# Patient Record
Sex: MALE | Race: WHITE | Employment: OTHER | URBAN - METROPOLITAN AREA
[De-identification: names, ages, dates, MRNs, and addresses within clinical notes are randomized per-mention and may not be internally consistent; named-entity substitution may affect disease eponyms.]

---

## 2020-01-01 ENCOUNTER — HOSPITAL ENCOUNTER (OUTPATIENT)
Dept: LAB | Age: 75
Discharge: HOME | End: 2020-11-23
Payer: MEDICARE

## 2020-01-01 ENCOUNTER — OFFICE VISIT (OUTPATIENT)
Dept: CARDIOLOGY CLINIC | Age: 75
End: 2020-01-01
Payer: MEDICARE

## 2020-01-01 ENCOUNTER — HOSPITAL ENCOUNTER (OUTPATIENT)
Dept: GENERAL RADIOLOGY | Age: 75
Discharge: HOME | End: 2020-12-01
Attending: INTERNAL MEDICINE
Payer: MEDICARE

## 2020-01-01 ENCOUNTER — HOSPITAL ENCOUNTER (OUTPATIENT)
Dept: NON INVASIVE DIAGNOSTICS | Age: 75
Discharge: HOME | End: 2020-11-24
Attending: INTERNAL MEDICINE
Payer: MEDICARE

## 2020-01-01 ENCOUNTER — TELEPHONE (OUTPATIENT)
Dept: PULMONOLOGY | Age: 75
End: 2020-01-01

## 2020-01-01 ENCOUNTER — OFFICE VISIT (OUTPATIENT)
Dept: FAMILY MEDICINE CLINIC | Age: 75
End: 2020-01-01
Payer: MEDICARE

## 2020-01-01 ENCOUNTER — DOCUMENTATION ONLY (OUTPATIENT)
Dept: PULMONOLOGY | Age: 75
End: 2020-01-01

## 2020-01-01 VITALS
DIASTOLIC BLOOD PRESSURE: 60 MMHG | SYSTOLIC BLOOD PRESSURE: 110 MMHG | BODY MASS INDEX: 18.04 KG/M2 | HEIGHT: 68 IN | WEIGHT: 119 LBS

## 2020-01-01 VITALS
DIASTOLIC BLOOD PRESSURE: 67 MMHG | RESPIRATION RATE: 18 BRPM | HEART RATE: 98 BPM | TEMPERATURE: 98.7 F | SYSTOLIC BLOOD PRESSURE: 121 MMHG | BODY MASS INDEX: 18.19 KG/M2 | WEIGHT: 120 LBS | HEIGHT: 68 IN | OXYGEN SATURATION: 98 %

## 2020-01-01 VITALS
HEART RATE: 93 BPM | DIASTOLIC BLOOD PRESSURE: 62 MMHG | HEIGHT: 68 IN | SYSTOLIC BLOOD PRESSURE: 98 MMHG | OXYGEN SATURATION: 97 % | BODY MASS INDEX: 18.04 KG/M2 | WEIGHT: 119 LBS

## 2020-01-01 DIAGNOSIS — J47.0 BRONCHIECTASIS WITH ACUTE LOWER RESPIRATORY INFECTION (HCC): ICD-10-CM

## 2020-01-01 DIAGNOSIS — R06.09 DOE (DYSPNEA ON EXERTION): ICD-10-CM

## 2020-01-01 DIAGNOSIS — R91.8 PULMONARY INFILTRATE: ICD-10-CM

## 2020-01-01 DIAGNOSIS — R06.09 DYSPNEA ON EXERTION: ICD-10-CM

## 2020-01-01 DIAGNOSIS — R09.02 HYPOXIA: ICD-10-CM

## 2020-01-01 DIAGNOSIS — R91.8 LUNG MASS: ICD-10-CM

## 2020-01-01 DIAGNOSIS — R13.10 DYSPHAGIA, UNSPECIFIED TYPE: Primary | ICD-10-CM

## 2020-01-01 DIAGNOSIS — R91.8 PULMONARY INFILTRATES: ICD-10-CM

## 2020-01-01 DIAGNOSIS — J30.9 ALLERGIC RHINITIS, UNSPECIFIED SEASONALITY, UNSPECIFIED TRIGGER: ICD-10-CM

## 2020-01-01 DIAGNOSIS — R13.12 OROPHARYNGEAL DYSPHAGIA: Primary | ICD-10-CM

## 2020-01-01 DIAGNOSIS — R06.02 SOB (SHORTNESS OF BREATH): Primary | ICD-10-CM

## 2020-01-01 DIAGNOSIS — R13.10 DYSPHAGIA, UNSPECIFIED TYPE: ICD-10-CM

## 2020-01-01 DIAGNOSIS — J84.9 ILD (INTERSTITIAL LUNG DISEASE) (HCC): ICD-10-CM

## 2020-01-01 DIAGNOSIS — R06.09 DYSPNEA ON EXERTION: Primary | ICD-10-CM

## 2020-01-01 DIAGNOSIS — R53.81 PHYSICAL DECONDITIONING: ICD-10-CM

## 2020-01-01 LAB
A FUMIGATUS1 AB SER QL ID: NEGATIVE
A PULLULANS AB SER QL: NEGATIVE
ACE SERPL-CCNC: 15 U/L (ref 14–82)
C-ANCA TITR SER IF: NORMAL TITER
CENTROMERE B AB SER-ACNC: <0.2 AI (ref 0–0.9)
CHROMATIN AB SERPL-ACNC: <0.2 AI (ref 0–0.9)
CK SERPL-CCNC: 132 U/L (ref 39–308)
CRP SERPL-MCNC: <0.3 MG/DL (ref 0–0.3)
DSDNA AB SER-ACNC: <1 IU/ML (ref 0–9)
ECHO AO ROOT DIAM: 3.3 CM
ECHO LV INTERNAL DIMENSION DIASTOLIC: 4.97 CM (ref 4.2–5.9)
ECHO LV INTERNAL DIMENSION SYSTOLIC: 4.05 CM
ECHO LV IVSD: 0.99 CM (ref 0.6–1)
ECHO LV MASS 2D: 177.8 G (ref 88–224)
ECHO LV MASS INDEX 2D: 108.4 G/M2 (ref 49–115)
ECHO LV POSTERIOR WALL DIASTOLIC: 0.99 CM (ref 0.6–1)
ECHO LVOT DIAM: 2.29 CM
ECHO LVOT PEAK GRADIENT: 2.33 MMHG
ECHO LVOT PEAK VELOCITY: 76.25 CM/S
ECHO LVOT SV: 53.5 ML
ECHO LVOT VTI: 13.02 CM
ECHO MV A VELOCITY: 66.11 CM/S
ECHO MV E DECELERATION TIME (DT): 224.71 MS
ECHO MV E VELOCITY: 36.18 CM/S
ECHO MV E/A RATIO: 0.55
ECHO TV REGURGITANT MAX VELOCITY: 314.05 CM/S
ECHO TV REGURGITANT PEAK GRADIENT: 39.45 MMHG
ENA JO1 AB SER-ACNC: <0.2 AI (ref 0–0.9)
ENA RNP AB SER-ACNC: 0.2 AI (ref 0–0.9)
ENA SCL70 AB SER-ACNC: <0.2 AI (ref 0–0.9)
ENA SM AB SER-ACNC: <0.2 AI (ref 0–0.9)
ENA SM+RNP AB SER-ACNC: <0.2 AI (ref 0–0.9)
ENA SS-A AB SER-ACNC: <0.2 AI (ref 0–0.9)
ENA SS-B AB SER-ACNC: <0.2 AI (ref 0–0.9)
ERYTHROCYTE [SEDIMENTATION RATE] IN BLOOD: 31 MM/HR (ref 0–20)
GALACTOMANNAN AG SPEC IA-ACNC: 0.03 INDEX (ref 0–0.49)
LACEYELLA SACCHARI AB SER QL: NEGATIVE
LVOT MG: 1.34 MMHG
M TB IFN-G BLD-IMP: NEGATIVE
MYELOPEROXIDASE AB SER IA-ACNC: <9 U/ML (ref 0–9)
P-ANCA ATYPICAL TITR SER IF: NORMAL TITER
P-ANCA TITR SER IF: NORMAL TITER
PIGEON SERUM AB QL ID: NEGATIVE
PROTEINASE3 AB SER IA-ACNC: <3.5 U/ML (ref 0–3.5)
QUANTIFERON CRITERIA, QFI1T: NORMAL
QUANTIFERON INCUBATION, QF1T: NORMAL
QUANTIFERON MITOGEN VALUE: 6.29 IU/ML
QUANTIFERON NIL VALUE: 0.02 IU/ML
QUANTIFERON TB1 AG: 0.03 IU/ML
QUANTIFERON TB2 AG: 0.02 IU/ML
RHEUMATOID FACT SERPL-ACNC: 11.7 IU/ML (ref 0–13.9)
RIBOSOMAL P AB SER-ACNC: <0.2 AI (ref 0–0.9)
S RECTIVIRGULA AB SER QL ID: NEGATIVE
SEE BELOW:, 164879: NORMAL
T VULGARIS AB SER QL ID: NEGATIVE

## 2020-01-01 PROCEDURE — 82550 ASSAY OF CK (CPK): CPT

## 2020-01-01 PROCEDURE — 86606 ASPERGILLUS ANTIBODY: CPT

## 2020-01-01 PROCEDURE — G8536 NO DOC ELDER MAL SCRN: HCPCS | Performed by: NURSE PRACTITIONER

## 2020-01-01 PROCEDURE — 86431 RHEUMATOID FACTOR QUANT: CPT

## 2020-01-01 PROCEDURE — G8419 CALC BMI OUT NRM PARAM NOF/U: HCPCS | Performed by: NURSE PRACTITIONER

## 2020-01-01 PROCEDURE — 85652 RBC SED RATE AUTOMATED: CPT

## 2020-01-01 PROCEDURE — G0463 HOSPITAL OUTPT CLINIC VISIT: HCPCS | Performed by: NURSE PRACTITIONER

## 2020-01-01 PROCEDURE — 99203 OFFICE O/P NEW LOW 30 MIN: CPT | Performed by: INTERNAL MEDICINE

## 2020-01-01 PROCEDURE — 86225 DNA ANTIBODY NATIVE: CPT

## 2020-01-01 PROCEDURE — 93000 ELECTROCARDIOGRAM COMPLETE: CPT | Performed by: INTERNAL MEDICINE

## 2020-01-01 PROCEDURE — 74011000255 HC RX REV CODE- 255: Performed by: INTERNAL MEDICINE

## 2020-01-01 PROCEDURE — 74230 X-RAY XM SWLNG FUNCJ C+: CPT

## 2020-01-01 PROCEDURE — 86480 TB TEST CELL IMMUN MEASURE: CPT

## 2020-01-01 PROCEDURE — 87305 ASPERGILLUS AG IA: CPT

## 2020-01-01 PROCEDURE — 96374 THER/PROPH/DIAG INJ IV PUSH: CPT

## 2020-01-01 PROCEDURE — G8427 DOCREV CUR MEDS BY ELIG CLIN: HCPCS | Performed by: INTERNAL MEDICINE

## 2020-01-01 PROCEDURE — G8432 DEP SCR NOT DOC, RNG: HCPCS | Performed by: NURSE PRACTITIONER

## 2020-01-01 PROCEDURE — 92611 MOTION FLUOROSCOPY/SWALLOW: CPT

## 2020-01-01 PROCEDURE — G8427 DOCREV CUR MEDS BY ELIG CLIN: HCPCS | Performed by: NURSE PRACTITIONER

## 2020-01-01 PROCEDURE — 1101F PT FALLS ASSESS-DOCD LE1/YR: CPT | Performed by: INTERNAL MEDICINE

## 2020-01-01 PROCEDURE — 86140 C-REACTIVE PROTEIN: CPT

## 2020-01-01 PROCEDURE — 82164 ANGIOTENSIN I ENZYME TEST: CPT

## 2020-01-01 PROCEDURE — G8432 DEP SCR NOT DOC, RNG: HCPCS | Performed by: INTERNAL MEDICINE

## 2020-01-01 PROCEDURE — 1101F PT FALLS ASSESS-DOCD LE1/YR: CPT | Performed by: NURSE PRACTITIONER

## 2020-01-01 PROCEDURE — 99213 OFFICE O/P EST LOW 20 MIN: CPT | Performed by: NURSE PRACTITIONER

## 2020-01-01 PROCEDURE — 74011000250 HC RX REV CODE- 250: Performed by: INTERNAL MEDICINE

## 2020-01-01 PROCEDURE — G8536 NO DOC ELDER MAL SCRN: HCPCS | Performed by: INTERNAL MEDICINE

## 2020-01-01 PROCEDURE — 36415 COLL VENOUS BLD VENIPUNCTURE: CPT

## 2020-01-01 PROCEDURE — G8419 CALC BMI OUT NRM PARAM NOF/U: HCPCS | Performed by: INTERNAL MEDICINE

## 2020-01-01 PROCEDURE — 83520 IMMUNOASSAY QUANT NOS NONAB: CPT

## 2020-01-01 RX ORDER — SODIUM CHLORIDE 9 MG/ML
10 INJECTION INTRAMUSCULAR; INTRAVENOUS; SUBCUTANEOUS
Status: COMPLETED | OUTPATIENT
Start: 2020-01-01 | End: 2020-01-01

## 2020-01-01 RX ADMIN — BARIUM SULFATE 90 G: 960 POWDER, FOR SUSPENSION ORAL at 13:23

## 2020-01-01 RX ADMIN — SODIUM CHLORIDE 10 ML: 9 INJECTION INTRAMUSCULAR; INTRAVENOUS; SUBCUTANEOUS at 13:13

## 2020-01-01 RX ADMIN — BARIUM SULFATE 30 ML: 400 SUSPENSION ORAL at 13:23

## 2020-01-01 RX ADMIN — BARIUM SULFATE 30 ML: 400 PASTE ORAL at 13:23

## 2020-01-01 RX ADMIN — BARIUM SULFATE 30 ML: 400 SUSPENSION ORAL at 13:22

## 2020-03-16 ENCOUNTER — TELEPHONE (OUTPATIENT)
Dept: FAMILY MEDICINE CLINIC | Age: 75
End: 2020-03-16

## 2020-09-03 ENCOUNTER — OFFICE VISIT (OUTPATIENT)
Dept: FAMILY MEDICINE CLINIC | Age: 75
End: 2020-09-03

## 2020-09-03 VITALS
WEIGHT: 120 LBS | HEART RATE: 108 BPM | HEIGHT: 68 IN | TEMPERATURE: 98.6 F | RESPIRATION RATE: 22 BRPM | DIASTOLIC BLOOD PRESSURE: 66 MMHG | SYSTOLIC BLOOD PRESSURE: 125 MMHG | BODY MASS INDEX: 18.19 KG/M2 | OXYGEN SATURATION: 93 %

## 2020-09-03 DIAGNOSIS — R05.3 CHRONIC COUGH: ICD-10-CM

## 2020-09-03 DIAGNOSIS — I21.3 ST ELEVATION MYOCARDIAL INFARCTION (STEMI), UNSPECIFIED ARTERY (HCC): ICD-10-CM

## 2020-09-03 DIAGNOSIS — J30.9 ALLERGIC RHINITIS, UNSPECIFIED SEASONALITY, UNSPECIFIED TRIGGER: ICD-10-CM

## 2020-09-03 DIAGNOSIS — R35.0 URINARY FREQUENCY: Primary | ICD-10-CM

## 2020-09-03 DIAGNOSIS — I25.10 CORONARY ARTERY DISEASE INVOLVING NATIVE CORONARY ARTERY OF NATIVE HEART WITHOUT ANGINA PECTORIS: ICD-10-CM

## 2020-09-03 NOTE — PATIENT INSTRUCTIONS
Rhinitis: Care Instructions Your Care Instructions Rhinitis is swelling and irritation in the nose. Allergies and infections are often the cause. Your nose may run or feel stuffy. Other symptoms are itchy and sore eyes, ears, throat, and mouth. If allergies are the cause, your doctor may do tests to find out what you are allergic to. You may be able to stop symptoms if you avoid the things that cause them. Your doctor may suggest or prescribe medicine to ease your symptoms. Follow-up care is a key part of your treatment and safety. Be sure to make and go to all appointments, and call your doctor if you are having problems. It's also a good idea to know your test results and keep a list of the medicines you take. How can you care for yourself at home? · If your rhinitis is caused by allergies, try to find out what sets off (triggers) your symptoms. Take steps to avoid these triggers. ? Avoid yard work. It can stir up both pollen and mold. ? Do not smoke or allow others to smoke around you. If you need help quitting, talk to your doctor about stop-smoking programs and medicines. These can increase your chances of quitting for good. ? Do not use aerosol sprays, cleaning products, or perfumes. ? If pollen is one of your triggers, close your house and car windows during blooming season. ? Clean your house often to control dust. 
? Keep pets outside. · If your doctor recommends over-the-counter medicines to relieve symptoms, take your medicines exactly as prescribed. Call your doctor if you think you are having a problem with your medicine. · Use saline (saltwater) nasal washes to help keep your nasal passages open and wash out mucus and bacteria. You can buy saline nose drops at a grocery store or drugstore. Or you can make your own at home by adding 1 teaspoon of salt and 1 teaspoon of baking soda to 2 cups of distilled water.  If you make your own, fill a bulb syringe with the solution, insert the tip into your nostril, and squeeze gently. Marina Jennifer your nose. When should you call for help? Call your doctor now or seek immediate medical care if: 
· You are having trouble breathing. Watch closely for changes in your health, and be sure to contact your doctor if: · Mucus from your nose gets thicker (like pus) or has new blood in it. · You have new or worse symptoms. · You do not get better as expected. Where can you learn more? Go to http://www.gray.com/ Enter M030 in the search box to learn more about \"Rhinitis: Care Instructions. \" Current as of: July 29, 2019               Content Version: 12.5 © 2546-0686 [employer]. Care instructions adapted under license by [employer] (which disclaims liability or warranty for this information). If you have questions about a medical condition or this instruction, always ask your healthcare professional. [place] [address] any warranty or liability for your use of this information. Frequent Urination: Care Instructions Your Care Instructions An urge to urinate frequently but usually passing only small amounts of urine is a common symptom of urinary problems, such as urinary tract infections. The bladder may become inflamed. This can cause the urge to urinate. You may try to urinate more often than usual to try to soothe that urge. Frequent urination also may be caused by sexually transmitted infections (STIs) or kidney stones. Or it may happen when something irritates the tube that carries urine from the bladder to the outside of the body (urethra). It may also be a sign of diabetes. The cause may be hard to find. You may need tests. Follow-up care is a key part of your treatment and safety. Be sure to make and go to all appointments, and call your doctor if you are having problems. It's also a good idea to know your test results and keep a list of the medicines you take. How can you care for yourself at home? · Drink extra water for the next day or two. This will help make the urine less concentrated. (If you have kidney, heart, or liver disease and have to limit fluids, talk with your doctor before you increase the amount of fluids you drink.) · Avoid drinks that are carbonated or have caffeine. They can irritate the bladder. For women: · Urinate right after you have sex. · After you go to the bathroom, wipe from front to back. · Avoid douches, bubble baths, and feminine hygiene sprays. And avoid other feminine hygiene products that have deodorants. When should you call for help? Call your doctor now or seek immediate medical care if: 
  · You have new symptoms, such as fever, nausea, or vomiting.  
  · You have new or worse symptoms of a urinary problem. For example: ? You have blood or pus in your urine. ? You have chills or body aches. ? It hurts to urinate. ? You have groin or belly pain. ? You have pain in your back just below your rib cage (the flank area). Watch closely for changes in your health, and be sure to contact your doctor if you feel thirstier than usual. 
Where can you learn more? Go to http://www.gray.com/ Enter 486 4371 in the search box to learn more about \"Frequent Urination: Care Instructions. \" Current as of: June 29, 2020               Content Version: 12.6 © 2292-4436 [employer]. Care instructions adapted under license by [employer] (which disclaims liability or warranty for this information). If you have questions about a medical condition or this instruction, always ask your healthcare professional. [place] [address] any warranty or liability for your use of this information.

## 2020-09-03 NOTE — PROGRESS NOTES
General Office Visit Note Patient:  Radha Ruvalcaba Subjective:  
 
Lorna Moran is a 67 y.o. y.o. male who complains of  
Chief Complaint Patient presents with  Weight Loss Pt presents with a host of past medical problems. Pt is deaf using a [id number] krishna on his phone and accompanied by his sister Muna Dos Santos. Pt's currently c/o chronic rhinitis with dry cough, and PND. Pt was previously seen by an allergist and told he has a grass allergy. Pt denies SOB, fever/chills, sinus pressure, headache, or CP. Pt and sister are also concerned with pt's difficulty eating and weight loss. Pt has lost about 10-20 lbs over the last 2-3 months and has decaying teeth. Other concerns include left knee arthritis, with loss of ROM, and mobility (pt ambulates with a rolling walker). Pt denies pain, was seen by PT for gait and ambulation in the past.   
 
Pt also c/o urinary frequency, nocturia, dribbling, and trouble starting stream. He has never seen a urologist but would like a referral. 
 
Cardiovascular disease previously managed by Dr. Natalee Jeter at [facility], Pulmonary managed by Dr. Teri Aguilera with [facility], ENT with Dr. Carole Perez, and Urology with Dr. Nathan Griggs. Past Medical History:  
Diagnosis Date  CAD (coronary artery disease) MI 1995; stent 2003 (done preop cochlear sgy)  Cancer ([facility] [address] SQUAMOUS CELL  
 Deafness  Myocardial infarction [facility]) [id number]  Scarlet fever age 9 Past Surgical History:  
Procedure Laterality Date  BRONCH-FIBER/DIAGNOSTIC  6/16/2016  HX COLONOSCOPY  2013  
 neg  HX CORONARY STENT PLACEMENT  2003 [facility] [address] CATHETERIZATION  [address]  HX HERNIA REPAIR    
 inguinal ? laterality  HX OTHER SURGICAL    
 2 cochlear implants (R); 1 left - all failed Social History Socioeconomic History  Marital status: SINGLE Spouse name: Not on file  Number of children: Not on file  Years of education: Not on file  Highest education level: Not on file Tobacco Use  Smoking status: Never Smoker  Smokeless tobacco: Never Used Substance and Sexual Activity  Alcohol use: No  
 Drug use: No  
 
Current Outpatient Medications Medication Sig Dispense Refill  ascorbic acid, vitamin C, (VITAMIN C) 500 mg tablet Take  by mouth.  multivitamin (ONE A DAY) tablet Take 1 Tab by mouth daily.  glucosamine-chondroitin (ARTHX) 500-400 mg cap Take 1 Cap by mouth daily.  ibuprofen (MOTRIN) 200 mg tablet Take 600 mg by mouth as needed for Pain.  fluticasone propionate (FLONASE NA) by Nasal route.  aspirin delayed-release 81 mg tablet Take  by mouth daily. No Known Allergies The patient has a family history of REVIEW OF SYSTEMS Review of Systems Constitutional: Positive for weight loss. Negative for chills and fever. HENT: Positive for hearing loss. Negative for congestion, sinus pain and sore throat. Respiratory: Positive for cough. Negative for sputum production, shortness of breath and wheezing. Cardiovascular: Negative for chest pain and leg swelling. Musculoskeletal: Positive for falls and joint pain (left knee). Objective:  
 
Visit Vitals /66 (BP 1 Location: Left arm, BP Patient Position: Sitting) Pulse (!) 108 Temp 98.6 °F (37 °C) (Temporal) Resp 22 Ht 5' 8\" (1.727 m) Wt 120 lb (54.4 kg) SpO2 93% BMI 18.25 kg/m² Current Outpatient Medications Medication Instructions  ascorbic acid, vitamin C, (VITAMIN C) 500 mg tablet Oral  
 aspirin delayed-release 81 mg tablet Oral, DAILY  fluticasone propionate (FLONASE NA) Nasal  
 glucosamine-chondroitin (ARTHX) 500-400 mg cap 1 Cap, Oral, DAILY  ibuprofen (MOTRIN) 600 mg, Oral, AS NEEDED  
 multivitamin (ONE A DAY) tablet 1 Tab, Oral, DAILY PHYSICAL EXAM 
Physical Exam 
Vitals signs and nursing note reviewed. Constitutional:   
   Appearance: Normal appearance. HENT:  
   Head: Normocephalic and atraumatic. Right Ear: Tympanic membrane normal.  
   Left Ear: Tympanic membrane normal.  
   Nose: Rhinorrhea present. Mouth/Throat:  
   Mouth: Mucous membranes are moist.  
   Dentition: Abnormal dentition. Gingival swelling and dental caries present. Eyes:  
   Extraocular Movements: Extraocular movements intact. Pupils: Pupils are equal, round, and reactive to light. Neck: Musculoskeletal: Normal range of motion and neck supple. Cardiovascular:  
   Rate and Rhythm: Tachycardia present. Rhythm regularly irregular. Pulses: Normal pulses. Heart sounds: Normal heart sounds, S1 normal and S2 normal.  
Pulmonary:  
   Effort: Pulmonary effort is normal.  
   Breath sounds: Normal breath sounds. Abdominal:  
   General: Abdomen is flat. Bowel sounds are normal.  
   Palpations: Abdomen is soft. Musculoskeletal:  
   Left knee: He exhibits decreased range of motion. Skin: 
   General: Skin is warm and dry. Capillary Refill: Capillary refill takes less than 2 seconds. Neurological:  
   General: No focal deficit present. Mental Status: He is alert and oriented to person, place, and time. Mental status is at baseline. Gait: Gait abnormal.  
Psychiatric:     
   Mood and Affect: Mood normal.     
   Behavior: Behavior normal.  
 
 
 
Assessment/Plan:  
 
Diagnoses and all orders for this visit: 
 
1. Urinary frequency -     PSA, TOTAL &  FREE; Future 
-     REFERRAL TO UROLOGY 2. Coronary artery disease involving native coronary artery of native heart without angina pectoris,s/p stent [id number] 
-     METABOLIC PANEL, BASIC; Future -     CBC WITH AUTOMATED DIFF; Future -     LIPID PANEL; Future 
-     REFERRAL TO CARDIOLOGY 3. ST elevation myocardial infarction (STEMI), unspecified artery ([facility] [address] -     METABOLIC PANEL, BASIC; Future -     CBC WITH AUTOMATED DIFF; Future -     LIPID PANEL; Future 
-     REFERRAL TO CARDIOLOGY 4. Allergic rhinitis, unspecified seasonality, unspecified trigger 
-     REFERRAL TO ENT-OTOLARYNGOLOGY 5. Chronic cough 
-     REFERRAL TO PULMONARY DISEASE Follow-up and Dispositions · Return in about 1 month (around 10/3/2020) for [employer], Labs. Disclaimer: 
 
I have discussed the diagnosis with the patient and the intended plan as seen above. The patient understands our medical plan. The risks, benefits and significant side effects of all medications have been reviewed. Anticipated time course and progression of condition reviewed. All questions have been addressed. He received an after visit summary, with information reviewed, and questions answered. Where appropriate, he is instructed to call the clinic if he has not been notified either by phone or through [address] with the results of his tests or with an appointment plan for any referrals within 1 week(s). The patient  is to call if his condition worsens or fails to improve or if significant side effects are experienced.   
 
 
Mary Rader NP

## 2020-09-24 ENCOUNTER — HOSPITAL ENCOUNTER (OUTPATIENT)
Dept: LAB | Age: 75
Discharge: HOME | End: 2020-09-24
Payer: MEDICARE

## 2020-09-24 ENCOUNTER — APPOINTMENT (OUTPATIENT)
Dept: FAMILY MEDICINE CLINIC | Age: 75
End: 2020-09-24

## 2020-09-24 DIAGNOSIS — I21.3 ST ELEVATION MYOCARDIAL INFARCTION (STEMI), UNSPECIFIED ARTERY (HCC): ICD-10-CM

## 2020-09-24 DIAGNOSIS — I25.10 CORONARY ARTERY DISEASE INVOLVING NATIVE CORONARY ARTERY OF NATIVE HEART WITHOUT ANGINA PECTORIS: ICD-10-CM

## 2020-09-24 DIAGNOSIS — R35.0 URINARY FREQUENCY: ICD-10-CM

## 2020-09-24 LAB
ANION GAP SERPL CALC-SCNC: 4 MMOL/L (ref 3–18)
BASOPHILS # BLD: 0 K/UL (ref 0–0.1)
BASOPHILS NFR BLD: 0 % (ref 0–2)
BUN SERPL-MCNC: 31 MG/DL (ref 7–18)
BUN/CREAT SERPL: 62 (ref 12–20)
CALCIUM SERPL-MCNC: 9 MG/DL (ref 8.5–10.1)
CHLORIDE SERPL-SCNC: 104 MMOL/L (ref 100–111)
CHOLEST SERPL-MCNC: 138 MG/DL
CO2 SERPL-SCNC: 31 MMOL/L (ref 21–32)
CREAT SERPL-MCNC: 0.5 MG/DL (ref 0.6–1.3)
DIFFERENTIAL METHOD BLD: ABNORMAL
EOSINOPHIL # BLD: 0.1 K/UL (ref 0–0.4)
EOSINOPHIL NFR BLD: 1 % (ref 0–5)
ERYTHROCYTE [DISTWIDTH] IN BLOOD BY AUTOMATED COUNT: 13.8 % (ref 11.6–14.5)
GLUCOSE SERPL-MCNC: 104 MG/DL (ref 74–99)
HCT VFR BLD AUTO: 44.1 % (ref 36–48)
HDLC SERPL-MCNC: 66 MG/DL (ref 40–60)
HDLC SERPL: 2.1 {RATIO} (ref 0–5)
HGB BLD-MCNC: 14.4 G/DL (ref 13–16)
LDLC SERPL CALC-MCNC: 61.4 MG/DL (ref 0–100)
LIPID PROFILE,FLP: ABNORMAL
LYMPHOCYTES # BLD: 0.6 K/UL (ref 0.9–3.6)
LYMPHOCYTES NFR BLD: 8 % (ref 21–52)
MCH RBC QN AUTO: 29.9 PG (ref 24–34)
MCHC RBC AUTO-ENTMCNC: 32.7 G/DL (ref 31–37)
MCV RBC AUTO: 91.5 FL (ref 74–97)
MONOCYTES # BLD: 0.9 K/UL (ref 0.05–1.2)
MONOCYTES NFR BLD: 13 % (ref 3–10)
NEUTS SEG # BLD: 5.7 K/UL (ref 1.8–8)
NEUTS SEG NFR BLD: 78 % (ref 40–73)
PLATELET # BLD AUTO: 210 K/UL (ref 135–420)
PMV BLD AUTO: 10.6 FL (ref 9.2–11.8)
POTASSIUM SERPL-SCNC: 4.4 MMOL/L (ref 3.5–5.5)
RBC # BLD AUTO: 4.82 M/UL (ref 4.7–5.5)
SODIUM SERPL-SCNC: 139 MMOL/L (ref 136–145)
TRIGL SERPL-MCNC: 53 MG/DL (ref ?–150)
VLDLC SERPL CALC-MCNC: 10.6 MG/DL
WBC # BLD AUTO: 7.3 K/UL (ref 4.6–13.2)

## 2020-09-24 PROCEDURE — 84154 ASSAY OF PSA FREE: CPT

## 2020-09-24 PROCEDURE — 80048 BASIC METABOLIC PNL TOTAL CA: CPT

## 2020-09-24 PROCEDURE — 80061 LIPID PANEL: CPT

## 2020-09-24 PROCEDURE — 36415 COLL VENOUS BLD VENIPUNCTURE: CPT

## 2020-09-24 PROCEDURE — 85025 COMPLETE CBC W/AUTO DIFF WBC: CPT

## 2020-09-25 LAB
PSA FREE MFR SERPL: 33.3 %
PSA FREE SERPL-MCNC: 0.3 NG/ML
PSA SERPL-MCNC: 0.9 NG/ML (ref 0–4)

## 2020-10-08 ENCOUNTER — HOSPITAL ENCOUNTER (EMERGENCY)
Age: 75
Discharge: HOME | End: 2020-10-09
Attending: EMERGENCY MEDICINE
Payer: MEDICARE

## 2020-10-08 ENCOUNTER — OFFICE VISIT (OUTPATIENT)
Dept: FAMILY MEDICINE CLINIC | Age: 75
End: 2020-10-08
Payer: MEDICARE

## 2020-10-08 ENCOUNTER — APPOINTMENT (OUTPATIENT)
Dept: CT IMAGING | Age: 75
End: 2020-10-08
Attending: STUDENT IN AN ORGANIZED HEALTH CARE EDUCATION/TRAINING PROGRAM
Payer: MEDICARE

## 2020-10-08 ENCOUNTER — APPOINTMENT (OUTPATIENT)
Dept: GENERAL RADIOLOGY | Age: 75
End: 2020-10-08
Attending: EMERGENCY MEDICINE
Payer: MEDICARE

## 2020-10-08 VITALS
TEMPERATURE: 98.4 F | SYSTOLIC BLOOD PRESSURE: 134 MMHG | BODY MASS INDEX: 18.19 KG/M2 | WEIGHT: 120 LBS | HEART RATE: 115 BPM | OXYGEN SATURATION: 94 % | RESPIRATION RATE: 20 BRPM | HEIGHT: 68 IN | DIASTOLIC BLOOD PRESSURE: 65 MMHG

## 2020-10-08 VITALS
DIASTOLIC BLOOD PRESSURE: 57 MMHG | HEART RATE: 95 BPM | RESPIRATION RATE: 30 BRPM | SYSTOLIC BLOOD PRESSURE: 121 MMHG | TEMPERATURE: 98.1 F | OXYGEN SATURATION: 96 %

## 2020-10-08 DIAGNOSIS — Z23 ENCOUNTER FOR IMMUNIZATION: ICD-10-CM

## 2020-10-08 DIAGNOSIS — Z00.00 MEDICARE ANNUAL WELLNESS VISIT, SUBSEQUENT: ICD-10-CM

## 2020-10-08 DIAGNOSIS — R05.3 CHRONIC COUGH: ICD-10-CM

## 2020-10-08 DIAGNOSIS — Z23 NEEDS FLU SHOT: ICD-10-CM

## 2020-10-08 DIAGNOSIS — Z00.00 ROUTINE MEDICAL EXAM: Primary | ICD-10-CM

## 2020-10-08 DIAGNOSIS — R13.10 DYSPHAGIA, UNSPECIFIED TYPE: Primary | ICD-10-CM

## 2020-10-08 DIAGNOSIS — R63.4 WEIGHT LOSS, NON-INTENTIONAL: ICD-10-CM

## 2020-10-08 DIAGNOSIS — Z23 NEED FOR PROPHYLACTIC VACCINATION AGAINST STREPTOCOCCUS PNEUMONIAE (PNEUMOCOCCUS) AND INFLUENZA: ICD-10-CM

## 2020-10-08 DIAGNOSIS — R91.8 MASS OF UPPER LOBE OF RIGHT LUNG: ICD-10-CM

## 2020-10-08 DIAGNOSIS — J18.9 PNEUMONIA OF RIGHT UPPER LOBE DUE TO INFECTIOUS ORGANISM: ICD-10-CM

## 2020-10-08 DIAGNOSIS — R06.09 DYSPNEA ON EXERTION: ICD-10-CM

## 2020-10-08 DIAGNOSIS — R09.82 POSTNASAL DRIP: ICD-10-CM

## 2020-10-08 LAB
ALBUMIN SERPL-MCNC: 3.3 G/DL (ref 3.4–5)
ALBUMIN/GLOB SERPL: 0.6 {RATIO} (ref 0.8–1.7)
ALP SERPL-CCNC: 43 U/L (ref 45–117)
ALT SERPL-CCNC: 24 U/L (ref 16–61)
ANION GAP SERPL CALC-SCNC: 0 MMOL/L (ref 3–18)
AST SERPL-CCNC: 19 U/L (ref 10–38)
BASOPHILS # BLD: 0 K/UL (ref 0–0.1)
BASOPHILS NFR BLD: 0 % (ref 0–2)
BILIRUB SERPL-MCNC: 0.5 MG/DL (ref 0.2–1)
BUN SERPL-MCNC: 36 MG/DL (ref 7–18)
BUN/CREAT SERPL: 56 (ref 12–20)
CALCIUM SERPL-MCNC: 9.3 MG/DL (ref 8.5–10.1)
CHLORIDE SERPL-SCNC: 102 MMOL/L (ref 100–111)
CK MB CFR SERPL CALC: 4 % (ref 0–4)
CK MB SERPL-MCNC: 4.5 NG/ML (ref 5–25)
CK SERPL-CCNC: 113 U/L (ref 39–308)
CO2 SERPL-SCNC: 36 MMOL/L (ref 21–32)
CREAT SERPL-MCNC: 0.64 MG/DL (ref 0.6–1.3)
DIFFERENTIAL METHOD BLD: ABNORMAL
EOSINOPHIL # BLD: 0.1 K/UL (ref 0–0.4)
EOSINOPHIL NFR BLD: 1 % (ref 0–5)
ERYTHROCYTE [DISTWIDTH] IN BLOOD BY AUTOMATED COUNT: 13.8 % (ref 11.6–14.5)
GLOBULIN SER CALC-MCNC: 5.2 G/DL (ref 2–4)
GLUCOSE SERPL-MCNC: 98 MG/DL (ref 74–99)
HCT VFR BLD AUTO: 42.7 % (ref 36–48)
HGB BLD-MCNC: 13.6 G/DL (ref 13–16)
LYMPHOCYTES # BLD: 0.8 K/UL (ref 0.9–3.6)
LYMPHOCYTES NFR BLD: 9 % (ref 21–52)
MAGNESIUM SERPL-MCNC: 2.6 MG/DL (ref 1.6–2.6)
MCH RBC QN AUTO: 29.2 PG (ref 24–34)
MCHC RBC AUTO-ENTMCNC: 31.9 G/DL (ref 31–37)
MCV RBC AUTO: 91.6 FL (ref 74–97)
MONOCYTES # BLD: 0.3 K/UL (ref 0.05–1.2)
MONOCYTES NFR BLD: 4 % (ref 3–10)
NEUTS SEG # BLD: 7.4 K/UL (ref 1.8–8)
NEUTS SEG NFR BLD: 86 % (ref 40–73)
PLATELET # BLD AUTO: 231 K/UL (ref 135–420)
PMV BLD AUTO: 9.6 FL (ref 9.2–11.8)
POTASSIUM SERPL-SCNC: 4.8 MMOL/L (ref 3.5–5.5)
PROT SERPL-MCNC: 8.5 G/DL (ref 6.4–8.2)
RBC # BLD AUTO: 4.66 M/UL (ref 4.7–5.5)
SODIUM SERPL-SCNC: 138 MMOL/L (ref 136–145)
TROPONIN I SERPL-MCNC: <0.02 NG/ML (ref 0–0.04)
WBC # BLD AUTO: 8.6 K/UL (ref 4.6–13.2)

## 2020-10-08 PROCEDURE — 1101F PT FALLS ASSESS-DOCD LE1/YR: CPT | Performed by: NURSE PRACTITIONER

## 2020-10-08 PROCEDURE — 93005 ELECTROCARDIOGRAM TRACING: CPT

## 2020-10-08 PROCEDURE — 99285 EMERGENCY DEPT VISIT HI MDM: CPT

## 2020-10-08 PROCEDURE — 71260 CT THORAX DX C+: CPT

## 2020-10-08 PROCEDURE — 90732 PPSV23 VACC 2 YRS+ SUBQ/IM: CPT

## 2020-10-08 PROCEDURE — G8536 NO DOC ELDER MAL SCRN: HCPCS | Performed by: NURSE PRACTITIONER

## 2020-10-08 PROCEDURE — 82550 ASSAY OF CK (CPK): CPT

## 2020-10-08 PROCEDURE — 96374 THER/PROPH/DIAG INJ IV PUSH: CPT

## 2020-10-08 PROCEDURE — 71045 X-RAY EXAM CHEST 1 VIEW: CPT

## 2020-10-08 PROCEDURE — 80053 COMPREHEN METABOLIC PANEL: CPT

## 2020-10-08 PROCEDURE — G8419 CALC BMI OUT NRM PARAM NOF/U: HCPCS | Performed by: NURSE PRACTITIONER

## 2020-10-08 PROCEDURE — G8432 DEP SCR NOT DOC, RNG: HCPCS | Performed by: NURSE PRACTITIONER

## 2020-10-08 PROCEDURE — 74011250636 HC RX REV CODE- 250/636: Performed by: STUDENT IN AN ORGANIZED HEALTH CARE EDUCATION/TRAINING PROGRAM

## 2020-10-08 PROCEDURE — G8427 DOCREV CUR MEDS BY ELIG CLIN: HCPCS | Performed by: NURSE PRACTITIONER

## 2020-10-08 PROCEDURE — G0463 HOSPITAL OUTPT CLINIC VISIT: HCPCS | Performed by: NURSE PRACTITIONER

## 2020-10-08 PROCEDURE — 74011250636 HC RX REV CODE- 250/636: Performed by: EMERGENCY MEDICINE

## 2020-10-08 PROCEDURE — 74011000250 HC RX REV CODE- 250: Performed by: STUDENT IN AN ORGANIZED HEALTH CARE EDUCATION/TRAINING PROGRAM

## 2020-10-08 PROCEDURE — 90694 VACC AIIV4 NO PRSRV 0.5ML IM: CPT

## 2020-10-08 PROCEDURE — G0439 PPPS, SUBSEQ VISIT: HCPCS | Performed by: NURSE PRACTITIONER

## 2020-10-08 PROCEDURE — 74011000636 HC RX REV CODE- 636: Performed by: EMERGENCY MEDICINE

## 2020-10-08 PROCEDURE — 85025 COMPLETE CBC W/AUTO DIFF WBC: CPT

## 2020-10-08 PROCEDURE — 83735 ASSAY OF MAGNESIUM: CPT

## 2020-10-08 RX ORDER — SULFAMETHOXAZOLE AND TRIMETHOPRIM 800; 160 MG/1; MG/1
1 TABLET ORAL 2 TIMES DAILY
Qty: 14 TAB | Refills: 0 | Status: SHIPPED | OUTPATIENT
Start: 2020-10-08 | End: 2020-10-12

## 2020-10-08 RX ADMIN — WATER 1 G: 1 INJECTION INTRAMUSCULAR; INTRAVENOUS; SUBCUTANEOUS at 22:45

## 2020-10-08 RX ADMIN — IOPAMIDOL 80 ML: 755 INJECTION, SOLUTION INTRAVENOUS at 20:05

## 2020-10-08 RX ADMIN — SODIUM CHLORIDE 1000 ML: 900 INJECTION, SOLUTION INTRAVENOUS at 17:37

## 2020-10-08 NOTE — PROGRESS NOTES
This is the Subsequent Medicare Annual Wellness Exam, performed 12 months or more after the Initial AWV or the last Subsequent AWV I have reviewed the patient's medical history in detail and updated the computerized patient record. Discussed the purpose of ACP with patient and asked pt to be prepared to discuss planning on subsequent yearly wellness visit. This includes the name of his/her authorized decision maker, care preferences during hospitalization, and resuscitation specifications. Gina Gustafson NP 
10/8/2020 History Patient Active Problem List  
Diagnosis Code  Coronary artery disease involving native coronary artery of native heart without angina pectoris,s/p stent 2003 I25.10  Scarlet fever A38.9  Myocardial infarction (HCC) I21.9  Urinary frequency R35.0 Past Medical History:  
Diagnosis Date  CAD (coronary artery disease) MI 1995; stent 2003 (done preop cochlear sgy)  Cancer ([facility] [address] SQUAMOUS CELL  
 Deafness  Myocardial infarction [facility]) [id number]  Scarlet fever age 9 Past Surgical History:  
Procedure Laterality Date  BRONCH-FIBER/DIAGNOSTIC  6/16/2016  HX COLONOSCOPY  2013  
 neg  HX CORONARY STENT PLACEMENT  2003 [facility] [address] [address] CATHETERIZATION  [address]  HX HERNIA REPAIR    
 inguinal ? laterality  HX OTHER SURGICAL    
 2 cochlear implants (R); 1 left - all failed Current Outpatient Medications Medication Sig Dispense Refill  fluticasone propionate (FLONASE NA) by Nasal route.  ascorbic acid, vitamin C, (VITAMIN C) 500 mg tablet Take  by mouth.  multivitamin (ONE A DAY) tablet Take 1 Tab by mouth daily.  aspirin delayed-release 81 mg tablet Take  by mouth daily.  glucosamine-chondroitin (ARTHX) 500-400 mg cap Take 1 Cap by mouth daily.  ibuprofen (MOTRIN) 200 mg tablet Take 600 mg by mouth as needed for Pain. No Known Allergies Family History Problem Relation Age of Onset  No Known Problems Mother  Heart Disease Father  Heart Attack Father   
     46  Coronary Artery Disease Father  Hypertension Father  High Cholesterol Father Social History Tobacco Use  Smoking status: Never Smoker  Smokeless tobacco: Never Used Substance Use Topics  Alcohol use: No  
 
 
Depression Risk Factor Screening:  
 
3 most recent PHQ Screens 10/8/2020 Little interest or pleasure in doing things Not at all Feeling down, depressed, irritable, or hopeless Not at all Total Score PHQ 2 0 Alcohol Risk Screen Do you average more than 1 drink per night or more than 7 drinks a week: No 
 
In the past three months have you have had more than 4 drinks containing alcohol on one occasion: No 
 
 
 
Functional Ability and Level of Safety:  
Hearing: The patient wears hearing aids. Pt is deaf and uses a phone krishna to communicate Activities of Daily Living: The home contains: handrails and grab bars Patient does total self care Ambulation: with mild difficulty and uses a rolling walker Fall Risk: 
Fall Risk Assessment, last 12 mths 10/8/2020 Able to walk? Yes Fall in past 12 months? No  
 
Abuse Screen: 
Patient is not abused Cognitive Screening Has your family/caregiver stated any concerns about your memory: no 
  
Cognitive Screening: A+Ox3 Patient Care Team  
Patient Care Team: 
Tierra Dillard MD as PCP - General (Internal Medicine) Luis Saldana MD (Orthopedic Surgery) Vangie Pyle MD (Cardiology) Katlin Dubon MD (Pulmonary Disease) Assessment/Plan Education and counseling provided: 
Are appropriate based on today's review and evaluation Diagnoses and all orders for this visit: 1. Need for prophylactic vaccination against Streptococcus pneumoniae (pneumococcus) and influenza -     ADMIN PNEUMOCOCCAL VACCINE 
 
2. Encounter for immunization -     ADMIN PNEUMOCOCCAL VACCINE 3. Medicare annual wellness visit, subsequent 4. Routine medical exam 
 
5. Dyspnea on exertion Health Maintenance Due Topic Date Due  
 DTaP/Tdap/Td series (1 - Tdap) 06/25/1965  Shingrix Vaccine Age 50> (1 of 2) 06/25/1994  Pneumococcal 65+ years (1 of 1 - PPSV23) 06/25/2009  GLAUCOMA SCREENING Q2Y  08/31/2018  Medicare Yearly Exam  07/06/2020  Flu Vaccine (1) 09/01/2020

## 2020-10-08 NOTE — PROGRESS NOTES
Mike Carbone presents today for No chief complaint on file. Is someone accompanying this pt? Yes, sister Is the patient using any DME equipment during OV? walker Depression Screening: 
3 most recent PHQ Screens 10/8/2020 Little interest or pleasure in doing things Not at all Feeling down, depressed, irritable, or hopeless Not at all Total Score PHQ 2 0 Learning Assessment: 
Learning Assessment 6/6/2016 PRIMARY LEARNER  PRIMARY LANGUAGE ENGLISH  
LEARNER PREFERENCE PRIMARY DEMONSTRATION  
  READING  
ANSWERED BY Christie Melgar RELATIONSHIP OTHER Abuse Screening: No flowsheet data found. Fall Risk Fall Risk Assessment, last 12 mths 9/3/2020 Able to walk? Yes Fall in past 12 months? No  
 
 
ADL ADL Assessment 9/3/2020 Feeding yourself No Help Needed Getting from bed to chair No Help Needed Getting dressed No Help Needed Bathing or showering No Help Needed Walk across the room (includes cane/walker) No Help Needed Using the telphone Help Needed Taking your medications No Help Needed Preparing meals No Help Needed Managing money (expenses/bills) Help Needed Moderately strenuous housework (laundry) Help Needed Shopping for personal items (toiletries/medicines) Help Needed Shopping for groceries Help Needed Driving Help Needed Climbing a flight of stairs Help Needed Getting to places beyond walking distances Help Needed Health Maintenance reviewed and discussed and ordered per Provider. Health Maintenance Due Topic Date Due  
 DTaP/Tdap/Td series (1 - Tdap) 06/25/1965  Shingrix Vaccine Age 50> (1 of 2) 06/25/1994  Pneumococcal 65+ years (1 of 1 - PPSV23) 06/25/2009  GLAUCOMA SCREENING Q2Y  08/31/2018  Medicare Yearly Exam  07/06/2020  Flu Vaccine (1) 09/01/2020 [place] Coordination of Care: 1.  Have you been to the ER, urgent care clinic since your last visit? noHospitalized since your last visit? no 
 
 2. Have you seen or consulted any other health care providers outside of the [address] [place] [address] since your last visit? Include any pap smears or colon screening. tona 
Viry Feliz is a 67 y.o. male who presents for routine immunizations. He denies any symptoms , reactions or allergies that would exclude them from being immunized today. Risks and adverse reactions were discussed and the VIS was given to them. All questions were addressed. He was observed for 15 min post injection. There were no reactions observed. Verbal order for Flu and PPSV 23 IM received per Len Villagran NP for pt Viry Feliz. Order written down and read back to provider for verification prior to completion.

## 2020-10-08 NOTE — PROGRESS NOTES
Subjective:  
 
Lynda Ahn is a 67 y.o. male presenting for annual exam and complete physical. 
 
Patient Active Problem List  
Diagnosis Code  Coronary artery disease involving native coronary artery of native heart without angina pectoris,s/p stent 2003 I25.10  Scarlet fever A38.9  Myocardial infarction (HCC) I21.9  Urinary frequency R35.0 Current Outpatient Medications Medication Sig Dispense Refill  fluticasone propionate (FLONASE NA) by Nasal route.  ascorbic acid, vitamin C, (VITAMIN C) 500 mg tablet Take  by mouth.  multivitamin (ONE A DAY) tablet Take 1 Tab by mouth daily.  aspirin delayed-release 81 mg tablet Take  by mouth daily.  glucosamine-chondroitin (ARTHX) 500-400 mg cap Take 1 Cap by mouth daily.  ibuprofen (MOTRIN) 200 mg tablet Take 600 mg by mouth as needed for Pain. No Known Allergies Lab Results Component Value Date/Time WBC 7.3 09/24/2020 10:04 AM  
 HGB 14.4 09/24/2020 10:04 AM  
 HCT 44.1 09/24/2020 10:04 AM  
 PLATELET 567 83/82/8179 10:04 AM  
 MCV 91.5 09/24/2020 10:04 AM  
 
Lab Results Component Value Date/Time Cholesterol, total 138 09/24/2020 10:04 AM  
 HDL Cholesterol 66 (H) 09/24/2020 10:04 AM  
 LDL, calculated 61.4 09/24/2020 10:04 AM  
 Triglyceride 53 09/24/2020 10:04 AM  
 CHOL/HDL Ratio 2.1 09/24/2020 10:04 AM  
 
Lab Results Component Value Date/Time Sodium 139 09/24/2020 10:04 AM  
 Potassium 4.4 09/24/2020 10:04 AM  
 Chloride 104 09/24/2020 10:04 AM  
 CO2 31 09/24/2020 10:04 AM  
 Anion gap 4 09/24/2020 10:04 AM  
 Glucose 104 (H) 09/24/2020 10:04 AM  
 BUN 31 (H) 09/24/2020 10:04 AM  
 Creatinine 0.50 (L) 09/24/2020 10:04 AM  
 BUN/Creatinine ratio 62 (H) 09/24/2020 10:04 AM  
 GFR est AA >60 09/24/2020 10:04 AM  
 GFR est non-AA >60 09/24/2020 10:04 AM  
 Calcium 9.0 09/24/2020 10:04 AM  
   
 
Review of Systems Pertinent items are noted in HPI. Objective:  
 
Visit Vitals /65 Pulse (!) 125 Temp 98.4 °F (36.9 °C) (Temporal) Resp 20 Ht 5' 8\" (1.727 m) Wt 120 lb (54.4 kg) SpO2 91% BMI 18.25 kg/m² Physical exam:  
General appearance - alert, well appearing, and in moderate distress and oriented to person, place, and time Mouth - mucous membranes moist, pharynx normal without lesions and dental hygiene poor Chest - Congestions, in bilateral lung fields to auscultation, rales or rhonchi noted on inspiration and expiration. Pt has chronic cough and has a referral to pulmonary. Heart - tachycardiac Musculoskeletal - Joint tenderness and severe arthritis in bilateral knees Assessment/Plan: Pt will be sent to [facility] emergency department for further evaluation. ICD-10-CM ICD-9-CM 1. Routine medical exam  Z00.00 V70.0 2. Medicare annual wellness visit, subsequent  Z00.00 V70.0 3. Need for prophylactic vaccination against Streptococcus pneumoniae (pneumococcus) and influenza  Z23 V06.6 ADMIN PNEUMOCOCCAL VACCINE  
4. Encounter for immunization  Z23 V03.89 ADMIN PNEUMOCOCCAL VACCINE  
5. Dyspnea on exertion  R06.00 786.09   
6. Needs flu shot  Z23 V04.81 FLU (FLUAD QUAD INFLUENZA VACCINE,QUAD,ADJUVANTED) Maureen Olivera

## 2020-10-08 NOTE — ED NOTES
Attending Physician Note for Supervision of  
Resident Physician Care of Patient I have personally seen and examined this patient. I have fully participated in the care of this patient. I have reviewed all pertinent clinical information, including history, physical exam and plan. Physical exam was performed by the [address] under my direct supervision. I, as the supervising physician, was at the bedside to perform a direct face-to-face PMH, ROS, HPI, and physical examination of the patient in addition to the resident physician's evaluation. Diagnostic Study Results Abnormal lab results from this emergency department encounter: 
Labs Reviewed CBC WITH AUTOMATED DIFF  
METABOLIC PANEL, COMPREHENSIVE MAGNESIUM  
CARDIAC PANEL,(CK, CKMB & TROPONIN) Lab values for this patient within approximately the last 12 hours: 
No results found for this or any previous visit (from the past 12 hour(s)). Radiologist and cardiologist interpretations if available at time of this note: No results found. Medication(s) ordered for patient during this emergency visit encounter: 
Medications - No data to display Medical Decision Making I am the first provider for this patient. I reviewed the vital signs, available nursing notes, past medical history, past surgical history, family history and social history. Vital Signs:  Reviewed the patient's vital signs. Physician comments:  
Per sister at bedside, the patient moved to the area in 2013 has had a chronic cough since then that usually with exertion. He states he had pulmonary work-up with Dr. Blanca Santana in the past regarding it but did not find any thing. However that was several years ago. He is brought to the ER today for increased cough as well as low blood pressure that was noted with tachycardia in his primary care physician's office today.   Primary care physician's office also noted a wheeze that concerned him as well and thus wanted him evaluated in the emergency department. On initial MD exam the patient is longer wheezing. He is clearly noticed distress and no respiratory distress in particular. Of note family notes that the patient has very poor dentition it is been affecting his ability to eat. I believe that his hypotension and tachycardia today was secondary to his decreased p.o. intake. Signout Note Pt care transferred to Dr. Yayo Flores  ,ED provider. History of patient complaint(s), available diagnostic reports and current treatment plan has been discussed thoroughly. Bedside rounding on patient occured : no . Intended disposition of patient:  Discharge. Pending diagnostics reports and/or labs (please list): CTA chest and probable discharge home. Diane Davalos M.D. DAVID Board Certified Emergency Physician

## 2020-10-08 NOTE — ED TRIAGE NOTES
Spouse also reports pt is also reports difficulty swallowing that started in March and also states that the pt is in need of teeth and has loss weight

## 2020-10-08 NOTE — PATIENT INSTRUCTIONS
Vaccine Information Statement Pneumococcal Polysaccharide Vaccine (PPSV23): What You Need to Know Many Vaccine Information Statements are available in Faroese and other languages. See www.immunize.org/vis Hojas de información sobre vacunas están disponibles en español y en muchos otros idiomas. Visite www.immunize.org/vis 1. Why get vaccinated? Pneumococcal polysaccharide vaccine (PPSV23) can prevent pneumococcal disease. Pneumococcal disease refers to any illness caused by pneumococcal bacteria. These bacteria can cause many types of illnesses, including pneumonia, which is an infection of the lungs. Pneumococcal bacteria are one of the most common causes of pneumonia. Besides pneumonia, pneumococcal bacteria can also cause: 
 Ear infections  Sinus infections  Meningitis (infection of the tissue covering the brain and spinal cord)  Bacteremia (bloodstream infection) Anyone can get pneumococcal disease, but children under 3years of age, people with certain medical conditions, adults 72 years or older, and cigarette smokers are at the highest risk. Most pneumococcal infections are mild. However, some can result in long-term problems, such as brain damage or hearing loss. Meningitis, bacteremia, and pneumonia caused by pneumococcal disease can be fatal.  
 
2. PPSV23  
 
PPSV23 protects against 23 types of bacteria that cause pneumococcal disease. PPSV23 is recommended for:  All adults 72 years or older,  Anyone 2 years or older with certain medical conditions that can lead to an increased risk for pneumococcal disease. Most people need only one dose of PPSV23. A second dose of PPSV23, and another type of pneumococcal vaccine called PCV13, are recommended for certain high-risk groups. Your health care provider can give you more information.  
 
People 65 years or older should get a dose of PPSV23 even if they have already gotten one or more doses of the vaccine before they turned 65. 
 
3. Talk with your health care provider Tell your vaccine provider if the person getting the vaccine: 
 Has had an allergic reaction after a previous dose of PPSV23, or has any severe, life-threatening allergies. In some cases, your health care provider may decide to postpone PPSV23 vaccination to a future visit. People with minor illnesses, such as a cold, may be vaccinated. People who are moderately or severely ill should usually wait until they recover before getting PPSV23. Your health care provider can give you more information. 4. Risks of a vaccine reaction  Redness or pain where the shot is given, feeling tired, fever, or muscle aches can happen after PPSV23. People sometimes faint after medical procedures, including vaccination. Tell your provider if you feel dizzy or have vision changes or ringing in the ears. As with any medicine, there is a very remote chance of a vaccine causing a severe allergic reaction, other serious injury, or death. 5. What if there is a serious problem? An allergic reaction could occur after the vaccinated person leaves the clinic. If you see signs of a severe allergic reaction (hives, swelling of the face and throat, difficulty breathing, a fast heartbeat, dizziness, or weakness), call 9-1-1 and get the person to the nearest hospital. 
 
For other signs that concern you, call your health care provider. Adverse reactions should be reported to the Vaccine Adverse Event Reporting System (VAERS). Your health care provider will usually file this report, or you can do it yourself. Visit the VAERS website at www.vaers. hhs.gov or call 3-501.956.5620. VAERS is only for reporting reactions, and VAERS staff do not give medical advice. 6. How can I learn more?  Ask your health care provider.  Call your local or state health department.  Contact the Centers for Disease Control and Prevention (CDC): 
- Call 6-975.451.6038 (1-800-CDC-INFO) or 
- Visit CDCs website at www.cdc.gov/vaccines Vaccine Information Statement PPSV23  
10/30/2019 Department of [facility] and [place] Centers for Disease Control and Prevention Office Use Only

## 2020-10-08 NOTE — ED PROVIDER NOTES
Mr. Vince Barragan is a 53-year-old male with past medical history significant for coronary artery disease and deafness who presents with chronic cough and 10 pound weight loss over the last few months. She was sent by his PCP with concern for coarse breath sounds, tachycardia, tachypnea, hypoxia. History is obtained by a combination of speaking with the patient's sister and via a voice to text krishna. Patient has a chronic cough that the patient sister thinks is secondary to postnasal drip. Cough described as dry, nonproductive, and worse with movement. Additionally, the patient has lost about 10 pounds since January. The patient and his sister attribute this to a combination of decreased appetite/p.o. intake and difficulty swallowing secondary to poor dentition. Patient denies any fevers, chills, sore throat, chest pain, abdominal pain, nausea/vomiting, bowel/bladder problems. Past Medical History:  
Diagnosis Date  CAD (coronary artery disease) MI 1995; stent 2003 (done preop cochlear sgy)  Cancer ([facility] [address] SQUAMOUS CELL  
 Deafness  Myocardial infarction [facility]) [id number]  Scarlet fever age 9 Past Surgical History:  
Procedure Laterality Date  BRONCH-FIBER/DIAGNOSTIC  6/16/2016  HX COLONOSCOPY  2013  
 neg  HX CORONARY STENT PLACEMENT  2003 [facility] [address] CATHETERIZATION  [address]  HX HERNIA REPAIR    
 inguinal ? laterality  HX OTHER SURGICAL    
 2 cochlear implants (R); 1 left - all failed Family History:  
Problem Relation Age of Onset  No Known Problems Mother  Heart Disease Father  Heart Attack Father   
     46  Coronary Artery Disease Father  Hypertension Father  High Cholesterol Father Social History Socioeconomic History  Marital status: SINGLE Spouse name: Not on file  Number of children: Not on file  Years of education: Not on file  Highest education level: Not on file Occupational History  Not on file Social Needs  Financial resource strain: Not on file  Food insecurity Worry: Not on file Inability: Not on file  Transportation needs Medical: Not on file Non-medical: Not on file Tobacco Use  Smoking status: Never Smoker  Smokeless tobacco: Never Used Substance and Sexual Activity  Alcohol use: No  
 Drug use: No  
 Sexual activity: Not on file Lifestyle  Physical activity Days per week: Not on file Minutes per session: Not on file  Stress: Not on file Relationships  Social connections Talks on phone: Not on file Gets together: Not on file Attends Jainism service: Not on file Active member of club or organization: Not on file Attends meetings of clubs or organizations: Not on file Relationship status: Not on file  Intimate partner violence Fear of current or ex partner: Not on file Emotionally abused: Not on file Physically abused: Not on file Forced sexual activity: Not on file Other Topics Concern  Not on file Social History Narrative  Not on file ALLERGIES: Patient has no known allergies. Review of Systems Constitutional: Positive for appetite change (Decreased appetite over the last several months) and unexpected weight change (10 pounds loss since January. ). Negative for chills and fever. HENT: Positive for hearing loss (Patient deaf), postnasal drip and trouble swallowing. Negative for sore throat. Respiratory: Positive for cough (Nonproductive). Negative for chest tightness, shortness of breath and wheezing. Cardiovascular: Negative for chest pain, palpitations and leg swelling. Gastrointestinal: Negative for abdominal pain, constipation, diarrhea, nausea and vomiting. Genitourinary: Negative for difficulty urinating, dysuria and hematuria. Neurological: Negative for dizziness, light-headedness and headaches. Vitals:  
 10/08/20 1715 10/08/20 1730 10/08/20 1745 10/08/20 1800 BP: (!) 100/44 (!) 113/58 (!) 105/49 (!) 116/56 Pulse: 87 93 81 85 Resp: 23 25 21 17 Temp:      
SpO2: 96% 95% 97% 98% Physical Exam 
Vitals signs and nursing note reviewed. Constitutional:   
   General: He is not in acute distress. Appearance: He is well-developed and underweight. He is not ill-appearing, toxic-appearing or diaphoretic. HENT:  
   Head: Normocephalic and atraumatic. Mouth/Throat:  
   Lips: Pink. Mouth: Mucous membranes are moist. No injury or lacerations. Dentition: Abnormal dentition (Poor dentition). Tongue: No lesions. Tongue does not deviate from midline. Pharynx: Oropharynx is clear. Uvula midline. No pharyngeal swelling or oropharyngeal exudate. Comments: Postnasal drip appreciated Eyes:  
   General: Lids are normal.  
Neck: Musculoskeletal: Normal range of motion. Trachea: Trachea and phonation normal.  
Cardiovascular:  
   Rate and Rhythm: Normal rate and regular rhythm. Pulses: Normal pulses. Heart sounds: Normal heart sounds. No murmur. Pulmonary:  
   Effort: Pulmonary effort is normal. No tachypnea or respiratory distress. Breath sounds: Examination of the right-upper field reveals rhonchi. Examination of the left-upper field reveals rhonchi. Examination of the right-middle field reveals rhonchi. Examination of the left-middle field reveals rhonchi. Examination of the right-lower field reveals rhonchi. Examination of the left-lower field reveals rhonchi. Rhonchi present. Abdominal:  
   General: Abdomen is flat. Bowel sounds are normal. There is no distension. Palpations: Abdomen is soft. Tenderness: There is no abdominal tenderness. Musculoskeletal:  
   Right lower leg: No edema. Left lower leg: No edema. Neurological: General: No focal deficit present. Mental Status: He is alert and oriented to person, place, and time. MDM Number of Diagnoses or Management Options Diagnosis management comments: Saw the patient after the attending physician evaluated and initiated orders (CBC, CMP, mag, cardiac panel, CXR). Patient is hemodynamically stable with blood pressure of 121/57, heart rate 95. Patient is charted respiratory rate elevated at 30, however this was during physical examination and maneuvering and felt likely to be erroneous. Patient 96 to 98% on room air while in the department and afebrile. Initial EKG showed sinus tachycardia with PAC. Rate 103 bpm..  Normal axis. UT interval 0.126, QTc 461. Q waves seen in V1, V2. No ST segment changes. T wave inversions in V1, V2. Repeat EKG looks similar, with now normalized to 90 bpm. 
 
6:59 PM: CBC shows normal white count, no anemia. CBC with normal electrolytes (section is elevated CO2 at 36). BUN elevated at 36, creatinine normal at 0.64. Chest chest x-ray shows right upper lung field opacity concerning for consolidation. This, combined with patient's diffusely rhonchorous lung exam, as well as his difficulty swallowing/weight loss, increases concern for malignancy. CTA chest ordered for further evaluation 7:46 PM: Patient and sister updated with results back thus far, as well as reasoning for CT scan. They understand and have no questions at this time. Magnesium within normal limits, troponin negative. 8:19 PM: Turned over to Dr. Kitty Zepeda, oncoming intern. CT read still pending, anticipate discharge home once official read is complete. Procedures

## 2020-10-09 LAB
ATRIAL RATE: 103 BPM
CALCULATED P AXIS, ECG09: 61 DEGREES
CALCULATED R AXIS, ECG10: -3 DEGREES
CALCULATED T AXIS, ECG11: 80 DEGREES
DIAGNOSIS, 93000: NORMAL
P-R INTERVAL, ECG05: 126 MS
Q-T INTERVAL, ECG07: 352 MS
QRS DURATION, ECG06: 84 MS
QTC CALCULATION (BEZET), ECG08: 461 MS
VENTRICULAR RATE, ECG03: 103 BPM

## 2020-10-09 NOTE — DISCHARGE INSTRUCTIONS
Ensure that you follow up with your primary care provider as soon as possible. Also call the pulmonologist, Dr. Manuela Lundy via contact info listed below for follow-up as soon as possible. Results of the CT scan of your chest are below:    Study Result     INDICATION: Consolidation seen on chest x-ray: Concern for possible malignancy     TECHNIQUE: Thin section noncontrast axial CT images of the chest with coronal  and sagittal reformatted images.     The following ct dose reduction techniques were utilized: automated exposure  control and/or adjustment of the mA and/or kV according to patient size, and the  use of iterative reconstruction technique      COMPARISON: CT chest May 24, 2016.     FINDINGS:     Lungs:   When compared with 2016 exam, chronic predominantly peripheral areas of  consolidation. Additional areas of increased interstitial lung markings. Tree-in-bud pattern bilateral lower lobes. No pleural effusion or pneumothorax.     New left lower lobe peripheral subpleural nodule measures 1.6 x 1.1 cm, axial  image 37 of series 2.     Within the right upper lobe, there is masslike consolidation of the posterior  right upper lobe measuring approximately 4.7 cm in craniocaudal dimension, 5 cm  in AP dimension and 7.7 cm in transverse dimension. Areas of central cavitation.     Tracheomegaly.     Heart and great vessels: Top normal heart size. Coronary and aortic  atherosclerosis.     Mediastinum and neck base: No significant hilar adenopathy. No axillary  adenopathy. No bulky mediastinal adenopathy.     Body wall/axilla: Within normal limits.     Limited upper abdomen: Within normal limits.      Osseous structures: No destructive osseous lesions. Mild, chronic compression  fracture deformity of the T11 vertebral body.      IMPRESSION  IMPRESSION:   Masslike consolidation of the peripheral posterior right upper lobe measures 4.7  x 5 x 7.7 cm with areas of central cavitation.  This could be  infectious/inflammatory in etiology versus neoplastic process.     When compared with 2016 exam, chronic peripheral opacities of the lungs and  bilateral lower lobe tree-in-bud opacities; suspect chronic infectious or  inflammatory process such as organizing pneumonia, chronic eosinophilic  pneumonia, obliterative bronchiolitis, etc...      Interval follow-up chest CT is recommended.     Consider nonemergent pulmonology consultation. Patient Education        Cough: Care Instructions  Your Care Instructions     A cough is your body's response to something that bothers your throat or airways. Many things can cause a cough. You might cough because of a cold or the flu, bronchitis, or asthma. Smoking, postnasal drip, allergies, and stomach acid that backs up into your throat also can cause coughs. A cough is a symptom, not a disease. Most coughs stop when the cause, such as a cold, goes away. You can take a few steps at home to cough less and feel better. Follow-up care is a key part of your treatment and safety. Be sure to make and go to all appointments, and call your doctor if you are having problems. It's also a good idea to know your test results and keep a list of the medicines you take. How can you care for yourself at home? · Drink lots of water and other fluids. This helps thin the mucus and soothes a dry or sore throat. Honey or lemon juice in hot water or tea may ease a dry cough. · Take cough medicine as directed by your doctor. · Prop up your head on pillows to help you breathe and ease a dry cough. · Try cough drops to soothe a dry or sore throat. Cough drops don't stop a cough. Medicine-flavored cough drops are no better than candy-flavored drops or hard candy. · Do not smoke. Avoid secondhand smoke. If you need help quitting, talk to your doctor about stop-smoking programs and medicines. These can increase your chances of quitting for good. When should you call for help?    Call 911 anytime you think you may need emergency care. For example, call if:    · You have severe trouble breathing. Call your doctor now or seek immediate medical care if:    · You cough up blood.     · You have new or worse trouble breathing.     · You have a new or higher fever.     · You have a new rash. Watch closely for changes in your health, and be sure to contact your doctor if:    · You cough more deeply or more often, especially if you notice more mucus or a change in the color of your mucus.     · You have new symptoms, such as a sore throat, an earache, or sinus pain.     · You do not get better as expected. Where can you learn more? Go to http://www.gray.com/  Enter D279 in the search box to learn more about \"Cough: Care Instructions. \"  Current as of: February 24, 2020               Content Version: 12.6  © 9826-5095 [employer]. Care instructions adapted under license by [employer] (which disclaims liability or warranty for this information). If you have questions about a medical condition or this instruction, always ask your healthcare professional. [place] [address] any warranty or liability for your use of this information. Patient Education        Learning About Lung Nodules  What is a lung nodule? A lung nodule is a growth in the lung. A single nodule surrounded by lung tissue is called a solitary pulmonary nodule. A lung nodule might not cause any symptoms. Your doctor may have found one or more nodules on your lung when you were having a chest X-ray or CT scan. Or it may have been found during a lung cancer screening. A lung nodule may be caused by an old infection or cancer. It might also be a noncancerous growth. Lung nodules can cause a screening to give an abnormal result. Most nodules do not cause any harm.  But without further tests, your doctor can't tell whether an abnormal finding is cancer, a harmless nodule, or something else. What can you expect when you have a lung nodule? Your doctor will look at several risk factors to see how likely it is that the nodule is cancer. He or she will look at:  · Whether you smoke or have ever smoked. · Your age and your family's medical history. · Whether you have ever had lung cancer. · The size, density, and other characteristics of the nodule. · Whether the nodule has changed in size. Your doctor may look at past chest X-rays or CT scans, if available, and compare them. Or you may have a series of CT scans to see if the nodule grows over time. What happens next depends on the risk of the nodule being cancer. · If you have no risk factors and the nodule is small, your doctor may advise doing nothing. · If the risk is small, your doctor may schedule follow-up appointments and CT scans later to see if the nodule is growing. · If there's a higher risk of cancer, your doctor may:  ? Do a PET scan, which may help tell if the nodule is cancerous or not. ? Take a sample of tissue from the nodule for testing. This is called a biopsy. ? Remove the nodule with surgery. Follow-up care is a key part of your treatment and safety. Be sure to make and go to all appointments, and call your doctor if you are having problems. It's also a good idea to know your test results and keep a list of the medicines you take. Where can you learn more? Go to http://www.gray.com/  Enter P430 in the search box to learn more about \"Learning About Lung Nodules. \"  Current as of: April 29, 2020               Content Version: 12.6  © 9209-7074 [employer]. Care instructions adapted under license by [employer] (which disclaims liability or warranty for this information).  If you have questions about a medical condition or this instruction, always ask your healthcare professional. Jane Prabhakar disclaims any warranty or liability for your use of this information.

## 2020-10-09 NOTE — PROGRESS NOTES
conducted an initial consultation and Spiritual Assessment for Samm Weiss, who is a 67 y.o.,male. Patient's Primary Language is: [place]. According to the patient's EMR Taoist Affiliation is: Islam. The reason the Patient came to the hospital is:  
Patient Active Problem List  
 Diagnosis Date Noted  Urinary frequency 06/30/2016  Scarlet fever  Myocardial infarction ([facility] [address]  Coronary artery disease involving native coronary artery of native heart without angina pectoris,s/p stent 2003 05/19/2016 The  provided the following Interventions: 
Initiated a relationship of care and support. Explored issues of con, belief, spirituality and Yarsani/ritual needs while hospitalized. Listened empathically. Provided chaplaincy education. Offered prayer and assurance of continued prayers on patient's behalf. Chart reviewed. The following outcomes where achieved: 
Patient expressed gratitude for 's visit. Assessment: 
There are no spiritual or Yarsani issues which require intervention at this time. Plan: 
Chaplains will continue to follow and will provide pastoral care on an as needed/requested basis. [address] Spiritual Care  
[phone]

## 2020-10-09 NOTE — ED NOTES
I have reviewed discharge instructions with the caregiver. The caregiver verbalized understanding. Caregiver assisted pt to car.

## 2020-10-09 NOTE — ED NOTES
Turned over to me by Dr. Kurt Ding for follow-up and disposition. Patient is a 58-year-old male who presents with shortness of breath and tachycardia. Awaiting CTA. If negative patient will be discharged home. CTA showed cavitary lesion and infiltrates. Discuss with his wife. Patient will follow-up with pulmonologist tomorrow. I have reassessed the patient. Patient is feeling well. Patient will be prescribed Bactrim. Patient was discharged in stable condition. Patient is to return to emergency department if any new or worsening condition.

## 2020-10-09 NOTE — ED NOTES
Mr. Rangel Poster turned over to me at shift change. Briefly 67year old male here no stable presenting after concerns for rhonchi, tachycardia, tachypnea, hypoxia here pending results of CT scan after CXR showed right upper lung field opacity concerning for consolidation. CT results show Masslike consolidation of the peripheral posterior right upper lobe measures 4.7 
x 5 x 7.7 cm with areas of central cavitation. This could be infectious/inflammatory in etiology versus neoplastic process. Interval follow-up chest CT is recommended. Consider nonemergent pulmonology consultation. Will plan to discharge patient home with recommendation to follow-up with pulmonology. As patient has infiltrates on CT will treat for pneumonia with 1 gram of ceftriaxone here prior to discharge. Will also prescribe bactrim. Have discussed all findings with the patient and his wife at bedside. They express understanding of the CT findings and the need to call pulmonologist as soon as possible for follow-up. I personally saw and examined the patient. I have reviewed and agree with the resident's findings, including all diagnostic interpretations, and plans as written. I was present during the key portions of separately billed procedures.    
[address], DO

## 2020-10-12 ENCOUNTER — OFFICE VISIT (OUTPATIENT)
Dept: PULMONOLOGY | Age: 75
End: 2020-10-12
Payer: MEDICARE

## 2020-10-12 VITALS
HEART RATE: 112 BPM | TEMPERATURE: 98.8 F | OXYGEN SATURATION: 93 % | DIASTOLIC BLOOD PRESSURE: 60 MMHG | RESPIRATION RATE: 18 BRPM | BODY MASS INDEX: 18.1 KG/M2 | WEIGHT: 119.4 LBS | SYSTOLIC BLOOD PRESSURE: 110 MMHG | HEIGHT: 68 IN

## 2020-10-12 DIAGNOSIS — J47.0 BRONCHIECTASIS WITH ACUTE LOWER RESPIRATORY INFECTION (HCC): ICD-10-CM

## 2020-10-12 DIAGNOSIS — R06.09 DYSPNEA ON EXERTION: ICD-10-CM

## 2020-10-12 DIAGNOSIS — R13.10 DYSPHAGIA, UNSPECIFIED TYPE: ICD-10-CM

## 2020-10-12 DIAGNOSIS — R91.8 PULMONARY INFILTRATES: ICD-10-CM

## 2020-10-12 DIAGNOSIS — R53.81 PHYSICAL DECONDITIONING: ICD-10-CM

## 2020-10-12 DIAGNOSIS — J84.9 ILD (INTERSTITIAL LUNG DISEASE) (HCC): ICD-10-CM

## 2020-10-12 DIAGNOSIS — J30.9 ALLERGIC RHINITIS, UNSPECIFIED SEASONALITY, UNSPECIFIED TRIGGER: ICD-10-CM

## 2020-10-12 DIAGNOSIS — R91.8 LUNG MASS: Primary | ICD-10-CM

## 2020-10-12 DIAGNOSIS — R09.02 HYPOXIA: ICD-10-CM

## 2020-10-12 PROCEDURE — 1101F PT FALLS ASSESS-DOCD LE1/YR: CPT | Performed by: INTERNAL MEDICINE

## 2020-10-12 PROCEDURE — 99205 OFFICE O/P NEW HI 60 MIN: CPT | Performed by: INTERNAL MEDICINE

## 2020-10-12 PROCEDURE — G8419 CALC BMI OUT NRM PARAM NOF/U: HCPCS | Performed by: INTERNAL MEDICINE

## 2020-10-12 PROCEDURE — G8432 DEP SCR NOT DOC, RNG: HCPCS | Performed by: INTERNAL MEDICINE

## 2020-10-12 PROCEDURE — G8536 NO DOC ELDER MAL SCRN: HCPCS | Performed by: INTERNAL MEDICINE

## 2020-10-12 PROCEDURE — G8427 DOCREV CUR MEDS BY ELIG CLIN: HCPCS | Performed by: INTERNAL MEDICINE

## 2020-10-12 RX ORDER — LEVOFLOXACIN 750 MG/1
750 TABLET ORAL DAILY
Qty: 14 TAB | Refills: 0 | Status: SHIPPED | OUTPATIENT
Start: 2020-10-12 | End: 2020-10-26

## 2020-10-12 NOTE — PROGRESS NOTES
The pt. Was diagnosed with pneumonia on a  recent ED visit. The pt. Is failure to thrive and cannot hear at all. His sister, Lani Hess, is his spokesperson. She uses a phone krishna \"Live Transcribe\" to communicater with him He hasn't had CoVID testing. He received a Flu and Pneumo vaccines while in the ED.

## 2020-10-12 NOTE — PROGRESS NOTES
[address] [facility], [place] 
[phone] [facility] Pulmonary, Critical Care, and Sleep Medicine Pulmonary Office Initial Consultation Name: Vinicius Jiménez 67 y.o. male MRN: [id number] : 1945 Service Date: 10/12/20 Referring Provider: Angel Silva,  [facility] [address] 
[facility], [address] Chief Complaint:  
Chief Complaint Patient presents with  Abnormal CT Scan Ref. by Dr. Philip Burks/ED  PCP Erika Carter NP CT 10/8 History of Present Illness:  N.B. Pt deaf, history mainly obtained from his sister who also helps care for him. Communication with patient via dictation software on phone Vinicius Jiménez is a 67 y.o. male, who presents to Pulmonary clinic referred for abnormal CT scan. Patient's sister reports that patient recently went to the ER a few days ago for worsening cough and weight loss. Patient had a CT scan done while there which showed a possible right upper lobe consolidation versus mass. Patient was given ceftriaxone and discharged on p.o. Bactrim. They report that patient's symptoms have not improved. With regards to symptoms, they report that the patient has a chronic cough, present for multiple months. Patient also has 10 pound weight loss. Cough is mainly nonproductive. Cough occurs throughout the day, no temporal association, no precipitating factors. They report no exacerbations with food intake. Sister reports that the patient does not eat much. He spends most of his time breathing. They also report no alleviating factors, no improvement with cough syrup, antibiotic, etc.  No other modifying factors. Patient is frail, dyspnea with mild exertion, however patient spends most of his day sedentary and stays at home alone, able to get around without issue. Able to perform ADLs. Patient is a lifelong non-smoker Patient has no occupational exposures to coal/silica/asbestos Denies hemoptysis, fevers, chills, night sweats, angina, LE swelling Past Medical Hx: I have personally reviewed medical hx Past Medical History:  
Diagnosis Date  CAD (coronary artery disease) MI 1995; stent 2003 (done preop cochlear sgy)  Cancer ([facility] [address] SQUAMOUS CELL  
 Deafness  Myocardial infarction [facility]) [id number]  Scarlet fever age 9 Past Surgical Hx: I have personally reviewed surgical hx Past Surgical History:  
Procedure Laterality Date  BRONCH-FIBER/DIAGNOSTIC  6/16/2016  HX COLONOSCOPY  2013  
 neg  HX CORONARY STENT PLACEMENT  2003 [address] CATHETERIZATION  [address]  HX HERNIA REPAIR    
 inguinal ? laterality  HX OTHER SURGICAL    
 2 cochlear implants (R); 1 left - all failed Family Hx: I have personally reviewed the family hx. Family History Problem Relation Age of Onset  No Known Problems Mother  Heart Disease Father  Heart Attack Father   
     46  Coronary Artery Disease Father  Hypertension Father  High Cholesterol Father Social Hx: I have personally reviewed the social hx. Social History Socioeconomic History  Marital status: SINGLE Spouse name: Not on file  Number of children: Not on file  Years of education: Not on file  Highest education level: Not on file Occupational History  Not on file Social Needs  Financial resource strain: Not on file  Food insecurity Worry: Not on file Inability: Not on file  Transportation needs Medical: Not on file Non-medical: Not on file Tobacco Use  Smoking status: Never Smoker  Smokeless tobacco: Never Used Substance and Sexual Activity  Alcohol use: No  
 Drug use: No  
 Sexual activity: Not on file Lifestyle  Physical activity Days per week: Not on file Minutes per session: Not on file  Stress: Not on file Relationships  Social connections Talks on phone: Not on file Gets together: Not on file Attends Caodaism service: Not on file Active member of club or organization: Not on file Attends meetings of clubs or organizations: Not on file Relationship status: Not on file  Intimate partner violence Fear of current or ex partner: Not on file Emotionally abused: Not on file Physically abused: Not on file Forced sexual activity: Not on file Other Topics Concern  Not on file Social History Narrative  Not on file Allergies: I have reviewed the allergy hx Allergies Allergen Reactions  Pollens Extract Runny Nose and Cough Medications:  I have reviewed the patient's medications Prior to Admission medications Medication Sig Start Date End Date Taking? Authorizing Provider OTHER 1 Cap daily. tumeric   Yes Provider, Historical  
trimethoprim-sulfamethoxazole (Bactrim DS) 160-800 mg per tablet Take 1 Tab by mouth two (2) times a day for 7 days. 10/8/20 10/15/20 Yes Diamond Bermudez MD  
fluticasone propionate (FLONASE NA) by Nasal route. Yes Provider, Historical  
ascorbic acid, vitamin C, (VITAMIN C) 500 mg tablet Take  by mouth. Yes Provider, Historical  
multivitamin (ONE A DAY) tablet Take 1 Tab by mouth daily. Yes Provider, Historical  
aspirin delayed-release 81 mg tablet Take  by mouth daily. Yes Provider, Historical  
ibuprofen (MOTRIN) 200 mg tablet Take 600 mg by mouth as needed for Pain. Yes Provider, Historical  
glucosamine-chondroitin (ARTHX) 500-400 mg cap Take 1 Cap by mouth daily. Provider, Historical  
 
 
Immunizations:  I have reviewed the patient's immunizations Immunization History Administered Date(s) Administered  Influenza High Dose Vaccine PF 10/06/2016  Influenza, Quadrivalent, Adjuvanted (>65 Yrs FLUAD QUAD [id number]) 10/08/2020  Pneumococcal Polysaccharide (PPSV-23) 10/08/2020 Review of Systems: A complete review of systems was performed as stated in the HPI, all others are negative. Objective: 
 
Physical Exam: 
/60 (BP 1 Location: Left arm, BP Patient Position: Sitting)   Pulse (!) 112   Temp 98.8 °F (37.1 °C)   Resp 18   Ht 5' 8\" (1.727 m)   Wt 54.2 kg (119 lb 6.4 oz)   SpO2 93%   BMI 18.15 kg/m² Vitals were personally reviewed Gen: no acute distress, pleasant and cooperative, sitting up in chair, frail, unable to hear, ambulates slowly with assistance, unable to climb up to exam table HEENT: normocephalic/atraumatic, PERRLA, EOMI, no scleral icterus, nasal turbinates have no erythema, nasal septum midline, no ocular or nasal drainage, no nasal polyps, no oral lesions, poor dentition, Mallampati IV Neck: supple, trachea midline, no JVD, no cervical and supraclavicular adenopathy Chest: no lesions, with even rise and fall, no pectus excavatum or flail chest 
CVS: regular rate rhythm, S1/S2, no murmurs/rubs/gallops Lungs: good air entry B/L, some scattered rhonchi, faint, no wheezes or rales throughout all lung fields Back: Mild kyphosis, no scoliosis Abdomen: soft, nontender, bowel sounds present, no hepatosplenomegaly Ext: no pitting edema B/L, no peripheral cyanosis or clubbing Neuro: grossly normal, AAOx3, globally weak, extremities have normal strength and coordination grossly, no focal deficits Skin: no rashes, erythema, lesions Psych: normal memory, thought content, and processing Labs: I have reviewed the patient's available labs Lab Results Component Value Date/Time WBC 8.6 10/08/2020 05:00 PM  
 HGB 13.6 10/08/2020 05:00 PM  
 HCT 42.7 10/08/2020 05:00 PM  
 PLATELET 887 00/22/4711 05:00 PM  
 MCV 91.6 10/08/2020 05:00 PM  
 
Lab Results Component Value Date/Time  Sodium 138 10/08/2020 05:00 PM  
 Potassium 4.8 10/08/2020 05:00 PM  
 Chloride 102 10/08/2020 05:00 PM  
 CO2 36 (H) 10/08/2020 05:00 PM  
 Anion gap 0 (L) 10/08/2020 05:00 PM  
 Glucose 98 10/08/2020 05:00 PM  
 BUN 36 (H) 10/08/2020 05:00 PM  
 Creatinine 0.64 10/08/2020 05:00 PM  
 BUN/Creatinine ratio 56 (H) 10/08/2020 05:00 PM  
 GFR est AA >60 10/08/2020 05:00 PM  
 GFR est non-AA >60 10/08/2020 05:00 PM  
 Calcium 9.3 10/08/2020 05:00 PM  
 Bilirubin, total 0.5 10/08/2020 05:00 PM  
 Alk. phosphatase 43 (L) 10/08/2020 05:00 PM  
 Protein, total 8.5 (H) 10/08/2020 05:00 PM  
 Albumin 3.3 (L) 10/08/2020 05:00 PM  
 Globulin 5.2 (H) 10/08/2020 05:00 PM  
 A-G Ratio 0.6 (L) 10/08/2020 05:00 PM  
 ALT (SGPT) 24 10/08/2020 05:00 PM  
 AST (SGOT) 19 10/08/2020 05:00 PM  
 
 
Outside records reviewed in clinic as follows: 
-ER note by Dr. Costa Nuno, reports that 69-year-old male with a history of CAD and deafness presented with chronic cough and 10 pound weight loss after the last few months, saw his PCP for coarse breath sounds, tachycardia, tachypnea, hypoxia, cough is dry, nonproductive, worse with movement, 10 pound weight loss. CT scan was ordered which showed nodules. Reviewed note from Dr. Lillie Delgado who noted that he would prescribe ceftriaxone and Bactrim, and refer the patient to pulmonary medicine for possible mass. 
-Last progress note by PCP from 9/3/2020, NP Garrison Burkitt, reports the patient was seen by an allergist and had a grass allergy, denies shortness of breath, fevers, chills. Patient sister reports difficulty eating and weight loss, 10 to 20 pound weight loss after the last 2 to 3 months. Also has left knee arthritis, and cardiovascular disease. Notes that the patient had seen pulmonary medicine Dr. Odette Turcios 
-Last pulmonary progress note by Dr. Raul Cruz from 10/6/2016, reports the patient presented for cough and weight loss, good appetite, gaining weight, chest pain relieved by water. Parenchymal pleural densities on CT imaging, did not show up on chest x-ray, advised to have follow-up chest x-ray in 6 months. Imaging:  I have personally reviewed patient's imaging as follows--CT chest with IV contrast from 10/8/2020 shows areas of patchy atelectasis and subpleural fibrotic changes with traction bronchiectasis, and tree-in-bud opacities in bilateral lower lobes, along with right upper lobe consolidation versus masslike appearance along with cavitation versus bulla. No other masses, consolidations, effusions seen. Official report per radiology: CT Results (most recent): 
Results from Hospital Encounter encounter on 10/08/20 CT CHEST W CONT Narrative INDICATION: Consolidation seen on chest x-ray: Concern for possible malignancy TECHNIQUE: Thin section noncontrast axial CT images of the chest with coronal 
and sagittal reformatted images. The following ct dose reduction techniques were utilized: automated exposure 
control and/or adjustment of the mA and/or kV according to patient size, and the 
use of iterative reconstruction technique COMPARISON: CT chest May 24, 2016. FINDINGS: 
 
Lungs:  
When compared with 2016 exam, chronic predominantly peripheral areas of 
consolidation. Additional areas of increased interstitial lung markings. Tree-in-bud pattern bilateral lower lobes. No pleural effusion or pneumothorax. New left lower lobe peripheral subpleural nodule measures 1.6 x 1.1 cm, axial 
image 37 of series 2. Within the right upper lobe, there is masslike consolidation of the posterior 
right upper lobe measuring approximately 4.7 cm in craniocaudal dimension, 5 cm 
in AP dimension and 7.7 cm in transverse dimension. Areas of central cavitation. Tracheomegaly. Heart and great vessels: Top normal heart size. Coronary and aortic 
atherosclerosis. Mediastinum and neck base: No significant hilar adenopathy. No axillary 
adenopathy. No bulky mediastinal adenopathy. Body wall/axilla: Within normal limits. Limited upper abdomen: Within normal limits. Osseous structures: No destructive osseous lesions. Mild, chronic compression 
fracture deformity of the T11 vertebral body. Impression IMPRESSION:  
Masslike consolidation of the peripheral posterior right upper lobe measures 4.7 
x 5 x 7.7 cm with areas of central cavitation. This could be 
infectious/inflammatory in etiology versus neoplastic process. When compared with 2016 exam, chronic peripheral opacities of the lungs and 
bilateral lower lobe tree-in-bud opacities; suspect chronic infectious or 
inflammatory process such as organizing pneumonia, chronic eosinophilic 
pneumonia, obliterative bronchiolitis, etc... Interval follow-up chest CT is recommended. Consider nonemergent pulmonology consultation. PFTs:  I have reviewed the patient's PFTs from 6/8/2016, spirometry shows severe obstructive defect, likely has mixed obstruction and restriction. No BD response. TTE:  I have reviewed the patient's TTE results No results found for this or any previous visit. Assessment and Plan: 
67 y.o. male with: 
 
Impression: 1. Abnormal CT scan of chest: Performed due to chronic cough. Right upper lobe shows masslike consolidation, concerning for malignancy versus infection. This is in the setting of underlying interstitial lung disease which has progressed. No apical basilar gradient, likely NSIP, underlying cause unclear. No smoking history to indicate RBILD/DIP, no pulmonary toxic medications. Consolidation may be possible infection. 2.  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 Bronchiectasis with lower respiratory tract infection 4. Deconditioning 5. Allergic rhinitis 6. Chronic cough: Etiology due to above, suspect from underlying ILD 7. Suspect dysphagia 8. Dyspnea on exertion/shortness of breath Plan: -Spent time in this clinic visit going over CT findings with patient and his sister present with him in today's visit. Advised patient that based on imaging, malignancy cannot be ruled out, however given appearance, this is not high on the differential.  Advised patient to have repeat CT scan in 3 months to look for interval change before attempting more invasive sampling. 
-Given appearance of consolidation with air bronchogram, will treat patient for pneumonia. Stop Bactrim prescribed in ER. Start Levaquin 750 mg p.o. once daily x14 days. Counseled patient regarding possible complications including tendon rupture, etc. 
-Ordered sputum to be induced, however due to COVID-19 pandemic, this procedure is not being performed currently at [facility]. Unfortunately given the patient's frailty, I would not pursue bronchoscopy unless absolutely necessary due to high risk of procedure for complications. In this setting, we will have to attempt noninvasive testing to rule out atypical infection, ordered galactomannan and Fungitell, along with gold QuantiFERON to rule out fungal infection or TB. May attempt induced sputum when possible 
-Full PFTs at next visit -6-minute walk test performed today in clinic, patient ambulated 408 m. No significant desaturations on room air, lowest SPO2 was 93% (starting SPO2 was 94%) -ILD work-up to include, CRP, ESR, ACE, CK, connective tissue work-up including WHITNEY cascade, ANCA, and HP panel 
-Given debility, will refer patient to pulmonary rehab 
-given dysphagia, modified barium swallow ordered to rule out overt aspiration 
-Transthoracic echo with bubble study 
-Consider graded exercise, physical therapy 
-Immunizations reviewed, influenza pneumococcal vaccinations up-to-date 
-Counseled patient regarding lifestyle precautions in COVID-19 pandemic including wearing mask in public and confined spaces, social/physical distancing, frequent hand hygiene, etc. 
 
 Follow-up and Dispositions · Return in about 3 months (around 1/12/2021). Orders Placed This Encounter  AMB POC PFT COMPLETE W/BRONCHODILATOR  AMB POC PFT COMPLETE W/O BRONCHODILATOR  
 GAS DILUT/WASHOUT LUNG VOL W/WO DISTRIB VENT&VOL  DIFFUSING CAPACITY  PRO PULMONARY STRESS TESTING  
 CULTURE, RESPIRATORY/SPUTUM/BRONCH W GRAM STAIN  
 CULTURE, FUNGUS  
 AFB CULTURE + SMEAR W/RFLX ID FROM CULTURE  
 QUANTIFERON-TB GOLD PLUS  
 CT CHEST WO CONT  XR SWALLOW FUNC VIDEO  ANCA PANEL  
 RHEUMATOID FACTOR, QL  
 SCLERODERMA (SCL-70) AB, IGG  
 SED RATE (ESR)  ANGIOTENSIN CONVERTING ENZYME  C REACTIVE PROTEIN, QT  
 CK  SJOGREN'S ABS, SSA AND SSB  WHITNEY COMPREHENSIVE PLUS PANEL  
 HYPERSENS PNEUMONITIS I  
 ASPERGILLUS GALACT AG  
 FUNGAL ABS, QT  
 REFERRAL TO PULMONARY REHAB  
 RT--INDUCE SPUTUM  
 OTHER  
 levoFLOXacin (LEVAQUIN) 750 mg tablet 60 minutes were spent in this patient encounter with greater than 50% of that time spent in face-to-face counseling regarding the above given time taken to dictate patient. Time was spent going over imaging and lesion found, and possible work-up. Time also spent in coordination of care contacting respiratory lab for induced sputum. Ariane Faye MD/MPH Pulmonary, Critical Care Medicine [facility]

## 2020-10-12 NOTE — LETTER
10/19/20 Patient: Yuval Pandya YOB: 1945 Date of Visit: 10/12/2020 Loida Moran NP 
[address] [address] VIA In Basket Johanne Hernandez, [address] [facility] [address] [facility] [address] VIA In Basket Dear LEORA Norton MD, Thank you for referring Mr. Suzanne Michaels to [address] [place] [address] for evaluation. My notes for this consultation are attached. If you have questions, please do not hesitate to call me. I look forward to following your patient along with you. Sincerely, Marifer Cruz MD

## 2020-10-13 ENCOUNTER — TELEPHONE (OUTPATIENT)
Dept: PULMONOLOGY | Age: 75
End: 2020-10-13

## 2020-10-13 DIAGNOSIS — R06.09 DYSPNEA ON EXERTION: ICD-10-CM

## 2020-10-13 DIAGNOSIS — R91.8 LUNG MASS: ICD-10-CM

## 2020-10-13 DIAGNOSIS — R09.02 HYPOXIA: ICD-10-CM

## 2020-10-13 DIAGNOSIS — J30.9 ALLERGIC RHINITIS, UNSPECIFIED SEASONALITY, UNSPECIFIED TRIGGER: ICD-10-CM

## 2020-10-13 DIAGNOSIS — R91.8 PULMONARY INFILTRATES: ICD-10-CM

## 2020-10-13 DIAGNOSIS — J47.0 BRONCHIECTASIS WITH ACUTE LOWER RESPIRATORY INFECTION (HCC): ICD-10-CM

## 2020-10-13 DIAGNOSIS — J84.9 ILD (INTERSTITIAL LUNG DISEASE) (HCC): ICD-10-CM

## 2020-10-13 PROCEDURE — 94618 PULMONARY STRESS TESTING: CPT | Performed by: INTERNAL MEDICINE

## 2020-10-13 NOTE — TELEPHONE ENCOUNTER
David returns our call of inquiry re: Sputum induction for labs on the pt. Maureen Jarod [facility]. Cannot yet do the Sputum Induction for labs secondary to CoVID. Dr. Reyna Cohn and the pt's sister are notified of same.

## 2020-10-13 NOTE — TELEPHONE ENCOUNTER
The pt's sister is left a msg. To call me back. Sputum Induction by [facility] is not avail. At this time. Dr. Ankit Noriega placed an order for labwork in place of the sputums.

## 2020-10-21 ENCOUNTER — TELEPHONE (OUTPATIENT)
Dept: PULMONOLOGY | Age: 75
End: 2020-10-21

## 2020-10-21 NOTE — TELEPHONE ENCOUNTER
FYI- sister calling to advise they never started the levaquin from 10/12 visit. They completed the Bactrim. Patient nevous to take the levaquin due to already having p. Neuopathy and worried about his tendens.

## 2020-10-21 NOTE — TELEPHONE ENCOUNTER
Pt's sister, Jay Barkley [phone]). Needs to talk to nurse regrading side effect of antibiotic Leviflax. Please call her back.

## 2020-10-26 RX ORDER — AZITHROMYCIN 500 MG/1
500 TABLET, FILM COATED ORAL DAILY
Qty: 10 TAB | Refills: 0 | Status: SHIPPED | OUTPATIENT
Start: 2020-10-26 | End: 2020-01-01 | Stop reason: ALTCHOICE

## 2020-10-26 RX ORDER — CEFPODOXIME PROXETIL 200 MG/1
200 TABLET, FILM COATED ORAL 2 TIMES DAILY
Qty: 20 TAB | Refills: 0 | Status: SHIPPED | OUTPATIENT
Start: 2020-10-26 | End: 2020-01-01 | Stop reason: ALTCHOICE

## 2020-10-26 NOTE — TELEPHONE ENCOUNTER
Pt declines levaquin. Will do empiric course of Cefpodoxime and Azithro for 10 days. Orders placed.     Freddie Fontaine MD/MPH     Pulmonary, Critical Care Medicine  [facility]

## 2020-10-27 ENCOUNTER — TELEPHONE (OUTPATIENT)
Dept: PULMONOLOGY | Age: 75
End: 2020-10-27

## 2020-10-27 NOTE — TELEPHONE ENCOUNTER
In review of the pt's chart, it is noted that the requested labwork has not been done. The pt's sister, his main caretaker, is left a message re: same.

## 2020-10-28 ENCOUNTER — TELEPHONE (OUTPATIENT)
Dept: PULMONOLOGY | Age: 75
End: 2020-10-28

## 2020-10-28 NOTE — TELEPHONE ENCOUNTER
Pt sister states she needs to speak w/ nurse Oriana Ronquillo regarding why pt testing hasn't been complete. Please advise [phone].

## 2020-10-30 NOTE — TELEPHONE ENCOUNTER
The pt's sister and  both ended up with CoVID. The pt. Is in quarantine without symptoms and tested negative. The pt. Himself refused to take two antibiotics at once. As it was, one of them had to be ordered and didn't arrive until yesterday. She contacted [facility] Scheduling but, they told her they don't schedule Speech Eval. Appt.s   I gave her the number for Speech Pathology. I infomed Flom that when, she gets the Speech Eval. Appt. then call us for an appt. with Dr. William Quinteros.

## 2020-11-12 NOTE — TELEPHONE ENCOUNTER
Pulmonary Rehab called patient and left a message about the program. Additional attempts at contact will be made.     Thank you,  Danyelle Jiménez

## 2020-12-01 NOTE — PROGRESS NOTES
SPEECH LANGUAGE PATHOLOGY   MODIFIED BARIUM SWALLOW STUDY    Patient: Chloe Davis (89 y.o. male)  Date: 12/1/2020  Primary Diagnosis: Lung mass [R91.8]  Pulmonary infiltrates [R91.8]  Dyspnea on exertion [R06.00]  Bronchiectasis with acute lower respiratory infection (HCC) [J47.0]  ILD (interstitial lung disease) ([address] [J84.9]  Hypoxia [R09.02]  Allergic rhinitis, unspecified seasonality, unspecified trigger [J30.9]  Dysphagia, unspecified type [R13.10]    Precautions: Fall      Prior Level of Swallowing Function/Home Situation: Mostly \"soft food\" per sisters report   Assessment:  Based on the objective data described below, the patient presents with mild-mod oral and moderately severe pharyngeal dysphagia. Pt seen with pt's sister present utilizing voice-transcription software on phone as pt deaf. Sister reporting consuming mostly \"soft food\" due to lack of dentition and inability to get dentures, denying coughing/choking with intake. Pt demonstrating silent penetration during and after the swallow with trials of thin, nectar thick and honey thick liquids. Pt able to tolerate pudding and regular solids with no aspiration/penetration visualized (pill not tested as pt's sister reporting pt requires pills to be coated). Deficits include decreased bolus formation/control, decreased base of tongue strength with premature spillage, decreased laryngeal elevation/adduction/sensation and slowed epiglottic inversion. Pt also with decreased pharyngeal motility and decreased opening of upper esophageal sphincter resulting in mild-mod pharyngeal residuals that were eventually penetrated. Safest, least restrictive diet would be soft solids with pudding thick liquids; however, pt's sister reporting \"he wouldn't want to do that\". Educated extensively related to test results, aspiration risk/precautions, diet recs, oral care and positioning with sister able to verbalize understanding.   Recommend follow up with outpatient SLP to further address oropharyngeal dysphagia and for further training and education related to aspiration risk/precautions. Recommendations:   Safest, least restrictive diet would be soft solids diet with pudding thick liquids  Aspiration precautions  HOB >45 during po intake, remain >30 for 30-45 minutes after po   Small bites/sips; alternate liquid/solid with slow feeding rate   Oral care TID  Meds in pudding  Outpatient SLP to further address oropharyngeal dysphagia      SUBJECTIVE:   Patient stated Bradly Pitt is this?     OBJECTIVE:     Past Medical History:   Diagnosis Date    CAD (coronary artery disease)     MI 1995; stent 2003 (done preop cochlear sgy)    Cancer ([facility] [address]     SQUAMOUS CELL    Deafness     Myocardial infarction [facility]) [phone]    Scarlet fever age 9     Past Surgical History:   Procedure Laterality Date    BRONCH-FIBER/DIAGNOSTIC  6/16/2016         HX COLONOSCOPY  2013    neg    [place]    HX HEART CATHETERIZATION  2003    HX HEART CATHETERIZATION  1999    HX HERNIA REPAIR      inguinal ? laterality    HX OTHER SURGICAL      2 cochlear implants (R); 1 left - all failed     Current Diet:  Mostly soft food    Radiology:  Film Views: Lateral;Fluoro  Patient Position: 90 in chair    Trial 1: Trial 2:   Consistency Presented: Thin liquid; Nectar thick liquid;Honey thick liquid Consistency Presented: Pudding; Solid   How Presented: Self-fed/presented;Cup/sip;Straw;Successive swallows;Spoon How Presented: Self-fed/presented;Cup/sip; Successive swallows   Bolus Acceptance: No impairment Bolus Acceptance: No impairment   Bolus Formation/Control: Impaired: Premature spillage Bolus Formation/Control: Impaired: Mastication;Delayed;Poor;Posterior   Propulsion: Delayed (# of seconds) Propulsion: Delayed (# of seconds)     Oral Residue: Lingual   Penetration: During swallow; After swallow; To laryngeal vestibule;From initial swallow;From residual Penetration: None   Aspiration/Timing: No evidence of aspiration Aspiration/Timing: No evidence of aspiration   Pharyngeal Clearance: Pyriform residue ; Less than 10%; Vallecular residue Pharyngeal Clearance: Vallecular residue;Pyriform residue ; Less than 10%   Attempted Modifications: Cup/sip;Effortful swallow;Spoon;Small sips and bites Attempted Modifications: Small sips and bites; Alternate liquids/solids   Effective Modifications: None Effective Modifications: Alternate liquids/solids   Cues for Modifications: Minimal Cues for Modifications: Minimal     Decreased Tongue Base Retraction?: Yes  Laryngeal Elevation: Inadequate epiglottic inversion; Incomplete laryngeal closure  Aspiration/Penetration Score: 3 (Penetration/Visible residue-Contrast remains above the folds/cords, but is not cleared)  Pharyngeal Symmetry: Not assessed  Pharyngeal-Esophageal Segment: Decreased relaxation of upper esophageal segment  Pharyngeal Dysfunction: Decreased strength;Decreased tongue base retraction;Crico-pharyngeal dysfunction;Decreased elevation/closure;Decreased pharyngeal wall constriction  Oral Phase Severity: Mild-moderate  Pharyngeal Phase Severity: Moderately severe    8-point Penetration-Aspiration Scale: Score 3    PAIN:  Pt reports 0/10 pain or discomfort prior to MBS. Pt reports 0/10 pain or discomfort post MBS. COMMUNICATION/EDUCATION:   [x]  Education provided post diagnostic testing including oropharyngeal anatomy/physiology, MBS results, diet recommendations and        compensatory strategies/positioning. [x]  Video feedback utilized. []  Handout regarding diet recommendations and thickener instructions provided. [x]  Patient/family have participated as able in goal setting and plan of care. []  Patient/family agree to work toward stated goals and plan of care. []  Patient understands intent and goals of therapy, but is neutral about his/her participation.   []  Patient is unable to participate in goal setting and plan of care.     Thank you for this referral,  Ky Raza M.S., [id number] [facility]  Speech-Language Pathologist

## 2020-12-02 NOTE — PROGRESS NOTES
MBS noted, showing dysphagia -- will refer to SLP for therapy.       Claudia Conteh MD/MPH     Pulmonary, Critical Care Medicine  [facility]

## 2020-12-03 NOTE — PATIENT INSTRUCTIONS
Learning About the Diet for Swallowing Problems  What are swallowing problems? Difficulty swallowing is also called dysphagia (say \"wxd-UXX-eop-uh\"). It is most often a sign of a problem with your throat or esophagus. This is the tube that moves food and liquids from the back of your mouth to your stomach. Trouble swallowing can occur when the muscles and nerves that move food through the throat and esophagus are not working right. To help you swallow food, your doctor or speech therapist may advise a special dysphagia diet for you. Why is a special diet important? A dysphagia diet can help you handle some problems that can occur when it's hard to swallow food and liquids easily. These problems can include:  · Malnutrition. This means you aren't getting enough healthy foods to keep your body working well. · Dehydration. This means you aren't getting enough liquids to keep your body healthy. · Aspiration. This means that food, liquid, or saliva goes down your windpipe (trachea) into your lungs, instead of down your esophagus to your stomach. This can lead to aspiration pneumonia, which is an inflammation of the lungs. What is the dysphagia diet? In the dysphagia diet, you change the foods you eat and the liquids you drink to make it easier to swallow them. You may:  · Change the texture of the foods you eat. Your doctor or speech therapist may advise you to eat one of these types of foods:  ? Easy-to-chew foods. These are foods that are soft or tender. ? Soft and bite-sized foods. These are soft foods that have been cut into small pieces. ? Minced and moist foods. These are very soft, small, and moist lumps of food that need very little chewing. ? Pureed foods. These are foods that have been blended smooth. The puree must be thick enough to hold its shape on a spoon. These foods don't need to be chewed. ? Liquidized foods.  These are foods that have been blended smooth but are not as thick as pureed foods. You can drink them from a cup. · Thicken the liquids you drink. Your doctor or speech therapist will tell you what kind of thickener to use and how thick to make the liquids. ? Slightly thick liquids. These are thicker than water but can flow through a straw. ? Mildly thick liquids. These can be sipped from a cup but take some effort to drink with a straw. ? Moderately thick liquids. These liquids are thick enough to drink from a cup or from a spoon. But they are hard to drink through a wide straw. ? Extremely thick liquids. These are thick enough to hold their shape on a spoon. They are too thick to drink from a cup or suck through a straw. Your speech therapist will help you learn exercises to train your muscles to work together so you can swallow. You may also need to learn how to position your body or how to put food in your mouth to be able to swallow better. Follow-up care is a key part of your treatment and safety. Be sure to make and go to all appointments, and call your doctor if you are having problems. It's also a good idea to know your test results and keep a list of the medicines you take. Where can you learn more? Go to http://www.gray.com/  Enter G281 in the search box to learn more about \"Learning About the Diet for Swallowing Problems. \"  Current as of: June 26, 2019               Content Version: 12.6  © 5351-5740 [employer]. Care instructions adapted under license by [employer] (which disclaims liability or warranty for this information). If you have questions about a medical condition or this instruction, always ask your healthcare professional. [place] [address] any warranty or liability for your use of this information.

## 2020-12-03 NOTE — PROGRESS NOTES
General Office Visit Note      Patient:  Randi Gonzalez    Subjective:     Pa De León is a 67 y.o. y.o. male who complains of   Chief Complaint   Patient presents with    Follow-up     ER      Pt is following-up on pulmonary and hospital visits. He has had a modified barrum swallow which showed that he was aspirating on thin liquids. The recommendation is for soft thickened liquids. Pt would also benefit from speech therapy. His sister states that he is still SOB on occasion and the pulmonologist did a walking o2 test and his o2 did drop but he recovered quickly according to his sister. Pt's sister is also concerned about his gait. When he fell, one of his legs seems shorter than the other. Pt states he is not in pain but in the future he would like to see an orthopedic provider for options. Past Medical History:   Diagnosis Date    CAD (coronary artery disease)     MI 1995; stent 2003 (done preop cochlear sgy)    Cancer ([facility])     SQUAMOUS CELL    Deafness     Myocardial infarction [facility]) [phone]    Scarlet fever age 9     Past Surgical History:   Procedure Laterality Date    BRONCH-FIBER/DIAGNOSTIC  6/16/2016         HX COLONOSCOPY  2013    neg    [place]    HX HEART CATHETERIZATION  2003    HX HEART CATHETERIZATION  1999    HX HERNIA REPAIR      inguinal ? laterality    HX OTHER SURGICAL      2 cochlear implants (R); 1 left - all failed     Social History     Socioeconomic History    Marital status: SINGLE     Spouse name: Not on file    Number of children: Not on file    Years of education: Not on file    Highest education level: Not on file   Tobacco Use    Smoking status: Never Smoker    Smokeless tobacco: Never Used   Substance and Sexual Activity    Alcohol use: No    Drug use: No     Current Outpatient Medications   Medication Sig Dispense Refill    OTHER 1 Cap daily. tumeric      fluticasone propionate (FLONASE NA) by Nasal route.       ascorbic acid, vitamin C, (VITAMIN C) 500 mg tablet Take  by mouth.  multivitamin (ONE A DAY) tablet Take 1 Tab by mouth daily.  aspirin delayed-release 81 mg tablet Take  by mouth daily.  ibuprofen (MOTRIN) 200 mg tablet Take 600 mg by mouth as needed for Pain. Allergies   Allergen Reactions    Pollens Extract Runny Nose and Cough     The patient has a family history of    REVIEW OF SYSTEMS  ROS    Objective:     Visit Vitals  /67 (BP 1 Location: Right arm, BP Patient Position: Sitting)   Pulse 98   Temp 98.7 °F (37.1 °C)   Resp 18   Ht 5' 8\" (1.727 m)   Wt 120 lb (54.4 kg)   SpO2 98%   BMI 18.25 kg/m²       Current Outpatient Medications   Medication Instructions    ascorbic acid, vitamin C, (VITAMIN C) 500 mg tablet Oral    aspirin delayed-release 81 mg tablet Oral, DAILY    fluticasone propionate (FLONASE NA) Nasal    ibuprofen (MOTRIN) 600 mg, Oral, AS NEEDED    multivitamin (ONE A DAY) tablet 1 Tab, Oral, DAILY    OTHER 1 Cap, DAILY, tumeric         PHYSICAL EXAM  Physical Exam  Vitals signs and nursing note reviewed. Constitutional:       Appearance: Normal appearance. Neck:      Musculoskeletal: Normal range of motion and neck supple. Cardiovascular:      Rate and Rhythm: Normal rate and regular rhythm. Pulses: Normal pulses. Heart sounds: Normal heart sounds. Pulmonary:      Breath sounds: Decreased air movement present. Examination of the right-upper field reveals decreased breath sounds. Decreased breath sounds present. Musculoskeletal: Normal range of motion. Skin:     General: Skin is warm and dry. Neurological:      Mental Status: He is alert and oriented to person, place, and time. Psychiatric:         Mood and Affect: Mood normal.         Behavior: Behavior normal.         Assessment/Plan:     Diagnoses and all orders for this visit:    1.  Dysphagia, unspecified type        Follow-up and Dispositions    · Return in about 3 months (around 3/3/2021) for Lung mass follow-up. Disclaimer:    I have discussed the diagnosis with the patient and the intended plan as seen above. The patient understands our medical plan. The risks, benefits and significant side effects of all medications have been reviewed. Anticipated time course and progression of condition reviewed. All questions have been addressed. He received an after visit summary, with information reviewed, and questions answered. Where appropriate, he is instructed to call the clinic if he has not been notified either by phone or through [address] with the results of his tests or with an appointment plan for any referrals within 1 week(s). The patient  is to call if his condition worsens or fails to improve or if significant side effects are experienced.        Svetlana Weinstein NP

## 2020-12-04 NOTE — TELEPHONE ENCOUNTER
Pulmonary Rehab received a call from patient's sister stating that they are interested in the program. She will call back when she figures out a good schedule for him.     Thank you,  Lexy Gutierrez

## 2020-12-09 NOTE — PROGRESS NOTES
Order placed for COVID-19 test, per Verbal Order from Dr. Giulia Pinedo on 12/9/2020. Last office visit: 10/12/2020  Follow up Visit: 2/1/2021    Provider is aware of last office visit and follow up. No further action requested from provider.

## 2020-12-09 NOTE — PROGRESS NOTES
Order placed for COVID-19 test, per Verbal Order from Dr. Kristina Regan on 12/9/2020. Last office visit: 10/12/2020  Follow up Visit: 2/1/2021    Provider is aware of last office visit and follow up. No further action requested from provider.

## 2020-12-22 NOTE — PROGRESS NOTES
Kvng Woodard presents today for Chief Complaint Patient presents with  New Patient  
  [facility] preferred language for health care discussion is english/other. Is someone accompanying this pt? no 
 
Is the patient using any DME equipment during [address]? no 
 
Depression Screening: 
3 most recent PHQ Screens 12/3/2020 Little interest or pleasure in doing things Not at all Feeling down, depressed, irritable, or hopeless Not at all Total Score PHQ 2 0 Learning Assessment: 
Learning Assessment 6/6/2016 PRIMARY LEARNER  PRIMARY LANGUAGE ENGLISH  
LEARNER PREFERENCE PRIMARY DEMONSTRATION  
  READING  
ANSWERED BY Celina Ramsay RELATIONSHIP OTHER Abuse Screening: 
Abuse Screening Questionnaire 10/8/2020 Do you ever feel afraid of your partner? Vitor Lime Are you in a relationship with someone who physically or mentally threatens you? Vitor Lime Is it safe for you to go home? Illene Dural Fall Risk Fall Risk Assessment, last 12 mths 12/3/2020 Able to walk? Yes Fall in past 12 months? No  
 
 
Pt currently taking Anticoagulant therapy? ASA 81mg every day Coordination of Care: 1. Have you been to the ER, urgent care clinic since your last visit? Hospitalized since your last visit? no 
 
2. Have you seen or consulted any other health care providers outside of the [address] [place] [address] since your last visit? Include any pap smears or colon screening.  no

## 2020-12-22 NOTE — PROGRESS NOTES
HISTORY OF PRESENT ILLNESS Yuval Pandya is a 67 y.o. male. ASSESSMENT and PLAN Mr. Carol Tovar has prior history of CAD. He had myocardial infarction back in 1995, when he was living in [place]. He had stent performed in 2003. He moved to [place] in 2015. He lost his hearing as a child with gradual loss into his early adulthood. He is able to speak but cannot hear anything. He does not read lips. He has right upper lobe lung mass which is currently being evaluated. This was present for years ago. However, he was lost to follow-up. He is scheduled to have continued work-up. He lives by himself. Because of poor dentition, he does not eat. He is currently in the process of getting dentures. His echocardiogram in November 2020 revealed EF 45-50%. There was global hypokinesis. Moderate tricuspid regurgitation was noted with PA pressure of 42 mmHg. Saline contrast study was performed and did not show any shunts. From cardiac standpoint, he denies any chest pains, or increased dyspnea on exertion. He is not able to ambulate without a walker. However, this is without significant or recent changes. His blood pressure is controlled. His heart rate is stable. There is no evidence of decompensated CHF noted. His weight today is 119 pounds. His target LDL is less than 70. He should be on statins. With his prior history of CAD, he should be on beta-blockers. For the time being, I did not make any changes to his medication regimen. He should continue on his baby aspirin daily. He was accompanied by his sister who takes care of him on weekends. He should be able to tolerate teeth extraction. He needs to put on some weight. He has not had any anginal symptoms. He should continue on aspirin daily. I will plan on seeing him back in about 6 months. At that point, further discussions will take place including initiation of statins and beta-blockers. Encounter Diagnoses Name Primary?  Dyspnea on exertion Yes  Hypoxia   
 
current treatment plan is effective, no change in therapy 
lab results and schedule of future lab studies reviewed with patient 
reviewed diet, exercise and weight control 
cardiovascular risk and specific lipid/LDL goals reviewed 
use of aspirin to prevent MI and TIA's discussed HPI Today, Mr. Juan Carlos Prieto has no complaints of chest pains, increased shortness of breath or decreased exercise capacity. He comes in as a new patient after his echocardiogram revealed EF of 40-45%. Review of Systems Constitutional: Positive for weight loss. Respiratory: Negative for shortness of breath. Cardiovascular: Negative for chest pain, palpitations, orthopnea, claudication, leg swelling and PND. All other systems reviewed and are negative. Physical Exam 
Vitals signs and nursing note reviewed. Constitutional:   
   Appearance: Normal appearance. HENT:  
   Head: Normocephalic. Eyes:  
   Conjunctiva/sclera: Conjunctivae normal.  
Neck: Musculoskeletal: No neck rigidity. Cardiovascular:  
   Rate and Rhythm: Normal rate and regular rhythm. Pulmonary:  
   Breath sounds: Normal breath sounds. Abdominal:  
   Palpations: Abdomen is soft. Musculoskeletal:     
   General: No swelling. Skin: 
   General: Skin is warm and dry. Neurological:  
   General: No focal deficit present. Mental Status: He is alert and oriented to person, place, and time. Psychiatric:     
   Mood and Affect: Mood normal.     
   Behavior: Behavior normal.  
 
 
 
PCP: Alaina Mejia NP Past Medical History:  
Diagnosis Date  CAD (coronary artery disease) MI 1995; stent 2003 (done preop cochlear sgy)  Cancer ([facility] [address] SQUAMOUS CELL  
 Deafness  Myocardial infarction [facility]) [id number]  Scarlet fever age 9 Past Surgical History:  
Procedure Laterality Date  BRONCH-FIBER/DIAGNOSTIC  6/16/2016  HX COLONOSCOPY  2013 neg  
 HX CORONARY STENT PLACEMENT  2003 [facility] [address] CATHETERIZATION  [address]  HX HERNIA REPAIR    
 inguinal ? laterality  HX OTHER SURGICAL    
 2 cochlear implants (R); 1 left - all failed Current Outpatient Medications Medication Sig Dispense Refill  OTHER 1 Cap daily. tumeric  fluticasone propionate (FLONASE NA) by Nasal route.  ascorbic acid, vitamin C, (VITAMIN C) 500 mg tablet Take  by mouth.  multivitamin (ONE A DAY) tablet Take 1 Tab by mouth daily.  aspirin delayed-release 81 mg tablet Take  by mouth daily. The patient has a family history of 
 
Social History Tobacco Use  Smoking status: Never Smoker  Smokeless tobacco: Never Used Substance Use Topics  Alcohol use: No  
 Drug use: No  
 
 
Lab Results Component Value Date/Time Cholesterol, total 138 09/24/2020 10:04 AM  
 HDL Cholesterol 66 (H) 09/24/2020 10:04 AM  
 LDL, calculated 61.4 09/24/2020 10:04 AM  
 Triglyceride 53 09/24/2020 10:04 AM  
 CHOL/HDL Ratio 2.1 09/24/2020 10:04 AM  
  
 
BP Readings from Last 3 Encounters:  
12/22/20 98/62  
12/03/20 121/67  
11/24/20 110/60 Pulse Readings from Last 3 Encounters:  
12/22/20 93  
12/03/20 98  
10/12/20 (!) 112 Wt Readings from Last 3 Encounters:  
12/22/20 54 kg (119 lb) 12/03/20 54.4 kg (120 lb)  
11/24/20 54 kg (119 lb) EKG: normal EKG, normal sinus rhythm.

## 2021-01-01 ENCOUNTER — HOSPITAL ENCOUNTER (INPATIENT)
Age: 76
LOS: 25 days | DRG: 870 | End: 2021-11-12
Attending: EMERGENCY MEDICINE | Admitting: STUDENT IN AN ORGANIZED HEALTH CARE EDUCATION/TRAINING PROGRAM
Payer: MEDICARE

## 2021-01-01 ENCOUNTER — APPOINTMENT (OUTPATIENT)
Dept: CT IMAGING | Age: 76
DRG: 870 | End: 2021-01-01
Attending: INTERNAL MEDICINE
Payer: MEDICARE

## 2021-01-01 ENCOUNTER — ANESTHESIA (OUTPATIENT)
Dept: ENDOSCOPY | Age: 76
DRG: 870 | End: 2021-01-01
Payer: MEDICARE

## 2021-01-01 ENCOUNTER — APPOINTMENT (OUTPATIENT)
Dept: GENERAL RADIOLOGY | Age: 76
DRG: 870 | End: 2021-01-01
Attending: EMERGENCY MEDICINE
Payer: MEDICARE

## 2021-01-01 ENCOUNTER — APPOINTMENT (OUTPATIENT)
Dept: GENERAL RADIOLOGY | Age: 76
DRG: 870 | End: 2021-01-01
Attending: PHYSICIAN ASSISTANT
Payer: MEDICARE

## 2021-01-01 ENCOUNTER — OFFICE VISIT (OUTPATIENT)
Dept: FAMILY MEDICINE CLINIC | Age: 76
End: 2021-01-01
Payer: MEDICARE

## 2021-01-01 ENCOUNTER — DOCUMENTATION ONLY (OUTPATIENT)
Dept: PULMONOLOGY | Age: 76
End: 2021-01-01

## 2021-01-01 ENCOUNTER — TELEPHONE (OUTPATIENT)
Dept: PULMONOLOGY | Age: 76
End: 2021-01-01

## 2021-01-01 ENCOUNTER — ANESTHESIA EVENT (OUTPATIENT)
Dept: ENDOSCOPY | Age: 76
DRG: 870 | End: 2021-01-01
Payer: MEDICARE

## 2021-01-01 ENCOUNTER — OFFICE VISIT (OUTPATIENT)
Dept: PULMONOLOGY | Age: 76
End: 2021-01-01
Payer: MEDICARE

## 2021-01-01 ENCOUNTER — APPOINTMENT (OUTPATIENT)
Dept: VASCULAR SURGERY | Age: 76
DRG: 870 | End: 2021-01-01
Attending: INTERNAL MEDICINE
Payer: MEDICARE

## 2021-01-01 ENCOUNTER — HOSPITAL ENCOUNTER (OUTPATIENT)
Dept: LAB | Age: 76
Discharge: HOME | End: 2021-02-10
Payer: MEDICARE

## 2021-01-01 ENCOUNTER — HOSPITAL ENCOUNTER (OUTPATIENT)
Dept: LAB | Age: 76
Discharge: HOME | End: 2021-04-28
Payer: MEDICARE

## 2021-01-01 ENCOUNTER — HOSPITAL ENCOUNTER (OUTPATIENT)
Dept: LAB | Age: 76
Discharge: HOME | End: 2021-05-12
Payer: MEDICARE

## 2021-01-01 ENCOUNTER — ANESTHESIA EVENT (OUTPATIENT)
Dept: ICU | Age: 76
DRG: 870 | End: 2021-01-01
Payer: MEDICARE

## 2021-01-01 ENCOUNTER — APPOINTMENT (OUTPATIENT)
Dept: CT IMAGING | Age: 76
DRG: 870 | End: 2021-01-01
Attending: EMERGENCY MEDICINE
Payer: MEDICARE

## 2021-01-01 ENCOUNTER — VIRTUAL VISIT (OUTPATIENT)
Dept: FAMILY MEDICINE CLINIC | Age: 76
End: 2021-01-01
Payer: MEDICARE

## 2021-01-01 ENCOUNTER — OFFICE VISIT (OUTPATIENT)
Dept: CARDIOLOGY CLINIC | Age: 76
End: 2021-01-01
Payer: MEDICARE

## 2021-01-01 ENCOUNTER — HOSPITAL ENCOUNTER (OUTPATIENT)
Dept: CT IMAGING | Age: 76
Discharge: HOME | End: 2021-08-10
Attending: INTERNAL MEDICINE
Payer: MEDICARE

## 2021-01-01 ENCOUNTER — ANESTHESIA (OUTPATIENT)
Dept: ICU | Age: 76
DRG: 870 | End: 2021-01-01
Payer: MEDICARE

## 2021-01-01 ENCOUNTER — HOSPITAL ENCOUNTER (OUTPATIENT)
Dept: CT IMAGING | Age: 76
Discharge: HOME | End: 2021-01-14
Attending: INTERNAL MEDICINE
Payer: MEDICARE

## 2021-01-01 ENCOUNTER — HOSPITAL ENCOUNTER (OUTPATIENT)
Dept: LAB | Age: 76
Discharge: HOME | End: 2021-05-18
Payer: MEDICARE

## 2021-01-01 ENCOUNTER — APPOINTMENT (OUTPATIENT)
Dept: NON INVASIVE DIAGNOSTICS | Age: 76
DRG: 870 | End: 2021-01-01
Attending: EMERGENCY MEDICINE
Payer: MEDICARE

## 2021-01-01 ENCOUNTER — APPOINTMENT (OUTPATIENT)
Dept: PHYSICAL THERAPY | Age: 76
End: 2021-01-01

## 2021-01-01 VITALS
HEIGHT: 68 IN | BODY MASS INDEX: 18.49 KG/M2 | OXYGEN SATURATION: 94 % | HEART RATE: 110 BPM | WEIGHT: 122 LBS | RESPIRATION RATE: 16 BRPM | SYSTOLIC BLOOD PRESSURE: 121 MMHG | TEMPERATURE: 98 F | DIASTOLIC BLOOD PRESSURE: 55 MMHG

## 2021-01-01 VITALS
SYSTOLIC BLOOD PRESSURE: 116 MMHG | DIASTOLIC BLOOD PRESSURE: 62 MMHG | HEIGHT: 68 IN | WEIGHT: 117 LBS | HEART RATE: 107 BPM | BODY MASS INDEX: 17.73 KG/M2 | OXYGEN SATURATION: 92 %

## 2021-01-01 VITALS
SYSTOLIC BLOOD PRESSURE: 117 MMHG | HEART RATE: 121 BPM | TEMPERATURE: 97.8 F | RESPIRATION RATE: 22 BRPM | DIASTOLIC BLOOD PRESSURE: 76 MMHG | HEIGHT: 68 IN | WEIGHT: 114 LBS | BODY MASS INDEX: 17.28 KG/M2 | OXYGEN SATURATION: 91 %

## 2021-01-01 VITALS
OXYGEN SATURATION: 91 % | SYSTOLIC BLOOD PRESSURE: 112 MMHG | BODY MASS INDEX: 18.01 KG/M2 | HEIGHT: 68 IN | DIASTOLIC BLOOD PRESSURE: 59 MMHG | WEIGHT: 118.8 LBS | HEART RATE: 110 BPM

## 2021-01-01 VITALS
SYSTOLIC BLOOD PRESSURE: 106 MMHG | WEIGHT: 138.45 LBS | BODY MASS INDEX: 21.73 KG/M2 | OXYGEN SATURATION: 89 % | DIASTOLIC BLOOD PRESSURE: 58 MMHG | TEMPERATURE: 99 F | HEIGHT: 67 IN

## 2021-01-01 VITALS — WEIGHT: 121 LBS | HEIGHT: 68 IN | BODY MASS INDEX: 18.34 KG/M2 | TEMPERATURE: 98.3 F

## 2021-01-01 DIAGNOSIS — J96.02 ACUTE RESPIRATORY FAILURE WITH HYPOXIA AND HYPERCAPNIA (HCC): ICD-10-CM

## 2021-01-01 DIAGNOSIS — R91.8 LUNG MASS: ICD-10-CM

## 2021-01-01 DIAGNOSIS — J47.9 BRONCHIECTASIS WITHOUT COMPLICATION (HCC): Primary | ICD-10-CM

## 2021-01-01 DIAGNOSIS — R13.12 OROPHARYNGEAL DYSPHAGIA: ICD-10-CM

## 2021-01-01 DIAGNOSIS — R06.09 DYSPNEA ON EXERTION: ICD-10-CM

## 2021-01-01 DIAGNOSIS — J18.9 PNEUMONIA OF RIGHT LUNG DUE TO INFECTIOUS ORGANISM, UNSPECIFIED PART OF LUNG: Primary | ICD-10-CM

## 2021-01-01 DIAGNOSIS — A31.0 MAI (MYCOBACTERIUM AVIUM-INTRACELLULARE) INFECTION (HCC): ICD-10-CM

## 2021-01-01 DIAGNOSIS — R91.8 PULMONARY INFILTRATE: ICD-10-CM

## 2021-01-01 DIAGNOSIS — R13.12 OROPHARYNGEAL DYSPHAGIA: Primary | ICD-10-CM

## 2021-01-01 DIAGNOSIS — J84.9 ILD (INTERSTITIAL LUNG DISEASE) (HCC): ICD-10-CM

## 2021-01-01 DIAGNOSIS — T17.908S CHRONIC PULMONARY ASPIRATION, SEQUELA: ICD-10-CM

## 2021-01-01 DIAGNOSIS — J47.0 BRONCHIECTASIS WITH ACUTE LOWER RESPIRATORY INFECTION (HCC): ICD-10-CM

## 2021-01-01 DIAGNOSIS — J69.0 ASPIRATION PNEUMONIA OF RIGHT LOWER LOBE DUE TO GASTRIC SECRETIONS (HCC): ICD-10-CM

## 2021-01-01 DIAGNOSIS — R91.8 PULMONARY INFILTRATES: ICD-10-CM

## 2021-01-01 DIAGNOSIS — J47.1 BRONCHIECTASIS WITH ACUTE EXACERBATION (HCC): ICD-10-CM

## 2021-01-01 DIAGNOSIS — Z71.89 GOALS OF CARE, COUNSELING/DISCUSSION: ICD-10-CM

## 2021-01-01 DIAGNOSIS — J47.9 BRONCHIECTASIS WITHOUT COMPLICATION (HCC): ICD-10-CM

## 2021-01-01 DIAGNOSIS — R53.81 DEBILITY: ICD-10-CM

## 2021-01-01 DIAGNOSIS — J30.9 ALLERGIC RHINITIS, UNSPECIFIED SEASONALITY, UNSPECIFIED TRIGGER: ICD-10-CM

## 2021-01-01 DIAGNOSIS — J96.01 ACUTE RESPIRATORY FAILURE WITH HYPOXIA AND HYPERCAPNIA (HCC): ICD-10-CM

## 2021-01-01 DIAGNOSIS — R06.02 SHORTNESS OF BREATH: Primary | ICD-10-CM

## 2021-01-01 DIAGNOSIS — A41.9 SEVERE SEPSIS (HCC): ICD-10-CM

## 2021-01-01 DIAGNOSIS — A41.89 SEPSIS DUE TO OTHER ETIOLOGY (HCC): ICD-10-CM

## 2021-01-01 DIAGNOSIS — J96.01 ACUTE RESPIRATORY FAILURE WITH HYPOXIA (HCC): ICD-10-CM

## 2021-01-01 DIAGNOSIS — R53.81 PHYSICAL DECONDITIONING: ICD-10-CM

## 2021-01-01 DIAGNOSIS — R09.02 HYPOXIA: ICD-10-CM

## 2021-01-01 DIAGNOSIS — R06.09 DOE (DYSPNEA ON EXERTION): ICD-10-CM

## 2021-01-01 DIAGNOSIS — R65.20 SEVERE SEPSIS (HCC): ICD-10-CM

## 2021-01-01 DIAGNOSIS — E43 SEVERE PROTEIN-CALORIE MALNUTRITION (HCC): ICD-10-CM

## 2021-01-01 DIAGNOSIS — R62.7 ADULT FAILURE TO THRIVE: ICD-10-CM

## 2021-01-01 DIAGNOSIS — I50.23 SYSTOLIC CHF, ACUTE ON CHRONIC (HCC): ICD-10-CM

## 2021-01-01 DIAGNOSIS — R06.02 SOB (SHORTNESS OF BREATH): ICD-10-CM

## 2021-01-01 DIAGNOSIS — R13.10 DYSPHAGIA, UNSPECIFIED TYPE: ICD-10-CM

## 2021-01-01 DIAGNOSIS — K94.20 GASTROSTOMY COMPLICATION (HCC): ICD-10-CM

## 2021-01-01 DIAGNOSIS — J80 ARDS (ADULT RESPIRATORY DISTRESS SYNDROME) (HCC): ICD-10-CM

## 2021-01-01 DIAGNOSIS — R40.1 OBTUNDED: ICD-10-CM

## 2021-01-01 DIAGNOSIS — J81.0 ACUTE PULMONARY EDEMA (HCC): ICD-10-CM

## 2021-01-01 DIAGNOSIS — I50.23 ACUTE ON CHRONIC SYSTOLIC CONGESTIVE HEART FAILURE (HCC): ICD-10-CM

## 2021-01-01 DIAGNOSIS — G93.40 ACUTE ENCEPHALOPATHY: ICD-10-CM

## 2021-01-01 DIAGNOSIS — R00.0 TACHYCARDIA: ICD-10-CM

## 2021-01-01 DIAGNOSIS — T17.908D CHRONIC PULMONARY ASPIRATION, SUBSEQUENT ENCOUNTER: ICD-10-CM

## 2021-01-01 DIAGNOSIS — R62.7 FAILURE TO THRIVE IN ADULT: ICD-10-CM

## 2021-01-01 DIAGNOSIS — Z23 ENCOUNTER FOR IMMUNIZATION: ICD-10-CM

## 2021-01-01 DIAGNOSIS — E46 PROTEIN-CALORIE MALNUTRITION, UNSPECIFIED SEVERITY (HCC): Primary | ICD-10-CM

## 2021-01-01 DIAGNOSIS — J98.4 CAVITARY LESION OF LUNG: ICD-10-CM

## 2021-01-01 DIAGNOSIS — E46 PROTEIN-CALORIE MALNUTRITION, UNSPECIFIED SEVERITY (HCC): ICD-10-CM

## 2021-01-01 DIAGNOSIS — J90 PLEURAL EFFUSION ON LEFT: ICD-10-CM

## 2021-01-01 DIAGNOSIS — I25.10 CORONARY ARTERY DISEASE INVOLVING NATIVE CORONARY ARTERY OF NATIVE HEART WITHOUT ANGINA PECTORIS: Primary | ICD-10-CM

## 2021-01-01 LAB
1,3 BETA GLUCAN SER-MCNC: 61 PG/ML
ABO + RH BLD: NORMAL
ABO + RH BLD: NORMAL
ALBUMIN SERPL-MCNC: 1.4 G/DL (ref 3.4–5)
ALBUMIN SERPL-MCNC: 1.5 G/DL (ref 3.4–5)
ALBUMIN SERPL-MCNC: 1.9 G/DL (ref 3.4–5)
ALBUMIN SERPL-MCNC: 2.4 G/DL (ref 3.4–5)
ALBUMIN SERPL-MCNC: 3.4 G/DL (ref 3.4–5)
ALBUMIN/GLOB SERPL: 0.4 {RATIO} (ref 0.8–1.7)
ALBUMIN/GLOB SERPL: 0.5 {RATIO} (ref 0.8–1.7)
ALBUMIN/GLOB SERPL: 0.5 {RATIO} (ref 0.8–1.7)
ALBUMIN/GLOB SERPL: 0.8 {RATIO} (ref 0.8–1.7)
ALP SERPL-CCNC: 30 U/L (ref 45–117)
ALP SERPL-CCNC: 32 U/L (ref 45–117)
ALP SERPL-CCNC: 41 U/L (ref 45–117)
ALP SERPL-CCNC: 55 U/L (ref 45–117)
ALT SERPL-CCNC: 21 U/L (ref 16–61)
ALT SERPL-CCNC: 23 U/L (ref 16–61)
ALT SERPL-CCNC: 28 U/L (ref 16–61)
ALT SERPL-CCNC: 30 U/L (ref 16–61)
ANION GAP SERPL CALC-SCNC: 0 MMOL/L (ref 3–18)
ANION GAP SERPL CALC-SCNC: 1 MMOL/L (ref 3–18)
ANION GAP SERPL CALC-SCNC: 2 MMOL/L (ref 3–18)
ANION GAP SERPL CALC-SCNC: 3 MMOL/L (ref 3–18)
ANION GAP SERPL CALC-SCNC: 4 MMOL/L (ref 3–18)
ANION GAP SERPL CALC-SCNC: 4 MMOL/L (ref 3–18)
ANION GAP SERPL CALC-SCNC: 5 MMOL/L (ref 3–18)
ANION GAP SERPL CALC-SCNC: 5 MMOL/L (ref 3–18)
ANION GAP SERPL CALC-SCNC: ABNORMAL MMOL/L (ref 3–18)
APPEARANCE FLD: ABNORMAL
APPEARANCE UR: CLEAR
APPEARANCE UR: CLEAR
ARTERIAL PATENCY WRIST A: ABNORMAL
ARTERIAL PATENCY WRIST A: NEGATIVE
ARTERIAL PATENCY WRIST A: POSITIVE
AST SERPL-CCNC: 24 U/L (ref 10–38)
AST SERPL-CCNC: 25 U/L (ref 10–38)
AST SERPL-CCNC: 28 U/L (ref 10–38)
AST SERPL-CCNC: 36 U/L (ref 10–38)
ATRIAL RATE: 102 BPM
ATRIAL RATE: 120 BPM
ATRIAL RATE: 143 BPM
B PERT DNA SPEC QL NAA+PROBE: NOT DETECTED
BACTERIA SPEC CULT: ABNORMAL
BACTERIA SPEC CULT: NORMAL
BACTERIA URNS QL MICRO: NEGATIVE /HPF
BACTERIA URNS QL MICRO: NEGATIVE /HPF
BASE EXCESS BLD CALC-SCNC: 1.6 MMOL/L
BASE EXCESS BLD CALC-SCNC: 10.1 MMOL/L
BASE EXCESS BLD CALC-SCNC: 10.8 MMOL/L
BASE EXCESS BLD CALC-SCNC: 10.8 MMOL/L
BASE EXCESS BLD CALC-SCNC: 11.9 MMOL/L
BASE EXCESS BLD CALC-SCNC: 12.4 MMOL/L
BASE EXCESS BLD CALC-SCNC: 19.1 MMOL/L
BASE EXCESS BLD CALC-SCNC: 2.6 MMOL/L
BASE EXCESS BLD CALC-SCNC: 3.8 MMOL/L
BASE EXCESS BLD CALC-SCNC: 4.6 MMOL/L
BASE EXCESS BLD CALC-SCNC: 4.8 MMOL/L
BASE EXCESS BLD CALC-SCNC: 5 MMOL/L
BASE EXCESS BLD CALC-SCNC: 5.2 MMOL/L
BASE EXCESS BLD CALC-SCNC: 6.2 MMOL/L
BASE EXCESS BLD CALC-SCNC: 6.7 MMOL/L
BASE EXCESS BLD CALC-SCNC: 6.9 MMOL/L
BASE EXCESS BLD CALC-SCNC: 8 MMOL/L
BASE EXCESS BLD CALC-SCNC: 8.4 MMOL/L
BASE EXCESS BLD CALC-SCNC: 8.8 MMOL/L
BASE EXCESS BLD CALC-SCNC: 9.7 MMOL/L
BASOPHILS # BLD: 0 K/UL (ref 0–0.1)
BASOPHILS NFR BLD: 0 % (ref 0–2)
BDY SITE: ABNORMAL
BILIRUB DIRECT SERPL-MCNC: 0.2 MG/DL (ref 0–0.2)
BILIRUB SERPL-MCNC: 0.1 MG/DL (ref 0.2–1)
BILIRUB SERPL-MCNC: 0.3 MG/DL (ref 0.2–1)
BILIRUB SERPL-MCNC: 0.3 MG/DL (ref 0.2–1)
BILIRUB SERPL-MCNC: 0.9 MG/DL (ref 0.2–1)
BILIRUB UR QL: NEGATIVE
BILIRUB UR QL: NEGATIVE
BLD PROD TYP BPU: NORMAL
BLOOD GROUP ANTIBODIES SERPL: NORMAL
BLOOD GROUP ANTIBODIES SERPL: NORMAL
BNP SERPL-MCNC: 1775 PG/ML (ref 0–1800)
BNP SERPL-MCNC: 3478 PG/ML (ref 0–1800)
BODY FLD TYPE: NORMAL
BODY TEMPERATURE: 100.7
BODY TEMPERATURE: 98.1
BODY TEMPERATURE: 98.1
BODY TEMPERATURE: 99.9
BORDETELLA PARAPERTUSSIS PCR, BORPAR: NOT DETECTED
BPU ID: NORMAL
BUN SERPL-MCNC: 10 MG/DL (ref 7–18)
BUN SERPL-MCNC: 10 MG/DL (ref 7–18)
BUN SERPL-MCNC: 11 MG/DL (ref 7–18)
BUN SERPL-MCNC: 13 MG/DL (ref 7–18)
BUN SERPL-MCNC: 15 MG/DL (ref 7–18)
BUN SERPL-MCNC: 15 MG/DL (ref 7–18)
BUN SERPL-MCNC: 18 MG/DL (ref 7–18)
BUN SERPL-MCNC: 19 MG/DL (ref 7–18)
BUN SERPL-MCNC: 20 MG/DL (ref 7–18)
BUN SERPL-MCNC: 20 MG/DL (ref 7–18)
BUN SERPL-MCNC: 21 MG/DL (ref 7–18)
BUN SERPL-MCNC: 22 MG/DL (ref 7–18)
BUN SERPL-MCNC: 23 MG/DL (ref 7–18)
BUN SERPL-MCNC: 24 MG/DL (ref 7–18)
BUN SERPL-MCNC: 25 MG/DL (ref 7–18)
BUN SERPL-MCNC: 25 MG/DL (ref 7–18)
BUN SERPL-MCNC: 27 MG/DL (ref 7–18)
BUN SERPL-MCNC: 28 MG/DL (ref 7–18)
BUN SERPL-MCNC: 29 MG/DL (ref 7–18)
BUN SERPL-MCNC: 29 MG/DL (ref 7–18)
BUN SERPL-MCNC: 33 MG/DL (ref 7–18)
BUN SERPL-MCNC: 38 MG/DL (ref 7–18)
BUN/CREAT SERPL: 27 (ref 12–20)
BUN/CREAT SERPL: 31 (ref 12–20)
BUN/CREAT SERPL: 37 (ref 12–20)
BUN/CREAT SERPL: 38 (ref 12–20)
BUN/CREAT SERPL: 38 (ref 12–20)
BUN/CREAT SERPL: 39 (ref 12–20)
BUN/CREAT SERPL: 40 (ref 12–20)
BUN/CREAT SERPL: 41 (ref 12–20)
BUN/CREAT SERPL: 41 (ref 12–20)
BUN/CREAT SERPL: 42 (ref 12–20)
BUN/CREAT SERPL: 44 (ref 12–20)
BUN/CREAT SERPL: 44 (ref 12–20)
BUN/CREAT SERPL: 45 (ref 12–20)
BUN/CREAT SERPL: 45 (ref 12–20)
BUN/CREAT SERPL: 46 (ref 12–20)
BUN/CREAT SERPL: 48 (ref 12–20)
BUN/CREAT SERPL: 50 (ref 12–20)
BUN/CREAT SERPL: 51 (ref 12–20)
BUN/CREAT SERPL: 51 (ref 12–20)
BUN/CREAT SERPL: 55 (ref 12–20)
BUN/CREAT SERPL: 56 (ref 12–20)
BUN/CREAT SERPL: 57 (ref 12–20)
BUN/CREAT SERPL: 61 (ref 12–20)
BUN/CREAT SERPL: 61 (ref 12–20)
BUN/CREAT SERPL: 66 (ref 12–20)
BUN/CREAT SERPL: 71 (ref 12–20)
C PNEUM DNA SPEC QL NAA+PROBE: NOT DETECTED
CA-I SERPL-SCNC: 1.19 MMOL/L (ref 1.15–1.33)
CALCIUM SERPL-MCNC: 7.4 MG/DL (ref 8.5–10.1)
CALCIUM SERPL-MCNC: 7.7 MG/DL (ref 8.5–10.1)
CALCIUM SERPL-MCNC: 7.8 MG/DL (ref 8.5–10.1)
CALCIUM SERPL-MCNC: 7.9 MG/DL (ref 8.5–10.1)
CALCIUM SERPL-MCNC: 8 MG/DL (ref 8.5–10.1)
CALCIUM SERPL-MCNC: 8.1 MG/DL (ref 8.5–10.1)
CALCIUM SERPL-MCNC: 8.2 MG/DL (ref 8.5–10.1)
CALCIUM SERPL-MCNC: 8.3 MG/DL (ref 8.5–10.1)
CALCIUM SERPL-MCNC: 8.3 MG/DL (ref 8.5–10.1)
CALCIUM SERPL-MCNC: 8.4 MG/DL (ref 8.5–10.1)
CALCIUM SERPL-MCNC: 8.4 MG/DL (ref 8.5–10.1)
CALCIUM SERPL-MCNC: 8.5 MG/DL (ref 8.5–10.1)
CALCIUM SERPL-MCNC: 8.6 MG/DL (ref 8.5–10.1)
CALCIUM SERPL-MCNC: 8.8 MG/DL (ref 8.5–10.1)
CALCIUM SERPL-MCNC: 8.9 MG/DL (ref 8.5–10.1)
CALCIUM SERPL-MCNC: 8.9 MG/DL (ref 8.5–10.1)
CALCIUM SERPL-MCNC: 9.1 MG/DL (ref 8.5–10.1)
CALCIUM SERPL-MCNC: 9.1 MG/DL (ref 8.5–10.1)
CALCULATED P AXIS, ECG09: 52 DEGREES
CALCULATED P AXIS, ECG09: 53 DEGREES
CALCULATED P AXIS, ECG09: 57 DEGREES
CALCULATED R AXIS, ECG10: -24 DEGREES
CALCULATED R AXIS, ECG10: -42 DEGREES
CALCULATED R AXIS, ECG10: -44 DEGREES
CALCULATED T AXIS, ECG11: 85 DEGREES
CALCULATED T AXIS, ECG11: 86 DEGREES
CALCULATED T AXIS, ECG11: 87 DEGREES
CALLED TO:,BCALL1: NORMAL
CHLORIDE SERPL-SCNC: 100 MMOL/L (ref 100–111)
CHLORIDE SERPL-SCNC: 101 MMOL/L (ref 100–111)
CHLORIDE SERPL-SCNC: 102 MMOL/L (ref 100–111)
CHLORIDE SERPL-SCNC: 104 MMOL/L (ref 100–111)
CHLORIDE SERPL-SCNC: 105 MMOL/L (ref 100–111)
CHLORIDE SERPL-SCNC: 107 MMOL/L (ref 100–111)
CHLORIDE SERPL-SCNC: 95 MMOL/L (ref 100–111)
CHLORIDE SERPL-SCNC: 96 MMOL/L (ref 100–111)
CHLORIDE SERPL-SCNC: 97 MMOL/L (ref 100–111)
CHLORIDE SERPL-SCNC: 99 MMOL/L (ref 100–111)
CK MB CFR SERPL CALC: 11.2 % (ref 0–4)
CK MB CFR SERPL CALC: 11.4 % (ref 0–4)
CK MB CFR SERPL CALC: 4 % (ref 0–4)
CK MB CFR SERPL CALC: 6 % (ref 0–4)
CK MB SERPL-MCNC: 1.5 NG/ML (ref 5–25)
CK MB SERPL-MCNC: 2.1 NG/ML (ref 5–25)
CK MB SERPL-MCNC: 2.8 NG/ML (ref 5–25)
CK MB SERPL-MCNC: 4.9 NG/ML (ref 5–25)
CK SERPL-CCNC: 25 U/L (ref 39–308)
CK SERPL-CCNC: 25 U/L (ref 39–308)
CK SERPL-CCNC: 43 U/L (ref 39–308)
CK SERPL-CCNC: 53 U/L (ref 39–308)
CO2 SERPL-SCNC: 28 MMOL/L (ref 21–32)
CO2 SERPL-SCNC: 29 MMOL/L (ref 21–32)
CO2 SERPL-SCNC: 31 MMOL/L (ref 21–32)
CO2 SERPL-SCNC: 32 MMOL/L (ref 21–32)
CO2 SERPL-SCNC: 33 MMOL/L (ref 21–32)
CO2 SERPL-SCNC: 34 MMOL/L (ref 21–32)
CO2 SERPL-SCNC: 35 MMOL/L (ref 21–32)
CO2 SERPL-SCNC: 36 MMOL/L (ref 21–32)
CO2 SERPL-SCNC: 37 MMOL/L (ref 21–32)
CO2 SERPL-SCNC: 38 MMOL/L (ref 21–32)
CO2 SERPL-SCNC: 38 MMOL/L (ref 21–32)
CO2 SERPL-SCNC: 39 MMOL/L (ref 21–32)
CO2 SERPL-SCNC: 40 MMOL/L (ref 21–32)
CO2 SERPL-SCNC: 41 MMOL/L (ref 21–32)
CO2 SERPL-SCNC: 41 MMOL/L (ref 21–32)
CO2 SERPL-SCNC: 43 MMOL/L (ref 21–32)
CO2 SERPL-SCNC: 44 MMOL/L (ref 21–32)
COLOR FLD: YELLOW
COLOR UR: YELLOW
COLOR UR: YELLOW
CREAT SERPL-MCNC: 0.29 MG/DL (ref 0.6–1.3)
CREAT SERPL-MCNC: 0.32 MG/DL (ref 0.6–1.3)
CREAT SERPL-MCNC: 0.32 MG/DL (ref 0.6–1.3)
CREAT SERPL-MCNC: 0.33 MG/DL (ref 0.6–1.3)
CREAT SERPL-MCNC: 0.33 MG/DL (ref 0.6–1.3)
CREAT SERPL-MCNC: 0.34 MG/DL (ref 0.6–1.3)
CREAT SERPL-MCNC: 0.37 MG/DL (ref 0.6–1.3)
CREAT SERPL-MCNC: 0.37 MG/DL (ref 0.6–1.3)
CREAT SERPL-MCNC: 0.41 MG/DL (ref 0.6–1.3)
CREAT SERPL-MCNC: 0.41 MG/DL (ref 0.6–1.3)
CREAT SERPL-MCNC: 0.42 MG/DL (ref 0.6–1.3)
CREAT SERPL-MCNC: 0.43 MG/DL (ref 0.6–1.3)
CREAT SERPL-MCNC: 0.44 MG/DL (ref 0.6–1.3)
CREAT SERPL-MCNC: 0.44 MG/DL (ref 0.6–1.3)
CREAT SERPL-MCNC: 0.45 MG/DL (ref 0.6–1.3)
CREAT SERPL-MCNC: 0.46 MG/DL (ref 0.6–1.3)
CREAT SERPL-MCNC: 0.46 MG/DL (ref 0.6–1.3)
CREAT SERPL-MCNC: 0.48 MG/DL (ref 0.6–1.3)
CREAT SERPL-MCNC: 0.49 MG/DL (ref 0.6–1.3)
CREAT SERPL-MCNC: 0.52 MG/DL (ref 0.6–1.3)
CREAT SERPL-MCNC: 0.52 MG/DL (ref 0.6–1.3)
CREAT SERPL-MCNC: 0.54 MG/DL (ref 0.6–1.3)
CREAT SERPL-MCNC: 0.54 MG/DL (ref 0.6–1.3)
CREAT SERPL-MCNC: 0.56 MG/DL (ref 0.6–1.3)
CREAT SERPL-MCNC: 0.57 MG/DL (ref 0.6–1.3)
CREAT SERPL-MCNC: 0.58 MG/DL (ref 0.6–1.3)
CREAT SERPL-MCNC: 0.62 MG/DL (ref 0.6–1.3)
CREAT SERPL-MCNC: 0.63 MG/DL (ref 0.6–1.3)
CROSSMATCH RESULT,%XM: NORMAL
DATE LAST DOSE: NORMAL
DIAGNOSIS, 93000: NORMAL
DIFFERENTIAL METHOD BLD: ABNORMAL
ECHO AO ROOT DIAM: 2.96 CM
ECHO LV E' LATERAL VELOCITY: 12.46 CM/S
ECHO LV E' SEPTAL VELOCITY: 13.71 CM/S
ECHO LV INTERNAL DIMENSION DIASTOLIC: 5.49 CM (ref 4.2–5.9)
ECHO LV INTERNAL DIMENSION SYSTOLIC: 4.76 CM
ECHO LV IVSD: 0.86 CM (ref 0.6–1)
ECHO LV MASS 2D: 183.9 G (ref 88–224)
ECHO LV MASS INDEX 2D: 112.8 G/M2 (ref 49–115)
ECHO LV POSTERIOR WALL DIASTOLIC: 0.93 CM (ref 0.6–1)
ECHO LVOT CARDIAC OUTPUT: 5.7 LITER/MINUTE
ECHO LVOT DIAM: 2.15 CM
ECHO LVOT PEAK GRADIENT: 2.81 MMHG
ECHO LVOT PEAK VELOCITY: 83.85 CM/S
ECHO LVOT SV: 47.3 ML
ECHO LVOT VTI: 13.01 CM
ECHO TV REGURGITANT MAX VELOCITY: 418.44 CM/S
ECHO TV REGURGITANT PEAK GRADIENT: 70.04 MMHG
EOSINOPHIL # BLD: 0 K/UL (ref 0–0.4)
EOSINOPHIL # BLD: 0.1 K/UL (ref 0–0.4)
EOSINOPHIL # BLD: 0.2 K/UL (ref 0–0.4)
EOSINOPHIL # BLD: 0.3 K/UL (ref 0–0.4)
EOSINOPHIL # BLD: 0.4 K/UL (ref 0–0.4)
EOSINOPHIL NFR BLD: 0 % (ref 0–5)
EOSINOPHIL NFR BLD: 1 % (ref 0–5)
EOSINOPHIL NFR BLD: 2 % (ref 0–5)
EOSINOPHIL NFR BLD: 3 % (ref 0–5)
EOSINOPHIL NFR BLD: 5 % (ref 0–5)
EOSINOPHIL NFR FLD MANUAL: 0 %
EPITH CASTS URNS QL MICRO: NEGATIVE /LPF (ref 0–5)
EPITH CASTS URNS QL MICRO: NEGATIVE /LPF (ref 0–5)
ERYTHROCYTE [DISTWIDTH] IN BLOOD BY AUTOMATED COUNT: 13.3 % (ref 11.6–14.5)
ERYTHROCYTE [DISTWIDTH] IN BLOOD BY AUTOMATED COUNT: 13.4 % (ref 11.6–14.5)
ERYTHROCYTE [DISTWIDTH] IN BLOOD BY AUTOMATED COUNT: 13.5 % (ref 11.6–14.5)
ERYTHROCYTE [DISTWIDTH] IN BLOOD BY AUTOMATED COUNT: 13.7 % (ref 11.6–14.5)
ERYTHROCYTE [DISTWIDTH] IN BLOOD BY AUTOMATED COUNT: 13.8 % (ref 11.6–14.5)
ERYTHROCYTE [DISTWIDTH] IN BLOOD BY AUTOMATED COUNT: 13.8 % (ref 11.6–14.5)
ERYTHROCYTE [DISTWIDTH] IN BLOOD BY AUTOMATED COUNT: 14 % (ref 11.6–14.5)
ERYTHROCYTE [DISTWIDTH] IN BLOOD BY AUTOMATED COUNT: 14 % (ref 11.6–14.5)
ERYTHROCYTE [DISTWIDTH] IN BLOOD BY AUTOMATED COUNT: 14.1 % (ref 11.6–14.5)
ERYTHROCYTE [DISTWIDTH] IN BLOOD BY AUTOMATED COUNT: 14.2 % (ref 11.6–14.5)
ERYTHROCYTE [DISTWIDTH] IN BLOOD BY AUTOMATED COUNT: 14.3 % (ref 11.6–14.5)
ERYTHROCYTE [DISTWIDTH] IN BLOOD BY AUTOMATED COUNT: 14.6 % (ref 11.6–14.5)
ERYTHROCYTE [DISTWIDTH] IN BLOOD BY AUTOMATED COUNT: 14.9 % (ref 11.6–14.5)
ERYTHROCYTE [DISTWIDTH] IN BLOOD BY AUTOMATED COUNT: 15.4 % (ref 11.6–14.5)
ERYTHROCYTE [DISTWIDTH] IN BLOOD BY AUTOMATED COUNT: 15.8 % (ref 11.6–14.5)
EST. AVERAGE GLUCOSE BLD GHB EST-MCNC: 120 MG/DL
FLUAV H1 2009 PAND RNA SPEC QL NAA+PROBE: NOT DETECTED
FLUAV H1 RNA SPEC QL NAA+PROBE: NOT DETECTED
FLUAV H3 RNA SPEC QL NAA+PROBE: NOT DETECTED
FLUAV SUBTYP SPEC NAA+PROBE: NOT DETECTED
FLUBV RNA SPEC QL NAA+PROBE: NOT DETECTED
GAS FLOW.O2 O2 DELIVERY SYS: ABNORMAL L/MIN
GAS FLOW.O2 SETTING OXYMISER: 14 BPM
GAS FLOW.O2 SETTING OXYMISER: 16 BPM
GAS FLOW.O2 SETTING OXYMISER: 18 BPM
GAS FLOW.O2 SETTING OXYMISER: 24 BPM
GLOBULIN SER CALC-MCNC: 3.5 G/DL (ref 2–4)
GLOBULIN SER CALC-MCNC: 3.7 G/DL (ref 2–4)
GLOBULIN SER CALC-MCNC: 4.2 G/DL (ref 2–4)
GLOBULIN SER CALC-MCNC: 4.8 G/DL (ref 2–4)
GLUCOSE BLD STRIP.AUTO-MCNC: 100 MG/DL (ref 70–110)
GLUCOSE BLD STRIP.AUTO-MCNC: 103 MG/DL (ref 70–110)
GLUCOSE BLD STRIP.AUTO-MCNC: 103 MG/DL (ref 70–110)
GLUCOSE BLD STRIP.AUTO-MCNC: 104 MG/DL (ref 70–110)
GLUCOSE BLD STRIP.AUTO-MCNC: 107 MG/DL (ref 70–110)
GLUCOSE BLD STRIP.AUTO-MCNC: 109 MG/DL (ref 70–110)
GLUCOSE BLD STRIP.AUTO-MCNC: 110 MG/DL (ref 70–110)
GLUCOSE BLD STRIP.AUTO-MCNC: 113 MG/DL (ref 70–110)
GLUCOSE BLD STRIP.AUTO-MCNC: 113 MG/DL (ref 70–110)
GLUCOSE BLD STRIP.AUTO-MCNC: 114 MG/DL (ref 70–110)
GLUCOSE BLD STRIP.AUTO-MCNC: 115 MG/DL (ref 70–110)
GLUCOSE BLD STRIP.AUTO-MCNC: 116 MG/DL (ref 70–110)
GLUCOSE BLD STRIP.AUTO-MCNC: 118 MG/DL (ref 70–110)
GLUCOSE BLD STRIP.AUTO-MCNC: 120 MG/DL (ref 70–110)
GLUCOSE BLD STRIP.AUTO-MCNC: 122 MG/DL (ref 70–110)
GLUCOSE BLD STRIP.AUTO-MCNC: 126 MG/DL (ref 70–110)
GLUCOSE BLD STRIP.AUTO-MCNC: 128 MG/DL (ref 70–110)
GLUCOSE BLD STRIP.AUTO-MCNC: 129 MG/DL (ref 70–110)
GLUCOSE BLD STRIP.AUTO-MCNC: 129 MG/DL (ref 70–110)
GLUCOSE BLD STRIP.AUTO-MCNC: 133 MG/DL (ref 70–110)
GLUCOSE BLD STRIP.AUTO-MCNC: 133 MG/DL (ref 70–110)
GLUCOSE BLD STRIP.AUTO-MCNC: 134 MG/DL (ref 70–110)
GLUCOSE BLD STRIP.AUTO-MCNC: 135 MG/DL (ref 70–110)
GLUCOSE BLD STRIP.AUTO-MCNC: 138 MG/DL (ref 70–110)
GLUCOSE BLD STRIP.AUTO-MCNC: 140 MG/DL (ref 70–110)
GLUCOSE BLD STRIP.AUTO-MCNC: 142 MG/DL (ref 70–110)
GLUCOSE BLD STRIP.AUTO-MCNC: 146 MG/DL (ref 70–110)
GLUCOSE BLD STRIP.AUTO-MCNC: 148 MG/DL (ref 70–110)
GLUCOSE BLD STRIP.AUTO-MCNC: 150 MG/DL (ref 70–110)
GLUCOSE BLD STRIP.AUTO-MCNC: 151 MG/DL (ref 70–110)
GLUCOSE BLD STRIP.AUTO-MCNC: 152 MG/DL (ref 70–110)
GLUCOSE BLD STRIP.AUTO-MCNC: 152 MG/DL (ref 70–110)
GLUCOSE BLD STRIP.AUTO-MCNC: 155 MG/DL (ref 70–110)
GLUCOSE BLD STRIP.AUTO-MCNC: 157 MG/DL (ref 70–110)
GLUCOSE BLD STRIP.AUTO-MCNC: 158 MG/DL (ref 70–110)
GLUCOSE BLD STRIP.AUTO-MCNC: 158 MG/DL (ref 70–110)
GLUCOSE BLD STRIP.AUTO-MCNC: 159 MG/DL (ref 70–110)
GLUCOSE BLD STRIP.AUTO-MCNC: 161 MG/DL (ref 70–110)
GLUCOSE BLD STRIP.AUTO-MCNC: 162 MG/DL (ref 70–110)
GLUCOSE BLD STRIP.AUTO-MCNC: 162 MG/DL (ref 70–110)
GLUCOSE BLD STRIP.AUTO-MCNC: 163 MG/DL (ref 70–110)
GLUCOSE BLD STRIP.AUTO-MCNC: 164 MG/DL (ref 70–110)
GLUCOSE BLD STRIP.AUTO-MCNC: 164 MG/DL (ref 70–110)
GLUCOSE BLD STRIP.AUTO-MCNC: 165 MG/DL (ref 70–110)
GLUCOSE BLD STRIP.AUTO-MCNC: 166 MG/DL (ref 70–110)
GLUCOSE BLD STRIP.AUTO-MCNC: 166 MG/DL (ref 70–110)
GLUCOSE BLD STRIP.AUTO-MCNC: 171 MG/DL (ref 70–110)
GLUCOSE BLD STRIP.AUTO-MCNC: 172 MG/DL (ref 70–110)
GLUCOSE BLD STRIP.AUTO-MCNC: 173 MG/DL (ref 70–110)
GLUCOSE BLD STRIP.AUTO-MCNC: 175 MG/DL (ref 70–110)
GLUCOSE BLD STRIP.AUTO-MCNC: 178 MG/DL (ref 70–110)
GLUCOSE BLD STRIP.AUTO-MCNC: 180 MG/DL (ref 70–110)
GLUCOSE BLD STRIP.AUTO-MCNC: 181 MG/DL (ref 70–110)
GLUCOSE BLD STRIP.AUTO-MCNC: 183 MG/DL (ref 70–110)
GLUCOSE BLD STRIP.AUTO-MCNC: 183 MG/DL (ref 70–110)
GLUCOSE BLD STRIP.AUTO-MCNC: 186 MG/DL (ref 70–110)
GLUCOSE BLD STRIP.AUTO-MCNC: 187 MG/DL (ref 70–110)
GLUCOSE BLD STRIP.AUTO-MCNC: 188 MG/DL (ref 70–110)
GLUCOSE BLD STRIP.AUTO-MCNC: 189 MG/DL (ref 70–110)
GLUCOSE BLD STRIP.AUTO-MCNC: 195 MG/DL (ref 70–110)
GLUCOSE BLD STRIP.AUTO-MCNC: 204 MG/DL (ref 70–110)
GLUCOSE BLD STRIP.AUTO-MCNC: 229 MG/DL (ref 70–110)
GLUCOSE BLD STRIP.AUTO-MCNC: 96 MG/DL (ref 70–110)
GLUCOSE BLD STRIP.AUTO-MCNC: 96 MG/DL (ref 70–110)
GLUCOSE BLD STRIP.AUTO-MCNC: 98 MG/DL (ref 70–110)
GLUCOSE FLD-MCNC: 141 MG/DL
GLUCOSE SERPL-MCNC: 101 MG/DL (ref 74–99)
GLUCOSE SERPL-MCNC: 101 MG/DL (ref 74–99)
GLUCOSE SERPL-MCNC: 107 MG/DL (ref 74–99)
GLUCOSE SERPL-MCNC: 116 MG/DL (ref 74–99)
GLUCOSE SERPL-MCNC: 120 MG/DL (ref 74–99)
GLUCOSE SERPL-MCNC: 122 MG/DL (ref 74–99)
GLUCOSE SERPL-MCNC: 124 MG/DL (ref 74–99)
GLUCOSE SERPL-MCNC: 124 MG/DL (ref 74–99)
GLUCOSE SERPL-MCNC: 129 MG/DL (ref 74–99)
GLUCOSE SERPL-MCNC: 138 MG/DL (ref 74–99)
GLUCOSE SERPL-MCNC: 139 MG/DL (ref 74–99)
GLUCOSE SERPL-MCNC: 141 MG/DL (ref 74–99)
GLUCOSE SERPL-MCNC: 145 MG/DL (ref 74–99)
GLUCOSE SERPL-MCNC: 147 MG/DL (ref 74–99)
GLUCOSE SERPL-MCNC: 150 MG/DL (ref 74–99)
GLUCOSE SERPL-MCNC: 153 MG/DL (ref 74–99)
GLUCOSE SERPL-MCNC: 154 MG/DL (ref 74–99)
GLUCOSE SERPL-MCNC: 155 MG/DL (ref 74–99)
GLUCOSE SERPL-MCNC: 160 MG/DL (ref 74–99)
GLUCOSE SERPL-MCNC: 166 MG/DL (ref 74–99)
GLUCOSE SERPL-MCNC: 167 MG/DL (ref 74–99)
GLUCOSE SERPL-MCNC: 170 MG/DL (ref 74–99)
GLUCOSE SERPL-MCNC: 186 MG/DL (ref 74–99)
GLUCOSE SERPL-MCNC: 247 MG/DL (ref 74–99)
GLUCOSE SERPL-MCNC: 84 MG/DL (ref 74–99)
GLUCOSE SERPL-MCNC: 86 MG/DL (ref 74–99)
GLUCOSE SERPL-MCNC: 87 MG/DL (ref 74–99)
GLUCOSE SERPL-MCNC: 88 MG/DL (ref 74–99)
GLUCOSE UR STRIP.AUTO-MCNC: NEGATIVE MG/DL
GLUCOSE UR STRIP.AUTO-MCNC: NEGATIVE MG/DL
GRAM STN SPEC: ABNORMAL
GRAM STN SPEC: NORMAL
HADV DNA SPEC QL NAA+PROBE: NOT DETECTED
HBA1C MFR BLD: 5.8 % (ref 4.2–5.6)
HCO3 BLD-SCNC: 26.1 MMOL/L (ref 22–26)
HCO3 BLD-SCNC: 27.2 MMOL/L (ref 22–26)
HCO3 BLD-SCNC: 28.6 MMOL/L (ref 22–26)
HCO3 BLD-SCNC: 28.8 MMOL/L (ref 22–26)
HCO3 BLD-SCNC: 29.6 MMOL/L (ref 22–26)
HCO3 BLD-SCNC: 31.1 MMOL/L (ref 22–26)
HCO3 BLD-SCNC: 31.4 MMOL/L (ref 22–26)
HCO3 BLD-SCNC: 32.9 MMOL/L (ref 22–26)
HCO3 BLD-SCNC: 33.9 MMOL/L (ref 22–26)
HCO3 BLD-SCNC: 34.6 MMOL/L (ref 22–26)
HCO3 BLD-SCNC: 34.7 MMOL/L (ref 22–26)
HCO3 BLD-SCNC: 34.8 MMOL/L (ref 22–26)
HCO3 BLD-SCNC: 35.2 MMOL/L (ref 22–26)
HCO3 BLD-SCNC: 35.3 MMOL/L (ref 22–26)
HCO3 BLD-SCNC: 36 MMOL/L (ref 22–26)
HCO3 BLD-SCNC: 36.2 MMOL/L (ref 22–26)
HCO3 BLD-SCNC: 36.9 MMOL/L (ref 22–26)
HCO3 BLD-SCNC: 37.8 MMOL/L (ref 22–26)
HCO3 BLD-SCNC: 38.4 MMOL/L (ref 22–26)
HCO3 BLD-SCNC: 49.3 MMOL/L (ref 22–26)
HCOV 229E RNA SPEC QL NAA+PROBE: NOT DETECTED
HCOV HKU1 RNA SPEC QL NAA+PROBE: NOT DETECTED
HCOV NL63 RNA SPEC QL NAA+PROBE: NOT DETECTED
HCOV OC43 RNA SPEC QL NAA+PROBE: NOT DETECTED
HCT VFR BLD AUTO: 23.8 % (ref 36–48)
HCT VFR BLD AUTO: 24.6 % (ref 36–48)
HCT VFR BLD AUTO: 24.8 % (ref 36–48)
HCT VFR BLD AUTO: 26.2 % (ref 36–48)
HCT VFR BLD AUTO: 26.9 % (ref 36–48)
HCT VFR BLD AUTO: 28.2 % (ref 36–48)
HCT VFR BLD AUTO: 29 % (ref 36–48)
HCT VFR BLD AUTO: 30.3 % (ref 36–48)
HCT VFR BLD AUTO: 30.4 % (ref 36–48)
HCT VFR BLD AUTO: 30.5 % (ref 36–48)
HCT VFR BLD AUTO: 30.9 % (ref 36–48)
HCT VFR BLD AUTO: 31 % (ref 36–48)
HCT VFR BLD AUTO: 31.2 % (ref 36–48)
HCT VFR BLD AUTO: 31.4 % (ref 36–48)
HCT VFR BLD AUTO: 32.6 % (ref 36–48)
HCT VFR BLD AUTO: 33.7 % (ref 36–48)
HCT VFR BLD AUTO: 34.1 % (ref 36–48)
HCT VFR BLD AUTO: 34.7 % (ref 36–48)
HCT VFR BLD AUTO: 36 % (ref 36–48)
HCT VFR BLD AUTO: 36.1 % (ref 36–48)
HCT VFR BLD AUTO: 37 % (ref 36–48)
HCT VFR BLD AUTO: 38 % (ref 36–48)
HCT VFR BLD AUTO: 39.4 % (ref 36–48)
HCT VFR BLD AUTO: 39.7 % (ref 36–48)
HCT VFR BLD AUTO: 41 % (ref 36–48)
HCT VFR BLD AUTO: 41.2 % (ref 36–48)
HCT VFR BLD AUTO: 44.5 % (ref 36–48)
HGB BLD-MCNC: 10 G/DL (ref 13–16)
HGB BLD-MCNC: 10.4 G/DL (ref 13–16)
HGB BLD-MCNC: 10.6 G/DL (ref 13–16)
HGB BLD-MCNC: 10.6 G/DL (ref 13–16)
HGB BLD-MCNC: 10.8 G/DL (ref 13–16)
HGB BLD-MCNC: 11.4 G/DL (ref 13–16)
HGB BLD-MCNC: 11.5 G/DL (ref 13–16)
HGB BLD-MCNC: 11.9 G/DL (ref 13–16)
HGB BLD-MCNC: 12.8 G/DL (ref 13–16)
HGB BLD-MCNC: 12.9 G/DL (ref 13–16)
HGB BLD-MCNC: 6.9 G/DL (ref 13–16)
HGB BLD-MCNC: 7.2 G/DL (ref 13–16)
HGB BLD-MCNC: 7.2 G/DL (ref 13–16)
HGB BLD-MCNC: 7.7 G/DL (ref 13–16)
HGB BLD-MCNC: 8.1 G/DL (ref 13–16)
HGB BLD-MCNC: 8.6 G/DL (ref 13–16)
HGB BLD-MCNC: 8.6 G/DL (ref 13–16)
HGB BLD-MCNC: 8.7 G/DL (ref 13–16)
HGB BLD-MCNC: 8.9 G/DL (ref 13–16)
HGB BLD-MCNC: 9 G/DL (ref 13–16)
HGB BLD-MCNC: 9.1 G/DL (ref 13–16)
HGB BLD-MCNC: 9.1 G/DL (ref 13–16)
HGB BLD-MCNC: 9.4 G/DL (ref 13–16)
HGB UR QL STRIP: ABNORMAL
HGB UR QL STRIP: NEGATIVE
HISTORY CHECKED?,CKHIST: NORMAL
HMPV RNA SPEC QL NAA+PROBE: NOT DETECTED
HPIV1 RNA SPEC QL NAA+PROBE: NOT DETECTED
HPIV2 RNA SPEC QL NAA+PROBE: NOT DETECTED
HPIV3 RNA SPEC QL NAA+PROBE: NOT DETECTED
HPIV4 RNA SPEC QL NAA+PROBE: NOT DETECTED
IMM GRANULOCYTES # BLD AUTO: 0 K/UL
IMM GRANULOCYTES NFR BLD AUTO: 0 %
INR PPP: 1.1 (ref 0.8–1.2)
INR PPP: 1.1 (ref 0.8–1.2)
INSPIRATION.DURATION SETTING TIME VENT: 0.9 SEC
INSPIRATION.DURATION SETTING TIME VENT: 1 SEC
INSPIRATION.DURATION SETTING TIME VENT: 1 SEC
KETONES UR QL STRIP.AUTO: NEGATIVE MG/DL
KETONES UR QL STRIP.AUTO: NEGATIVE MG/DL
L PNEUMO AG UR QL IA: NEGATIVE
LACTATE BLD-SCNC: 1.13 MMOL/L (ref 0.4–2)
LACTATE SERPL-SCNC: 1.1 MMOL/L (ref 0.4–2)
LACTATE SERPL-SCNC: 1.5 MMOL/L (ref 0.4–2)
LACTATE SERPL-SCNC: 1.9 MMOL/L (ref 0.4–2)
LDH FLD L TO P-CCNC: 202 U/L
LDH SERPL L TO P-CCNC: 179 U/L (ref 81–234)
LEUKOCYTE ESTERASE UR QL STRIP.AUTO: NEGATIVE
LEUKOCYTE ESTERASE UR QL STRIP.AUTO: NEGATIVE
LVOT MG: 1.24 MMHG
LYMPHOCYTES # BLD: 0 K/UL (ref 0.9–3.6)
LYMPHOCYTES # BLD: 0.2 K/UL (ref 0.9–3.6)
LYMPHOCYTES # BLD: 0.3 K/UL (ref 0.9–3.6)
LYMPHOCYTES # BLD: 0.4 K/UL (ref 0.9–3.6)
LYMPHOCYTES # BLD: 0.4 K/UL (ref 0.9–3.6)
LYMPHOCYTES # BLD: 0.5 K/UL (ref 0.9–3.6)
LYMPHOCYTES # BLD: 0.6 K/UL (ref 0.9–3.6)
LYMPHOCYTES NFR BLD: 0 % (ref 21–52)
LYMPHOCYTES NFR BLD: 1 % (ref 21–52)
LYMPHOCYTES NFR BLD: 2 % (ref 21–52)
LYMPHOCYTES NFR BLD: 3 % (ref 21–52)
LYMPHOCYTES NFR BLD: 4 % (ref 21–52)
LYMPHOCYTES NFR BLD: 5 % (ref 21–52)
LYMPHOCYTES NFR BLD: 5 % (ref 21–52)
LYMPHOCYTES NFR FLD: 40 %
M PNEUMO DNA SPEC QL NAA+PROBE: NOT DETECTED
MAGNESIUM SERPL-MCNC: 1.9 MG/DL (ref 1.6–2.6)
MAGNESIUM SERPL-MCNC: 2 MG/DL (ref 1.6–2.6)
MAGNESIUM SERPL-MCNC: 2.1 MG/DL (ref 1.6–2.6)
MAGNESIUM SERPL-MCNC: 2.2 MG/DL (ref 1.6–2.6)
MAGNESIUM SERPL-MCNC: 2.3 MG/DL (ref 1.6–2.6)
MAGNESIUM SERPL-MCNC: 2.4 MG/DL (ref 1.6–2.6)
MAGNESIUM SERPL-MCNC: 2.5 MG/DL (ref 1.6–2.6)
MCH RBC QN AUTO: 25.7 PG (ref 24–34)
MCH RBC QN AUTO: 25.7 PG (ref 24–34)
MCH RBC QN AUTO: 25.8 PG (ref 24–34)
MCH RBC QN AUTO: 25.8 PG (ref 24–34)
MCH RBC QN AUTO: 26 PG (ref 24–34)
MCH RBC QN AUTO: 26.2 PG (ref 24–34)
MCH RBC QN AUTO: 26.2 PG (ref 24–34)
MCH RBC QN AUTO: 26.3 PG (ref 24–34)
MCH RBC QN AUTO: 26.4 PG (ref 24–34)
MCH RBC QN AUTO: 26.4 PG (ref 24–34)
MCH RBC QN AUTO: 26.5 PG (ref 24–34)
MCH RBC QN AUTO: 26.5 PG (ref 24–34)
MCH RBC QN AUTO: 26.6 PG (ref 24–34)
MCH RBC QN AUTO: 26.7 PG (ref 24–34)
MCH RBC QN AUTO: 26.7 PG (ref 24–34)
MCH RBC QN AUTO: 26.8 PG (ref 24–34)
MCH RBC QN AUTO: 26.9 PG (ref 24–34)
MCH RBC QN AUTO: 26.9 PG (ref 24–34)
MCH RBC QN AUTO: 27 PG (ref 24–34)
MCH RBC QN AUTO: 27 PG (ref 24–34)
MCH RBC QN AUTO: 29.1 PG (ref 24–34)
MCHC RBC AUTO-ENTMCNC: 27.7 G/DL (ref 31–37)
MCHC RBC AUTO-ENTMCNC: 27.8 G/DL (ref 31–37)
MCHC RBC AUTO-ENTMCNC: 27.9 G/DL (ref 31–37)
MCHC RBC AUTO-ENTMCNC: 28.6 G/DL (ref 31–37)
MCHC RBC AUTO-ENTMCNC: 28.7 G/DL (ref 31–37)
MCHC RBC AUTO-ENTMCNC: 28.8 G/DL (ref 31–37)
MCHC RBC AUTO-ENTMCNC: 28.9 G/DL (ref 31–37)
MCHC RBC AUTO-ENTMCNC: 29 G/DL (ref 31–37)
MCHC RBC AUTO-ENTMCNC: 29 G/DL (ref 31–37)
MCHC RBC AUTO-ENTMCNC: 29.2 G/DL (ref 31–37)
MCHC RBC AUTO-ENTMCNC: 29.2 G/DL (ref 31–37)
MCHC RBC AUTO-ENTMCNC: 29.3 G/DL (ref 31–37)
MCHC RBC AUTO-ENTMCNC: 29.3 G/DL (ref 31–37)
MCHC RBC AUTO-ENTMCNC: 29.4 G/DL (ref 31–37)
MCHC RBC AUTO-ENTMCNC: 29.7 G/DL (ref 31–37)
MCHC RBC AUTO-ENTMCNC: 30 G/DL (ref 31–37)
MCHC RBC AUTO-ENTMCNC: 30 G/DL (ref 31–37)
MCHC RBC AUTO-ENTMCNC: 30.1 G/DL (ref 31–37)
MCHC RBC AUTO-ENTMCNC: 30.1 G/DL (ref 31–37)
MCHC RBC AUTO-ENTMCNC: 30.5 G/DL (ref 31–37)
MCHC RBC AUTO-ENTMCNC: 30.7 G/DL (ref 31–37)
MCHC RBC AUTO-ENTMCNC: 30.8 G/DL (ref 31–37)
MCHC RBC AUTO-ENTMCNC: 31.5 G/DL (ref 31–37)
MCV RBC AUTO: 85.7 FL (ref 78–100)
MCV RBC AUTO: 86.2 FL (ref 78–100)
MCV RBC AUTO: 86.9 FL (ref 78–100)
MCV RBC AUTO: 87.1 FL (ref 78–100)
MCV RBC AUTO: 87.6 FL (ref 78–100)
MCV RBC AUTO: 87.6 FL (ref 78–100)
MCV RBC AUTO: 87.8 FL (ref 78–100)
MCV RBC AUTO: 88.5 FL (ref 78–100)
MCV RBC AUTO: 88.9 FL (ref 78–100)
MCV RBC AUTO: 89.1 FL (ref 78–100)
MCV RBC AUTO: 89.2 FL (ref 78–100)
MCV RBC AUTO: 89.5 FL (ref 78–100)
MCV RBC AUTO: 89.8 FL (ref 78–100)
MCV RBC AUTO: 90.3 FL (ref 78–100)
MCV RBC AUTO: 90.5 FL (ref 78–100)
MCV RBC AUTO: 90.7 FL (ref 78–100)
MCV RBC AUTO: 91.3 FL (ref 78–100)
MCV RBC AUTO: 91.4 FL (ref 78–100)
MCV RBC AUTO: 91.8 FL (ref 78–100)
MCV RBC AUTO: 91.9 FL (ref 78–100)
MCV RBC AUTO: 92.1 FL (ref 78–100)
MCV RBC AUTO: 92.3 FL (ref 74–97)
MCV RBC AUTO: 92.3 FL (ref 78–100)
MCV RBC AUTO: 92.5 FL (ref 78–100)
MCV RBC AUTO: 92.5 FL (ref 78–100)
MCV RBC AUTO: 95 FL (ref 78–100)
MCV RBC AUTO: 97.2 FL (ref 78–100)
MONOCYTES # BLD: 0.4 K/UL (ref 0.05–1.2)
MONOCYTES # BLD: 0.4 K/UL (ref 0.05–1.2)
MONOCYTES # BLD: 0.5 K/UL (ref 0.05–1.2)
MONOCYTES # BLD: 0.6 K/UL (ref 0.05–1.2)
MONOCYTES # BLD: 0.7 K/UL (ref 0.05–1.2)
MONOCYTES # BLD: 0.8 K/UL (ref 0.05–1.2)
MONOCYTES # BLD: 0.9 K/UL (ref 0.05–1.2)
MONOCYTES # BLD: 0.9 K/UL (ref 0.05–1.2)
MONOCYTES # BLD: 1 K/UL (ref 0.05–1.2)
MONOCYTES NFR BLD: 10 % (ref 3–10)
MONOCYTES NFR BLD: 10 % (ref 3–10)
MONOCYTES NFR BLD: 11 % (ref 3–10)
MONOCYTES NFR BLD: 2 % (ref 3–10)
MONOCYTES NFR BLD: 4 % (ref 3–10)
MONOCYTES NFR BLD: 4 % (ref 3–10)
MONOCYTES NFR BLD: 5 % (ref 3–10)
MONOCYTES NFR BLD: 5 % (ref 3–10)
MONOCYTES NFR BLD: 6 % (ref 3–10)
MONOCYTES NFR BLD: 7 % (ref 3–10)
MONOCYTES NFR BLD: 8 % (ref 3–10)
MONOCYTES NFR BLD: 9 % (ref 3–10)
MONOCYTES NFR FLD: 11 %
NEUTROPHILS NFR FLD: 49 %
NEUTS BAND # FLD: 0 %
NEUTS SEG # BLD: 10.1 K/UL (ref 1.8–8)
NEUTS SEG # BLD: 10.3 K/UL (ref 1.8–8)
NEUTS SEG # BLD: 10.4 K/UL (ref 1.8–8)
NEUTS SEG # BLD: 10.7 K/UL (ref 1.8–8)
NEUTS SEG # BLD: 10.9 K/UL (ref 1.8–8)
NEUTS SEG # BLD: 11.1 K/UL (ref 1.8–8)
NEUTS SEG # BLD: 14.2 K/UL (ref 1.8–8)
NEUTS SEG # BLD: 14.3 K/UL (ref 1.8–8)
NEUTS SEG # BLD: 17.5 K/UL (ref 1.8–8)
NEUTS SEG # BLD: 4.9 K/UL (ref 1.8–8)
NEUTS SEG # BLD: 5.8 K/UL (ref 1.8–8)
NEUTS SEG # BLD: 6 K/UL (ref 1.8–8)
NEUTS SEG # BLD: 6.1 K/UL (ref 1.8–8)
NEUTS SEG # BLD: 6.4 K/UL (ref 1.8–8)
NEUTS SEG # BLD: 6.5 K/UL (ref 1.8–8)
NEUTS SEG # BLD: 6.6 K/UL (ref 1.8–8)
NEUTS SEG # BLD: 6.7 K/UL (ref 1.8–8)
NEUTS SEG # BLD: 7 K/UL (ref 1.8–8)
NEUTS SEG # BLD: 7 K/UL (ref 1.8–8)
NEUTS SEG # BLD: 7.1 K/UL (ref 1.8–8)
NEUTS SEG # BLD: 7.1 K/UL (ref 1.8–8)
NEUTS SEG # BLD: 7.5 K/UL (ref 1.8–8)
NEUTS SEG # BLD: 7.6 K/UL (ref 1.8–8)
NEUTS SEG # BLD: 7.8 K/UL (ref 1.8–8)
NEUTS SEG # BLD: 7.8 K/UL (ref 1.8–8)
NEUTS SEG # BLD: 8.3 K/UL (ref 1.8–8)
NEUTS SEG # BLD: 8.6 K/UL (ref 1.8–8)
NEUTS SEG NFR BLD: 82 % (ref 40–73)
NEUTS SEG NFR BLD: 83 % (ref 40–73)
NEUTS SEG NFR BLD: 85 % (ref 40–73)
NEUTS SEG NFR BLD: 85 % (ref 40–73)
NEUTS SEG NFR BLD: 86 % (ref 40–73)
NEUTS SEG NFR BLD: 87 % (ref 40–73)
NEUTS SEG NFR BLD: 88 % (ref 40–73)
NEUTS SEG NFR BLD: 88 % (ref 40–73)
NEUTS SEG NFR BLD: 89 % (ref 40–73)
NEUTS SEG NFR BLD: 90 % (ref 40–73)
NEUTS SEG NFR BLD: 91 % (ref 40–73)
NEUTS SEG NFR BLD: 92 % (ref 40–73)
NEUTS SEG NFR BLD: 93 % (ref 40–73)
NEUTS SEG NFR BLD: 94 % (ref 40–73)
NEUTS SEG NFR BLD: 95 % (ref 40–73)
NITRITE UR QL STRIP.AUTO: NEGATIVE
NITRITE UR QL STRIP.AUTO: NEGATIVE
NRBC # BLD: 0 K/UL (ref 0–0.01)
NRBC BLD-RTO: 0 PER 100 WBC
NUC CELL # FLD: 967 /CU MM
O2/TOTAL GAS SETTING VFR VENT: 30 %
O2/TOTAL GAS SETTING VFR VENT: 35 %
O2/TOTAL GAS SETTING VFR VENT: 4 %
O2/TOTAL GAS SETTING VFR VENT: 40 %
O2/TOTAL GAS SETTING VFR VENT: 40 %
O2/TOTAL GAS SETTING VFR VENT: 50 %
O2/TOTAL GAS SETTING VFR VENT: 55 %
O2/TOTAL GAS SETTING VFR VENT: 55 %
O2/TOTAL GAS SETTING VFR VENT: 6 %
O2/TOTAL GAS SETTING VFR VENT: 60 %
O2/TOTAL GAS SETTING VFR VENT: 85 %
O2/TOTAL GAS SETTING VFR VENT: 90 %
O2/TOTAL GAS SETTING VFR VENT: 90 %
P-R INTERVAL, ECG05: 124 MS
P-R INTERVAL, ECG05: 132 MS
P-R INTERVAL, ECG05: 134 MS
PAW @ MEAN EXP FLOW ON VENT: 10 CMH2O
PAW @ MEAN EXP FLOW ON VENT: 11 CMH2O
PAW @ MEAN EXP FLOW ON VENT: 12 CMH2O
PAW @ MEAN EXP FLOW ON VENT: 13 CMH2O
PAW @ MEAN EXP FLOW ON VENT: 13 CMH2O
PAW @ MEAN EXP FLOW ON VENT: 15 CMH2O
PAW @ MEAN EXP FLOW ON VENT: 21 CMH2O
PCO2 BLD: 33.2 MMHG (ref 35–45)
PCO2 BLD: 39.6 MMHG (ref 35–45)
PCO2 BLD: 41.4 MMHG (ref 35–45)
PCO2 BLD: 41.7 MMHG (ref 35–45)
PCO2 BLD: 42.7 MMHG (ref 35–45)
PCO2 BLD: 42.9 MMHG (ref 35–45)
PCO2 BLD: 44.1 MMHG (ref 35–45)
PCO2 BLD: 49.1 MMHG (ref 35–45)
PCO2 BLD: 52 MMHG (ref 35–45)
PCO2 BLD: 52.8 MMHG (ref 35–45)
PCO2 BLD: 53.5 MMHG (ref 35–45)
PCO2 BLD: 54.2 MMHG (ref 35–45)
PCO2 BLD: 56 MMHG (ref 35–45)
PCO2 BLD: 56.6 MMHG (ref 35–45)
PCO2 BLD: 59.5 MMHG (ref 35–45)
PCO2 BLD: 59.7 MMHG (ref 35–45)
PCO2 BLD: 78.3 MMHG (ref 35–45)
PCO2 BLD: 80 MMHG (ref 35–45)
PCO2 BLD: 83 MMHG (ref 35–45)
PCO2 BLD: 84.5 MMHG (ref 35–45)
PEEP RESPIRATORY: 10 CMH2O
PEEP RESPIRATORY: 5 CMH2O
PEEP RESPIRATORY: 6 CMH2O
PH BLD: 7.23 [PH] (ref 7.35–7.45)
PH BLD: 7.23 [PH] (ref 7.35–7.45)
PH BLD: 7.26 [PH] (ref 7.35–7.45)
PH BLD: 7.26 [PH] (ref 7.35–7.45)
PH BLD: 7.37 [PH] (ref 7.35–7.45)
PH BLD: 7.39 [PH] (ref 7.35–7.45)
PH BLD: 7.4 [PH] (ref 7.35–7.45)
PH BLD: 7.42 [PH] (ref 7.35–7.45)
PH BLD: 7.43 [PH] (ref 7.35–7.45)
PH BLD: 7.44 [PH] (ref 7.35–7.45)
PH BLD: 7.44 [PH] (ref 7.35–7.45)
PH BLD: 7.46 [PH] (ref 7.35–7.45)
PH BLD: 7.47 [PH] (ref 7.35–7.45)
PH BLD: 7.52 [PH] (ref 7.35–7.45)
PH BLD: 7.55 [PH] (ref 7.35–7.45)
PH FLD: 7.7 [PH]
PH UR STRIP: 5.5 [PH] (ref 5–8)
PH UR STRIP: >8.5 [PH] (ref 5–8)
PHOSPHATE SERPL-MCNC: 1.5 MG/DL (ref 2.5–4.9)
PHOSPHATE SERPL-MCNC: 1.8 MG/DL (ref 2.5–4.9)
PHOSPHATE SERPL-MCNC: 1.9 MG/DL (ref 2.5–4.9)
PHOSPHATE SERPL-MCNC: 2 MG/DL (ref 2.5–4.9)
PHOSPHATE SERPL-MCNC: 2 MG/DL (ref 2.5–4.9)
PHOSPHATE SERPL-MCNC: 2.1 MG/DL (ref 2.5–4.9)
PHOSPHATE SERPL-MCNC: 2.1 MG/DL (ref 2.5–4.9)
PHOSPHATE SERPL-MCNC: 2.2 MG/DL (ref 2.5–4.9)
PHOSPHATE SERPL-MCNC: 2.3 MG/DL (ref 2.5–4.9)
PHOSPHATE SERPL-MCNC: 2.4 MG/DL (ref 2.5–4.9)
PHOSPHATE SERPL-MCNC: 2.6 MG/DL (ref 2.5–4.9)
PHOSPHATE SERPL-MCNC: 2.7 MG/DL (ref 2.5–4.9)
PHOSPHATE SERPL-MCNC: 2.8 MG/DL (ref 2.5–4.9)
PHOSPHATE SERPL-MCNC: 2.9 MG/DL (ref 2.5–4.9)
PHOSPHATE SERPL-MCNC: 3 MG/DL (ref 2.5–4.9)
PHOSPHATE SERPL-MCNC: 3.1 MG/DL (ref 2.5–4.9)
PHOSPHATE SERPL-MCNC: 3.1 MG/DL (ref 2.5–4.9)
PHOSPHATE SERPL-MCNC: 3.6 MG/DL (ref 2.5–4.9)
PHOSPHATE SERPL-MCNC: 3.7 MG/DL (ref 2.5–4.9)
PHOSPHATE SERPL-MCNC: 3.9 MG/DL (ref 2.5–4.9)
PHOSPHATE SERPL-MCNC: 4.5 MG/DL (ref 2.5–4.9)
PIP ISTAT,IPIP: 23
PIP ISTAT,IPIP: 24
PIP ISTAT,IPIP: 25
PIP ISTAT,IPIP: 26
PIP ISTAT,IPIP: 30
PIP ISTAT,IPIP: 32
PIP ISTAT,IPIP: 32
PIP ISTAT,IPIP: 33
PIP ISTAT,IPIP: 37
PIP ISTAT,IPIP: 39
PLATELET # BLD AUTO: 172 K/UL (ref 135–420)
PLATELET # BLD AUTO: 183 K/UL (ref 135–420)
PLATELET # BLD AUTO: 183 K/UL (ref 135–420)
PLATELET # BLD AUTO: 187 K/UL (ref 135–420)
PLATELET # BLD AUTO: 192 K/UL (ref 135–420)
PLATELET # BLD AUTO: 193 K/UL (ref 135–420)
PLATELET # BLD AUTO: 200 K/UL (ref 135–420)
PLATELET # BLD AUTO: 202 K/UL (ref 135–420)
PLATELET # BLD AUTO: 213 K/UL (ref 135–420)
PLATELET # BLD AUTO: 214 K/UL (ref 135–420)
PLATELET # BLD AUTO: 217 K/UL (ref 135–420)
PLATELET # BLD AUTO: 218 K/UL (ref 135–420)
PLATELET # BLD AUTO: 219 K/UL (ref 135–420)
PLATELET # BLD AUTO: 220 K/UL (ref 135–420)
PLATELET # BLD AUTO: 221 K/UL (ref 135–420)
PLATELET # BLD AUTO: 232 K/UL (ref 135–420)
PLATELET # BLD AUTO: 245 K/UL (ref 135–420)
PLATELET # BLD AUTO: 249 K/UL (ref 135–420)
PLATELET # BLD AUTO: 267 K/UL (ref 135–420)
PLATELET # BLD AUTO: 283 K/UL (ref 135–420)
PLATELET # BLD AUTO: 301 K/UL (ref 135–420)
PLATELET # BLD AUTO: 301 K/UL (ref 135–420)
PLATELET # BLD AUTO: 309 K/UL (ref 135–420)
PLATELET # BLD AUTO: 337 K/UL (ref 135–420)
PLATELET # BLD AUTO: 366 K/UL (ref 135–420)
PLATELET # BLD AUTO: 403 K/UL (ref 135–420)
PLATELET # BLD AUTO: 404 K/UL (ref 135–420)
PLATELET COMMENTS,PCOM: ABNORMAL
PMV BLD AUTO: 10 FL (ref 9.2–11.8)
PMV BLD AUTO: 10.1 FL (ref 9.2–11.8)
PMV BLD AUTO: 10.2 FL (ref 9.2–11.8)
PMV BLD AUTO: 11.3 FL (ref 9.2–11.8)
PMV BLD AUTO: 8.9 FL (ref 9.2–11.8)
PMV BLD AUTO: 9.1 FL (ref 9.2–11.8)
PMV BLD AUTO: 9.2 FL (ref 9.2–11.8)
PMV BLD AUTO: 9.2 FL (ref 9.2–11.8)
PMV BLD AUTO: 9.3 FL (ref 9.2–11.8)
PMV BLD AUTO: 9.4 FL (ref 9.2–11.8)
PMV BLD AUTO: 9.5 FL (ref 9.2–11.8)
PMV BLD AUTO: 9.6 FL (ref 9.2–11.8)
PMV BLD AUTO: 9.8 FL (ref 9.2–11.8)
PMV BLD AUTO: 9.9 FL (ref 9.2–11.8)
PO2 BLD: 107 MMHG (ref 80–100)
PO2 BLD: 124 MMHG (ref 80–100)
PO2 BLD: 230 MMHG (ref 80–100)
PO2 BLD: 49 MMHG (ref 80–100)
PO2 BLD: 54 MMHG (ref 80–100)
PO2 BLD: 55 MMHG (ref 80–100)
PO2 BLD: 59 MMHG (ref 80–100)
PO2 BLD: 59 MMHG (ref 80–100)
PO2 BLD: 60 MMHG (ref 80–100)
PO2 BLD: 61 MMHG (ref 80–100)
PO2 BLD: 62 MMHG (ref 80–100)
PO2 BLD: 66 MMHG (ref 80–100)
PO2 BLD: 67 MMHG (ref 80–100)
PO2 BLD: 67 MMHG (ref 80–100)
PO2 BLD: 68 MMHG (ref 80–100)
PO2 BLD: 69 MMHG (ref 80–100)
PO2 BLD: 76 MMHG (ref 80–100)
PO2 BLD: 76 MMHG (ref 80–100)
PO2 BLD: 78 MMHG (ref 80–100)
PO2 BLD: 81 MMHG (ref 80–100)
PO2 BLD: 83 MMHG (ref 80–100)
POTASSIUM SERPL-SCNC: 3.5 MMOL/L (ref 3.5–5.5)
POTASSIUM SERPL-SCNC: 3.6 MMOL/L (ref 3.5–5.5)
POTASSIUM SERPL-SCNC: 3.8 MMOL/L (ref 3.5–5.5)
POTASSIUM SERPL-SCNC: 4 MMOL/L (ref 3.5–5.5)
POTASSIUM SERPL-SCNC: 4.1 MMOL/L (ref 3.5–5.5)
POTASSIUM SERPL-SCNC: 4.3 MMOL/L (ref 3.5–5.5)
POTASSIUM SERPL-SCNC: 4.4 MMOL/L (ref 3.5–5.5)
POTASSIUM SERPL-SCNC: 4.5 MMOL/L (ref 3.5–5.5)
POTASSIUM SERPL-SCNC: 4.5 MMOL/L (ref 3.5–5.5)
POTASSIUM SERPL-SCNC: 4.6 MMOL/L (ref 3.5–5.5)
POTASSIUM SERPL-SCNC: 4.7 MMOL/L (ref 3.5–5.5)
POTASSIUM SERPL-SCNC: 4.8 MMOL/L (ref 3.5–5.5)
POTASSIUM SERPL-SCNC: 5.1 MMOL/L (ref 3.5–5.5)
PROCALCITONIN SERPL-MCNC: 0.14 NG/ML
PROCALCITONIN SERPL-MCNC: 0.14 NG/ML
PROCALCITONIN SERPL-MCNC: 0.15 NG/ML
PROCALCITONIN SERPL-MCNC: 0.16 NG/ML
PROCALCITONIN SERPL-MCNC: 0.16 NG/ML
PROCALCITONIN SERPL-MCNC: 0.17 NG/ML
PROCALCITONIN SERPL-MCNC: 0.28 NG/ML
PROCALCITONIN SERPL-MCNC: 0.3 NG/ML
PROCALCITONIN SERPL-MCNC: 0.44 NG/ML
PROCALCITONIN SERPL-MCNC: 0.52 NG/ML
PROCALCITONIN SERPL-MCNC: 0.67 NG/ML
PROCALCITONIN SERPL-MCNC: <0.05 NG/ML
PROT FLD-MCNC: 3.1 G/DL
PROT SERPL-MCNC: 5.2 G/DL (ref 6.4–8.2)
PROT SERPL-MCNC: 5.4 G/DL (ref 6.4–8.2)
PROT SERPL-MCNC: 7.2 G/DL (ref 6.4–8.2)
PROT SERPL-MCNC: 7.6 G/DL (ref 6.4–8.2)
PROT UR STRIP-MCNC: 30 MG/DL
PROT UR STRIP-MCNC: 30 MG/DL
PROTHROMBIN TIME: 13.7 SEC (ref 11.5–15.2)
PROTHROMBIN TIME: 14.2 SEC (ref 11.5–15.2)
Q-T INTERVAL, ECG07: 248 MS
Q-T INTERVAL, ECG07: 306 MS
Q-T INTERVAL, ECG07: 352 MS
QRS DURATION, ECG06: 68 MS
QRS DURATION, ECG06: 76 MS
QRS DURATION, ECG06: 88 MS
QTC CALCULATION (BEZET), ECG08: 382 MS
QTC CALCULATION (BEZET), ECG08: 432 MS
QTC CALCULATION (BEZET), ECG08: 458 MS
RBC # BLD AUTO: 2.65 M/UL (ref 4.35–5.65)
RBC # BLD AUTO: 2.75 M/UL (ref 4.35–5.65)
RBC # BLD AUTO: 2.79 M/UL (ref 4.35–5.65)
RBC # BLD AUTO: 2.96 M/UL (ref 4.35–5.65)
RBC # BLD AUTO: 3.07 M/UL (ref 4.35–5.65)
RBC # BLD AUTO: 3.27 M/UL (ref 4.35–5.65)
RBC # BLD AUTO: 3.33 M/UL (ref 4.35–5.65)
RBC # BLD AUTO: 3.34 M/UL (ref 4.35–5.65)
RBC # BLD AUTO: 3.35 M/UL (ref 4.35–5.65)
RBC # BLD AUTO: 3.42 M/UL (ref 4.35–5.65)
RBC # BLD AUTO: 3.46 M/UL (ref 4.35–5.65)
RBC # BLD AUTO: 3.53 M/UL (ref 4.35–5.65)
RBC # BLD AUTO: 3.56 M/UL (ref 4.35–5.65)
RBC # BLD AUTO: 3.59 M/UL (ref 4.35–5.65)
RBC # BLD AUTO: 3.66 M/UL (ref 4.35–5.65)
RBC # BLD AUTO: 3.73 M/UL (ref 4.35–5.65)
RBC # BLD AUTO: 3.77 M/UL (ref 4.35–5.65)
RBC # BLD AUTO: 3.8 M/UL (ref 4.35–5.65)
RBC # BLD AUTO: 3.91 M/UL (ref 4.35–5.65)
RBC # BLD AUTO: 3.93 M/UL (ref 4.35–5.65)
RBC # BLD AUTO: 4 M/UL (ref 4.35–5.65)
RBC # BLD AUTO: 4 M/UL (ref 4.35–5.65)
RBC # BLD AUTO: 4.24 M/UL (ref 4.35–5.65)
RBC # BLD AUTO: 4.27 M/UL (ref 4.35–5.65)
RBC # BLD AUTO: 4.44 M/UL (ref 4.35–5.65)
RBC # BLD AUTO: 4.45 M/UL (ref 4.35–5.65)
RBC # BLD AUTO: 4.87 M/UL (ref 4.35–5.65)
RBC # FLD: 1733 /CU MM
RBC #/AREA URNS HPF: NEGATIVE /HPF (ref 0–5)
RBC #/AREA URNS HPF: NORMAL /HPF (ref 0–5)
RBC MORPH BLD: ABNORMAL
REPORTED DOSE,DOSE: NORMAL UNITS
REPORTED DOSE/TIME,TMG: 0
REPORTED DOSE/TIME,TMG: 1800
REPORTED DOSE/TIME,TMG: 2300
RSV RNA SPEC QL NAA+PROBE: NOT DETECTED
RV+EV RNA SPEC QL NAA+PROBE: NOT DETECTED
S PNEUM AG UR QL: NEGATIVE
SAO2 % BLD: 80.5 % (ref 92–97)
SAO2 % BLD: 83.5 % (ref 92–97)
SAO2 % BLD: 87.2 % (ref 92–97)
SAO2 % BLD: 89.6 % (ref 92–97)
SAO2 % BLD: 90 % (ref 92–97)
SAO2 % BLD: 90.7 % (ref 92–97)
SAO2 % BLD: 90.8 % (ref 92–97)
SAO2 % BLD: 90.8 % (ref 92–97)
SAO2 % BLD: 91.1 % (ref 92–97)
SAO2 % BLD: 91.5 % (ref 92–97)
SAO2 % BLD: 91.9 % (ref 92–97)
SAO2 % BLD: 92 % (ref 92–97)
SAO2 % BLD: 93.3 % (ref 92–97)
SAO2 % BLD: 93.4 % (ref 92–97)
SAO2 % BLD: 93.6 % (ref 92–97)
SAO2 % BLD: 95.5 % (ref 92–97)
SAO2 % BLD: 95.7 % (ref 92–97)
SAO2 % BLD: 96.5 % (ref 92–97)
SAO2 % BLD: 98.6 % (ref 92–97)
SAO2 % BLD: 99.3 % (ref 92–97)
SARS-COV-2 PCR, COVPCR: NOT DETECTED
SARS-COV-2, COV2NT: NOT DETECTED
SARS-COV-2, COV2NT: NOT DETECTED
SERVICE CMNT-IMP: ABNORMAL
SERVICE CMNT-IMP: NORMAL
SODIUM SERPL-SCNC: 131 MMOL/L (ref 136–145)
SODIUM SERPL-SCNC: 132 MMOL/L (ref 136–145)
SODIUM SERPL-SCNC: 133 MMOL/L (ref 136–145)
SODIUM SERPL-SCNC: 134 MMOL/L (ref 136–145)
SODIUM SERPL-SCNC: 135 MMOL/L (ref 136–145)
SODIUM SERPL-SCNC: 137 MMOL/L (ref 136–145)
SODIUM SERPL-SCNC: 138 MMOL/L (ref 136–145)
SODIUM SERPL-SCNC: 139 MMOL/L (ref 136–145)
SODIUM SERPL-SCNC: 139 MMOL/L (ref 136–145)
SODIUM SERPL-SCNC: 140 MMOL/L (ref 136–145)
SODIUM SERPL-SCNC: 141 MMOL/L (ref 136–145)
SODIUM SERPL-SCNC: 141 MMOL/L (ref 136–145)
SODIUM SERPL-SCNC: 142 MMOL/L (ref 136–145)
SODIUM SERPL-SCNC: 143 MMOL/L (ref 136–145)
SODIUM SERPL-SCNC: 145 MMOL/L (ref 136–145)
SP GR UR REFRACTOMETRY: 1.01 (ref 1–1.03)
SP GR UR REFRACTOMETRY: >1.03 (ref 1–1.03)
SPECIMEN EXP DATE BLD: NORMAL
SPECIMEN EXP DATE BLD: NORMAL
SPECIMEN SOURCE FLD: ABNORMAL
SPECIMEN SOURCE FLD: NORMAL
SPECIMEN TYPE: ABNORMAL
STATUS OF UNIT,%ST: NORMAL
TOTAL RESP. RATE, ITRR: 14
TOTAL RESP. RATE, ITRR: 18
TROPONIN I SERPL-MCNC: 0.04 NG/ML (ref 0–0.04)
TROPONIN I SERPL-MCNC: 0.53 NG/ML (ref 0–0.04)
TROPONIN I SERPL-MCNC: 0.55 NG/ML (ref 0–0.04)
TROPONIN I SERPL-MCNC: 0.62 NG/ML (ref 0–0.04)
TROPONIN I SERPL-MCNC: <0.02 NG/ML (ref 0–0.04)
TSH SERPL DL<=0.05 MIU/L-ACNC: 1.4 UIU/ML (ref 0.36–3.74)
UNIT DIVISION, %UDIV: 0
UROBILINOGEN UR QL STRIP.AUTO: 0.2 EU/DL (ref 0.2–1)
UROBILINOGEN UR QL STRIP.AUTO: 1 EU/DL (ref 0.2–1)
VANCOMYCIN SERPL-MCNC: 19.3 UG/ML (ref 5–40)
VANCOMYCIN TROUGH SERPL-MCNC: 11.8 UG/ML (ref 10–20)
VANCOMYCIN TROUGH SERPL-MCNC: 14.2 UG/ML (ref 10–20)
VANCOMYCIN TROUGH SERPL-MCNC: 15.2 UG/ML (ref 10–20)
VENTILATION MODE VENT: ABNORMAL
VENTRICULAR RATE, ECG03: 102 BPM
VENTRICULAR RATE, ECG03: 120 BPM
VENTRICULAR RATE, ECG03: 143 BPM
VT SETTING VENT: 304 ML
VT SETTING VENT: 350 ML
VT SETTING VENT: 400 ML
WBC # BLD AUTO: 11 K/UL (ref 4.6–13.2)
WBC # BLD AUTO: 11.1 K/UL (ref 4.6–13.2)
WBC # BLD AUTO: 11.4 K/UL (ref 4.6–13.2)
WBC # BLD AUTO: 11.5 K/UL (ref 4.6–13.2)
WBC # BLD AUTO: 11.9 K/UL (ref 4.6–13.2)
WBC # BLD AUTO: 12.2 K/UL (ref 4.6–13.2)
WBC # BLD AUTO: 15.3 K/UL (ref 4.6–13.2)
WBC # BLD AUTO: 15.6 K/UL (ref 4.6–13.2)
WBC # BLD AUTO: 18.5 K/UL (ref 4.6–13.2)
WBC # BLD AUTO: 5.9 K/UL (ref 4.6–13.2)
WBC # BLD AUTO: 6.6 K/UL (ref 4.6–13.2)
WBC # BLD AUTO: 6.9 K/UL (ref 4.6–13.2)
WBC # BLD AUTO: 7.3 K/UL (ref 4.6–13.2)
WBC # BLD AUTO: 7.3 K/UL (ref 4.6–13.2)
WBC # BLD AUTO: 7.5 K/UL (ref 4.6–13.2)
WBC # BLD AUTO: 7.7 K/UL (ref 4.6–13.2)
WBC # BLD AUTO: 7.8 K/UL (ref 4.6–13.2)
WBC # BLD AUTO: 7.8 K/UL (ref 4.6–13.2)
WBC # BLD AUTO: 7.9 K/UL (ref 4.6–13.2)
WBC # BLD AUTO: 8.3 K/UL (ref 4.6–13.2)
WBC # BLD AUTO: 8.4 K/UL (ref 4.6–13.2)
WBC # BLD AUTO: 8.6 K/UL (ref 4.6–13.2)
WBC # BLD AUTO: 8.6 K/UL (ref 4.6–13.2)
WBC # BLD AUTO: 9 K/UL (ref 4.6–13.2)
WBC # BLD AUTO: 9 K/UL (ref 4.6–13.2)
WBC # BLD AUTO: 9.4 K/UL (ref 4.6–13.2)
WBC # BLD AUTO: 9.7 K/UL (ref 4.6–13.2)
WBC URNS QL MICRO: NEGATIVE /HPF (ref 0–4)
WBC URNS QL MICRO: NEGATIVE /HPF (ref 0–4)

## 2021-01-01 PROCEDURE — 71045 X-RAY EXAM CHEST 1 VIEW: CPT

## 2021-01-01 PROCEDURE — 65610000006 HC RM INTENSIVE CARE

## 2021-01-01 PROCEDURE — 74011250637 HC RX REV CODE- 250/637: Performed by: PHYSICIAN ASSISTANT

## 2021-01-01 PROCEDURE — 94640 AIRWAY INHALATION TREATMENT: CPT

## 2021-01-01 PROCEDURE — 0W9B3ZZ DRAINAGE OF LEFT PLEURAL CAVITY, PERCUTANEOUS APPROACH: ICD-10-PCS | Performed by: INTERNAL MEDICINE

## 2021-01-01 PROCEDURE — 36600 WITHDRAWAL OF ARTERIAL BLOOD: CPT

## 2021-01-01 PROCEDURE — 74011000258 HC RX REV CODE- 258: Performed by: EMERGENCY MEDICINE

## 2021-01-01 PROCEDURE — 82962 GLUCOSE BLOOD TEST: CPT

## 2021-01-01 PROCEDURE — 74011250637 HC RX REV CODE- 250/637: Performed by: REGISTERED NURSE

## 2021-01-01 PROCEDURE — 74176 CT ABD & PELVIS W/O CONTRAST: CPT

## 2021-01-01 PROCEDURE — 2709999900 HC NON-CHARGEABLE SUPPLY: Performed by: STUDENT IN AN ORGANIZED HEALTH CARE EDUCATION/TRAINING PROGRAM

## 2021-01-01 PROCEDURE — 99232 SBSQ HOSP IP/OBS MODERATE 35: CPT | Performed by: INTERNAL MEDICINE

## 2021-01-01 PROCEDURE — 99291 CRITICAL CARE FIRST HOUR: CPT | Performed by: INTERNAL MEDICINE

## 2021-01-01 PROCEDURE — 87040 BLOOD CULTURE FOR BACTERIA: CPT

## 2021-01-01 PROCEDURE — 74011250637 HC RX REV CODE- 250/637: Performed by: STUDENT IN AN ORGANIZED HEALTH CARE EDUCATION/TRAINING PROGRAM

## 2021-01-01 PROCEDURE — 74011250636 HC RX REV CODE- 250/636: Performed by: INTERNAL MEDICINE

## 2021-01-01 PROCEDURE — 84100 ASSAY OF PHOSPHORUS: CPT

## 2021-01-01 PROCEDURE — U0003 INFECTIOUS AGENT DETECTION BY NUCLEIC ACID (DNA OR RNA); SEVERE ACUTE RESPIRATORY SYNDROME CORONAVIRUS 2 (SARS-COV-2) (CORONAVIRUS DISEASE [COVID-19]), AMPLIFIED PROBE TECHNIQUE, MAKING USE OF HIGH THROUGHPUT TECHNOLOGIES AS DESCRIBED BY CMS-2020-01-R: HCPCS

## 2021-01-01 PROCEDURE — 74011250636 HC RX REV CODE- 250/636: Performed by: PHYSICIAN ASSISTANT

## 2021-01-01 PROCEDURE — 77030037878 HC DRSG MEPILEX >48IN BORD MOLN -B

## 2021-01-01 PROCEDURE — 82803 BLOOD GASES ANY COMBINATION: CPT

## 2021-01-01 PROCEDURE — 74011000258 HC RX REV CODE- 258: Performed by: INTERNAL MEDICINE

## 2021-01-01 PROCEDURE — 80048 BASIC METABOLIC PNL TOTAL CA: CPT

## 2021-01-01 PROCEDURE — 87070 CULTURE OTHR SPECIMN AEROBIC: CPT

## 2021-01-01 PROCEDURE — 85025 COMPLETE CBC W/AUTO DIFF WBC: CPT

## 2021-01-01 PROCEDURE — 74011250636 HC RX REV CODE- 250/636: Performed by: NURSE PRACTITIONER

## 2021-01-01 PROCEDURE — 74011000250 HC RX REV CODE- 250: Performed by: STUDENT IN AN ORGANIZED HEALTH CARE EDUCATION/TRAINING PROGRAM

## 2021-01-01 PROCEDURE — 87149 DNA/RNA DIRECT PROBE: CPT

## 2021-01-01 PROCEDURE — 99233 SBSQ HOSP IP/OBS HIGH 50: CPT | Performed by: NURSE PRACTITIONER

## 2021-01-01 PROCEDURE — 36591 DRAW BLOOD OFF VENOUS DEVICE: CPT

## 2021-01-01 PROCEDURE — 76040000007: Performed by: STUDENT IN AN ORGANIZED HEALTH CARE EDUCATION/TRAINING PROGRAM

## 2021-01-01 PROCEDURE — 77030041076 HC DRSG AG OPTICELL MDII -A

## 2021-01-01 PROCEDURE — 74011250636 HC RX REV CODE- 250/636: Performed by: NURSE ANESTHETIST, CERTIFIED REGISTERED

## 2021-01-01 PROCEDURE — 74011250636 HC RX REV CODE- 250/636: Performed by: STUDENT IN AN ORGANIZED HEALTH CARE EDUCATION/TRAINING PROGRAM

## 2021-01-01 PROCEDURE — G8432 DEP SCR NOT DOC, RNG: HCPCS | Performed by: NURSE PRACTITIONER

## 2021-01-01 PROCEDURE — 92526 ORAL FUNCTION THERAPY: CPT

## 2021-01-01 PROCEDURE — 83735 ASSAY OF MAGNESIUM: CPT

## 2021-01-01 PROCEDURE — 74011000250 HC RX REV CODE- 250: Performed by: PHYSICIAN ASSISTANT

## 2021-01-01 PROCEDURE — 2709999900 HC NON-CHARGEABLE SUPPLY

## 2021-01-01 PROCEDURE — 82040 ASSAY OF SERUM ALBUMIN: CPT

## 2021-01-01 PROCEDURE — 74011000258 HC RX REV CODE- 258: Performed by: STUDENT IN AN ORGANIZED HEALTH CARE EDUCATION/TRAINING PROGRAM

## 2021-01-01 PROCEDURE — 84145 PROCALCITONIN (PCT): CPT

## 2021-01-01 PROCEDURE — 99214 OFFICE O/P EST MOD 30 MIN: CPT | Performed by: NURSE PRACTITIONER

## 2021-01-01 PROCEDURE — 99285 EMERGENCY DEPT VISIT HI MDM: CPT

## 2021-01-01 PROCEDURE — 99291 CRITICAL CARE FIRST HOUR: CPT | Performed by: PHYSICIAN ASSISTANT

## 2021-01-01 PROCEDURE — 77030008771 HC TU NG SALEM SUMP -A

## 2021-01-01 PROCEDURE — 74011250636 HC RX REV CODE- 250/636: Performed by: REGISTERED NURSE

## 2021-01-01 PROCEDURE — 71260 CT THORAX DX C+: CPT

## 2021-01-01 PROCEDURE — 77030040393 HC DRSG OPTIFOAM GENT MDII -B

## 2021-01-01 PROCEDURE — 89220 SPUTUM SPECIMEN COLLECTION: CPT

## 2021-01-01 PROCEDURE — 99223 1ST HOSP IP/OBS HIGH 75: CPT | Performed by: STUDENT IN AN ORGANIZED HEALTH CARE EDUCATION/TRAINING PROGRAM

## 2021-01-01 PROCEDURE — 74011250637 HC RX REV CODE- 250/637: Performed by: EMERGENCY MEDICINE

## 2021-01-01 PROCEDURE — 86901 BLOOD TYPING SEROLOGIC RH(D): CPT

## 2021-01-01 PROCEDURE — 74011000250 HC RX REV CODE- 250: Performed by: INTERNAL MEDICINE

## 2021-01-01 PROCEDURE — 74011250637 HC RX REV CODE- 250/637: Performed by: INTERNAL MEDICINE

## 2021-01-01 PROCEDURE — 87116 MYCOBACTERIA CULTURE: CPT

## 2021-01-01 PROCEDURE — 80053 COMPREHEN METABOLIC PANEL: CPT

## 2021-01-01 PROCEDURE — 88305 TISSUE EXAM BY PATHOLOGIST: CPT

## 2021-01-01 PROCEDURE — 03HY32Z INSERTION OF MONITORING DEVICE INTO UPPER ARTERY, PERCUTANEOUS APPROACH: ICD-10-PCS | Performed by: INTERNAL MEDICINE

## 2021-01-01 PROCEDURE — 93306 TTE W/DOPPLER COMPLETE: CPT | Performed by: INTERNAL MEDICINE

## 2021-01-01 PROCEDURE — G8536 NO DOC ELDER MAL SCRN: HCPCS | Performed by: NURSE PRACTITIONER

## 2021-01-01 PROCEDURE — 77010033678 HC OXYGEN DAILY

## 2021-01-01 PROCEDURE — 87205 SMEAR GRAM STAIN: CPT

## 2021-01-01 PROCEDURE — 99292 CRITICAL CARE ADDL 30 MIN: CPT | Performed by: INTERNAL MEDICINE

## 2021-01-01 PROCEDURE — 94002 VENT MGMT INPAT INIT DAY: CPT

## 2021-01-01 PROCEDURE — 99232 SBSQ HOSP IP/OBS MODERATE 35: CPT | Performed by: EMERGENCY MEDICINE

## 2021-01-01 PROCEDURE — 74011250636 HC RX REV CODE- 250/636: Performed by: EMERGENCY MEDICINE

## 2021-01-01 PROCEDURE — 87449 NOS EACH ORGANISM AG IA: CPT

## 2021-01-01 PROCEDURE — 74011000250 HC RX REV CODE- 250: Performed by: NURSE PRACTITIONER

## 2021-01-01 PROCEDURE — 65270000029 HC RM PRIVATE

## 2021-01-01 PROCEDURE — 74011000636 HC RX REV CODE- 636: Performed by: INTERNAL MEDICINE

## 2021-01-01 PROCEDURE — 83880 ASSAY OF NATRIURETIC PEPTIDE: CPT

## 2021-01-01 PROCEDURE — G8510 SCR DEP NEG, NO PLAN REQD: HCPCS | Performed by: INTERNAL MEDICINE

## 2021-01-01 PROCEDURE — 31624 DX BRONCHOSCOPE/LAVAGE: CPT | Performed by: INTERNAL MEDICINE

## 2021-01-01 PROCEDURE — 36415 COLL VENOUS BLD VENIPUNCTURE: CPT

## 2021-01-01 PROCEDURE — 94762 N-INVAS EAR/PLS OXIMTRY CONT: CPT

## 2021-01-01 PROCEDURE — 74011636637 HC RX REV CODE- 636/637: Performed by: PHYSICIAN ASSISTANT

## 2021-01-01 PROCEDURE — APPNB45 APP NON BILLABLE 31-45 MINUTES: Performed by: REGISTERED NURSE

## 2021-01-01 PROCEDURE — 74230 X-RAY XM SWLNG FUNCJ C+: CPT

## 2021-01-01 PROCEDURE — 90715 TDAP VACCINE 7 YRS/> IM: CPT | Performed by: NURSE PRACTITIONER

## 2021-01-01 PROCEDURE — 99223 1ST HOSP IP/OBS HIGH 75: CPT | Performed by: INTERNAL MEDICINE

## 2021-01-01 PROCEDURE — 82553 CREATINE MB FRACTION: CPT

## 2021-01-01 PROCEDURE — 83986 ASSAY PH BODY FLUID NOS: CPT

## 2021-01-01 PROCEDURE — 77030018798 HC PMP KT ENTRL FED COVD -A

## 2021-01-01 PROCEDURE — 84484 ASSAY OF TROPONIN QUANT: CPT

## 2021-01-01 PROCEDURE — 5A1955Z RESPIRATORY VENTILATION, GREATER THAN 96 CONSECUTIVE HOURS: ICD-10-PCS | Performed by: INTERNAL MEDICINE

## 2021-01-01 PROCEDURE — 80202 ASSAY OF VANCOMYCIN: CPT

## 2021-01-01 PROCEDURE — 99232 SBSQ HOSP IP/OBS MODERATE 35: CPT

## 2021-01-01 PROCEDURE — 0202U NFCT DS 22 TRGT SARS-COV-2: CPT

## 2021-01-01 PROCEDURE — 74011000250 HC RX REV CODE- 250: Performed by: NURSE ANESTHETIST, CERTIFIED REGISTERED

## 2021-01-01 PROCEDURE — 77030040392 HC DRSG OPTIFOAM MDII -A

## 2021-01-01 PROCEDURE — 94003 VENT MGMT INPAT SUBQ DAY: CPT

## 2021-01-01 PROCEDURE — 74011000250 HC RX REV CODE- 250: Performed by: EMERGENCY MEDICINE

## 2021-01-01 PROCEDURE — 31500 INSERT EMERGENCY AIRWAY: CPT

## 2021-01-01 PROCEDURE — 36430 TRANSFUSION BLD/BLD COMPNT: CPT

## 2021-01-01 PROCEDURE — 97535 SELF CARE MNGMENT TRAINING: CPT

## 2021-01-01 PROCEDURE — 97530 THERAPEUTIC ACTIVITIES: CPT

## 2021-01-01 PROCEDURE — P9045 ALBUMIN (HUMAN), 5%, 250 ML: HCPCS | Performed by: NURSE PRACTITIONER

## 2021-01-01 PROCEDURE — 81001 URINALYSIS AUTO W/SCOPE: CPT

## 2021-01-01 PROCEDURE — 85610 PROTHROMBIN TIME: CPT

## 2021-01-01 PROCEDURE — 0B9J8ZX DRAINAGE OF LEFT LOWER LUNG LOBE, VIA NATURAL OR ARTIFICIAL OPENING ENDOSCOPIC, DIAGNOSTIC: ICD-10-PCS | Performed by: INTERNAL MEDICINE

## 2021-01-01 PROCEDURE — 0T9B80Z DRAINAGE OF BLADDER WITH DRAINAGE DEVICE, VIA NATURAL OR ARTIFICIAL OPENING ENDOSCOPIC: ICD-10-PCS | Performed by: UROLOGY

## 2021-01-01 PROCEDURE — 93000 ELECTROCARDIOGRAM COMPLETE: CPT | Performed by: INTERNAL MEDICINE

## 2021-01-01 PROCEDURE — 74011250637 HC RX REV CODE- 250/637: Performed by: NURSE PRACTITIONER

## 2021-01-01 PROCEDURE — 77030040831 HC BAG URINE DRNG MDII -A

## 2021-01-01 PROCEDURE — 99215 OFFICE O/P EST HI 40 MIN: CPT | Performed by: INTERNAL MEDICINE

## 2021-01-01 PROCEDURE — 84443 ASSAY THYROID STIM HORMONE: CPT

## 2021-01-01 PROCEDURE — G0463 HOSPITAL OUTPT CLINIC VISIT: HCPCS | Performed by: NURSE PRACTITIONER

## 2021-01-01 PROCEDURE — G8419 CALC BMI OUT NRM PARAM NOF/U: HCPCS | Performed by: INTERNAL MEDICINE

## 2021-01-01 PROCEDURE — 74011000250 HC RX REV CODE- 250

## 2021-01-01 PROCEDURE — 1101F PT FALLS ASSESS-DOCD LE1/YR: CPT | Performed by: NURSE PRACTITIONER

## 2021-01-01 PROCEDURE — 94761 N-INVAS EAR/PLS OXIMETRY MLT: CPT

## 2021-01-01 PROCEDURE — 02HV33Z INSERTION OF INFUSION DEVICE INTO SUPERIOR VENA CAVA, PERCUTANEOUS APPROACH: ICD-10-PCS | Performed by: INTERNAL MEDICINE

## 2021-01-01 PROCEDURE — 0BC48ZZ EXTIRPATION OF MATTER FROM RIGHT UPPER LOBE BRONCHUS, VIA NATURAL OR ARTIFICIAL OPENING ENDOSCOPIC: ICD-10-PCS | Performed by: INTERNAL MEDICINE

## 2021-01-01 PROCEDURE — APPNB15 APP NON BILLABLE TIME 0-15 MINS: Performed by: PHYSICIAN ASSISTANT

## 2021-01-01 PROCEDURE — 88331 PATH CONSLTJ SURG 1 BLK 1SPC: CPT

## 2021-01-01 PROCEDURE — 87015 SPECIMEN INFECT AGNT CONCNTJ: CPT

## 2021-01-01 PROCEDURE — 83605 ASSAY OF LACTIC ACID: CPT

## 2021-01-01 PROCEDURE — 0B9F8ZX DRAINAGE OF RIGHT LOWER LUNG LOBE, VIA NATURAL OR ARTIFICIAL OPENING ENDOSCOPIC, DIAGNOSTIC: ICD-10-PCS | Performed by: INTERNAL MEDICINE

## 2021-01-01 PROCEDURE — 65660000004 HC RM CVT STEPDOWN

## 2021-01-01 PROCEDURE — 1101F PT FALLS ASSESS-DOCD LE1/YR: CPT | Performed by: INTERNAL MEDICINE

## 2021-01-01 PROCEDURE — G8427 DOCREV CUR MEDS BY ELIG CLIN: HCPCS | Performed by: INTERNAL MEDICINE

## 2021-01-01 PROCEDURE — 99231 SBSQ HOSP IP/OBS SF/LOW 25: CPT | Performed by: NURSE PRACTITIONER

## 2021-01-01 PROCEDURE — 74011000258 HC RX REV CODE- 258: Performed by: REGISTERED NURSE

## 2021-01-01 PROCEDURE — APPNB30 APP NON BILLABLE TIME 0-30 MINS: Performed by: NURSE PRACTITIONER

## 2021-01-01 PROCEDURE — 87086 URINE CULTURE/COLONY COUNT: CPT

## 2021-01-01 PROCEDURE — 74018 RADEX ABDOMEN 1 VIEW: CPT

## 2021-01-01 PROCEDURE — 74011000258 HC RX REV CODE- 258

## 2021-01-01 PROCEDURE — 87077 CULTURE AEROBIC IDENTIFY: CPT

## 2021-01-01 PROCEDURE — 74011250636 HC RX REV CODE- 250/636

## 2021-01-01 PROCEDURE — 97161 PT EVAL LOW COMPLEX 20 MIN: CPT

## 2021-01-01 PROCEDURE — 77030020454 HC TU ET CUF HI/LO COVD -B

## 2021-01-01 PROCEDURE — 0BC68ZZ EXTIRPATION OF MATTER FROM RIGHT LOWER LOBE BRONCHUS, VIA NATURAL OR ARTIFICIAL OPENING ENDOSCOPIC: ICD-10-PCS | Performed by: INTERNAL MEDICINE

## 2021-01-01 PROCEDURE — G8536 NO DOC ELDER MAL SCRN: HCPCS | Performed by: INTERNAL MEDICINE

## 2021-01-01 PROCEDURE — 83615 LACTATE (LD) (LDH) ENZYME: CPT

## 2021-01-01 PROCEDURE — 97110 THERAPEUTIC EXERCISES: CPT

## 2021-01-01 PROCEDURE — 94010 BREATHING CAPACITY TEST: CPT | Performed by: INTERNAL MEDICINE

## 2021-01-01 PROCEDURE — 97116 GAIT TRAINING THERAPY: CPT

## 2021-01-01 PROCEDURE — 71275 CT ANGIOGRAPHY CHEST: CPT

## 2021-01-01 PROCEDURE — 89051 BODY FLUID CELL COUNT: CPT

## 2021-01-01 PROCEDURE — 87186 SC STD MICRODIL/AGAR DIL: CPT

## 2021-01-01 PROCEDURE — 0BC88ZZ EXTIRPATION OF MATTER FROM LEFT UPPER LOBE BRONCHUS, VIA NATURAL OR ARTIFICIAL OPENING ENDOSCOPIC: ICD-10-PCS | Performed by: INTERNAL MEDICINE

## 2021-01-01 PROCEDURE — P9016 RBC LEUKOCYTES REDUCED: HCPCS

## 2021-01-01 PROCEDURE — 99233 SBSQ HOSP IP/OBS HIGH 50: CPT | Performed by: INTERNAL MEDICINE

## 2021-01-01 PROCEDURE — 0BCB8ZZ EXTIRPATION OF MATTER FROM LEFT LOWER LOBE BRONCHUS, VIA NATURAL OR ARTIFICIAL OPENING ENDOSCOPIC: ICD-10-PCS | Performed by: INTERNAL MEDICINE

## 2021-01-01 PROCEDURE — G8419 CALC BMI OUT NRM PARAM NOF/U: HCPCS | Performed by: NURSE PRACTITIONER

## 2021-01-01 PROCEDURE — 74011000636 HC RX REV CODE- 636: Performed by: EMERGENCY MEDICINE

## 2021-01-01 PROCEDURE — 77030011254 HC DRSG HYDRGEL S&N -A

## 2021-01-01 PROCEDURE — 88112 CYTOPATH CELL ENHANCE TECH: CPT

## 2021-01-01 PROCEDURE — 32554 ASPIRATE PLEURA W/O IMAGING: CPT | Performed by: INTERNAL MEDICINE

## 2021-01-01 PROCEDURE — 71250 CT THORAX DX C-: CPT

## 2021-01-01 PROCEDURE — 93005 ELECTROCARDIOGRAM TRACING: CPT

## 2021-01-01 PROCEDURE — G8420 CALC BMI NORM PARAMETERS: HCPCS | Performed by: INTERNAL MEDICINE

## 2021-01-01 PROCEDURE — 92611 MOTION FLUOROSCOPY/SWALLOW: CPT

## 2021-01-01 PROCEDURE — 99214 OFFICE O/P EST MOD 30 MIN: CPT | Performed by: INTERNAL MEDICINE

## 2021-01-01 PROCEDURE — 77030040830 HC CATH URETH FOL MDII -A

## 2021-01-01 PROCEDURE — 76937 US GUIDE VASCULAR ACCESS: CPT | Performed by: INTERNAL MEDICINE

## 2021-01-01 PROCEDURE — 87153 DNA/RNA SEQUENCING: CPT

## 2021-01-01 PROCEDURE — 31645 BRNCHSC W/THER ASPIR 1ST: CPT | Performed by: INTERNAL MEDICINE

## 2021-01-01 PROCEDURE — 77030005122 HC CATH GASTMY PEG BSC -B: Performed by: STUDENT IN AN ORGANIZED HEALTH CARE EDUCATION/TRAINING PROGRAM

## 2021-01-01 PROCEDURE — 36592 COLLECT BLOOD FROM PICC: CPT

## 2021-01-01 PROCEDURE — G8427 DOCREV CUR MEDS BY ELIG CLIN: HCPCS | Performed by: NURSE PRACTITIONER

## 2021-01-01 PROCEDURE — 80076 HEPATIC FUNCTION PANEL: CPT

## 2021-01-01 PROCEDURE — 93306 TTE W/DOPPLER COMPLETE: CPT

## 2021-01-01 PROCEDURE — 0DH68UZ INSERTION OF FEEDING DEVICE INTO STOMACH, VIA NATURAL OR ARTIFICIAL OPENING ENDOSCOPIC: ICD-10-PCS | Performed by: STUDENT IN AN ORGANIZED HEALTH CARE EDUCATION/TRAINING PROGRAM

## 2021-01-01 PROCEDURE — 36556 INSERT NON-TUNNEL CV CATH: CPT | Performed by: INTERNAL MEDICINE

## 2021-01-01 PROCEDURE — 99222 1ST HOSP IP/OBS MODERATE 55: CPT | Performed by: NURSE PRACTITIONER

## 2021-01-01 PROCEDURE — 87106 FUNGI IDENTIFICATION YEAST: CPT

## 2021-01-01 PROCEDURE — 94667 MNPJ CHEST WALL 1ST: CPT

## 2021-01-01 PROCEDURE — P9047 ALBUMIN (HUMAN), 25%, 50ML: HCPCS | Performed by: REGISTERED NURSE

## 2021-01-01 PROCEDURE — 97165 OT EVAL LOW COMPLEX 30 MIN: CPT

## 2021-01-01 PROCEDURE — 36620 INSERTION CATHETER ARTERY: CPT | Performed by: INTERNAL MEDICINE

## 2021-01-01 PROCEDURE — G8420 CALC BMI NORM PARAMETERS: HCPCS | Performed by: NURSE PRACTITIONER

## 2021-01-01 PROCEDURE — 76060000032 HC ANESTHESIA 0.5 TO 1 HR: Performed by: STUDENT IN AN ORGANIZED HEALTH CARE EDUCATION/TRAINING PROGRAM

## 2021-01-01 PROCEDURE — 77010033711 HC HIGH FLOW OXYGEN

## 2021-01-01 PROCEDURE — 99239 HOSP IP/OBS DSCHRG MGMT >30: CPT | Performed by: INTERNAL MEDICINE

## 2021-01-01 PROCEDURE — 94727 GAS DIL/WSHOT DETER LNG VOL: CPT | Performed by: INTERNAL MEDICINE

## 2021-01-01 PROCEDURE — 76450000000

## 2021-01-01 PROCEDURE — 0BH17EZ INSERTION OF ENDOTRACHEAL AIRWAY INTO TRACHEA, VIA NATURAL OR ARTIFICIAL OPENING: ICD-10-PCS | Performed by: NURSE ANESTHETIST, CERTIFIED REGISTERED

## 2021-01-01 PROCEDURE — 99232 SBSQ HOSP IP/OBS MODERATE 35: CPT | Performed by: NURSE PRACTITIONER

## 2021-01-01 PROCEDURE — 97164 PT RE-EVAL EST PLAN CARE: CPT

## 2021-01-01 PROCEDURE — 83036 HEMOGLOBIN GLYCOSYLATED A1C: CPT

## 2021-01-01 PROCEDURE — 86923 COMPATIBILITY TEST ELECTRIC: CPT

## 2021-01-01 PROCEDURE — 3E0G76Z INTRODUCTION OF NUTRITIONAL SUBSTANCE INTO UPPER GI, VIA NATURAL OR ARTIFICIAL OPENING: ICD-10-PCS | Performed by: INTERNAL MEDICINE

## 2021-01-01 PROCEDURE — 93970 EXTREMITY STUDY: CPT

## 2021-01-01 PROCEDURE — 82330 ASSAY OF CALCIUM: CPT

## 2021-01-01 PROCEDURE — 82945 GLUCOSE OTHER FLUID: CPT

## 2021-01-01 PROCEDURE — 74011250636 HC RX REV CODE- 250/636: Performed by: GENERAL PRACTICE

## 2021-01-01 PROCEDURE — 84157 ASSAY OF PROTEIN OTHER: CPT

## 2021-01-01 PROCEDURE — 99222 1ST HOSP IP/OBS MODERATE 55: CPT | Performed by: INTERNAL MEDICINE

## 2021-01-01 PROCEDURE — 96365 THER/PROPH/DIAG IV INF INIT: CPT

## 2021-01-01 RX ORDER — GUAIFENESIN 600 MG/1
600 TABLET, EXTENDED RELEASE ORAL EVERY 12 HOURS
Status: DISCONTINUED | OUTPATIENT
Start: 2021-01-01 | End: 2021-01-01

## 2021-01-01 RX ORDER — SODIUM CHLORIDE FOR INHALATION 3 %
4 VIAL, NEBULIZER (ML) INHALATION 4 TIMES DAILY
Status: DISCONTINUED | OUTPATIENT
Start: 2021-01-01 | End: 2021-01-01

## 2021-01-01 RX ORDER — BACITRACIN ZINC AND POLYMYXIN B SULFATE 500; 1000 [USP'U]/G; [USP'U]/G
OINTMENT TOPICAL 2 TIMES DAILY
Status: COMPLETED | OUTPATIENT
Start: 2021-01-01 | End: 2021-01-01

## 2021-01-01 RX ORDER — OMEGA-3-ACID ETHYL ESTERS 1 G/1
1 CAPSULE, LIQUID FILLED ORAL
Status: DISCONTINUED | OUTPATIENT
Start: 2021-01-01 | End: 2021-01-01

## 2021-01-01 RX ORDER — SODIUM CHLORIDE FOR INHALATION 3 %
4 VIAL, NEBULIZER (ML) INHALATION
Status: DISCONTINUED | OUTPATIENT
Start: 2021-01-01 | End: 2021-01-01

## 2021-01-01 RX ORDER — FUROSEMIDE 10 MG/ML
20 INJECTION INTRAMUSCULAR; INTRAVENOUS ONCE
Status: COMPLETED | OUTPATIENT
Start: 2021-01-01 | End: 2021-01-01

## 2021-01-01 RX ORDER — HYDROMORPHONE HYDROCHLORIDE 1 MG/ML
1 INJECTION, SOLUTION INTRAMUSCULAR; INTRAVENOUS; SUBCUTANEOUS
Status: DISCONTINUED | OUTPATIENT
Start: 2021-01-01 | End: 2021-01-01

## 2021-01-01 RX ORDER — CHLORHEXIDINE GLUCONATE 1.2 MG/ML
10 RINSE ORAL EVERY 12 HOURS
Status: DISCONTINUED | OUTPATIENT
Start: 2021-01-01 | End: 2021-01-01 | Stop reason: HOSPADM

## 2021-01-01 RX ORDER — LIDOCAINE HYDROCHLORIDE 10 MG/ML
INJECTION, SOLUTION EPIDURAL; INFILTRATION; INTRACAUDAL; PERINEURAL
Status: DISCONTINUED
Start: 2021-01-01 | End: 2021-01-01 | Stop reason: WASHOUT

## 2021-01-01 RX ORDER — FLUCONAZOLE 2 MG/ML
400 INJECTION, SOLUTION INTRAVENOUS EVERY 24 HOURS
Status: DISCONTINUED | OUTPATIENT
Start: 2021-01-01 | End: 2021-01-01

## 2021-01-01 RX ORDER — MAGNESIUM SULFATE 100 %
16 CRYSTALS MISCELLANEOUS AS NEEDED
Status: DISCONTINUED | OUTPATIENT
Start: 2021-01-01 | End: 2021-01-01

## 2021-01-01 RX ORDER — SODIUM CHLORIDE FOR INHALATION 3 %
4 VIAL, NEBULIZER (ML) INHALATION 2 TIMES DAILY
Status: DISCONTINUED | OUTPATIENT
Start: 2021-01-01 | End: 2021-01-01

## 2021-01-01 RX ORDER — DEXTROSE MONOHYDRATE AND SODIUM CHLORIDE 5; .9 G/100ML; G/100ML
40 INJECTION, SOLUTION INTRAVENOUS CONTINUOUS
Status: DISCONTINUED | OUTPATIENT
Start: 2021-01-01 | End: 2021-01-01

## 2021-01-01 RX ORDER — MORPHINE SULFATE 2 MG/ML
1 INJECTION, SOLUTION INTRAMUSCULAR; INTRAVENOUS ONCE
Status: COMPLETED | OUTPATIENT
Start: 2021-01-01 | End: 2021-01-01

## 2021-01-01 RX ORDER — MELATONIN
1000 DAILY
Status: DISCONTINUED | OUTPATIENT
Start: 2021-01-01 | End: 2021-01-01

## 2021-01-01 RX ORDER — HYDROMORPHONE HYDROCHLORIDE 1 MG/ML
1 INJECTION, SOLUTION INTRAMUSCULAR; INTRAVENOUS; SUBCUTANEOUS ONCE
Status: ACTIVE | OUTPATIENT
Start: 2021-01-01 | End: 2021-01-01

## 2021-01-01 RX ORDER — LIDOCAINE HYDROCHLORIDE 10 MG/ML
INJECTION, SOLUTION EPIDURAL; INFILTRATION; INTRACAUDAL; PERINEURAL AS NEEDED
Status: DISCONTINUED | OUTPATIENT
Start: 2021-01-01 | End: 2021-01-01 | Stop reason: HOSPADM

## 2021-01-01 RX ORDER — GUAIFENESIN 100 MG/5ML
400 SOLUTION ORAL
Status: DISCONTINUED | OUTPATIENT
Start: 2021-01-01 | End: 2021-01-01

## 2021-01-01 RX ORDER — GUAIFENESIN 600 MG/1
600 TABLET, EXTENDED RELEASE ORAL 2 TIMES DAILY
Qty: 180 TABLET | Refills: 3 | Status: SHIPPED | OUTPATIENT
Start: 2021-01-01 | End: 2021-11-29

## 2021-01-01 RX ORDER — ONDANSETRON 2 MG/ML
4 INJECTION INTRAMUSCULAR; INTRAVENOUS ONCE
Status: CANCELLED | OUTPATIENT
Start: 2021-01-01 | End: 2021-01-01

## 2021-01-01 RX ORDER — PROPOFOL 10 MG/ML
0-50 VIAL (ML) INTRAVENOUS
Status: DISCONTINUED | OUTPATIENT
Start: 2021-01-01 | End: 2021-01-01

## 2021-01-01 RX ORDER — PROPOFOL 10 MG/ML
INJECTION, EMULSION INTRAVENOUS
Status: COMPLETED
Start: 2021-01-01 | End: 2021-01-01

## 2021-01-01 RX ORDER — POLYETHYLENE GLYCOL 3350 17 G/17G
17 POWDER, FOR SOLUTION ORAL 2 TIMES DAILY
Status: DISCONTINUED | OUTPATIENT
Start: 2021-01-01 | End: 2021-01-01

## 2021-01-01 RX ORDER — FENTANYL CITRATE 50 UG/ML
25 INJECTION, SOLUTION INTRAMUSCULAR; INTRAVENOUS AS NEEDED
Status: CANCELLED | OUTPATIENT
Start: 2021-01-01

## 2021-01-01 RX ORDER — NOREPINEPHRINE BITARTRATE/D5W 8 MG/250ML
PLASTIC BAG, INJECTION (ML) INTRAVENOUS
Status: DISPENSED
Start: 2021-01-01 | End: 2021-01-01

## 2021-01-01 RX ORDER — IPRATROPIUM BROMIDE AND ALBUTEROL SULFATE 2.5; .5 MG/3ML; MG/3ML
3 SOLUTION RESPIRATORY (INHALATION)
Status: DISCONTINUED | OUTPATIENT
Start: 2021-01-01 | End: 2021-01-01

## 2021-01-01 RX ORDER — ASCORBIC ACID 250 MG
500 TABLET ORAL DAILY
Status: DISCONTINUED | OUTPATIENT
Start: 2021-01-01 | End: 2021-01-01

## 2021-01-01 RX ORDER — POLYETHYLENE GLYCOL 3350 17 G/17G
17 POWDER, FOR SOLUTION ORAL DAILY PRN
Status: DISCONTINUED | OUTPATIENT
Start: 2021-01-01 | End: 2021-01-01

## 2021-01-01 RX ORDER — POLYETHYLENE GLYCOL 3350 17 G/17G
17 POWDER, FOR SOLUTION ORAL DAILY
Status: DISCONTINUED | OUTPATIENT
Start: 2021-01-01 | End: 2021-01-01

## 2021-01-01 RX ORDER — ACETAMINOPHEN 650 MG/1
650 SUPPOSITORY RECTAL
Status: DISCONTINUED | OUTPATIENT
Start: 2021-01-01 | End: 2021-01-01 | Stop reason: HOSPADM

## 2021-01-01 RX ORDER — NOREPINEPHRINE BITARTRATE/D5W 8 MG/250ML
.5-5 PLASTIC BAG, INJECTION (ML) INTRAVENOUS
Status: DISPENSED | OUTPATIENT
Start: 2021-01-01 | End: 2021-01-01

## 2021-01-01 RX ORDER — IPRATROPIUM BROMIDE AND ALBUTEROL SULFATE 2.5; .5 MG/3ML; MG/3ML
3 SOLUTION RESPIRATORY (INHALATION)
Status: CANCELLED | OUTPATIENT
Start: 2021-01-01

## 2021-01-01 RX ORDER — MIDAZOLAM HYDROCHLORIDE 1 MG/ML
2 INJECTION, SOLUTION INTRAMUSCULAR; INTRAVENOUS
Status: DISCONTINUED | OUTPATIENT
Start: 2021-01-01 | End: 2021-01-01

## 2021-01-01 RX ORDER — MAGNESIUM SULFATE HEPTAHYDRATE 40 MG/ML
2 INJECTION, SOLUTION INTRAVENOUS ONCE
Status: COMPLETED | OUTPATIENT
Start: 2021-01-01 | End: 2021-01-01

## 2021-01-01 RX ORDER — THERA TABS 400 MCG
1 TAB ORAL DAILY
Status: DISCONTINUED | OUTPATIENT
Start: 2021-01-01 | End: 2021-01-01

## 2021-01-01 RX ORDER — VANCOMYCIN HYDROCHLORIDE
1250 ONCE
Status: DISPENSED | OUTPATIENT
Start: 2021-01-01 | End: 2021-01-01

## 2021-01-01 RX ORDER — VANCOMYCIN HYDROCHLORIDE
1250 ONCE
Status: COMPLETED | OUTPATIENT
Start: 2021-01-01 | End: 2021-01-01

## 2021-01-01 RX ORDER — ALBUTEROL SULFATE 0.83 MG/ML
SOLUTION RESPIRATORY (INHALATION)
Qty: 360 NEBULE | Refills: 3 | Status: SHIPPED | OUTPATIENT
Start: 2021-01-01

## 2021-01-01 RX ORDER — FENTANYL CITRATE 50 UG/ML
100 INJECTION, SOLUTION INTRAMUSCULAR; INTRAVENOUS
Status: DISCONTINUED | OUTPATIENT
Start: 2021-01-01 | End: 2021-01-01

## 2021-01-01 RX ORDER — SODIUM CHLORIDE 9 MG/ML
75 INJECTION, SOLUTION INTRAVENOUS CONTINUOUS
Status: DISCONTINUED | OUTPATIENT
Start: 2021-01-01 | End: 2021-01-01

## 2021-01-01 RX ORDER — ACETAMINOPHEN 650 MG/1
650 SUPPOSITORY RECTAL
Status: DISCONTINUED | OUTPATIENT
Start: 2021-01-01 | End: 2021-01-01

## 2021-01-01 RX ORDER — GUAIFENESIN 100 MG/5ML
81 LIQUID (ML) ORAL DAILY
Status: DISCONTINUED | OUTPATIENT
Start: 2021-01-01 | End: 2021-01-01

## 2021-01-01 RX ORDER — SODIUM CHLORIDE FOR INHALATION 3 %
4 VIAL, NEBULIZER (ML) INHALATION 4 TIMES DAILY
Qty: 480 ML | Refills: 5 | Status: SHIPPED | OUTPATIENT
Start: 2021-01-01

## 2021-01-01 RX ORDER — SODIUM CHLORIDE 9 MG/ML
250 INJECTION, SOLUTION INTRAVENOUS AS NEEDED
Status: DISCONTINUED | OUTPATIENT
Start: 2021-01-01 | End: 2021-01-01 | Stop reason: HOSPADM

## 2021-01-01 RX ORDER — SODIUM CHLORIDE, SODIUM LACTATE, POTASSIUM CHLORIDE, CALCIUM CHLORIDE 600; 310; 30; 20 MG/100ML; MG/100ML; MG/100ML; MG/100ML
125 INJECTION, SOLUTION INTRAVENOUS CONTINUOUS
Status: DISCONTINUED | OUTPATIENT
Start: 2021-01-01 | End: 2021-01-01 | Stop reason: HOSPADM

## 2021-01-01 RX ORDER — SCOLOPAMINE TRANSDERMAL SYSTEM 1 MG/1
1 PATCH, EXTENDED RELEASE TRANSDERMAL
Status: DISCONTINUED | OUTPATIENT
Start: 2021-01-01 | End: 2021-01-01 | Stop reason: HOSPADM

## 2021-01-01 RX ORDER — MELATONIN
1000 DAILY
COMMUNITY

## 2021-01-01 RX ORDER — LANOLIN ALCOHOL/MO/W.PET/CERES
200 CREAM (GRAM) TOPICAL DAILY
Status: DISCONTINUED | OUTPATIENT
Start: 2021-01-01 | End: 2021-01-01

## 2021-01-01 RX ORDER — POTASSIUM CHLORIDE 7.45 MG/ML
INJECTION INTRAVENOUS
Status: DISCONTINUED
Start: 2021-01-01 | End: 2021-01-01 | Stop reason: WASHOUT

## 2021-01-01 RX ORDER — NALOXONE HYDROCHLORIDE 0.4 MG/ML
0.4 INJECTION, SOLUTION INTRAMUSCULAR; INTRAVENOUS; SUBCUTANEOUS AS NEEDED
Status: DISCONTINUED | OUTPATIENT
Start: 2021-01-01 | End: 2021-01-01

## 2021-01-01 RX ORDER — SODIUM,POTASSIUM PHOSPHATES 280-250MG
1 POWDER IN PACKET (EA) ORAL 2 TIMES DAILY
Status: COMPLETED | OUTPATIENT
Start: 2021-01-01 | End: 2021-01-01

## 2021-01-01 RX ORDER — AMOXICILLIN 250 MG
1 CAPSULE ORAL DAILY
Status: DISCONTINUED | OUTPATIENT
Start: 2021-01-01 | End: 2021-01-01

## 2021-01-01 RX ORDER — LEVOFLOXACIN 5 MG/ML
750 INJECTION, SOLUTION INTRAVENOUS EVERY 24 HOURS
Status: DISCONTINUED | OUTPATIENT
Start: 2021-01-01 | End: 2021-01-01

## 2021-01-01 RX ORDER — SODIUM CHLORIDE, SODIUM LACTATE, POTASSIUM CHLORIDE, CALCIUM CHLORIDE 600; 310; 30; 20 MG/100ML; MG/100ML; MG/100ML; MG/100ML
25 INJECTION, SOLUTION INTRAVENOUS CONTINUOUS
Status: CANCELLED | OUTPATIENT
Start: 2021-01-01

## 2021-01-01 RX ORDER — ACETAMINOPHEN 325 MG/1
650 TABLET ORAL
Status: DISCONTINUED | OUTPATIENT
Start: 2021-01-01 | End: 2021-01-01

## 2021-01-01 RX ORDER — PHENOBARBITAL SODIUM 65 MG/ML
200 INJECTION INTRAMUSCULAR ONCE
Status: COMPLETED | OUTPATIENT
Start: 2021-01-01 | End: 2021-01-01

## 2021-01-01 RX ORDER — LORAZEPAM 2 MG/ML
INJECTION INTRAMUSCULAR
Status: COMPLETED
Start: 2021-01-01 | End: 2021-01-01

## 2021-01-01 RX ORDER — UREA 10 %
2 LOTION (ML) TOPICAL 2 TIMES DAILY
Status: DISCONTINUED | OUTPATIENT
Start: 2021-01-01 | End: 2021-01-01

## 2021-01-01 RX ORDER — SODIUM CHLORIDE 0.9 % (FLUSH) 0.9 %
5-40 SYRINGE (ML) INJECTION AS NEEDED
Status: DISCONTINUED | OUTPATIENT
Start: 2021-01-01 | End: 2021-01-01 | Stop reason: HOSPADM

## 2021-01-01 RX ORDER — ALBUTEROL SULFATE 0.83 MG/ML
2.5 SOLUTION RESPIRATORY (INHALATION)
Status: DISCONTINUED | OUTPATIENT
Start: 2021-01-01 | End: 2021-01-01 | Stop reason: SDUPTHER

## 2021-01-01 RX ORDER — DEXMEDETOMIDINE HYDROCHLORIDE 4 UG/ML
.1-1.5 INJECTION, SOLUTION INTRAVENOUS
Status: DISCONTINUED | OUTPATIENT
Start: 2021-01-01 | End: 2021-01-01

## 2021-01-01 RX ORDER — NOREPINEPHRINE BITARTRATE/D5W 8 MG/250ML
.5-5 PLASTIC BAG, INJECTION (ML) INTRAVENOUS
Status: DISCONTINUED | OUTPATIENT
Start: 2021-01-01 | End: 2021-01-01

## 2021-01-01 RX ORDER — HYDROMORPHONE HYDROCHLORIDE 2 MG/ML
INJECTION, SOLUTION INTRAMUSCULAR; INTRAVENOUS; SUBCUTANEOUS
Status: COMPLETED
Start: 2021-01-01 | End: 2021-01-01

## 2021-01-01 RX ORDER — IMIQUIMOD 12.5 MG/.25G
CREAM TOPICAL
COMMUNITY
Start: 2021-01-01 | End: 2021-01-01

## 2021-01-01 RX ORDER — VANCOMYCIN HYDROCHLORIDE
1250 EVERY 12 HOURS
Status: DISCONTINUED | OUTPATIENT
Start: 2021-01-01 | End: 2021-01-01

## 2021-01-01 RX ORDER — LIDOCAINE HYDROCHLORIDE 20 MG/ML
INJECTION, SOLUTION EPIDURAL; INFILTRATION; INTRACAUDAL; PERINEURAL AS NEEDED
Status: DISCONTINUED | OUTPATIENT
Start: 2021-01-01 | End: 2021-01-01 | Stop reason: HOSPADM

## 2021-01-01 RX ORDER — HYDROMORPHONE HYDROCHLORIDE 2 MG/ML
2 INJECTION, SOLUTION INTRAMUSCULAR; INTRAVENOUS; SUBCUTANEOUS ONCE
Status: COMPLETED | OUTPATIENT
Start: 2021-01-01 | End: 2021-01-01

## 2021-01-01 RX ORDER — ALBUMIN HUMAN 50 G/1000ML
25 SOLUTION INTRAVENOUS ONCE
Status: COMPLETED | OUTPATIENT
Start: 2021-01-01 | End: 2021-01-01

## 2021-01-01 RX ORDER — LORAZEPAM 2 MG/ML
2 INJECTION INTRAMUSCULAR ONCE
Status: COMPLETED | OUTPATIENT
Start: 2021-01-01 | End: 2021-01-01

## 2021-01-01 RX ORDER — SODIUM CHLORIDE 0.9 % (FLUSH) 0.9 %
5-40 SYRINGE (ML) INJECTION EVERY 8 HOURS
Status: DISCONTINUED | OUTPATIENT
Start: 2021-01-01 | End: 2021-01-01

## 2021-01-01 RX ORDER — LORAZEPAM 2 MG/ML
2 INJECTION INTRAMUSCULAR
Status: DISCONTINUED | OUTPATIENT
Start: 2021-01-01 | End: 2021-01-01

## 2021-01-01 RX ORDER — POTASSIUM CHLORIDE 14.9 MG/ML
10 INJECTION INTRAVENOUS ONCE
Status: COMPLETED | OUTPATIENT
Start: 2021-01-01 | End: 2021-01-01

## 2021-01-01 RX ORDER — MORPHINE SULFATE 2 MG/ML
1 INJECTION, SOLUTION INTRAMUSCULAR; INTRAVENOUS
Status: DISCONTINUED | OUTPATIENT
Start: 2021-01-01 | End: 2021-01-01

## 2021-01-01 RX ORDER — IPRATROPIUM BROMIDE AND ALBUTEROL SULFATE 2.5; .5 MG/3ML; MG/3ML
3 SOLUTION RESPIRATORY (INHALATION) 2 TIMES DAILY
Status: DISCONTINUED | OUTPATIENT
Start: 2021-01-01 | End: 2021-01-01

## 2021-01-01 RX ORDER — ALBUMIN HUMAN 250 G/1000ML
25 SOLUTION INTRAVENOUS ONCE
Status: COMPLETED | OUTPATIENT
Start: 2021-01-01 | End: 2021-01-01

## 2021-01-01 RX ORDER — MORPHINE SULFATE 5 MG/ML
INJECTION, SOLUTION INTRAVENOUS CONTINUOUS
Status: DISCONTINUED | OUTPATIENT
Start: 2021-01-01 | End: 2021-01-01 | Stop reason: HOSPADM

## 2021-01-01 RX ORDER — IPRATROPIUM BROMIDE AND ALBUTEROL SULFATE 2.5; .5 MG/3ML; MG/3ML
3 SOLUTION RESPIRATORY (INHALATION)
Status: DISCONTINUED | OUTPATIENT
Start: 2021-01-01 | End: 2021-01-01 | Stop reason: HOSPADM

## 2021-01-01 RX ORDER — MORPHINE SULFATE 4 MG/ML
4 INJECTION INTRAVENOUS
Status: DISCONTINUED | OUTPATIENT
Start: 2021-01-01 | End: 2021-01-01

## 2021-01-01 RX ORDER — SODIUM CHLORIDE FOR INHALATION 3 %
4 VIAL, NEBULIZER (ML) INHALATION EVERY 8 HOURS
Status: DISCONTINUED | OUTPATIENT
Start: 2021-01-01 | End: 2021-01-01

## 2021-01-01 RX ORDER — ACETYLCYSTEINE 100 MG/ML
4 SOLUTION ORAL; RESPIRATORY (INHALATION) EVERY 4 HOURS
Qty: 720 ML | Refills: 3 | OUTPATIENT
Start: 2021-01-01 | End: 2021-01-01 | Stop reason: SDUPTHER

## 2021-01-01 RX ORDER — SODIUM,POTASSIUM PHOSPHATES 280-250MG
1 POWDER IN PACKET (EA) ORAL 4 TIMES DAILY
Status: DISCONTINUED | OUTPATIENT
Start: 2021-01-01 | End: 2021-01-01

## 2021-01-01 RX ORDER — LORAZEPAM 2 MG/ML
1 INJECTION INTRAMUSCULAR
Status: DISCONTINUED | OUTPATIENT
Start: 2021-01-01 | End: 2021-01-01 | Stop reason: HOSPADM

## 2021-01-01 RX ORDER — INSULIN LISPRO 100 [IU]/ML
INJECTION, SOLUTION INTRAVENOUS; SUBCUTANEOUS EVERY 6 HOURS
Status: DISCONTINUED | OUTPATIENT
Start: 2021-01-01 | End: 2021-01-01

## 2021-01-01 RX ORDER — LEVOFLOXACIN 5 MG/ML
750 INJECTION, SOLUTION INTRAVENOUS EVERY 24 HOURS
Status: DISCONTINUED | OUTPATIENT
Start: 2021-01-01 | End: 2021-01-01 | Stop reason: ALTCHOICE

## 2021-01-01 RX ORDER — ENOXAPARIN SODIUM 100 MG/ML
40 INJECTION SUBCUTANEOUS
Status: DISCONTINUED | OUTPATIENT
Start: 2021-01-01 | End: 2021-01-01

## 2021-01-01 RX ORDER — GLYCOPYRROLATE 0.2 MG/ML
0.2 INJECTION INTRAMUSCULAR; INTRAVENOUS
Status: DISCONTINUED | OUTPATIENT
Start: 2021-01-01 | End: 2021-01-01 | Stop reason: HOSPADM

## 2021-01-01 RX ORDER — DEXTROSE MONOHYDRATE AND SODIUM CHLORIDE 5; .9 G/100ML; G/100ML
75 INJECTION, SOLUTION INTRAVENOUS CONTINUOUS
Status: DISCONTINUED | OUTPATIENT
Start: 2021-01-01 | End: 2021-01-01

## 2021-01-01 RX ORDER — PROPOFOL 10 MG/ML
INJECTION, EMULSION INTRAVENOUS AS NEEDED
Status: DISCONTINUED | OUTPATIENT
Start: 2021-01-01 | End: 2021-01-01 | Stop reason: HOSPADM

## 2021-01-01 RX ORDER — MORPHINE SULFATE 10 MG/ML
10 INJECTION, SOLUTION INTRAMUSCULAR; INTRAVENOUS ONCE
Status: COMPLETED | OUTPATIENT
Start: 2021-01-01 | End: 2021-01-01

## 2021-01-01 RX ORDER — PROPOFOL 10 MG/ML
INJECTION, EMULSION INTRAVENOUS AS NEEDED
Status: DISCONTINUED | OUTPATIENT
Start: 2021-01-01 | End: 2021-01-01

## 2021-01-01 RX ORDER — ACETYLCYSTEINE 100 MG/ML
4 SOLUTION ORAL; RESPIRATORY (INHALATION) EVERY 4 HOURS
Qty: 720 ML | Refills: 3 | Status: SHIPPED | OUTPATIENT
Start: 2021-01-01 | End: 2022-01-29

## 2021-01-01 RX ORDER — FLUOROURACIL 50 MG/G
CREAM TOPICAL
COMMUNITY
Start: 2021-01-01

## 2021-01-01 RX ORDER — POTASSIUM CHLORIDE 14.9 MG/ML
10 INJECTION INTRAVENOUS
Status: DISPENSED | OUTPATIENT
Start: 2021-01-01 | End: 2021-01-01

## 2021-01-01 RX ORDER — BUMETANIDE 0.25 MG/ML
0.5 INJECTION INTRAMUSCULAR; INTRAVENOUS ONCE
Status: COMPLETED | OUTPATIENT
Start: 2021-01-01 | End: 2021-01-01

## 2021-01-01 RX ORDER — MORPHINE SULFATE 2 MG/ML
2 INJECTION, SOLUTION INTRAMUSCULAR; INTRAVENOUS
Status: DISCONTINUED | OUTPATIENT
Start: 2021-01-01 | End: 2021-01-01 | Stop reason: HOSPADM

## 2021-01-01 RX ORDER — SODIUM CHLORIDE 0.9 % (FLUSH) 0.9 %
5-10 SYRINGE (ML) INJECTION AS NEEDED
Status: DISCONTINUED | OUTPATIENT
Start: 2021-01-01 | End: 2021-01-01 | Stop reason: HOSPADM

## 2021-01-01 RX ADMIN — Medication 200 MG: at 09:11

## 2021-01-01 RX ADMIN — Medication 200 MG: at 09:37

## 2021-01-01 RX ADMIN — VANCOMYCIN HYDROCHLORIDE 1000 MG: 1 INJECTION, POWDER, LYOPHILIZED, FOR SOLUTION INTRAVENOUS at 06:27

## 2021-01-01 RX ADMIN — SODIUM PHOSPHATE, MONOBASIC, MONOHYDRATE AND SODIUM PHOSPHATE, DIBASIC, ANHYDROUS: 276; 142 INJECTION, SOLUTION INTRAVENOUS at 07:04

## 2021-01-01 RX ADMIN — Medication 10 ML: at 14:30

## 2021-01-01 RX ADMIN — PIPERACILLIN AND TAZOBACTAM 4.5 G: 4; .5 INJECTION, POWDER, FOR SOLUTION INTRAVENOUS at 18:22

## 2021-01-01 RX ADMIN — LIDOCAINE HYDROCHLORIDE 60 MG: 20 INJECTION, SOLUTION EPIDURAL; INFILTRATION; INTRACAUDAL; PERINEURAL at 16:35

## 2021-01-01 RX ADMIN — DEXTROSE MONOHYDRATE 9 MCG/MIN: 50 INJECTION, SOLUTION INTRAVENOUS at 18:50

## 2021-01-01 RX ADMIN — IPRATROPIUM BROMIDE AND ALBUTEROL SULFATE 3 ML: .5; 2.5 SOLUTION RESPIRATORY (INHALATION) at 08:48

## 2021-01-01 RX ADMIN — INSULIN LISPRO 2 UNITS: 100 INJECTION, SOLUTION INTRAVENOUS; SUBCUTANEOUS at 00:00

## 2021-01-01 RX ADMIN — FENTANYL CITRATE 175 MCG/HR: 0.05 INJECTION, SOLUTION INTRAMUSCULAR; INTRAVENOUS at 20:00

## 2021-01-01 RX ADMIN — IPRATROPIUM BROMIDE AND ALBUTEROL SULFATE 3 ML: .5; 2.5 SOLUTION RESPIRATORY (INHALATION) at 07:33

## 2021-01-01 RX ADMIN — FENTANYL CITRATE 100 MCG/HR: 0.05 INJECTION, SOLUTION INTRAMUSCULAR; INTRAVENOUS at 09:16

## 2021-01-01 RX ADMIN — POTASSIUM & SODIUM PHOSPHATES POWDER PACK 280-160-250 MG 1 PACKET: 280-160-250 PACK at 09:11

## 2021-01-01 RX ADMIN — CHLORHEXIDINE GLUCONATE 0.12% ORAL RINSE 10 ML: 1.2 LIQUID ORAL at 09:07

## 2021-01-01 RX ADMIN — CHLORHEXIDINE GLUCONATE 0.12% ORAL RINSE 10 ML: 1.2 LIQUID ORAL at 09:22

## 2021-01-01 RX ADMIN — PHENOBARBITAL SODIUM 200 MG: 65 INJECTION INTRAMUSCULAR at 10:41

## 2021-01-01 RX ADMIN — OMEGA-3-ACID ETHYL ESTERS 1000 MG: 1 CAPSULE, LIQUID FILLED ORAL at 09:10

## 2021-01-01 RX ADMIN — CHLORHEXIDINE GLUCONATE 0.12% ORAL RINSE 10 ML: 1.2 LIQUID ORAL at 10:14

## 2021-01-01 RX ADMIN — NOREPINEPHRINE BITARTRATE 8 MCG/MIN: 1 SOLUTION INTRAVENOUS at 14:48

## 2021-01-01 RX ADMIN — WATER 1 G: 1 INJECTION INTRAMUSCULAR; INTRAVENOUS; SUBCUTANEOUS at 08:37

## 2021-01-01 RX ADMIN — PIPERACILLIN AND TAZOBACTAM 4.5 G: 4; .5 INJECTION, POWDER, FOR SOLUTION INTRAVENOUS at 23:19

## 2021-01-01 RX ADMIN — ACETAMINOPHEN 650 MG: 325 TABLET ORAL at 08:10

## 2021-01-01 RX ADMIN — Medication 10 ML: at 14:00

## 2021-01-01 RX ADMIN — MORPHINE SULFATE 1 MG: 2 INJECTION, SOLUTION INTRAMUSCULAR; INTRAVENOUS at 06:25

## 2021-01-01 RX ADMIN — IPRATROPIUM BROMIDE AND ALBUTEROL SULFATE 3 ML: .5; 2.5 SOLUTION RESPIRATORY (INHALATION) at 20:39

## 2021-01-01 RX ADMIN — LORAZEPAM 2 MG: 2 INJECTION INTRAMUSCULAR; INTRAVENOUS at 13:46

## 2021-01-01 RX ADMIN — IPRATROPIUM BROMIDE AND ALBUTEROL SULFATE 3 ML: .5; 2.5 SOLUTION RESPIRATORY (INHALATION) at 15:20

## 2021-01-01 RX ADMIN — POTASSIUM & SODIUM PHOSPHATES POWDER PACK 280-160-250 MG 1 PACKET: 280-160-250 PACK at 21:22

## 2021-01-01 RX ADMIN — MIDAZOLAM 2 MG: 1 INJECTION INTRAMUSCULAR; INTRAVENOUS at 15:42

## 2021-01-01 RX ADMIN — MIDAZOLAM 2 MG: 1 INJECTION INTRAMUSCULAR; INTRAVENOUS at 18:38

## 2021-01-01 RX ADMIN — Medication 10 ML: at 23:18

## 2021-01-01 RX ADMIN — Medication 2 TABLET: at 08:50

## 2021-01-01 RX ADMIN — Medication 2 TABLET: at 08:19

## 2021-01-01 RX ADMIN — CHLORHEXIDINE GLUCONATE 0.12% ORAL RINSE 10 ML: 1.2 LIQUID ORAL at 20:02

## 2021-01-01 RX ADMIN — PIPERACILLIN AND TAZOBACTAM 3.38 G: 3; .375 INJECTION, POWDER, LYOPHILIZED, FOR SOLUTION INTRAVENOUS at 04:38

## 2021-01-01 RX ADMIN — MIDAZOLAM 2 MG: 1 INJECTION INTRAMUSCULAR; INTRAVENOUS at 00:29

## 2021-01-01 RX ADMIN — IPRATROPIUM BROMIDE AND ALBUTEROL SULFATE 3 ML: .5; 2.5 SOLUTION RESPIRATORY (INHALATION) at 11:52

## 2021-01-01 RX ADMIN — ASPIRIN 81 MG CHEWABLE TABLET 81 MG: 81 TABLET CHEWABLE at 10:09

## 2021-01-01 RX ADMIN — Medication 10 ML: at 18:28

## 2021-01-01 RX ADMIN — BACITRACIN ZINC AND POLYMYXIN B SULFATE: 500; 10000 OINTMENT TOPICAL at 17:46

## 2021-01-01 RX ADMIN — THERA TABS 1 TABLET: TAB at 09:13

## 2021-01-01 RX ADMIN — DEXTROSE MONOHYDRATE 8 MCG/MIN: 50 INJECTION, SOLUTION INTRAVENOUS at 02:13

## 2021-01-01 RX ADMIN — SODIUM CHLORIDE SOLN NEBU 3% 4 ML: 3 NEBU SOLN at 17:04

## 2021-01-01 RX ADMIN — POTASSIUM CHLORIDE 10 MEQ: 14.9 INJECTION, SOLUTION INTRAVENOUS at 08:21

## 2021-01-01 RX ADMIN — PIPERACILLIN AND TAZOBACTAM 3.38 G: 3; .375 INJECTION, POWDER, LYOPHILIZED, FOR SOLUTION INTRAVENOUS at 18:53

## 2021-01-01 RX ADMIN — CHLORHEXIDINE GLUCONATE 0.12% ORAL RINSE 10 ML: 1.2 LIQUID ORAL at 08:10

## 2021-01-01 RX ADMIN — SODIUM CHLORIDE SOLN NEBU 3% 4 ML: 3 NEBU SOLN at 11:29

## 2021-01-01 RX ADMIN — OMEGA-3-ACID ETHYL ESTERS 1000 MG: 1 CAPSULE, LIQUID FILLED ORAL at 09:11

## 2021-01-01 RX ADMIN — PIPERACILLIN AND TAZOBACTAM 3.38 G: 3; .375 INJECTION, POWDER, LYOPHILIZED, FOR SOLUTION INTRAVENOUS at 12:57

## 2021-01-01 RX ADMIN — INSULIN LISPRO 2 UNITS: 100 INJECTION, SOLUTION INTRAVENOUS; SUBCUTANEOUS at 18:55

## 2021-01-01 RX ADMIN — Medication 10 ML: at 16:08

## 2021-01-01 RX ADMIN — Medication 10 ML: at 13:20

## 2021-01-01 RX ADMIN — IPRATROPIUM BROMIDE AND ALBUTEROL SULFATE 3 ML: .5; 2.5 SOLUTION RESPIRATORY (INHALATION) at 12:57

## 2021-01-01 RX ADMIN — Medication 2 TABLET: at 08:23

## 2021-01-01 RX ADMIN — Medication 10 ML: at 16:52

## 2021-01-01 RX ADMIN — Medication 2 TABLET: at 08:10

## 2021-01-01 RX ADMIN — BARIUM SULFATE 30 G: 960 POWDER, FOR SUSPENSION ORAL at 13:30

## 2021-01-01 RX ADMIN — WATER 1 G: 1 INJECTION INTRAMUSCULAR; INTRAVENOUS; SUBCUTANEOUS at 09:22

## 2021-01-01 RX ADMIN — PIPERACILLIN AND TAZOBACTAM 4.5 G: 4; .5 INJECTION, POWDER, FOR SOLUTION INTRAVENOUS at 17:00

## 2021-01-01 RX ADMIN — MORPHINE SULFATE 10 MG: 10 INJECTION, SOLUTION INTRAMUSCULAR; INTRAVENOUS at 12:04

## 2021-01-01 RX ADMIN — PIPERACILLIN AND TAZOBACTAM 4.5 G: 4; .5 INJECTION, POWDER, FOR SOLUTION INTRAVENOUS at 05:02

## 2021-01-01 RX ADMIN — INSULIN LISPRO 2 UNITS: 100 INJECTION, SOLUTION INTRAVENOUS; SUBCUTANEOUS at 06:05

## 2021-01-01 RX ADMIN — MIDAZOLAM 2 MG: 1 INJECTION INTRAMUSCULAR; INTRAVENOUS at 22:19

## 2021-01-01 RX ADMIN — DOCUSATE SODIUM 50 MG AND SENNOSIDES 8.6 MG 1 TABLET: 8.6; 5 TABLET, FILM COATED ORAL at 09:23

## 2021-01-01 RX ADMIN — IPRATROPIUM BROMIDE AND ALBUTEROL SULFATE 3 ML: .5; 2.5 SOLUTION RESPIRATORY (INHALATION) at 08:02

## 2021-01-01 RX ADMIN — OMEGA-3-ACID ETHYL ESTERS 1000 MG: 1 CAPSULE, LIQUID FILLED ORAL at 09:23

## 2021-01-01 RX ADMIN — FAMOTIDINE 20 MG: 10 INJECTION INTRAVENOUS at 20:41

## 2021-01-01 RX ADMIN — PIPERACILLIN AND TAZOBACTAM 4.5 G: 4; .5 INJECTION, POWDER, FOR SOLUTION INTRAVENOUS at 11:07

## 2021-01-01 RX ADMIN — IPRATROPIUM BROMIDE AND ALBUTEROL SULFATE 3 ML: .5; 2.5 SOLUTION RESPIRATORY (INHALATION) at 03:36

## 2021-01-01 RX ADMIN — FENTANYL CITRATE 75 MCG/HR: 0.05 INJECTION, SOLUTION INTRAMUSCULAR; INTRAVENOUS at 13:30

## 2021-01-01 RX ADMIN — POLYETHYLENE GLYCOL 3350 17 G: 17 POWDER, FOR SOLUTION ORAL at 09:11

## 2021-01-01 RX ADMIN — ENOXAPARIN SODIUM 40 MG: 100 INJECTION SUBCUTANEOUS at 21:22

## 2021-01-01 RX ADMIN — MORPHINE SULFATE 4 MG: 4 INJECTION, SOLUTION INTRAMUSCULAR; INTRAVENOUS at 18:51

## 2021-01-01 RX ADMIN — THIAMINE HYDROCHLORIDE 200 MG: 100 INJECTION, SOLUTION INTRAMUSCULAR; INTRAVENOUS at 09:07

## 2021-01-01 RX ADMIN — SODIUM CHLORIDE SOLN NEBU 3% 4 ML: 3 NEBU SOLN at 12:19

## 2021-01-01 RX ADMIN — PIPERACILLIN AND TAZOBACTAM 3.38 G: 3; .375 INJECTION, POWDER, LYOPHILIZED, FOR SOLUTION INTRAVENOUS at 11:54

## 2021-01-01 RX ADMIN — IPRATROPIUM BROMIDE AND ALBUTEROL SULFATE 3 ML: .5; 2.5 SOLUTION RESPIRATORY (INHALATION) at 00:52

## 2021-01-01 RX ADMIN — Medication 500 MG: at 09:46

## 2021-01-01 RX ADMIN — FAMOTIDINE 20 MG: 10 INJECTION INTRAVENOUS at 20:07

## 2021-01-01 RX ADMIN — Medication 10 ML: at 14:45

## 2021-01-01 RX ADMIN — IPRATROPIUM BROMIDE AND ALBUTEROL SULFATE 3 ML: .5; 2.5 SOLUTION RESPIRATORY (INHALATION) at 19:42

## 2021-01-01 RX ADMIN — SODIUM CHLORIDE SOLN NEBU 3% 4 ML: 3 NEBU SOLN at 14:00

## 2021-01-01 RX ADMIN — SODIUM CHLORIDE SOLN NEBU 3% 4 ML: 3 NEBU SOLN at 09:32

## 2021-01-01 RX ADMIN — BACITRACIN ZINC AND POLYMYXIN B SULFATE: 500; 10000 OINTMENT TOPICAL at 08:19

## 2021-01-01 RX ADMIN — INSULIN LISPRO 2 UNITS: 100 INJECTION, SOLUTION INTRAVENOUS; SUBCUTANEOUS at 06:21

## 2021-01-01 RX ADMIN — LEVOFLOXACIN 750 MG: 5 INJECTION, SOLUTION INTRAVENOUS at 22:38

## 2021-01-01 RX ADMIN — Medication 1000 UNITS: at 09:30

## 2021-01-01 RX ADMIN — OMEGA-3-ACID ETHYL ESTERS 1000 MG: 1 CAPSULE, LIQUID FILLED ORAL at 09:36

## 2021-01-01 RX ADMIN — WATER 1 G: 1 INJECTION INTRAMUSCULAR; INTRAVENOUS; SUBCUTANEOUS at 16:39

## 2021-01-01 RX ADMIN — Medication 10 ML: at 22:00

## 2021-01-01 RX ADMIN — INSULIN LISPRO 2 UNITS: 100 INJECTION, SOLUTION INTRAVENOUS; SUBCUTANEOUS at 12:27

## 2021-01-01 RX ADMIN — INSULIN LISPRO 4 UNITS: 100 INJECTION, SOLUTION INTRAVENOUS; SUBCUTANEOUS at 18:02

## 2021-01-01 RX ADMIN — DEXTROSE MONOHYDRATE 10 MCG/MIN: 50 INJECTION, SOLUTION INTRAVENOUS at 11:47

## 2021-01-01 RX ADMIN — IPRATROPIUM BROMIDE AND ALBUTEROL SULFATE 3 ML: .5; 2.5 SOLUTION RESPIRATORY (INHALATION) at 23:08

## 2021-01-01 RX ADMIN — Medication 500 MG: at 10:43

## 2021-01-01 RX ADMIN — ACETAMINOPHEN 650 MG: 325 TABLET ORAL at 03:06

## 2021-01-01 RX ADMIN — ASPIRIN 81 MG CHEWABLE TABLET 81 MG: 81 TABLET CHEWABLE at 09:28

## 2021-01-01 RX ADMIN — CHLORHEXIDINE GLUCONATE 0.12% ORAL RINSE 10 ML: 1.2 LIQUID ORAL at 08:49

## 2021-01-01 RX ADMIN — IOPAMIDOL 100 ML: 755 INJECTION, SOLUTION INTRAVENOUS at 21:06

## 2021-01-01 RX ADMIN — FENTANYL CITRATE 75 MCG/HR: 0.05 INJECTION, SOLUTION INTRAMUSCULAR; INTRAVENOUS at 02:52

## 2021-01-01 RX ADMIN — SODIUM CHLORIDE 75 ML/HR: 900 INJECTION, SOLUTION INTRAVENOUS at 11:44

## 2021-01-01 RX ADMIN — SODIUM CHLORIDE 75 ML/HR: 900 INJECTION, SOLUTION INTRAVENOUS at 23:34

## 2021-01-01 RX ADMIN — Medication 10 ML: at 20:46

## 2021-01-01 RX ADMIN — PIPERACILLIN AND TAZOBACTAM 4.5 G: 4; .5 INJECTION, POWDER, FOR SOLUTION INTRAVENOUS at 11:18

## 2021-01-01 RX ADMIN — Medication 10 ML: at 22:41

## 2021-01-01 RX ADMIN — LEVOFLOXACIN 750 MG: 5 INJECTION, SOLUTION INTRAVENOUS at 21:20

## 2021-01-01 RX ADMIN — INSULIN LISPRO 2 UNITS: 100 INJECTION, SOLUTION INTRAVENOUS; SUBCUTANEOUS at 23:42

## 2021-01-01 RX ADMIN — ASPIRIN 81 MG CHEWABLE TABLET 81 MG: 81 TABLET CHEWABLE at 10:13

## 2021-01-01 RX ADMIN — SODIUM CHLORIDE SOLN NEBU 3% 4 ML: 3 NEBU SOLN at 19:33

## 2021-01-01 RX ADMIN — CHLORHEXIDINE GLUCONATE 0.12% ORAL RINSE 10 ML: 1.2 LIQUID ORAL at 09:27

## 2021-01-01 RX ADMIN — Medication 2 TABLET: at 17:55

## 2021-01-01 RX ADMIN — POTASSIUM & SODIUM PHOSPHATES POWDER PACK 280-160-250 MG 1 PACKET: 280-160-250 PACK at 21:07

## 2021-01-01 RX ADMIN — INSULIN LISPRO 2 UNITS: 100 INJECTION, SOLUTION INTRAVENOUS; SUBCUTANEOUS at 18:26

## 2021-01-01 RX ADMIN — PIPERACILLIN AND TAZOBACTAM 3.38 G: 3; .375 INJECTION, POWDER, LYOPHILIZED, FOR SOLUTION INTRAVENOUS at 17:42

## 2021-01-01 RX ADMIN — POTASSIUM PHOSPHATE, MONOBASIC POTASSIUM PHOSPHATE, DIBASIC: 224; 236 INJECTION, SOLUTION, CONCENTRATE INTRAVENOUS at 09:28

## 2021-01-01 RX ADMIN — GUAIFENESIN 400 MG: 200 SOLUTION ORAL at 23:52

## 2021-01-01 RX ADMIN — MIDAZOLAM 2 MG: 1 INJECTION INTRAMUSCULAR; INTRAVENOUS at 20:58

## 2021-01-01 RX ADMIN — SODIUM CHLORIDE SOLN NEBU 3% 4 ML: 3 NEBU SOLN at 07:36

## 2021-01-01 RX ADMIN — POLYETHYLENE GLYCOL 3350 17 G: 17 POWDER, FOR SOLUTION ORAL at 08:19

## 2021-01-01 RX ADMIN — OMEGA-3-ACID ETHYL ESTERS 1000 MG: 1 CAPSULE, LIQUID FILLED ORAL at 09:34

## 2021-01-01 RX ADMIN — ACETAMINOPHEN 650 MG: 325 TABLET ORAL at 12:46

## 2021-01-01 RX ADMIN — IPRATROPIUM BROMIDE AND ALBUTEROL SULFATE 3 ML: .5; 2.5 SOLUTION RESPIRATORY (INHALATION) at 20:00

## 2021-01-01 RX ADMIN — THERA TABS 1 TABLET: TAB at 09:33

## 2021-01-01 RX ADMIN — IPRATROPIUM BROMIDE AND ALBUTEROL SULFATE 3 ML: .5; 2.5 SOLUTION RESPIRATORY (INHALATION) at 12:50

## 2021-01-01 RX ADMIN — Medication 200 MG: at 09:52

## 2021-01-01 RX ADMIN — SODIUM CHLORIDE SOLN NEBU 3% 4 ML: 3 NEBU SOLN at 08:19

## 2021-01-01 RX ADMIN — SODIUM CHLORIDE SOLN NEBU 3% 4 ML: 3 NEBU SOLN at 07:47

## 2021-01-01 RX ADMIN — ENOXAPARIN SODIUM 40 MG: 100 INJECTION SUBCUTANEOUS at 22:38

## 2021-01-01 RX ADMIN — CHLORHEXIDINE GLUCONATE 0.12% ORAL RINSE 10 ML: 1.2 LIQUID ORAL at 21:06

## 2021-01-01 RX ADMIN — ASPIRIN 81 MG CHEWABLE TABLET 81 MG: 81 TABLET CHEWABLE at 08:10

## 2021-01-01 RX ADMIN — HYDROMORPHONE HYDROCHLORIDE 1 MG: 1 INJECTION, SOLUTION INTRAMUSCULAR; INTRAVENOUS; SUBCUTANEOUS at 20:30

## 2021-01-01 RX ADMIN — PIPERACILLIN AND TAZOBACTAM 4.5 G: 4; .5 INJECTION, POWDER, FOR SOLUTION INTRAVENOUS at 05:09

## 2021-01-01 RX ADMIN — LORAZEPAM 2 MG: 2 INJECTION INTRAMUSCULAR; INTRAVENOUS at 02:08

## 2021-01-01 RX ADMIN — PROPOFOL 30 MG: 10 INJECTION, EMULSION INTRAVENOUS at 16:39

## 2021-01-01 RX ADMIN — SODIUM CHLORIDE SOLN NEBU 3% 4 ML: 3 NEBU SOLN at 12:56

## 2021-01-01 RX ADMIN — Medication 200 MG: at 09:12

## 2021-01-01 RX ADMIN — MORPHINE SULFATE 4 MG: 4 INJECTION, SOLUTION INTRAMUSCULAR; INTRAVENOUS at 18:33

## 2021-01-01 RX ADMIN — LORAZEPAM 2 MG: 2 INJECTION INTRAMUSCULAR; INTRAVENOUS at 00:59

## 2021-01-01 RX ADMIN — DOCUSATE SODIUM 50 MG AND SENNOSIDES 8.6 MG 1 TABLET: 8.6; 5 TABLET, FILM COATED ORAL at 08:38

## 2021-01-01 RX ADMIN — POTASSIUM & SODIUM PHOSPHATES POWDER PACK 280-160-250 MG 1 PACKET: 280-160-250 PACK at 18:55

## 2021-01-01 RX ADMIN — FLUCONAZOLE 400 MG: 2 INJECTION, SOLUTION INTRAVENOUS at 17:00

## 2021-01-01 RX ADMIN — FAMOTIDINE 20 MG: 10 INJECTION INTRAVENOUS at 20:01

## 2021-01-01 RX ADMIN — VANCOMYCIN HYDROCHLORIDE 1250 MG: 10 INJECTION, POWDER, LYOPHILIZED, FOR SOLUTION INTRAVENOUS at 12:00

## 2021-01-01 RX ADMIN — LEVOFLOXACIN 750 MG: 5 INJECTION, SOLUTION INTRAVENOUS at 19:06

## 2021-01-01 RX ADMIN — ACETAMINOPHEN 650 MG: 650 SUPPOSITORY RECTAL at 15:32

## 2021-01-01 RX ADMIN — DEXTROSE MONOHYDRATE 8 MCG/MIN: 50 INJECTION, SOLUTION INTRAVENOUS at 08:05

## 2021-01-01 RX ADMIN — LEVOFLOXACIN 750 MG: 5 INJECTION, SOLUTION INTRAVENOUS at 20:48

## 2021-01-01 RX ADMIN — LORAZEPAM 2 MG: 2 INJECTION INTRAMUSCULAR; INTRAVENOUS at 18:51

## 2021-01-01 RX ADMIN — INSULIN LISPRO 2 UNITS: 100 INJECTION, SOLUTION INTRAVENOUS; SUBCUTANEOUS at 11:53

## 2021-01-01 RX ADMIN — IPRATROPIUM BROMIDE AND ALBUTEROL SULFATE 3 ML: .5; 2.5 SOLUTION RESPIRATORY (INHALATION) at 23:45

## 2021-01-01 RX ADMIN — LEVOFLOXACIN 750 MG: 5 INJECTION, SOLUTION INTRAVENOUS at 19:59

## 2021-01-01 RX ADMIN — ACETAMINOPHEN 650 MG: 325 TABLET ORAL at 21:31

## 2021-01-01 RX ADMIN — OMEGA-3-ACID ETHYL ESTERS 1000 MG: 1 CAPSULE, LIQUID FILLED ORAL at 10:10

## 2021-01-01 RX ADMIN — POLYETHYLENE GLYCOL 3350 17 G: 17 POWDER, FOR SOLUTION ORAL at 18:30

## 2021-01-01 RX ADMIN — Medication 500 MG: at 10:09

## 2021-01-01 RX ADMIN — SODIUM CHLORIDE SOLN NEBU 3% 4 ML: 3 NEBU SOLN at 12:50

## 2021-01-01 RX ADMIN — VANCOMYCIN HYDROCHLORIDE 1000 MG: 1 INJECTION, POWDER, LYOPHILIZED, FOR SOLUTION INTRAVENOUS at 05:20

## 2021-01-01 RX ADMIN — FLUCONAZOLE 400 MG: 2 INJECTION, SOLUTION INTRAVENOUS at 18:49

## 2021-01-01 RX ADMIN — IPRATROPIUM BROMIDE AND ALBUTEROL SULFATE 3 ML: .5; 3 SOLUTION RESPIRATORY (INHALATION) at 18:19

## 2021-01-01 RX ADMIN — OMEGA-3-ACID ETHYL ESTERS 1000 MG: 1 CAPSULE, LIQUID FILLED ORAL at 09:33

## 2021-01-01 RX ADMIN — MAGNESIUM SULFATE 2 G: 2 INJECTION INTRAVENOUS at 07:56

## 2021-01-01 RX ADMIN — DEXTROSE MONOHYDRATE AND SODIUM CHLORIDE 40 ML/HR: 5; .9 INJECTION, SOLUTION INTRAVENOUS at 20:16

## 2021-01-01 RX ADMIN — Medication 2 TABLET: at 18:55

## 2021-01-01 RX ADMIN — POTASSIUM & SODIUM PHOSPHATES POWDER PACK 280-160-250 MG 1 PACKET: 280-160-250 PACK at 10:44

## 2021-01-01 RX ADMIN — Medication 10 ML: at 05:24

## 2021-01-01 RX ADMIN — DEXTROSE MONOHYDRATE 8 MCG/MIN: 50 INJECTION, SOLUTION INTRAVENOUS at 03:34

## 2021-01-01 RX ADMIN — SODIUM CHLORIDE 200 MG: 9 INJECTION, SOLUTION INTRAVENOUS at 22:15

## 2021-01-01 RX ADMIN — Medication 500 MG: at 09:21

## 2021-01-01 RX ADMIN — SODIUM CHLORIDE SOLN NEBU 3% 4 ML: 3 NEBU SOLN at 15:52

## 2021-01-01 RX ADMIN — Medication 1000 UNITS: at 09:23

## 2021-01-01 RX ADMIN — SODIUM CHLORIDE SOLN NEBU 3% 4 ML: 3 NEBU SOLN at 13:07

## 2021-01-01 RX ADMIN — GUAIFENESIN 600 MG: 600 TABLET, EXTENDED RELEASE ORAL at 09:21

## 2021-01-01 RX ADMIN — Medication 2 TABLET: at 09:05

## 2021-01-01 RX ADMIN — SODIUM CHLORIDE, SODIUM LACTATE, POTASSIUM CHLORIDE, AND CALCIUM CHLORIDE 125 ML/HR: 600; 310; 30; 20 INJECTION, SOLUTION INTRAVENOUS at 14:30

## 2021-01-01 RX ADMIN — BACITRACIN ZINC AND POLYMYXIN B SULFATE: 500; 10000 OINTMENT TOPICAL at 17:43

## 2021-01-01 RX ADMIN — CHLORHEXIDINE GLUCONATE 0.12% ORAL RINSE 10 ML: 1.2 LIQUID ORAL at 20:41

## 2021-01-01 RX ADMIN — FAMOTIDINE 20 MG: 10 INJECTION INTRAVENOUS at 21:05

## 2021-01-01 RX ADMIN — IPRATROPIUM BROMIDE AND ALBUTEROL SULFATE 3 ML: .5; 2.5 SOLUTION RESPIRATORY (INHALATION) at 01:06

## 2021-01-01 RX ADMIN — Medication 2 TABLET: at 18:21

## 2021-01-01 RX ADMIN — Medication 10 ML: at 21:45

## 2021-01-01 RX ADMIN — FENTANYL CITRATE 50 MCG/HR: 0.05 INJECTION, SOLUTION INTRAMUSCULAR; INTRAVENOUS at 01:08

## 2021-01-01 RX ADMIN — WATER 1 G: 1 INJECTION INTRAMUSCULAR; INTRAVENOUS; SUBCUTANEOUS at 16:52

## 2021-01-01 RX ADMIN — LORAZEPAM 2 MG: 2 INJECTION INTRAMUSCULAR; INTRAVENOUS at 08:13

## 2021-01-01 RX ADMIN — SODIUM CHLORIDE SOLN NEBU 3% 4 ML: 3 NEBU SOLN at 13:46

## 2021-01-01 RX ADMIN — POTASSIUM BICARBONATE 20 MEQ: 782 TABLET, EFFERVESCENT ORAL at 06:41

## 2021-01-01 RX ADMIN — Medication 1000 UNITS: at 10:21

## 2021-01-01 RX ADMIN — HYDROMORPHONE HYDROCHLORIDE 2 MG: 2 INJECTION, SOLUTION INTRAMUSCULAR; INTRAVENOUS; SUBCUTANEOUS at 20:30

## 2021-01-01 RX ADMIN — Medication 1000 UNITS: at 08:22

## 2021-01-01 RX ADMIN — IPRATROPIUM BROMIDE AND ALBUTEROL SULFATE 3 ML: .5; 2.5 SOLUTION RESPIRATORY (INHALATION) at 08:10

## 2021-01-01 RX ADMIN — SODIUM CHLORIDE SOLN NEBU 3% 4 ML: 3 NEBU SOLN at 21:03

## 2021-01-01 RX ADMIN — Medication 10 ML: at 23:12

## 2021-01-01 RX ADMIN — Medication 5 ML: at 05:20

## 2021-01-01 RX ADMIN — HYDROMORPHONE HYDROCHLORIDE 1 MG: 1 INJECTION, SOLUTION INTRAMUSCULAR; INTRAVENOUS; SUBCUTANEOUS at 00:53

## 2021-01-01 RX ADMIN — Medication 2 TABLET: at 18:13

## 2021-01-01 RX ADMIN — FAMOTIDINE 20 MG: 10 INJECTION INTRAVENOUS at 09:23

## 2021-01-01 RX ADMIN — DOCUSATE SODIUM 50 MG AND SENNOSIDES 8.6 MG 1 TABLET: 8.6; 5 TABLET, FILM COATED ORAL at 10:13

## 2021-01-01 RX ADMIN — Medication 20 ML: at 06:00

## 2021-01-01 RX ADMIN — POTASSIUM & SODIUM PHOSPHATES POWDER PACK 280-160-250 MG 1 PACKET: 280-160-250 PACK at 21:06

## 2021-01-01 RX ADMIN — THIAMINE HYDROCHLORIDE 200 MG: 100 INJECTION, SOLUTION INTRAMUSCULAR; INTRAVENOUS at 10:36

## 2021-01-01 RX ADMIN — Medication 10 ML: at 18:52

## 2021-01-01 RX ADMIN — DEXTROSE MONOHYDRATE 12 MCG/MIN: 50 INJECTION, SOLUTION INTRAVENOUS at 18:12

## 2021-01-01 RX ADMIN — FAMOTIDINE 20 MG: 10 INJECTION INTRAVENOUS at 00:10

## 2021-01-01 RX ADMIN — PIPERACILLIN AND TAZOBACTAM 3.38 G: 3; .375 INJECTION, POWDER, LYOPHILIZED, FOR SOLUTION INTRAVENOUS at 06:13

## 2021-01-01 RX ADMIN — SODIUM CHLORIDE SOLN NEBU 3% 4 ML: 3 NEBU SOLN at 19:25

## 2021-01-01 RX ADMIN — ACETAMINOPHEN 650 MG: 325 TABLET ORAL at 03:31

## 2021-01-01 RX ADMIN — POTASSIUM & SODIUM PHOSPHATES POWDER PACK 280-160-250 MG 1 PACKET: 280-160-250 PACK at 09:05

## 2021-01-01 RX ADMIN — PIPERACILLIN AND TAZOBACTAM 4.5 G: 4; .5 INJECTION, POWDER, FOR SOLUTION INTRAVENOUS at 00:47

## 2021-01-01 RX ADMIN — Medication 10 ML: at 05:43

## 2021-01-01 RX ADMIN — VANCOMYCIN HYDROCHLORIDE 1000 MG: 1 INJECTION, POWDER, LYOPHILIZED, FOR SOLUTION INTRAVENOUS at 23:07

## 2021-01-01 RX ADMIN — FENTANYL CITRATE 200 MCG/HR: 0.05 INJECTION, SOLUTION INTRAMUSCULAR; INTRAVENOUS at 01:59

## 2021-01-01 RX ADMIN — PIPERACILLIN AND TAZOBACTAM 3.38 G: 3; .375 INJECTION, POWDER, LYOPHILIZED, FOR SOLUTION INTRAVENOUS at 05:26

## 2021-01-01 RX ADMIN — BACITRACIN ZINC AND POLYMYXIN B SULFATE: 500; 10000 OINTMENT TOPICAL at 09:11

## 2021-01-01 RX ADMIN — PIPERACILLIN AND TAZOBACTAM 3.38 G: 3; .375 INJECTION, POWDER, LYOPHILIZED, FOR SOLUTION INTRAVENOUS at 11:29

## 2021-01-01 RX ADMIN — ENOXAPARIN SODIUM 40 MG: 100 INJECTION SUBCUTANEOUS at 23:11

## 2021-01-01 RX ADMIN — Medication 2 MCG/MIN: at 20:00

## 2021-01-01 RX ADMIN — IPRATROPIUM BROMIDE AND ALBUTEROL SULFATE 3 ML: .5; 2.5 SOLUTION RESPIRATORY (INHALATION) at 23:09

## 2021-01-01 RX ADMIN — FAMOTIDINE 20 MG: 10 INJECTION INTRAVENOUS at 21:08

## 2021-01-01 RX ADMIN — ASPIRIN 81 MG CHEWABLE TABLET 81 MG: 81 TABLET CHEWABLE at 08:23

## 2021-01-01 RX ADMIN — CHLORHEXIDINE GLUCONATE 0.12% ORAL RINSE 10 ML: 1.2 LIQUID ORAL at 22:00

## 2021-01-01 RX ADMIN — DEXTROSE MONOHYDRATE 8 MCG/MIN: 50 INJECTION, SOLUTION INTRAVENOUS at 13:47

## 2021-01-01 RX ADMIN — WATER 1 G: 1 INJECTION INTRAMUSCULAR; INTRAVENOUS; SUBCUTANEOUS at 09:32

## 2021-01-01 RX ADMIN — Medication 10 ML: at 22:28

## 2021-01-01 RX ADMIN — Medication 10 ML: at 21:47

## 2021-01-01 RX ADMIN — IPRATROPIUM BROMIDE AND ALBUTEROL SULFATE 3 ML: .5; 2.5 SOLUTION RESPIRATORY (INHALATION) at 12:08

## 2021-01-01 RX ADMIN — CHLORHEXIDINE GLUCONATE 0.12% ORAL RINSE 10 ML: 1.2 LIQUID ORAL at 21:08

## 2021-01-01 RX ADMIN — IPRATROPIUM BROMIDE AND ALBUTEROL SULFATE 3 ML: .5; 2.5 SOLUTION RESPIRATORY (INHALATION) at 10:44

## 2021-01-01 RX ADMIN — INSULIN LISPRO 4 UNITS: 100 INJECTION, SOLUTION INTRAVENOUS; SUBCUTANEOUS at 23:04

## 2021-01-01 RX ADMIN — IPRATROPIUM BROMIDE AND ALBUTEROL SULFATE 3 ML: .5; 2.5 SOLUTION RESPIRATORY (INHALATION) at 04:25

## 2021-01-01 RX ADMIN — SODIUM CHLORIDE SOLN NEBU 3% 4 ML: 3 NEBU SOLN at 06:13

## 2021-01-01 RX ADMIN — THIAMINE HYDROCHLORIDE 200 MG: 100 INJECTION, SOLUTION INTRAMUSCULAR; INTRAVENOUS at 13:29

## 2021-01-01 RX ADMIN — FENTANYL CITRATE 100 MCG/HR: 0.05 INJECTION, SOLUTION INTRAMUSCULAR; INTRAVENOUS at 12:13

## 2021-01-01 RX ADMIN — IPRATROPIUM BROMIDE AND ALBUTEROL SULFATE 3 ML: .5; 2.5 SOLUTION RESPIRATORY (INHALATION) at 03:00

## 2021-01-01 RX ADMIN — POTASSIUM PHOSPHATE, MONOBASIC AND POTASSIUM PHOSPHATE, DIBASIC: 224; 236 INJECTION, SOLUTION, CONCENTRATE INTRAVENOUS at 22:06

## 2021-01-01 RX ADMIN — IPRATROPIUM BROMIDE AND ALBUTEROL SULFATE 3 ML: .5; 2.5 SOLUTION RESPIRATORY (INHALATION) at 17:23

## 2021-01-01 RX ADMIN — Medication 10 ML: at 21:05

## 2021-01-01 RX ADMIN — IPRATROPIUM BROMIDE AND ALBUTEROL SULFATE 3 ML: .5; 2.5 SOLUTION RESPIRATORY (INHALATION) at 00:21

## 2021-01-01 RX ADMIN — PROPOFOL 20 MG: 10 INJECTION, EMULSION INTRAVENOUS at 16:46

## 2021-01-01 RX ADMIN — FENTANYL CITRATE 200 MCG/HR: 0.05 INJECTION, SOLUTION INTRAMUSCULAR; INTRAVENOUS at 11:59

## 2021-01-01 RX ADMIN — Medication 2 TABLET: at 18:31

## 2021-01-01 RX ADMIN — SODIUM CHLORIDE SOLN NEBU 3% 4 ML: 3 NEBU SOLN at 07:44

## 2021-01-01 RX ADMIN — HYDROMORPHONE HYDROCHLORIDE 1 MG: 1 INJECTION, SOLUTION INTRAMUSCULAR; INTRAVENOUS; SUBCUTANEOUS at 08:10

## 2021-01-01 RX ADMIN — ALBUTEROL SULFATE 2.5 MG: 2.5 SOLUTION RESPIRATORY (INHALATION) at 05:24

## 2021-01-01 RX ADMIN — PIPERACILLIN AND TAZOBACTAM 3.38 G: 3; .375 INJECTION, POWDER, LYOPHILIZED, FOR SOLUTION INTRAVENOUS at 06:08

## 2021-01-01 RX ADMIN — BARIUM SULFATE 30 ML: 400 PASTE ORAL at 13:30

## 2021-01-01 RX ADMIN — INSULIN LISPRO 2 UNITS: 100 INJECTION, SOLUTION INTRAVENOUS; SUBCUTANEOUS at 17:46

## 2021-01-01 RX ADMIN — FENTANYL CITRATE 100 MCG/HR: 0.05 INJECTION, SOLUTION INTRAMUSCULAR; INTRAVENOUS at 22:22

## 2021-01-01 RX ADMIN — DEXTROSE MONOHYDRATE 10 MCG/MIN: 50 INJECTION, SOLUTION INTRAVENOUS at 09:30

## 2021-01-01 RX ADMIN — Medication 2 TABLET: at 18:56

## 2021-01-01 RX ADMIN — POTASSIUM & SODIUM PHOSPHATES POWDER PACK 280-160-250 MG 1 PACKET: 280-160-250 PACK at 21:15

## 2021-01-01 RX ADMIN — IPRATROPIUM BROMIDE AND ALBUTEROL SULFATE 3 ML: .5; 2.5 SOLUTION RESPIRATORY (INHALATION) at 15:29

## 2021-01-01 RX ADMIN — ACETAMINOPHEN 650 MG: 325 TABLET ORAL at 23:22

## 2021-01-01 RX ADMIN — Medication 12 ML: at 06:00

## 2021-01-01 RX ADMIN — WATER 1 G: 1 INJECTION INTRAMUSCULAR; INTRAVENOUS; SUBCUTANEOUS at 16:11

## 2021-01-01 RX ADMIN — SODIUM CHLORIDE SOLN NEBU 3% 4 ML: 3 NEBU SOLN at 20:00

## 2021-01-01 RX ADMIN — OMEGA-3-ACID ETHYL ESTERS 1000 MG: 1 CAPSULE, LIQUID FILLED ORAL at 08:47

## 2021-01-01 RX ADMIN — CHLORHEXIDINE GLUCONATE 0.12% ORAL RINSE 10 ML: 1.2 LIQUID ORAL at 09:18

## 2021-01-01 RX ADMIN — FLUCONAZOLE 400 MG: 2 INJECTION, SOLUTION INTRAVENOUS at 17:18

## 2021-01-01 RX ADMIN — Medication 10 ML: at 05:56

## 2021-01-01 RX ADMIN — FAMOTIDINE 20 MG: 10 INJECTION INTRAVENOUS at 21:20

## 2021-01-01 RX ADMIN — Medication 500 MG: at 09:30

## 2021-01-01 RX ADMIN — GUAIFENESIN 600 MG: 600 TABLET, EXTENDED RELEASE ORAL at 00:34

## 2021-01-01 RX ADMIN — POTASSIUM & SODIUM PHOSPHATES POWDER PACK 280-160-250 MG 1 PACKET: 280-160-250 PACK at 17:20

## 2021-01-01 RX ADMIN — IPRATROPIUM BROMIDE AND ALBUTEROL SULFATE 3 ML: .5; 3 SOLUTION RESPIRATORY (INHALATION) at 19:42

## 2021-01-01 RX ADMIN — VANCOMYCIN HYDROCHLORIDE 1000 MG: 1 INJECTION, POWDER, LYOPHILIZED, FOR SOLUTION INTRAVENOUS at 20:07

## 2021-01-01 RX ADMIN — BACITRACIN ZINC AND POLYMYXIN B SULFATE: 500; 10000 OINTMENT TOPICAL at 18:56

## 2021-01-01 RX ADMIN — CHLORHEXIDINE GLUCONATE 0.12% ORAL RINSE 10 ML: 1.2 LIQUID ORAL at 09:45

## 2021-01-01 RX ADMIN — IPRATROPIUM BROMIDE AND ALBUTEROL SULFATE 3 ML: .5; 2.5 SOLUTION RESPIRATORY (INHALATION) at 07:47

## 2021-01-01 RX ADMIN — SODIUM CHLORIDE SOLN NEBU 3% 4 ML: 3 NEBU SOLN at 16:03

## 2021-01-01 RX ADMIN — THERA TABS 1 TABLET: TAB at 10:11

## 2021-01-01 RX ADMIN — ACETAZOLAMIDE SODIUM 500 MG: 500 INJECTION, POWDER, LYOPHILIZED, FOR SOLUTION INTRAVENOUS at 14:07

## 2021-01-01 RX ADMIN — ACETAMINOPHEN 650 MG: 325 TABLET ORAL at 01:45

## 2021-01-01 RX ADMIN — Medication 2 TABLET: at 18:30

## 2021-01-01 RX ADMIN — FAMOTIDINE 20 MG: 10 INJECTION INTRAVENOUS at 09:30

## 2021-01-01 RX ADMIN — INSULIN LISPRO 2 UNITS: 100 INJECTION, SOLUTION INTRAVENOUS; SUBCUTANEOUS at 23:55

## 2021-01-01 RX ADMIN — THIAMINE HYDROCHLORIDE 200 MG: 100 INJECTION, SOLUTION INTRAMUSCULAR; INTRAVENOUS at 10:31

## 2021-01-01 RX ADMIN — ACETAMINOPHEN 650 MG: 325 TABLET ORAL at 05:12

## 2021-01-01 RX ADMIN — PIPERACILLIN AND TAZOBACTAM 4.5 G: 4; .5 INJECTION, POWDER, FOR SOLUTION INTRAVENOUS at 12:26

## 2021-01-01 RX ADMIN — DEXTROSE MONOHYDRATE 12 MCG/MIN: 50 INJECTION, SOLUTION INTRAVENOUS at 06:51

## 2021-01-01 RX ADMIN — CHLORHEXIDINE GLUCONATE 0.12% ORAL RINSE 10 ML: 1.2 LIQUID ORAL at 09:04

## 2021-01-01 RX ADMIN — MIDAZOLAM 2 MG: 1 INJECTION INTRAMUSCULAR; INTRAVENOUS at 01:59

## 2021-01-01 RX ADMIN — Medication 2 TABLET: at 17:20

## 2021-01-01 RX ADMIN — LORAZEPAM 2 MG: 2 INJECTION INTRAMUSCULAR; INTRAVENOUS at 04:42

## 2021-01-01 RX ADMIN — PIPERACILLIN AND TAZOBACTAM 4.5 G: 4; .5 INJECTION, POWDER, FOR SOLUTION INTRAVENOUS at 10:40

## 2021-01-01 RX ADMIN — Medication 10 ML: at 23:43

## 2021-01-01 RX ADMIN — FENTANYL CITRATE 175 MCG/HR: 0.05 INJECTION, SOLUTION INTRAMUSCULAR; INTRAVENOUS at 03:26

## 2021-01-01 RX ADMIN — Medication 1 PACKET: at 18:02

## 2021-01-01 RX ADMIN — PROPOFOL 20 MG: 10 INJECTION, EMULSION INTRAVENOUS at 16:36

## 2021-01-01 RX ADMIN — Medication 10 ML: at 05:03

## 2021-01-01 RX ADMIN — Medication 2 TABLET: at 09:07

## 2021-01-01 RX ADMIN — LEVOFLOXACIN 750 MG: 5 INJECTION, SOLUTION INTRAVENOUS at 20:00

## 2021-01-01 RX ADMIN — Medication 10 ML: at 21:20

## 2021-01-01 RX ADMIN — INSULIN LISPRO 2 UNITS: 100 INJECTION, SOLUTION INTRAVENOUS; SUBCUTANEOUS at 06:00

## 2021-01-01 RX ADMIN — MIDAZOLAM 2 MG: 1 INJECTION INTRAMUSCULAR; INTRAVENOUS at 08:46

## 2021-01-01 RX ADMIN — VANCOMYCIN HYDROCHLORIDE 1250 MG: 10 INJECTION, POWDER, LYOPHILIZED, FOR SOLUTION INTRAVENOUS at 22:49

## 2021-01-01 RX ADMIN — ACETAMINOPHEN 650 MG: 325 TABLET ORAL at 21:45

## 2021-01-01 RX ADMIN — Medication 1000 UNITS: at 09:19

## 2021-01-01 RX ADMIN — Medication 1000 UNITS: at 09:32

## 2021-01-01 RX ADMIN — IPRATROPIUM BROMIDE AND ALBUTEROL SULFATE 3 ML: .5; 2.5 SOLUTION RESPIRATORY (INHALATION) at 03:33

## 2021-01-01 RX ADMIN — SODIUM CHLORIDE SOLN NEBU 3% 4 ML: 3 NEBU SOLN at 12:12

## 2021-01-01 RX ADMIN — IPRATROPIUM BROMIDE AND ALBUTEROL SULFATE 3 ML: .5; 2.5 SOLUTION RESPIRATORY (INHALATION) at 03:46

## 2021-01-01 RX ADMIN — IPRATROPIUM BROMIDE AND ALBUTEROL SULFATE 3 ML: .5; 2.5 SOLUTION RESPIRATORY (INHALATION) at 15:55

## 2021-01-01 RX ADMIN — ENOXAPARIN SODIUM 40 MG: 100 INJECTION SUBCUTANEOUS at 01:36

## 2021-01-01 RX ADMIN — POTASSIUM & SODIUM PHOSPHATES POWDER PACK 280-160-250 MG 1 PACKET: 280-160-250 PACK at 15:41

## 2021-01-01 RX ADMIN — IPRATROPIUM BROMIDE AND ALBUTEROL SULFATE 3 ML: .5; 3 SOLUTION RESPIRATORY (INHALATION) at 23:54

## 2021-01-01 RX ADMIN — FAMOTIDINE 20 MG: 10 INJECTION INTRAVENOUS at 21:04

## 2021-01-01 RX ADMIN — Medication 1000 UNITS: at 08:19

## 2021-01-01 RX ADMIN — Medication 10 ML: at 06:33

## 2021-01-01 RX ADMIN — IPRATROPIUM BROMIDE AND ALBUTEROL SULFATE 3 ML: .5; 2.5 SOLUTION RESPIRATORY (INHALATION) at 04:44

## 2021-01-01 RX ADMIN — ALBUTEROL SULFATE 2.5 MG: 2.5 SOLUTION RESPIRATORY (INHALATION) at 03:15

## 2021-01-01 RX ADMIN — Medication 10 ML: at 05:28

## 2021-01-01 RX ADMIN — IOPAMIDOL 90 ML: 612 INJECTION, SOLUTION INTRAVENOUS at 10:41

## 2021-01-01 RX ADMIN — ACETAMINOPHEN 650 MG: 325 TABLET ORAL at 15:39

## 2021-01-01 RX ADMIN — IPRATROPIUM BROMIDE AND ALBUTEROL SULFATE 3 ML: .5; 2.5 SOLUTION RESPIRATORY (INHALATION) at 08:32

## 2021-01-01 RX ADMIN — FLUCONAZOLE 400 MG: 2 INJECTION, SOLUTION INTRAVENOUS at 21:06

## 2021-01-01 RX ADMIN — VANCOMYCIN HYDROCHLORIDE 1250 MG: 10 INJECTION, POWDER, LYOPHILIZED, FOR SOLUTION INTRAVENOUS at 13:51

## 2021-01-01 RX ADMIN — POLYETHYLENE GLYCOL 3350 17 G: 17 POWDER, FOR SOLUTION ORAL at 09:52

## 2021-01-01 RX ADMIN — IPRATROPIUM BROMIDE AND ALBUTEROL SULFATE 3 ML: .5; 2.5 SOLUTION RESPIRATORY (INHALATION) at 23:42

## 2021-01-01 RX ADMIN — VANCOMYCIN HYDROCHLORIDE 1000 MG: 1 INJECTION, POWDER, LYOPHILIZED, FOR SOLUTION INTRAVENOUS at 14:47

## 2021-01-01 RX ADMIN — IPRATROPIUM BROMIDE AND ALBUTEROL SULFATE 3 ML: .5; 2.5 SOLUTION RESPIRATORY (INHALATION) at 00:23

## 2021-01-01 RX ADMIN — LORAZEPAM 2 MG: 2 INJECTION INTRAMUSCULAR; INTRAVENOUS at 23:55

## 2021-01-01 RX ADMIN — Medication 2 TABLET: at 09:32

## 2021-01-01 RX ADMIN — CHLORHEXIDINE GLUCONATE 0.12% ORAL RINSE 10 ML: 1.2 LIQUID ORAL at 08:18

## 2021-01-01 RX ADMIN — IPRATROPIUM BROMIDE AND ALBUTEROL SULFATE 3 ML: .5; 2.5 SOLUTION RESPIRATORY (INHALATION) at 23:13

## 2021-01-01 RX ADMIN — OMEGA-3-ACID ETHYL ESTERS 1000 MG: 1 CAPSULE, LIQUID FILLED ORAL at 10:44

## 2021-01-01 RX ADMIN — SODIUM CHLORIDE SOLN NEBU 3% 4 ML: 3 NEBU SOLN at 13:00

## 2021-01-01 RX ADMIN — Medication 200 MG: at 09:23

## 2021-01-01 RX ADMIN — POTASSIUM & SODIUM PHOSPHATES POWDER PACK 280-160-250 MG 1 PACKET: 280-160-250 PACK at 18:30

## 2021-01-01 RX ADMIN — Medication 1000 UNITS: at 09:00

## 2021-01-01 RX ADMIN — PIPERACILLIN AND TAZOBACTAM 4.5 G: 4; .5 INJECTION, POWDER, FOR SOLUTION INTRAVENOUS at 17:55

## 2021-01-01 RX ADMIN — IPRATROPIUM BROMIDE AND ALBUTEROL SULFATE 3 ML: .5; 3 SOLUTION RESPIRATORY (INHALATION) at 09:38

## 2021-01-01 RX ADMIN — ENOXAPARIN SODIUM 40 MG: 100 INJECTION SUBCUTANEOUS at 21:10

## 2021-01-01 RX ADMIN — Medication 10 ML: at 23:56

## 2021-01-01 RX ADMIN — DEXTROSE MONOHYDRATE 9 MCG/MIN: 50 INJECTION, SOLUTION INTRAVENOUS at 07:30

## 2021-01-01 RX ADMIN — INSULIN LISPRO 2 UNITS: 100 INJECTION, SOLUTION INTRAVENOUS; SUBCUTANEOUS at 12:30

## 2021-01-01 RX ADMIN — SODIUM CHLORIDE SOLN NEBU 3% 4 ML: 3 NEBU SOLN at 12:08

## 2021-01-01 RX ADMIN — MORPHINE SULFATE 1 MG: 2 INJECTION, SOLUTION INTRAMUSCULAR; INTRAVENOUS at 20:16

## 2021-01-01 RX ADMIN — PIPERACILLIN AND TAZOBACTAM 3.38 G: 3; .375 INJECTION, POWDER, LYOPHILIZED, FOR SOLUTION INTRAVENOUS at 18:18

## 2021-01-01 RX ADMIN — Medication 2 TABLET: at 18:00

## 2021-01-01 RX ADMIN — THIAMINE HYDROCHLORIDE 200 MG: 100 INJECTION, SOLUTION INTRAMUSCULAR; INTRAVENOUS at 09:46

## 2021-01-01 RX ADMIN — IPRATROPIUM BROMIDE AND ALBUTEROL SULFATE 3 ML: .5; 3 SOLUTION RESPIRATORY (INHALATION) at 20:51

## 2021-01-01 RX ADMIN — LORAZEPAM 2 MG: 2 INJECTION INTRAMUSCULAR; INTRAVENOUS at 19:36

## 2021-01-01 RX ADMIN — WATER 1 G: 1 INJECTION INTRAMUSCULAR; INTRAVENOUS; SUBCUTANEOUS at 23:11

## 2021-01-01 RX ADMIN — DEXTROSE MONOHYDRATE 8 MCG/MIN: 50 INJECTION, SOLUTION INTRAVENOUS at 12:29

## 2021-01-01 RX ADMIN — IPRATROPIUM BROMIDE AND ALBUTEROL SULFATE 3 ML: .5; 2.5 SOLUTION RESPIRATORY (INHALATION) at 04:16

## 2021-01-01 RX ADMIN — PIPERACILLIN AND TAZOBACTAM 4.5 G: 4; .5 INJECTION, POWDER, FOR SOLUTION INTRAVENOUS at 17:17

## 2021-01-01 RX ADMIN — Medication 200 MG: at 09:07

## 2021-01-01 RX ADMIN — FENTANYL CITRATE 200 MCG/HR: 0.05 INJECTION, SOLUTION INTRAMUSCULAR; INTRAVENOUS at 09:35

## 2021-01-01 RX ADMIN — SODIUM CHLORIDE 100 MG: 9 INJECTION, SOLUTION INTRAVENOUS at 19:59

## 2021-01-01 RX ADMIN — BACITRACIN ZINC AND POLYMYXIN B SULFATE: 500; 10000 OINTMENT TOPICAL at 18:52

## 2021-01-01 RX ADMIN — SODIUM CHLORIDE SOLN NEBU 3% 4 ML: 3 NEBU SOLN at 15:30

## 2021-01-01 RX ADMIN — IPRATROPIUM BROMIDE AND ALBUTEROL SULFATE 3 ML: .5; 2.5 SOLUTION RESPIRATORY (INHALATION) at 19:22

## 2021-01-01 RX ADMIN — SODIUM CHLORIDE 500 ML: 900 INJECTION, SOLUTION INTRAVENOUS at 11:43

## 2021-01-01 RX ADMIN — ACETAMINOPHEN 650 MG: 325 TABLET ORAL at 22:32

## 2021-01-01 RX ADMIN — POTASSIUM & SODIUM PHOSPHATES POWDER PACK 280-160-250 MG 1 PACKET: 280-160-250 PACK at 17:55

## 2021-01-01 RX ADMIN — POLYETHYLENE GLYCOL 3350 17 G: 17 POWDER, FOR SOLUTION ORAL at 09:20

## 2021-01-01 RX ADMIN — VANCOMYCIN HYDROCHLORIDE 1250 MG: 10 INJECTION, POWDER, LYOPHILIZED, FOR SOLUTION INTRAVENOUS at 20:00

## 2021-01-01 RX ADMIN — FAMOTIDINE 20 MG: 10 INJECTION INTRAVENOUS at 20:13

## 2021-01-01 RX ADMIN — IPRATROPIUM BROMIDE AND ALBUTEROL SULFATE 3 ML: .5; 3 SOLUTION RESPIRATORY (INHALATION) at 10:21

## 2021-01-01 RX ADMIN — POLYETHYLENE GLYCOL 3350 17 G: 17 POWDER, FOR SOLUTION ORAL at 08:10

## 2021-01-01 RX ADMIN — Medication 10 ML: at 06:25

## 2021-01-01 RX ADMIN — IPRATROPIUM BROMIDE AND ALBUTEROL SULFATE 3 ML: .5; 2.5 SOLUTION RESPIRATORY (INHALATION) at 00:40

## 2021-01-01 RX ADMIN — DOCUSATE SODIUM 50 MG AND SENNOSIDES 8.6 MG 1 TABLET: 8.6; 5 TABLET, FILM COATED ORAL at 09:33

## 2021-01-01 RX ADMIN — HYDROMORPHONE HYDROCHLORIDE 1 MG: 2 INJECTION, SOLUTION INTRAMUSCULAR; INTRAVENOUS; SUBCUTANEOUS at 13:47

## 2021-01-01 RX ADMIN — BACITRACIN ZINC AND POLYMYXIN B SULFATE: 500; 10000 OINTMENT TOPICAL at 08:38

## 2021-01-01 RX ADMIN — BACITRACIN ZINC AND POLYMYXIN B SULFATE: 500; 10000 OINTMENT TOPICAL at 09:13

## 2021-01-01 RX ADMIN — IPRATROPIUM BROMIDE AND ALBUTEROL SULFATE 3 ML: .5; 2.5 SOLUTION RESPIRATORY (INHALATION) at 23:35

## 2021-01-01 RX ADMIN — VANCOMYCIN HYDROCHLORIDE 1250 MG: 10 INJECTION, POWDER, LYOPHILIZED, FOR SOLUTION INTRAVENOUS at 14:07

## 2021-01-01 RX ADMIN — FENTANYL CITRATE 200 MCG/HR: 0.05 INJECTION, SOLUTION INTRAMUSCULAR; INTRAVENOUS at 22:10

## 2021-01-01 RX ADMIN — PIPERACILLIN AND TAZOBACTAM 3.38 G: 3; .375 INJECTION, POWDER, LYOPHILIZED, FOR SOLUTION INTRAVENOUS at 22:04

## 2021-01-01 RX ADMIN — Medication 2 TABLET: at 08:37

## 2021-01-01 RX ADMIN — SODIUM CHLORIDE SOLN NEBU 3% 4 ML: 3 NEBU SOLN at 13:51

## 2021-01-01 RX ADMIN — SODIUM CHLORIDE, SODIUM LACTATE, POTASSIUM CHLORIDE, AND CALCIUM CHLORIDE 500 ML: 600; 310; 30; 20 INJECTION, SOLUTION INTRAVENOUS at 23:58

## 2021-01-01 RX ADMIN — IPRATROPIUM BROMIDE AND ALBUTEROL SULFATE 3 ML: .5; 2.5 SOLUTION RESPIRATORY (INHALATION) at 11:30

## 2021-01-01 RX ADMIN — WATER 1 G: 1 INJECTION INTRAMUSCULAR; INTRAVENOUS; SUBCUTANEOUS at 02:09

## 2021-01-01 RX ADMIN — IPRATROPIUM BROMIDE AND ALBUTEROL SULFATE 3 ML: .5; 3 SOLUTION RESPIRATORY (INHALATION) at 12:47

## 2021-01-01 RX ADMIN — ACETAMINOPHEN 650 MG: 325 TABLET ORAL at 20:56

## 2021-01-01 RX ADMIN — SODIUM CHLORIDE SOLN NEBU 3% 4 ML: 3 NEBU SOLN at 20:39

## 2021-01-01 RX ADMIN — Medication 2 TABLET: at 09:19

## 2021-01-01 RX ADMIN — IPRATROPIUM BROMIDE AND ALBUTEROL SULFATE 3 ML: .5; 2.5 SOLUTION RESPIRATORY (INHALATION) at 19:23

## 2021-01-01 RX ADMIN — IPRATROPIUM BROMIDE AND ALBUTEROL SULFATE 3 ML: .5; 2.5 SOLUTION RESPIRATORY (INHALATION) at 20:45

## 2021-01-01 RX ADMIN — GUAIFENESIN 600 MG: 600 TABLET, EXTENDED RELEASE ORAL at 10:43

## 2021-01-01 RX ADMIN — DEXTROSE MONOHYDRATE 5 MCG/MIN: 50 INJECTION, SOLUTION INTRAVENOUS at 05:15

## 2021-01-01 RX ADMIN — Medication 10 ML: at 21:15

## 2021-01-01 RX ADMIN — OMEGA-3-ACID ETHYL ESTERS 1000 MG: 1 CAPSULE, LIQUID FILLED ORAL at 09:12

## 2021-01-01 RX ADMIN — DOCUSATE SODIUM 50 MG AND SENNOSIDES 8.6 MG 1 TABLET: 8.6; 5 TABLET, FILM COATED ORAL at 08:10

## 2021-01-01 RX ADMIN — THIAMINE HYDROCHLORIDE 200 MG: 100 INJECTION, SOLUTION INTRAMUSCULAR; INTRAVENOUS at 16:00

## 2021-01-01 RX ADMIN — PIPERACILLIN AND TAZOBACTAM 3.38 G: 3; .375 INJECTION, POWDER, LYOPHILIZED, FOR SOLUTION INTRAVENOUS at 12:16

## 2021-01-01 RX ADMIN — ENOXAPARIN SODIUM 40 MG: 100 INJECTION SUBCUTANEOUS at 21:28

## 2021-01-01 RX ADMIN — FAMOTIDINE 20 MG: 10 INJECTION INTRAVENOUS at 20:18

## 2021-01-01 RX ADMIN — IPRATROPIUM BROMIDE AND ALBUTEROL SULFATE 3 ML: .5; 2.5 SOLUTION RESPIRATORY (INHALATION) at 12:56

## 2021-01-01 RX ADMIN — IPRATROPIUM BROMIDE AND ALBUTEROL SULFATE 3 ML: .5; 2.5 SOLUTION RESPIRATORY (INHALATION) at 19:15

## 2021-01-01 RX ADMIN — Medication 10 ML: at 00:03

## 2021-01-01 RX ADMIN — THIAMINE HYDROCHLORIDE 200 MG: 100 INJECTION, SOLUTION INTRAMUSCULAR; INTRAVENOUS at 09:15

## 2021-01-01 RX ADMIN — CHLORHEXIDINE GLUCONATE 0.12% ORAL RINSE 10 ML: 1.2 LIQUID ORAL at 08:22

## 2021-01-01 RX ADMIN — PROPOFOL 50 MG: 10 INJECTION, EMULSION INTRAVENOUS at 16:35

## 2021-01-01 RX ADMIN — IPRATROPIUM BROMIDE AND ALBUTEROL SULFATE 3 ML: .5; 2.5 SOLUTION RESPIRATORY (INHALATION) at 04:47

## 2021-01-01 RX ADMIN — MIDAZOLAM 2 MG: 1 INJECTION INTRAMUSCULAR; INTRAVENOUS at 23:55

## 2021-01-01 RX ADMIN — Medication 10 ML: at 23:36

## 2021-01-01 RX ADMIN — Medication 10 ML: at 13:53

## 2021-01-01 RX ADMIN — LORAZEPAM 2 MG: 2 INJECTION INTRAMUSCULAR; INTRAVENOUS at 18:33

## 2021-01-01 RX ADMIN — IPRATROPIUM BROMIDE AND ALBUTEROL SULFATE 3 ML: .5; 2.5 SOLUTION RESPIRATORY (INHALATION) at 16:35

## 2021-01-01 RX ADMIN — THERA TABS 1 TABLET: TAB at 09:30

## 2021-01-01 RX ADMIN — ASPIRIN 81 MG CHEWABLE TABLET 81 MG: 81 TABLET CHEWABLE at 09:35

## 2021-01-01 RX ADMIN — Medication 10 ML: at 21:14

## 2021-01-01 RX ADMIN — IPRATROPIUM BROMIDE AND ALBUTEROL SULFATE 3 ML: .5; 2.5 SOLUTION RESPIRATORY (INHALATION) at 16:03

## 2021-01-01 RX ADMIN — ACETAMINOPHEN 650 MG: 325 TABLET ORAL at 19:37

## 2021-01-01 RX ADMIN — Medication 10 ML: at 05:46

## 2021-01-01 RX ADMIN — ENOXAPARIN SODIUM 40 MG: 100 INJECTION SUBCUTANEOUS at 21:15

## 2021-01-01 RX ADMIN — FAMOTIDINE 20 MG: 10 INJECTION INTRAVENOUS at 15:56

## 2021-01-01 RX ADMIN — BACITRACIN ZINC AND POLYMYXIN B SULFATE: 500; 10000 OINTMENT TOPICAL at 18:13

## 2021-01-01 RX ADMIN — PROPOFOL 10 MCG/KG/MIN: 10 INJECTION, EMULSION INTRAVENOUS at 12:05

## 2021-01-01 RX ADMIN — PIPERACILLIN AND TAZOBACTAM 4.5 G: 4; .5 INJECTION, POWDER, FOR SOLUTION INTRAVENOUS at 10:14

## 2021-01-01 RX ADMIN — INSULIN LISPRO 2 UNITS: 100 INJECTION, SOLUTION INTRAVENOUS; SUBCUTANEOUS at 23:46

## 2021-01-01 RX ADMIN — MIDAZOLAM 2 MG: 1 INJECTION INTRAMUSCULAR; INTRAVENOUS at 15:17

## 2021-01-01 RX ADMIN — THERA TABS 1 TABLET: TAB at 09:12

## 2021-01-01 RX ADMIN — THERA TABS 1 TABLET: TAB at 09:05

## 2021-01-01 RX ADMIN — PIPERACILLIN AND TAZOBACTAM 3.38 G: 3; .375 INJECTION, POWDER, LYOPHILIZED, FOR SOLUTION INTRAVENOUS at 04:55

## 2021-01-01 RX ADMIN — PIPERACILLIN AND TAZOBACTAM 3.38 G: 3; .375 INJECTION, POWDER, LYOPHILIZED, FOR SOLUTION INTRAVENOUS at 11:24

## 2021-01-01 RX ADMIN — GUAIFENESIN 600 MG: 600 TABLET, EXTENDED RELEASE ORAL at 23:09

## 2021-01-01 RX ADMIN — SODIUM CHLORIDE SOLN NEBU 3% 4 ML: 3 NEBU SOLN at 19:14

## 2021-01-01 RX ADMIN — IPRATROPIUM BROMIDE AND ALBUTEROL SULFATE 3 ML: .5; 2.5 SOLUTION RESPIRATORY (INHALATION) at 09:56

## 2021-01-01 RX ADMIN — FENTANYL CITRATE 100 MCG: 50 INJECTION INTRAMUSCULAR; INTRAVENOUS at 02:07

## 2021-01-01 RX ADMIN — ACETAMINOPHEN 650 MG: 325 TABLET ORAL at 19:49

## 2021-01-01 RX ADMIN — IPRATROPIUM BROMIDE AND ALBUTEROL SULFATE 3 ML: .5; 2.5 SOLUTION RESPIRATORY (INHALATION) at 11:53

## 2021-01-01 RX ADMIN — LORAZEPAM 1 MG: 2 INJECTION INTRAMUSCULAR; INTRAVENOUS at 17:02

## 2021-01-01 RX ADMIN — IPRATROPIUM BROMIDE AND ALBUTEROL SULFATE 3 ML: .5; 3 SOLUTION RESPIRATORY (INHALATION) at 10:19

## 2021-01-01 RX ADMIN — OMEGA-3-ACID ETHYL ESTERS 1000 MG: 1 CAPSULE, LIQUID FILLED ORAL at 08:18

## 2021-01-01 RX ADMIN — ASPIRIN 81 MG CHEWABLE TABLET 81 MG: 81 TABLET CHEWABLE at 08:50

## 2021-01-01 RX ADMIN — IPRATROPIUM BROMIDE AND ALBUTEROL SULFATE 3 ML: .5; 3 SOLUTION RESPIRATORY (INHALATION) at 05:24

## 2021-01-01 RX ADMIN — Medication 1000 UNITS: at 08:50

## 2021-01-01 RX ADMIN — IPRATROPIUM BROMIDE AND ALBUTEROL SULFATE 3 ML: .5; 2.5 SOLUTION RESPIRATORY (INHALATION) at 08:19

## 2021-01-01 RX ADMIN — SODIUM CHLORIDE SOLN NEBU 3% 4 ML: 3 NEBU SOLN at 20:43

## 2021-01-01 RX ADMIN — LEVOFLOXACIN 750 MG: 5 INJECTION, SOLUTION INTRAVENOUS at 20:02

## 2021-01-01 RX ADMIN — Medication 10 ML: at 13:52

## 2021-01-01 RX ADMIN — ASPIRIN 81 MG CHEWABLE TABLET 81 MG: 81 TABLET CHEWABLE at 09:11

## 2021-01-01 RX ADMIN — PIPERACILLIN AND TAZOBACTAM 4.5 G: 4; .5 INJECTION, POWDER, FOR SOLUTION INTRAVENOUS at 22:57

## 2021-01-01 RX ADMIN — PIPERACILLIN AND TAZOBACTAM 3.38 G: 3; .375 INJECTION, POWDER, LYOPHILIZED, FOR SOLUTION INTRAVENOUS at 01:40

## 2021-01-01 RX ADMIN — SODIUM PHOSPHATE, MONOBASIC, MONOHYDRATE 9 MMOL: 276; 142 INJECTION, SOLUTION INTRAVENOUS at 11:45

## 2021-01-01 RX ADMIN — LEVOFLOXACIN 750 MG: 5 INJECTION, SOLUTION INTRAVENOUS at 21:44

## 2021-01-01 RX ADMIN — LORAZEPAM 2 MG: 2 INJECTION INTRAMUSCULAR; INTRAVENOUS at 22:32

## 2021-01-01 RX ADMIN — ACETAMINOPHEN 650 MG: 325 TABLET ORAL at 21:28

## 2021-01-01 RX ADMIN — FENTANYL CITRATE 75 MCG/HR: 0.05 INJECTION, SOLUTION INTRAMUSCULAR; INTRAVENOUS at 03:25

## 2021-01-01 RX ADMIN — DOCUSATE SODIUM 50 MG AND SENNOSIDES 8.6 MG 1 TABLET: 8.6; 5 TABLET, FILM COATED ORAL at 09:52

## 2021-01-01 RX ADMIN — THERA TABS 1 TABLET: TAB at 10:43

## 2021-01-01 RX ADMIN — ALBUMIN (HUMAN) 25 G: 0.25 INJECTION, SOLUTION INTRAVENOUS at 17:41

## 2021-01-01 RX ADMIN — VANCOMYCIN HYDROCHLORIDE 1000 MG: 1 INJECTION, POWDER, LYOPHILIZED, FOR SOLUTION INTRAVENOUS at 23:08

## 2021-01-01 RX ADMIN — THERA TABS 1 TABLET: TAB at 09:19

## 2021-01-01 RX ADMIN — FENTANYL CITRATE 75 MCG/HR: 0.05 INJECTION, SOLUTION INTRAMUSCULAR; INTRAVENOUS at 17:26

## 2021-01-01 RX ADMIN — DEXTROSE MONOHYDRATE 12 MCG/MIN: 50 INJECTION, SOLUTION INTRAVENOUS at 09:25

## 2021-01-01 RX ADMIN — GUAIFENESIN 600 MG: 600 TABLET, EXTENDED RELEASE ORAL at 21:45

## 2021-01-01 RX ADMIN — IPRATROPIUM BROMIDE AND ALBUTEROL SULFATE 3 ML: .5; 2.5 SOLUTION RESPIRATORY (INHALATION) at 13:20

## 2021-01-01 RX ADMIN — MORPHINE SULFATE 2 MG: 2 INJECTION, SOLUTION INTRAMUSCULAR; INTRAVENOUS at 16:10

## 2021-01-01 RX ADMIN — DEXTROSE MONOHYDRATE 7 MCG/MIN: 50 INJECTION, SOLUTION INTRAVENOUS at 12:00

## 2021-01-01 RX ADMIN — THERA TABS 1 TABLET: TAB at 09:23

## 2021-01-01 RX ADMIN — LORAZEPAM 2 MG: 2 INJECTION INTRAMUSCULAR; INTRAVENOUS at 23:37

## 2021-01-01 RX ADMIN — WATER 1 G: 1 INJECTION INTRAMUSCULAR; INTRAVENOUS; SUBCUTANEOUS at 23:12

## 2021-01-01 RX ADMIN — ENOXAPARIN SODIUM 40 MG: 100 INJECTION SUBCUTANEOUS at 22:40

## 2021-01-01 RX ADMIN — BACITRACIN ZINC AND POLYMYXIN B SULFATE: 500; 10000 OINTMENT TOPICAL at 09:28

## 2021-01-01 RX ADMIN — Medication 10 ML: at 00:48

## 2021-01-01 RX ADMIN — FENTANYL CITRATE 200 MCG/HR: 0.05 INJECTION, SOLUTION INTRAMUSCULAR; INTRAVENOUS at 08:17

## 2021-01-01 RX ADMIN — PIPERACILLIN AND TAZOBACTAM 3.38 G: 3; .375 INJECTION, POWDER, LYOPHILIZED, FOR SOLUTION INTRAVENOUS at 23:13

## 2021-01-01 RX ADMIN — Medication 1000 UNITS: at 10:35

## 2021-01-01 RX ADMIN — SODIUM CHLORIDE SOLN NEBU 3% 4 ML: 3 NEBU SOLN at 17:23

## 2021-01-01 RX ADMIN — MIDAZOLAM 2 MG: 1 INJECTION INTRAMUSCULAR; INTRAVENOUS at 01:52

## 2021-01-01 RX ADMIN — OMEGA-3-ACID ETHYL ESTERS 1000 MG: 1 CAPSULE, LIQUID FILLED ORAL at 08:23

## 2021-01-01 RX ADMIN — LORAZEPAM 1 MG: 2 INJECTION INTRAMUSCULAR; INTRAVENOUS at 16:10

## 2021-01-01 RX ADMIN — POLYETHYLENE GLYCOL 3350 17 G: 17 POWDER, FOR SOLUTION ORAL at 17:17

## 2021-01-01 RX ADMIN — PIPERACILLIN AND TAZOBACTAM 3.38 G: 3; .375 INJECTION, POWDER, LYOPHILIZED, FOR SOLUTION INTRAVENOUS at 00:35

## 2021-01-01 RX ADMIN — THERA TABS 1 TABLET: TAB at 09:28

## 2021-01-01 RX ADMIN — IPRATROPIUM BROMIDE AND ALBUTEROL SULFATE 3 ML: .5; 2.5 SOLUTION RESPIRATORY (INHALATION) at 11:34

## 2021-01-01 RX ADMIN — PIPERACILLIN AND TAZOBACTAM 4.5 G: 4; .5 INJECTION, POWDER, FOR SOLUTION INTRAVENOUS at 04:18

## 2021-01-01 RX ADMIN — PIPERACILLIN AND TAZOBACTAM 3.38 G: 3; .375 INJECTION, POWDER, LYOPHILIZED, FOR SOLUTION INTRAVENOUS at 04:58

## 2021-01-01 RX ADMIN — FAMOTIDINE 20 MG: 10 INJECTION INTRAVENOUS at 08:50

## 2021-01-01 RX ADMIN — POLYETHYLENE GLYCOL 3350 17 G: 17 POWDER, FOR SOLUTION ORAL at 18:55

## 2021-01-01 RX ADMIN — ACETAMINOPHEN ORAL SOLUTION 500 MG: 650 SOLUTION ORAL at 04:35

## 2021-01-01 RX ADMIN — INSULIN LISPRO 2 UNITS: 100 INJECTION, SOLUTION INTRAVENOUS; SUBCUTANEOUS at 00:09

## 2021-01-01 RX ADMIN — FENTANYL CITRATE 75 MCG/HR: 0.05 INJECTION, SOLUTION INTRAMUSCULAR; INTRAVENOUS at 09:35

## 2021-01-01 RX ADMIN — IPRATROPIUM BROMIDE AND ALBUTEROL SULFATE 3 ML: .5; 2.5 SOLUTION RESPIRATORY (INHALATION) at 04:11

## 2021-01-01 RX ADMIN — Medication 200 MG: at 08:37

## 2021-01-01 RX ADMIN — Medication 200 MG: at 08:10

## 2021-01-01 RX ADMIN — IPRATROPIUM BROMIDE AND ALBUTEROL SULFATE 3 ML: .5; 2.5 SOLUTION RESPIRATORY (INHALATION) at 08:59

## 2021-01-01 RX ADMIN — PIPERACILLIN AND TAZOBACTAM 4.5 G: 4; .5 INJECTION, POWDER, FOR SOLUTION INTRAVENOUS at 10:42

## 2021-01-01 RX ADMIN — ACETAZOLAMIDE SODIUM 500 MG: 500 INJECTION, POWDER, LYOPHILIZED, FOR SOLUTION INTRAVENOUS at 11:01

## 2021-01-01 RX ADMIN — PIPERACILLIN AND TAZOBACTAM 3.38 G: 3; .375 INJECTION, POWDER, LYOPHILIZED, FOR SOLUTION INTRAVENOUS at 23:56

## 2021-01-01 RX ADMIN — Medication 10 ML: at 06:00

## 2021-01-01 RX ADMIN — CHLORHEXIDINE GLUCONATE 0.12% ORAL RINSE 10 ML: 1.2 LIQUID ORAL at 09:09

## 2021-01-01 RX ADMIN — FAMOTIDINE 20 MG: 10 INJECTION INTRAVENOUS at 09:27

## 2021-01-01 RX ADMIN — LORAZEPAM 2 MG: 2 INJECTION INTRAMUSCULAR; INTRAVENOUS at 12:03

## 2021-01-01 RX ADMIN — IPRATROPIUM BROMIDE AND ALBUTEROL SULFATE 3 ML: .5; 2.5 SOLUTION RESPIRATORY (INHALATION) at 15:11

## 2021-01-01 RX ADMIN — POTASSIUM CHLORIDE 10 MEQ: 14.9 INJECTION, SOLUTION INTRAVENOUS at 11:03

## 2021-01-01 RX ADMIN — IPRATROPIUM BROMIDE AND ALBUTEROL SULFATE 3 ML: .5; 2.5 SOLUTION RESPIRATORY (INHALATION) at 13:46

## 2021-01-01 RX ADMIN — IPRATROPIUM BROMIDE AND ALBUTEROL SULFATE 3 ML: .5; 2.5 SOLUTION RESPIRATORY (INHALATION) at 04:06

## 2021-01-01 RX ADMIN — Medication 4 MCG/MIN: at 13:00

## 2021-01-01 RX ADMIN — SODIUM CHLORIDE SOLN NEBU 3% 4 ML: 3 NEBU SOLN at 08:59

## 2021-01-01 RX ADMIN — ACETAMINOPHEN 650 MG: 325 TABLET ORAL at 19:39

## 2021-01-01 RX ADMIN — DEXTROSE MONOHYDRATE AND SODIUM CHLORIDE 75 ML/HR: 5; .9 INJECTION, SOLUTION INTRAVENOUS at 20:48

## 2021-01-01 RX ADMIN — SODIUM CHLORIDE SOLN NEBU 3% 4 ML: 3 NEBU SOLN at 05:24

## 2021-01-01 RX ADMIN — SODIUM CHLORIDE SOLN NEBU 3% 4 ML: 3 NEBU SOLN at 19:34

## 2021-01-01 RX ADMIN — IPRATROPIUM BROMIDE AND ALBUTEROL SULFATE 3 ML: .5; 2.5 SOLUTION RESPIRATORY (INHALATION) at 07:19

## 2021-01-01 RX ADMIN — FAMOTIDINE 20 MG: 10 INJECTION INTRAVENOUS at 20:00

## 2021-01-01 RX ADMIN — PIPERACILLIN AND TAZOBACTAM 4.5 G: 4; .5 INJECTION, POWDER, FOR SOLUTION INTRAVENOUS at 04:55

## 2021-01-01 RX ADMIN — FENTANYL CITRATE 200 MCG/HR: 0.05 INJECTION, SOLUTION INTRAMUSCULAR; INTRAVENOUS at 05:22

## 2021-01-01 RX ADMIN — PIPERACILLIN AND TAZOBACTAM 3.38 G: 3; .375 INJECTION, POWDER, LYOPHILIZED, FOR SOLUTION INTRAVENOUS at 05:55

## 2021-01-01 RX ADMIN — IPRATROPIUM BROMIDE AND ALBUTEROL SULFATE 3 ML: .5; 2.5 SOLUTION RESPIRATORY (INHALATION) at 07:44

## 2021-01-01 RX ADMIN — LORAZEPAM 2 MG: 2 INJECTION INTRAMUSCULAR; INTRAVENOUS at 00:52

## 2021-01-01 RX ADMIN — THERA TABS 1 TABLET: TAB at 08:18

## 2021-01-01 RX ADMIN — Medication 1000 UNITS: at 09:21

## 2021-01-01 RX ADMIN — SODIUM CHLORIDE SOLN NEBU 3% 4 ML: 3 NEBU SOLN at 19:42

## 2021-01-01 RX ADMIN — CHLORHEXIDINE GLUCONATE 0.12% ORAL RINSE 10 ML: 1.2 LIQUID ORAL at 20:13

## 2021-01-01 RX ADMIN — GUAIFENESIN 600 MG: 600 TABLET, EXTENDED RELEASE ORAL at 20:50

## 2021-01-01 RX ADMIN — ACETAMINOPHEN 650 MG: 325 TABLET ORAL at 12:55

## 2021-01-01 RX ADMIN — IPRATROPIUM BROMIDE AND ALBUTEROL SULFATE 3 ML: .5; 2.5 SOLUTION RESPIRATORY (INHALATION) at 09:11

## 2021-01-01 RX ADMIN — PIPERACILLIN AND TAZOBACTAM 4.5 G: 4; .5 INJECTION, POWDER, FOR SOLUTION INTRAVENOUS at 05:28

## 2021-01-01 RX ADMIN — PIPERACILLIN AND TAZOBACTAM 3.38 G: 3; .375 INJECTION, POWDER, LYOPHILIZED, FOR SOLUTION INTRAVENOUS at 23:42

## 2021-01-01 RX ADMIN — VANCOMYCIN HYDROCHLORIDE 1000 MG: 1 INJECTION, POWDER, LYOPHILIZED, FOR SOLUTION INTRAVENOUS at 05:28

## 2021-01-01 RX ADMIN — INSULIN LISPRO 2 UNITS: 100 INJECTION, SOLUTION INTRAVENOUS; SUBCUTANEOUS at 12:59

## 2021-01-01 RX ADMIN — Medication 10 ML: at 06:14

## 2021-01-01 RX ADMIN — PIPERACILLIN AND TAZOBACTAM 3.38 G: 3; .375 INJECTION, POWDER, LYOPHILIZED, FOR SOLUTION INTRAVENOUS at 12:45

## 2021-01-01 RX ADMIN — IPRATROPIUM BROMIDE AND ALBUTEROL SULFATE 3 ML: .5; 2.5 SOLUTION RESPIRATORY (INHALATION) at 20:32

## 2021-01-01 RX ADMIN — INSULIN LISPRO 2 UNITS: 100 INJECTION, SOLUTION INTRAVENOUS; SUBCUTANEOUS at 12:46

## 2021-01-01 RX ADMIN — Medication 2 TABLET: at 09:27

## 2021-01-01 RX ADMIN — IPRATROPIUM BROMIDE AND ALBUTEROL SULFATE 3 ML: .5; 2.5 SOLUTION RESPIRATORY (INHALATION) at 00:00

## 2021-01-01 RX ADMIN — FENTANYL CITRATE 200 MCG/HR: 0.05 INJECTION, SOLUTION INTRAMUSCULAR; INTRAVENOUS at 16:41

## 2021-01-01 RX ADMIN — PIPERACILLIN AND TAZOBACTAM 3.38 G: 3; .375 INJECTION, POWDER, LYOPHILIZED, FOR SOLUTION INTRAVENOUS at 23:32

## 2021-01-01 RX ADMIN — SODIUM CHLORIDE SOLN NEBU 3% 4 ML: 3 NEBU SOLN at 09:05

## 2021-01-01 RX ADMIN — GUAIFENESIN 600 MG: 600 TABLET, EXTENDED RELEASE ORAL at 22:40

## 2021-01-01 RX ADMIN — ACETAMINOPHEN 650 MG: 325 TABLET ORAL at 21:16

## 2021-01-01 RX ADMIN — VANCOMYCIN HYDROCHLORIDE 1000 MG: 1 INJECTION, POWDER, LYOPHILIZED, FOR SOLUTION INTRAVENOUS at 23:58

## 2021-01-01 RX ADMIN — NOREPINEPHRINE BITARTRATE 4 MCG/MIN: 1 SOLUTION INTRAVENOUS at 13:45

## 2021-01-01 RX ADMIN — FENTANYL CITRATE 150 MCG/HR: 0.05 INJECTION, SOLUTION INTRAMUSCULAR; INTRAVENOUS at 05:37

## 2021-01-01 RX ADMIN — MIDAZOLAM 2 MG: 1 INJECTION INTRAMUSCULAR; INTRAVENOUS at 15:23

## 2021-01-01 RX ADMIN — ACETAMINOPHEN 650 MG: 325 TABLET ORAL at 04:47

## 2021-01-01 RX ADMIN — LEVOFLOXACIN 750 MG: 5 INJECTION, SOLUTION INTRAVENOUS at 17:00

## 2021-01-01 RX ADMIN — Medication 10 ML: at 15:41

## 2021-01-01 RX ADMIN — FAMOTIDINE 20 MG: 10 INJECTION INTRAVENOUS at 09:11

## 2021-01-01 RX ADMIN — FENTANYL CITRATE 50 MCG/HR: 0.05 INJECTION, SOLUTION INTRAMUSCULAR; INTRAVENOUS at 09:00

## 2021-01-01 RX ADMIN — MORPHINE SULFATE: 10 INJECTION, SOLUTION INTRAMUSCULAR; INTRAVENOUS at 16:42

## 2021-01-01 RX ADMIN — SODIUM CHLORIDE SOLN NEBU 3% 4 ML: 3 NEBU SOLN at 08:02

## 2021-01-01 RX ADMIN — IPRATROPIUM BROMIDE AND ALBUTEROL SULFATE 3 ML: .5; 2.5 SOLUTION RESPIRATORY (INHALATION) at 03:03

## 2021-01-01 RX ADMIN — ENOXAPARIN SODIUM 40 MG: 100 INJECTION SUBCUTANEOUS at 22:16

## 2021-01-01 RX ADMIN — DOCUSATE SODIUM 50 MG AND SENNOSIDES 8.6 MG 1 TABLET: 8.6; 5 TABLET, FILM COATED ORAL at 08:18

## 2021-01-01 RX ADMIN — SODIUM CHLORIDE SOLN NEBU 3% 4 ML: 3 NEBU SOLN at 15:36

## 2021-01-01 RX ADMIN — IPRATROPIUM BROMIDE AND ALBUTEROL SULFATE 3 ML: .5; 3 SOLUTION RESPIRATORY (INHALATION) at 22:39

## 2021-01-01 RX ADMIN — IPRATROPIUM BROMIDE AND ALBUTEROL SULFATE 3 ML: .5; 2.5 SOLUTION RESPIRATORY (INHALATION) at 12:19

## 2021-01-01 RX ADMIN — WATER 1 G: 1 INJECTION INTRAMUSCULAR; INTRAVENOUS; SUBCUTANEOUS at 15:33

## 2021-01-01 RX ADMIN — Medication 2 TABLET: at 09:46

## 2021-01-01 RX ADMIN — IPRATROPIUM BROMIDE AND ALBUTEROL SULFATE 3 ML: .5; 2.5 SOLUTION RESPIRATORY (INHALATION) at 17:05

## 2021-01-01 RX ADMIN — FENTANYL CITRATE 200 MCG/HR: 0.05 INJECTION, SOLUTION INTRAMUSCULAR; INTRAVENOUS at 18:36

## 2021-01-01 RX ADMIN — SODIUM CHLORIDE SOLN NEBU 3% 4 ML: 3 NEBU SOLN at 20:20

## 2021-01-01 RX ADMIN — BACITRACIN ZINC AND POLYMYXIN B SULFATE: 500; 10000 OINTMENT TOPICAL at 18:47

## 2021-01-01 RX ADMIN — BACITRACIN ZINC AND POLYMYXIN B SULFATE: 500; 10000 OINTMENT TOPICAL at 09:32

## 2021-01-01 RX ADMIN — BACITRACIN ZINC AND POLYMYXIN B SULFATE: 500; 10000 OINTMENT TOPICAL at 17:59

## 2021-01-01 RX ADMIN — BACITRACIN ZINC AND POLYMYXIN B SULFATE: 500; 10000 OINTMENT TOPICAL at 09:07

## 2021-01-01 RX ADMIN — PIPERACILLIN AND TAZOBACTAM 4.5 G: 4; .5 INJECTION, POWDER, FOR SOLUTION INTRAVENOUS at 06:18

## 2021-01-01 RX ADMIN — FENTANYL CITRATE 100 MCG: 50 INJECTION INTRAMUSCULAR; INTRAVENOUS at 20:56

## 2021-01-01 RX ADMIN — CHLORHEXIDINE GLUCONATE 0.12% ORAL RINSE 10 ML: 1.2 LIQUID ORAL at 09:12

## 2021-01-01 RX ADMIN — CHLORHEXIDINE GLUCONATE 0.12% ORAL RINSE 10 ML: 1.2 LIQUID ORAL at 09:52

## 2021-01-01 RX ADMIN — ASPIRIN 81 MG CHEWABLE TABLET 81 MG: 81 TABLET CHEWABLE at 08:37

## 2021-01-01 RX ADMIN — LORAZEPAM 2 MG: 2 INJECTION INTRAMUSCULAR at 13:46

## 2021-01-01 RX ADMIN — MIDAZOLAM 2 MG: 1 INJECTION INTRAMUSCULAR; INTRAVENOUS at 23:37

## 2021-01-01 RX ADMIN — PIPERACILLIN AND TAZOBACTAM 3.38 G: 3; .375 INJECTION, POWDER, LYOPHILIZED, FOR SOLUTION INTRAVENOUS at 23:11

## 2021-01-01 RX ADMIN — FENTANYL CITRATE 200 MCG/HR: 0.05 INJECTION, SOLUTION INTRAMUSCULAR; INTRAVENOUS at 14:25

## 2021-01-01 RX ADMIN — INSULIN LISPRO 2 UNITS: 100 INJECTION, SOLUTION INTRAVENOUS; SUBCUTANEOUS at 05:45

## 2021-01-01 RX ADMIN — FAMOTIDINE 20 MG: 10 INJECTION INTRAVENOUS at 08:38

## 2021-01-01 RX ADMIN — FAMOTIDINE 20 MG: 10 INJECTION INTRAVENOUS at 09:18

## 2021-01-01 RX ADMIN — SODIUM CHLORIDE SOLN NEBU 3% 4 ML: 3 NEBU SOLN at 13:20

## 2021-01-01 RX ADMIN — ACETAMINOPHEN 650 MG: 325 TABLET ORAL at 19:05

## 2021-01-01 RX ADMIN — Medication 500 MG: at 10:21

## 2021-01-01 RX ADMIN — WATER 1 G: 1 INJECTION INTRAMUSCULAR; INTRAVENOUS; SUBCUTANEOUS at 00:09

## 2021-01-01 RX ADMIN — Medication 10 ML: at 05:16

## 2021-01-01 RX ADMIN — ENOXAPARIN SODIUM 40 MG: 100 INJECTION SUBCUTANEOUS at 00:10

## 2021-01-01 RX ADMIN — GUAIFENESIN 600 MG: 600 TABLET, EXTENDED RELEASE ORAL at 23:57

## 2021-01-01 RX ADMIN — GUAIFENESIN 600 MG: 600 TABLET, EXTENDED RELEASE ORAL at 09:30

## 2021-01-01 RX ADMIN — INSULIN LISPRO 2 UNITS: 100 INJECTION, SOLUTION INTRAVENOUS; SUBCUTANEOUS at 17:52

## 2021-01-01 RX ADMIN — ACETAMINOPHEN 650 MG: 325 TABLET ORAL at 00:30

## 2021-01-01 RX ADMIN — CHLORHEXIDINE GLUCONATE 0.12% ORAL RINSE 10 ML: 1.2 LIQUID ORAL at 09:32

## 2021-01-01 RX ADMIN — Medication 10 MCG/KG/MIN: at 12:05

## 2021-01-01 RX ADMIN — ASPIRIN 81 MG CHEWABLE TABLET 81 MG: 81 TABLET CHEWABLE at 09:45

## 2021-01-01 RX ADMIN — BACITRACIN ZINC AND POLYMYXIN B SULFATE: 500; 10000 OINTMENT TOPICAL at 09:23

## 2021-01-01 RX ADMIN — SODIUM CHLORIDE SOLN NEBU 3% 4 ML: 3 NEBU SOLN at 16:35

## 2021-01-01 RX ADMIN — Medication 2 TABLET: at 18:02

## 2021-01-01 RX ADMIN — Medication 2 TABLET: at 17:42

## 2021-01-01 RX ADMIN — WATER 1 G: 1 INJECTION INTRAMUSCULAR; INTRAVENOUS; SUBCUTANEOUS at 16:40

## 2021-01-01 RX ADMIN — POLYETHYLENE GLYCOL 3350 17 G: 17 POWDER, FOR SOLUTION ORAL at 09:32

## 2021-01-01 RX ADMIN — PIPERACILLIN AND TAZOBACTAM 3.38 G: 3; .375 INJECTION, POWDER, LYOPHILIZED, FOR SOLUTION INTRAVENOUS at 13:30

## 2021-01-01 RX ADMIN — WATER 1 G: 1 INJECTION INTRAMUSCULAR; INTRAVENOUS; SUBCUTANEOUS at 16:08

## 2021-01-01 RX ADMIN — BARIUM SULFATE 45 ML: 400 SUSPENSION ORAL at 13:30

## 2021-01-01 RX ADMIN — Medication 10 ML: at 21:27

## 2021-01-01 RX ADMIN — Medication 2 TABLET: at 17:46

## 2021-01-01 RX ADMIN — IPRATROPIUM BROMIDE AND ALBUTEROL SULFATE 3 ML: .5; 2.5 SOLUTION RESPIRATORY (INHALATION) at 07:36

## 2021-01-01 RX ADMIN — SODIUM CHLORIDE SOLN NEBU 3% 4 ML: 3 NEBU SOLN at 19:22

## 2021-01-01 RX ADMIN — Medication 2 TABLET: at 09:52

## 2021-01-01 RX ADMIN — FLUCONAZOLE 400 MG: 400 INJECTION, SOLUTION INTRAVENOUS at 15:24

## 2021-01-01 RX ADMIN — IPRATROPIUM BROMIDE AND ALBUTEROL SULFATE 3 ML: .5; 2.5 SOLUTION RESPIRATORY (INHALATION) at 15:52

## 2021-01-01 RX ADMIN — FENTANYL CITRATE 150 MCG/HR: 0.05 INJECTION, SOLUTION INTRAMUSCULAR; INTRAVENOUS at 09:40

## 2021-01-01 RX ADMIN — THERA TABS 1 TABLET: TAB at 10:35

## 2021-01-01 RX ADMIN — VANCOMYCIN HYDROCHLORIDE 750 MG: 750 INJECTION, POWDER, LYOPHILIZED, FOR SOLUTION INTRAVENOUS at 09:21

## 2021-01-01 RX ADMIN — Medication 10 ML: at 14:07

## 2021-01-01 RX ADMIN — INSULIN LISPRO 2 UNITS: 100 INJECTION, SOLUTION INTRAVENOUS; SUBCUTANEOUS at 18:56

## 2021-01-01 RX ADMIN — THERA TABS 1 TABLET: TAB at 08:50

## 2021-01-01 RX ADMIN — THIAMINE HYDROCHLORIDE 200 MG: 100 INJECTION, SOLUTION INTRAMUSCULAR; INTRAVENOUS at 11:38

## 2021-01-01 RX ADMIN — LORAZEPAM 2 MG: 2 INJECTION INTRAMUSCULAR; INTRAVENOUS at 02:06

## 2021-01-01 RX ADMIN — IPRATROPIUM BROMIDE AND ALBUTEROL SULFATE 3 ML: .5; 2.5 SOLUTION RESPIRATORY (INHALATION) at 00:22

## 2021-01-01 RX ADMIN — PIPERACILLIN AND TAZOBACTAM 4.5 G: 4; .5 INJECTION, POWDER, FOR SOLUTION INTRAVENOUS at 04:35

## 2021-01-01 RX ADMIN — LORAZEPAM 2 MG: 2 INJECTION INTRAMUSCULAR; INTRAVENOUS at 09:07

## 2021-01-01 RX ADMIN — Medication 10 ML: at 05:29

## 2021-01-01 RX ADMIN — IPRATROPIUM BROMIDE AND ALBUTEROL SULFATE 3 ML: .5; 3 SOLUTION RESPIRATORY (INHALATION) at 00:36

## 2021-01-01 RX ADMIN — FAMOTIDINE 20 MG: 10 INJECTION INTRAVENOUS at 11:24

## 2021-01-01 RX ADMIN — ASPIRIN 81 MG CHEWABLE TABLET 81 MG: 81 TABLET CHEWABLE at 09:23

## 2021-01-01 RX ADMIN — PIPERACILLIN AND TAZOBACTAM 3.38 G: 3; .375 INJECTION, POWDER, LYOPHILIZED, FOR SOLUTION INTRAVENOUS at 19:20

## 2021-01-01 RX ADMIN — DEXTROSE MONOHYDRATE 6 MCG/MIN: 50 INJECTION, SOLUTION INTRAVENOUS at 07:56

## 2021-01-01 RX ADMIN — ASPIRIN 81 MG CHEWABLE TABLET 81 MG: 81 TABLET CHEWABLE at 08:18

## 2021-01-01 RX ADMIN — SODIUM CHLORIDE 200 MG: 9 INJECTION, SOLUTION INTRAVENOUS at 21:56

## 2021-01-01 RX ADMIN — ENOXAPARIN SODIUM 40 MG: 100 INJECTION SUBCUTANEOUS at 23:07

## 2021-01-01 RX ADMIN — IPRATROPIUM BROMIDE AND ALBUTEROL SULFATE 3 ML: .5; 2.5 SOLUTION RESPIRATORY (INHALATION) at 13:07

## 2021-01-01 RX ADMIN — IPRATROPIUM BROMIDE AND ALBUTEROL SULFATE 3 ML: .5; 2.5 SOLUTION RESPIRATORY (INHALATION) at 03:31

## 2021-01-01 RX ADMIN — Medication 10 ML: at 05:50

## 2021-01-01 RX ADMIN — ACETAMINOPHEN 650 MG: 325 TABLET ORAL at 19:55

## 2021-01-01 RX ADMIN — PIPERACILLIN AND TAZOBACTAM 4.5 G: 4; .5 INJECTION, POWDER, FOR SOLUTION INTRAVENOUS at 17:24

## 2021-01-01 RX ADMIN — IPRATROPIUM BROMIDE AND ALBUTEROL SULFATE 3 ML: .5; 2.5 SOLUTION RESPIRATORY (INHALATION) at 14:58

## 2021-01-01 RX ADMIN — BACITRACIN ZINC AND POLYMYXIN B SULFATE: 500; 10000 OINTMENT TOPICAL at 18:32

## 2021-01-01 RX ADMIN — MIDAZOLAM 2 MG: 1 INJECTION INTRAMUSCULAR; INTRAVENOUS at 05:09

## 2021-01-01 RX ADMIN — FENTANYL CITRATE 175 MCG/HR: 0.05 INJECTION, SOLUTION INTRAMUSCULAR; INTRAVENOUS at 11:18

## 2021-01-01 RX ADMIN — POLYETHYLENE GLYCOL 3350 17 G: 17 POWDER, FOR SOLUTION ORAL at 08:37

## 2021-01-01 RX ADMIN — IPRATROPIUM BROMIDE AND ALBUTEROL SULFATE 3 ML: .5; 2.5 SOLUTION RESPIRATORY (INHALATION) at 19:24

## 2021-01-01 RX ADMIN — FAMOTIDINE 20 MG: 10 INJECTION INTRAVENOUS at 09:07

## 2021-01-01 RX ADMIN — SODIUM CHLORIDE SOLN NEBU 3% 4 ML: 3 NEBU SOLN at 15:20

## 2021-01-01 RX ADMIN — ACETAMINOPHEN 650 MG: 325 TABLET ORAL at 05:28

## 2021-01-01 RX ADMIN — ASPIRIN 81 MG CHEWABLE TABLET 81 MG: 81 TABLET CHEWABLE at 09:52

## 2021-01-01 RX ADMIN — WATER 1 G: 1 INJECTION INTRAMUSCULAR; INTRAVENOUS; SUBCUTANEOUS at 15:24

## 2021-01-01 RX ADMIN — SODIUM CHLORIDE SOLN NEBU 3% 4 ML: 3 NEBU SOLN at 12:21

## 2021-01-01 RX ADMIN — DEXTROSE MONOHYDRATE AND SODIUM CHLORIDE 75 ML/HR: 5; .9 INJECTION, SOLUTION INTRAVENOUS at 23:19

## 2021-01-01 RX ADMIN — PIPERACILLIN AND TAZOBACTAM 3.38 G: 3; .375 INJECTION, POWDER, LYOPHILIZED, FOR SOLUTION INTRAVENOUS at 09:07

## 2021-01-01 RX ADMIN — VANCOMYCIN HYDROCHLORIDE 1250 MG: 10 INJECTION, POWDER, LYOPHILIZED, FOR SOLUTION INTRAVENOUS at 13:52

## 2021-01-01 RX ADMIN — POTASSIUM CHLORIDE 10 MEQ: 14.9 INJECTION, SOLUTION INTRAVENOUS at 12:15

## 2021-01-01 RX ADMIN — PIPERACILLIN AND TAZOBACTAM 3.38 G: 3; .375 INJECTION, POWDER, LYOPHILIZED, FOR SOLUTION INTRAVENOUS at 19:43

## 2021-01-01 RX ADMIN — ASPIRIN 81 MG CHEWABLE TABLET 81 MG: 81 TABLET CHEWABLE at 09:33

## 2021-01-01 RX ADMIN — PIPERACILLIN AND TAZOBACTAM 3.38 G: 3; .375 INJECTION, POWDER, LYOPHILIZED, FOR SOLUTION INTRAVENOUS at 11:52

## 2021-01-01 RX ADMIN — THERA TABS 1 TABLET: TAB at 09:52

## 2021-01-01 RX ADMIN — FENTANYL CITRATE 200 MCG/HR: 0.05 INJECTION, SOLUTION INTRAMUSCULAR; INTRAVENOUS at 01:09

## 2021-01-01 RX ADMIN — PIPERACILLIN AND TAZOBACTAM 3.38 G: 3; .375 INJECTION, POWDER, LYOPHILIZED, FOR SOLUTION INTRAVENOUS at 04:50

## 2021-01-01 RX ADMIN — IPRATROPIUM BROMIDE AND ALBUTEROL SULFATE 3 ML: .5; 2.5 SOLUTION RESPIRATORY (INHALATION) at 20:43

## 2021-01-01 RX ADMIN — INSULIN LISPRO 2 UNITS: 100 INJECTION, SOLUTION INTRAVENOUS; SUBCUTANEOUS at 13:19

## 2021-01-01 RX ADMIN — Medication 2 TABLET: at 17:18

## 2021-01-01 RX ADMIN — IPRATROPIUM BROMIDE AND ALBUTEROL SULFATE 3 ML: .5; 2.5 SOLUTION RESPIRATORY (INHALATION) at 20:16

## 2021-01-01 RX ADMIN — POLYETHYLENE GLYCOL 3350 17 G: 17 POWDER, FOR SOLUTION ORAL at 09:22

## 2021-01-01 RX ADMIN — DEXTROSE MONOHYDRATE 12 MCG/MIN: 50 INJECTION, SOLUTION INTRAVENOUS at 06:49

## 2021-01-01 RX ADMIN — FUROSEMIDE 20 MG: 20 INJECTION, SOLUTION INTRAMUSCULAR; INTRAVENOUS at 13:52

## 2021-01-01 RX ADMIN — DEXTROSE MONOHYDRATE 10 MCG/MIN: 50 INJECTION, SOLUTION INTRAVENOUS at 01:06

## 2021-01-01 RX ADMIN — IPRATROPIUM BROMIDE AND ALBUTEROL SULFATE 3 ML: .5; 2.5 SOLUTION RESPIRATORY (INHALATION) at 19:18

## 2021-01-01 RX ADMIN — Medication 1 PACKET: at 08:28

## 2021-01-01 RX ADMIN — POTASSIUM & SODIUM PHOSPHATES POWDER PACK 280-160-250 MG 1 PACKET: 280-160-250 PACK at 12:57

## 2021-01-01 RX ADMIN — Medication 2 TABLET: at 18:44

## 2021-01-01 RX ADMIN — NOREPINEPHRINE BITARTRATE 8 MCG/MIN: 1 SOLUTION INTRAVENOUS at 06:48

## 2021-01-01 RX ADMIN — IPRATROPIUM BROMIDE AND ALBUTEROL SULFATE 3 ML: .5; 2.5 SOLUTION RESPIRATORY (INHALATION) at 12:12

## 2021-01-01 RX ADMIN — SODIUM CHLORIDE SOLN NEBU 3% 4 ML: 3 NEBU SOLN at 19:18

## 2021-01-01 RX ADMIN — Medication 10 ML: at 06:35

## 2021-01-01 RX ADMIN — ASPIRIN 81 MG CHEWABLE TABLET 81 MG: 81 TABLET CHEWABLE at 10:35

## 2021-01-01 RX ADMIN — THERA TABS 1 TABLET: TAB at 09:11

## 2021-01-01 RX ADMIN — DOCUSATE SODIUM 50 MG AND SENNOSIDES 8.6 MG 1 TABLET: 8.6; 5 TABLET, FILM COATED ORAL at 09:19

## 2021-01-01 RX ADMIN — PIPERACILLIN AND TAZOBACTAM 3.38 G: 3; .375 INJECTION, POWDER, LYOPHILIZED, FOR SOLUTION INTRAVENOUS at 20:41

## 2021-01-01 RX ADMIN — Medication 1000 UNITS: at 09:36

## 2021-01-01 RX ADMIN — THERA TABS 1 TABLET: TAB at 08:37

## 2021-01-01 RX ADMIN — ACETAMINOPHEN 650 MG: 325 TABLET ORAL at 08:59

## 2021-01-01 RX ADMIN — Medication 200 MG: at 09:05

## 2021-01-01 RX ADMIN — ASPIRIN 81 MG CHEWABLE TABLET 81 MG: 81 TABLET CHEWABLE at 10:43

## 2021-01-01 RX ADMIN — LORAZEPAM 2 MG: 2 INJECTION INTRAMUSCULAR; INTRAVENOUS at 20:30

## 2021-01-01 RX ADMIN — Medication 10 ML: at 22:49

## 2021-01-01 RX ADMIN — ASPIRIN 81 MG CHEWABLE TABLET 81 MG: 81 TABLET CHEWABLE at 09:07

## 2021-01-01 RX ADMIN — IPRATROPIUM BROMIDE AND ALBUTEROL SULFATE 3 ML: .5; 2.5 SOLUTION RESPIRATORY (INHALATION) at 12:00

## 2021-01-01 RX ADMIN — POLYETHYLENE GLYCOL 3350 17 G: 17 POWDER, FOR SOLUTION ORAL at 10:14

## 2021-01-01 RX ADMIN — Medication 10 ML: at 18:31

## 2021-01-01 RX ADMIN — IPRATROPIUM BROMIDE AND ALBUTEROL SULFATE 3 ML: .5; 2.5 SOLUTION RESPIRATORY (INHALATION) at 09:05

## 2021-01-01 RX ADMIN — VANCOMYCIN HYDROCHLORIDE 750 MG: 750 INJECTION, POWDER, LYOPHILIZED, FOR SOLUTION INTRAVENOUS at 09:29

## 2021-01-01 RX ADMIN — VANCOMYCIN HYDROCHLORIDE 1250 MG: 10 INJECTION, POWDER, LYOPHILIZED, FOR SOLUTION INTRAVENOUS at 00:00

## 2021-01-01 RX ADMIN — IPRATROPIUM BROMIDE AND ALBUTEROL SULFATE 3 ML: .5; 3 SOLUTION RESPIRATORY (INHALATION) at 21:45

## 2021-01-01 RX ADMIN — PIPERACILLIN AND TAZOBACTAM 4.5 G: 4; .5 INJECTION, POWDER, FOR SOLUTION INTRAVENOUS at 23:22

## 2021-01-01 RX ADMIN — SODIUM CHLORIDE SOLN NEBU 3% 4 ML: 3 NEBU SOLN at 12:10

## 2021-01-01 RX ADMIN — IPRATROPIUM BROMIDE AND ALBUTEROL SULFATE 3 ML: .5; 2.5 SOLUTION RESPIRATORY (INHALATION) at 17:04

## 2021-01-01 RX ADMIN — Medication 2 TABLET: at 10:13

## 2021-01-01 RX ADMIN — POTASSIUM & SODIUM PHOSPHATES POWDER PACK 280-160-250 MG 1 PACKET: 280-160-250 PACK at 18:19

## 2021-01-01 RX ADMIN — SODIUM CHLORIDE SOLN NEBU 3% 4 ML: 3 NEBU SOLN at 11:52

## 2021-01-01 RX ADMIN — DEXTROSE MONOHYDRATE 10 MCG/MIN: 50 INJECTION, SOLUTION INTRAVENOUS at 23:30

## 2021-01-01 RX ADMIN — CHLORHEXIDINE GLUCONATE 0.12% ORAL RINSE 10 ML: 1.2 LIQUID ORAL at 21:24

## 2021-01-01 RX ADMIN — DEXTROSE MONOHYDRATE 2 MCG/MIN: 50 INJECTION, SOLUTION INTRAVENOUS at 13:35

## 2021-01-01 RX ADMIN — IPRATROPIUM BROMIDE AND ALBUTEROL SULFATE 3 ML: .5; 2.5 SOLUTION RESPIRATORY (INHALATION) at 00:35

## 2021-01-01 RX ADMIN — DOCUSATE SODIUM 50 MG AND SENNOSIDES 8.6 MG 1 TABLET: 8.6; 5 TABLET, FILM COATED ORAL at 09:12

## 2021-01-01 RX ADMIN — LORAZEPAM 2 MG: 2 INJECTION INTRAMUSCULAR; INTRAVENOUS at 03:26

## 2021-01-01 RX ADMIN — IPRATROPIUM BROMIDE AND ALBUTEROL SULFATE 3 ML: .5; 3 SOLUTION RESPIRATORY (INHALATION) at 12:57

## 2021-01-01 RX ADMIN — IPRATROPIUM BROMIDE AND ALBUTEROL SULFATE 3 ML: .5; 2.5 SOLUTION RESPIRATORY (INHALATION) at 16:49

## 2021-01-01 RX ADMIN — SODIUM CHLORIDE SOLN NEBU 3% 4 ML: 3 NEBU SOLN at 08:00

## 2021-01-01 RX ADMIN — Medication 2 TABLET: at 10:09

## 2021-01-01 RX ADMIN — PIPERACILLIN AND TAZOBACTAM 3.38 G: 3; .375 INJECTION, POWDER, LYOPHILIZED, FOR SOLUTION INTRAVENOUS at 16:51

## 2021-01-01 RX ADMIN — Medication 2 TABLET: at 17:53

## 2021-01-01 RX ADMIN — GUAIFENESIN 600 MG: 600 TABLET, EXTENDED RELEASE ORAL at 09:36

## 2021-01-01 RX ADMIN — IPRATROPIUM BROMIDE AND ALBUTEROL SULFATE 3 ML: .5; 3 SOLUTION RESPIRATORY (INHALATION) at 16:57

## 2021-01-01 RX ADMIN — THERA TABS 1 TABLET: TAB at 09:36

## 2021-01-01 RX ADMIN — PIPERACILLIN AND TAZOBACTAM 4.5 G: 4; .5 INJECTION, POWDER, FOR SOLUTION INTRAVENOUS at 11:20

## 2021-01-01 RX ADMIN — Medication 2 TABLET: at 09:23

## 2021-01-01 RX ADMIN — SODIUM CHLORIDE SOLN NEBU 3% 4 ML: 3 NEBU SOLN at 08:10

## 2021-01-01 RX ADMIN — Medication 200 MG: at 09:27

## 2021-01-01 RX ADMIN — IPRATROPIUM BROMIDE AND ALBUTEROL SULFATE 3 ML: .5; 2.5 SOLUTION RESPIRATORY (INHALATION) at 23:38

## 2021-01-01 RX ADMIN — FENTANYL CITRATE 100 MCG/HR: 0.05 INJECTION, SOLUTION INTRAMUSCULAR; INTRAVENOUS at 18:45

## 2021-01-01 RX ADMIN — WATER 1 G: 1 INJECTION INTRAMUSCULAR; INTRAVENOUS; SUBCUTANEOUS at 08:18

## 2021-01-01 RX ADMIN — WATER 500 MG: 1 INJECTION INTRAMUSCULAR; INTRAVENOUS; SUBCUTANEOUS at 15:34

## 2021-01-01 RX ADMIN — ENOXAPARIN SODIUM 40 MG: 100 INJECTION SUBCUTANEOUS at 21:31

## 2021-01-01 RX ADMIN — IPRATROPIUM BROMIDE AND ALBUTEROL SULFATE 3 ML: .5; 2.5 SOLUTION RESPIRATORY (INHALATION) at 19:34

## 2021-01-01 RX ADMIN — IPRATROPIUM BROMIDE AND ALBUTEROL SULFATE 3 ML: .5; 2.5 SOLUTION RESPIRATORY (INHALATION) at 15:28

## 2021-01-01 RX ADMIN — OMEGA-3-ACID ETHYL ESTERS 1000 MG: 1 CAPSULE, LIQUID FILLED ORAL at 08:50

## 2021-01-01 RX ADMIN — PIPERACILLIN AND TAZOBACTAM 4.5 G: 4; .5 INJECTION, POWDER, FOR SOLUTION INTRAVENOUS at 22:48

## 2021-01-01 RX ADMIN — CHLORHEXIDINE GLUCONATE 0.12% ORAL RINSE 10 ML: 1.2 LIQUID ORAL at 20:00

## 2021-01-01 RX ADMIN — PIPERACILLIN AND TAZOBACTAM 4.5 G: 4; .5 INJECTION, POWDER, FOR SOLUTION INTRAVENOUS at 23:23

## 2021-01-01 RX ADMIN — POLYETHYLENE GLYCOL 3350 17 G: 17 POWDER, FOR SOLUTION ORAL at 18:44

## 2021-01-01 RX ADMIN — FAMOTIDINE 20 MG: 10 INJECTION INTRAVENOUS at 20:08

## 2021-01-01 RX ADMIN — Medication 500 MG: at 10:35

## 2021-01-01 RX ADMIN — SODIUM CHLORIDE SOLN NEBU 3% 4 ML: 3 NEBU SOLN at 20:31

## 2021-01-01 RX ADMIN — POTASSIUM & SODIUM PHOSPHATES POWDER PACK 280-160-250 MG 1 PACKET: 280-160-250 PACK at 08:50

## 2021-01-01 RX ADMIN — PIPERACILLIN AND TAZOBACTAM 4.5 G: 4; .5 INJECTION, POWDER, FOR SOLUTION INTRAVENOUS at 18:21

## 2021-01-01 RX ADMIN — INSULIN LISPRO 2 UNITS: 100 INJECTION, SOLUTION INTRAVENOUS; SUBCUTANEOUS at 18:49

## 2021-01-01 RX ADMIN — SODIUM CHLORIDE, SODIUM LACTATE, POTASSIUM CHLORIDE, AND CALCIUM CHLORIDE 500 ML: 600; 310; 30; 20 INJECTION, SOLUTION INTRAVENOUS at 11:47

## 2021-01-01 RX ADMIN — Medication 10 ML: at 16:57

## 2021-01-01 RX ADMIN — FENTANYL CITRATE 150 MCG/HR: 0.05 INJECTION, SOLUTION INTRAMUSCULAR; INTRAVENOUS at 17:41

## 2021-01-01 RX ADMIN — IPRATROPIUM BROMIDE AND ALBUTEROL SULFATE 3 ML: .5; 2.5 SOLUTION RESPIRATORY (INHALATION) at 03:12

## 2021-01-01 RX ADMIN — Medication 2 MCG/MIN: at 09:03

## 2021-01-01 RX ADMIN — SODIUM PHOSPHATE, MONOBASIC, MONOHYDRATE: 276; 142 INJECTION, SOLUTION INTRAVENOUS at 04:26

## 2021-01-01 RX ADMIN — ACETAMINOPHEN 650 MG: 325 TABLET ORAL at 05:20

## 2021-01-01 RX ADMIN — PIPERACILLIN AND TAZOBACTAM 4.5 G: 4; .5 INJECTION, POWDER, FOR SOLUTION INTRAVENOUS at 22:26

## 2021-01-01 RX ADMIN — Medication 8 MCG/MIN: at 00:36

## 2021-01-01 RX ADMIN — FENTANYL CITRATE 200 MCG/HR: 0.05 INJECTION, SOLUTION INTRAMUSCULAR; INTRAVENOUS at 06:49

## 2021-01-01 RX ADMIN — POLYETHYLENE GLYCOL 3350 17 G: 17 POWDER, FOR SOLUTION ORAL at 18:12

## 2021-01-01 RX ADMIN — SODIUM CHLORIDE SOLN NEBU 3% 4 ML: 3 NEBU SOLN at 08:32

## 2021-01-01 RX ADMIN — FUROSEMIDE 20 MG: 20 INJECTION, SOLUTION INTRAMUSCULAR; INTRAVENOUS at 11:18

## 2021-01-01 RX ADMIN — THIAMINE HYDROCHLORIDE 200 MG: 100 INJECTION, SOLUTION INTRAMUSCULAR; INTRAVENOUS at 17:03

## 2021-01-01 RX ADMIN — Medication 1000 UNITS: at 10:11

## 2021-01-01 RX ADMIN — ALBUMIN (HUMAN) 25 G: 12.5 INJECTION, SOLUTION INTRAVENOUS at 14:16

## 2021-01-01 RX ADMIN — PIPERACILLIN AND TAZOBACTAM 3.38 G: 3; .375 INJECTION, POWDER, LYOPHILIZED, FOR SOLUTION INTRAVENOUS at 05:16

## 2021-01-01 RX ADMIN — OMEGA-3-ACID ETHYL ESTERS 1000 MG: 1 CAPSULE, LIQUID FILLED ORAL at 08:00

## 2021-01-01 RX ADMIN — ENOXAPARIN SODIUM 40 MG: 100 INJECTION SUBCUTANEOUS at 21:08

## 2021-01-01 RX ADMIN — LORAZEPAM 2 MG: 2 INJECTION INTRAMUSCULAR; INTRAVENOUS at 20:50

## 2021-01-01 RX ADMIN — MORPHINE SULFATE 1 MG: 2 INJECTION, SOLUTION INTRAMUSCULAR; INTRAVENOUS at 00:24

## 2021-01-01 RX ADMIN — FENTANYL CITRATE 50 MCG/HR: 0.05 INJECTION, SOLUTION INTRAMUSCULAR; INTRAVENOUS at 16:36

## 2021-01-01 RX ADMIN — CHLORHEXIDINE GLUCONATE 0.12% ORAL RINSE 10 ML: 1.2 LIQUID ORAL at 20:18

## 2021-01-01 RX ADMIN — BACITRACIN ZINC AND POLYMYXIN B SULFATE: 500; 10000 OINTMENT TOPICAL at 18:30

## 2021-01-01 RX ADMIN — POTASSIUM & SODIUM PHOSPHATES POWDER PACK 280-160-250 MG 1 PACKET: 280-160-250 PACK at 17:42

## 2021-01-01 RX ADMIN — CHLORHEXIDINE GLUCONATE 0.12% ORAL RINSE 10 ML: 1.2 LIQUID ORAL at 00:10

## 2021-01-01 RX ADMIN — WATER 1 G: 1 INJECTION INTRAMUSCULAR; INTRAVENOUS; SUBCUTANEOUS at 09:11

## 2021-01-01 RX ADMIN — IPRATROPIUM BROMIDE AND ALBUTEROL SULFATE 3 ML: .5; 2.5 SOLUTION RESPIRATORY (INHALATION) at 03:28

## 2021-01-01 RX ADMIN — FLUCONAZOLE 400 MG: 400 INJECTION, SOLUTION INTRAVENOUS at 16:08

## 2021-01-01 RX ADMIN — FAMOTIDINE 20 MG: 10 INJECTION INTRAVENOUS at 21:06

## 2021-01-01 RX ADMIN — PROPOFOL 50 MG: 10 INJECTION, EMULSION INTRAVENOUS at 00:20

## 2021-01-01 RX ADMIN — BUMETANIDE 0.5 MG: 0.25 INJECTION INTRAMUSCULAR; INTRAVENOUS at 10:12

## 2021-01-01 RX ADMIN — Medication 200 MG: at 10:12

## 2021-01-01 RX ADMIN — POLYETHYLENE GLYCOL 3350 17 G: 17 POWDER, FOR SOLUTION ORAL at 09:09

## 2021-01-01 RX ADMIN — SODIUM CHLORIDE SOLN NEBU 3% 4 ML: 3 NEBU SOLN at 22:39

## 2021-01-01 RX ADMIN — SODIUM CHLORIDE SOLN NEBU 3% 4 ML: 3 NEBU SOLN at 15:55

## 2021-01-01 RX ADMIN — THERA TABS 1 TABLET: TAB at 09:21

## 2021-01-01 RX ADMIN — INSULIN LISPRO 2 UNITS: 100 INJECTION, SOLUTION INTRAVENOUS; SUBCUTANEOUS at 14:07

## 2021-01-01 RX ADMIN — INSULIN LISPRO 2 UNITS: 100 INJECTION, SOLUTION INTRAVENOUS; SUBCUTANEOUS at 18:42

## 2021-01-01 RX ADMIN — GUAIFENESIN 600 MG: 600 TABLET, EXTENDED RELEASE ORAL at 10:21

## 2021-01-01 RX ADMIN — ASPIRIN 81 MG CHEWABLE TABLET 81 MG: 81 TABLET CHEWABLE at 09:21

## 2021-01-01 RX ADMIN — WATER 1 G: 1 INJECTION INTRAMUSCULAR; INTRAVENOUS; SUBCUTANEOUS at 00:40

## 2021-01-01 RX ADMIN — OMEGA-3-ACID ETHYL ESTERS 1000 MG: 1 CAPSULE, LIQUID FILLED ORAL at 09:28

## 2021-01-01 RX ADMIN — LORAZEPAM 2 MG: 2 INJECTION INTRAMUSCULAR; INTRAVENOUS at 03:50

## 2021-01-01 RX ADMIN — INSULIN LISPRO 2 UNITS: 100 INJECTION, SOLUTION INTRAVENOUS; SUBCUTANEOUS at 12:57

## 2021-01-01 RX ADMIN — POLYETHYLENE GLYCOL 3350 17 G: 17 POWDER, FOR SOLUTION ORAL at 18:24

## 2021-01-01 RX ADMIN — ACETAMINOPHEN 650 MG: 325 TABLET ORAL at 15:33

## 2021-01-01 RX ADMIN — Medication 30 ML: at 09:00

## 2021-01-01 RX ADMIN — CHLORHEXIDINE GLUCONATE 0.12% ORAL RINSE 10 ML: 1.2 LIQUID ORAL at 08:37

## 2021-01-01 RX ADMIN — ASPIRIN 81 MG CHEWABLE TABLET 81 MG: 81 TABLET CHEWABLE at 10:21

## 2021-01-01 RX ADMIN — IPRATROPIUM BROMIDE AND ALBUTEROL SULFATE 3 ML: .5; 2.5 SOLUTION RESPIRATORY (INHALATION) at 07:31

## 2021-01-01 RX ADMIN — Medication 7 MCG/MIN: at 01:19

## 2021-01-01 RX ADMIN — Medication 200 MG: at 08:19

## 2021-01-01 RX ADMIN — OMEGA-3-ACID ETHYL ESTERS 1000 MG: 1 CAPSULE, LIQUID FILLED ORAL at 09:19

## 2021-01-01 RX ADMIN — Medication 10 ML: at 21:07

## 2021-01-01 RX ADMIN — PIPERACILLIN AND TAZOBACTAM 3.38 G: 3; .375 INJECTION, POWDER, LYOPHILIZED, FOR SOLUTION INTRAVENOUS at 04:47

## 2021-01-01 RX ADMIN — FENTANYL CITRATE 200 MCG/HR: 0.05 INJECTION, SOLUTION INTRAMUSCULAR; INTRAVENOUS at 06:53

## 2021-01-01 RX ADMIN — THERA TABS 1 TABLET: TAB at 10:13

## 2021-01-01 RX ADMIN — ALBUTEROL SULFATE 2.5 MG: 2.5 SOLUTION RESPIRATORY (INHALATION) at 09:32

## 2021-01-01 RX ADMIN — ACETAMINOPHEN 650 MG: 325 TABLET ORAL at 04:59

## 2021-01-01 RX ADMIN — Medication 1000 UNITS: at 09:05

## 2021-01-01 RX ADMIN — BACITRACIN ZINC AND POLYMYXIN B SULFATE: 500; 10000 OINTMENT TOPICAL at 09:14

## 2021-01-01 RX ADMIN — IPRATROPIUM BROMIDE AND ALBUTEROL SULFATE 3 ML: .5; 2.5 SOLUTION RESPIRATORY (INHALATION) at 10:09

## 2021-01-01 RX ADMIN — FENTANYL CITRATE 200 MCG/HR: 0.05 INJECTION, SOLUTION INTRAMUSCULAR; INTRAVENOUS at 21:51

## 2021-01-01 RX ADMIN — FAMOTIDINE 20 MG: 10 INJECTION INTRAVENOUS at 08:18

## 2021-01-01 RX ADMIN — BACITRACIN ZINC AND POLYMYXIN B SULFATE: 500; 10000 OINTMENT TOPICAL at 09:20

## 2021-01-01 RX ADMIN — Medication 10 ML: at 21:06

## 2021-01-01 RX ADMIN — ACETAMINOPHEN 650 MG: 325 TABLET ORAL at 20:02

## 2021-01-01 RX ADMIN — PIPERACILLIN AND TAZOBACTAM 3.38 G: 3; .375 INJECTION, POWDER, LYOPHILIZED, FOR SOLUTION INTRAVENOUS at 10:30

## 2021-01-01 RX ADMIN — SODIUM CHLORIDE SOLN NEBU 3% 4 ML: 3 NEBU SOLN at 09:57

## 2021-01-01 RX ADMIN — PIPERACILLIN AND TAZOBACTAM 4.5 G: 4; .5 INJECTION, POWDER, FOR SOLUTION INTRAVENOUS at 04:08

## 2021-01-01 RX ADMIN — IPRATROPIUM BROMIDE AND ALBUTEROL SULFATE 3 ML: .5; 2.5 SOLUTION RESPIRATORY (INHALATION) at 20:20

## 2021-01-01 RX ADMIN — VANCOMYCIN HYDROCHLORIDE 750 MG: 750 INJECTION, POWDER, LYOPHILIZED, FOR SOLUTION INTRAVENOUS at 00:35

## 2021-01-01 RX ADMIN — LEVOFLOXACIN 750 MG: 5 INJECTION, SOLUTION INTRAVENOUS at 17:17

## 2021-01-01 RX ADMIN — INSULIN LISPRO 2 UNITS: 100 INJECTION, SOLUTION INTRAVENOUS; SUBCUTANEOUS at 18:21

## 2021-01-01 RX ADMIN — Medication 1000 UNITS: at 09:11

## 2021-01-01 RX ADMIN — ENOXAPARIN SODIUM 40 MG: 100 INJECTION SUBCUTANEOUS at 21:05

## 2021-01-01 RX ADMIN — ENOXAPARIN SODIUM 40 MG: 100 INJECTION SUBCUTANEOUS at 23:09

## 2021-01-01 RX ADMIN — Medication 10 ML: at 21:32

## 2021-01-01 RX ADMIN — CHLORHEXIDINE GLUCONATE 0.12% ORAL RINSE 10 ML: 1.2 LIQUID ORAL at 21:05

## 2021-01-01 RX ADMIN — VANCOMYCIN HYDROCHLORIDE 1250 MG: 10 INJECTION, POWDER, LYOPHILIZED, FOR SOLUTION INTRAVENOUS at 23:19

## 2021-01-01 RX ADMIN — IPRATROPIUM BROMIDE AND ALBUTEROL SULFATE 3 ML: .5; 2.5 SOLUTION RESPIRATORY (INHALATION) at 13:52

## 2021-01-01 RX ADMIN — WATER 1 G: 1 INJECTION INTRAMUSCULAR; INTRAVENOUS; SUBCUTANEOUS at 23:47

## 2021-01-01 RX ADMIN — Medication 10 ML: at 16:54

## 2021-01-01 RX ADMIN — ASPIRIN 81 MG CHEWABLE TABLET 81 MG: 81 TABLET CHEWABLE at 09:12

## 2021-01-01 RX ADMIN — FENTANYL CITRATE 150 MCG/HR: 0.05 INJECTION, SOLUTION INTRAMUSCULAR; INTRAVENOUS at 23:23

## 2021-01-01 RX ADMIN — ACETAMINOPHEN 650 MG: 325 TABLET ORAL at 10:00

## 2021-01-01 RX ADMIN — POLYETHYLENE GLYCOL 3350 17 G: 17 POWDER, FOR SOLUTION ORAL at 18:21

## 2021-01-01 RX ADMIN — IPRATROPIUM BROMIDE AND ALBUTEROL SULFATE 3 ML: .5; 2.5 SOLUTION RESPIRATORY (INHALATION) at 15:36

## 2021-01-01 RX ADMIN — IPRATROPIUM BROMIDE AND ALBUTEROL SULFATE 3 ML: .5; 2.5 SOLUTION RESPIRATORY (INHALATION) at 08:00

## 2021-01-01 RX ADMIN — Medication 10 ML: at 06:07

## 2021-01-01 RX ADMIN — FENTANYL CITRATE 200 MCG/HR: 0.05 INJECTION, SOLUTION INTRAMUSCULAR; INTRAVENOUS at 07:24

## 2021-01-01 RX ADMIN — Medication 10 ML: at 04:39

## 2021-01-01 RX ADMIN — ASPIRIN 81 MG CHEWABLE TABLET 81 MG: 81 TABLET CHEWABLE at 09:32

## 2021-01-01 RX ADMIN — VANCOMYCIN HYDROCHLORIDE 1000 MG: 1 INJECTION, POWDER, LYOPHILIZED, FOR SOLUTION INTRAVENOUS at 05:17

## 2021-01-01 RX ADMIN — Medication 8 MCG/MIN: at 11:52

## 2021-01-01 RX ADMIN — Medication 200 MG: at 09:48

## 2021-01-01 RX ADMIN — FENTANYL CITRATE 200 MCG/HR: 0.05 INJECTION, SOLUTION INTRAMUSCULAR; INTRAVENOUS at 17:55

## 2021-01-01 RX ADMIN — GUAIFENESIN 600 MG: 600 TABLET, EXTENDED RELEASE ORAL at 10:34

## 2021-01-01 RX ADMIN — SODIUM CHLORIDE SOLN NEBU 3% 4 ML: 3 NEBU SOLN at 07:33

## 2021-01-01 RX ADMIN — ALBUTEROL SULFATE 2.5 MG: 2.5 SOLUTION RESPIRATORY (INHALATION) at 22:39

## 2021-01-01 RX ADMIN — SODIUM CHLORIDE SOLN NEBU 3% 4 ML: 3 NEBU SOLN at 15:11

## 2021-01-01 RX ADMIN — FAMOTIDINE 20 MG: 10 INJECTION INTRAVENOUS at 20:59

## 2021-01-01 RX ADMIN — ENOXAPARIN SODIUM 40 MG: 100 INJECTION SUBCUTANEOUS at 22:00

## 2021-01-01 RX ADMIN — IPRATROPIUM BROMIDE AND ALBUTEROL SULFATE 3 ML: .5; 2.5 SOLUTION RESPIRATORY (INHALATION) at 01:12

## 2021-01-01 RX ADMIN — Medication 10 ML: at 14:40

## 2021-01-01 RX ADMIN — FAMOTIDINE 20 MG: 10 INJECTION INTRAVENOUS at 09:45

## 2021-01-01 RX ADMIN — MIDAZOLAM 2 MG: 1 INJECTION INTRAMUSCULAR; INTRAVENOUS at 16:11

## 2021-01-01 RX ADMIN — ENOXAPARIN SODIUM 40 MG: 100 INJECTION SUBCUTANEOUS at 22:25

## 2021-01-01 RX ADMIN — SODIUM CHLORIDE SOLN NEBU 3% 4 ML: 3 NEBU SOLN at 12:57

## 2021-01-01 RX ADMIN — ENOXAPARIN SODIUM 40 MG: 100 INJECTION SUBCUTANEOUS at 22:24

## 2021-01-01 RX ADMIN — IPRATROPIUM BROMIDE AND ALBUTEROL SULFATE 3 ML: .5; 2.5 SOLUTION RESPIRATORY (INHALATION) at 20:42

## 2021-01-01 RX ADMIN — WATER 1 G: 1 INJECTION INTRAMUSCULAR; INTRAVENOUS; SUBCUTANEOUS at 09:18

## 2021-01-01 RX ADMIN — POTASSIUM & SODIUM PHOSPHATES POWDER PACK 280-160-250 MG 1 PACKET: 280-160-250 PACK at 12:13

## 2021-01-01 RX ADMIN — FENTANYL CITRATE 200 MCG/HR: 0.05 INJECTION, SOLUTION INTRAMUSCULAR; INTRAVENOUS at 02:52

## 2021-01-01 RX ADMIN — ENOXAPARIN SODIUM 40 MG: 100 INJECTION SUBCUTANEOUS at 21:11

## 2021-01-01 RX ADMIN — PIPERACILLIN AND TAZOBACTAM 4.5 G: 4; .5 INJECTION, POWDER, FOR SOLUTION INTRAVENOUS at 22:27

## 2021-01-01 RX ADMIN — FAMOTIDINE 20 MG: 10 INJECTION INTRAVENOUS at 10:14

## 2021-01-01 RX ADMIN — THERA TABS 1 TABLET: TAB at 10:21

## 2021-01-01 RX ADMIN — CHLORHEXIDINE GLUCONATE 0.12% ORAL RINSE 10 ML: 1.2 LIQUID ORAL at 20:07

## 2021-01-01 RX ADMIN — SODIUM CHLORIDE SOLN NEBU 3% 4 ML: 3 NEBU SOLN at 08:49

## 2021-01-01 RX ADMIN — MORPHINE SULFATE 1 MG: 2 INJECTION, SOLUTION INTRAMUSCULAR; INTRAVENOUS at 15:45

## 2021-01-01 RX ADMIN — WATER 500 MG: 1 INJECTION INTRAMUSCULAR; INTRAVENOUS; SUBCUTANEOUS at 18:23

## 2021-01-01 RX ADMIN — OMEGA-3-ACID ETHYL ESTERS 1000 MG: 1 CAPSULE, LIQUID FILLED ORAL at 08:37

## 2021-01-01 RX ADMIN — ACETAMINOPHEN 650 MG: 325 TABLET ORAL at 03:13

## 2021-01-01 RX ADMIN — IPRATROPIUM BROMIDE AND ALBUTEROL SULFATE 3 ML: .5; 2.5 SOLUTION RESPIRATORY (INHALATION) at 00:28

## 2021-01-01 RX ADMIN — FAMOTIDINE 20 MG: 10 INJECTION INTRAVENOUS at 09:52

## 2021-01-01 RX ADMIN — SODIUM PHOSPHATE, MONOBASIC, MONOHYDRATE AND SODIUM PHOSPHATE, DIBASIC, ANHYDROUS: 276; 142 INJECTION, SOLUTION INTRAVENOUS at 09:37

## 2021-01-01 RX ADMIN — IPRATROPIUM BROMIDE AND ALBUTEROL SULFATE 3 ML: .5; 2.5 SOLUTION RESPIRATORY (INHALATION) at 21:03

## 2021-01-01 RX ADMIN — Medication 200 MG: at 08:50

## 2021-01-01 RX ADMIN — FAMOTIDINE 20 MG: 10 INJECTION INTRAVENOUS at 08:10

## 2021-01-01 RX ADMIN — PIPERACILLIN AND TAZOBACTAM 4.5 G: 4; .5 INJECTION, POWDER, FOR SOLUTION INTRAVENOUS at 12:18

## 2021-01-01 RX ADMIN — IPRATROPIUM BROMIDE AND ALBUTEROL SULFATE 3 ML: .5; 2.5 SOLUTION RESPIRATORY (INHALATION) at 03:50

## 2021-01-01 RX ADMIN — Medication 2 TABLET: at 09:11

## 2021-01-01 RX ADMIN — FAMOTIDINE 20 MG: 10 INJECTION INTRAVENOUS at 22:00

## 2021-01-01 RX ADMIN — PIPERACILLIN AND TAZOBACTAM 3.38 G: 3; .375 INJECTION, POWDER, LYOPHILIZED, FOR SOLUTION INTRAVENOUS at 12:37

## 2021-01-01 RX ADMIN — PIPERACILLIN AND TAZOBACTAM 3.38 G: 3; .375 INJECTION, POWDER, LYOPHILIZED, FOR SOLUTION INTRAVENOUS at 16:37

## 2021-01-01 RX ADMIN — ACETAMINOPHEN 650 MG: 325 TABLET ORAL at 20:11

## 2021-01-01 RX ADMIN — FAMOTIDINE 20 MG: 10 INJECTION INTRAVENOUS at 09:05

## 2021-01-01 RX ADMIN — POTASSIUM CHLORIDE 10 MEQ: 14.9 INJECTION, SOLUTION INTRAVENOUS at 09:23

## 2021-01-01 RX ADMIN — FLUCONAZOLE 400 MG: 2 INJECTION, SOLUTION INTRAVENOUS at 20:00

## 2021-01-01 RX ADMIN — Medication 1000 UNITS: at 09:27

## 2021-01-01 RX ADMIN — MIDAZOLAM 2 MG: 1 INJECTION INTRAMUSCULAR; INTRAVENOUS at 15:41

## 2021-01-01 RX ADMIN — ENOXAPARIN SODIUM 40 MG: 100 INJECTION SUBCUTANEOUS at 21:46

## 2021-01-01 RX ADMIN — DOCUSATE SODIUM 50 MG AND SENNOSIDES 8.6 MG 1 TABLET: 8.6; 5 TABLET, FILM COATED ORAL at 09:11

## 2021-01-01 RX ADMIN — THERA TABS 1 TABLET: TAB at 09:15

## 2021-01-01 RX ADMIN — ASPIRIN 81 MG CHEWABLE TABLET 81 MG: 81 TABLET CHEWABLE at 09:19

## 2021-01-01 RX ADMIN — ENOXAPARIN SODIUM 40 MG: 100 INJECTION SUBCUTANEOUS at 21:07

## 2021-01-01 RX ADMIN — Medication 1000 UNITS: at 08:37

## 2021-01-01 RX ADMIN — SODIUM CHLORIDE SOLN NEBU 3% 4 ML: 3 NEBU SOLN at 20:16

## 2021-01-01 RX ADMIN — Medication 10 ML: at 22:29

## 2021-01-01 RX ADMIN — POTASSIUM & SODIUM PHOSPHATES POWDER PACK 280-160-250 MG 1 PACKET: 280-160-250 PACK at 10:34

## 2021-01-01 RX ADMIN — Medication 2 TABLET: at 09:12

## 2021-01-01 RX ADMIN — IPRATROPIUM BROMIDE AND ALBUTEROL SULFATE 3 ML: .5; 3 SOLUTION RESPIRATORY (INHALATION) at 04:57

## 2021-01-01 RX ADMIN — Medication 4 MCG/MIN: at 16:39

## 2021-01-01 RX ADMIN — FENTANYL CITRATE 200 MCG/HR: 0.05 INJECTION, SOLUTION INTRAMUSCULAR; INTRAVENOUS at 10:42

## 2021-01-01 RX ADMIN — PIPERACILLIN AND TAZOBACTAM 4.5 G: 4; .5 INJECTION, POWDER, FOR SOLUTION INTRAVENOUS at 18:30

## 2021-01-01 RX ADMIN — SODIUM CHLORIDE SOLN NEBU 3% 4 ML: 3 NEBU SOLN at 07:19

## 2021-01-01 RX ADMIN — MIDAZOLAM 2 MG: 1 INJECTION INTRAMUSCULAR; INTRAVENOUS at 20:10

## 2021-01-01 RX ADMIN — POTASSIUM & SODIUM PHOSPHATES POWDER PACK 280-160-250 MG 1 PACKET: 280-160-250 PACK at 12:18

## 2021-01-01 RX ADMIN — Medication 6 MCG/MIN: at 00:16

## 2021-01-01 RX ADMIN — SODIUM CHLORIDE SOLN NEBU 3% 4 ML: 3 NEBU SOLN at 20:45

## 2021-01-01 RX ADMIN — SODIUM CHLORIDE 1000 ML: 900 INJECTION, SOLUTION INTRAVENOUS at 20:46

## 2021-01-01 RX ADMIN — Medication 200 MG: at 09:19

## 2021-01-01 RX ADMIN — ENOXAPARIN SODIUM 40 MG: 100 INJECTION SUBCUTANEOUS at 21:06

## 2021-01-01 RX ADMIN — BACITRACIN ZINC AND POLYMYXIN B SULFATE: 500; 10000 OINTMENT TOPICAL at 19:09

## 2021-01-01 RX ADMIN — Medication 2 TABLET: at 18:24

## 2021-01-01 RX ADMIN — HYDROMORPHONE HYDROCHLORIDE 2 MG: 2 INJECTION, SOLUTION INTRAMUSCULAR; INTRAVENOUS; SUBCUTANEOUS at 20:17

## 2021-01-01 RX ADMIN — IPRATROPIUM BROMIDE AND ALBUTEROL SULFATE 3 ML: .5; 2.5 SOLUTION RESPIRATORY (INHALATION) at 12:21

## 2021-01-01 RX ADMIN — PIPERACILLIN AND TAZOBACTAM 4.5 G: 4; .5 INJECTION, POWDER, FOR SOLUTION INTRAVENOUS at 17:20

## 2021-01-01 RX ADMIN — FENTANYL CITRATE 200 MCG/HR: 0.05 INJECTION, SOLUTION INTRAMUSCULAR; INTRAVENOUS at 00:13

## 2021-01-01 RX ADMIN — IPRATROPIUM BROMIDE AND ALBUTEROL SULFATE 3 ML: .5; 3 SOLUTION RESPIRATORY (INHALATION) at 13:30

## 2021-01-01 RX ADMIN — SODIUM CHLORIDE SOLN NEBU 3% 4 ML: 3 NEBU SOLN at 09:04

## 2021-01-01 RX ADMIN — ASPIRIN 81 MG CHEWABLE TABLET 81 MG: 81 TABLET CHEWABLE at 09:05

## 2021-01-01 RX ADMIN — POTASSIUM BICARBONATE 40 MEQ: 782 TABLET, EFFERVESCENT ORAL at 21:09

## 2021-01-01 RX ADMIN — SODIUM CHLORIDE 75 ML/HR: 900 INJECTION, SOLUTION INTRAVENOUS at 17:25

## 2021-01-01 RX ADMIN — INSULIN LISPRO 2 UNITS: 100 INJECTION, SOLUTION INTRAVENOUS; SUBCUTANEOUS at 23:47

## 2021-01-01 RX ADMIN — Medication 10 ML: at 21:22

## 2021-01-01 RX ADMIN — SODIUM CHLORIDE SOLN NEBU 3% 4 ML: 3 NEBU SOLN at 21:45

## 2021-01-01 RX ADMIN — Medication 1000 UNITS: at 10:43

## 2021-01-01 RX ADMIN — IPRATROPIUM BROMIDE AND ALBUTEROL SULFATE 3 ML: .5; 2.5 SOLUTION RESPIRATORY (INHALATION) at 19:33

## 2021-01-01 RX ADMIN — Medication 10 ML: at 21:09

## 2021-02-18 NOTE — PROGRESS NOTES
Epi Palomino presents today for Chief Complaint Patient presents with  Lung Mass  
  follow up from 10/12/2020  Breathing Problem BROOKE, hypoxia  Other  
  pulmonary infiltrates, interstitial lung disease, allergic rhinitis  Bronchiectasis  Results PFT 2/15/2021, CT 1/14/2021, Echo 11/24/2020, barium swallow study 12/1/2020, labs 11/23/2020, COVID (-) 2/10/2021 Is someone accompanying this pt? Yes. Family member Is the patient using any DME equipment during OV? Yes. Wheelchair & walker 
 -DME Company N/A Depression Screening: 
3 most recent PHQ Screens 2/18/2021 Little interest or pleasure in doing things Not at all Feeling down, depressed, irritable, or hopeless Not at all Total Score PHQ 2 0 Learning Assessment: 
Learning Assessment 2/18/2021 PRIMARY LEARNER Patient PRIMARY LANGUAGE ENGLISH  
LEARNER PREFERENCE PRIMARY DEMONSTRATION  
  READING  
ANSWERED BY Indy Wade RELATIONSHIP OTHER Abuse Screening: 
Abuse Screening Questionnaire 2/18/2021 Do you ever feel afraid of your partner? Alden Case Are you in a relationship with someone who physically or mentally threatens you? Alden Case Is it safe for you to go home? Savanna Gómez Fall Risk Fall Risk Assessment, last 12 mths 2/18/2021 Able to walk? Yes Fall in past 12 months? 0 Do you feel unsteady? 1 Are you worried about falling 1 Is the gait abnormal? 1 Number of falls in past 12 months 0 Coordination of Care: 1. Have you been to the ER, urgent care clinic since your last visit? Hospitalized since your last visit? No 
 
2. Have you seen or consulted any other health care providers outside of the [address] [place] [address] since your last visit? Include any pap smears or colon screening.  No

## 2021-02-18 NOTE — PROGRESS NOTES
[address] [place] [address] [facility]  Pulmonary, Critical Care, and Sleep Medicine    Pulmonary F/U  Name: Radha Al 67 y.o. male  MRN: [id number]  : 1945  Service Date: 21  Chief Complaint:   Chief Complaint   Patient presents with    Lung Mass     follow up from 10/12/2020    Breathing Problem     BROOKE, hypoxia    Other     pulmonary infiltrates, interstitial lung disease, allergic rhinitis    Bronchiectasis    Results     PFT 2/15/2021, CT 2021, Echo 2020, barium swallow study 2020, labs 2020, COVID (-) 2/10/2021       History of Present Illness:  N.B. Pt deaf, history mainly obtained from his sister who also helps care for him. Communication with patient via dictation software on phone  Radha Al is a 67 y.o. male, who presents to Pulmonary clinic for followup of lung mass/lesion and bronchiectasis. Pt was last seen in our clinic on 10/12/20. In the interval, pt's sister reports that pt is very sedentary. Sister reports that she was unable to take him to therapy because her  got COVID and got a stroke. She reports pt is ambulatory, sits reads and does computer work. They have a exercise bike which the patient can do, but he does not do. She reports that the patient did start a modified diet, but they did not hear from speech therapy and cannot followup currently due to her 's health from recent stroke. She reports pt continues to not eat and remains frail. They report that the pt's cough did improve some with course of ABX from last visit. Still has some persistent cough but nonproductive.     Past Medical History:   Diagnosis Date    CAD (coronary artery disease)     MI ; stent  (done preop cochlear sgy)    Cancer ([facility] [address]     SQUAMOUS CELL    Coronary artery disease     Deafness     Myocardial infarction [facility]) [id number]    Scarlet fever age 9     Past Surgical History:   Procedure Laterality Date    BRONCH-FIBER/DIAGNOSTIC  6/16/2016         HX COLONOSCOPY  2013    neg    [place]ON[place]    HX HEART CATHETERIZATION  2003    HX HEART CATHETERIZATION  1999    HX HERNIA REPAIR      inguinal ? laterality    HX OTHER SURGICAL      2 cochlear implants (R); 1 left - all failed     Family History   Problem Relation Age of Onset    No Known Problems Mother     Heart Disease Father     Heart Attack Father         1989    Coronary Artery Disease Father     Hypertension Father     High Cholesterol Father      Social History     Socioeconomic History    Marital status: SINGLE     Spouse name: Not on file    Number of children: Not on file    Years of education: Not on file    Highest education level: Not on file   Occupational History    Not on file   Social Needs    Financial resource strain: Not on file    Food insecurity     Worry: Not on file     Inability: Not on file    Transportation needs     Medical: Not on file     Non-medical: Not on file   Tobacco Use    Smoking status: Never Smoker    Smokeless tobacco: Never Used   Substance and Sexual Activity    Alcohol use: No    Drug use: No    Sexual activity: Not on file   Lifestyle    Physical activity     Days per week: Not on file     Minutes per session: Not on file    Stress: Not on file   Relationships    Social connections     Talks on phone: Not on file     Gets together: Not on file     Attends [employer] service: Not on file     Active member of club or organization: Not on file     Attends meetings of clubs or organizations: Not on file     Relationship status: Not on file    Intimate partner violence     Fear of current or ex partner: Not on file     Emotionally abused: Not on file     Physically abused: Not on file     Forced sexual activity: Not on file   Other Topics Concern    Not on file   Social History Narrative    Not on file     Allergies Allergen Reactions    Pollens Extract Runny Nose and Cough     Prior to Admission medications    Medication Sig Start Date End Date Taking? Authorizing Provider   FISH OIL-DHA-EPA PO Take 1 Tab by mouth daily. Yes Provider, Historical   amino acids powd Take 1 Dose by mouth daily. Yes Provider, Historical   OTHER Take 1 Cap by mouth daily. tumeric    Yes Provider, Historical   fluticasone propionate (FLONASE NA) 1 Spray by Both Nostrils route daily. Yes Provider, Historical   ascorbic acid, vitamin C, (VITAMIN C) 500 mg tablet Take 500 mg by mouth daily. Yes Provider, Historical   multivitamin (ONE A DAY) tablet Take 1 Tab by mouth daily. Yes Provider, Historical   aspirin delayed-release 81 mg tablet Take 81 mg by mouth daily. Yes Provider, Historical     Immunization History   Administered Date(s) Administered    Influenza High Dose Vaccine PF 10/06/2016    Influenza, Quadrivalent, Adjuvanted (>65 Yrs FLUAD QUAD [id number]) 10/08/2020    Pneumococcal Polysaccharide (PPSV-23) 10/08/2020       Review of Systems:  A complete review of systems was performed as stated in the HPI, all others are negative.       Objective:    Physical Exam:  BP (!) 121/55 (BP 1 Location: Right arm, BP Patient Position: Sitting, BP Cuff Size: Adult)   Pulse (!) 110   Temp 98 °F (36.7 °C) (Oral)   Resp 16   Ht 5' 8\" (1.727 m)   Wt 55.3 kg (122 lb)   SpO2 94%   BMI 18.55 kg/m²   Vitals were personally reviewed  Gen: no acute distress, pleasant and cooperative, sitting up in chair, frail, unable to hear, ambulates slowly with assistance and walker, unable to climb up to exam table  HEENT: normocephalic/atraumatic, no ocular drainage, EOMI, no scleral icterus, nasal bridge midline, unable to assess nasal and oral cavities due to patient wearing mask in the setting of COVID-19 pandemic  Neck: supple, trachea midline, no JVD, no cervical and supraclavicular adenopathy  CVS: regular rate rhythm, S1/S2, no murmurs/rubs/gallops  Lungs: good air entry B/L, some scattered rhonchi, faint, no wheezes or rales throughout all lung fields  Back: Mild kyphosis, no scoliosis    Labs: I have reviewed the patient's available labs  Lab Results   Component Value Date/Time    WBC 8.6 10/08/2020 05:00 PM    HGB 13.6 10/08/2020 05:00 PM    HCT 42.7 10/08/2020 05:00 PM    PLATELET 930 19/62/7349 05:00 PM    MCV 91.6 10/08/2020 05:00 PM     Lab Results   Component Value Date/Time    Sodium 138 10/08/2020 05:00 PM    Potassium 4.8 10/08/2020 05:00 PM    Chloride 102 10/08/2020 05:00 PM    CO2 36 (H) 10/08/2020 05:00 PM    Anion gap 0 (L) 10/08/2020 05:00 PM    Glucose 98 10/08/2020 05:00 PM    BUN 36 (H) 10/08/2020 05:00 PM    Creatinine 0.64 10/08/2020 05:00 PM    BUN/Creatinine ratio 56 (H) 10/08/2020 05:00 PM    GFR est AA >60 10/08/2020 05:00 PM    GFR est non-AA >60 10/08/2020 05:00 PM    Calcium 9.3 10/08/2020 05:00 PM    Bilirubin, total 0.5 10/08/2020 05:00 PM    Alk. phosphatase 43 (L) 10/08/2020 05:00 PM    Protein, total 8.5 (H) 10/08/2020 05:00 PM    Albumin 3.3 (L) 10/08/2020 05:00 PM    Globulin 5.2 (H) 10/08/2020 05:00 PM    A-G Ratio 0.6 (L) 10/08/2020 05:00 PM    ALT (SGPT) 24 10/08/2020 05:00 PM    AST (SGOT) 19 10/08/2020 05:00 PM   -Connective tissue work-up negative    Imaging:  I have personally reviewed patient's imaging as follows--CT chest without contrast from 1/14/2021 shows some improvement in right upper lobe consolidative masslike lesion, still persists but smaller. Patient has persistent areas of patchy atelectasis and subpleural fibrotic changes with traction bronchiectasis, and tree-in-bud opacities in bilateral lower lobes, along with right upper lobe consolidation versus masslike appearance along with cavitation versus bulla. No other masses, consolidations, effusions seen.   Official report per radiology:  CT Results (most recent):  Results from Hospital Encounter encounter on 01/14/21   CT CHEST WO CONT    Narrative CT CHEST WITHOUT ENHANCEMENT    INDICATION: Follow-up right upper lobe mass, dyspnea on exertion, pulmonary  infiltrates, bronchiectasis, interstitial lung disease. TECHNIQUE: Axial images obtained from the thoracic inlet to the level of the  diaphragm without intravenous contrast. Coronal and sagittal reformatted images  were obtained. All CT scans at this facility are performed using dose optimization technique as  appropriate to a performed exam, to include automated exposure control,  adjustment of the mA and/or kV according to patient size (including appropriate  matching first site-specific examinations), or use of iterative reconstruction  technique. COMPARISON: 10/8/2020. CHEST FINDINGS:   The evaluation of the mediastinum, hilum and vascular structures is limited in  the absence of intravenous contrast.    Thyroid: Unremarkable in its visualized aspects. Pericardium/ Heart: No significant effusion. Aorta/ Vessels: Mild atherosclerosis. Lymph Nodes: Calcified mediastinal nodes. .    Lungs: Right greater than left bronchial wall thickening. Biapical pleural  parenchymal scarring is improved posteriorly on the left. Improved subpleural  posterior consolidation right upper lobe now measures 5.7 x 1.6 cm, previously  8.3 x 3 cm. There is associated bronchiectasis and new cavitation which measures  up to 2.8 cm. Similar 1.2 x 0.8 cm consolidation in the anterior right upper  lobe (20). Similar bronchiolectasis and consolidation right middle lobe. Mild  centrilobular densities and bronchiolectasis right lower lobe is overall  improved. Similar linear density posterior lingula. Similar subsegmental  atelectasis or scarring lingula. Pleura: No effusion. Upper Abdomen: Gastric distention. Mild atherosclerosis. Bones/soft tissues: Osteopenia. Anterior compression deformity T11.           Impression IMPRESSION:    Improved multifocal pneumonia and bronchiolitis with likely atypical infection  such as MAC/OSCAR. PFTs:  I have reviewed the patient's PFTs from 2/15/2021, spirometry shows a severe obstructive defect, however unreliable since patient was unable to follow directions. TTE:  I have reviewed the patient's TTE results  11/24/20   ECHO ADULT COMPLETE 11/24/2020 11/24/2020    Narrative · LV: Estimated LVEF is 45 - 50%. Visually measured ejection fraction. Normal cavity size and wall thickness. Globally reduced systolic function. Abnormal left ventricular septal motion consistent with paradoxic motion. Inconclusive left ventricular diastolic function. · Saline contrast was given to evaluate for intracardiac shunt. No shunt   seen. Bubble study was negative with valsalva and without valsalva. · TV: Moderate tricuspid valve regurgitation is present. · PA: Mild pulmonary hypertension. Pulmonary arterial systolic pressure is   42 mmHg. Signed by: Haja Barrientos MD            Assessment and Plan:  67 y.o. male with:    Impression:  1. Pulm infiltrates with RUL masslike consolidation:  Repeat CT after course of ABx shows some improvement. This represents an infected bullae. This is in the setting of underlying interstitial lung disease which has progressed. No apical basilar gradient, likely NSIP, underlying cause unclear. No smoking history to indicate RBILD/DIP, no pulmonary toxic medications. Consolidation may be possible infection. 2.  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 Bronchiectasis, non-CF  4. Deconditioning  5. Allergic rhinitis  6. Chronic cough: Etiology due to above  7. Chronic aspiration with dysphagia  8. CHF, systolic dysfunction:  Seen on last TTE with LVEF of 45-50%  9. Dyspnea on exertion/shortness of breath:  Multifactorial -- due to above    Plan:  -Spent time in this clinic visit going over CT findings with patient and his sister present with him in today's visit.   Advised patient that based on imaging, this is likely not a malignancy and due to his bronchiectasis with infected bullae. Given pt's frailty, I would NOT recommend bronchoscopy -- explained to both pt's sister and the patient. We will obtain induced sputum when possible --currently not being performed due to restrictions from [facility] due to COVID-19 pandemic. Advised that if patient becomes symptomatic, we will collect serial sputum at that time. -Repeat CT scan in 6 months for serial surveillance of right upper lobe masslike conslidation  -Counseled regarding aspiration lifestyle precautions. Advised them to follow-up with SLP pharyngeal strengthening therapy when possible  -Management of CHF per Cardiology  -Consider graded exercise --- offered pulmonary rehab, but they are currently unable to provide transportation at this time  -Immunizations reviewed, influenza pneumococcal vaccinations up-to-date  -Counseled patient regarding lifestyle precautions in COVID-19 pandemic including wearing mask in public and confined spaces, social/physical distancing, frequent hand hygiene, etc.  Cortes Cannon pt to receive COVID-19 vaccination when possible     Follow-up and Dispositions    · Return in about 6 months (around 8/18/2021).        Orders Placed This Encounter    CT CHEST WO CONT    REFERRAL TO SPEECH THERAPY       Vy Wagoner MD/MPH     Pulmonary, Critical Care Medicine  [facility]

## 2021-02-18 NOTE — LETTER
2/21/2021 Patient: Kelvin Dowling YOB: 1945 Date of Visit: 2/18/2021 Adenike Baumann NP 
[address] [facility] [address] [facility] [address] [address] [employer] Dear Adenike Baumann NP, Thank you for referring Mr. Rina Vasquez to [address] [place] [address] for evaluation. My notes for this consultation are attached. If you have questions, please do not hesitate to call me. I look forward to following your patient along with you. Sincerely, Gema Rocha MD

## 2021-02-23 NOTE — TELEPHONE ENCOUNTER
Pt's sister Gene GATICA([phone]). She wants to know if Dr Carmita Alfaro will refer pt to a speech therapist closer to where he lives. Please check and call her back.
Sister Romel Sawyer state she can not go to [facility], [facility] for speech therapy and would like a closer place. [facility] has a speech therapy program that comes to the home but the doctor does need to do a home health order for the speech therapy. Fax with notes and any studies of why the speech therapy is needed.    Nurse to discuss with Dr. Nellie Christine
verbal cues/nonverbal cues (demo/gestures)/1 person assist

## 2021-03-09 NOTE — TELEPHONE ENCOUNTER
Pulmonary Rehab called patient and spoke to his sister. She said that they are more interested in [address] and potentially PT/OT right now until patient gets stronger. She will call if they change their mind down the road and want to participate.     Thank you,  Vita Mayfield

## 2021-03-15 NOTE — PROGRESS NOTES
Andie Hsu is a 67 y.o. male, evaluated via audio-only technology on 3/15/2021 for Follow Up Chronic Condition Pt is deaf and the interview is being conducted by his sister Indy Wade. She says that he is scheduled for his second COVID vaccine on March 24th at the [facility]. Sister says that she is still working on getting pt Medicaid and some dental insurance because his eating is still poor due to poor dentition. She is trying various supplement and protein to put in his diet and has started him on soft foods. Pt will follow-up in the office 6/9/21 and have all of his labs and care gaps completed. Assessment & Plan:  
Diagnoses and all orders for this visit: 1. Protein-calorie malnutrition, unspecified severity ([facility] [address] Follow-up and Dispositions · Return in about 3 months (around 6/15/2021) for Malnutrition (Keep 6/9/21 appointment), (In Office). 12 
Subjective:  
 
 
Prior to Admission medications Medication Sig Start Date End Date Taking? Authorizing Provider FISH OIL-DHA-EPA PO Take 1 Tab by mouth daily. Yes Provider, Historical  
amino acids powd Take 1 Dose by mouth daily. Yes Provider, Historical  
OTHER Take 1 Cap by mouth daily. tumeric    Yes Provider, Historical  
fluticasone propionate (FLONASE NA) 1 Spray by Both Nostrils route daily. Yes Provider, Historical  
ascorbic acid, vitamin C, (VITAMIN C) 500 mg tablet Take 500 mg by mouth daily. Yes Provider, Historical  
multivitamin (ONE A DAY) tablet Take 1 Tab by mouth daily. Yes Provider, Historical  
aspirin delayed-release 81 mg tablet Take 81 mg by mouth daily. Yes Provider, Historical  
 
Patient Active Problem List  
Diagnosis Code  Coronary artery disease involving native coronary artery of native heart without angina pectoris,s/p stent 2003 I25.10  Scarlet fever A38.9  Myocardial infarction (HCC) I21.9  Urinary frequency R35.0 Current Outpatient Medications Medication Sig Dispense Refill  FISH OIL-DHA-EPA PO Take 1 Tab by mouth daily.  amino acids powd Take 1 Dose by mouth daily.  OTHER Take 1 Cap by mouth daily. tumeric  fluticasone propionate (FLONASE NA) 1 Spray by Both Nostrils route daily.  ascorbic acid, vitamin C, (VITAMIN C) 500 mg tablet Take 500 mg by mouth daily.  multivitamin (ONE A DAY) tablet Take 1 Tab by mouth daily.  aspirin delayed-release 81 mg tablet Take 81 mg by mouth daily. Allergies Allergen Reactions  Pollens Extract Runny Nose and Cough ROS No flowsheet data found. Aura Jocy, who was evaluated through a patient-initiated, synchronous (real-time) audio only encounter, and/or her healthcare decision maker, is aware that it is a billable service, with coverage as determined by his insurance carrier. He provided verbal consent to proceed: Yes. He has not had a related appointment within my department in the past 7 days or scheduled within the next 24 hours. Total Time: minutes: 21-30 minutes Amarilis Vieira NP

## 2021-06-09 NOTE — PATIENT INSTRUCTIONS
Vaccine Information Statement Pneumococcal Polysaccharide Vaccine (PPSV23): What You Need to Know Many Vaccine Information Statements are available in Estonian and other languages. See www.immunize.org/vis Hojas de información sobre vacunas están disponibles en español y en muchos otros idiomas. Visite www.immunize.org/vis 1. Why get vaccinated? Pneumococcal polysaccharide vaccine (PPSV23) can prevent pneumococcal disease. Pneumococcal disease refers to any illness caused by pneumococcal bacteria. These bacteria can cause many types of illnesses, including pneumonia, which is an infection of the lungs. Pneumococcal bacteria are one of the most common causes of pneumonia. Besides pneumonia, pneumococcal bacteria can also cause: 
 Ear infections  Sinus infections  Meningitis (infection of the tissue covering the brain and spinal cord)  Bacteremia (bloodstream infection) Anyone can get pneumococcal disease, but children under 3years of age, people with certain medical conditions, adults 72 years or older, and cigarette smokers are at the highest risk. Most pneumococcal infections are mild. However, some can result in long-term problems, such as brain damage or hearing loss. Meningitis, bacteremia, and pneumonia caused by pneumococcal disease can be fatal.  
 
2. PPSV23  
 
PPSV23 protects against 23 types of bacteria that cause pneumococcal disease. PPSV23 is recommended for:  All adults 72 years or older,  Anyone 2 years or older with certain medical conditions that can lead to an increased risk for pneumococcal disease. Most people need only one dose of PPSV23. A second dose of PPSV23, and another type of pneumococcal vaccine called PCV13, are recommended for certain high-risk groups. Your health care provider can give you more information.  
 
People 65 years or older should get a dose of PPSV23 even if they have already gotten one or more doses of the vaccine before they turned 72. 
 
3. Talk with your health care provider Tell your vaccine provider if the person getting the vaccine: 
 Has had an allergic reaction after a previous dose of PPSV23, or has any severe, life-threatening allergies. In some cases, your health care provider may decide to postpone PPSV23 vaccination to a future visit. People with minor illnesses, such as a cold, may be vaccinated. People who are moderately or severely ill should usually wait until they recover before getting PPSV23. Your health care provider can give you more information. 4. Risks of a vaccine reaction  Redness or pain where the shot is given, feeling tired, fever, or muscle aches can happen after PPSV23. People sometimes faint after medical procedures, including vaccination. Tell your provider if you feel dizzy or have vision changes or ringing in the ears. As with any medicine, there is a very remote chance of a vaccine causing a severe allergic reaction, other serious injury, or death. 5. What if there is a serious problem? An allergic reaction could occur after the vaccinated person leaves the clinic. If you see signs of a severe allergic reaction (hives, swelling of the face and throat, difficulty breathing, a fast heartbeat, dizziness, or weakness), call 9-1-1 and get the person to the nearest hospital. 
 
For other signs that concern you, call your health care provider. Adverse reactions should be reported to the Vaccine Adverse Event Reporting System (VAERS). Your health care provider will usually file this report, or you can do it yourself. Visit the VAERS website at www.vaers. hhs.gov or call 7-674.406.3163. VAERS is only for reporting reactions, and VAERS staff do not give medical advice. 6. How can I learn more?  Ask your health care provider.  Call your local or state health department.  
 Contact the Centers for Disease Control and Prevention (CDC): 
- Call 5-513.982.3449 (7-332-FZN-INFO) or 
- Visit CDCs website at www.cdc.gov/vaccines Vaccine Information Statement PPSV23  
10/30/2019 Department of [facility] and [employer] Centers for Disease Control and Prevention Office Use Only Vaccine Information Statement Recombinant Zoster (Shingles) Vaccine: What You Need to Know Many Vaccine Information Statements are available in [place]ian and other languages. See www.immunize.org/vis Hojas de información sobre vacunas están disponibles en español y en muchos otros idiomas. Visite www.immunize.org/vis 1. Why get vaccinated? Recombinant zoster (shingles) vaccine can prevent shingles. Shingles (also called herpes zoster, or just zoster) is a painful skin rash, usually with blisters. In addition to the rash, shingles can cause fever, headache, chills, or upset stomach. More rarely, shingles can lead to pneumonia, hearing problems, blindness, brain inflammation (encephalitis), or death. The most common complication of shingles is long-term nerve pain called postherpetic neuralgia (PHN). PHN occurs in the areas where the shingles rash was, even after the rash clears up. It can last for months or years after the rash goes away. The pain from PHN can be severe and debilitating. About 10 to 18% of people who get shingles will experience PHN. The risk of PHN increases with age. An older adult with shingles is more likely to develop PHN and have longer lasting and more severe pain than a younger person with shingles. Shingles is caused by the varicella zoster virus, the same virus that causes chickenpox. After you have chickenpox, the virus stays in your body and can cause shingles later in life. Shingles cannot be passed from one person to another, but the virus that causes shingles can spread and cause chickenpox in someone who had never had chickenpox or received chickenpox vaccine. 2. Recombinant shingles vaccine Recombinant shingles vaccine provides strong protection against shingles. By preventing shingles, recombinant shingles vaccine also protects against PHN. Recombinant shingles vaccine is the preferred vaccine for the prevention of shingles. However, a different vaccine, live shingles vaccine, may be used in some circumstances. The recombinant shingles vaccine is recommended for adults 50 years and older without serious immune problems. It is given as a two-dose series. This vaccine is also recommended for people who have already gotten another type of shingles vaccine, the live shingles vaccine. There is no live virus in this vaccine. Shingles vaccine may be given at the same time as other vaccines. 3. Talk with your health care provider Tell your vaccine provider if the person getting the vaccine: 
 Has had an allergic reaction after a previous dose of recombinant shingles vaccine, or has any severe, life-threatening allergies.  Is pregnant or breastfeeding.  Is currently experiencing an episode of shingles. In some cases, your health care provider may decide to postpone shingles vaccination to a future visit. People with minor illnesses, such as a cold, may be vaccinated. People who are moderately or severely ill should usually wait until they recover before getting recombinant shingles vaccine. Your health care provider can give you more information. 4. Risks of a vaccine reaction  A sore arm with mild or moderate pain is very common after recombinant shingles vaccine, affecting about 80% of vaccinated people. Redness and swelling can also happen at the site of the injection.  Tiredness, muscle pain, headache, shivering, fever, stomach pain, and nausea happen after vaccination in more than half of people who receive recombinant shingles vaccine.  
 
In clinical trials, about 1 out of 6 people who got recombinant zoster vaccine experienced side effects that prevented them from doing regular activities. Symptoms usually went away on their own in 2 to 3 days. You should still get the second dose of recombinant zoster vaccine even if you had one of these reactions after the first dose. People sometimes faint after medical procedures, including vaccination. Tell your provider if you feel dizzy or have vision changes or ringing in the ears. As with any medicine, there is a very remote chance of a vaccine causing a severe allergic reaction, other serious injury, or death. 5. What if there is a serious problem? An allergic reaction could occur after the vaccinated person leaves the clinic. If you see signs of a severe allergic reaction (hives, swelling of the face and throat, difficulty breathing, a fast heartbeat, dizziness, or weakness), call 9-1-1 and get the person to the nearest hospital. 
 
For other signs that concern you, call your health care provider. Adverse reactions should be reported to the Vaccine Adverse Event Reporting System (VAERS). Your health care provider will usually file this report, or you can do it yourself. Visit the VAERS website at www.vaers. [facility].gov or call 7-765.599.4677. VAERS is only for reporting reactions, and VAERS staff do not give medical advice. 6. How can I learn more?  Ask your health care provider.  Call your local or state health department.  Contact the Centers for Disease Control and Prevention (CDC): 
- Call 9-428.125.1813 (1-800-CDC-INFO) or 
- Visit CDCs website at www.cdc.gov/vaccines Vaccine Information Statement Recombinant Zoster Vaccine 10/30/2019 Department of Health and [employer] Centers for Disease Control and Prevention Office Use Only

## 2021-06-09 NOTE — PROGRESS NOTES
General Office Visit Note Patient:  Lyndsay Cortez Subjective:  
 
Brandy Polk is a 67 y.o. y.o. male who complains of  
Chief Complaint Patient presents with  Follow-up Patient presents for a 3-month follow-up. Pt is deaf using a [id number]-je-ecnq krishna on his phone and accompanied by his sister Alton Morley. Sister states that patient is starting to eat better. He is drinking 3-4 milk shakes and 2 meals daily. She says that patient had his Zuri Luster checked in March, got both Moderna vaccines, had a squamous cell carcinoma with removal and skin grafting in May on the right side of his scalp. He also has a basal cell carcinoma on the left side of his neck, saw derm in May, and is currently being treated with a cream.. Pt's sister is working to get speech therapy for his eating but it has been difficult with also managing her family issues at home. Past Medical History:  
Diagnosis Date  CAD (coronary artery disease) MI 1995; stent 2003 (done preop cochlear sgy)  Cancer ([facility] [address] SQUAMOUS CELL  
 Coronary artery disease  Deafness  Myocardial infarction [facility]) [id number]  Scarlet fever age 9 Past Surgical History:  
Procedure Laterality Date  BRONCH-FIBER/DIAGNOSTIC  6/16/2016  HX COLONOSCOPY  2013  
 neg  HX CORONARY STENT PLACEMENT  2003 [address] CATHETERIZATION  [address]  HX HERNIA REPAIR    
 inguinal ? laterality  HX OTHER SURGICAL    
 2 cochlear implants (R); 1 left - all failed Social History Socioeconomic History  Marital status: SINGLE Spouse name: Not on file  Number of children: Not on file  Years of education: Not on file  Highest education level: Not on file Tobacco Use  Smoking status: Never Smoker  Smokeless tobacco: Never Used Vaping Use  Vaping Use: Never used Substance and Sexual Activity  Alcohol use: No  
 Drug use: No  
 
Social Determinants of Health Financial Resource Strain:  Difficulty of Paying Living Expenses:   
Food Insecurity:  Worried About [address] [facility] in the Last Year:   
[address] in the Last Year:   
Transportation Needs:   
 Lack of Transportation (Medical):  Lack of Transportation (Non-Medical): Physical Activity:   
 Days of Exercise per Week:  Minutes of Exercise per Session:   
Stress:  Feeling of Stress :   
Social Connections:  Frequency of Communication with Friends and Family:  Frequency of Social Gatherings with Friends and Family:  Attends [employer] Services:  Active Member of Clubs or Organizations:  Attends Club or Organization Meetings:  Marital Status:   
 
Current Outpatient Medications Medication Sig Dispense Refill  imiquimod (ALDARA) 5 % cream APPLY TO LEFT COLLARBONE MONDAY THROUGH FRIDAY AT NIGHT FOR 6 WEEKS  cholecalciferol (VITAMIN D3) (1000 Units /25 mcg) tablet Take 1,000 Units by mouth daily.  varicella-zoster recombinant, PF, (SHINGRIX) 50 mcg/0.5 mL susr injection 0.5 mL by IntraMUSCular route once for 1 dose. 1 Each 1  
 FISH OIL-DHA-EPA PO Take 1 Tab by mouth daily.  amino acids powd Take 1 Dose by mouth daily.  OTHER Take 1 Cap by mouth daily. tumeric  fluticasone propionate (FLONASE NA) 1 Spray by Both Nostrils route daily.  ascorbic acid, vitamin C, (VITAMIN C) 500 mg tablet Take 500 mg by mouth daily.  multivitamin (ONE A DAY) tablet Take 1 Tab by mouth daily.  aspirin delayed-release 81 mg tablet Take 81 mg by mouth daily. Allergies Allergen Reactions  Pollens Extract Runny Nose and Cough The patient has a family history of REVIEW OF SYSTEMS 
ROS Objective:  
 
Visit Vitals BP (!) 112/59 (BP 1 Location: Right arm, BP Patient Position: Sitting, BP Cuff Size: Adult) Pulse (!) 110 Ht 5' 8\" (1.727 m) Wt 118 lb 12.8 oz (53.9 kg) SpO2 91% BMI 18.06 kg/m² Current Outpatient Medications Medication Instructions  amino acids powd 1 Dose, Oral, DAILY  ascorbic acid (vitamin C) (VITAMIN C) 500 mg, Oral, DAILY  aspirin delayed-release 81 mg, Oral, DAILY  cholecalciferol (VITAMIN D3) 1,000 Units, Oral, DAILY  FISH OIL-DHA-EPA PO 1 Tablet, Oral, DAILY  fluticasone propionate (FLONASE NA) 1 Spray, Both Nostrils, DAILY  imiquimod (ALDARA) 5 % cream APPLY TO LEFT COLLARBONE MONDAY THROUGH FRIDAY AT NIGHT FOR 6 WEEKS  
 multivitamin (ONE A DAY) tablet 1 Tablet, Oral, DAILY  OTHER 1 Capsule, Oral, DAILY, tumeric  varicella-zoster recombinant, PF, (SHINGRIX) 50 mcg/0.5 mL susr injection 0.5 mL, IntraMUSCular, ONCE PHYSICAL EXAM 
Physical Exam 
Vitals and nursing note reviewed. Constitutional:   
   Appearance: Normal appearance. Cardiovascular:  
   Rate and Rhythm: Normal rate and regular rhythm. Pulses: Normal pulses. Heart sounds: Normal heart sounds. Pulmonary:  
   Effort: Pulmonary effort is normal.  
   Breath sounds: Normal breath sounds. Musculoskeletal:     
   General: Normal range of motion. Cervical back: Normal range of motion and neck supple. Skin: 
   General: Skin is warm and dry. Neurological:  
   Mental Status: He is alert and oriented to person, place, and time. Psychiatric:     
   Mood and Affect: Mood normal.     
   Behavior: Behavior normal.  
 
 
Assessment/Plan:  
 
Diagnoses and all orders for this visit: 1. Protein-calorie malnutrition, unspecified severity ([facility] [address] 2. Dysphagia, unspecified type 3. Encounter for immunization 
-     varicella-zoster recombinant, PF, (SHINGRIX) 50 mcg/0.5 mL susr injection; 0.5 mL by IntraMUSCular route once for 1 dose. -     TETANUS, DIPHTHERIA TOXOIDS AND ACELLULAR PERTUSSIS VACCINE (TDAP), IN INDIVIDS. >=7, IM Follow-up and Dispositions · Return in about 6 months (around 12/9/2021) for Failure to Thrive, (In Office). Disclaimer: I have discussed the diagnosis with the patient and the intended plan as seen above. The patient understands our medical plan. The risks, benefits and significant side effects of all medications have been reviewed. Anticipated time course and progression of condition reviewed. All questions have been addressed. He received an after visit summary, with information reviewed, and questions answered. Where appropriate, he is instructed to call the clinic if he has not been notified either by phone or through [address] with the results of his tests or with an appointment plan for any referrals within 1 week(s). The patient  is to call if his condition worsens or fails to improve or if significant side effects are experienced.

## 2021-06-29 NOTE — PROGRESS NOTES
Kristina Rowan presents today for   Chief Complaint   Patient presents with    Follow-up     6 month follow up        Kristina Rowan preferred language for health care discussion is english/other. Is someone accompanying this pt? Yes, siser    Is the patient using any DME equipment during [address]? walker    Depression Screening:  3 most recent PHQ Screens 6/29/2021   Little interest or pleasure in doing things Not at all   Feeling down, depressed, irritable, or hopeless Not at all   Total Score PHQ 2 0       Learning Assessment:  Learning Assessment 2/18/2021   PRIMARY LEARNER Patient   PRIMARY LANGUAGE ENGLISH   LEARNER PREFERENCE PRIMARY DEMONSTRATION     READING   ANSWERED BY Indy Wade   RELATIONSHIP OTHER       Abuse Screening:  Abuse Screening Questionnaire 6/29/2021   Do you ever feel afraid of your partner? N   Are you in a relationship with someone who physically or mentally threatens you? N   Is it safe for you to go home? -       Fall Risk  Fall Risk Assessment, last 12 mths 6/29/2021   Able to walk? Yes   Fall in past 12 months? 0   Do you feel unsteady? 0   Are you worried about falling 0   Is the gait abnormal? -   Number of falls in past 12 months -       Pt currently taking Anticoagulant therapy? no    Coordination of Care:  1. Have you been to the ER, urgent care clinic since your last visit? Hospitalized since your last visit? no    2. Have you seen or consulted any other health care providers outside of the [address] [place] [address] since your last visit? Include any pap smears or colon screening.  no

## 2021-06-29 NOTE — PROGRESS NOTES
HISTORY OF PRESENT ILLNESS  Kristina Rowan is a 67 y.o. male. ASSESSMENT and PLAN    Mr. Neymar Turcios has prior history of CAD. He had myocardial infarction back in 1995, when he was living in [place]. He had stent performed in 2003. He moved to [place] in 2015. He lost his hearing as a child with gradual loss into his early adulthood. He is able to speak but cannot hear anything. He does not read lips. He has right upper lobe lung mass which is currently being evaluated; suspicions for malignancy is low due to the shrinkage. This was present 2016. However, he was lost to follow-up. He will have continued work-up. He lives by himself. Because of poor dentition, he does not eat. He is currently in the process of getting dentures. His echocardiogram in November 2020 revealed EF 45-50%. There was global hypokinesis. Moderate tricuspid regurgitation was noted with PA pressure of 42 mmHg. Saline contrast study was performed and did not show any shunts. · CAD:    Symptomatically stable. He has not had any chest pains. · BP:    Well controlled. · Rhythm:    Sinus tachycardia. This is likely from being malnourished and hypovolemic. · CHF:    There is no evidence of decompensated CHF noted. · Weight:     His current weight is 117 pounds. This is his biggest issue at this time. He needs to gain at least 10 to 20 pounds. Again, he is accompanied by his sister. This was discussed and encouraged at length. · Cholesterol:   Target LDL <70. · Anti-platelet:   Remains on ASA. He was not able to get his teeth pulled due to insurance/financial reasons. He states that his right upper lobe lung mass has now shrunk. His pulmonologist, Dr. Shirin Malone is managing. He was accompanied by his sister who takes care of him on weekends. We discussed possible use of statins and beta-blockers.   However, with his current malnourished state, I have encouraged him to try to gain weight and increase oral intake rather than initiating other medications. I will see him back in 12 months. Thank you. Encounter Diagnoses   Name Primary?  Coronary artery disease involving native coronary artery of native heart without angina pectoris,s/p stent 2003 Yes     current treatment plan is effective, no change in therapy  lab results and schedule of future lab studies reviewed with patient  reviewed diet, exercise and weight control      HPI   Today, Mr. Agustin Vuong has no complaints of chest pains. He has baseline dyspnea on exertion. He denies any changes. Unfortunately, he has not been able to gain any weight; he has lost few pounds. He denies any orthopnea or PND. He denies any palpitations or dizziness. Review of Systems   Constitutional: Positive for malaise/fatigue and weight loss. Respiratory: Positive for shortness of breath. Cardiovascular: Negative for chest pain, palpitations, orthopnea, claudication, leg swelling and PND. All other systems reviewed and are negative. Physical Exam  Constitutional:       Appearance: He is cachectic. He is ill-appearing. HENT:      Head: Normocephalic. Eyes:      Conjunctiva/sclera: Conjunctivae normal.   Cardiovascular:      Rate and Rhythm: Regular rhythm. Tachycardia present. Pulmonary:      Breath sounds: Normal breath sounds. Abdominal:      Palpations: Abdomen is soft. Musculoskeletal:         General: No swelling. Cervical back: No rigidity. Skin:     General: Skin is warm and dry. Neurological:      General: No focal deficit present. Mental Status: He is alert and oriented to person, place, and time.    Psychiatric:         Mood and Affect: Mood normal.         Behavior: Behavior normal.         PCP: Freddie Pérez NP    Past Medical History:   Diagnosis Date    CAD (coronary artery disease)     MI 1995; stent 2003 (done preop cochlear sgy)    Cancer ([facility] [address]     SQUAMOUS CELL    Coronary artery disease     Deafness     Myocardial infarction [facility]) [id number]    Scarlet fever age 9       Past Surgical History:   Procedure Laterality Date    BRONCH-FIBER/DIAGNOSTIC  6/16/2016         HX COLONOSCOPY  2013    neg    [place]    HX HEART CATHETERIZATION  2003    HX HEART CATHETERIZATION  1999    HX HERNIA REPAIR      inguinal ? laterality    HX OTHER SURGICAL      2 cochlear implants (R); 1 left - all failed       Current Outpatient Medications   Medication Sig Dispense Refill    imiquimod (ALDARA) 5 % cream APPLY TO LEFT COLLARBONE MONDAY THROUGH FRIDAY AT NIGHT FOR 6 WEEKS      cholecalciferol (VITAMIN D3) (1000 Units /25 mcg) tablet Take 1,000 Units by mouth daily.  FISH OIL-DHA-EPA PO Take 1 Tab by mouth daily.  amino acids powd Take 1 Dose by mouth daily.  OTHER Take 1 Cap by mouth daily. tumeric       fluticasone propionate (FLONASE NA) 1 Spray by Both Nostrils route daily.  ascorbic acid, vitamin C, (VITAMIN C) 500 mg tablet Take 500 mg by mouth daily.  multivitamin (ONE A DAY) tablet Take 1 Tab by mouth daily.  aspirin delayed-release 81 mg tablet Take 81 mg by mouth daily.          The patient has a family history of    Social History     Tobacco Use    Smoking status: Never Smoker    Smokeless tobacco: Never Used   Vaping Use    Vaping Use: Never used   Substance Use Topics    Alcohol use: No    Drug use: No       Lab Results   Component Value Date/Time    Cholesterol, total 138 09/24/2020 10:04 AM    HDL Cholesterol 66 (H) 09/24/2020 10:04 AM    LDL, calculated 61.4 09/24/2020 10:04 AM    Triglyceride 53 09/24/2020 10:04 AM    CHOL/HDL Ratio 2.1 09/24/2020 10:04 AM        BP Readings from Last 3 Encounters:   06/29/21 116/62   06/09/21 (!) 112/59   02/18/21 (!) 121/55        Pulse Readings from Last 3 Encounters:   06/29/21 (!) 107   06/09/21 (!) 110   02/18/21 (!) 110       Wt Readings from Last 3 Encounters:   06/29/21 53.1 kg (117 lb)   06/09/21 53.9 kg (118 lb 12.8 oz)   02/18/21 55.3 kg (122 lb)         EKG: unchanged from previous tracings, sinus tachycardia, Q waves in leads V1 and V2.

## 2021-08-31 NOTE — PROGRESS NOTES
[address] [facility]  Pulmonary, Critical Care, and Sleep Medicine    Pulmonary F/U  Name: Rodri Medina 67 y.o. male  MRN: [id number]  : 1945  Service Date: 21  Chief Complaint:   Chief Complaint   Patient presents with    Bronchiectasis     follow up from 2021    Other     pulmonary infiltrates, chronic pulmonary aspirations, ILD    Results     COVID (-) 2021       History of Present Illness:  N.B. Pt deaf, history mainly obtained from his sister who also helps care for him. Communication with patient via dictation software on phone  Rodri Medina is a 67 y.o. male, who presents to Pulmonary clinic for followup of lung lesion and bronchiectasis. Pt was last seen in our clinic on 2021. Pt has had progressively worsening dyspnea. Sister reports that he had some worsening dyspnea and cough last Thursday. She considered taking him to ER but rest improved his dyspnea some. She reports that she was unable to take the patient to speech-language pathology for pharyngeal strengthening therapy. She reports that her  suffered a stroke and was hospitalized and then required therapy. She reports that she is considering placing the patient in assisted living, however she fears the patient will continue to deteriorate no matter what. She reports that the patient continues to deteriorate, losing weight, has ongoing cough, does not take care of himself.   No exacerbations in the interval    Past Medical History:   Diagnosis Date    CAD (coronary artery disease)     MI ; stent  (done preop cochlear sgy)    Cancer ([facility] [address]     SQUAMOUS CELL    Coronary artery disease     Deafness     Myocardial infarction [facility]) [id number]    Scarlet fever age 9     Past Surgical History:   Procedure Laterality Date    BRONCH-FIBER/DIAGNOSTIC  2016         HX COLONOSCOPY      neg    [place]ON[place]    HX HEART CATHETERIZATION  2003    HX HEART CATHETERIZATION  1999    HX HERNIA REPAIR      inguinal ? laterality    HX OTHER SURGICAL      2 cochlear implants (R); 1 left - all failed     Family History   Problem Relation Age of Onset    No Known Problems Mother     Heart Disease Father     Heart Attack Father         1989    Coronary Artery Disease Father     Hypertension Father     High Cholesterol Father      Social History     Socioeconomic History    Marital status: SINGLE     Spouse name: Not on file    Number of children: Not on file    Years of education: Not on file    Highest education level: Not on file   Occupational History    Not on file   Tobacco Use    Smoking status: Never Smoker    Smokeless tobacco: Never Used   Vaping Use    Vaping Use: Never used   Substance and Sexual Activity    Alcohol use: No    Drug use: No    Sexual activity: Not on file   Other Topics Concern    Not on file   Social History Narrative    Not on file     Social Determinants of Health     Financial Resource Strain:     Difficulty of Paying Living Expenses:    Food Insecurity:     Worried About Running Out of Food in the Last Year:     Ran Out of Food in the Last Year:    Transportation Needs:     Lack of Transportation (Medical):  Lack of Transportation (Non-Medical):    Physical Activity:     Days of Exercise per Week:     Minutes of Exercise per Session:    Stress:     Feeling of Stress :    Social Connections:     Frequency of Communication with Friends and Family:     Frequency of Social Gatherings with Friends and Family:     Attends [employer] Services:     Active Member of Clubs or Organizations:     Attends Club or Organization Meetings:     Marital Status:    Intimate Partner Violence:     Fear of Current or Ex-Partner:     Emotionally Abused:     Physically Abused:     Sexually Abused:       Allergies   Allergen Reactions    Pollens Extract Runny Nose and Cough     Prior to Admission medications    Medication Sig Start Date End Date Taking? Authorizing Provider   imiquimod (ALDARA) 5 % cream APPLY TO LEFT COLLARBONE MONDAY THROUGH FRIDAY AT NIGHT FOR 6 WEEKS 4/19/21   Provider, Historical   cholecalciferol (VITAMIN D3) (1000 Units /25 mcg) tablet Take 1,000 Units by mouth daily. Provider, Historical   FISH OIL-DHA-EPA PO Take 1 Tab by mouth daily. Provider, Historical   amino acids powd Take 1 Dose by mouth daily. Provider, Historical   OTHER Take 1 Cap by mouth daily. tumeric     Provider, Historical   fluticasone propionate (FLONASE NA) 1 Spray by Both Nostrils route daily. Provider, Historical   ascorbic acid, vitamin C, (VITAMIN C) 500 mg tablet Take 500 mg by mouth daily. Provider, Historical   multivitamin (ONE A DAY) tablet Take 1 Tab by mouth daily. Provider, Historical   aspirin delayed-release 81 mg tablet Take 81 mg by mouth daily. Provider, Historical     Immunization History   Administered Date(s) Administered    Influenza High Dose Vaccine PF 10/06/2016    Influenza, Quadrivalent, Adjuvanted (>65 Yrs FLUAD QUAD [id number]) 10/08/2020    Pneumococcal Polysaccharide (PPSV-23) 10/08/2020    Tdap 06/09/2021    Zoster Vaccine, Live 07/21/2021       Review of Systems:  A complete review of systems was performed as stated in the HPI, all others are negative.       Objective:    Physical Exam:  /76 (BP 1 Location: Left upper arm, BP Patient Position: Sitting, BP Cuff Size: Adult)   Pulse (!) 121   Temp 97.8 °F (36.6 °C) (Temporal)   Resp 22   Ht 5' 8\" (1.727 m)   Wt 51.7 kg (114 lb)   SpO2 91%   BMI 17.33 kg/m²   Vitals were personally reviewed  Gen: no acute distress, pleasant and cooperative, sitting up in chair, very frail, unable to hear, ambulates slowly with assistance and walker, unable to climb up to exam table, patient is deaf  HEENT: normocephalic/atraumatic, no ocular drainage, EOMI, no scleral icterus, nasal bridge midline, unable to assess nasal and oral cavities due to patient wearing mask in the setting of COVID-19 pandemic  Neck: supple, trachea midline, no JVD  CVS: regular rate rhythm, S1/S2, no murmurs/rubs/gallops  Lungs: Poor air entry B/L, some scattered rhonchi, faint, no wheezes or rales throughout all lung fields  Back: Mild kyphosis, no scoliosis    Labs: I have reviewed the patient's available labs  Lab Results   Component Value Date/Time    WBC 8.6 04/28/2021 01:42 PM    HGB 12.9 (L) 04/28/2021 01:42 PM    HCT 41.0 04/28/2021 01:42 PM    PLATELET 957 60/41/2137 01:42 PM    MCV 92.3 04/28/2021 01:42 PM     Lab Results   Component Value Date/Time    Sodium 137 04/28/2021 01:42 PM    Potassium 4.5 04/28/2021 01:42 PM    Chloride 101 04/28/2021 01:42 PM    CO2 32 04/28/2021 01:42 PM    Anion gap 4 04/28/2021 01:42 PM    Glucose 101 (H) 04/28/2021 01:42 PM    BUN 27 (H) 04/28/2021 01:42 PM    Creatinine 0.44 (L) 04/28/2021 01:42 PM    BUN/Creatinine ratio 61 (H) 04/28/2021 01:42 PM    GFR est AA >60 04/28/2021 01:42 PM    GFR est non-AA >60 04/28/2021 01:42 PM    Calcium 8.8 04/28/2021 01:42 PM    Bilirubin, total 0.3 04/28/2021 01:42 PM    Alk. phosphatase 41 (L) 04/28/2021 01:42 PM    Protein, total 7.6 04/28/2021 01:42 PM    Albumin 3.4 04/28/2021 01:42 PM    Globulin 4.2 (H) 04/28/2021 01:42 PM    A-G Ratio 0.8 04/28/2021 01:42 PM    ALT (SGPT) 23 04/28/2021 01:42 PM    AST (SGOT) 24 04/28/2021 01:42 PM   -Connective tissue work-up negative    Imaging:  I have personally reviewed patient's imaging as follows--CT chest without contrast from 8/10/2021 shows some improvement in right upper lobe consolidative masslike lesion --now is cystic, with internal contents emptied, consistent with below.   Patient has persistent areas of patchy atelectasis and subpleural fibrotic changes with traction bronchiectasis, and tree-in-bud opacities in bilateral lower lobes, along with right upper lobe consolidation versus masslike appearance along with cavitation versus bulla. No other masses, consolidations, effusions seen. Official report per radiology:  CT Results (most recent):  Results from Hospital Encounter encounter on 08/10/21    CT CHEST WO CONT    Narrative  CT CHEST WITHOUT ENHANCEMENT    INDICATION: Interstitial lung disease, dysphagia, bronchiectasis, pulmonary  infiltrates, right upper lobe mass undergoing follow-up. TECHNIQUE: Axial images obtained from the thoracic inlet to the level of the  diaphragm without intravenous contrast. Coronal and sagittal reformatted images  were obtained. All CT scans at this facility are performed using dose optimization technique as  appropriate to a performed exam, to include automated exposure control,  adjustment of the mA and/or kV according to patient size (including appropriate  matching first site-specific examinations), or use of iterative reconstruction  technique. COMPARISON: 1/14/2021. CHEST FINDINGS:  The evaluation of the mediastinum, hilum and vascular structures is limited in  the absence of intravenous contrast.      Thyroid: Unremarkable in its visualized aspects. Pericardium/ Heart: Cardiomegaly. No pericardial effusion. Severe coronary  arteriosclerosis throughout. Aorta/ Vessels: No aneurysm. Lymph Nodes: Unremarkable. .    Lungs: Tracheomegaly. Regions of bronchiectasis is, as before. Increased size of  the thick-walled collapsed cyst in the subpleural right upper lobe which  measures 6.1 x 2.6 cm (series 3, image 19), previously 4.6 x 1.9 cm. There is  similar adjacent subpleural consolidation right upper, right middle and right  lower lobes. Similar centrilobular nodules in the periphery of the right lower  lobe. There are subpleural consolidation in the left upper lobe and medial  lingula. Similar centrilobular nodules left lower lobe. Pleura: Trace left pleural effusion.     Upper Abdomen: Mild adrenal thickening. Bones/soft tissues: Osteopenia. Degenerative disc disease. Impression  No significant interval change in lung findings except a thick-walled collapsed  cystic structure in the right upper lobe is increased. This is presumed  infectious/inflammatory. No nodular or significant thickening to suggest  malignancy. Severe coronary arteriosclerosis. Trace left pleural effusion. PFTs:  I have reviewed the patient's PFTs from 2/15/2021, spirometry shows a severe obstructive defect, however unreliable since patient was unable to follow directions. TTE:  I have reviewed the patient's TTE results  11/24/20   ECHO ADULT COMPLETE 11/24/2020 11/24/2020    Narrative · LV: Estimated LVEF is 45 - 50%. Visually measured ejection fraction. Normal cavity size and wall thickness. Globally reduced systolic function. Abnormal left ventricular septal motion consistent with paradoxic motion. Inconclusive left ventricular diastolic function. · Saline contrast was given to evaluate for intracardiac shunt. No shunt   seen. Bubble study was negative with valsalva and without valsalva. · TV: Moderate tricuspid valve regurgitation is present. · PA: Mild pulmonary hypertension. Pulmonary arterial systolic pressure is   42 mmHg. Signed by: Mitch Steele MD            Assessment and Plan:  67 y.o. male with:    Impression:  1. Pulm infiltrates with RUL masslike consolidation:  Repeat CT shows RUL lesion was likely an infected bulla. This is in the setting of underlying interstitial lung disease which has progressed. No apical basilar gradient, likely NSIP, underlying cause unclear. No smoking history to indicate RBILD/DIP, no pulmonary toxic medications. ILD likely due to chronic aspiration. Infiltrates likely caused by non-CF bronchiectasis  2. 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 Bronchiectasis, non-CF  4. Deconditioning  5. Allergic rhinitis  6.   Chronic cough: Etiology due to above  7. Chronic aspiration with dysphagia  8. CHF, systolic dysfunction:  Seen on last TTE with LVEF of 45-50%  9. Dyspnea on exertion/shortness of breath:  Multifactorial -- due to above    Plan:  -Spent time in this clinic visit going over CT findings with patient and his sister present with him in today's visit. Advised patient that based on imaging, this is likely not a malignancy and due to his bronchiectasis with infected bullae. Given pt's frailty, I would NOT recommend bronchoscopy -- explained to both pt's sister and the patient. I have advised aggressive airway clearance if tolerated. We will obtain serial sputum once patient starts airway clearance  -Start airway clearance as follows:  3% hypertonic saline nebs 4 times per day  Simultaneous albuterol nebs 4 times per day  Flutter device 4 times per day  Mucinex 600 mg p.o. twice daily  -Advised sister that if patient fails, we will consider mechanical vest therapy at that time  -Orders written for nebulizer with supplies and flutter device since patient does not have either of these and will need them for airway clearance  -Sputum for Gram stain and culture and AFB x3  -Given patient's frailty, I have advised sister consider putting patient in assisted living. I advised her that pt's lung condition will likely lead to progressive worsening and decompensation  -6-minute walk test at next visit if possible  -We will hold off on repeat CT until patient can start aggressive airway clearance as lung nodules will likely persist  -Counseled regarding aspiration lifestyle precautions.   Advised them to follow-up with SLP pharyngeal strengthening therapy when possible  -Management of CHF per Cardiology  -Advised graded exercise --- offered pulmonary rehab, but they are currently unable to provide transportation at this time  -Immunizations reviewed, Covid and pneumococcal vaccinations up-to-date  -Counseled patient regarding lifestyle precautions in COVID-19 pandemic including wearing mask in public and confined spaces, social/physical distancing, frequent hand hygiene, etc        Follow-up and Dispositions    · Return in about 3 months (around 11/30/2021).        Orders Placed This Encounter    PRO PULMONARY STRESS TESTING    AMB SUPPLY ORDER    AMB SUPPLY ORDER    CULTURE, RESPIRATORY/SPUTUM/BRONCH W GRAM STAIN    AFB CULTURE + SMEAR W/RFLX ID FROM CULTURE    AFB CULTURE + SMEAR W/RFLX ID FROM CULTURE    AFB CULTURE + SMEAR W/RFLX ID FROM CULTURE    fluorouraciL (EFUDEX) 5 % chemo cream    triamcinolone acetonide (NASACORT NA)    sodium chloride 3 % nebulizer solution    albuterol (PROVENTIL VENTOLIN) 2.5 mg /3 mL (0.083 %) nebu    guaiFENesin ER (MUCINEX) 600 mg ER tablet       Masood Irwin MD/MPH     Pulmonary, Critical Care Medicine  [address] [place] [address] [facility]

## 2021-08-31 NOTE — LETTER
9/1/2021    Patient: Casandra Cisneros   YOB: 1945   Date of Visit: 8/31/2021     Siddhartha Silverio, [address] IntersReplaced by [phone]  [address]  [id number]  [address]    Dear Siddhartha Silverio NP,      Thank you for referring Mr. Marge Kelly to [address] [place] [address] for evaluation. My notes for this consultation are attached. If you have questions, please do not hesitate to call me. I look forward to following your patient along with you.       Sincerely,    Amber Vizcarra MD

## 2021-08-31 NOTE — PROGRESS NOTES
Nayeli Keene presents today for   Chief Complaint   Patient presents with    Bronchiectasis     follow up from 2/18/2021    Other     pulmonary infiltrates, chronic pulmonary aspirations, ILD    Results     COVID (-) 5/12/2021       Is someone accompanying this pt? Yes. Sister    Is the patient using any DME equipment during [address]? Yes. Walker/rollator   -DME Company N/A    Depression Screening:  3 most recent PHQ Screens 8/31/2021   Little interest or pleasure in doing things Not at all   Feeling down, depressed, irritable, or hopeless Not at all   Total Score PHQ 2 0       Learning Assessment:  Learning Assessment 2/18/2021   PRIMARY LEARNER Patient   PRIMARY LANGUAGE ENGLISH   LEARNER PREFERENCE PRIMARY DEMONSTRATION     READING   ANSWERED BY Indy Wade   RELATIONSHIP OTHER       Abuse Screening:  Abuse Screening Questionnaire 6/29/2021   Do you ever feel afraid of your partner? N   Are you in a relationship with someone who physically or mentally threatens you? N   Is it safe for you to go home? -       Fall Risk  Fall Risk Assessment, last 12 mths 8/31/2021   Able to walk? Yes   Fall in past 12 months? 0   Do you feel unsteady? 1   Are you worried about falling 1   Is the gait abnormal? 1   Number of falls in past 12 months 0         Coordination of Care:  1. Have you been to the ER, urgent care clinic since your last visit? Hospitalized since your last visit? No    2. Have you seen or consulted any other health care providers outside of the [address] [place] [address] since your last visit? Include any pap smears or colon screening. Yes. NP Wei Dash, PCP    COVID vaccine Tonia Dupree) received from [facility] on 2/24/2021 (1st dose) and 3/24/2021 (2nd dose) per vaccine card. Card scanned into patient's chart. Immunization record updated.

## 2021-09-03 NOTE — TELEPHONE ENCOUNTER
Order placed for Referral to Speech thereapy, per Verbal Order from Dr. Yolanda Lorenzo on 9/3/2021. Last office visit: 9/01/21  Follow up Visit: Due November 2021    Provider is aware of last office visit and follow up. No further action requested from provider.
Patient Caretaker notified referral has been placed.
Pt's caretaker Indy called because the ref for speech therapy has . She would like to have a new order placed so that she can set up an appointment with in [facility] on [facility].  Please advise
yes

## 2021-09-22 NOTE — TELEPHONE ENCOUNTER
Patient caregiver inquiring if hypertonic solution is absolutely necessary. It is not covered by patient's insurance and he will have to pay over $50 out of pocket.

## 2021-10-01 NOTE — TELEPHONE ENCOUNTER
Patient requesting refill of mucomyst on 10/1/2021. [facility] requesting new script with diagnosis codes for billing. Last office visit: 8/31/2021  Follow up Visit: Due November 2021    Provider is aware of last office visit and follow up. No further action requested from provider.

## 2021-10-18 PROBLEM — J18.9 PNEUMONIA: Status: ACTIVE | Noted: 2021-01-01

## 2021-10-18 NOTE — ED PROVIDER NOTES
[facility]  Emergency Department Treatment Report        Patient: Darius Hardy Age: 67 y.o. Sex: male    YOB: 1945 Admit Date: 10/18/2021 PCP: Mata Hearn NP   MRN: [id number]  CSN: [id number]  Attending: Dr. Donny Valencia   Room: [place] [address] Time Dictated: 7:43 PM KRISHNA: George Camacho MD     Chief Complaint   Chief Complaint   Patient presents with    Shortness of Breath       History of Present Illness   67 y.o. male with a PMH of CAD, deafness, pulmonary mass of unknown etiology presents to the ER for evaluation of progressive shortness of breath. Per his sister, the patient has had the symptoms for the past 10 days and they became acutely worse last night. She also reports that he has had very poor by mouth intake and has experienced an 8 pound weight loss in the past 2 months with resultant generalized weakness. Reports that he did have his COVID-19 vaccine. The patient is deaf. History was obtained from the sister and from a phone krishna. Review of Systems   Review of Systems   Constitutional: Negative for chills and fever. HENT: Negative for congestion and sore throat. Eyes: Negative for blurred vision. Respiratory: Negative for cough. Cardiovascular: Negative for chest pain. Gastrointestinal: Negative for abdominal pain, diarrhea, nausea and vomiting. Genitourinary: Negative for dysuria and hematuria. Musculoskeletal: Negative for myalgias. Skin: Negative for rash. Neurological: Negative for headaches.        Past Medical/Surgical History     Past Medical History:   Diagnosis Date    CAD (coronary artery disease)     MI 1995; stent 2003 (done preop cochlear sgy)    Cancer ([facility] [address]     SQUAMOUS CELL    Coronary artery disease     Deafness     Myocardial infarction [facility]) [id number],[phone]    Scarlet fever age 9     Past Surgical History:   Procedure Laterality Date    BRONCH-FIBER/DIAGNOSTIC  6/16/2016         HX COLONOSCOPY  2013    neg    [place]    HX HEART CATHETERIZATION  2003    HX HEART CATHETERIZATION  1999    HX HERNIA REPAIR      inguinal ? laterality    HX OTHER SURGICAL      2 cochlear implants (R); 1 left - all failed       Social History     Social History     Socioeconomic History    Marital status: SINGLE     Spouse name: Not on file    Number of children: Not on file    Years of education: Not on file    Highest education level: Not on file   Occupational History    Not on file   Tobacco Use    Smoking status: Never Smoker    Smokeless tobacco: Never Used   Vaping Use    Vaping Use: Never used   Substance and Sexual Activity    Alcohol use: No    Drug use: No    Sexual activity: Not on file   Other Topics Concern    Not on file   Social History Narrative    Not on file     Social Determinants of Health     Financial Resource Strain:     Difficulty of Paying Living Expenses:    Food Insecurity:     Worried About Running Out of Food in the Last Year:     [address] in the Last Year:    Transportation Needs:     Lack of Transportation (Medical):      Lack of Transportation (Non-Medical):    Physical Activity:     Days of Exercise per Week:     Minutes of Exercise per Session:    Stress:     Feeling of Stress :    Social Connections:     Frequency of Communication with Friends and Family:     Frequency of Social Gatherings with Friends and Family:     Attends Buddhism Services:     Active Member of Clubs or Organizations:     Attends Club or Organization Meetings:     Marital Status:    Intimate Partner Violence:     Fear of Current or Ex-Partner:     Emotionally Abused:     Physically Abused:     Sexually Abused:        Family History     Family History   Problem Relation Age of Onset    No Known Problems Mother     Heart Disease Father     Heart Attack Father         1989    Coronary Artery Disease Father     Hypertension Father     High Cholesterol Father Current Medications   (Not in a hospital admission)      Allergies     Allergies   Allergen Reactions    Pollens Extract Runny Nose and Cough       Physical Exam     ED Triage Vitals [10/18/21 1828]   Enc Vitals Group      /62      Pulse (Heart Rate) (!) 123      Resp Rate (!) 56      Temp 100.2 °F (37.9 °C)      Temp src       O2 Sat (%) 90 %      Weight       Height       Head Circumference       Peak Flow       Pain Score       Pain Loc       Pain Edu? Excl. in [address]? Physical Exam  Constitutional:       General: He is not in acute distress. Comments: Chronically ill-appearing, very thin and unkempt   HENT:      Head: Normocephalic. Mouth/Throat:      Mouth: Mucous membranes are moist.   Eyes:      Extraocular Movements: Extraocular movements intact. Cardiovascular:      Rate and Rhythm: Tachycardia present. Pulmonary:      Effort: Tachypnea present. No accessory muscle usage. Breath sounds: No wheezing, rhonchi or rales. Chest:      Chest wall: No mass or deformity. Abdominal:      Palpations: Abdomen is soft. Tenderness: There is no abdominal tenderness. There is no guarding. Musculoskeletal:      Cervical back: Neck supple. Right lower leg: No tenderness. No edema. Left lower leg: No tenderness. No edema. Skin:     General: Skin is dry. Capillary Refill: Capillary refill takes less than 2 seconds. Neurological:      Mental Status: He is alert. Impression and Management Plan   76YOM with a history of CAD, pulmonary mass/cyst, hearing loss presents to the ER for evaluation of progressive dyspnea as well as failure to thrive at home. Vital signs remarkable for tachycardia, tachypnea, and hypoxia to the mid 80s, borderline fever of 100.2. Oxygen saturation improved on 4L NC. Physical exam reveals a chronically ill-appearing thin, and unkempt individual with tachypnea.   Benign cardiopulmonary and abdominal exam.  No lower extremity edema.  EKG reveals sinus tachycardia without ischemic changes. Given his vital signs and history, sepsis bundle initiated. Blood cultures obtained. Initiated on broad spectrum antibiotics to cover likely pulmonary source. Will also pursue cardiac and PE workup with cardiac markers and CTA chest. I established with the patient and his sister that he is full code. Diagnostic Studies   Lab:   Recent Results (from the past 12 hour(s))   EKG, 12 LEAD, INITIAL    Collection Time: 10/18/21  6:37 PM   Result Value Ref Range    Ventricular Rate 119 BPM    Atrial Rate 119 BPM    P-R Interval 118 ms    QRS Duration 92 ms    Q-T Interval 314 ms    QTC Calculation (Bezet) 441 ms    Calculated P Axis 69 degrees    Calculated R Axis -39 degrees    Calculated T Axis 83 degrees    Diagnosis       Sinus tachycardia  Right atrial enlargement  Left axis deviation  Abnormal ECG  When compared with ECG of 28-APR-2021 13:39,  No significant change was found     METABOLIC PANEL, COMPREHENSIVE    Collection Time: 10/18/21  7:28 PM   Result Value Ref Range    Sodium 139 136 - 145 mmol/L    Potassium 4.6 3.5 - 5.5 mmol/L    Chloride 101 100 - 111 mmol/L    CO2 38 (H) 21 - 32 mmol/L    Anion gap 0 (L) 3.0 - 18 mmol/L    Glucose 124 (H) 74 - 99 mg/dL    BUN 38 (H) 7.0 - 18 MG/DL    Creatinine 0.58 (L) 0.6 - 1.3 MG/DL    BUN/Creatinine ratio 66 (H) 12 - 20      GFR est AA >60 >60 ml/min/1.73m2    GFR est non-AA >60 >60 ml/min/1.73m2    Calcium 8.9 8.5 - 10.1 MG/DL    Bilirubin, total 0.1 (L) 0.2 - 1.0 MG/DL    ALT (SGPT) 28 16 - 61 U/L    AST (SGOT) 25 10 - 38 U/L    Alk.  phosphatase 32 (L) 45 - 117 U/L    Protein, total 7.2 6.4 - 8.2 g/dL    Albumin 2.4 (L) 3.4 - 5.0 g/dL    Globulin 4.8 (H) 2.0 - 4.0 g/dL    A-G Ratio 0.5 (L) 0.8 - 1.7     CBC WITH AUTOMATED DIFF    Collection Time: 10/18/21  7:28 PM   Result Value Ref Range    WBC 9.0 4.6 - 13.2 K/uL    RBC 4.45 4.35 - 5.65 M/uL    HGB 11.9 (L) 13.0 - 16.0 g/dL    HCT 39.7 36.0 - 48.0 % MCV 89.2 78.0 - 100.0 FL    MCH 26.7 24.0 - 34.0 PG    MCHC 30.0 (L) 31.0 - 37.0 g/dL    RDW 13.7 11.6 - 14.5 %    PLATELET 981 810 - 081 K/uL    MPV 9.1 (L) 9.2 - 11.8 FL    NEUTROPHILS 85 (H) 40 - 73 %    LYMPHOCYTES 3 (L) 21 - 52 %    MONOCYTES 11 (H) 3 - 10 %    EOSINOPHILS 1 0 - 5 %    BASOPHILS 0 0 - 2 %    ABS. NEUTROPHILS 7.6 1.8 - 8.0 K/UL    ABS. LYMPHOCYTES 0.3 (L) 0.9 - 3.6 K/UL    ABS. MONOCYTES 1.0 0.05 - 1.2 K/UL    ABS. EOSINOPHILS 0.1 0.0 - 0.4 K/UL    ABS. BASOPHILS 0.0 0.0 - 0.1 K/UL    DF AUTOMATED     TROPONIN I    Collection Time: 10/18/21  7:28 PM   Result Value Ref Range    Troponin-I, QT <0.02 0.0 - 0.045 NG/ML   POC LACTIC ACID    Collection Time: 10/18/21  7:44 PM   Result Value Ref Range    Lactic Acid (POC) 1.13 0.40 - 2.00 mmol/L   URINALYSIS W/ RFLX MICROSCOPIC    Collection Time: 10/18/21  9:15 PM   Result Value Ref Range    Color YELLOW      Appearance CLEAR      Specific gravity >1.030 (H) 1.005 - 1.030    pH (UA) 5.5 5.0 - 8.0      Protein 30 (A) NEG mg/dL    Glucose Negative NEG mg/dL    Ketone Negative NEG mg/dL    Bilirubin Negative NEG      Blood Negative NEG      Urobilinogen 1.0 0.2 - 1.0 EU/dL    Nitrites Negative NEG      Leukocyte Esterase Negative NEG       Labs Reviewed   URINALYSIS W/ RFLX MICROSCOPIC - Abnormal; Notable for the following components:       Result Value    Specific gravity >1.030 (*)     Protein 30 (*)     All other components within normal limits   METABOLIC PANEL, COMPREHENSIVE - Abnormal; Notable for the following components:    CO2 38 (*)     Anion gap 0 (*)     Glucose 124 (*)     BUN 38 (*)     Creatinine 0.58 (*)     BUN/Creatinine ratio 66 (*)     Bilirubin, total 0.1 (*)     Alk.  phosphatase 32 (*)     Albumin 2.4 (*)     Globulin 4.8 (*)     A-G Ratio 0.5 (*)     All other components within normal limits   CBC WITH AUTOMATED DIFF - Abnormal; Notable for the following components:    HGB 11.9 (*)     MCHC 30.0 (*)     MPV 9.1 (*) NEUTROPHILS 85 (*)     LYMPHOCYTES 3 (*)     MONOCYTES 11 (*)     ABS. LYMPHOCYTES 0.3 (*)     All other components within normal limits   CULTURE, BLOOD   CULTURE, BLOOD   RESPIRATORY VIRUS PANEL W/COVID-19, PCR   TROPONIN I   URINE MICROSCOPIC ONLY   POC LACTIC ACID       Imaging:    No results found. EKG as interpreted by me: Rate 120. Sinus tachycardia. Left axis deviation. Normal intervals. Large q waves in anterior leads. No acute ischemic changes. Imaging as interpreted by me:     CXR: Increased lung markings of the RUL and new RLL infiltrate concerning for pneumonia. CTA chest RUL and RLL infiltrate. Likely underlying interstitial lung disease. ED Course/Medical Decision Making   Bedside telemetry was due to dyspnea. On my interpretation it shows -  Rate 100 Rhythm - Normal sinus    Labs reveal normal white count. Mild normocytic anemia. Elevated Bun with normal Cr concerning for dehydration. Normal electrolytes, hepatic function. Low albumin consistent with poor PO intake. CXR concerning for RLL pneumonia and worsening of fluid around RUL cystic structure. CTA with concern for bilateral pneumonia in addition to underlying interstitial lung disease. Treated with 1L IVF, Vanc, Zosyn, and Levaquin. Negative troponin and nonischemic EKG. Low clinical concern for acute cardiogenic etiology of his symptoms. Given his CT findings, I clinically suspect his symptoms and hypoxia are secondary to progressive infectious pulmonary process. Given his septic features and hypoxia, he would benefit from inpatient admission for further management of his symptoms. 9:40PM: Case discussed with Dr. Sofia Mao, hospitalist, who agrees to admission. Final Diagnosis       ICD-10-CM ICD-9-CM   1. Pneumonia of right lung due to infectious organism, unspecified part of lung  J18.9 483.8   2. Hypoxia  R09.02 799.02   3.  Sepsis due to other etiology [facility])  A41.89 038.8       Disposition   Admit    Robert July, MD  October 18, 2021  9:43 PM      My signature above authenticates this document and my orders, the final    diagnosis (es), discharge prescription (s), and instructions in the Epic    record. If you have any questions please contact [phone]. Nursing notes have been reviewed by the physician/ advanced practice    Clinician.

## 2021-10-19 NOTE — ED NOTES
Pt received from previous nurse. Pt alert and awake, following commands. Pt on 4L nasal canula. Pt tolerating at this time with oxygen sat 98%. No complaints of pain at this time. Pt slightly hypotensive 90/50s which family states is normal for this patient. Pt deaf and can communicate by typing on phone. Pt cachectic. Continue to monitor. Call bell within reach.

## 2021-10-19 NOTE — CONSULTS
[id number] [facility] 54: [phone]  [facility]: [address] [place] [address] Way: [phone]    Patient Name: Dean Lopez  YOB: 1945    Date of Initial Consult: 10/19/2021   Reason for Consult: goals of care discussions   Requesting Provider: Dr Mao  Primary Care Physician: Dionne Jim NP      SUMMARY:   Dean Lopez is a 67y.o. year old with a past history of CAD. S/p stent in 2003, scarlet fever at age 9, interstitial lung disease, chronic bronchiectasis, and chronic aspiration with dysphagia   , who was admitted on 10/18/2021 from home  with a diagnosis of hypoxemic respiratory failure possibly due to aspiration pneumonia . Current medical issues leading to Palliative Medicine involvement include: 67year old male with several life limiting chronic illnesses. Palliative medicine is consulted for support and goals of care discussions. PALLIATIVE DIAGNOSES:   1. Goals of care   2. Acute hypoxic respiratory failure   3. Pneumonia likely due to aspiration   4. Debility        PLAN:   1. Goals of care Patient seen along with Ms Kay Pickering RN. He is alert, deaf and communicates verbally however needs questions text or written. We were able to write our questions today. He denied pain or shortness of breath. Noted AMD on file naming his sister Indy Wade as MPOA and her  Carmelita Dunham as secondary. He wished no changes today. Able to discuss goals of care through writing the question and explaining the benefits and burdens. He was able to verbally tell us he wished to be resuscitated in the event of cardiac arrest and intubation if needed. This was consistent with his AMD wishes. Goals of care full code with full interventions.  Communicated with patient by writing to have further thought and consideration of resuscitation as these efforts will not cure, treat or reverse his underlying chronic medical conditions and likely cause more functional debility. 2. Acute hypoxic respiratory failure likely due to pneumonia but has interstitial lung disease. Maintaining on nasal cannula currently   3. Pneumonia due to aspiration. Appreciate speech consult at time of our visit MBS pending. 4. Debility has care givers during weekdays, sister and brother in law provide care on weekends. Uses walker for ambulation. Very thin and frail appearing albumin 2.4   5. Initial consult note routed to primary continuity provider  6. Communicated plan of care with: Palliative IDT, patient     Patient/Health Care Proxy Stated Goals: Prolong life      TREATMENT PREFERENCES:   Code Status: Full Code    Advance Care Planning:  [] The [facility] Interdisciplinary Team has updated the ACP Navigator with [address] and Patient Capacity    Primary Decision Maker ([address]): AMD on file naming his sister Luis Daniel Ding as MPOA   Brother in law Gela Malcolm as secondary     Medical Interventions: Full interventions           Other:  As far as possible, the palliative care team has discussed with patient / health care proxy about goals of care / treatment preferences for patient. HISTORY:     History obtained from: chart and patient     CHIEF COMPLAINT: respiratory failure     HPI/SUBJECTIVE:    The patient is:   [x] Verbal and participatory receives communication via writing and via text.  He is verbal  [] Non-participatory due to:   Please see summary     Clinical Pain Assessment (nonverbal scale for nonverbal patients): Clinical Pain Assessment  Severity: 0          Duration: for how long has pt been experiencing pain (e.g., 2 days, 1 month, years)  Frequency: how often pain is an issue (e.g., several times per day, once every few days, constant)     FUNCTIONAL ASSESSMENT:     Palliative Performance Scale (PPS):  PPS: 50    ECOG  ECOG Status : Limited self-care     PSYCHOSOCIAL/SPIRITUAL SCREENING:      Any spiritual / Nondenominational concerns:  [] Yes /  [x] No    Caregiver Burnout:  [] Yes /  [] No /  [x] No Caregiver Present      Anticipatory grief assessment:   [x] Normal  / [] Maladaptive        REVIEW OF SYSTEMS:     Positive and pertinent negative findings in ROS are noted above in HPI. The following systems were [x] reviewed / [] unable to be reviewed as noted in HPI  Other findings are noted below. Systems: constitutional, ears/nose/mouth/throat, respiratory, gastrointestinal, genitourinary, musculoskeletal, integumentary, neurologic, psychiatric, endocrine. Positive findings noted below. Modified ESAS Completed by: provider   Fatigue: 6       Pain: 0   Anxiety: 0       Dyspnea: 0                    PHYSICAL EXAM:     Wt Readings from Last 3 Encounters:   10/18/21 51.3 kg (113 lb)   08/31/21 51.7 kg (114 lb)   06/29/21 53.1 kg (117 lb)     Blood pressure (!) 87/46, pulse 80, temperature 98.3 °F (36.8 °C), resp. rate 15, height 5' 7\" (1.702 m), weight 51.3 kg (113 lb), SpO2 99 %.   Pain:                       Last bowel movement: none recorded     Constitutional: chronically ill, very thin and frail appearing gentleman who is lying on gurney in NAD   Eyes: glasses donned   ENMT: moist MM   Cardiovascular: RRR   Respiratory: nasal cannula oxygen in place, respirations not labored at present   Skin: warm, dry  Neurologic: alert oriented x 3   Psychiatric: affect flat        HISTORY:     Active Problems:    Pneumonia (10/18/2021)      Past Medical History:   Diagnosis Date    CAD (coronary artery disease)     MI 1995; stent 2003 (done preop cochlear sgy)    Cancer ([facility] [address]     SQUAMOUS CELL    Coronary artery disease     Deafness     Myocardial infarction [facility]) [phone]    Scarlet fever age 9      Past Surgical History:   Procedure Laterality Date    BRONCH-FIBER/DIAGNOSTIC  6/16/2016         HX COLONOSCOPY  2013    neg    [place]    HX HEART CATHETERIZATION  2003    HX HEART CATHETERIZATION  1999    HX HERNIA REPAIR inguinal ? laterality    HX OTHER SURGICAL      2 cochlear implants (R); 1 left - all failed      Family History   Problem Relation Age of Onset    No Known Problems Mother     Heart Disease Father     Heart Attack Father         1989    Coronary Artery Disease Father     Hypertension Father     High Cholesterol Father      History reviewed, no pertinent family history.   Social History     Tobacco Use    Smoking status: Never Smoker    Smokeless tobacco: Never Used   Substance Use Topics    Alcohol use: No     Allergies   Allergen Reactions    Pollens Extract Runny Nose and Cough      Current Facility-Administered Medications   Medication Dose Route Frequency    enoxaparin (LOVENOX) injection 40 mg  40 mg SubCUTAneous QHS    vancomycin (VANCOCIN) 750 mg in 0.9% sodium chloride 250 mL (VIAL-MATE)  750 mg IntraVENous Q8H    sodium chloride 3% hypertonic nebulizer soln  4 mL Nebulization Q6H RT    [START ON 10/20/2021] Vancomycin Lab Information: Trough Level Due at 0730 on 10/20  1 Each Other ONCE    0.9% sodium chloride infusion  75 mL/hr IntraVENous CONTINUOUS    albuterol-ipratropium (DUO-NEB) 2.5 MG-0.5 MG/3 ML  3 mL Nebulization Q4H RT    sodium chloride 3% hypertonic nebulizer soln  4 mL Nebulization Q8H    guaiFENesin ER (MUCINEX) tablet 600 mg  600 mg Oral Q12H    sodium chloride (NS) flush 5-10 mL  5-10 mL IntraVENous PRN    piperacillin-tazobactam (ZOSYN) 3.375 g in 0.9% sodium chloride (MBP/ADV) 100 mL MBP  3.375 g IntraVENous Q6H    levoFLOXacin (LEVAQUIN) 750 mg in D5W IVPB  750 mg IntraVENous Q24H    sodium chloride (NS) flush 5-40 mL  5-40 mL IntraVENous Q8H    sodium chloride (NS) flush 5-40 mL  5-40 mL IntraVENous PRN    acetaminophen (TYLENOL) tablet 650 mg  650 mg Oral Q6H PRN    Or    acetaminophen (TYLENOL) suppository 650 mg  650 mg Rectal Q6H PRN    polyethylene glycol (MIRALAX) packet 17 g  17 g Oral DAILY PRN    albuterol (PROVENTIL VENTOLIN) nebulizer solution 2.5 mg  2.5 mg Nebulization Q6H RT    guaiFENesin ER (MUCINEX) tablet 600 mg  600 mg Oral Q12H    cholecalciferol (VITAMIN D3) (1000 Units /25 mcg) tablet 1,000 Units  1,000 Units Oral DAILY    omega-3 acid ethyl esters (LOVAZA) capsule 1,000 mg  1 g Oral DAILY WITH BREAKFAST    ascorbic acid (vitamin C) (VITAMIN C) tablet 500 mg  500 mg Oral DAILY    therapeutic multivitamin (THERAGRAN) tablet 1 Tablet  1 Tablet Oral DAILY    aspirin chewable tablet 81 mg  81 mg Oral DAILY     Current Outpatient Medications   Medication Sig    acetylcysteine (MUCOMYST) 100 mg/mL (10 %) nebulizer solution Take 4 mL by inhalation every four (4) hours for 120 days.  [START ON 11/5/2021] fluorouraciL (EFUDEX) 5 % chemo cream APPLY CREAM TO FOREHEAD 2 TIMES DAILY FOR 2 WEEKS ONCE HEALED USE 2 TIMES DAILY TO THE EARS FOR 2 WEEKS ONCE HEALED USE 2 TIMES DAILY TO THE CHEEKS AND TEMPLES FOR 2 WEEKS    sodium chloride 3 % nebulizer solution 4 mL by Nebulization route four (4) times daily. Mix with albuterol. File under Medicare Part B. Dx: R06.02; J47.9; J84.9; R53.81    albuterol (PROVENTIL VENTOLIN) 2.5 mg /3 mL (0.083 %) nebu Mix with hypertonic saline nebulizer -- use 3- times per day. File under Medicare Part B. Dx: R06.02; J47.9; J84.9; R53.81    guaiFENesin ER (MUCINEX) 600 mg ER tablet Take 1 Tablet by mouth two (2) times a day for 90 days.  cholecalciferol (VITAMIN D3) (1000 Units /25 mcg) tablet Take 1,000 Units by mouth daily.  FISH OIL-DHA-EPA PO Take 1 Tab by mouth daily.  amino acids powd Take 1 Dose by mouth daily.  OTHER Take 1 Cap by mouth daily. tumeric     fluticasone propionate (FLONASE NA) 1 Spray by Both Nostrils route daily.  ascorbic acid, vitamin C, (VITAMIN C) 500 mg tablet Take 500 mg by mouth daily.  multivitamin (ONE A DAY) tablet Take 1 Tab by mouth daily.  aspirin delayed-release 81 mg tablet Take 81 mg by mouth daily.         LAB AND IMAGING FINDINGS:     Lab Results Component Value Date/Time    WBC 7.3 10/19/2021 03:30 AM    HGB 10.0 (L) 10/19/2021 03:30 AM    PLATELET 157 84/55/1404 03:30 AM     Lab Results   Component Value Date/Time    Sodium 138 10/19/2021 03:30 AM    Potassium 4.4 10/19/2021 03:30 AM    Chloride 105 10/19/2021 03:30 AM    CO2 34 (H) 10/19/2021 03:30 AM    BUN 29 (H) 10/19/2021 03:30 AM    Creatinine 0.41 (L) 10/19/2021 03:30 AM    Calcium 8.0 (L) 10/19/2021 03:30 AM    Magnesium 2.6 10/08/2020 05:00 PM      Lab Results   Component Value Date/Time    Alk. phosphatase 32 (L) 10/18/2021 07:28 PM    Protein, total 7.2 10/18/2021 07:28 PM    Albumin 2.4 (L) 10/18/2021 07:28 PM    Globulin 4.8 (H) 10/18/2021 07:28 PM     Lab Results   Component Value Date/Time    INR (POC) 1.2 (H) 06/16/2016 11:26 AM      No results found for: IRON, FE, TIBC, IBCT, PSAT, FERR   No results found for: PH, PCO2, PO2  No components found for: [place]   Lab Results   Component Value Date/Time     11/23/2020 02:09 PM    CK - MB 4.5 (H) 10/08/2020 05:00 PM              Total time: 50 minutes   Counseling / coordination time, spent as noted above:   > 50% counseling / coordination: yes with patient     Prolonged service was provided for  []30 min   []75 min in face to face time in the presence of the patient, spent as noted above.   Time Start:   Time End:

## 2021-10-19 NOTE — ED NOTES
Pt continues to be hypotensive. Pt mentating well. Pt assisted to standing to use urinal at bedside. No complaints. Continue to monitor.

## 2021-10-19 NOTE — PROGRESS NOTES
visited with the family of Andie Hsu, who is a 67 y. o.,male. The  provided the following Interventions:  Initiated a relationship of care and support. Offered prayer and assurance of continued prayers on patient's behalf. Plan:  Chaplains will continue to follow and will provide pastoral care on an as needed/requested basis.  recommends bedside caregivers page  on duty if patient shows signs of acute spiritual or emotional distress.     [address]   [phone]

## 2021-10-19 NOTE — PROGRESS NOTES
[facility] Hospitalist Group  Progress Note    Patient: Sylvester Perea Age: 67 y.o. : 1945 MR#: [id number] SSN: [id number]  Date/Time: 10/19/2021    Subjective:     Patient is laying in bed in no apparent distress, awake, follows simple commands. Patient is very hard of hearing    Assessment/Plan:     Possible aspiration pneumonia, suspected chronic aspiration  Cavitary lung lesion  Interstitial lung disease  Hypoxia  Chronic systolic congestive heart failurecompensated  Gram-positive cocci bacteremia  Dysphagia    PLAN  Continue broad-spectrum antibiotics, follow cultures  , Bronchial hygiene  Pulmonary and ID consulted  Continue oxygen  Gentle IV fluids given the low normal blood pressures  Speech therapy evaluation noted. Palliative care input noted. Patient is full code  Discussed with patient, discussed with ID  I called patient sister at phone #[phone] and updated her regarding patient's care    Case discussed with:  [x]Patient  []Family  []Nursing  []Case Management  DVT Prophylaxis:  [x]Lovenox  []Hep SQ  []SCDs  []Coumadin   []On Heparin gtt    Objective:   VS:   Visit Vitals  BP (!) 87/46   Pulse 80   Temp 98.3 °F (36.8 °C)   Resp 15   Ht 5' 7\" (1.702 m)   Wt 51.3 kg (113 lb)   SpO2 99%   BMI 17.70 kg/m²      Tmax/24hrs: Temp (24hrs), Av.3 °F (37.4 °C), Min:98.3 °F (36.8 °C), Max:100.2 °F (37.9 °C)    Blood pressure when I was in patient's room on the monitor was 96/50.   This is around 3:30 PM    Input/Output:     Intake/Output Summary (Last 24 hours) at 10/19/2021 1636  Last data filed at 10/19/2021 1029  Gross per 24 hour   Intake 400 ml   Output    Net 400 ml       General:  Awake, follows simple commands  Cardiovascular:  S1S2+, RRR  Pulmonary: Coarse breath sounds bilaterally  GI:  Soft, BS+, NT, ND  Extremities:  No edema  Has bitemporal wasting    Labs:    Recent Results (from the past 24 hour(s))   EKG, 12 LEAD, INITIAL    Collection Time: 10/18/21 6:37 PM   Result Value Ref Range    Ventricular Rate 120 BPM    Atrial Rate 120 BPM    P-R Interval 134 ms    QRS Duration 76 ms    Q-T Interval 306 ms    QTC Calculation (Bezet) 432 ms    Calculated P Axis 57 degrees    Calculated R Axis -44 degrees    Calculated T Axis 86 degrees    Diagnosis       Sinus tachycardia  Right atrial enlargement  Left axis deviation  Pulmonary disease pattern  Septal infarct (cited on or before 08-OCT-2020)  Abnormal ECG  When compared with ECG of 28-APR-2021 13:39,  No significant change was found  Confirmed by Chitra Kemp MD, --- ([id number]) on 10/19/2021 12:05:37 PM     CULTURE, BLOOD    Collection Time: 10/18/21  7:28 PM    Specimen: Blood   Result Value Ref Range    Special Requests: NO SPECIAL REQUESTS      GRAM STAIN AEROBIC BOTTLE GRAM POSITIVE COCCI IN GROUPS      GRAM STAIN        SMEAR CALLED TO AND CORRECTLY REPEATED BY: Buffy Choe RN ED AT [phone] TO [phone]. Culture result: CULTURE IN PROGRESS,FURTHER UPDATES TO FOLLOW      Culture result:        Sent to [facility] for ID/Susceptibility if indicated. METABOLIC PANEL, COMPREHENSIVE    Collection Time: 10/18/21  7:28 PM   Result Value Ref Range    Sodium 139 136 - 145 mmol/L    Potassium 4.6 3.5 - 5.5 mmol/L    Chloride 101 100 - 111 mmol/L    CO2 38 (H) 21 - 32 mmol/L    Anion gap 0 (L) 3.0 - 18 mmol/L    Glucose 124 (H) 74 - 99 mg/dL    BUN 38 (H) 7.0 - 18 MG/DL    Creatinine 0.58 (L) 0.6 - 1.3 MG/DL    BUN/Creatinine ratio 66 (H) 12 - 20      GFR est AA >60 >60 ml/min/1.73m2    GFR est non-AA >60 >60 ml/min/1.73m2    Calcium 8.9 8.5 - 10.1 MG/DL    Bilirubin, total 0.1 (L) 0.2 - 1.0 MG/DL    ALT (SGPT) 28 16 - 61 U/L    AST (SGOT) 25 10 - 38 U/L    Alk.  phosphatase 32 (L) 45 - 117 U/L    Protein, total 7.2 6.4 - 8.2 g/dL    Albumin 2.4 (L) 3.4 - 5.0 g/dL    Globulin 4.8 (H) 2.0 - 4.0 g/dL    A-G Ratio 0.5 (L) 0.8 - 1.7     CBC WITH AUTOMATED DIFF    Collection Time: 10/18/21  7:28 PM   Result Value Ref Range    WBC 9.0 4.6 - 13.2 K/uL    RBC 4.45 4.35 - 5.65 M/uL    HGB 11.9 (L) 13.0 - 16.0 g/dL    HCT 39.7 36.0 - 48.0 %    MCV 89.2 78.0 - 100.0 FL    MCH 26.7 24.0 - 34.0 PG    MCHC 30.0 (L) 31.0 - 37.0 g/dL    RDW 13.7 11.6 - 14.5 %    PLATELET 912 153 - 258 K/uL    MPV 9.1 (L) 9.2 - 11.8 FL    NEUTROPHILS 85 (H) 40 - 73 %    LYMPHOCYTES 3 (L) 21 - 52 %    MONOCYTES 11 (H) 3 - 10 %    EOSINOPHILS 1 0 - 5 %    BASOPHILS 0 0 - 2 %    ABS. NEUTROPHILS 7.6 1.8 - 8.0 K/UL    ABS. LYMPHOCYTES 0.3 (L) 0.9 - 3.6 K/UL    ABS. MONOCYTES 1.0 0.05 - 1.2 K/UL    ABS. EOSINOPHILS 0.1 0.0 - 0.4 K/UL    ABS.  BASOPHILS 0.0 0.0 - 0.1 K/UL    DF AUTOMATED     TROPONIN I    Collection Time: 10/18/21  7:28 PM   Result Value Ref Range    Troponin-I, QT <0.02 0.0 - 0.045 NG/ML   PROCALCITONIN    Collection Time: 10/18/21  7:28 PM   Result Value Ref Range    Procalcitonin <0.05 ng/mL   POC LACTIC ACID    Collection Time: 10/18/21  7:44 PM   Result Value Ref Range    Lactic Acid (POC) 1.13 0.40 - 2.00 mmol/L   CULTURE, BLOOD    Collection Time: 10/18/21  9:15 PM    Specimen: Blood   Result Value Ref Range    Special Requests: NO SPECIAL REQUESTS      Culture result: NO GROWTH AFTER 9 HOURS     URINALYSIS W/ RFLX MICROSCOPIC    Collection Time: 10/18/21  9:15 PM   Result Value Ref Range    Color YELLOW      Appearance CLEAR      Specific gravity >1.030 (H) 1.005 - 1.030    pH (UA) 5.5 5.0 - 8.0      Protein 30 (A) NEG mg/dL    Glucose Negative NEG mg/dL    Ketone Negative NEG mg/dL    Bilirubin Negative NEG      Blood Negative NEG      Urobilinogen 1.0 0.2 - 1.0 EU/dL    Nitrites Negative NEG      Leukocyte Esterase Negative NEG     URINE MICROSCOPIC ONLY    Collection Time: 10/18/21  9:15 PM   Result Value Ref Range    WBC Negative 0 - 4 /hpf    RBC Negative 0 - 5 /hpf    Epithelial cells Negative 0 - 5 /lpf    Bacteria Negative NEG /hpf   RESPIRATORY VIRUS PANEL W/COVID-19, PCR    Collection Time: 10/18/21  9:20 PM    Specimen: Nasopharyngeal   Result Value Ref Range    Adenovirus Not detected NOTD      Coronavirus 229E Not detected NOTD      Coronavirus HKU1 Not detected NOTD      Coronavirus CVNL63 Not detected NOTD      Coronavirus OC43 Not detected NOTD      SARS-CoV-2, PCR Not detected NOTD      Metapneumovirus Not detected NOTD      Rhinovirus and Enterovirus Not detected NOTD      Influenza A Not detected NOTD      Influenza A, subtype H1 Not detected NOTD      Influenza A, subtype H3 Not detected NOTD      INFLUENZA A H1N1 PCR Not detected NOTD      Influenza B Not detected NOTD      Parainfluenza 1 Not detected NOTD      Parainfluenza 2 Not detected NOTD      Parainfluenza 3 Not detected NOTD      Parainfluenza virus 4 Not detected NOTD      RSV by PCR Not detected NOTD      B. parapertussis, PCR Not detected NOTD      Bordetella pertussis - PCR Not detected NOTD      Chlamydophila pneumoniae DNA, QL, PCR Not detected NOTD      Mycoplasma pneumoniae DNA, QL, PCR Not detected NOTD     METABOLIC PANEL, BASIC    Collection Time: 10/19/21  3:30 AM   Result Value Ref Range    Sodium 138 136 - 145 mmol/L    Potassium 4.4 3.5 - 5.5 mmol/L    Chloride 105 100 - 111 mmol/L    CO2 34 (H) 21 - 32 mmol/L    Anion gap NEG 1 3.0 - 18 mmol/L    Glucose 101 (H) 74 - 99 mg/dL    BUN 29 (H) 7.0 - 18 MG/DL    Creatinine 0.41 (L) 0.6 - 1.3 MG/DL    BUN/Creatinine ratio 71 (H) 12 - 20      GFR est AA >60 >60 ml/min/1.73m2    GFR est non-AA >60 >60 ml/min/1.73m2    Calcium 8.0 (L) 8.5 - 10.1 MG/DL   CBC WITH AUTOMATED DIFF    Collection Time: 10/19/21  3:30 AM   Result Value Ref Range    WBC 7.3 4.6 - 13.2 K/uL    RBC 3.77 (L) 4.35 - 5.65 M/uL    HGB 10.0 (L) 13.0 - 16.0 g/dL    HCT 34.1 (L) 36.0 - 48.0 %    MCV 90.5 78.0 - 100.0 FL    MCH 26.5 24.0 - 34.0 PG    MCHC 29.3 (L) 31.0 - 37.0 g/dL    RDW 13.5 11.6 - 14.5 %    PLATELET 504 046 - 492 K/uL    MPV 9.1 (L) 9.2 - 11.8 FL    NEUTROPHILS 83 (H) 40 - 73 %    LYMPHOCYTES 5 (L) 21 - 52 %    MONOCYTES 10 3 - 10 %    EOSINOPHILS 1 0 - 5 %    BASOPHILS 0 0 - 2 %    ABS. NEUTROPHILS 6.1 1.8 - 8.0 K/UL    ABS. LYMPHOCYTES 0.4 (L) 0.9 - 3.6 K/UL    ABS. MONOCYTES 0.7 0.05 - 1.2 K/UL    ABS. EOSINOPHILS 0.1 0.0 - 0.4 K/UL    ABS.  BASOPHILS 0.0 0.0 - 0.1 K/UL    DF AUTOMATED     STREP PNEUMO AG, URINE    Collection Time: 10/19/21  3:30 AM    Specimen: Urine, random   Result Value Ref Range    Strep pneumo Ag, urine Negative NEG     LEGIONELLA PNEUMOPHILA AG, URINE    Collection Time: 10/19/21  3:30 AM    Specimen: Urine, random   Result Value Ref Range    Legionella Ag, urine Negative NEG       Additional Data Reviewed:      Signed By: Marimar Raymundo MD     October 19, 2021

## 2021-10-19 NOTE — REMOTE MONITORING
Spoke with primary RN Ander Iyer, regarding Sepsis bundle. Thank You.     Jing Wyatt RN, [facility]  [phone]

## 2021-10-19 NOTE — CONSULTS
Infectious Disease Consultation Note        Reason: Right upper lobe cavitary pneumonia    Current abx Prior abx   Piperacillin/tazobactam, levofloxacin, vancomycin since 10/18/2021      Lines:       Assessment :    67 y.o. 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 intake, and worsening cough . Now with gram positive bacteremia, chronic cavitary lesion RUL with RUL/RLL infiltrates    Clinical presentation c/w acute on chronic hypoxic respiratory failure likely secondary to recurrent aspiration pneumonia, chronic cavitary lesion right upper lobe  Rule out superimposed infection/colonization with atypical mycobacteria    Pulmonary follow-up appreciated    Single positive blood culture for gram-positive cocci in 10/18 represent contamination versus secondary bloodstream infection-will need to follow-up identification of gram-positive cocci to determine this    Recommendations:    1. Continue Zosyn, levofloxacin, vancomycin for now  2. Obtain induced sputum culture, sputum for AFB  3. Follow-up identification of gram-positive cocci in blood culture  4. Obtain nasal MRSA swab  5. Follow-up pulmonary recommendations  6. Will de-escalate antibiotics based on the blood test results, clinical course      Thank you for consultation request. Above plan was discussed in details with patient, and dr Nanine Dandy. Please call me if any further questions or concerns. Will continue to participate in the care of this patient. HPI:    67 y.o. 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 intake, and worsening cough . Patient is very hard of hearing.   He answers some questions when written on paper. I obtained most history from review of records, talking to the hospitalist.    Per patient's sister's report, patient lives by himself and has a caretaker that comes to his living space Monday through Friday. Sister and brother-in-law help patient out on the weekend. He has continued to lose weight over the past few months. More specifically, patient is not eating very much food and seems disinterested in it. When he does eat, sister notes that he aspirates. Review of records reveals that [facility]. He was last seen by dr. Tara Epstein in 2/2021. At that time he was noted to have RUL lesion felt to be infected bullae. He was noted to have some iimprovement s/p abx. Plans were made to obtain induced sputum, repeat CT chest in 6 months. ER Course:   Levofloxacin, Zosyn, vancomycin  NS 1000 mL bolus   mL bolus     CTA chest showed no evidence of pulmonary emboli, but it did show multifocal pneumonia that has developed over the right lung and a pre-existing cavitary right upper lobe lesion that is similar in appearance to previous CT scans     Significant labs: Lactic acid 1.13, white blood cell count 9, neutrophil: Lymphocyte ratio 85:3, creatinine 0.58, albumin 2.4, troponin undetectable, glucose 124, respiratory viral panel  Negative    Pulmonary consulted. I have been consulted for further recommendations.   Detailed review of system not feasible since difficulty chronic at with the patient         Past Medical History:   Diagnosis Date    CAD (coronary artery disease)     MI 1995; stent 2003 (done preop cochlear sgy)    Cancer ([facility] [address]     SQUAMOUS CELL    Coronary artery disease     Deafness     Myocardial infarction [facility]) [id number]    Scarlet fever age 9       Past Surgical History:   Procedure Laterality Date    BRONCH-FIBER/DIAGNOSTIC  6/16/2016         HX COLONOSCOPY  2013    neg    [place]    HX HEART CATHETERIZATION  2003    HX HEART CATHETERIZATION  1999    HX HERNIA REPAIR      inguinal ? laterality    HX OTHER SURGICAL      2 cochlear implants (R); 1 left - all failed       Patient's Medications   Start Taking    No medications on file   Continue Taking    ACETYLCYSTEINE (MUCOMYST) 100 MG/ML (10 %) NEBULIZER SOLUTION    Take 4 mL by inhalation every four (4) hours for 120 days. ALBUTEROL (PROVENTIL VENTOLIN) 2.5 MG /3 ML (0.083 %) NEBU    Mix with hypertonic saline nebulizer -- use 3- times per day. File under Medicare Part B. Dx: R06.02; J47.9; J84.9; R53.81    AMINO ACIDS POWD    Take 1 Dose by mouth daily. ASCORBIC ACID, VITAMIN C, (VITAMIN C) 500 MG TABLET    Take 500 mg by mouth daily. ASPIRIN DELAYED-RELEASE 81 MG TABLET    Take 81 mg by mouth daily. CHOLECALCIFEROL (VITAMIN D3) (1000 UNITS /25 MCG) TABLET    Take 1,000 Units by mouth daily. FISH OIL-DHA-EPA PO    Take 1 Tab by mouth daily. FLUOROURACIL (EFUDEX) 5 % CHEMO CREAM    APPLY CREAM TO FOREHEAD 2 TIMES DAILY FOR 2 WEEKS ONCE HEALED USE 2 TIMES DAILY TO THE EARS FOR 2 WEEKS ONCE HEALED USE 2 TIMES DAILY TO THE CHEEKS AND TEMPLES FOR 2 WEEKS    FLUTICASONE PROPIONATE (FLONASE NA)    1 [place] by Both Nostrils route daily. GUAIFENESIN ER (MUCINEX) 600 MG ER TABLET    Take 1 Tablet by mouth two (2) times a day for 90 days. MULTIVITAMIN (ONE A DAY) TABLET    Take 1 Tab by mouth daily. OTHER    Take 1 Cap by mouth daily. tumeric     SODIUM CHLORIDE 3 % NEBULIZER SOLUTION    4 mL by Nebulization route four (4) times daily. Mix with albuterol. File under Medicare Part B.   Dx: R06.02; J47.9; J84.9; R53.81   These Medications have changed    No medications on file   Stop Taking    No medications on file       Current Facility-Administered Medications   Medication Dose Route Frequency    enoxaparin (LOVENOX) injection 40 mg  40 mg SubCUTAneous QHS    vancomycin (VANCOCIN) 750 mg in 0.9% sodium chloride 250 mL (VIAL-MATE)  750 mg IntraVENous Q8H    sodium chloride 3% hypertonic nebulizer soln  4 mL Nebulization Q6H RT    [START ON 10/20/2021] Vancomycin Lab Information: Trough Level Due at 0730 on 10/20  1 Each Other ONCE    0.9% sodium chloride infusion  75 mL/hr IntraVENous CONTINUOUS    albuterol-ipratropium (DUO-NEB) 2.5 MG-0.5 MG/3 ML  3 mL Nebulization Q4H RT    sodium chloride 3% hypertonic nebulizer soln  4 mL Nebulization Q8H    guaiFENesin ER (MUCINEX) tablet 600 mg  600 mg Oral Q12H    sodium chloride (NS) flush 5-10 mL  5-10 mL IntraVENous PRN    piperacillin-tazobactam (ZOSYN) 3.375 g in 0.9% sodium chloride (MBP/ADV) 100 mL MBP  3.375 g IntraVENous Q6H    levoFLOXacin (LEVAQUIN) 750 mg in D5W IVPB  750 mg IntraVENous Q24H    sodium chloride (NS) flush 5-40 mL  5-40 mL IntraVENous Q8H    sodium chloride (NS) flush 5-40 mL  5-40 mL IntraVENous PRN    acetaminophen (TYLENOL) tablet 650 mg  650 mg Oral Q6H PRN    Or    acetaminophen (TYLENOL) suppository 650 mg  650 mg Rectal Q6H PRN    polyethylene glycol (MIRALAX) packet 17 g  17 g Oral DAILY PRN    albuterol (PROVENTIL VENTOLIN) nebulizer solution 2.5 mg  2.5 mg Nebulization Q6H RT    guaiFENesin ER (MUCINEX) tablet 600 mg  600 mg Oral Q12H    cholecalciferol (VITAMIN D3) (1000 Units /25 mcg) tablet 1,000 Units  1,000 Units Oral DAILY    omega-3 acid ethyl esters (LOVAZA) capsule 1,000 mg  1 g Oral DAILY WITH BREAKFAST    ascorbic acid (vitamin C) (VITAMIN C) tablet 500 mg  500 mg Oral DAILY    therapeutic multivitamin (THERAGRAN) tablet 1 Tablet  1 Tablet Oral DAILY    aspirin chewable tablet 81 mg  81 mg Oral DAILY     Current Outpatient Medications   Medication Sig    acetylcysteine (MUCOMYST) 100 mg/mL (10 %) nebulizer solution Take 4 mL by inhalation every four (4) hours for 120 days.     [START ON 11/5/2021] fluorouraciL (EFUDEX) 5 % chemo cream APPLY CREAM TO FOREHEAD 2 TIMES DAILY FOR 2 WEEKS ONCE HEALED USE 2 TIMES DAILY TO THE EARS FOR 2 WEEKS ONCE HEALED USE 2 TIMES DAILY TO THE CHEEKS AND TEMPLES FOR 2 WEEKS    sodium chloride 3 % nebulizer solution 4 mL by Nebulization route four (4) times daily. Mix with albuterol. File under Medicare Part B. Dx: R06.02; J47.9; J84.9; R53.81    albuterol (PROVENTIL VENTOLIN) 2.5 mg /3 mL (0.083 %) nebu Mix with hypertonic saline nebulizer -- use 3- times per day. File under Medicare Part B. Dx: R06.02; J47.9; J84.9; R53.81    guaiFENesin ER (MUCINEX) 600 mg ER tablet Take 1 Tablet by mouth two (2) times a day for 90 days.  cholecalciferol (VITAMIN D3) (1000 Units /25 mcg) tablet Take 1,000 Units by mouth daily.  FISH OIL-DHA-EPA PO Take 1 Tab by mouth daily.  amino acids powd Take 1 Dose by mouth daily.  OTHER Take 1 Cap by mouth daily. tumeric     fluticasone propionate (FLONASE NA) 1 Spray by Both Nostrils route daily.  ascorbic acid, vitamin C, (VITAMIN C) 500 mg tablet Take 500 mg by mouth daily.  multivitamin (ONE A DAY) tablet Take 1 Tab by mouth daily.  aspirin delayed-release 81 mg tablet Take 81 mg by mouth daily.        Allergies: Pollens extract    Family History   Problem Relation Age of Onset    No Known Problems Mother     Heart Disease Father     Heart Attack Father         1989    Coronary Artery Disease Father     Hypertension Father     High Cholesterol Father      Social History     Socioeconomic History    Marital status: SINGLE     Spouse name: Not on file    Number of children: Not on file    Years of education: Not on file    Highest education level: Not on file   Occupational History    Not on file   Tobacco Use    Smoking status: Never Smoker    Smokeless tobacco: Never Used   Vaping Use    Vaping Use: Never used   Substance and Sexual Activity    Alcohol use: No    Drug use: No    Sexual activity: Not on file   Other Topics Concern    Not on file   Social History Narrative    Not on file Social Determinants of Health     Financial Resource Strain:     Difficulty of Paying Living Expenses:    Food Insecurity:     Worried About Running Out of Food in the Last Year:     [address] in the Last Year:    Transportation Needs:     Lack of Transportation (Medical):  Lack of Transportation (Non-Medical):    Physical Activity:     Days of Exercise per Week:     Minutes of Exercise per Session:    Stress:     Feeling of Stress :    Social Connections:     Frequency of Communication with Friends and Family:     Frequency of Social Gatherings with Friends and Family:     Attends [employer] Services:     Active Member of Clubs or Organizations:     Attends Club or Organization Meetings:     Marital Status:    Intimate Partner Violence:     Fear of Current or Ex-Partner:     Emotionally Abused:     Physically Abused:     Sexually Abused:      Social History     Tobacco Use   Smoking Status Never Smoker   Smokeless Tobacco Never Used        Temp (24hrs), Av.3 °F (37.4 °C), Min:98.3 °F (36.8 °C), Max:100.2 °F (37.9 °C)    Visit Vitals  BP (!) 87/46   Pulse 80   Temp 98.3 °F (36.8 °C)   Resp 15   Ht 5' 7\" (1.702 m)   Wt 51.3 kg (113 lb)   SpO2 99%   BMI 17.70 kg/m²       ROS: 12 point ROS obtained in details. Pertinent positives as mentioned in HPI,   otherwise negative    Physical Exam:    General:Thin  male laying on the bed AAOx3 in no acute distress. HEENT:  Normocephalic, atraumatic, EOMI, no scleral icterus or pallor; no conjunctival hemmohage;  nasal and oral mucous are moist and without evidence of lesions. Neck supple, no bruits. Lymph Nodes:   not examined   Lungs:   shallow breathing, tachypneic, rhonchi right upper lung   Heart:  RRR, s1 and s2; no  rubs or gallops, no edema, + pedal pulses   Abdomen:  soft, non-distended, active bowel sounds, no hepatomegaly, no splenomegaly. Non-tender   Genitourinary:  deferred   Extremities:   no clubbing, cyanosis; no joint effusions or swelling; Full ROM of all large joints to the upper and lower extremities; muscle mass appropriate for age   Neurologic:  No gross focal motor or sensory abnormalities                        Skin:  No rash or ulcers noted   Back:  no spinal or paraspinal muscle tenderness or rigidity, no CVA tenderness     Psychiatric:  No suicidal or homicidal ideations, appropriate mood and affect         Labs: Results:   Chemistry Recent Labs     10/19/21  0330 10/18/21  1928   * 124*    139   K 4.4 4.6    101   CO2 34* 38*   BUN 29* 38*   CREA 0.41* 0.58*   CA 8.0* 8.9   AGAP NEG 1 0*   BUCR 71* 66*   AP  --  32*   TP  --  7.2   ALB  --  2.4*   GLOB  --  4.8*   AGRAT  --  0.5*      CBC w/Diff Recent Labs     10/19/21  0330 10/18/21  1928   WBC 7.3 9.0   RBC 3.77* 4.45   HGB 10.0* 11.9*   HCT 34.1* 39.7    245   GRANS 83* 85*   LYMPH 5* 3*   EOS 1 1      Microbiology Recent Labs     10/18/21  2115 10/18/21  1928   CULT NO GROWTH AFTER 9 HOURS NO GROWTH AFTER 11 HOURS          RADIOLOGY:    All available imaging studies/reports in [facility] care for this admission were reviewed      Disclaimer: Sections of this note are dictated utilizing voice recognition software, which may have resulted in some phonetic based errors in grammar and contents. Even though attempts were made to correct all the mistakes, some may have been missed, and remained in the body of the document. If questions arise, please contact our department.     Dr. Georgette Spivey, Infectious Disease Specialist  [phone]  October 19, 2021  3:25 PM

## 2021-10-19 NOTE — CONSULTS
[facility]. Pulmonary, Critical Care, and Sleep Medicine    Initial Patient Consult    Name: Dora Maravilla MRN: [id number]   : 1945 Hospital: [address] [place] [address]   Date: 10/19/2021        IMPRESSION:   · Acute on Chronic Hypoxic Respiratory Failure  · - 2/2 chronic aspiration, bronchiectasis, ILD, with new consolidations suspicious for mucus plugging vs pneumonia. · RUL cavitary lesion- chronic, stable, likely 2/2 MAC  · Interstitial Lung disease- fibrotic NSIP  · Bronchiectasis- non-CF  · Chronic cough- 2/2 bronchiectasis  · CHF- EF 45-50%     Patient Active Problem List   Diagnosis Code    Coronary artery disease involving native coronary artery of native heart without angina pectoris,s/p stent  I25.10    Scarlet fever A38.9    Myocardial infarction ([facility] [address] I21.9    Urinary frequency R35.0    Pneumonia J18.9      RECOMMENDATIONS:   · Send for sputum AFBs and sputum Cx  · Okay for broad spectrum ABX- defer to ID  · No need for steroids  · Will start hypertonic saline nebs with duonebs. · Start mucinex  · Start acapella valve  · May need metanebs. · Assess home Oxygen needs at discharge  · Will Follow     Subjective: This patient has been seen and evaluated at the request of Dr. Laure Brewster for respiratory failure. Patient is a 67 y.o. male with PMHx of chronic respiratory failure, ILD, bronchiectasis who presents with failure to thrive. He has apparently not been eating well and had more shortness of breath. He was placed on some O2 and CT chest showed some worsening consolidations in the presence of the chronic lung disease. He follows with us in clinic and was instructed to use hypertonic saline, acapella and mucinex. It is unclear whether he has been doing this regimen. He also had AFB ordered which had not been submitted. He is unable to give me a history and he is deaf and has limited ability to speak. No family member is present.        Past Medical History: Diagnosis Date    CAD (coronary artery disease)     MI 1995; stent 2003 (done preop cochlear sgy)    Cancer ([facility] [address]     SQUAMOUS CELL    Coronary artery disease     Deafness     Myocardial infarction [facility]) [phone]    Scarlet fever age 9      Past Surgical History:   Procedure Laterality Date    BRONCH-FIBER/DIAGNOSTIC  6/16/2016         HX COLONOSCOPY  2013    neg    [place]ON[place]    HX HEART CATHETERIZATION  2003    HX HEART CATHETERIZATION  1999    HX HERNIA REPAIR      inguinal ? laterality    HX OTHER SURGICAL      2 cochlear implants (R); 1 left - all failed      Prior to Admission medications    Medication Sig Start Date End Date Taking? Authorizing Provider   acetylcysteine (MUCOMYST) 100 mg/mL (10 %) nebulizer solution Take 4 mL by inhalation every four (4) hours for 120 days. 10/1/21 1/29/22  Selene Silva MD   fluorouraciL (EFUDEX) 5 % chemo cream APPLY CREAM TO FOREHEAD 2 TIMES DAILY FOR 2 WEEKS ONCE HEALED USE 2 TIMES DAILY TO THE EARS FOR 2 WEEKS ONCE HEALED USE 2 TIMES DAILY TO THE CHEEKS AND TEMPLES FOR 2 WEEKS 11/5/21   Provider, Historical   sodium chloride 3 % nebulizer solution 4 mL by Nebulization route four (4) times daily. Mix with albuterol. File under Medicare Part B. Dx: R06.02; I80.9; J84.9; R53.81 8/31/21   Selene Silva MD   albuterol (PROVENTIL VENTOLIN) 2.5 mg /3 mL (0.083 %) nebu Mix with hypertonic saline nebulizer -- use 3- times per day. File under Medicare Part B. Dx: R06.02; T36.7; J84.9; R53.81 8/31/21   Selene Silva MD   guaiFENesin ER (MUCINEX) 600 mg ER tablet Take 1 Tablet by mouth two (2) times a day for 90 days. 8/31/21 11/29/21  Selene Silva MD   cholecalciferol (VITAMIN D3) (1000 Units /25 mcg) tablet Take 1,000 Units by mouth daily. Provider, Historical   FISH OIL-DHA-EPA PO Take 1 Tab by mouth daily. Provider, Historical   amino acids powd Take 1 Dose by mouth daily.     Provider, Historical   OTHER Take 1 Cap by mouth daily. tumeric     Provider, Historical   fluticasone propionate (FLONASE NA) 1 Spray by Both Nostrils route daily. Provider, Historical   ascorbic acid, vitamin C, (VITAMIN C) 500 mg tablet Take 500 mg by mouth daily. Provider, Historical   multivitamin (ONE A DAY) tablet Take 1 Tab by mouth daily. Provider, Historical   aspirin delayed-release 81 mg tablet Take 81 mg by mouth daily.     Provider, Historical     Allergies   Allergen Reactions    Pollens Extract Runny Nose and Cough      Social History     Tobacco Use    Smoking status: Never Smoker    Smokeless tobacco: Never Used   Substance Use Topics    Alcohol use: No      Family History   Problem Relation Age of Onset    No Known Problems Mother     Heart Disease Father     Heart Attack Father         46    Coronary Artery Disease Father     Hypertension Father     High Cholesterol Father         Current Facility-Administered Medications   Medication Dose Route Frequency    enoxaparin (LOVENOX) injection 40 mg  40 mg SubCUTAneous QHS    vancomycin (VANCOCIN) 750 mg in 0.9% sodium chloride 250 mL (VIAL-MATE)  750 mg IntraVENous Q8H    sodium chloride 3% hypertonic nebulizer soln  4 mL Nebulization Q6H RT    [START ON 10/20/2021] Vancomycin Lab Information: Trough Level Due at 0730 on 10/20  1 Each Other ONCE    0.9% sodium chloride infusion  75 mL/hr IntraVENous CONTINUOUS    piperacillin-tazobactam (ZOSYN) 3.375 g in 0.9% sodium chloride (MBP/ADV) 100 mL MBP  3.375 g IntraVENous Q6H    levoFLOXacin (LEVAQUIN) 750 mg in D5W IVPB  750 mg IntraVENous Q24H    sodium chloride (NS) flush 5-40 mL  5-40 mL IntraVENous Q8H    albuterol (PROVENTIL VENTOLIN) nebulizer solution 2.5 mg  2.5 mg Nebulization Q6H RT    guaiFENesin ER (MUCINEX) tablet 600 mg  600 mg Oral Q12H    cholecalciferol (VITAMIN D3) (1000 Units /25 mcg) tablet 1,000 Units  1,000 Units Oral DAILY    omega-3 acid ethyl esters (LOVAZA) capsule 1,000 mg  1 g Oral DAILY WITH BREAKFAST    ascorbic acid (vitamin C) (VITAMIN C) tablet 500 mg  500 mg Oral DAILY    therapeutic multivitamin (THERAGRAN) tablet 1 Tablet  1 Tablet Oral DAILY    aspirin chewable tablet 81 mg  81 mg Oral DAILY       Review of Systems:  Pertinent items are noted in HPI. ROS    Objective:   Vital Signs:    Visit Vitals  BP (!) 87/46   Pulse 80   Temp 98.3 °F (36.8 °C)   Resp 15   Ht 5' 7\" (1.702 m)   Wt 51.3 kg (113 lb)   SpO2 99%   BMI 17.70 kg/m²       O2 Device: Nasal cannula   O2 Flow Rate (L/min): 4 l/min   Temp (24hrs), Av.3 °F (37.4 °C), Min:98.3 °F (36.8 °C), Max:100.2 °F (37.9 °C)       Intake/Output:   Last shift:      10/19 0701 - 10/19 1900  In: 250 [I.V.:250]  Out: -   Last 3 shifts: 10/17 1901 - 10/19 0700  In: 150 [I.V.:150]  Out: -     Intake/Output Summary (Last 24 hours) at 10/19/2021 1426  Last data filed at 10/19/2021 1029  Gross per 24 hour   Intake 400 ml   Output    Net 400 ml      Physical Exam:   General:  Alert, awake, appears older than stated age, chronic malnourished. Head:  Normocephalic, without obvious abnormality, atraumatic. Lungs:   Bilateral auscultation bilateral rhonchi, no rales. No wheezing. Chest wall:  No tenderness or deformity. NO CREPITUS   Heart:  Regular rate and rhythm, S1, S2 normal, no murmur, click, rub or gallop. Abdomen:   Soft, non-tender. Bowel sounds normal. No masses,  No organomegaly. No paradox   Extremities: normal, atraumatic, no cyanosis or edema. Pulses: 1-2+ and symmetric all extremities.    Neurologic: Grossly nonfocal, deaf          Data review:   Labs:  Recent Results (from the past 24 hour(s))   EKG, 12 LEAD, INITIAL    Collection Time: 10/18/21  6:37 PM   Result Value Ref Range    Ventricular Rate 120 BPM    Atrial Rate 120 BPM    P-R Interval 134 ms    QRS Duration 76 ms    Q-T Interval 306 ms    QTC Calculation (Bezet) 432 ms    Calculated P Axis 57 degrees    Calculated R Axis -44 degrees    Calculated T Axis 86 degrees    Diagnosis       Sinus tachycardia  Right atrial enlargement  Left axis deviation  Pulmonary disease pattern  Septal infarct (cited on or before 08-OCT-2020)  Abnormal ECG  When compared with ECG of 28-APR-2021 13:39,  No significant change was found  Confirmed by Julissa Christensen MD, --- ([id number]) on 10/19/2021 12:05:37 PM     CULTURE, BLOOD    Collection Time: 10/18/21  7:28 PM    Specimen: Blood   Result Value Ref Range    Special Requests: NO SPECIAL REQUESTS      Culture result: NO GROWTH AFTER 11 HOURS     METABOLIC PANEL, COMPREHENSIVE    Collection Time: 10/18/21  7:28 PM   Result Value Ref Range    Sodium 139 136 - 145 mmol/L    Potassium 4.6 3.5 - 5.5 mmol/L    Chloride 101 100 - 111 mmol/L    CO2 38 (H) 21 - 32 mmol/L    Anion gap 0 (L) 3.0 - 18 mmol/L    Glucose 124 (H) 74 - 99 mg/dL    BUN 38 (H) 7.0 - 18 MG/DL    Creatinine 0.58 (L) 0.6 - 1.3 MG/DL    BUN/Creatinine ratio 66 (H) 12 - 20      GFR est AA >60 >60 ml/min/1.73m2    GFR est non-AA >60 >60 ml/min/1.73m2    Calcium 8.9 8.5 - 10.1 MG/DL    Bilirubin, total 0.1 (L) 0.2 - 1.0 MG/DL    ALT (SGPT) 28 16 - 61 U/L    AST (SGOT) 25 10 - 38 U/L    Alk. phosphatase 32 (L) 45 - 117 U/L    Protein, total 7.2 6.4 - 8.2 g/dL    Albumin 2.4 (L) 3.4 - 5.0 g/dL    Globulin 4.8 (H) 2.0 - 4.0 g/dL    A-G Ratio 0.5 (L) 0.8 - 1.7     CBC WITH AUTOMATED DIFF    Collection Time: 10/18/21  7:28 PM   Result Value Ref Range    WBC 9.0 4.6 - 13.2 K/uL    RBC 4.45 4.35 - 5.65 M/uL    HGB 11.9 (L) 13.0 - 16.0 g/dL    HCT 39.7 36.0 - 48.0 %    MCV 89.2 78.0 - 100.0 FL    MCH 26.7 24.0 - 34.0 PG    MCHC 30.0 (L) 31.0 - 37.0 g/dL    RDW 13.7 11.6 - 14.5 %    PLATELET 350 310 - 346 K/uL    MPV 9.1 (L) 9.2 - 11.8 FL    NEUTROPHILS 85 (H) 40 - 73 %    LYMPHOCYTES 3 (L) 21 - 52 %    MONOCYTES 11 (H) 3 - 10 %    EOSINOPHILS 1 0 - 5 %    BASOPHILS 0 0 - 2 %    ABS. NEUTROPHILS 7.6 1.8 - 8.0 K/UL    ABS. LYMPHOCYTES 0.3 (L) 0.9 - 3.6 K/UL    ABS.  MONOCYTES 1.0 0.05 - 1.2 K/UL ABS. EOSINOPHILS 0.1 0.0 - 0.4 K/UL    ABS.  BASOPHILS 0.0 0.0 - 0.1 K/UL    DF AUTOMATED     TROPONIN I    Collection Time: 10/18/21  7:28 PM   Result Value Ref Range    Troponin-I, QT <0.02 0.0 - 0.045 NG/ML   PROCALCITONIN    Collection Time: 10/18/21  7:28 PM   Result Value Ref Range    Procalcitonin <0.05 ng/mL   POC LACTIC ACID    Collection Time: 10/18/21  7:44 PM   Result Value Ref Range    Lactic Acid (POC) 1.13 0.40 - 2.00 mmol/L   CULTURE, BLOOD    Collection Time: 10/18/21  9:15 PM    Specimen: Blood   Result Value Ref Range    Special Requests: NO SPECIAL REQUESTS      Culture result: NO GROWTH AFTER 9 HOURS     URINALYSIS W/ RFLX MICROSCOPIC    Collection Time: 10/18/21  9:15 PM   Result Value Ref Range    Color YELLOW      Appearance CLEAR      Specific gravity >1.030 (H) 1.005 - 1.030    pH (UA) 5.5 5.0 - 8.0      Protein 30 (A) NEG mg/dL    Glucose Negative NEG mg/dL    Ketone Negative NEG mg/dL    Bilirubin Negative NEG      Blood Negative NEG      Urobilinogen 1.0 0.2 - 1.0 EU/dL    Nitrites Negative NEG      Leukocyte Esterase Negative NEG     URINE MICROSCOPIC ONLY    Collection Time: 10/18/21  9:15 PM   Result Value Ref Range    WBC Negative 0 - 4 /hpf    RBC Negative 0 - 5 /hpf    Epithelial cells Negative 0 - 5 /lpf    Bacteria Negative NEG /hpf   RESPIRATORY VIRUS PANEL W/COVID-19, PCR    Collection Time: 10/18/21  9:20 PM    Specimen: Nasopharyngeal   Result Value Ref Range    Adenovirus Not detected NOTD      Coronavirus 229E Not detected NOTD      Coronavirus HKU1 Not detected NOTD      Coronavirus CVNL63 Not detected NOTD      Coronavirus OC43 Not detected NOTD      SARS-CoV-2, PCR Not detected NOTD      Metapneumovirus Not detected NOTD      Rhinovirus and Enterovirus Not detected NOTD      Influenza A Not detected NOTD      Influenza A, subtype H1 Not detected NOTD      Influenza A, subtype H3 Not detected NOTD      INFLUENZA A H1N1 PCR Not detected NOTD      Influenza B Not detected NOTD      Parainfluenza 1 Not detected NOTD      Parainfluenza 2 Not detected NOTD      Parainfluenza 3 Not detected NOTD      Parainfluenza virus 4 Not detected NOTD      RSV by PCR Not detected NOTD      B. parapertussis, PCR Not detected NOTD      Bordetella pertussis - PCR Not detected NOTD      Chlamydophila pneumoniae DNA, QL, PCR Not detected NOTD      Mycoplasma pneumoniae DNA, QL, PCR Not detected NOTD     METABOLIC PANEL, BASIC    Collection Time: 10/19/21  3:30 AM   Result Value Ref Range    Sodium 138 136 - 145 mmol/L    Potassium 4.4 3.5 - 5.5 mmol/L    Chloride 105 100 - 111 mmol/L    CO2 34 (H) 21 - 32 mmol/L    Anion gap NEG 1 3.0 - 18 mmol/L    Glucose 101 (H) 74 - 99 mg/dL    BUN 29 (H) 7.0 - 18 MG/DL    Creatinine 0.41 (L) 0.6 - 1.3 MG/DL    BUN/Creatinine ratio 71 (H) 12 - 20      GFR est AA >60 >60 ml/min/1.73m2    GFR est non-AA >60 >60 ml/min/1.73m2    Calcium 8.0 (L) 8.5 - 10.1 MG/DL   CBC WITH AUTOMATED DIFF    Collection Time: 10/19/21  3:30 AM   Result Value Ref Range    WBC 7.3 4.6 - 13.2 K/uL    RBC 3.77 (L) 4.35 - 5.65 M/uL    HGB 10.0 (L) 13.0 - 16.0 g/dL    HCT 34.1 (L) 36.0 - 48.0 %    MCV 90.5 78.0 - 100.0 FL    MCH 26.5 24.0 - 34.0 PG    MCHC 29.3 (L) 31.0 - 37.0 g/dL    RDW 13.5 11.6 - 14.5 %    PLATELET 758 173 - 125 K/uL    MPV 9.1 (L) 9.2 - 11.8 FL    NEUTROPHILS 83 (H) 40 - 73 %    LYMPHOCYTES 5 (L) 21 - 52 %    MONOCYTES 10 3 - 10 %    EOSINOPHILS 1 0 - 5 %    BASOPHILS 0 0 - 2 %    ABS. NEUTROPHILS 6.1 1.8 - 8.0 K/UL    ABS. LYMPHOCYTES 0.4 (L) 0.9 - 3.6 K/UL    ABS. MONOCYTES 0.7 0.05 - 1.2 K/UL    ABS. EOSINOPHILS 0.1 0.0 - 0.4 K/UL    ABS.  BASOPHILS 0.0 0.0 - 0.1 K/UL    DF AUTOMATED     STREP PNEUMO AG, URINE    Collection Time: 10/19/21  3:30 AM    Specimen: Urine, random   Result Value Ref Range    Strep pneumo Ag, urine Negative NEG     LEGIONELLA PNEUMOPHILA AG, URINE    Collection Time: 10/19/21  3:30 AM    Specimen: Urine, random   Result Value Ref Range Legionella Ag, urine Negative NEG       Imaging:  I have personally reviewed the patients radiographs and have reviewed the reports:  CXR Results  (Last 48 hours)               10/18/21 1908  XR CHEST PORT Final result    Impression:  1. Multifocal pneumonia with new focal consolidations of the right upper and   right lower lobe, possible aspiration. 2.  Chronic interstitial disease at bilateral lung apices with known cavitary   lesions in the right upper and lower lobes. Please refer to the CT chest report from 10/18/2021 for follow-up   recommendations. Davin Adkins MD    R1 Radiology Resident       All findings discussed with attending radiologist. I have personally reviewed   all images and agree with the interpretation above. Narrative:  EXAM: XR CHEST PORT       INDICATION: 67 years Male. Meets SIRS criteria. ADDITIONAL HISTORY: Patient complaining of shortness of breath for past week   with acute worsening. History of interstitial lung disease and chronic   bronchiectasis. Admitted for hypoxic respiratory failure. TECHNIQUE: Frontal view of the chest.       COMPARISON: CT chest 10/18/2021. FINDINGS:       The cardiac silhouette is mildly enlarged, similar to prior. Mild bilateral   hilar fullness, similar to prior. Tracheal is prominent with deviation to the right at the level of the aortic   knob, which appears chronic, likely due to adjacent fibrosis. Increased haziness and pleural thickening at bilateral apices, similar to prior. Area of increased lucency at right upper lobe at level of overlying EKG lead,   likely correlates with known cavitary lesion. Suspected additional cavitary   lesion within the lateral right lower lobe. Right upper lobe and right middle   lobe bronchiectasis again noted        New consolidation in the right upper lobe and medial right lower lobe.  The   background background of interstitial lung disease decreases sensitivity for   identification of acute lung disease. No definite evidence of pleural effusion or pneumothorax. No acute osseous abnormality appreciated. Generalized osteopenia. Multilevel   degenerative changes of the spine. Overlying wires visualized. CT Results  (Last 48 hours)               10/18/21 2107  CTA CHEST W OR W WO CONT Final result    Impression:  No evidence for pulmonary emboli. Multifocal pneumonia has developed in the right lung. Pre-existing cavitary right upper lobe lesion is similar in appearance. 3 month follow-up chest CT is recommended for reassessment. Additional incidentals as above. Narrative:  CTA CHEST PULMONARY EMBOLISM PROTOCOL             INDICATION: Progressive shortness of breath. Question pulmonary embolism. TECHNIQUE: Thin collimation axial images obtained through the level of the   pulmonary arteries with additional imaging through the chest following the   uneventful administration of nonionic intravenous contrast.  Images   reconstructed into three dimensional coronal and sagittal projections for   complete evaluation of the tortuous and overlapping pulmonary vascular   structures and to reduce patient radiation dose. All CT scans at this facility are performed using dose optimization technique as   appropriate to a performed exam, to include automated exposure control,   adjustment of the mA and/or kV according to patient size (including appropriate   matching first site-specific examinations), or use of iterative reconstruction   technique. COMPARISON: August 10, 2021. FINDINGS:       There is some mixing artifact in the left pulmonary artery. There is no definite   acute pulmonary embolus. .        Thyroid: Unremarkable in its visualized aspects. Pericardium/ Heart: Heart is enlarged. Coronary artery disease. Aorta/ Vessels: No aneurysm or dissection. Lymph Nodes: Unremarkable. [place] Lungs: Emphysema. There is dense consolidation in the lateral right upper lobe   and posteriorly which has increased. Dense consolidation anteriorly in the right   upper lobe is also increased. There is dense consolidation in the periphery of   the right lower lobe which has increased. The known cavitary right upper lobe   lesion is similar in the cavitary component. There are some nodular opacities in   the right lower lobe concerning for infection. The left lung is relatively   stable in appearance. Upper Abdomen: Colonic diverticulosis       Bones/soft tissues: No acute finding                   High complexity decision making was performed during the evaluation of this patient at high risk for decompensation with multiple organ involvement     Above mentioned total time spent on reviewing the case/medical record/data/notes/EMR/patient examination/documentation/coordinating care with nurse/consultants, exclusive of procedures with complex decision making performed and > 50% time spent in face to face evaluation.      Frantz Smith MD

## 2021-10-19 NOTE — PROGRESS NOTES
Problem: Dysphagia (Adult)  Goal: *Acute Goals and Plan of Care (Insert Text)  Description: Patient will:  1. Tolerate PO trials with 0 s/s overt distress in 4/5 trials  2. Utilize compensatory swallow strategies/maneuvers (decrease bite/sip, size/rate, alt. liq/sol) with min cues in 4/5 trials  3. Perform oral-motor/laryngeal exercises to increase oropharyngeal swallow function with min cues  4. Complete an objective swallow study (i.e., MBSS) to assess swallow integrity, r/o aspiration, and determine of safest LRD, min A as indicated/ordered by MD-met 10/19/21     Recommend:   NPO   Strict aspiration precautions (HOB >30 degrees at all times, Oral care TID)  ? alternative means of nutrition or comfort feeds     Outcome: Progressing Towards Goal    SPEECH PATHOLOGY MODIFIED BARIUM SWALLOW STUDY & TREATMENT    Patient: Darius Hardy (80 y.o. male)  Date: 10/19/2021  Primary Diagnosis: Pneumonia [J18.9]  Precautions: Aspiration        ASSESSMENT :  Based on the objective data described below, the patient presents with mild oral and severe pharyngeal dysphagia c/b silent aspiration during and after the swallow across all consistencies presented. Pt seen seated upright in flouro chair utilizing written word to communicate as pt is deaf. Pt demonstrating decreased bolus formation/control, premature spillage with pooling for ~10 seconds in valleculae prior to swallow initiation, minimal movement of larynx/epiglottis with vestibule gap and decreased laryngeal sensation. Pt also with impairments in overall pharyngeal strength/motility resulting in moderately severe pharyngeal residuals that were eventually aspirated. Compensatory strategies were ineffective at improving airway protection across trials. Pt at high risk of aspiration with subsequent aspiration pneumonia, dehydration and malnutrition with oral intake.   Recommend continue NPO with consideration of comfort feeds vs alternative nutrition/hydration source. TREATMENT :  Treatment provided post diagnostic testing including oropharyngeal anatomy/physiology, MBS results, diet recommendations and compensatory strategies/positioning via written word. Pt stated \"I didn't know that\" following training and education. Introduced idea of alternative nutrition/hydration source vs comfort feeds with pt stating \"I don't know\". Will follow for further dysphagia management as indicated. Patient will benefit from skilled intervention to address the above impairments. Patient's rehabilitation potential is considered to be Guarded  Factors which may influence rehabilitation potential include:   []              None noted  []              Mental ability/status  [x]              Medical condition  []              Home/family situation and support systems  [x]              Safety awareness  []              Pain tolerance/management  []              Other:      PLAN :  Recommendations and Planned Interventions:  As above   Frequency/Duration: Patient will be followed by speech-language pathology 1-2 times per day/3-7 days per week to address goals. Discharge Recommendations: To Be Determined     SUBJECTIVE:   Patient stated I didn't t know that.     OBJECTIVE:     Past Medical History:   Diagnosis Date    CAD (coronary artery disease)     MI 1995; stent 2003 (done preop cochlear sgy)    Cancer ([facility] [address]     SQUAMOUS CELL    Coronary artery disease     Deafness     Myocardial infarction [facility]) [phone]    Scarlet fever age 9     Past Surgical History:   Procedure Laterality Date    BRONCH-FIBER/DIAGNOSTIC  6/16/2016         HX COLONOSCOPY  2013    neg    [place]    HX HEART CATHETERIZATION  2003    HX HEART CATHETERIZATION  1999    HX HERNIA REPAIR      inguinal ? laterality    HX OTHER SURGICAL      2 cochlear implants (R); 1 left - all failed     Prior Level of Function/Home Situation: home  Diet prior to admission: unknown   Current Diet:  NPO   Radiologist:    Film Views: Lateral;Fluoro  Patient Position: 90 in chair    Trial 1:   Consistency Presented: Thin liquid; Nectar thick liquid;Pudding   How Presented: Self-fed/presented;Cup/sip;Spoon   Bolus Acceptance: No impairment   Bolus Formation/Control: Impaired: Premature spillage;Delayed   Propulsion: Delayed (# of seconds)   Oral Residue: Lingual   Initiation of Swallow: Triggered at vallecula   Timing: Pooling 6-10 sec;Vallecular   Aspiration/Timing: Silent ;Repeated;During; After;From initial swallow;From residual   Pharyngeal Clearance: Vallecular residue;Pyriform residue ;10-50%   Attempted Modifications: Double swallow;Spoon;Small sips and bites   Effective Modifications: None   Cues for Modifications: Moderate     Decreased Tongue Base Retraction?: Yes  Laryngeal Elevation: Inadequate epiglottic inversion; Incomplete laryngeal closure;Minimal movement of larynx/epiglottis  Aspiration/Penetration Score: 8 (Aspiration-Contrast passes cords/glottis with no effort to eject, ie/silent aspiration)  Pharyngeal Symmetry: Not assessed  Pharyngeal-Esophageal Segment: Decreased relaxation of upper esophageal segment; Suspected esophageal dysphagia  Pharyngeal Dysfunction: Decreased tongue base retraction;Crico-pharyngeal dysfunction;Decreased strength;Decreased pharyngeal wall constriction;Decreased elevation/closure  Oral Phase Severity: Mild  Pharyngeal Phase Severity: Moderately severe    8-point Penetration-Aspiration Scale: Score 8    PAIN:  Pt reports 0/10 pain or discomfort prior to MBS. Pt reports 0/10 pain or discomfort post MBS. COMMUNICATION/EDUCATION:   [x]  Patient educated regarding MBS results and diet recommendations. []  Patient/family have participated as able in goal setting and plan of care. [x]  Patient/family agree to work toward stated goals and plan of care.   []  Patient understands intent and goals of therapy, but is neutral about his/her participation. []  Patient is unable to participate in goal setting and plan of care.     Thank you for this referral,  Tatum Umanzor M.S., [id number] [facility]  Speech-Language Pathologist

## 2021-10-19 NOTE — H&P
History and Physical    Patient: Mariah Scott               Sex: male          DOA: 10/18/2021       YOB: 1945      Age:  67 y.o.        LOS:  LOS: 1 day        HPI:     Mariah Scott is a deaf 67 y.o. male with history of coronary artery disease s/p stent 2003, scarlet fever at age 9, interstitial lung disease (suspect fibrotic NSIP), chronic bronchiectasis, and chronic aspiration with dysphagia who presents with cachexia, weakness, poor p.o. intake, and worsening cough who is now admitted for hypoxemic respiratory failure due to likely aspiration pneumonia. History of patient condition was given by his wife and brother-in-law who are at the bedside. Patient is completely deaf. He is able to communicate with head nods when questions are written out to him in text. Per patient's sister's report, patient lives by himself and has a caretaker that comes to his living space Monday through Friday. Sister and brother-in-law help patient out on the weekend. He has continued to lose weight over the past few months. More specifically, patient is not eating very much food and seems disinterested in it. When he does eat, sister notes that he aspirates. Patient reportedly had outpatient speech therapy set up, but he did not go to it. No fevers or chills have been noted, only severe weakness and wasting. Patient himself only endorsing weakness and cough. Sister denies that patient has dementia. 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     ER Course:   Levofloxacin, Zosyn, vancomycin  NS 1000 mL bolus   mL bolus    CTA chest showed no evidence of pulmonary emboli, but it did show multifocal pneumonia that has developed over the right lung and a pre-existing cavitary right upper lobe lesion that is similar in appearance to previous CT scans    Significant labs: Lactic acid 1.13, white blood cell count 9, neutrophil: Lymphocyte ratio 85:3, creatinine 0.58, albumin 2.4, troponin undetectable, glucose 124, respiratory viral panel completely negative    Past Medical History:   Diagnosis Date    CAD (coronary artery disease)     MI 1995; stent 2003 (done preop cochlear sgy)    Cancer ([address]     SQUAMOUS CELL    Coronary artery disease     Deafness     Myocardial infarction [facility]) [phone]    Scarlet fever age 9     Past Surgical History:   Procedure Laterality Date    BRONCH-FIBER/DIAGNOSTIC  6/16/2016         HX COLONOSCOPY  2013    neg    H[place]    [facility] [place]    HX HEART CATHETERIZATION  2003    HX HEART CATHETERIZATION  1999    HX HERNIA REPAIR      inguinal ? laterality    HX OTHER SURGICAL      2 cochlear implants (R); 1 left - all failed      Family History   Problem Relation Age of Onset    No Known Problems Mother     Heart Disease Father     Heart Attack Father         1989    Coronary Artery Disease Father     Hypertension Father     High Cholesterol Father      Social History     Tobacco Use    Smoking status: Never Smoker    Smokeless tobacco: Never Used   Substance Use Topics    Alcohol use: No      Prior to Admission medications    Medication Sig Start Date End Date Taking? Authorizing Provider   acetylcysteine (MUCOMYST) 100 mg/mL (10 %) nebulizer solution Take 4 mL by inhalation every four (4) hours for 120 days. 10/1/21 1/29/22  Dinora Lunsford MD   fluorouraciL (EFUDEX) 5 % chemo cream APPLY CREAM TO FOREHEAD 2 TIMES DAILY FOR 2 WEEKS ONCE HEALED USE 2 TIMES DAILY TO THE EARS FOR 2 WEEKS ONCE HEALED USE 2 TIMES DAILY TO THE CHEEKS AND TEMPLES FOR 2 WEEKS 11/5/21   Provider, Historical   sodium chloride 3 % nebulizer solution 4 mL by Nebulization route four (4) times daily. Mix with albuterol. File under Medicare Part B. Dx: R06.02; Z23.1; J84.9; R53.81 8/31/21   Dinora Lunsford MD   albuterol (PROVENTIL VENTOLIN) 2.5 mg /3 mL (0.083 %) nebu Mix with hypertonic saline nebulizer -- use 3- times per day. File under Medicare Part B. Dx: R06.02; O91.4; J84.9; R53.81 8/31/21   Zaki Nevarez MD   guaiFENesin ER (MUCINEX) 600 mg ER tablet Take 1 Tablet by mouth two (2) times a day for 90 days. 8/31/21 11/29/21  Zaki Nevarez MD   cholecalciferol (VITAMIN D3) (1000 Units /25 mcg) tablet Take 1,000 Units by mouth daily. Provider, Historical   FISH OIL-DHA-EPA PO Take 1 Tab by mouth daily. Provider, Historical   amino acids powd Take 1 Dose by mouth daily. Provider, Historical   OTHER Take 1 Cap by mouth daily. tumeric     Provider, Historical   fluticasone propionate (FLONASE NA) 1 Spray by Both Nostrils route daily. Provider, Historical   ascorbic acid, vitamin C, (VITAMIN C) 500 mg tablet Take 500 mg by mouth daily. Provider, Historical   multivitamin (ONE A DAY) tablet Take 1 Tab by mouth daily. Provider, Historical   aspirin delayed-release 81 mg tablet Take 81 mg by mouth daily. Provider, Historical        Allergies   Allergen Reactions    Pollens Extract Runny Nose and Cough       Review of Systems: Patient able to head nod \"yes\" or \"no\" to questions    Negative Unless BOLDED    Constitutional: Fever, chills,diaphoresis. HENT: Negative for congestion, rhinorrhea, sore throat and trouble swallowing. Eyes: Negative for visual disturbance. Respiratory: Cough,shortness of breath, wheezing. Cardiovascular: Chest pain, palpitations. Gastrointestinal: Abdominal pain, blood in stool, constipation, diarrhea, nausea and vomiting. Endocrine: Polyuria. Genitourinary: Difficulty urinating and dysuria. Musculoskeletal: Arthralgias, myalgias, and neck stiffness. Skin: Pallor, rash. Neurological: Dizziness, weakness, numbness and headaches. Hematological: Bruise/bleed easily   Psychiatric/Behavioral: Confusion, dysphoric mood, hallucinations  All other systems reviewed and are negative.     Physical Exam:      Vitals:    10/18/21 1828 10/1945 10/18/21 2035   BP: 105/62 Pulse: (!) 123  99   Resp: (!) 56  27   Temp: 100.2 °F (37.9 °C)     SpO2: 90%  98%   Weight:  51.3 kg (113 lb)    Height:  5' 7\" (1.702 m)       Temp (24hrs), Av.2 °F (37.9 °C), Min:100.2 °F (37.9 °C), Max:100.2 °F (37.9 °C)      General:   awake alert, gives appropriate head nod answers and follows commands. Cachectic   Skin:   no rashes or skin lesions noted on limited exam   HEENT:  Normocephalic, atraumatic, PERRL, EOMI, no scleral icterus or pallor; no conjunctival hemmohage;  nasal and oral mucous are dry. Poor dentition. Neck supple. Lymph Nodes:   no cervical or axillary adenopathy   Lungs:   Crackles auscultated over right anterior lateral lung fields, good air movement, equal chest rise and fall bilaterally   Heart:   Tachycardic, regular rhythm, s1 and s2; no murmurs rubs or gallops, no edema, radial pulses intact bilaterally   Abdomen: soft, non-distended, active bowel sounds, nontender to palpation   Genitourinary:  deferred   Extremities:   Ulnar deviation of right upper extremity digits with tophi visible on DIP of right index finger; muscle mass inadequate for age, able to move all 4 extremities weakly   Neurologic:  4/5 muscle strength to upper and lower extremities. Communication appropriate considering patient is deaf. Cranial nerves grossly intact   Psychiatric:   appropriate and interactive.        Labs Reviewed:    Recent Results (from the past 24 hour(s))   EKG, 12 LEAD, INITIAL    Collection Time: 10/18/21  6:37 PM   Result Value Ref Range    Ventricular Rate 119 BPM    Atrial Rate 119 BPM    P-R Interval 118 ms    QRS Duration 92 ms    Q-T Interval 314 ms    QTC Calculation (Bezet) 441 ms    Calculated P Axis 69 degrees    Calculated R Axis -39 degrees    Calculated T Axis 83 degrees    Diagnosis       Sinus tachycardia  Right atrial enlargement  Left axis deviation  Abnormal ECG  When compared with ECG of 2021 13:39,  No significant change was found     METABOLIC PANEL, COMPREHENSIVE    Collection Time: 10/18/21  7:28 PM   Result Value Ref Range    Sodium 139 136 - 145 mmol/L    Potassium 4.6 3.5 - 5.5 mmol/L    Chloride 101 100 - 111 mmol/L    CO2 38 (H) 21 - 32 mmol/L    Anion gap 0 (L) 3.0 - 18 mmol/L    Glucose 124 (H) 74 - 99 mg/dL    BUN 38 (H) 7.0 - 18 MG/DL    Creatinine 0.58 (L) 0.6 - 1.3 MG/DL    BUN/Creatinine ratio 66 (H) 12 - 20      GFR est AA >60 >60 ml/min/1.73m2    GFR est non-AA >60 >60 ml/min/1.73m2    Calcium 8.9 8.5 - 10.1 MG/DL    Bilirubin, total 0.1 (L) 0.2 - 1.0 MG/DL    ALT (SGPT) 28 16 - 61 U/L    AST (SGOT) 25 10 - 38 U/L    Alk. phosphatase 32 (L) 45 - 117 U/L    Protein, total 7.2 6.4 - 8.2 g/dL    Albumin 2.4 (L) 3.4 - 5.0 g/dL    Globulin 4.8 (H) 2.0 - 4.0 g/dL    A-G Ratio 0.5 (L) 0.8 - 1.7     CBC WITH AUTOMATED DIFF    Collection Time: 10/18/21  7:28 PM   Result Value Ref Range    WBC 9.0 4.6 - 13.2 K/uL    RBC 4.45 4.35 - 5.65 M/uL    HGB 11.9 (L) 13.0 - 16.0 g/dL    HCT 39.7 36.0 - 48.0 %    MCV 89.2 78.0 - 100.0 FL    MCH 26.7 24.0 - 34.0 PG    MCHC 30.0 (L) 31.0 - 37.0 g/dL    RDW 13.7 11.6 - 14.5 %    PLATELET 628 476 - 181 K/uL    MPV 9.1 (L) 9.2 - 11.8 FL    NEUTROPHILS 85 (H) 40 - 73 %    LYMPHOCYTES 3 (L) 21 - 52 %    MONOCYTES 11 (H) 3 - 10 %    EOSINOPHILS 1 0 - 5 %    BASOPHILS 0 0 - 2 %    ABS. NEUTROPHILS 7.6 1.8 - 8.0 K/UL    ABS. LYMPHOCYTES 0.3 (L) 0.9 - 3.6 K/UL    ABS. MONOCYTES 1.0 0.05 - 1.2 K/UL    ABS. EOSINOPHILS 0.1 0.0 - 0.4 K/UL    ABS.  BASOPHILS 0.0 0.0 - 0.1 K/UL    DF AUTOMATED     TROPONIN I    Collection Time: 10/18/21  7:28 PM   Result Value Ref Range    Troponin-I, QT <0.02 0.0 - 0.045 NG/ML   POC LACTIC ACID    Collection Time: 10/18/21  7:44 PM   Result Value Ref Range    Lactic Acid (POC) 1.13 0.40 - 2.00 mmol/L   URINALYSIS W/ RFLX MICROSCOPIC    Collection Time: 10/18/21  9:15 PM   Result Value Ref Range    Color YELLOW      Appearance CLEAR      Specific gravity >1.030 (H) 1.005 - 1.030    pH (UA) 5.5 5.0 - 8.0      Protein 30 (A) NEG mg/dL    Glucose Negative NEG mg/dL    Ketone Negative NEG mg/dL    Bilirubin Negative NEG      Blood Negative NEG      Urobilinogen 1.0 0.2 - 1.0 EU/dL    Nitrites Negative NEG      Leukocyte Esterase Negative NEG     URINE MICROSCOPIC ONLY    Collection Time: 10/18/21  9:15 PM   Result Value Ref Range    WBC Negative 0 - 4 /hpf    RBC Negative 0 - 5 /hpf    Epithelial cells Negative 0 - 5 /lpf    Bacteria Negative NEG /hpf   RESPIRATORY VIRUS PANEL W/COVID-19, PCR    Collection Time: 10/18/21  9:20 PM    Specimen: Nasopharyngeal   Result Value Ref Range    Adenovirus Not detected NOTD      Coronavirus 229E Not detected NOTD      Coronavirus HKU1 Not detected NOTD      Coronavirus CVNL63 Not detected NOTD      Coronavirus OC43 Not detected NOTD      SARS-CoV-2, PCR Not detected NOTD      Metapneumovirus Not detected NOTD      Rhinovirus and Enterovirus Not detected NOTD      Influenza A Not detected NOTD      Influenza A, subtype H1 Not detected NOTD      Influenza A, subtype H3 Not detected NOTD      INFLUENZA A H1N1 PCR Not detected NOTD      Influenza B Not detected NOTD      Parainfluenza 1 Not detected NOTD      Parainfluenza 2 Not detected NOTD      Parainfluenza 3 Not detected NOTD      Parainfluenza virus 4 Not detected NOTD      RSV by PCR Not detected NOTD      B. parapertussis, PCR Not detected NOTD      Bordetella pertussis - PCR Not detected NOTD      Chlamydophila pneumoniae DNA, QL, PCR Not detected NOTD      Mycoplasma pneumoniae DNA, QL, PCR Not detected NOTD          Imaging:  CT Results  (Last 48 hours)               10/18/21 2107  CTA CHEST W OR W WO CONT Final result    Impression:  No evidence for pulmonary emboli. Multifocal pneumonia has developed in the right lung. Pre-existing cavitary right upper lobe lesion is similar in appearance. 3 month follow-up chest CT is recommended for reassessment. Additional incidentals as above. Narrative:  CTA CHEST PULMONARY EMBOLISM PROTOCOL             INDICATION: Progressive shortness of breath. Question pulmonary embolism. TECHNIQUE: Thin collimation axial images obtained through the level of the   pulmonary arteries with additional imaging through the chest following the   uneventful administration of nonionic intravenous contrast.  Images   reconstructed into three dimensional coronal and sagittal projections for   complete evaluation of the tortuous and overlapping pulmonary vascular   structures and to reduce patient radiation dose. All CT scans at this facility are performed using dose optimization technique as   appropriate to a performed exam, to include automated exposure control,   adjustment of the mA and/or kV according to patient size (including appropriate   matching first site-specific examinations), or use of iterative reconstruction   technique. COMPARISON: August 10, 2021. FINDINGS:       There is some mixing artifact in the left pulmonary artery. There is no definite   acute pulmonary embolus. .        Thyroid: Unremarkable in its visualized aspects. Pericardium/ Heart: Heart is enlarged. Coronary artery disease. Aorta/ Vessels: No aneurysm or dissection. Lymph Nodes: Unremarkable. .       Lungs: Emphysema. There is dense consolidation in the lateral right upper lobe   and posteriorly which has increased. Dense consolidation anteriorly in the right   upper lobe is also increased. There is dense consolidation in the periphery of   the right lower lobe which has increased. The known cavitary right upper lobe   lesion is similar in the cavitary component. There are some nodular opacities in   the right lower lobe concerning for infection. The left lung is relatively   stable in appearance.        Upper Abdomen: Colonic diverticulosis       Bones/soft tissues: No acute finding                  CXR Results  (Last 48 hours)    None         Swallow functional video December 2020  MODIFIED BARIUM SWALLOW.     CLINICAL INDICATION/HISTORY: Dysphagia. Feeding difficulties. Lung mass. Pulmonary infiltrates. Dyspnea on exertion. Bronchiectasis with lower acute  respiratory infection.       COMPARISON: None.     TECHNIQUE: A live videoradiographic swallowing function study was performed in  conjunction with the speech therapist.  The video is available in the department  for review by speech pathology and ancillary staff.      Fluoroscopic images were not made available in PACS for radiologist review and  this report is strictly a procedural documentation by the physician assistant  with input from speech pathology. It is not considered inclusive or exclusive  of anatomic abnormalities and is not diagnostic beyond the specific  considerations regarding swallowing function as it relates to airway protection  while eating and drinking.     Fluoroscopy time: 1 minute 54 seconds     Fluoroscopic images: 0     Electronic capture cine loops: 12     FINDINGS:     Patient swallowed multiple consistencies of barium mixtures including thin  liquids, nectar, honey, pudding, solids and barium tablet challenge.     There was laryngeal penetration of thin liquids, nectar, and honey  consistencies.     Other tested consistencies including pudding and solids were swallowed without  kwame penetration or aspiration.     Premature spillage and pharyngeal residuals were seen with all tested  consistencies.     IMPRESSION  IMPRESSION:     1.  Laryngeal penetration of thin liquids, nectar, and honey consistencies. 2.  No kwame penetration or aspiration with other tested consistencies.     Assessment/Plan     Bacterial pneumonia, likely aspiration driven  Coronary artery disease with history of stent placement  Cachexia, worsening  Dysphagia with aspiration    PLAN:    Continue broad-spectrum IV antibiotics  Legionella and strep antigen pending  Procalcitonin pending  Blood cultures pending  Likely can taper off vancomycin tomorrow as this is not behaving like a MRSA pneumonia  Continuing home bronchodilator medication and nebulized saline  Continuing home aspirin and vitamins  Pulmonary consult  Infectious disease consult  Barium swallow study  Dietary consult  Physical therapy  Palliative care consult  N.p.o. until speech therapy evaluation; okay to have ice chips, water and medications for now  Supplemental oxygen as necessary    Activity: Bedrest  Diet: N.p.o. Antibiotics: Vancomycin, Zosyn, Levaquin  DVT prophylaxis: Lovenox 40 mg subcu daily  CODE status: Full    Disposition: Remain inpatient for supplemental oxygen and IV antibiotics    Signed By: Umm Rabago MD   [facility]    October 19, 2021      Dragon voice recognition software was used for parts of this note. Unintended errors may have occurred.

## 2021-10-19 NOTE — PROGRESS NOTES
SLP Note:    MBS completed; full report to follow. Recommend NPO with alternative nutrition/hydration source vs comfort feeds.       Mariella Cho M.S., [address] [facility]  Speech-Language Pathologist

## 2021-10-19 NOTE — ED NOTES
Pt hypotensive. 80's/40's.  May aware and will administer IV bolus as ordered. Pt remains alert and awake with no complaints. Communicative. Continue to monitor. Mastoid Interpolation Flap Text: A decision was made to reconstruct the defect utilizing an interpolation axial flap and a staged reconstruction.  A telfa template was made of the defect.  This telfa template was then used to outline the mastoid interpolation flap.  The donor area for the pedicle flap was then injected with anesthesia.  The flap was excised through the skin and subcutaneous tissue down to the layer of the underlying musculature.  The pedicle flap was carefully excised within this deep plane to maintain its blood supply.  The edges of the donor site were undermined.   The donor site was closed in a primary fashion.  The pedicle was then rotated into position and sutured.  Once the tube was sutured into place, adequate blood supply was confirmed with blanching and refill.  The pedicle was then wrapped with xeroform gauze and dressed appropriately with a telfa and gauze bandage to ensure continued blood supply and protect the attached pedicle.

## 2021-10-19 NOTE — PROGRESS NOTES
Palliative Medicine    Palliative Medicine team Po Campbell NP and Kory Braxton RN met at the pt's bedside. Pt is totally deaf so we communicated using pen and paper. Pt was alert and oriented x 4, very flat affect. Has a very wet cough. Discussed benefits and burdens of CPR and intubation via paper. Pt stated that he would want us to attempt CPR if he  and that he would find intubation acceptable. He stated that he wishes to be FULL code with FULL aggressive medical management at this time. Encouraged pt to give further thought to his code status given his age and underlying comorbidities. Pt remains FULL code with FULL aggressive medical management. Potential dispo plan is unknown. Thank you for the Palliative Medicine consult and allowing us to participate in the care of Mr. Sun Longoria. Will continue to monitor and provide support.     Kory Braxton RN, BSN  Palliative Medicine Inpatient RN  [facility]  Palliative [place] Line: [phone])

## 2021-10-20 PROBLEM — E43 SEVERE PROTEIN-CALORIE MALNUTRITION (HCC): Status: ACTIVE | Noted: 2021-01-01

## 2021-10-20 NOTE — PROGRESS NOTES
[facility]  Pulmonary, Critical Care, and Sleep Medicine    Pulmonary Medicine Progress Note    Name: Ellen Hunter MRN: [id number]  : 1945 Hospital: [address] [place] [address] Dr  Date: 10/20/2021       Subjective:  Pt remains stable. Breathing is improved. Still unable to cough. Wife at bedside. Patient Active Problem List   Diagnosis Code    Coronary artery disease involving native coronary artery of native heart without angina pectoris,s/p stent  I25.10    Scarlet fever A38.9    Myocardial infarction ([facility] [address] I21.9    Urinary frequency R35.0    Pneumonia J18.9    Goals of care, counseling/discussion Z71.89    Acute respiratory failure with hypoxia ([facility] [address] J96.01    Debility R53.81    Severe protein-calorie malnutrition ([facility] [address] E43       Assessment:  · Acute on Chronic Hypoxic Respiratory Failure  · - 2/2 chronic aspiration, bronchiectasis, ILD, with new consolidations suspicious for mucus plugging vs pneumonia. · RUL cavitary lesion- chronic, stable, likely 2/2 MAC  · Interstitial Lung disease- fibrotic NSIP  · Bronchiectasis- non-CF  · Chronic cough- 2/2 bronchiectasis  · CHF- EF 45-50%    Impression/Plan:  · No sputum, will ask RT for induced sputum. · Okay for broad spectrum ABX- defer to ID  · No need for steroids  · Continue hypertonic saline nebs with duonebs. May need metanebs. · Mucinex and acapella valve  · Assess home Oxygen needs at discharge  · Will Follow      FiO2 to keep SpO2 >=92%, HOB >=30 degree, aspiration precautions, aggressive pulmonary toileting, incentive spirometry. Other issues management by primary team and respective consultants. Events and notes from last 24 hours reviewed. Discussed with patient and family, answered all questions to their satisfaction. Care plan discussed with nursing.      Labs and images personally seen and available reports reviewed  All current medicines are reviewed       Medications- Current:  Current Facility-Administered Medications   Medication Dose Route Frequency    vancomycin (VANCOCIN) 1,000 mg in 0.9% sodium chloride 250 mL (VIAL-MATE)  1,000 mg IntraVENous Q12H    thiamine (B-1) 200 mg in 0.9% sodium chloride 50 mL IVPB  200 mg IntraVENous DAILY    enoxaparin (LOVENOX) injection 40 mg  40 mg SubCUTAneous QHS    0.9% sodium chloride infusion  75 mL/hr IntraVENous CONTINUOUS    albuterol-ipratropium (DUO-NEB) 2.5 MG-0.5 MG/3 ML  3 mL Nebulization Q4H RT    sodium chloride 3% hypertonic nebulizer soln  4 mL Nebulization Q8H    guaiFENesin ER (MUCINEX) tablet 600 mg  600 mg Oral Q12H    sodium chloride (NS) flush 5-10 mL  5-10 mL IntraVENous PRN    piperacillin-tazobactam (ZOSYN) 3.375 g in 0.9% sodium chloride (MBP/ADV) 100 mL MBP  3.375 g IntraVENous Q6H    levoFLOXacin (LEVAQUIN) 750 mg in D5W IVPB  750 mg IntraVENous Q24H    sodium chloride (NS) flush 5-40 mL  5-40 mL IntraVENous Q8H    sodium chloride (NS) flush 5-40 mL  5-40 mL IntraVENous PRN    acetaminophen (TYLENOL) tablet 650 mg  650 mg Oral Q6H PRN    Or    acetaminophen (TYLENOL) suppository 650 mg  650 mg Rectal Q6H PRN    polyethylene glycol (MIRALAX) packet 17 g  17 g Oral DAILY PRN    cholecalciferol (VITAMIN D3) (1000 Units /25 mcg) tablet 1,000 Units  1,000 Units Oral DAILY    omega-3 acid ethyl esters (LOVAZA) capsule 1,000 mg  1 g Oral DAILY WITH BREAKFAST    ascorbic acid (vitamin C) (VITAMIN C) tablet 500 mg  500 mg Oral DAILY    therapeutic multivitamin (THERAGRAN) tablet 1 Tablet  1 Tablet Oral DAILY    aspirin chewable tablet 81 mg  81 mg Oral DAILY       Objective:  Vital Signs:    Visit Vitals  BP (!) 112/50   Pulse 85   Temp 98.7 °F (37.1 °C)   Resp 22   Ht 5' 7\" (1.702 m)   Wt 52.6 kg (116 lb)   SpO2 91%   BMI 18.17 kg/m²      O2 Device: Nasal cannula  O2 Flow Rate (L/min): 4 l/min  Temp (24hrs), Av °F (36.7 °C), Min:97.6 °F (36.4 °C), Max:98.7 °F (37.1 °C)      Intake/Output:   Last shift:      No intake/output data recorded. Last 3 shifts: 10/18 1901 - 10/20 0700  In: 400 [I.V.:400]  Out: 75 [Urine:75]    Intake/Output Summary (Last 24 hours) at 10/20/2021 1419  Last data filed at 10/19/2021 2340  Gross per 24 hour   Intake 0 ml   Output 75 ml   Net -75 ml       Physical Exam:   General:  Alert, awake, appears older than stated age, chronic malnourished. Head:  Normocephalic, without obvious abnormality, atraumatic. Lungs:   Bilateral auscultation bilateral rhonchi, no rales. No wheezing. Chest wall:  No tenderness or deformity. NO CREPITUS   Heart:  Regular rate and rhythm, S1, S2 normal, no murmur, click, rub or gallop. Abdomen:   Soft, non-tender. Bowel sounds normal. No masses,  No organomegaly. No paradox   Extremities: normal, atraumatic, no cyanosis or edema. Pulses: 1-2+ and symmetric all extremities.    Neurologic: Grossly nonfocal, deaf         Data:      Recent Results (from the past 24 hour(s))   METABOLIC PANEL, BASIC    Collection Time: 10/20/21  4:42 AM   Result Value Ref Range    Sodium 137 136 - 145 mmol/L    Potassium 4.0 3.5 - 5.5 mmol/L    Chloride 105 100 - 111 mmol/L    CO2 31 21 - 32 mmol/L    Anion gap 1 (L) 3.0 - 18 mmol/L    Glucose 84 74 - 99 mg/dL    BUN 25 (H) 7.0 - 18 MG/DL    Creatinine 0.49 (L) 0.6 - 1.3 MG/DL    BUN/Creatinine ratio 51 (H) 12 - 20      GFR est AA >60 >60 ml/min/1.73m2    GFR est non-AA >60 >60 ml/min/1.73m2    Calcium 8.3 (L) 8.5 - 10.1 MG/DL   CBC WITH AUTOMATED DIFF    Collection Time: 10/20/21  4:42 AM   Result Value Ref Range    WBC 6.9 4.6 - 13.2 K/uL    RBC 3.93 (L) 4.35 - 5.65 M/uL    HGB 10.6 (L) 13.0 - 16.0 g/dL    HCT 36.1 36.0 - 48.0 %    MCV 91.9 78.0 - 100.0 FL    MCH 27.0 24.0 - 34.0 PG    MCHC 29.4 (L) 31.0 - 37.0 g/dL    RDW 13.4 11.6 - 14.5 %    PLATELET 842 006 - 098 K/uL    MPV 9.4 9.2 - 11.8 FL    NEUTROPHILS 87 (H) 40 - 73 %    LYMPHOCYTES 3 (L) 21 - 52 %    MONOCYTES 9 3 - 10 %    EOSINOPHILS 0 0 - 5 %    BASOPHILS 0 0 - 2 % ABS. NEUTROPHILS 6.0 1.8 - 8.0 K/UL    ABS. LYMPHOCYTES 0.2 (L) 0.9 - 3.6 K/UL    ABS. MONOCYTES 0.6 0.05 - 1.2 K/UL    ABS. EOSINOPHILS 0.0 0.0 - 0.4 K/UL    ABS. BASOPHILS 0.0 0.0 - 0.1 K/UL    DF AUTOMATED     MAGNESIUM    Collection Time: 10/20/21  4:42 AM   Result Value Ref Range    Magnesium 2.1 1.6 - 2.6 mg/dL   Lio Silvio    Collection Time: 10/20/21  7:56 AM   Result Value Ref Range    Vancomycin,trough 11.8 10.0 - 20.0 ug/mL    Reported dose date [id number]      Reported dose time: 0000      Reported dose: 750 MG UNITS         Chemistry   Recent Labs     10/20/21  0442 10/19/21  0330 10/18/21  1928   GLU 84 101* 124*    138 139   K 4.0 4.4 4.6    105 101   CO2 31 34* 38*   BUN 25* 29* 38*   CREA 0.49* 0.41* 0.58*   CA 8.3* 8.0* 8.9   MG 2.1  --   --    AGAP 1* NEG 1 0*   BUCR 51* 71* 66*   AP  --   --  32*   TP  --   --  7.2   ALB  --   --  2.4*   GLOB  --   --  4.8*   AGRAT  --   --  0.5*       CBC w/Diff   Recent Labs     10/20/21  0442 10/19/21  0330 10/18/21  1928   WBC 6.9 7.3 9.0   RBC 3.93* 3.77* 4.45   HGB 10.6* 10.0* 11.9*   HCT 36.1 34.1* 39.7    200 245   GRANS 87* 83* 85*   LYMPH 3* 5* 3*   EOS 0 1 1       ABG No results for input(s): PHI, PHI, POC2, PCO2I, PO2, PO2I, HCO3, HCO3I, FIO2, FIO2I in the last 72 hours. Micro    Recent Labs     10/18/21  2115 10/18/21  1928   CULT NO GROWTH 2 DAYS STAPHYLOCOCCUS SPECIES, COAGULASE NEGATIVE GROWING IN THE AEROBIC BOTTLE NO SITE*     Recent Labs     10/18/21  2115 10/18/21  1928   CULT NO GROWTH 2 DAYS STAPHYLOCOCCUS SPECIES, COAGULASE NEGATIVE GROWING IN THE AEROBIC BOTTLE NO SITE*       CT (Most Recent) Results from Hospital Encounter encounter on 10/18/21    CTA CHEST W OR W WO CONT    Narrative  CTA CHEST PULMONARY EMBOLISM PROTOCOL      INDICATION: Progressive shortness of breath. Question pulmonary embolism.     TECHNIQUE: Thin collimation axial images obtained through the level of the  pulmonary arteries with additional imaging through the chest following the  uneventful administration of nonionic intravenous contrast.  Images  reconstructed into three dimensional coronal and sagittal projections for  complete evaluation of the tortuous and overlapping pulmonary vascular  structures and to reduce patient radiation dose. All CT scans at this facility are performed using dose optimization technique as  appropriate to a performed exam, to include automated exposure control,  adjustment of the mA and/or kV according to patient size (including appropriate  matching first site-specific examinations), or use of iterative reconstruction  technique. COMPARISON: August 10, 2021. FINDINGS:    There is some mixing artifact in the left pulmonary artery. There is no definite  acute pulmonary embolus. .    Thyroid: Unremarkable in its visualized aspects. Pericardium/ Heart: Heart is enlarged. Coronary artery disease. Aorta/ Vessels: No aneurysm or dissection. Lymph Nodes: Unremarkable. .    Lungs: Emphysema. There is dense consolidation in the lateral right upper lobe  and posteriorly which has increased. Dense consolidation anteriorly in the right  upper lobe is also increased. There is dense consolidation in the periphery of  the right lower lobe which has increased. The known cavitary right upper lobe  lesion is similar in the cavitary component. There are some nodular opacities in  the right lower lobe concerning for infection. The left lung is relatively  stable in appearance. Upper Abdomen: Colonic diverticulosis    Bones/soft tissues: No acute finding    Impression  No evidence for pulmonary emboli. Multifocal pneumonia has developed in the right lung. Pre-existing cavitary right upper lobe lesion is similar in appearance. 3 month follow-up chest CT is recommended for reassessment. Additional incidentals as above. XR (Most Recent).  CXR reviewed by me and compared with previous CXR Results from Hospital Encounter encounter on 10/18/21    XR SWALLOW [facility] VIDEO    Narrative  MODIFIED BARIUM SWALLOW. CLINICAL INDICATION/HISTORY: Dysphagia. Feeding difficulties. Poor PO intake. Worsening cough. Chronic aspiration. Hypoxemic respiratory failure. COMPARISON: None. TECHNIQUE: A live videoradiographic swallowing function study was performed in  conjunction with the speech therapist.  The video is available in the department  for review by speech pathology and ancillary staff. Fluoroscopic images were not made available in PACS for radiologist review and  this report is strictly a procedural documentation by the physician assistant  with input from speech pathology. It is not considered inclusive or exclusive  of anatomic abnormalities and is not diagnostic beyond the specific  considerations regarding swallowing function as it relates to airway protection  while eating and drinking. Fluoroscopy time: 24 seconds    Fluoroscopic images: 0    Electronic capture cine loops: 7    FINDINGS:    Patient swallowed multiple consistencies of barium mixtures including thin  liquids, nectar, pudding. There was silent tracheal aspiration of all tested consistencies. Swallow delay, premature spillage, and pharyngeal residuals were seen with all  tested consistencies. Impression  Silent tracheal aspiration of all tested consistencies. Please see speech pathologist report for additional details and recommendations. See my orders for details     Total care time exclusive of procedures with complex decision making, coordination of care and counseling patient performed and > 50% time spent in face to face evaluation as mentioned above.     Jose D Pride MD  Critical Care Medicine

## 2021-10-20 NOTE — CONSULTS
[email or web address]  [phone]    GASTROENTEROLOGY CONSULT      Impression:   1. Severe dysphagia - failed MBS, alternate means of nutrition recommended  2. Acute on chronic hypoxic respiratory failure  - ? Due to aspiration PNA  3. Cavitary lung lesion  4. Interstitial lung disease  5. Chronic CHF - compensated  6. Gram pos cocci bacteremia      Plan:     1. Plan for PEG placement if OK with pulmonology and BP stabilizes, currently low  2. NPO after midnight  3. Hold anticoagulation after midnight  4. Continue antibiotics per ID/pulm  5. Medical management per primary team      Chief Complaint: Severe dysphagia, aspiration      HPI:  Richard Olson is a 67 y.o. male who I am being asked to see in consultation for an opinion regarding the above. He presented to  ED for evaluation of worsening SOB, poor PO intake, weight loss and generalized weakness. His family states he had also been having trouble swallowing and felt he had been aspirating when he did try to eat. In the ED he was found to have bilateral PNA, likely aspiration driven and hypoxia. He has since failed several swallowing evals with SLP, recommendation is for alternate means of nutrition such as PEG or comfort feeds. He has been seen by palliative care and remains full code with full interventions and wants to proceed with PEG placement.     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) [phone]    Scarlet fever age 9       [address]:   Past Surgical History:   Procedure Laterality Date    BRONCH-FIBER/DIAGNOSTIC  6/16/2016         HX COLONOSCOPY  2013    neg    [place]ON[place]    HX HEART CATHETERIZATION  2003    HX HEART CATHETERIZATION  1999    HX HERNIA REPAIR      inguinal ? laterality    HX OTHER SURGICAL      2 cochlear implants (R); 1 left - all failed       Social HX:   Social History     Socioeconomic History    Marital status: SINGLE     Spouse name: Not on file    Number of children: Not on file    Years of education: Not on file    Highest education level: Not on file   Occupational History    Not on file   Tobacco Use    Smoking status: Never Smoker    Smokeless tobacco: Never Used   Vaping Use    Vaping Use: Never used   Substance and Sexual Activity    Alcohol use: No    Drug use: No    Sexual activity: Not on file   Other Topics Concern    Not on file   Social History Narrative    Not on file     Social Determinants of Health     Financial Resource Strain:     Difficulty of Paying Living Expenses:    Food Insecurity:     Worried About Running Out of Food in the Last Year:     [address] in the Last Year:    Transportation Needs:     Lack of Transportation (Medical):      Lack of Transportation (Non-Medical):    Physical Activity:     Days of Exercise per Week:     Minutes of Exercise per Session:    Stress:     Feeling of Stress :    Social Connections:     Frequency of Communication with Friends and Family:     Frequency of Social Gatherings with Friends and Family:     Attends Tenriism Services:     Active Member of Clubs or Organizations:     Attends Club or Organization Meetings:     Marital Status:    Intimate Partner Violence:     Fear of Current or Ex-Partner:     Emotionally Abused:     Physically Abused:     Sexually Abused:        FHX:   Family History   Problem Relation Age of Onset    No Known Problems Mother     Heart Disease Father     Heart Attack Father         1989    Coronary Artery Disease Father     Hypertension Father     High Cholesterol Father        Allergy:   Allergies   Allergen Reactions    Pollens Extract Runny Nose and Cough       Patient Active Problem List   Diagnosis Code    Coronary artery disease involving native coronary artery of native heart without angina pectoris,s/p stent 2003 I25.10    Scarlet fever A38.9    Myocardial infarction ([facility]) I21.9    Urinary frequency R35.0    Pneumonia J18.9    Goals of care, counseling/discussion Z71.89    Acute respiratory failure with hypoxia ([facility]) J96.01    Debility R53.81    Severe protein-calorie malnutrition ([facility]) E43       Home Medications:     Medications Prior to Admission   Medication Sig    acetylcysteine (MUCOMYST) 100 mg/mL (10 %) nebulizer solution Take 4 mL by inhalation every four (4) hours for 120 days.  [START ON 11/5/2021] fluorouraciL (EFUDEX) 5 % chemo cream APPLY CREAM TO FOREHEAD 2 TIMES DAILY FOR 2 WEEKS ONCE HEALED USE 2 TIMES DAILY TO THE EARS FOR 2 WEEKS ONCE HEALED USE 2 TIMES DAILY TO THE CHEEKS AND TEMPLES FOR 2 WEEKS    sodium chloride 3 % nebulizer solution 4 mL by Nebulization route four (4) times daily. Mix with albuterol. File under Medicare Part B. Dx: R06.02; J47.9; J84.9; R53.81    albuterol (PROVENTIL VENTOLIN) 2.5 mg /3 mL (0.083 %) nebu Mix with hypertonic saline nebulizer -- use 3- times per day. File under Medicare Part B. Dx: R06.02; J47.9; J84.9; R53.81    guaiFENesin ER (MUCINEX) 600 mg ER tablet Take 1 Tablet by mouth two (2) times a day for 90 days.  cholecalciferol (VITAMIN D3) (1000 Units /25 mcg) tablet Take 1,000 Units by mouth daily.  FISH OIL-DHA-EPA PO Take 1 Tab by mouth daily.  amino acids powd Take 1 Dose by mouth daily.  OTHER Take 1 Cap by mouth daily. tumeric     fluticasone propionate (FLONASE NA) 1 Spray by Both Nostrils route daily.  ascorbic acid, vitamin C, (VITAMIN C) 500 mg tablet Take 500 mg by mouth daily.  multivitamin (ONE A DAY) tablet Take 1 Tab by mouth daily.  aspirin delayed-release 81 mg tablet Take 81 mg by mouth daily. Review of Systems:     Constitutional: weight loss, fatigue. Skin: No rashes, pruritis, jaundice, ulcerations, erythema.    HENT: No headaches, nosebleeds, sinus pressure, rhinorrhea, sore throat. Eyes: No visual changes, blurred vision, eye pain, photophobia, jaundice. Cardiovascular: No chest pain, heart palpitations. Respiratory: cough, SOB   Gastrointestinal: Neg unless noted otherwise in H&P   Genitourinary: No dysuria, bleeding, discharge, pyuria. Musculoskeletal: No weakness, arthralgias, wasting. Endo: No sweats. Heme: No bruising, easy bleeding. Allergies: As noted. Neurological: Cranial nerves intact. Alert and oriented. Gait not assessed. Psychiatric:  No anxiety, depression, hallucinations. Visit Vitals  BP (!) 112/50   Pulse 85   Temp 98.7 °F (37.1 °C)   Resp 22   Ht 5' 7\" (1.702 m)   Wt 52.6 kg (116 lb)   SpO2 91%   BMI 18.17 kg/m²       Physical Assessment:     constitutional: appearance: thin, chronically ill appearing, in no acute distress. skin: inspection: no rashes, ulcers, icterus or other lesions; no clubbing or telangiectasias. palpation: no induration or subcutaneos nodules. eyes: inspection: normal conjunctivae and lids; no jaundice pupils: normal  ENMT: mouth: normal oral mucosa,lips and gums; good dentition. oropharynx: normal tongue, hard and soft palate; posterior pharynx without erithema, exudate or lesions. neck: thyroid: normal size, consistency and position; no masses or tenderness. respiratory: effort: normal chest excursion; no intercostal retraction or accessory muscle use. cardiovascular: abdominal aorta: normal size and position; no bruits. palpation: PMI of normal size and position; normal rhythm; no thrill or murmurs. abdominal: abdomen: normal consistency; no tenderness or masses. hernias: no hernias appreciated. liver: normal size and consistency. spleen: not palpable. rectal: hemoccult/guaiac: not performed. musculoskeletal: grossly normal, gait not assessed, resting in bed   neurologic: cranial nerves: II-XII grossly normal. Pupils intact. psychiatric: judgement/insight: within normal limits. memory: within normal limits for recent and remote events. mood and affect: no evidence of depression, anxiety or agitation. orientation: oriented to time, space and person. Basic Metabolic Profile   Recent Labs     10/20/21  0442      K 4.0      CO2 31   BUN 25*   GLU 84   CA 8.3*   MG 2.1         CBC w/Diff    Recent Labs     10/20/21  0442   WBC 6.9   RBC 3.93*   HGB 10.6*   HCT 36.1   MCV 91.9   MCH 27.0   MCHC 29.4*   RDW 13.4       Recent Labs     10/20/21  0442   GRANS 87*   LYMPH 3*   EOS 0        Hepatic Function   Recent Labs     10/18/21  1928   ALB 2.4*   TP 7.2   TBILI 0.1*   AP 32*        Coags   No results for input(s): PTP, INR, APTT, INREXT in the last 72 hours. LEXUS Morel. [facility], [address] [place] [address]  Cell: [phone]  [email or web address]kaye

## 2021-10-20 NOTE — CONSULTS
Comprehensive Nutrition Assessment    Type and Reason for Visit: Initial, Consult    Nutrition Recommendations/Plan:   - Recommend feeding tube placement to provide enteral nutrition support, only if consistent with goals of care. Nutrition available for management of tube feeding as needed. - Consider addition of dextrose to IVF while pt NPO without enteral access. - Pt at risk for refeeding syndrome, add IV thiamine, monitor mg & phos. Nutrition Assessment:  Admitted with pneumonia, suspected chronic aspiration. S/p MBS yesterday, SLP recommending NPO. Pt currently on IVF and family reports plan to speak with MD today regarding g-tube placement. NFPE completed. Malnutrition Assessment:  Malnutrition Status:  Severe malnutrition    Context:  Chronic illness     Findings of the 6 clinical characteristics of malnutrition:   Energy Intake:  Mild decrease in energy intake (specify) (inadequate intake x 2 weeks, generalized poor intake)  Weight Loss:  Mild weight loss (specify amount and time period) (-8#, 6.5% x 2-3 months per family report)     Body Fat Loss:  7 - Severe body fat loss, Fat overlying ribs, Orbital, Buccal region, Triceps   Muscle Mass Loss:  7 - Severe muscle mass loss, Clavicles (pectoralis &deltoids), Temples (temporalis), Hand (interosseous)  Fluid Accumulation:  Unable to assess,     Strength:  Not performed     Nutrition History and Allergies: PMHx- CAD, deafness (communicates via phone krishna), pulmonary mass of unknown etiology. Presented to ED for evaluation of progressive SOB. Per H&P pts sister reports pt with poor appetite, inadequate po intake x 10 days & an 8# wt loss x 2 months resulting in generalized weakness. NKFA. Estimated Daily Nutrient Needs:  Energy (kcal): 6717-1009; Weight Used for Energy Requirements: Ideal (67 kg)  Protein (g): 64-80;  Weight Used for Protein Requirements: Current (1.2-1.5)  Fluid (ml/day): 0132-5167; Method Used for Fluid Requirements: 1 ml/kcal    Nutrition Related Findings:  BM PTA. Meds: NS at 75 mL/hr, vitamin C, D3 & MVI. Wounds:    None       Current Nutrition Therapies:  DIET NPO Ice Chips, Sips of Water with Meds    Anthropometric Measures:  · Height:  5' 7\" (170.2 cm)  · Current Body Wt:  52.6 kg (115 lb 15.4 oz)   · Admission Body Wt:  115 lb 15.4 oz    · Usual Body Wt:  54.9 kg (121 lb) (2/15/21)     · Ideal Body Wt:  148 lbs:  78.4 %   · BMI Category:  Underweight (BMI less than 22) age over 72       Nutrition Diagnosis:   · Inadequate oral intake related to swallowing difficulty as evidenced by NPO or clear liquid status due to medical condition, swallowing study results    · Severe malnutrition, In context of chronic illness related to inadequate protein-energy intake, swallowing difficulty as evidenced by poor intake prior to admission, weight loss, severe muscle loss, severe loss of subcutaneous fat    Nutrition Interventions:   Food and/or Nutrient Delivery: Continue NPO, IV fluid delivery, Vitamin supplement, Mineral supplement  Nutrition Education and Counseling: No recommendations at this time, Education not indicated  Coordination of Nutrition Care: Continue to monitor while inpatient, Swallow evaluation    Goals:  Nutritional needs will be met through adequate oral intake or nutrition support within the next 7 days. Nutrition Monitoring and Evaluation:   Behavioral-Environmental Outcomes: None identified  Food/Nutrient Intake Outcomes: Diet advancement/tolerance, IVF intake  Physical Signs/Symptoms Outcomes: Biochemical data, Chewing or swallowing, Nutrition focused physical findings, Weight    Discharge Planning:     Too soon to determine     Electronically signed by Daisy Diaz RD on 10/20/2021 at 1:21 PM    Contact: [phone]

## 2021-10-20 NOTE — PROGRESS NOTES
Infectious Disease progress Note        Reason: Right upper lobe cavitary pneumonia    Current abx Prior abx   Piperacillin/tazobactam, levofloxacin, vancomycin since 10/18/2021      Lines:       Assessment :    67 y.o. 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 intake, and worsening cough . Now with gram positive bacteremia, chronic cavitary lesion RUL with RUL/RLL infiltrates    Clinical presentation c/w acute on chronic hypoxic respiratory failure likely secondary to recurrent aspiration pneumonia, chronic cavitary lesion right upper lobe  Rule out superimposed infection/colonization with atypical mycobacteria    Pulmonary follow-up appreciated    Single positive blood culture for coagulase negative staph. On  10/18 is likely contamination  Recommendations:    1. Continue Zosyn, levofloxacin, d/c vancomycin  2. Obtain induced sputum culture, sputum for AFB  3. Follow-up identification of gram-positive cocci in blood culture  4. Follow-up pulmonary recommendations  5. Will de-escalate antibiotics based on the blood test results, clinical course       Above plan was discussed in details with patient, and dr Reed Lares. Please call me if any further questions or concerns. Will continue to participate in the care of this patient. HPI:    Feels slightly better. Denies any chest pain. No new complaints. Current Discharge Medication List      CONTINUE these medications which have NOT CHANGED    Details   acetylcysteine (MUCOMYST) 100 mg/mL (10 %) nebulizer solution Take 4 mL by inhalation every four (4) hours for 120 days.   Qty: 720 mL, Refills: 3      fluorouraciL (EFUDEX) 5 % chemo cream APPLY CREAM TO FOREHEAD 2 TIMES DAILY FOR 2 WEEKS ONCE HEALED USE 2 TIMES DAILY TO THE EARS FOR 2 WEEKS ONCE HEALED USE 2 TIMES DAILY TO THE CHEEKS AND TEMPLES FOR 2 WEEKS      sodium chloride 3 % nebulizer solution 4 mL by Nebulization route four (4) times daily. Mix with albuterol. File under Medicare Part B. Dx: R06.02; J47.9; J84.9; R53.81  Qty: 480 mL, Refills: 5    Associated Diagnoses: Dyspnea on exertion; Bronchiectasis without complication ([facility] [address] ILD (interstitial lung disease) ([facility] Chronic pulmonary aspiration, sequela; Physical deconditioning      albuterol (PROVENTIL VENTOLIN) 2.5 mg /3 mL (0.083 %) nebu Mix with hypertonic saline nebulizer -- use 3- times per day. File under Medicare Part B. Dx: R06.02; J47.9; J84.9; R53.81  Qty: 360 Nebule, Refills: 3    Associated Diagnoses: Dyspnea on exertion; [facility] ILD (interstitial lung disease) ([facility] Chronic pulmonary aspiration, sequela; Physical deconditioning      guaiFENesin ER (MUCINEX) 600 mg ER tablet Take 1 Tablet by mouth two (2) times a day for 90 days. Qty: 180 Tablet, Refills: 3    Associated Diagnoses: Bronchiectasis without complication (HCC)      cholecalciferol (VITAMIN D3) (1000 Units /25 mcg) tablet Take 1,000 Units by mouth daily. FISH OIL-DHA-EPA PO Take 1 Tab by mouth daily. amino acids powd Take 1 Dose by mouth daily. OTHER Take 1 Cap by mouth daily. tumeric       fluticasone propionate (FLONASE NA) 1 Spray by Both Nostrils route daily. ascorbic acid, vitamin C, (VITAMIN C) 500 mg tablet Take 500 mg by mouth daily. multivitamin (ONE A DAY) tablet Take 1 Tab by mouth daily. aspirin delayed-release 81 mg tablet Take 81 mg by mouth daily.     Associated Diagnoses: Coronary artery disease involving native coronary artery of native heart without angina pectoris             Current Facility-Administered Medications   Medication Dose Route Frequency    enoxaparin (LOVENOX) injection 40 mg  40 mg SubCUTAneous QHS    vancomycin (VANCOCIN) 750 mg in 0.9% sodium chloride 250 mL (VIAL-MATE)  750 mg IntraVENous Q8H    Vancomycin Lab Information: Trough Level Due at 0730 on 10/20  1 Each Other ONCE    0.9% sodium chloride infusion  75 mL/hr IntraVENous CONTINUOUS    albuterol-ipratropium (DUO-NEB) 2.5 MG-0.5 MG/3 ML  3 mL Nebulization Q4H RT    sodium chloride 3% hypertonic nebulizer soln  4 mL Nebulization Q8H    guaiFENesin ER (MUCINEX) tablet 600 mg  600 mg Oral Q12H    sodium chloride (NS) flush 5-10 mL  5-10 mL IntraVENous PRN    piperacillin-tazobactam (ZOSYN) 3.375 g in 0.9% sodium chloride (MBP/ADV) 100 mL MBP  3.375 g IntraVENous Q6H    levoFLOXacin (LEVAQUIN) 750 mg in D5W IVPB  750 mg IntraVENous Q24H    sodium chloride (NS) flush 5-40 mL  5-40 mL IntraVENous Q8H    sodium chloride (NS) flush 5-40 mL  5-40 mL IntraVENous PRN    acetaminophen (TYLENOL) tablet 650 mg  650 mg Oral Q6H PRN    Or    acetaminophen (TYLENOL) suppository 650 mg  650 mg Rectal Q6H PRN    polyethylene glycol (MIRALAX) packet 17 g  17 g Oral DAILY PRN    albuterol (PROVENTIL VENTOLIN) nebulizer solution 2.5 mg  2.5 mg Nebulization Q6H RT    cholecalciferol (VITAMIN D3) (1000 Units /25 mcg) tablet 1,000 Units  1,000 Units Oral DAILY    omega-3 acid ethyl esters (LOVAZA) capsule 1,000 mg  1 g Oral DAILY WITH BREAKFAST    ascorbic acid (vitamin C) (VITAMIN C) tablet 500 mg  500 mg Oral DAILY    therapeutic multivitamin (THERAGRAN) tablet 1 Tablet  1 Tablet Oral DAILY    aspirin chewable tablet 81 mg  81 mg Oral DAILY       Allergies: Pollens extract    Family History   Problem Relation Age of Onset    No Known Problems Mother     Heart Disease Father     Heart Attack Father         1989    Coronary Artery Disease Father     Hypertension Father     High Cholesterol Father      Social History     Socioeconomic History    Marital status: SINGLE     Spouse name: Not on file    Number of children: Not on file    Years of education: Not on file    Highest education level: Not on file   Occupational History    Not on file   Tobacco Use    Smoking status: Never Smoker    Smokeless tobacco: Never Used   Vaping Use    Vaping Use: Never used   Substance and Sexual Activity    Alcohol use: No    Drug use: No    Sexual activity: Not on file   Other Topics Concern    Not on file   Social History Narrative    Not on file     Social Determinants of Health     Financial Resource Strain:     Difficulty of Paying Living Expenses:    Food Insecurity:     Worried About Running Out of Food in the Last Year:     [address] in the Last Year:    Transportation Needs:     Lack of Transportation (Medical):  Lack of Transportation (Non-Medical):    Physical Activity:     Days of Exercise per Week:     Minutes of Exercise per Session:    Stress:     Feeling of Stress :    Social Connections:     Frequency of Communication with Friends and Family:     Frequency of Social Gatherings with Friends and Family:     Attends [employer] Services:     Active Member of Clubs or Organizations:     Attends Club or Organization Meetings:     Marital Status:    Intimate Partner Violence:     Fear of Current or Ex-Partner:     Emotionally Abused:     Physically Abused:     Sexually Abused:      Social History     Tobacco Use   Smoking Status Never Smoker   Smokeless Tobacco Never Used        Temp (24hrs), Av.9 °F (36.6 °C), Min:97.6 °F (36.4 °C), Max:98.2 °F (36.8 °C)    Visit Vitals  BP (!) 99/49   Pulse 96   Temp 97.6 °F (36.4 °C)   Resp 27   Ht 5' 7\" (1.702 m)   Wt 52.6 kg (116 lb)   SpO2 95%   BMI 18.17 kg/m²       ROS: 12 point ROS obtained in details. Pertinent positives as mentioned in HPI,   otherwise negative    Physical Exam:    General:Thin  male laying on the bed AAOx3 in no acute distress. HEENT:  Normocephalic, atraumatic, EOMI, no scleral icterus or pallor; no conjunctival hemmohage;  nasal and oral mucous are moist and without evidence of lesions. Neck supple, no bruits.    Lymph Nodes:   not examined   Lungs: shallow breathing, tachypneic, rhonchi right upper lung   Heart:  RRR, s1 and s2; no  rubs or gallops, no edema, + pedal pulses   Abdomen:  soft, non-distended, active bowel sounds, no hepatomegaly, no splenomegaly. Non-tender   Genitourinary:  deferred   Extremities:   no clubbing, cyanosis; no joint effusions or swelling; Full ROM of all large joints to the upper and lower extremities; muscle mass appropriate for age   Neurologic:  No gross focal motor or sensory abnormalities                        Skin:  No rash or ulcers noted   Back:  no spinal or paraspinal muscle tenderness or rigidity, no CVA tenderness     Psychiatric:  No suicidal or homicidal ideations, appropriate mood and affect         Labs: Results:   Chemistry Recent Labs     10/20/21  0442 10/19/21  0330 10/18/21  1928   GLU 84 101* 124*    138 139   K 4.0 4.4 4.6    105 101   CO2 31 34* 38*   BUN 25* 29* 38*   CREA 0.49* 0.41* 0.58*   CA 8.3* 8.0* 8.9   AGAP 1* NEG 1 0*   BUCR 51* 71* 66*   AP  --   --  32*   TP  --   --  7.2   ALB  --   --  2.4*   GLOB  --   --  4.8*   AGRAT  --   --  0.5*      CBC w/Diff Recent Labs     10/20/21  0442 10/19/21  0330 10/18/21  1928   WBC 6.9 7.3 9.0   RBC 3.93* 3.77* 4.45   HGB 10.6* 10.0* 11.9*   HCT 36.1 34.1* 39.7    200 245   GRANS 87* 83* 85*   LYMPH 3* 5* 3*   EOS 0 1 1      Microbiology Recent Labs     10/18/21  2115 10/18/21  1928   CULT NO GROWTH 2 DAYS CULTURE IN PROGRESS,FURTHER UPDATES TO FOLLOW  Sent to [facility] for ID/Susceptibility if indicated. RADIOLOGY:    All available imaging studies/reports in [facility] for this admission were reviewed  High complexity decision making was performed during the evaluation of this patient at high risk for decompensation          Disclaimer: Sections of this note are dictated utilizing voice recognition software, which may have resulted in some phonetic based errors in grammar and contents.  Even though attempts were made to correct all the mistakes, some may have been missed, and remained in the body of the document. If questions arise, please contact our department.     Dr. Arjun Frye, Infectious Disease Specialist  [phone]  October 20, 2021  3:25 PM

## 2021-10-20 NOTE — PROGRESS NOTES
Problem: Self Care Deficits Care Plan (Adult)  Goal: *Acute Goals and Plan of Care (Insert Text)  Description: Occupational Therapy Goals  Initiated 10/20/2021 within 7 day(s). 1.  Patient will perform grooming with supervision/set-up standing at the sink for 2-4 min with Good balance. 2.  Patient will perform bathing with supervision/set-up. 3.  Patient will perform lower body dressing with supervision/set-up for seated and standing aspects, using EC techniques. 4.  Patient will perform toilet transfers with supervision/set-up. 5.  Patient will perform all aspects of toileting with supervision/set-up. 6.  Patient will participate in upper extremity therapeutic exercise/activities with supervision/set-up for 5 minutes to increase endurance for ADLs. 7.  Patient will utilize energy conservation techniques during functional activities with verbal cues. Prior Level of Function: Pt reports he lives alone and has caregivers daily assisting with meals and light housework. Outcome: Progressing Towards Goal  OCCUPATIONAL THERAPY EVALUATION    Patient: Manuel Alexander (29 y.o. male)  Date: 10/20/2021  Primary Diagnosis: Pneumonia [J18.9]        Precautions:   Fall, Aspiration    ASSESSMENT :  Based on the objective data described below, the patient presents with decreased endurance and functional activity tolerance, generalized weakness, decreased standing balance, decreased functional mobility, SOB with activities, limiting his participation and independence with ADLs. Pt with hearing impairment, benefits from communication via speech to text application on his phone. Pt is seen with PT to increase safety of the pt and staff during functional mobility and ADLs. Pt is very SOB with all activities with shallow breathing and increased respiratory rate, benefiting from mult cues for PLB and pacing throughout the session. Pt performs LB dressing with bending fwd method and mult RBs to manage SOB.  O2 sats remained 88-93% throughout the session. Patient will benefit from skilled intervention to address the above impairments. Patient's rehabilitation potential is considered to be Fair  Factors which may influence rehabilitation potential include:   []             None noted  []             Mental ability/status  [x]             Medical condition  [x]             Home/family situation and support systems  []             Safety awareness  []             Pain tolerance/management  []             Other:      PLAN :  Recommendations and Planned Interventions:   [x]               Self Care Training                  [x]      Therapeutic Activities  [x]               Functional Mobility Training   []      Cognitive Retraining  [x]               Therapeutic Exercises           [x]      Endurance Activities  [x]               Balance Training                    [x]      Neuromuscular Re-Education  []               Visual/Perceptual Training     [x]      Home Safety Training  [x]               Patient Education                   [x]      Family Training/Education  []               Other (comment):    Frequency/Duration: Patient will be followed by occupational therapy 1-2 times per day/3-5 days per week to address goals. Discharge Recommendations: Home Health with increased assistance vs SNF  Further Equipment Recommendations for Discharge: bedside commode and shower chair     SUBJECTIVE:   Patient stated I dress myself.     OBJECTIVE DATA SUMMARY:     Past Medical History:   Diagnosis Date    CAD (coronary artery disease)     MI 1995; stent 2003 (done preop cochlear sgy)    Cancer ([facility] [address]     SQUAMOUS CELL    Coronary artery disease     Deafness     Myocardial infarction [facility]) [id number]    Scarlet fever age 9     Past Surgical History:   Procedure Laterality Date    BRONCH-FIBER/DIAGNOSTIC  6/16/2016         HX COLONOSCOPY  2013    neg    HX CORONARY STENT PLACEMENT  2003    [facility] HEART CATHETERIZATION  2003    HX HEART CATHETERIZATION  1999    HX HERNIA REPAIR      inguinal ? laterality    HX OTHER SURGICAL      2 cochlear implants (R); 1 left - all failed     Barriers to Learning/Limitations: yes;  sensory deficits-vision/hearing/speech  Compensate with: visual, verbal, tactile, kinesthetic cues/model    Home Situation:   Home Situation  Home Environment: Private residence  # Steps to Enter: 2  Rails to Enter: No  Wheelchair Ramp: No  One/Two Story Residence: One story  Living Alone: Yes  Support Systems: Other Family Member(s)  Current DME Used/Available at Home: otto Wang  [x]  Right hand dominant   []  Left hand dominant    Cognitive/Behavioral Status:  Neurologic State: Alert  Orientation Level: Oriented X4  Cognition: Follows commands  Safety/Judgement: Awareness of environment    Skin: mult bruises, dry skin  Edema: none noted    Vision/Perceptual:       Acuity: Impaired near vision; Impaired far vision    Corrective Lenses: Glasses    Coordination: BUE  Coordination: Generally decreased, functional  Fine Motor Skills-Upper: Left Impaired;Right Impaired    Gross Motor Skills-Upper: Left Intact; Right Intact    Balance:  Sitting: Intact  Standing: Impaired; With support  Standing - Static: Good  Standing - Dynamic : Fair    Strength: BUE  Strength: Generally decreased, functional   Tone & Sensation: BUE  Tone: Normal  Sensation: Intact   Range of Motion: BUE  AROM: Generally decreased, functional     Functional Mobility and Transfers for ADLs:  Bed Mobility:     Supine to Sit: Stand-by assistance  Sit to Supine: Stand-by assistance  Scooting: Stand-by assistance  Transfers:  Sit to Stand: Contact guard assistance  Stand to Sit: Contact guard assistance   Toilet Transfer : Contact guard assistance    Bathroom Mobility: Contact guard assistance  ADL Assessment:   Feeding: Setup  Oral Facial Hygiene/Grooming: Setup  Bathing: Contact guard assistance  Upper Body Dressing: Setup  Lower Body Dressing: Contact guard assistance  Toileting: Contact guard assistance   ADL Intervention:     Cognitive Retraining  Safety/Judgement: Awareness of environment    Pain:  Pain level pre-treatment: 0/10   Pain level post-treatment: 0/10     Activity Tolerance:   Fair  Please refer to the flowsheet for vital signs taken during this treatment. After treatment:   [] Patient left in no apparent distress sitting up in chair  [x] Patient left in no apparent distress in bed  [x] Call bell left within reach  [x] Nursing notified  [] Caregiver present  [x] Bed alarm activated    COMMUNICATION/EDUCATION:   [x] Role of Occupational Therapy in the acute care setting  [x] Home safety education was provided and the patient/caregiver indicated understanding. [x] Patient/family have participated as able in goal setting and plan of care. [x] Patient/family agree to work toward stated goals and plan of care. [] Patient understands intent and goals of therapy, but is neutral about his/her participation. [] Patient is unable to participate in goal setting and plan of care. Thank you for this referral.  Renu Leong, OTR/L  Time Calculation: 38 mins    Eval Complexity: History: LOW Complexity : Brief history review ; Examination: LOW Complexity : 1-3 performance deficits relating to physical, cognitive , or psychosocial skils that result in activity limitations and / or participation restrictions ;    Decision Making:LOW Complexity : No comorbidities that affect functional and no verbal or physical assistance needed to complete eval tasks

## 2021-10-20 NOTE — PROGRESS NOTES
Problem: Patient Education: Go to Patient Education Activity  Goal: Patient/Family Education  Outcome: Progressing Towards Goal     Problem: Falls - Risk of  Goal: *Absence of Falls  Description: Document Earma Nataly Fall Risk and appropriate interventions in the flowsheet.   Outcome: Progressing Towards Goal  Note: Fall Risk Interventions:  Mobility Interventions: Patient to call before getting OOB              Elimination Interventions: Bed/chair exit alarm              Problem: Patient Education: Go to Patient Education Activity  Goal: Patient/Family Education  Outcome: Progressing Towards Goal     Problem: Discharge Planning  Goal: *Discharge to safe environment  Outcome: Progressing Towards Goal  Goal: *Knowledge of medication management  Outcome: Progressing Towards Goal  Goal: *Knowledge of discharge instructions  Outcome: Progressing Towards Goal     Problem: Patient Education: Go to Patient Education Activity  Goal: Patient/Family Education  Outcome: Progressing Towards Goal     Problem: Patient Education: Go to Patient Education Activity  Goal: Patient/Family Education  Outcome: Progressing Towards Goal     Problem: Patient Education: Go to Patient Education Activity  Goal: Patient/Family Education  Outcome: Progressing Towards Goal     Problem: Nutrition Deficit  Goal: *Optimize nutritional status  Outcome: Progressing Towards Goal

## 2021-10-20 NOTE — PROGRESS NOTES
Problem: Patient Education: Go to Patient Education Activity  Goal: Patient/Family Education  Outcome: Progressing Towards Goal     Problem: Falls - Risk of  Goal: *Absence of Falls  Description: Document Rajan Edwards Fall Risk and appropriate interventions in the flowsheet.   Outcome: Progressing Towards Goal  Note: Fall Risk Interventions:  Mobility Interventions: Patient to call before getting OOB              Elimination Interventions: Bed/chair exit alarm, Call light in reach, Patient to call for help with toileting needs              Problem: Patient Education: Go to Patient Education Activity  Goal: Patient/Family Education  Outcome: Progressing Towards Goal     Problem: Discharge Planning  Goal: *Discharge to safe environment  Outcome: Progressing Towards Goal  Goal: *Knowledge of medication management  Outcome: Progressing Towards Goal  Goal: *Knowledge of discharge instructions  Outcome: Progressing Towards Goal     Problem: Patient Education: Go to Patient Education Activity  Goal: Patient/Family Education  Outcome: Progressing Towards Goal

## 2021-10-20 NOTE — PROGRESS NOTES
Problem: Patient Education: Go to Patient Education Activity  Goal: Patient/Family Education  10/20/2021 0745 by Juan Pena  Outcome: Progressing Towards Goal  10/20/2021 0403 by Froy MARTINEZ  Outcome: Progressing Towards Goal     Problem: Falls - Risk of  Goal: *Absence of Falls  Description: Document Annabelle Lowry Fall Risk and appropriate interventions in the flowsheet.   10/20/2021 0745 by Juan Pena  Outcome: Progressing Towards Goal  Note: Fall Risk Interventions:  Mobility Interventions: Patient to call before getting OOB              Elimination Interventions: Bed/chair exit alarm, Call light in reach, Patient to call for help with toileting needs           10/20/2021 0403 by Fryo MARTINEZ  Outcome: Progressing Towards Goal  Note: Fall Risk Interventions:  Mobility Interventions: Patient to call before getting OOB              Elimination Interventions: Bed/chair exit alarm, Call light in reach, Patient to call for help with toileting needs              Problem: Patient Education: Go to Patient Education Activity  Goal: Patient/Family Education  10/20/2021 0745 by Juan Pena  Outcome: Progressing Towards Goal  10/20/2021 0403 by Juan Pena  Outcome: Progressing Towards Goal     Problem: Discharge Planning  Goal: *Discharge to safe environment  10/20/2021 0745 by Juan Pena  Outcome: Progressing Towards Goal  10/20/2021 0403 by Juan Pena  Outcome: Progressing Towards Goal  Goal: *Knowledge of medication management  10/20/2021 0745 by Juan Pena  Outcome: Progressing Towards Goal  10/20/2021 0403 by Juan Pena  Outcome: Progressing Towards Goal  Goal: *Knowledge of discharge instructions  10/20/2021 0745 by Juan Pena  Outcome: Progressing Towards Goal  10/20/2021 0403 by Juanlouie Pena  Outcome: Progressing Towards Goal     Problem: Patient Education: Go to Patient Education Activity  Goal: Patient/Family Education  10/20/2021 0745 by Froy Barclay S  Outcome: Progressing Towards Goal  10/20/2021 0403 by Bolivar MARTINEZ  Outcome: Progressing Towards Goal

## 2021-10-20 NOTE — PROGRESS NOTES
Left message for sister Ketan Singleton, requesting a call back.   Buffy Scales RN - Outcomes Manager  [phone]

## 2021-10-20 NOTE — PROGRESS NOTES
Problem: Mobility Impaired (Adult and Pediatric)  Goal: *Acute Goals and Plan of Care (Insert Text)  Description: Physical Therapy Goals  Initiated 10/20/2021 and to be accomplished within 7 day(s)  1. Patient will move from supine to sit and sit to supine , scoot up and down, and roll side to side in bed with modified independence. 2.  Patient will transfer from bed to chair and chair to bed with modified independence using the least restrictive device. 3.  Patient will perform sit to stand with modified independence. 4.  Patient will ambulate with modified independence for 100 feet with the least restrictive device. 5.  Patient will ascend/descend 2 stairs with 0 handrail(s) with modified independence. PLOF: Pt reporting he lives alone in 1 story house with 2 JOSE EDUARDO without handrails. Pt uses RW for mobility, caregiver daily for assist with meal prep and house chores. Outcome: Progressing Towards Goal        PHYSICAL THERAPY EVALUATION    Patient: Keith Hoyt (52 y.o. male)  Date: 10/20/2021  Primary Diagnosis: Pneumonia [J18.9]       Precautions:   Fall, Aspiration      ASSESSMENT :  Pt cleared to participate in PT session, pt received semi-reclined in bed and agreeable to therapy session. Completing with OT to maximize safety and mobility. Based on the objective data described below, the patient presents with decreased endurance, decreased strength, decreased balance reactions, gait deviations, and decreased independence in functional mobility. Pt Wainwright needing dictation krishna on phone for conversation with PT. Pt demonstrating bed mobility with SBA. Standing with CGA to RW. Pt on 4L O2, SPO2 in high 80s-low 90s throughout session. Pt needing education on deep breathing techniques. Pt demosntrating shallow breathing and increased use of accessory musculature. Pt positioned for comfort and educated to call for assist before getting up, pt verbalized understanding.  Pt left with all needs met and call bell in reach. RN notified of position and participation. Bed alarm activated. Patient will benefit from skilled intervention to address the above impairments. Patient's rehabilitation potential is considered to be Fair  Factors which may influence rehabilitation potential include:   []         None noted  []         Mental ability/status  [x]         Medical condition  [x]         Home/family situation and support systems  []         Safety awareness  []         Pain tolerance/management  []         Other:      PLAN :  Recommendations and Planned Interventions:   [x]           Bed Mobility Training             []    Neuromuscular Re-Education  [x]           Transfer Training                   []    Orthotic/Prosthetic Training  [x]           Gait Training                          []    Modalities  [x]           Therapeutic Exercises           []    Edema Management/Control  [x]           Therapeutic Activities            [x]    Family Training/Education  [x]           Patient Education  []           Other (comment):    Frequency/Duration: Patient will be followed by physical therapy 1-2 times per day/4-7 days per week to address goals. Discharge Recommendations: Rehab vs HH pending progress   Further Equipment Recommendations for Discharge: rolling walker     SUBJECTIVE:   Patient stated I use a walker.     OBJECTIVE DATA SUMMARY:     Past Medical History:   Diagnosis Date    CAD (coronary artery disease)     MI 1995; stent 2003 (done preop cochlear sgy)    Cancer ([facility] [address]     SQUAMOUS CELL    Coronary artery disease     Deafness     Myocardial infarction [facility]) [phone]    Scarlet fever age 9     Past Surgical History:   Procedure Laterality Date    BRONCH-FIBER/DIAGNOSTIC  6/16/2016         HX COLONOSCOPY  2013    neg    [place]    HX HEART CATHETERIZATION  2003    HX HEART CATHETERIZATION  1999    HX HERNIA REPAIR      inguinal ? laterality    HX OTHER SURGICAL      2 cochlear implants (R); 1 left - all failed     Barriers to Learning/Limitations: yes;  sensory deficits-vision/hearing/speech  Compensate with: Visual Cues and Tactile Cues  Home Situation:  Home Situation  Home Environment: Private residence  # Steps to Enter: 2  Rails to Enter: No  Wheelchair Ramp: No  One/Two Story Residence: One story  Living Alone: Yes  Support Systems: Other Family Member(s)  Critical Behavior:  Neurologic State: Alert  Orientation Level: Oriented X4  Cognition: Appropriate decision making  Safety/Judgement: Awareness of environment; Fall prevention  Psychosocial  Patient Behaviors: Calm; Cooperative  Purposeful Interaction: Yes  Pt Identified Daily Priority: Clinical issues (comment)  Caritas Process: Nurture loving kindness;Establish trust;Enable con/hope  Caring Interventions: Reassure  Reassure: Therapeutic listening; Informing  Strength:    Strength: Generally decreased, functional (4/5)  Tone & Sensation:   Tone: Normal     Sensation: Intact    Range Of Motion:  AROM: Within functional limits  Posture:  Posture (WDL): Within defined limits     Functional Mobility:  Bed Mobility:     Supine to Sit: Stand-by assistance  Sit to Supine: Stand-by assistance  Scooting: Stand-by assistance  Transfers:  Sit to Stand: Contact guard assistance  Stand to Sit: Contact guard assistance      Balance:   Sitting: Intact  Standing: Impaired; With support  Standing - Static: Good  Standing - Dynamic : Fair  Ambulation/Gait Training:  Distance (ft): 20 Feet (ft) (x2)  Assistive Device: Walker, rolling  Ambulation - Level of Assistance: Contact guard assistance     Gait Description (WDL): Exceptions to WDL  Gait Abnormalities: Decreased step clearance        Base of Support: Narrowed; Center of gravity altered     Speed/Elaina: Slow;Shuffled  Step Length: Right shortened;Left shortened     Pain:  Pain level pre-treatment: 0/10   Pain level post-treatment: 0/10    Activity Tolerance:   Fair tolerance, SOB     Please refer to the flowsheet for vital signs taken during this treatment. After treatment:   []         Patient left in no apparent distress sitting up in chair  [x]         Patient left in no apparent distress in bed  [x]         Call bell left within reach  [x]         Nursing notified  []         Caregiver present  [x]         Bed alarm activated  []         SCDs applied    COMMUNICATION/EDUCATION:   [x]         Role of Physical Therapy in the acute care setting. [x]         Fall prevention education was provided and the patient/caregiver indicated understanding. [x]         Patient/family have participated as able in goal setting and plan of care. [x]         Patient/family agree to work toward stated goals and plan of care. []         Patient understands intent and goals of therapy, but is neutral about his/her participation. []         Patient is unable to participate in goal setting/plan of care: ongoing with therapy staff.  []         Other:     Thank you for this referral.  Ernestina Da Silva, PT   Time Calculation: 45 mins      Eval Complexity: History: MEDIUM  Complexity : 1-2 comorbidities / personal factors will impact the outcome/ POC Exam:MEDIUM Complexity : 3 Standardized tests and measures addressing body structure, function, activity limitation and / or participation in recreation  Presentation: LOW Complexity : Stable, uncomplicated  Clinical Decision Making:Low Complexity low Overall Complexity:LOW

## 2021-10-20 NOTE — PROGRESS NOTES
[facility] Hospitalist Group  Progress Note    Patient: Marina Flores Age: 67 y.o. : 1945 MR#: [id number] SSN: [id number]  Date/Time: 10/20/2021    Subjective:     Patient is laying in bed in no apparent distress, awake, follows simple commands. Sister and brother-in-law at bedside    Assessment/Plan:     Possible aspiration pneumonia, suspected chronic aspiration  Cavitary lung lesion  Interstitial lung disease  Hypoxia  Chronic systolic congestive heart failurecompensated  Gram-positive cocci bacteremia  Dysphagiaspeech recommends n.p.o. and alternative means of feeding    PLAN  Continue antibiotics, bronchial hygiene, follow cultures  Pulmonary and ID are following  Continue oxygen  Continue D5 normal saline while patient is n.p.o.  GI is planning PEG tube tomorrow  Will consider PPN if there are any delays in PEG tube placement  Palliative care input noted. Patient is full code  I discussed at length with patient and sister and brother-in-law at bedside. Patient is agreeable for PEG tube placement. Case discussed with:  [x]Patient  []Family  []Nursing  []Case Management  DVT Prophylaxis:  [x]Lovenox  []Hep SQ  []SCDs  []Coumadin   []On Heparin gtt    Objective:   VS:   Visit Vitals  BP (!) 99/49   Pulse 96   Temp 97.6 °F (36.4 °C)   Resp 27   Ht 5' 7\" (1.702 m)   Wt 52.6 kg (116 lb)   SpO2 95%   BMI 18.17 kg/m²      Tmax/24hrs: Temp (24hrs), Av.9 °F (36.6 °C), Min:97.6 °F (36.4 °C), Max:98.2 °F (36.8 °C)    Blood pressure when I was in patient's room on the monitor was 96/50. This is around 3:30 PM    Input/Output:     Intake/Output Summary (Last 24 hours) at 10/20/2021 0956  Last data filed at 10/19/2021 2340  Gross per 24 hour   Intake 250 ml   Output 75 ml   Net 175 ml       General:  Awake, follows commands, responds appropriately.   Hard of hearing  Cardiovascular:  S1S2+, RRR  Pulmonary: Coarse breath sounds bilaterally  GI:  Soft, BS+, NT, ND  Extremities: No edema  Bitemporal wasting    Labs:    Recent Results (from the past 24 hour(s))   METABOLIC PANEL, BASIC    Collection Time: 10/20/21  4:42 AM   Result Value Ref Range    Sodium 137 136 - 145 mmol/L    Potassium 4.0 3.5 - 5.5 mmol/L    Chloride 105 100 - 111 mmol/L    CO2 31 21 - 32 mmol/L    Anion gap 1 (L) 3.0 - 18 mmol/L    Glucose 84 74 - 99 mg/dL    BUN 25 (H) 7.0 - 18 MG/DL    Creatinine 0.49 (L) 0.6 - 1.3 MG/DL    BUN/Creatinine ratio 51 (H) 12 - 20      GFR est AA >60 >60 ml/min/1.73m2    GFR est non-AA >60 >60 ml/min/1.73m2    Calcium 8.3 (L) 8.5 - 10.1 MG/DL   CBC WITH AUTOMATED DIFF    Collection Time: 10/20/21  4:42 AM   Result Value Ref Range    WBC 6.9 4.6 - 13.2 K/uL    RBC 3.93 (L) 4.35 - 5.65 M/uL    HGB 10.6 (L) 13.0 - 16.0 g/dL    HCT 36.1 36.0 - 48.0 %    MCV 91.9 78.0 - 100.0 FL    MCH 27.0 24.0 - 34.0 PG    MCHC 29.4 (L) 31.0 - 37.0 g/dL    RDW 13.4 11.6 - 14.5 %    PLATELET 135 477 - 757 K/uL    MPV 9.4 9.2 - 11.8 FL    NEUTROPHILS 87 (H) 40 - 73 %    LYMPHOCYTES 3 (L) 21 - 52 %    MONOCYTES 9 3 - 10 %    EOSINOPHILS 0 0 - 5 %    BASOPHILS 0 0 - 2 %    ABS. NEUTROPHILS 6.0 1.8 - 8.0 K/UL    ABS. LYMPHOCYTES 0.2 (L) 0.9 - 3.6 K/UL    ABS. MONOCYTES 0.6 0.05 - 1.2 K/UL    ABS. EOSINOPHILS 0.0 0.0 - 0.4 K/UL    ABS.  BASOPHILS 0.0 0.0 - 0.1 K/UL    DF AUTOMATED     MAGNESIUM    Collection Time: 10/20/21  4:42 AM   Result Value Ref Range    Magnesium 2.1 1.6 - 2.6 mg/dL   Mando Borjas    Collection Time: 10/20/21  7:56 AM   Result Value Ref Range    Vancomycin,trough 11.8 10.0 - 20.0 ug/mL    Reported dose date 20211020      Reported dose time: 0000      Reported dose: 750 MG UNITS     Additional Data Reviewed:      Signed By: Ramez Wise MD     October 20, 2021

## 2021-10-21 NOTE — PROGRESS NOTES
[address] [facility] 54: [phone]  [facility]: [address] [place] [address] Way: [phone]    Patient Name: Kelvin Dowling  YOB: 1945    Date of follow up 10/21/2021   Reason for Consult: goals of care discussions   Requesting Provider: Dr Mao  Primary Care Physician: Ag Melara NP      SUMMARY:   Kelvin Dowling is a 67y.o. year old with a past history of CAD. S/p stent in 2003, scarlet fever at age 9, interstitial lung disease, chronic bronchiectasis, and chronic aspiration with dysphagia   , who was admitted on 10/18/2021 from home  with a diagnosis of hypoxemic respiratory failure possibly due to aspiration pneumonia . Current medical issues leading to Palliative Medicine involvement include: 67year old male with several life limiting chronic illnesses. Palliative medicine is consulted for support and goals of care discussions. 10/21/2021 alert, no complaints this am. PEG placement today. PALLIATIVE DIAGNOSES:   1. Goals of care   2. Acute hypoxic respiratory failure   3. Pneumonia likely due to aspiration   4. Debility        PLAN:   1. 10/21/2021 Mr Mathew Reyes seen along with Ms Corina Szymanski. Alert, deaf, although able to communicate with written word. Denies pain, still some shortness of breath, he is not sure the oxygen is helping. Breathing a bit more labored this am. O2 sats 95% on nasal cannula oxygen. Noted plans for PEG placement later today. Goals of care have not changed patient wishes for full code with full interventions. Goals of care are defined. Palliative medicine will sign off at this time Please re consult if we can be of service. ( please see below for previous notes per palliative team)     2. Goals of care Patient seen along with Ms Livia Mendiola RN. He is alert, deaf and communicates verbally however needs questions text or written. We were able to write our questions today.  He denied pain or shortness of breath. Noted AMD on file naming his sister Indy Wade as MPOA and her  Carmelita Dunham as secondary. He wished no changes today. Able to discuss goals of care through writing the question and explaining the benefits and burdens. He was able to verbally tell us he wished to be resuscitated in the event of cardiac arrest and intubation if needed. This was consistent with his AMD wishes. Goals of care full code with full interventions. Communicated with patient by writing to have further thought and consideration of resuscitation as these efforts will not cure, treat or reverse his underlying chronic medical conditions and likely cause more functional debility. 3. Acute hypoxic respiratory failure likely due to pneumonia but has interstitial lung disease. Maintaining on nasal cannula currently   4. Pneumonia due to aspiration. Appreciate speech consult at time of our visit MBS pending. 5. Debility has care givers during weekdays, sister and brother in law provide care on weekends. Uses walker for ambulation. Very thin and frail appearing albumin 2.4   6. Initial consult note routed to primary continuity provider  7. Communicated plan of care with: Palliative IDT, patient     Patient/Health Care Proxy Stated Goals: Prolong life      TREATMENT PREFERENCES:   Code Status: Full Code    Advance Care Planning:  [] [facility] Interdisciplinary Team has updated the ACP Navigator with [address] and Patient Capacity    Primary Decision Maker ([address]): AMD on file naming his sister Tahira Martinez as MPOA   Brother in law Adam Nava as secondary     Medical Interventions: Full interventions     Artificially Administered Nutrition: Feeding tube long-term, if indicated     Other:  As far as possible, the palliative care team has discussed with patient / health care proxy about goals of care / treatment preferences for patient.      HISTORY:     History obtained from: chart and patient     CHIEF COMPLAINT: respiratory failure     HPI/SUBJECTIVE:    The patient is:   [x] Verbal and participatory receives communication via writing and via text. He is verbal  [] Non-participatory due to:   Please see summary     Clinical Pain Assessment (nonverbal scale for nonverbal patients): Clinical Pain Assessment  Severity: 0          Duration: for how long has pt been experiencing pain (e.g., 2 days, 1 month, years)  Frequency: how often pain is an issue (e.g., several times per day, once every few days, constant)     FUNCTIONAL ASSESSMENT:     Palliative Performance Scale (PPS):  PPS: 40    ECOG  ECOG Status : Ambulatory, but unable to carry out work activities     PSYCHOSOCIAL/SPIRITUAL SCREENING:      Any spiritual / Holiness concerns:  [] Yes /  [x] No    Caregiver Burnout:  [] Yes /  [] No /  [x] No Caregiver Present      Anticipatory grief assessment:   [x] Normal  / [] Maladaptive        REVIEW OF SYSTEMS:     Positive and pertinent negative findings in ROS are noted above in HPI. The following systems were [x] reviewed / [] unable to be reviewed as noted in HPI  Other findings are noted below. Systems: constitutional, ears/nose/mouth/throat, respiratory, gastrointestinal, genitourinary, musculoskeletal, integumentary, neurologic, psychiatric, endocrine. Positive findings noted below. Modified ESAS Completed by: provider   Fatigue: 5       Pain: 0   Anxiety: 0 Nausea: 0     Dyspnea: 0                    PHYSICAL EXAM:     Wt Readings from Last 3 Encounters:   10/21/21 54.4 kg (120 lb)   08/31/21 51.7 kg (114 lb)   06/29/21 53.1 kg (117 lb)     Blood pressure (!) 102/54, pulse 82, temperature 97.9 °F (36.6 °C), resp. rate 18, height 5' 7\" (1.702 m), weight 54.4 kg (120 lb), SpO2 95 %.   Pain:  Pain Scale 1: Visual  Pain Intensity 1: 0                 Last bowel movement: none recorded     Constitutional: thin, chronically ill appearing male who is reclining in bed seems in mild distress due to respiratory status   Eyes: glasses donned   ENMT: moist MM   Cardiovascular: RRR  Respiratory: breathing appears to be mildly labored on nasal cannula oxygen, O2 sats 95%   Skin: warm, dry  Neurologic: alert oriented x 3   Psychiatric: full affect        HISTORY:     Active Problems:    Pneumonia (10/18/2021)      Severe protein-calorie malnutrition ([facility] [address] (10/20/2021)      Past Medical History:   Diagnosis Date    CAD (coronary artery disease)     MI 1995; stent 2003 (done preop cochlear sgy)    Cancer ([facility] [address]     SQUAMOUS CELL    Coronary artery disease     Deafness     Myocardial infarction [facility]) [phone]    Scarlet fever age 9      Past Surgical History:   Procedure Laterality Date    BRONCH-FIBER/DIAGNOSTIC  6/16/2016         HX COLONOSCOPY  2013    neg    [place]    HX HEART CATHETERIZATION  2003    HX HEART CATHETERIZATION  1999    HX HERNIA REPAIR      inguinal ? laterality    HX OTHER SURGICAL      2 cochlear implants (R); 1 left - all failed      Family History   Problem Relation Age of Onset    No Known Problems Mother     Heart Disease Father     Heart Attack Father         1989    Coronary Artery Disease Father     Hypertension Father     High Cholesterol Father      History reviewed, no pertinent family history.   Social History     Tobacco Use    Smoking status: Never Smoker    Smokeless tobacco: Never Used   Substance Use Topics    Alcohol use: No     Allergies   Allergen Reactions    Pollens Extract Runny Nose and Cough      Current Facility-Administered Medications   Medication Dose Route Frequency    thiamine (B-1) 200 mg in 0.9% sodium chloride 50 mL IVPB  200 mg IntraVENous DAILY    dextrose 5% and 0.9% NaCl infusion  75 mL/hr IntraVENous CONTINUOUS    [Held by provider] enoxaparin (LOVENOX) injection 40 mg  40 mg SubCUTAneous QHS    albuterol-ipratropium (DUO-NEB) 2.5 MG-0.5 MG/3 ML  3 mL Nebulization Q4H RT    sodium chloride 3% hypertonic nebulizer soln  4 mL Nebulization Q8H    guaiFENesin ER (MUCINEX) tablet 600 mg  600 mg Oral Q12H    sodium chloride (NS) flush 5-10 mL  5-10 mL IntraVENous PRN    piperacillin-tazobactam (ZOSYN) 3.375 g in 0.9% sodium chloride (MBP/ADV) 100 mL MBP  3.375 g IntraVENous Q6H    levoFLOXacin (LEVAQUIN) 750 mg in D5W IVPB  750 mg IntraVENous Q24H    sodium chloride (NS) flush 5-40 mL  5-40 mL IntraVENous Q8H    sodium chloride (NS) flush 5-40 mL  5-40 mL IntraVENous PRN    acetaminophen (TYLENOL) tablet 650 mg  650 mg Oral Q6H PRN    Or    acetaminophen (TYLENOL) suppository 650 mg  650 mg Rectal Q6H PRN    polyethylene glycol (MIRALAX) packet 17 g  17 g Oral DAILY PRN    cholecalciferol (VITAMIN D3) (1000 Units /25 mcg) tablet 1,000 Units  1,000 Units Oral DAILY    omega-3 acid ethyl esters (LOVAZA) capsule 1,000 mg  1 g Oral DAILY WITH BREAKFAST    ascorbic acid (vitamin C) (VITAMIN C) tablet 500 mg  500 mg Oral DAILY    therapeutic multivitamin (THERAGRAN) tablet 1 Tablet  1 Tablet Oral DAILY    aspirin chewable tablet 81 mg  81 mg Oral DAILY        LAB AND IMAGING FINDINGS:     Lab Results   Component Value Date/Time    WBC 6.6 10/21/2021 02:18 AM    HGB 10.0 (L) 10/21/2021 02:18 AM    PLATELET 299 12/96/2952 02:18 AM     Lab Results   Component Value Date/Time    Sodium 141 10/21/2021 02:18 AM    Potassium 4.0 10/21/2021 02:18 AM    Chloride 105 10/21/2021 02:18 AM    CO2 33 (H) 10/21/2021 02:18 AM    BUN 18 10/21/2021 02:18 AM    Creatinine 0.33 (L) 10/21/2021 02:18 AM    Calcium 8.0 (L) 10/21/2021 02:18 AM    Magnesium 2.1 10/21/2021 02:18 AM    Phosphorus 2.4 (L) 10/21/2021 02:18 AM      Lab Results   Component Value Date/Time    Alk.  phosphatase 32 (L) 10/18/2021 07:28 PM    Protein, total 7.2 10/18/2021 07:28 PM    Albumin 2.4 (L) 10/18/2021 07:28 PM    Globulin 4.8 (H) 10/18/2021 07:28 PM     Lab Results   Component Value Date/Time    INR (POC) 1.2 (H) 06/16/2016 11:26 AM      No results found for: IRON, FE, TIBC, IBCT, PSAT, FERR   No results found for: PH, PCO2, PO2  No components found for: [place]   Lab Results   Component Value Date/Time     11/23/2020 02:09 PM    CK - MB 4.5 (H) 10/08/2020 05:00 PM              Total time: 15 minutes   Counseling / coordination time, spent as noted above:   > 50% counseling / coordination: yes with patient     Prolonged service was provided for  []30 min   []75 min in face to face time in the presence of the patient, spent as noted above.   Time Start:   Time End:

## 2021-10-21 NOTE — PROGRESS NOTES
Problem: Patient Education: Go to Patient Education Activity  Goal: Patient/Family Education  Outcome: Progressing Towards Goal     Problem: Falls - Risk of  Goal: *Absence of Falls  Description: Document Rajan Edwards Fall Risk and appropriate interventions in the flowsheet.   Outcome: Progressing Towards Goal  Note: Fall Risk Interventions:  Mobility Interventions: Assess mobility with egress test, Communicate number of staff needed for ambulation/transfer, OT consult for ADLs, Patient to call before getting OOB              Elimination Interventions: Bed/chair exit alarm, Call light in reach, Elevated toilet seat, Patient to call for help with toileting needs, Stay With Me (per policy), Toilet paper/wipes in reach, Toileting schedule/hourly rounds, Urinal in reach              Problem: Patient Education: Go to Patient Education Activity  Goal: Patient/Family Education  Outcome: Progressing Towards Goal     Problem: Discharge Planning  Goal: *Discharge to safe environment  Outcome: Progressing Towards Goal  Goal: *Knowledge of medication management  Outcome: Progressing Towards Goal  Goal: *Knowledge of discharge instructions  Outcome: Progressing Towards Goal     Problem: Patient Education: Go to Patient Education Activity  Goal: Patient/Family Education  Outcome: Progressing Towards Goal     Problem: Patient Education: Go to Patient Education Activity  Goal: Patient/Family Education  Outcome: Progressing Towards Goal     Problem: Patient Education: Go to Patient Education Activity  Goal: Patient/Family Education  Outcome: Progressing Towards Goal     Problem: Nutrition Deficit  Goal: *Optimize nutritional status  Outcome: Progressing Towards Goal

## 2021-10-21 NOTE — PROGRESS NOTES
[facility] Hospitalist Group  Progress Note    Patient: Neymar Matamoros Age: 67 y.o. : 1945 MR#: [id number] SSN: [id number]  Date/Time: 10/21/2021    Subjective:     I personally saw and evaluated this patient on 2021  Patient is laying in bed in no apparent distress, awake, status post PEG tube placement. Patient is hard of hearing    Assessment/Plan:     Possible aspiration pneumonia, suspected chronic aspiration  Cavitary lung lesion  Interstitial lung disease  Hypoxia  Chronic systolic congestive heart failurecompensated  Gram-positive cocci bacteremialikely contaminant  Dysphagia    PLAN  Continue antibiotics, ID is following  Pulmonary is following  Continue oxygen, wean  Status post PEG tube placement by GI today  Start tube feeding when okay with GI  Palliative care input noted. Patient is full code  Discussed with patient    Case discussed with:  [x]Patient  []Family  []Nursing  []Case Management  DVT Prophylaxis:  [x]Lovenox  []Hep SQ  []SCDs  []Coumadin   []On Heparin gtt    Objective:   VS:   Visit Vitals  BP (!) 126/58 (BP 1 Location: Left upper arm, BP Patient Position: At rest)   Pulse 98   Temp 97.6 °F (36.4 °C)   Resp 18   Ht 5' 7\" (1.702 m)   Wt 54.4 kg (120 lb)   SpO2 90%   BMI 18.79 kg/m²      Tmax/24hrs: Temp (24hrs), Av.4 °F (36.9 °C), Min:97.6 °F (36.4 °C), Max:98.9 °F (37.2 °C)    Blood pressure when I was in patient's room on the monitor was 96/50.   This is around 3:30 PM    Input/Output:     Intake/Output Summary (Last 24 hours) at 10/21/2021 1840  Last data filed at 10/21/2021 1653  Gross per 24 hour   Intake 4147.5 ml   Output 350 ml   Net 3797.5 ml       General:  Awake, alert  Cardiovascular:  S1S2+, RRR  Pulmonary: Coarse breath sounds bilaterally  GI:  Soft, BS+, NT, ND  Extremities:  No edema  Bitemporal wasting    Labs:    Recent Results (from the past 24 hour(s))   METABOLIC PANEL, BASIC    Collection Time: 10/21/21  2:18 AM Result Value Ref Range    Sodium 141 136 - 145 mmol/L    Potassium 4.0 3.5 - 5.5 mmol/L    Chloride 105 100 - 111 mmol/L    CO2 33 (H) 21 - 32 mmol/L    Anion gap 3 3.0 - 18 mmol/L    Glucose 87 74 - 99 mg/dL    BUN 18 7.0 - 18 MG/DL    Creatinine 0.33 (L) 0.6 - 1.3 MG/DL    BUN/Creatinine ratio 55 (H) 12 - 20      GFR est AA >60 >60 ml/min/1.73m2    GFR est non-AA >60 >60 ml/min/1.73m2    Calcium 8.0 (L) 8.5 - 10.1 MG/DL   MAGNESIUM    Collection Time: 10/21/21  2:18 AM   Result Value Ref Range    Magnesium 2.1 1.6 - 2.6 mg/dL   PHOSPHORUS    Collection Time: 10/21/21  2:18 AM   Result Value Ref Range    Phosphorus 2.4 (L) 2.5 - 4.9 MG/DL   CBC WITH AUTOMATED DIFF    Collection Time: 10/21/21  2:18 AM   Result Value Ref Range    WBC 6.6 4.6 - 13.2 K/uL    RBC 3.73 (L) 4.35 - 5.65 M/uL    HGB 10.0 (L) 13.0 - 16.0 g/dL    HCT 33.7 (L) 36.0 - 48.0 %    MCV 90.3 78.0 - 100.0 FL    MCH 26.8 24.0 - 34.0 PG    MCHC 29.7 (L) 31.0 - 37.0 g/dL    RDW 13.4 11.6 - 14.5 %    PLATELET 021 278 - 211 K/uL    MPV 9.2 9.2 - 11.8 FL    NEUTROPHILS 87 (H) 40 - 73 %    LYMPHOCYTES 3 (L) 21 - 52 %    MONOCYTES 9 3 - 10 %    EOSINOPHILS 1 0 - 5 %    BASOPHILS 0 0 - 2 %    ABS. NEUTROPHILS 5.8 1.8 - 8.0 K/UL    ABS. LYMPHOCYTES 0.2 (L) 0.9 - 3.6 K/UL    ABS. MONOCYTES 0.6 0.05 - 1.2 K/UL    ABS. EOSINOPHILS 0.1 0.0 - 0.4 K/UL    ABS.  BASOPHILS 0.0 0.0 - 0.1 K/UL    DF AUTOMATED       Additional Data Reviewed:      Signed By: Teresa Keene MD     October 21, 2021

## 2021-10-21 NOTE — PROGRESS NOTES
Problem: Patient Education: Go to Patient Education Activity  Goal: Patient/Family Education  Outcome: Progressing Towards Goal     Problem: Falls - Risk of  Goal: *Absence of Falls  Description: Document Nena Katelynn Fall Risk and appropriate interventions in the flowsheet.   Outcome: Progressing Towards Goal  Note: Fall Risk Interventions:  Mobility Interventions: Communicate number of staff needed for ambulation/transfer              Elimination Interventions: Call light in reach              Problem: Patient Education: Go to Patient Education Activity  Goal: Patient/Family Education  Outcome: Progressing Towards Goal     Problem: Discharge Planning  Goal: *Discharge to safe environment  Outcome: Progressing Towards Goal  Goal: *Knowledge of medication management  Outcome: Progressing Towards Goal  Goal: *Knowledge of discharge instructions  Outcome: Progressing Towards Goal     Problem: Patient Education: Go to Patient Education Activity  Goal: Patient/Family Education  Outcome: Progressing Towards Goal     Problem: Patient Education: Go to Patient Education Activity  Goal: Patient/Family Education  Outcome: Progressing Towards Goal     Problem: Patient Education: Go to Patient Education Activity  Goal: Patient/Family Education  Outcome: Progressing Towards Goal     Problem: Nutrition Deficit  Goal: *Optimize nutritional status  Outcome: Progressing Towards Goal

## 2021-10-21 NOTE — ROUTINE PROCESS
Bedside shift change report given to jonna jara (oncoming nurse) by Lorie Genao (offgoing nurse). Report included the following information SBAR and Kardex.

## 2021-10-21 NOTE — PROGRESS NOTES
Pt received Moderna Two shot vaccine in March and April 2021. Reason for Admission:  Pneumonia [J18.9]                 RUR Score:    13%            Plan for utilizing home health:    No, needs rehab                      Likelihood of Readmission:   LOW                         Transition of Care Plan:              Initial assessment completed with sisterHope. Cognitive status of patient: not assessed; sleeping. Face sheet information confirmed:  yes. The patient designates Yash calderón Pair to participate in his discharge plan and to receive any needed information. This patient lives in a single family home, owned by his sister and DARLENE, alone with a paid caregiver m-f for about 6hrs/day. Patient is not able to navigate steps as needed. Prior to hospitalization, patient was considered to be independent with ADLs/IADLS : no . If not independent,  patient needs assist with : food preparation and cooking    Patient has a current ACP document on file: yes      Healthcare Decision Maker:     Click here to complete [address] including selection of the Healthcare Decision Maker Relationship (ie \"Primary\")    The patient will need transport upon discharge. The patient already has [address] available in the home. Patient is not currently active with home health. Patient has not stayed in a skilled nursing facility or rehab. This patient is on dialysis :no    List of available SNF agencies were provided and reviewed with the patient prior to discharge. Freedom of choice signed verbally: yes, for local SNFs. Currently, the discharge plan is SNF. The patient states that he can obtain his medications from the pharmacy, and take his medications as directed. Patient's current insurance is Medicare and Medicaid. Care Management Interventions  PCP Verified by CM:  Yes  Mode of Transport at Discharge: BLS  Transition of Care Consult (CM Consult): Discharge Planning, SNF  Support Systems: Other Family Member(s)  Confirm Follow Up Transport: Family  The Patient and/or Patient Representative was Provided with a Choice of Provider and Agrees with the Discharge Plan?: Yes  Name of the Patient Representative Who was Provided with a Choice of Provider and Agrees with the Discharge Plan: sisterChrissy would like a local SNF for rehab.   [place] of Choice List was Provided with Basic Dialogue that Supports the Patient's Individualized Plan of Care/Goals, Treatment Preferences and Shares the Quality Data Associated with the Providers?: Yes  Discharge Location  Discharge Placement: Skilled nursing facility        German Ramos RN - Outcomes Manager  [phone]

## 2021-10-21 NOTE — PROGRESS NOTES
Infectious Disease progress Note        Reason: Right upper lobe cavitary pneumonia    Current abx Prior abx   Piperacillin/tazobactam, levofloxacin since 10/18/2021 vancomycin 10/18-10/21     Lines:       Assessment :    67 y.o. 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 intake, and worsening cough . Now with gram positive bacteremia, chronic cavitary lesion RUL with RUL/RLL infiltrates    Clinical presentation c/w acute on chronic hypoxic respiratory failure likely secondary to recurrent aspiration pneumonia, chronic cavitary lesion right upper lobe  Rule out superimposed infection/colonization with atypical mycobacteria    Pulmonary follow-up appreciated    Single positive blood culture for coagulase negative staph. On  10/18 is likely contamination    Clinically better. Still requiring 4 L oxygen. Plans for PEG tube    Recommendations:    1. Continue Zosyn, levofloxacin  2. Follow sputum culture, sputum for AFB  3. Follow-up pulmonary recommendations  4. Will de-escalate antibiotics based on cultures, clinical course       Above plan was discussed in details with patient, family at bedside. . Please call me if any further questions or concerns. Will continue to participate in the care of this patient. HPI:    Feels slightly better. Denies any chest pain. No new complaints. Current Discharge Medication List      CONTINUE these medications which have NOT CHANGED    Details   acetylcysteine (MUCOMYST) 100 mg/mL (10 %) nebulizer solution Take 4 mL by inhalation every four (4) hours for 120 days.   Qty: 720 mL, Refills: 3      fluorouraciL (EFUDEX) 5 % chemo cream APPLY CREAM TO FOREHEAD 2 TIMES DAILY FOR 2 WEEKS ONCE HEALED USE 2 TIMES DAILY TO THE EARS FOR 2 WEEKS ONCE HEALED USE 2 TIMES DAILY TO THE CHEEKS AND TEMPLES FOR 2 WEEKS      sodium chloride 3 % nebulizer solution 4 mL by Nebulization route four (4) times daily. Mix with albuterol. File under Medicare Part B. Dx: R06.02; J47.9; J84.9; R53.81  Qty: 480 mL, Refills: 5    Associated Diagnoses: Dyspnea on exertion; Bronchiectasis without complication ([facility]interstitial [facility] disease) [facility] Chronic pulmonary aspiration, sequela; Physical deconditioning      albuterol (PROVENTIL VENTOLIN) 2.5 mg /3 mL (0.083 %) nebu Mix with hypertonic saline nebulizer -- use 3- times per day. File under Medicare Part B. Dx: R06.02; J47.9; J84.9; R53.81  Qty: 360 Nebule, Refills: 3    Associated Diagnoses: Dyspnea on exertion; Bronchiectasis without complication ([facility] ILD (interstitial lung disease) ([facility] Chronic pulmonary aspiration, sequela; Physical deconditioning      guaiFENesin ER (MUCINEX) 600 mg ER tablet Take 1 Tablet by mouth two (2) times a day for 90 days. Qty: 180 Tablet, Refills: 3    Associated Diagnoses: Bronchiectasis without complication (HCC)      cholecalciferol (VITAMIN D3) (1000 Units /25 mcg) tablet Take 1,000 Units by mouth daily. FISH OIL-DHA-EPA PO Take 1 Tab by mouth daily. amino acids powd Take 1 Dose by mouth daily. OTHER Take 1 Cap by mouth daily. tumeric       fluticasone propionate (FLONASE NA) 1 Spray by Both Nostrils route daily. ascorbic acid, vitamin C, (VITAMIN C) 500 mg tablet Take 500 mg by mouth daily. multivitamin (ONE A DAY) tablet Take 1 Tab by mouth daily. aspirin delayed-release 81 mg tablet Take 81 mg by mouth daily.     Associated Diagnoses: Coronary artery disease involving native coronary artery of native heart without angina pectoris             Current Facility-Administered Medications   Medication Dose Route Frequency    thiamine (B-1) 200 mg in 0.9% sodium chloride 50 mL IVPB  200 mg IntraVENous DAILY    dextrose 5% and 0.9% NaCl infusion  75 mL/hr IntraVENous CONTINUOUS    [Held by provider] enoxaparin (LOVENOX) injection 40 mg  40 mg SubCUTAneous QHS    albuterol-ipratropium (DUO-NEB) 2.5 MG-0.5 MG/3 ML  3 mL Nebulization Q4H RT    sodium chloride 3% hypertonic nebulizer soln  4 mL Nebulization Q8H    guaiFENesin ER (MUCINEX) tablet 600 mg  600 mg Oral Q12H    sodium chloride (NS) flush 5-10 mL  5-10 mL IntraVENous PRN    piperacillin-tazobactam (ZOSYN) 3.375 g in 0.9% sodium chloride (MBP/ADV) 100 mL MBP  3.375 g IntraVENous Q6H    levoFLOXacin (LEVAQUIN) 750 mg in D5W IVPB  750 mg IntraVENous Q24H    sodium chloride (NS) flush 5-40 mL  5-40 mL IntraVENous Q8H    sodium chloride (NS) flush 5-40 mL  5-40 mL IntraVENous PRN    acetaminophen (TYLENOL) tablet 650 mg  650 mg Oral Q6H PRN    Or    acetaminophen (TYLENOL) suppository 650 mg  650 mg Rectal Q6H PRN    polyethylene glycol (MIRALAX) packet 17 g  17 g Oral DAILY PRN    cholecalciferol (VITAMIN D3) (1000 Units /25 mcg) tablet 1,000 Units  1,000 Units Oral DAILY    omega-3 acid ethyl esters (LOVAZA) capsule 1,000 mg  1 g Oral DAILY WITH BREAKFAST    ascorbic acid (vitamin C) (VITAMIN C) tablet 500 mg  500 mg Oral DAILY    therapeutic multivitamin (THERAGRAN) tablet 1 Tablet  1 Tablet Oral DAILY    aspirin chewable tablet 81 mg  81 mg Oral DAILY       Allergies: Pollens extract    Family History   Problem Relation Age of Onset    No Known Problems Mother     Heart Disease Father     Heart Attack Father         1989    Coronary Artery Disease Father     Hypertension Father     High Cholesterol Father      Social History     Socioeconomic History    Marital status: SINGLE     Spouse name: Not on file    Number of children: Not on file    Years of education: Not on file    Highest education level: Not on file   Occupational History    Not on file   Tobacco Use    Smoking status: Never Smoker    Smokeless tobacco: Never Used   Vaping Use    Vaping Use: Never used   Substance and Sexual Activity    Alcohol use:  No  Drug use: No    Sexual activity: Not on file   Other Topics Concern    Not on file   Social History Narrative    Not on file     Social Determinants of Health     Financial Resource Strain:     Difficulty of Paying Living Expenses:    Food Insecurity:     Worried About Running Out of Food in the Last Year:     [address] in the Last Year:    Transportation Needs:     Lack of Transportation (Medical):  Lack of Transportation (Non-Medical):    Physical Activity:     Days of Exercise per Week:     Minutes of Exercise per Session:    Stress:     Feeling of Stress :    Social Connections:     Frequency of Communication with Friends and Family:     Frequency of Social Gatherings with Friends and Family:     Attends Buddhist Services:     Active Member of Clubs or Organizations:     Attends Club or Organization Meetings:     Marital Status:    Intimate Partner Violence:     Fear of Current or Ex-Partner:     Emotionally Abused:     Physically Abused:     Sexually Abused:      Social History     Tobacco Use   Smoking Status Never Smoker   Smokeless Tobacco Never Used        Temp (24hrs), Av.6 °F (37 °C), Min:97.9 °F (36.6 °C), Max:98.9 °F (37.2 °C)    Visit Vitals  BP (!) 102/54 (BP 1 Location: Right upper arm, BP Patient Position: At rest)   Pulse 82   Temp 97.9 °F (36.6 °C)   Resp 18   Ht 5' 7\" (1.702 m)   Wt 54.4 kg (120 lb)   SpO2 95%   BMI 18.79 kg/m²       ROS: 12 point ROS obtained in details. Pertinent positives as mentioned in HPI,   otherwise negative    Physical Exam:    General:Thin  male laying on the bed AAOx3 in no acute distress. HEENT:  Normocephalic, atraumatic, EOMI, no scleral icterus or pallor; no conjunctival hemmohage;  nasal and oral mucous are moist and without evidence of lesions. Neck supple, no bruits.    Lymph Nodes:   not examined   Lungs:   shallow breathing, tachypneic, rhonchi right upper lung   Heart:  RRR, s1 and s2; no  rubs or gallops, no edema, + pedal pulses   Abdomen:  soft, non-distended, active bowel sounds, no hepatomegaly, no splenomegaly. Non-tender   Genitourinary:  deferred   Extremities:   no clubbing, cyanosis; no joint effusions or swelling; Full ROM of all large joints to the upper and lower extremities; muscle mass appropriate for age   Neurologic:  No gross focal motor or sensory abnormalities                        Skin:  No rash or ulcers noted   Back:  no spinal or paraspinal muscle tenderness or rigidity, no CVA tenderness     Psychiatric:  No suicidal or homicidal ideations, appropriate mood and affect         Labs: Results:   Chemistry Recent Labs     10/21/21  0218 10/20/21  0442 10/19/21  0330 10/18/21  1928 10/18/21  1928   GLU 87 84 101*   < > 124*    137 138   < > 139   K 4.0 4.0 4.4   < > 4.6    105 105   < > 101   CO2 33* 31 34*   < > 38*   BUN 18 25* 29*   < > 38*   CREA 0.33* 0.49* 0.41*   < > 0.58*   CA 8.0* 8.3* 8.0*   < > 8.9   AGAP 3 1* NEG 1   < > 0*   BUCR 55* 51* 71*   < > 66*   AP  --   --   --   --  32*   TP  --   --   --   --  7.2   ALB  --   --   --   --  2.4*   GLOB  --   --   --   --  4.8*   AGRAT  --   --   --   --  0.5*    < > = values in this interval not displayed. CBC w/Diff Recent Labs     10/21/21  0218 10/20/21  0442 10/19/21  0330   WBC 6.6 6.9 7.3   RBC 3.73* 3.93* 3.77*   HGB 10.0* 10.6* 10.0*   HCT 33.7* 36.1 34.1*    214 200   GRANS 87* 87* 83*   LYMPH 3* 3* 5*   EOS 1 0 1      Microbiology Recent Labs     10/18/21  2115 10/18/21  1928   CULT NO GROWTH 3 DAYS STAPHYLOCOCCUS SPECIES, COAGULASE NEGATIVE GROWING IN THE AEROBIC BOTTLE NO SITE*          RADIOLOGY:    All available imaging studies/reports in [facility] care for this admission were reviewed        Disclaimer: Sections of this note are dictated utilizing voice recognition software, which may have resulted in some phonetic based errors in grammar and contents.  Even though attempts were made to correct all the mistakes, some may have been missed, and remained in the body of the document. If questions arise, please contact our department.     Dr. Tara Soto, Infectious Disease Specialist  [phone]  October 21, 2021  3:25 PM

## 2021-10-21 NOTE — PROGRESS NOTES
[facility]  Pulmonary, Critical Care, and Sleep Medicine    Pulmonary Medicine Progress Note    Name: Neymar Matamoros MRN: [id number]  : 1945 Hospital: [facility]  Date: 10/21/2021       Subjective:  Sputum sample collected. Pt resting in no distress. Wife states he is doing better     Patient Active Problem List   Diagnosis Code    Coronary artery disease involving native coronary artery of native heart without angina pectoris,s/p stent  I25.10    Scarlet fever A38.9    Myocardial infarction ([facility] I21.9    Urinary frequency R35.0    Pneumonia J18.9    Goals of care, counseling/discussion Z71.89    Acute respiratory failure with hypoxia ([facility] J96.01    Debility R53.81    Severe protein-calorie malnutrition ([facility] E43       Assessment:  · Acute on Chronic Hypoxic Respiratory Failure  · - 2/2 chronic aspiration, bronchiectasis, ILD, with new consolidations suspicious for mucus plugging vs pneumonia. 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 Other issues management by primary team and respective consultants. Events and notes from last 24 hours reviewed. Discussed with patient and family, answered all questions to their satisfaction. Care plan discussed with nursing.      Labs and images personally seen and available reports reviewed  All current medicines are reviewed       Medications- Current:  Current Facility-Administered Medications   Medication Dose Route Frequency    thiamine (B-1) 200 mg in 0.9% sodium chloride 50 mL IVPB  200 mg IntraVENous DAILY    dextrose 5% and 0.9% NaCl infusion  75 mL/hr IntraVENous CONTINUOUS    [Held by provider] enoxaparin (LOVENOX) injection 40 mg  40 mg SubCUTAneous QHS    albuterol-ipratropium (DUO-NEB) 2.5 MG-0.5 MG/3 ML  3 mL Nebulization Q4H RT    sodium chloride 3% hypertonic nebulizer soln  4 mL Nebulization Q8H    guaiFENesin ER (MUCINEX) tablet 600 mg  600 mg Oral Q12H    sodium chloride (NS) flush 5-10 mL  5-10 mL IntraVENous PRN    piperacillin-tazobactam (ZOSYN) 3.375 g in 0.9% sodium chloride (MBP/ADV) 100 mL MBP  3.375 g IntraVENous Q6H    levoFLOXacin (LEVAQUIN) 750 mg in D5W IVPB  750 mg IntraVENous Q24H    sodium chloride (NS) flush 5-40 mL  5-40 mL IntraVENous Q8H    sodium chloride (NS) flush 5-40 mL  5-40 mL IntraVENous PRN    acetaminophen (TYLENOL) tablet 650 mg  650 mg Oral Q6H PRN    Or    acetaminophen (TYLENOL) suppository 650 mg  650 mg Rectal Q6H PRN    polyethylene glycol (MIRALAX) packet 17 g  17 g Oral DAILY PRN    cholecalciferol (VITAMIN D3) (1000 Units /25 mcg) tablet 1,000 Units  1,000 Units Oral DAILY    omega-3 acid ethyl esters (LOVAZA) capsule 1,000 mg  1 g Oral DAILY WITH BREAKFAST    ascorbic acid (vitamin C) (VITAMIN C) tablet 500 mg  500 mg Oral DAILY    therapeutic multivitamin (THERAGRAN) tablet 1 Tablet  1 Tablet Oral DAILY    aspirin chewable tablet 81 mg  81 mg Oral DAILY       Objective:  Vital Signs:    Visit Vitals  /61 (BP 1 Location: Left upper arm, BP Patient Position: At rest)   Pulse 96   Temp 98 °F (36.7 °C)   Resp 16   Ht 5' 7\" (1.702 m)   Wt 54.4 kg (120 lb)   SpO2 95%   BMI 18.79 kg/m²      O2 Device: Nasal cannula  O2 Flow Rate (L/min): 3.5 l/min  Temp (24hrs), Av.5 °F (36.9 °C), Min:97.9 °F (36.6 °C), Max:98.9 °F (37.2 °C)      Intake/Output:   Last shift:      10/21 0701 - 10/21 1900  In: -   Out: 100 [Urine:100]  Last 3 shifts: 10/19 1901 - 10/21 0700  In: 3647.5 [I.V.:3647.5]  Out: 325 [Urine:325]    Intake/Output Summary (Last 24 hours) at 10/21/2021 1304  Last data filed at 10/21/2021 0955  Gross per 24 hour   Intake 3647.5 ml   Output 350 ml   Net 3297.5 ml       Physical Exam:   General:  Alert, awake, appears older than stated age, chronic malnourished. Head:  Normocephalic, without obvious abnormality, atraumatic. Lungs:   Bilateral auscultation bilateral rhonchi, no rales. No wheezing. Chest wall:  No tenderness or deformity. NO CREPITUS   Heart:  Regular rate and rhythm, S1, S2 normal, no murmur, click, rub or gallop. Abdomen:   Soft, non-tender. Bowel sounds normal. No masses,  No organomegaly. No paradox   Extremities: normal, atraumatic, no cyanosis or edema. Pulses: 1-2+ and symmetric all extremities.    Neurologic: Grossly nonfocal, deaf         Data:      Recent Results (from the past 24 hour(s))   METABOLIC PANEL, BASIC    Collection Time: 10/21/21  2:18 AM   Result Value Ref Range    Sodium 141 136 - 145 mmol/L    Potassium 4.0 3.5 - 5.5 mmol/L    Chloride 105 100 - 111 mmol/L    CO2 33 (H) 21 - 32 mmol/L    Anion gap 3 3.0 - 18 mmol/L    Glucose 87 74 - 99 mg/dL    BUN 18 7.0 - 18 MG/DL    Creatinine 0.33 (L) 0.6 - 1.3 MG/DL    BUN/Creatinine ratio 55 (H) 12 - 20      GFR est AA >60 >60 ml/min/1.73m2    GFR est non-AA >60 >60 ml/min/1.73m2    Calcium 8.0 (L) 8.5 - 10.1 MG/DL   MAGNESIUM    Collection Time: 10/21/21  2:18 AM   Result Value Ref Range    Magnesium 2.1 1.6 - 2.6 mg/dL   PHOSPHORUS    Collection Time: 10/21/21  2:18 AM   Result Value Ref Range    Phosphorus 2.4 (L) 2.5 - 4.9 MG/DL   CBC WITH AUTOMATED DIFF    Collection Time: 10/21/21  2:18 AM   Result Value Ref Range    WBC 6.6 4.6 - 13.2 K/uL    RBC 3.73 (L) 4.35 - 5.65 M/uL    HGB 10.0 (L) 13.0 - 16.0 g/dL    HCT 33.7 (L) 36.0 - 48.0 %    MCV 90.3 78.0 - 100.0 FL    MCH 26.8 24.0 - 34.0 PG    MCHC 29.7 (L) 31.0 - 37.0 g/dL    RDW 13.4 11.6 - 14.5 % PLATELET 186 584 - 823 K/uL    MPV 9.2 9.2 - 11.8 FL    NEUTROPHILS 87 (H) 40 - 73 %    LYMPHOCYTES 3 (L) 21 - 52 %    MONOCYTES 9 3 - 10 %    EOSINOPHILS 1 0 - 5 %    BASOPHILS 0 0 - 2 %    ABS. NEUTROPHILS 5.8 1.8 - 8.0 K/UL    ABS. LYMPHOCYTES 0.2 (L) 0.9 - 3.6 K/UL    ABS. MONOCYTES 0.6 0.05 - 1.2 K/UL    ABS. EOSINOPHILS 0.1 0.0 - 0.4 K/UL    ABS. BASOPHILS 0.0 0.0 - 0.1 K/UL    DF AUTOMATED           Chemistry   Recent Labs     10/21/21  0218 10/20/21  0442 10/19/21  0330 10/18/21  1928 10/18/21  1928   GLU 87 84 101*   < > 124*    137 138   < > 139   K 4.0 4.0 4.4   < > 4.6    105 105   < > 101   CO2 33* 31 34*   < > 38*   BUN 18 25* 29*   < > 38*   CREA 0.33* 0.49* 0.41*   < > 0.58*   CA 8.0* 8.3* 8.0*   < > 8.9   MG 2.1 2.1  --   --   --    PHOS 2.4*  --   --   --   --    AGAP 3 1* NEG 1   < > 0*   BUCR 55* 51* 71*   < > 66*   AP  --   --   --   --  32*   TP  --   --   --   --  7.2   ALB  --   --   --   --  2.4*   GLOB  --   --   --   --  4.8*   AGRAT  --   --   --   --  0.5*    < > = values in this interval not displayed. CBC w/Diff   Recent Labs     10/21/21  0218 10/20/21  0442 10/19/21  0330   WBC 6.6 6.9 7.3   RBC 3.73* 3.93* 3.77*   HGB 10.0* 10.6* 10.0*   HCT 33.7* 36.1 34.1*    214 200   GRANS 87* 87* 83*   LYMPH 3* 3* 5*   EOS 1 0 1       ABG No results for input(s): PHI, PHI, POC2, PCO2I, PO2, PO2I, HCO3, HCO3I, FIO2, FIO2I in the last 72 hours.     Micro    Recent Labs     10/20/21  1141 10/18/21  2115 10/18/21  1928   CULT PENDING NO GROWTH 3 DAYS STAPHYLOCOCCUS SPECIES, COAGULASE NEGATIVE GROWING IN THE AEROBIC BOTTLE NO SITE*     Recent Labs     10/20/21  1141 10/18/21  2115 10/18/21  1928   CULT PENDING NO GROWTH 3 DAYS STAPHYLOCOCCUS SPECIES, COAGULASE NEGATIVE GROWING IN THE AEROBIC BOTTLE NO SITE*       CT (Most Recent) Results from Hospital Encounter encounter on 10/18/21    CTA CHEST W OR W WO CONT    Narrative  CTA CHEST PULMONARY EMBOLISM PROTOCOL      INDICATION: Progressive shortness of breath. Question pulmonary embolism. TECHNIQUE: Thin collimation axial images obtained through the level of the  pulmonary arteries with additional imaging through the chest following the  uneventful administration of nonionic intravenous contrast.  Images  reconstructed into three dimensional coronal and sagittal projections for  complete evaluation of the tortuous and overlapping pulmonary vascular  structures and to reduce patient radiation dose. All CT scans at this facility are performed using dose optimization technique as  appropriate to a performed exam, to include automated exposure control,  adjustment of the mA and/or kV according to patient size (including appropriate  matching first site-specific examinations), or use of iterative reconstruction  technique. COMPARISON: August 10, 2021. FINDINGS:    There is some mixing artifact in the left pulmonary artery. There is no definite  acute pulmonary embolus. .    Thyroid: Unremarkable in its visualized aspects. Pericardium/ Heart: Heart is enlarged. Coronary artery disease. Aorta/ Vessels: No aneurysm or dissection. Lymph Nodes: Unremarkable. .    Lungs: Emphysema. There is dense consolidation in the lateral right upper lobe  and posteriorly which has increased. Dense consolidation anteriorly in the right  upper lobe is also increased. There is dense consolidation in the periphery of  the right lower lobe which has increased. The known cavitary right upper lobe  lesion is similar in the cavitary component. There are some nodular opacities in  the right lower lobe concerning for infection. The left lung is relatively  stable in appearance. Upper Abdomen: Colonic diverticulosis    Bones/soft tissues: No acute finding    Impression  No evidence for pulmonary emboli. Multifocal pneumonia has developed in the right lung.     Pre-existing cavitary right upper lobe lesion is similar in appearance. 3 month follow-up chest CT is recommended for reassessment. Additional incidentals as above. XR (Most Recent). CXR reviewed by me and compared with previous CXR Results from [place] encounter on 10/18/21    XR SWALLOW [facility] VIDEO    Narrative  MODIFIED BARIUM SWALLOW. CLINICAL INDICATION/HISTORY: Dysphagia. Feeding difficulties. Poor PO intake. Worsening cough. Chronic aspiration. Hypoxemic respiratory failure. COMPARISON: None. TECHNIQUE: A live videoradiographic swallowing function study was performed in  conjunction with the speech therapist.  The video is available in the department  for review by speech pathology and ancillary staff. Fluoroscopic images were not made available in PACS for radiologist review and  this report is strictly a procedural documentation by the physician assistant  with input from speech pathology. It is not considered inclusive or exclusive  of anatomic abnormalities and is not diagnostic beyond the specific  considerations regarding swallowing function as it relates to airway protection  while eating and drinking. Fluoroscopy time: 24 seconds    Fluoroscopic images: 0    Electronic capture cine loops: 7    FINDINGS:    Patient swallowed multiple consistencies of barium mixtures including thin  liquids, nectar, pudding. There was silent tracheal aspiration of all tested consistencies. Swallow delay, premature spillage, and pharyngeal residuals were seen with all  tested consistencies. Impression  Silent tracheal aspiration of all tested consistencies. Please see speech pathologist report for additional details and recommendations. See my orders for details     Total care time exclusive of procedures with complex decision making, coordination of care and counseling patient performed and > 50% time spent in face to face evaluation as mentioned above.     Lindsay Alracon MD  Critical Care Medicine

## 2021-10-21 NOTE — CONSULTS
Nutrition Assessment     Type and Reason for Visit: Reassess, Consult    Nutrition Recommendations/Plan:  - Once PEG placed and okay for use per GI, start tube feeding of Jevity 1.5 at 20 mL/hr advancing as tolerated by 10 mL q 6 hours to goal rate of 50 mL/hr with 150 mL water flushes q 4 hours. (goal regimen to provide: 1800 kcal, 77 gm protein, 912 mL free water, 100% RDIs). - IVF per MD. Consider discontinuing once EN approaches goal.  - Basics of TF and recommended EN regimen education provided today via handouts. Nutrition Assessment:  Remains NPO with plan for PEG tube placement today. Sleeping this afternoon during visit & discussed nutritional plan with family at bedside & handouts regarding basics of TF and EN regimen were provided. Noted possible plan for SNF at discharge. Telephone orders from MD for nutrition to manage tube feeding and okay to start TF once able to use PEG per clearance from GI- discussed with RN. IVF modified to include dextrose last night. Malnutrition Assessment:  Malnutrition Status: Severe malnutrition     Estimated Daily Nutrient Needs:  Energy (kcal):  3729-8012  Protein (g):  64-80       Fluid (ml/day):  0728-6130    Nutrition Related Findings:  BM 10/20. Meds: Vitamin C, D3, MVI & IV thiamine. Deaf & communicates via phone krishna. Additional Caloric Sources: D5 NS at 75 mL/hr providing 90 gm dextrose, 306 kcal per day.     Current Nutrition Therapies:  DIET NPO Ice Chips, Sips of Water with Meds    Anthropometric Measures:  · Height:  5' 7\" (170.2 cm)  · Current Body Wt:  52.6 kg (115 lb 15.4 oz)  · BMI: 18.2    Nutrition Diagnosis:   · Inadequate oral intake related to swallowing difficulty as evidenced by NPO or clear liquid status due to medical condition, swallowing study results    · Severe malnutrition, In context of chronic illness related to inadequate protein-energy intake, swallowing difficulty as evidenced by poor intake prior to admission, weight loss, severe muscle loss, severe loss of subcutaneous fat    Nutrition Intervention:  Food and/or Nutrient Delivery: Continue NPO, Start tube feeding, Vitamin supplement, Mineral supplement, IV fluid delivery  Nutrition Education and Counseling: No recommendations at this time, Education not indicated  Coordination of Nutrition Care: Continue to monitor while inpatient, Swallow evaluation    Goals:  Nutritional needs will be met through adequate oral intake or nutrition support within the next 7 days.        Nutrition Monitoring and Evaluation:   Behavioral-Environmental Outcomes: None identified  Food/Nutrient Intake Outcomes: Enteral nutrition intake/tolerance, IVF intake, Vitamin/mineral intake  Physical Signs/Symptoms Outcomes: Biochemical data, Chewing or swallowing, Nutrition focused physical findings, Weight    Discharge Planning:    Enteral nutrition     Electronically signed by Vilma Stark RD on 10/21/2021 at 4:16 PM    Contact Number: [phone]

## 2021-10-21 NOTE — PROGRESS NOTES
Referrals sent out via Zack Santoro and ERIC to local SNFs for acceptance to rehab program. Sister, Dilip Adame in agreement to see who accepts patient and make a decision from there. LTSS completed and submitted for processing. Gave sister list of local [facility] nursing facilities and personal care agencies.   Fernanda Lamb RN - Outcomes Manager  [phone]

## 2021-10-21 NOTE — PERIOP NOTES
Date of Surgery Update:  Richard Olson was seen and examined. History and physical has been reviewed. The patient has been examined.  There have been no significant clinical changes since the completion of the originally dated History and Physical.    Signed By: Jennifer Buitrago MD     October 21, 2021 2:47 PM

## 2021-10-21 NOTE — ROUTINE PROCESS
Bedside verbal report given to [address] [place] [address],[address], Oncoming Nurse. Report consisted of patients Situation, Background, Assessment and   Recommendations(SBAR). Information from the following report(s): Kardex, MAR and Recent Results was reviewed with the oncoming nurse. Opportunity for questions and clarification was provided.

## 2021-10-21 NOTE — PROGRESS NOTES
SLP Note:    Plan for PEG placement later this date. Will hold dysphagia management accordingly and continue to follow per POC.      Micaela Albert M.S., [id number] [facility]  Speech-Language Pathologist

## 2021-10-21 NOTE — PROGRESS NOTES
Physician Progress Note      PATIENTCaralfreda Wayne  [facility] #:                  [id number]  :                       1945  ADMIT DATE:       10/18/2021 6:35 PM  [address] DATE:  RESPONDING  PROVIDER #:        Miladys Ruiz MD          QUERY TEXT:    Dear Hospitalist,    Pt admitted with Pneumonia. Noted documentation of  Severe Malnutrition in RD Consult note on 10/20. If possible, please document in progress notes and discharge summary:    The medical record reflects the following:  Risk Factors: BMI 18.79, Dysphagia, Aspiration    Clinical Indicators: Per RD Malnutrition Assessment 10/20  -> Malnutrition Status:  Severe malnutrition  *    Context:  Chronic illness  *    Findings of the 6 clinical characteristics of malnutrition:  *    Energy Intake:  Mild decrease in energy intake (specify) (inadequate intake x 2 weeks, generalized poor intake)  *    Weight Loss:  Mild weight loss (specify amount and time period) (-8#, 6.5% x 2-3 months per family report)  *    Body Fat Loss:  7 - Severe body fat loss, Fat overlying ribs, Orbital, Buccal region, Triceps  *    Muscle Mass Loss:  7 - Severe muscle mass loss, Clavicles (pectoralis &deltoids), Temples (temporalis), Hand (interosseous)  *    Fluid Accumulation:  Unable to assess,  *     Strength:  Not performed    Treatment: Per RD Nutrition Recommendations/Plan:  - Recommend feeding tube placement to provide enteral nutrition support, only if consistent with goals of care. Nutrition available for management of tube feeding as needed. - Consider addition of dextrose to IVF while pt NPO without enteral access. - Pt at risk for refeeding syndrome, add IV thiamine, monitor mg & phos.   ?  Thank you,  Libby SOSA, RN, CRCR  Please contact me for any questions or concerns regarding this query at [email or web address]  Options provided:  -- Severe Malnutrition confirmed present on admission  -- Severe Malnutrition ruled out  -- Other - I will add my own diagnosis  -- Disagree - Not applicable / Not valid  -- Disagree - Clinically unable to determine / Unknown  -- Refer to Clinical Documentation Reviewer    PROVIDER RESPONSE TEXT:    The diagnosis of Severe Malnutrition is confirmed as present on admission.     Query created by: Lieutenant Tobar on 10/21/2021 11:44 AM      Electronically signed by:  Alvarado Manuel MD 10/21/2021 5:34 PM

## 2021-10-21 NOTE — ANESTHESIA PREPROCEDURE EVALUATION
Relevant Problems   RESPIRATORY SYSTEM   (+) Pneumonia      CARDIOVASCULAR   (+) Coronary artery disease involving native coronary artery of native heart without angina pectoris,s/p stent 2003   (+) Myocardial infarction [facility])       Anesthetic History   No history of anesthetic complications            Review of Systems / Medical History  Patient summary reviewed, nursing notes reviewed and pertinent labs reviewed    Pulmonary                Comments: Acute on chronic respiratory failure, on nasal cannula    Chronic aspiration   Neuro/Psych   Within defined limits           Cardiovascular              CAD      Comments: Coronary stent 2003   GI/Hepatic/Renal  Within defined limits              Endo/Other  Within defined limits           Other Findings              Physical Exam    Airway  Mallampati: III  TM Distance: 4 - 6 cm  Neck ROM: normal range of motion   Mouth opening: Normal     Cardiovascular    Rhythm: regular  Rate: normal         Dental    Dentition: Poor dentition     Pulmonary  Breath sounds clear to auscultation               Abdominal  GI exam deferred       Other Findings            Anesthetic Plan    ASA: 3  Anesthesia type: MAC and general - backup          Induction: Intravenous  Anesthetic plan and risks discussed with: Patient      Pt is deaf, communicated through writing

## 2021-10-21 NOTE — PROCEDURES
[email or web address]  [place], [address]      Percutaneous Endoscopic Gastroduodenoscopy Procedure Note    Dora Maravilla  [birth year]  [id number]      Date of Procedure: 10/21/2021    Preoperative diagnosis: Malnutrition    Postoperative diagnosis: Peg placement    Procedure: Procedure(s):  PERCUTANEOUS ENDOSCOPIC GASTROSTOMY TUBE INSERTION    Indication: Dysphagia    :  Dr. Mynor Becerra MD    Assistant(s): Endoscopy Technician-1: Natalie De Leon  Endoscopy RN-1: Kayla NAJERA RN  Endoscopy RN-Relief: Jorje Mendoza RN    Anesthesia/Sedation:  MAC anesthesia Propofol      Procedure Details     After infomed consent was obtained for the procedure, with all risks and benefits of procedure explained the patient was taken to the endoscopy suite and placed in the left lateral decubitus position. Following sequential administration of sedation as per above, the endoscope was inserted into the mouth and advanced under direct vision to the second portion of the duodenum. There were no diagnostic abnormalities of the body, fundus, antrum, cardia and iscisura of the stomach. The first and second portion of the duodenum appeared normal.      A site was selected on the anterior abdominal wall where the light shined and where one finger easily indented the anterior abdominal wall. Lidocaine analgesia was utilized (1%). The exploring needle easily indented the stomach and penetrated it. The needle-trocar device was then inserted into the skin after an incision. Once the needle trocar device entered the stomach the trocar was grasped by the snare. The needle was removed and a wire was placed thorough the trocar. The wire was grasped by the snare and removed at the mouth. A 2 Fr [employer] tube was positioned over the wire and pushed out of the anterior abdominal wall. The tube was grabbed.   The incision site was enlarged as necessary and the tube was pulled snug at 2.5cm. A brief repeat endoscopy verified proper placement of the tube and no apparent complications. Complications:   None; patient tolerated the procedure well. EBL:  <5mL.            Impression:   Peg inserted      Recommendations:  Feeding, formula and rate per Nutrition    Rayna Reinoso MD  10/21/2021  4:53 PM

## 2021-10-21 NOTE — ROUTINE PROCESS
TRANSFER - OUT REPORT:    Verbal report given to Rowan(name) on Corby Hansen  being transferred to room 520(unit) for routine progression of care       Report consisted of patients Situation, Background, Assessment and   Recommendations(SBAR). Information from the following report(s) SBAR, Procedure Summary, Intake/Output and MAR was reviewed with the receiving nurse. Lines:   Peripheral IV 10/18/21 Anterior;Left;Proximal Forearm (Active)   Site Assessment Clean, dry, & intact 10/21/21 1702   Phlebitis Assessment 0 10/21/21 1702   Infiltration Assessment 0 10/21/21 1702   Dressing Status Clean, dry, & intact 10/21/21 1702   Dressing Type Tape;Transparent 10/21/21 1702   Hub Color/Line Status Green 10/21/21 1702   Action Taken Open ports on tubing capped 10/21/21 0802   Alcohol Cap Used Yes 10/21/21 0802        Opportunity for questions and clarification was provided.       Patient transported with:   O2 @ 2 liters   RN

## 2021-10-21 NOTE — ANESTHESIA POSTPROCEDURE EVALUATION
Procedure(s):  PERCUTANEOUS ENDOSCOPIC GASTROSTOMY TUBE INSERTION. MAC, general - backup    Anesthesia Post Evaluation      Multimodal analgesia: multimodal analgesia used between 6 hours prior to anesthesia start to PACU discharge  Patient location during evaluation: PACU  Patient participation: complete - patient participated  Level of consciousness: sleepy but conscious  Pain management: adequate  Airway patency: patent  Anesthetic complications: no  Cardiovascular status: acceptable  Respiratory status: acceptable  Hydration status: acceptable  Post anesthesia nausea and vomiting:  controlled  Final Post Anesthesia Temperature Assessment:  Normothermia (36.0-37.5 degrees C)      INITIAL Post-op Vital signs:   Vitals Value Taken Time   /57 10/21/21 1702   Temp 36.9 °C (98.4 °F) 10/21/21 1702   Pulse 93 10/21/21 1711   Resp 23 10/21/21 1711   SpO2 100 % 10/21/21 1711   Vitals shown include unvalidated device data.

## 2021-10-22 NOTE — PROGRESS NOTES
Problem: Mobility Impaired (Adult and Pediatric)  Goal: *Acute Goals and Plan of Care (Insert Text)  Description: Physical Therapy Goals  Initiated 10/20/2021 and to be accomplished within 7 day(s)  1. Patient will move from supine to sit and sit to supine , scoot up and down, and roll side to side in bed with modified independence. 2.  Patient will transfer from bed to chair and chair to bed with modified independence using the least restrictive device. 3.  Patient will perform sit to stand with modified independence. 4.  Patient will ambulate with modified independence for 100 feet with the least restrictive device. 5.  Patient will ascend/descend 2 stairs with 0 handrail(s) with modified independence. PLOF: Pt reporting he lives alone in 1 story house with 2 JOSE [address] without handrails. Pt uses RW for mobility, caregiver daily for assist with meal prep and house chores. Outcome: Progressing Towards Goal   PHYSICAL THERAPY TREATMENT    Patient: María Elena Polk (10 y.o. male)  Date: 10/22/2021  Diagnosis: Pneumonia [J18.9] <principal problem not specified>  Procedure(s) (LRB):  PERCUTANEOUS ENDOSCOPIC GASTROSTOMY TUBE INSERTION (N/A) 1 Day Post-Op  Precautions: Fall, Aspiration      ASSESSMENT:  Pt cleared to participate in PT session, pt received semi-reclined in bed and agreeable to therapy session. Pt's sister at bedside. Pt on 4L O2 via NC, educated on deep breathing. Pt then ambulating x100 feet today with SBA and increased time. Pt assisted with voiding in standing with urinal, sitting in recliner. SPO2 in low 90s throughout session on 4L. Pt positioned for comfort and educated to call for assist before getting up, pt verbalized understanding. Pt left with all needs met and call bell in reach. RN notified of position and participation.      Progression toward goals:   []      Improving appropriately and progressing toward goals  [x]      Improving slowly and progressing toward goals  []      Not making progress toward goals and plan of care will be adjusted     PLAN:  Patient continues to benefit from skilled intervention to address the above impairments. Continue treatment per established plan of care. Discharge Recommendations:  Home Health  Further Equipment Recommendations for Discharge:  pt has all equipment     SUBJECTIVE:   Patient stated I want to walk to the window.     OBJECTIVE DATA SUMMARY:   Critical Behavior:  Neurologic State: Alert  Orientation Level: Oriented X4  Cognition: Appropriate decision making, Appropriate safety awareness  Safety/Judgement: Awareness of environment  Functional Mobility Training:  Bed Mobility:     Supine to Sit: Supervision  Sit to Supine: Stand-by assistance  Scooting: Supervision    Transfers:  Sit to Stand: Stand-by assistance  Stand to Sit: Stand-by assistance    Balance:  Sitting: Intact  Standing: Impaired; With support  Standing - Static: Good  Standing - Dynamic : Fair (+ )      Posture:   Posture (WDL): Within defined limits     Ambulation/Gait Training:  Distance (ft): 100 Feet (ft)  Assistive Device: Walker, rolling  Ambulation - Level of Assistance: Stand-by assistance    Gait Abnormalities: Decreased step clearance    Base of Support: Narrowed     Speed/Elaina: Slow;Shuffled  Step Length: Right shortened;Left shortened      Pain:  Pain level pre-treatment: 0/10  Pain level post-treatment: 0/10     Activity Tolerance:   Fair tolerance     Please refer to the flowsheet for vital signs taken during this treatment. After treatment:   [x] Patient left in no apparent distress sitting up in chair  [] Patient left in no apparent distress in bed  [x] Call bell left within reach  [x] Nursing notified  [] Caregiver present  [] Bed alarm activated  [] SCDs applied      COMMUNICATION/EDUCATION:   [x]         Role of Physical Therapy in the acute care setting.   [x]         Fall prevention education was provided and the patient/caregiver indicated understanding. [x]         Patient/family have participated as able in working toward goals and plan of care. [x]         Patient/family agree to work toward stated goals and plan of care. []         Patient understands intent and goals of therapy, but is neutral about his/her participation.   []         Patient is unable to participate in stated goals/plan of care: ongoing with therapy staff.  []         Other:        Dayana Casillas, PT   Time Calculation: 43 mins

## 2021-10-22 NOTE — PROGRESS NOTES
[email or web address]  [phone]    Gastroenterology follow up-Progress note    Impression:  1. Malnutrition, dysphagia - s/p PEG placement 10/21/2021  2. Acute on chronic hypoxic respiratory failure  - ? Due to aspiration PNA  3. Cavitary lung lesion  4. Interstitial lung disease  5. Chronic CHF - compensated  6. Gram pos cocci bacteremia    Plan:  1. Continue with tube feeds per Nutrition  2. SLP as indicated  3. Medical management per primary team    Will sign off-Thank you for this consultation and the opportunity to participate in the care of this patient. Please do not hesitate to call with any questions or concerns, or should event occur that may necessitate additional GI evaluation. Chief Complaint: Severe dysphagia      Subjective:  Doing well, denies abdominal pain, tolerating tube feeds    ROS: Denies any fevers, chills, rash. Eyes: conjunctiva normal, EOM normal   Neck: ROM normal, supple and trachea normal   Cardiovascular: heart normal, intact distal pulses, normal rate and regular rhythm   Pulmonary/Chest Wall: breath sounds normal and effort normal   Abdominal: appearance normal, bowel sounds normal and soft, non-acute, non-tender, PEG in place, no signs of infection.      Patient Active Problem List   Diagnosis Code    Coronary artery disease involving native coronary artery of native heart without angina pectoris,s/p stent 2003 I25.10    Scarlet fever A38.9    Myocardial infarction ([facility] [address] I21.9    Urinary frequency R35.0    Pneumonia J18.9    Goals of care, counseling/discussion Z71.89    Acute respiratory failure with hypoxia ([address] J96.01    Debility R53.81    Severe protein-calorie malnutrition ([address] E43         Visit Vitals  /61   Pulse 94   Temp 98.4 °F (36.9 °C)   Resp 22 Comment: shallow, short breaths 44   Ht 5' 7\" (1.702 m)   Wt 54.4 kg (120 lb)   SpO2 95%   BMI 18.79 kg/m²           Intake/Output Summary (Last 24 hours) at 10/22/2021 1131  Last data filed at 10/22/2021 1022  Gross per 24 hour   Intake 650 ml   Output 350 ml   Net 300 ml       CBC w/Diff    Lab Results   Component Value Date/Time    WBC 7.3 10/22/2021 02:05 AM    RBC 4.00 (L) 10/22/2021 02:05 AM    HGB 10.8 (L) 10/22/2021 02:05 AM    HCT 37.0 10/22/2021 02:05 AM    MCV 92.5 10/22/2021 02:05 AM    MCH 27.0 10/22/2021 02:05 AM    MCHC 29.2 (L) 10/22/2021 02:05 AM    RDW 13.3 10/22/2021 02:05 AM     10/22/2021 02:05 AM    Lab Results   Component Value Date/Time    GRANS 89 (H) 10/22/2021 02:05 AM    LYMPH 2 (L) 10/22/2021 02:05 AM    EOS 0 10/22/2021 02:05 AM    BASOS 0 10/22/2021 02:05 AM      Basic Metabolic Profile   Recent Labs     10/22/21  0205      K 3.6      CO2 32   BUN 15   CA 8.1*   MG 2.1   PHOS 2.3*        Hepatic Function    Lab Results   Component Value Date/Time    ALB 2.4 (L) 10/18/2021 07:28 PM    TP 7.2 10/18/2021 07:28 PM    AP 32 (L) 10/18/2021 07:28 PM    No results found for: TBIL       Coags   No results for input(s): PTP, INR, APTT, INREXT in the last 72 hours. LEXUS Morrell    [facility]. [email or web address]  Phone: [phone]  Pager: [phone]

## 2021-10-22 NOTE — PROGRESS NOTES
[facility]ist [facility]  Progress Note    Patient: Keith Hoyt Age: 67 y.o. : 1945 MR#: [id number] SSN: [id number]  Date/Time: 10/22/2021    Subjective:     I saw patient earlier today. Patient is sitting in a chair in no apparent distress, awake, follows simple commands. Denies any discomfort. Sister at bedside. Patient is very hard of hearing    Assessment/Plan:     Possible aspiration pneumonia, suspected chronic aspiration  Cavitary lung lesion  Interstitial lung disease  Hypoxia  Chronic systolic congestive heart failurecompensated  Gram-positive cocci bacteremialikely contaminant  Dysphagia    PLAN  Antibiotics per ID  Bronchial hygiene. Pulmonary is following  Continue oxygen, wean  PEG tube feeding. Nutrition services are following  PT OT  Discussed with patient and sister at bedside  Dispositionlikely SNF  Palliative care input noted. Patient is full code  RN called stating that patient now with some sinus tachycardia at 118 after some exertion. Will add IV fluids and monitor. Discussed with RN    Case discussed with:  [x]Patient  []Family  []Nursing  []Case Management  DVT Prophylaxis:  [x]Lovenox  []Hep SQ  []SCDs  []Coumadin   []On Heparin gtt    Objective:   VS:   Visit Vitals  /64   Pulse (!) 118   Temp 98.5 °F (36.9 °C)   Resp 24 Comment: 40bpm   Ht 5' 7\" (1.702 m)   Wt 54.4 kg (120 lb)   SpO2 95%   BMI 18.79 kg/m²      Tmax/24hrs: Temp (24hrs), Av.2 °F (36.8 °C), Min:97.6 °F (36.4 °C), Max:98.5 °F (36.9 °C)        Input/Output:     Intake/Output Summary (Last 24 hours) at 10/22/2021 1727  Last data filed at 10/22/2021 1609  Gross per 24 hour   Intake 150 ml   Output 450 ml   Net -300 ml       General:  Awake, follows commands.   Patient is hard of hearing  Cardiovascular:  S1S2+, RRR  Pulmonary:  CTA b/l  GI:  Soft, BS+, NT, ND, PEG tube in place  Extremities:  No edema  Bitemporal wasting    Labs:    Recent Results (from the past 24 hour(s))   MAGNESIUM    Collection Time: 10/22/21  2:05 AM   Result Value Ref Range    Magnesium 2.1 1.6 - 2.6 mg/dL   PHOSPHORUS    Collection Time: 10/22/21  2:05 AM   Result Value Ref Range    Phosphorus 2.3 (L) 2.5 - 4.9 MG/DL   CBC WITH AUTOMATED DIFF    Collection Time: 10/22/21  2:05 AM   Result Value Ref Range    WBC 7.3 4.6 - 13.2 K/uL    RBC 4.00 (L) 4.35 - 5.65 M/uL    HGB 10.8 (L) 13.0 - 16.0 g/dL    HCT 37.0 36.0 - 48.0 %    MCV 92.5 78.0 - 100.0 FL    MCH 27.0 24.0 - 34.0 PG    MCHC 29.2 (L) 31.0 - 37.0 g/dL    RDW 13.3 11.6 - 14.5 %    PLATELET 832 619 - 283 K/uL    MPV 9.4 9.2 - 11.8 FL    NEUTROPHILS 89 (H) 40 - 73 %    LYMPHOCYTES 2 (L) 21 - 52 %    MONOCYTES 7 3 - 10 %    EOSINOPHILS 0 0 - 5 %    BASOPHILS 0 0 - 2 %    ABS. NEUTROPHILS 6.5 1.8 - 8.0 K/UL    ABS. LYMPHOCYTES 0.2 (L) 0.9 - 3.6 K/UL    ABS. MONOCYTES 0.5 0.05 - 1.2 K/UL    ABS. EOSINOPHILS 0.0 0.0 - 0.4 K/UL    ABS.  BASOPHILS 0.0 0.0 - 0.1 K/UL    DF AUTOMATED     METABOLIC PANEL, BASIC    Collection Time: 10/22/21  2:05 AM   Result Value Ref Range    Sodium 138 136 - 145 mmol/L    Potassium 3.6 3.5 - 5.5 mmol/L    Chloride 102 100 - 111 mmol/L    CO2 32 21 - 32 mmol/L    Anion gap 4 3.0 - 18 mmol/L    Glucose 88 74 - 99 mg/dL    BUN 15 7.0 - 18 MG/DL    Creatinine 0.33 (L) 0.6 - 1.3 MG/DL    BUN/Creatinine ratio 45 (H) 12 - 20      GFR est AA >60 >60 ml/min/1.73m2    GFR est non-AA >60 >60 ml/min/1.73m2    Calcium 8.1 (L) 8.5 - 10.1 MG/DL     Additional Data Reviewed:      Signed By: Satinder Graham MD     October 22, 2021

## 2021-10-22 NOTE — PROGRESS NOTES
MD notified of patients new MEWS score and elevated HR. Orders given to start IV fluids and repage if HR does not decrease in one hour.

## 2021-10-22 NOTE — PROGRESS NOTES
Nutrition Note    S/p PEG placement yesterday and TF started at 8am this morning. Pt tolerating well with plan for gradual advancement to goal. Formed BM 10/20. Remains on D5 NS at 75 mL/hr providing 90 gm dextrose, 306 kcal per day. Slow progression towards nutritional goals. Nutrition Recommendations/Plan:  - Continue tube feeding of Jevity 1.5 at 20 mL/hr advancing as tolerated by 10 mL q 6 hours to goal rate of 50 mL/hr with 150 mL water flushes q 4 hours. (goal regimen to provide: 1800 kcal, 77 gm protein, 912 mL free water, 100% RDIs). - IVF per MD- MD to assess today. - Pt at risk for refeeding- discussed with MD. Monitor and replace electrolytes as needed & continue IV thiamine.     Electronically signed by Rebecca Hewitt RD on 10/22/2021 at 12:26 PM    Contact: [phone]

## 2021-10-22 NOTE — PROGRESS NOTES
Infectious Disease progress Note        Reason: Right upper lobe cavitary pneumonia    Current abx Prior abx   Piperacillin/tazobactam, levofloxacin since 10/18/2021 vancomycin 10/18-10/21     Lines:       Assessment :    67 y.o. 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 intake, and worsening cough . Now with gram positive bacteremia, chronic cavitary lesion RUL with RUL/RLL infiltrates    Clinical presentation c/w acute on chronic hypoxic respiratory failure likely secondary to recurrent aspiration pneumonia, chronic cavitary lesion right upper lobe  Rule out superimposed infection/colonization with atypical mycobacteria    Sputum cx- mixed respiratory cristine, staph. Aureus, yeast  Yeast likely colonizer. Sputum AFB 10/19 negative    Pulmonary follow-up appreciated    Single positive blood culture for coagulase negative staph. On  10/18 is likely contamination    S/p peg tube placement 10/21/21    Appears more tachypneic on today's exam    Recommendations:    1. Continue Zosyn, d/c levofloxacin. Start vancomycin to cover for possible MRSA till further info. Obtained. Ok to d/c vancomycin if no mrsa isolated from sputum cx  2. Follow sputum culture- modify abx as needed, sputum for AFB  3. Follow-up pulmonary recommendations       Above plan was discussed in details with patient, family at bedside. . Please call me if any further questions or concerns. Will continue to participate in the care of this patient. HPI:    Feels slightly better. Denies any chest pain. No new complaints. Current Discharge Medication List      CONTINUE these medications which have NOT CHANGED    Details   acetylcysteine (MUCOMYST) 100 mg/mL (10 %) nebulizer solution Take 4 mL by inhalation every four (4) hours for 120 days.   Qty: 720 mL, Refills: 3      fluorouraciL (EFUDEX) 5 % chemo cream APPLY CREAM TO FOREHEAD 2 TIMES DAILY FOR 2 WEEKS ONCE HEALED USE 2 TIMES DAILY TO THE EARS FOR 2 WEEKS ONCE HEALED USE 2 TIMES DAILY TO THE CHEEKS AND TEMPLES FOR 2 WEEKS      sodium chloride 3 % nebulizer solution 4 mL by Nebulization route four (4) times daily. Mix with albuterol. File under Medicare Part B. Dx: R06.02; J47.9; J84.9; R53.81  Qty: 480 mL, Refills: 5    Associated Diagnoses: Dyspnea on exertion; Bronchiectasis without complication ([address] [address] ILD (interstitial lung disease) ([address] [address] Chronic pulmonary aspiration, sequela; Physical deconditioning      albuterol (PROVENTIL VENTOLIN) 2.5 mg /3 mL (0.083 %) nebu Mix with hypertonic saline nebulizer -- use 3- times per day. File under Medicare Part B. Dx: R06.02; J47.9; J84.9; R53.81  Qty: 360 Nebule, Refills: 3    Associated Diagnoses: Dyspnea on exertion; Bronchiectasis without complication ([facility] [address] ILD (interstitial lung disease) ([address] Chronic pulmonary aspiration, sequela; Physical deconditioning      guaiFENesin ER (MUCINEX) 600 mg ER tablet Take 1 Tablet by mouth two (2) times a day for 90 days. Qty: 180 Tablet, Refills: 3    Associated Diagnoses: Bronchiectasis without complication (HCC)      cholecalciferol (VITAMIN D3) (1000 Units /25 mcg) tablet Take 1,000 Units by mouth daily. FISH OIL-DHA-EPA PO Take 1 Tab by mouth daily. amino acids powd Take 1 Dose by mouth daily. OTHER Take 1 Cap by mouth daily. tumeric       fluticasone propionate (FLONASE NA) 1 Spray by Both Nostrils route daily. ascorbic acid, vitamin C, (VITAMIN C) 500 mg tablet Take 500 mg by mouth daily. multivitamin (ONE A DAY) tablet Take 1 Tab by mouth daily. aspirin delayed-release 81 mg tablet Take 81 mg by mouth daily.     Associated Diagnoses: Coronary artery disease involving native coronary artery of native heart without angina pectoris             Current Facility-Administered Medications Medication Dose Route Frequency    thiamine (B-1) 200 mg in 0.9% sodium chloride 50 mL IVPB  200 mg IntraVENous DAILY    dextrose 5% and 0.9% NaCl infusion  75 mL/hr IntraVENous CONTINUOUS    [Held by provider] enoxaparin (LOVENOX) injection 40 mg  40 mg SubCUTAneous QHS    albuterol-ipratropium (DUO-NEB) 2.5 MG-0.5 MG/3 ML  3 mL Nebulization Q4H RT    sodium chloride 3% hypertonic nebulizer soln  4 mL Nebulization Q8H    guaiFENesin ER (MUCINEX) tablet 600 mg  600 mg Oral Q12H    sodium chloride (NS) flush 5-10 mL  5-10 mL IntraVENous PRN    piperacillin-tazobactam (ZOSYN) 3.375 g in 0.9% sodium chloride (MBP/ADV) 100 mL MBP  3.375 g IntraVENous Q6H    levoFLOXacin (LEVAQUIN) 750 mg in D5W IVPB  750 mg IntraVENous Q24H    sodium chloride (NS) flush 5-40 mL  5-40 mL IntraVENous Q8H    sodium chloride (NS) flush 5-40 mL  5-40 mL IntraVENous PRN    acetaminophen (TYLENOL) tablet 650 mg  650 mg Oral Q6H PRN    Or    acetaminophen (TYLENOL) suppository 650 mg  650 mg Rectal Q6H PRN    polyethylene glycol (MIRALAX) packet 17 g  17 g Oral DAILY PRN    cholecalciferol (VITAMIN D3) (1000 Units /25 mcg) tablet 1,000 Units  1,000 Units Oral DAILY    omega-3 acid ethyl esters (LOVAZA) capsule 1,000 mg  1 g Oral DAILY WITH BREAKFAST    ascorbic acid (vitamin C) (VITAMIN C) tablet 500 mg  500 mg Oral DAILY    therapeutic multivitamin (THERAGRAN) tablet 1 Tablet  1 Tablet Oral DAILY    aspirin chewable tablet 81 mg  81 mg Oral DAILY       Allergies: Pollens extract    Family History   Problem Relation Age of Onset    No Known Problems Mother     Heart Disease Father     Heart Attack Father         1989    Coronary Artery Disease Father     Hypertension Father     High Cholesterol Father      Social History     Socioeconomic History    Marital status: SINGLE     Spouse name: Not on file    Number of children: Not on file    Years of education: Not on file    Highest education level: Not on file Occupational History    Not on file   Tobacco Use    Smoking status: Never Smoker    Smokeless tobacco: Never Used   Vaping Use    Vaping Use: Never used   Substance and Sexual Activity    Alcohol use: No    Drug use: No    Sexual activity: Not on file   Other Topics Concern    Not on file   Social History Narrative    Not on file     Social Determinants of Health     Financial Resource Strain:     Difficulty of Paying Living Expenses:    Food Insecurity:     Worried About Running Out of Food in the Last Year:     [address] in the Last Year:    Transportation Needs:     Lack of Transportation (Medical):  Lack of Transportation (Non-Medical):    Physical Activity:     Days of Exercise per Week:     Minutes of Exercise per Session:    Stress:     Feeling of Stress :    Social Connections:     Frequency of Communication with Friends and Family:     Frequency of Social Gatherings with Friends and Family:     Attends [employer] Services:     Active Member of Clubs or Organizations:     Attends Club or Organization Meetings:     Marital Status:    Intimate Partner Violence:     Fear of Current or Ex-Partner:     Emotionally Abused:     Physically Abused:     Sexually Abused:      Social History     Tobacco Use   Smoking Status Never Smoker   Smokeless Tobacco Never Used        Temp (24hrs), Av.2 °F (36.8 °C), Min:97.6 °F (36.4 °C), Max:98.9 °F (37.2 °C)    Visit Vitals  BP (!) 105/52 (BP 1 Location: Right upper arm, BP Patient Position: At rest;Semi fowlers)   Pulse 90   Temp 98 °F (36.7 °C)   Resp 22 Comment: shallow short breathes exceeding 40 bpm   Ht 5' 7\" (1.702 m)   Wt 54.4 kg (120 lb)   SpO2 98%   BMI 18.79 kg/m²       ROS: 12 point ROS obtained in details. Pertinent positives as mentioned in HPI,   otherwise negative    Physical Exam:    General:Thin  male laying on the bed AAOx3 in no acute distress.     HEENT:  Normocephalic, atraumatic, EOMI, no scleral icterus or pallor; no conjunctival hemmohage;  nasal and oral mucous are moist and without evidence of lesions. Neck supple, no bruits. Lymph Nodes:   not examined   Lungs:   shallow breathing, tachypneic, rhonchi right upper lung   Heart:  RRR, s1 and s2; no  rubs or gallops, no edema, + pedal pulses   Abdomen:  soft, non-distended, active bowel sounds, no hepatomegaly, no splenomegaly. Non-tender   Genitourinary:  deferred   Extremities:   no clubbing, cyanosis; no joint effusions or swelling; Full ROM of all large joints to the upper and lower extremities; muscle mass appropriate for age   Neurologic:  No gross focal motor or sensory abnormalities                        Skin:  No rash or ulcers noted   Back:  no spinal or paraspinal muscle tenderness or rigidity, no CVA tenderness     Psychiatric:  No suicidal or homicidal ideations, appropriate mood and affect         Labs: Results:   Chemistry Recent Labs     10/22/21  0205 10/21/21  0218 10/20/21  0442   GLU 88 87 84    141 137   K 3.6 4.0 4.0    105 105   CO2 32 33* 31   BUN 15 18 25*   CREA 0.33* 0.33* 0.49*   CA 8.1* 8.0* 8.3*   AGAP 4 3 1*   BUCR 45* 55* 51*      CBC w/Diff Recent Labs     10/22/21  0205 10/21/21  0218 10/20/21  0442   WBC 7.3 6.6 6.9   RBC 4.00* 3.73* 3.93*   HGB 10.8* 10.0* 10.6*   HCT 37.0 33.7* 36.1    193 214   GRANS 89* 87* 87*   LYMPH 2* 3* 3*   EOS 0 1 0      Microbiology Recent Labs     10/20/21  1141   CULT LIGHT YEAST, (APPARENT CANDIDA ALBICANS)*  FEW NORMAL RESPIRATORY LEROY          RADIOLOGY:    All available imaging studies/reports in [facility] care for this admission were reviewed      High complexity decision making was performed during the evaluation of this patient at high risk for decompensation with multiple organ involvement       Disclaimer: Sections of this note are dictated utilizing voice recognition software, which may have resulted in some phonetic based errors in grammar and contents.  Even though attempts were made to correct all the mistakes, some may have been missed, and remained in the body of the document. If questions arise, please contact our department.     Dr. Darlina Sandifer, Infectious Disease Specialist  [phone]  October 22, 2021  3:25 PM

## 2021-10-22 NOTE — PROGRESS NOTES
Problem: Patient Education: Go to Patient Education Activity  Goal: Patient/Family Education  Outcome: Progressing Towards Goal       Problem: Falls - Risk of  Goal: *Absence of Falls  Description: Document Laura Love Fall Risk and appropriate interventions in the flowsheet.   Outcome: Progressing Towards Goal  Note: Fall Risk Interventions:  Mobility Interventions: Communicate number of staff needed for ambulation/transfer              Elimination Interventions: Call light in reach              Problem: Patient Education: Go to Patient Education Activity  Goal: Patient/Family Education  Outcome: Progressing Towards Goal     Problem: Discharge Planning  Goal: *Discharge to safe environment  Outcome: Progressing Towards Goal  Goal: *Knowledge of medication management  Outcome: Progressing Towards Goal  Goal: *Knowledge of discharge instructions  Outcome: Progressing Towards Goal     Problem: Patient Education: Go to Patient Education Activity  Goal: Patient/Family Education  Outcome: Progressing Towards Goal     Problem: Patient Education: Go to Patient Education Activity  Goal: Patient/Family Education  Outcome: Progressing Towards Goal     Problem: Patient Education: Go to Patient Education Activity  Goal: Patient/Family Education  Outcome: Progressing Towards Goal     Problem: Nutrition Deficit  Goal: *Optimize nutritional status  Outcome: Progressing Towards Goal

## 2021-10-22 NOTE — PROGRESS NOTES
Chart reviewed. Spoke with patients sister, Maurice Gtz. Let her know which facilities accepted patient. Their first choice is [facility] and second choice is [facility]. Pt will most likely be discharged on Monday. No auth needed.   Mary Villafuerte RN - Outcomes Manager  [phone]

## 2021-10-23 NOTE — ROUTINE PROCESS
Bedside and Verbal shift change report given to 1106 Alejandra Duckworth (oncoming nurse) by Fany Vaelntine RN (offgoing nurse). Report included the following information SBAR, Kardex, Intake/Output, MAR and Recent Results.

## 2021-10-23 NOTE — PROGRESS NOTES
[facility]ist [facility]  Progress Note    Patient: Jacinto Alicia Age: 67 y.o. : 1945 MR#: [id number] SSN: [id number]  Date/Time: 10/23/2021    Subjective:     Patient is sitting in a chair in no apparent distress, awake and alert. RN and patient sister at bedside. Apparently patient was a little confused last night and was refusing care but since this morning has improved and is compliant. Assessment/Plan:     Possible aspiration pneumonia, suspected chronic aspiration  Cavitary lung lesion  Interstitial lung disease  Hypoxia  Chronic systolic congestive heart failurecompensated  Gram-positive cocci bacteremialikely contaminant  Dysphagia    PLAN  Continue antibiotics  Bronchial hygiene. Pulmonary is following  Wean oxygen  PEG tube feeding. Nutrition services are following  PT OT  Discussed with patient and sister at bedside  [address] [place] [address]  Palliative care input noted. Patient is full code      Case discussed with:  [x]Patient  []Family  []Nursing  []Case Management  DVT Prophylaxis:  [x]Lovenox  []Hep SQ  []SCDs  []Coumadin   []On Heparin gtt    Objective:   VS:   Visit Vitals  /69 (BP 1 Location: Left upper arm, BP Patient Position: At rest)   Pulse (!) 107   Temp 98.3 °F (36.8 °C)   Resp 18   Ht 5' 7\" (1.702 m)   Wt 54.4 kg (120 lb)   SpO2 96%   BMI 18.79 kg/m²      Tmax/24hrs: Temp (24hrs), Av.5 °F (36.9 °C), Min:98.1 °F (36.7 °C), Max:98.9 °F (37.2 °C)        Input/Output:     Intake/Output Summary (Last 24 hours) at 10/23/2021 1730  Last data filed at 10/23/2021 0445  Gross per 24 hour   Intake 100 ml   Output    Net 100 ml       General:  Awake, alert  Cardiovascular:  S1S2+, RRR  Pulmonary: Coarse breath sounds bilaterally  GI:  Soft, BS+, NT, ND, has PEG tube in place  Extremities:  No edema  Frail    Labs:    No results found for this or any previous visit (from the past 24 hour(s)).   Additional Data Reviewed:      Signed By: Medina Mendoza Olga Lidia Bowen MD     October 23, 2021

## 2021-10-23 NOTE — PROGRESS NOTES
Bronchodilation and bronchopulmonary hygiene Note:  RT and Nursing discussed sister of pt coming by and asking for suction for her brother,  At time of RT arrival for assessment, pt was with out rhonchi and no SOB. His cough is moderate, dry and breathing is shallow. RT did have pt do Aerobika device( for pep therap, pt was able to do this 10 times with no issue and he did cough and deep breath. Cough was dry and moderate. I talked to sister about the pt not having any secretions at this time,   HHN was also changed to combine 3% saline and Duoneb to BID and have Duoneb Q4 PRN as well. PT was not accepting tx last night and no wheezes were heard at this time. No resp distress, Sats 95% on 4 liters per nurse discussion. Pt is deaf or very hard of hearing, RT did use his phone device dictation for communication needs during this visit.

## 2021-10-23 NOTE — PROGRESS NOTES
Problem: Patient Education: Go to Patient Education Activity  Goal: Patient/Family Education  Outcome: Progressing Towards Goal     Problem: Falls - Risk of  Goal: *Absence of Falls  Description: Document Alex Lacy Fall Risk and appropriate interventions in the flowsheet.   Outcome: Progressing Towards Goal  Note: Fall Risk Interventions:  Mobility Interventions: Patient to call before getting OOB              Elimination Interventions: Call light in reach              Problem: Patient Education: Go to Patient Education Activity  Goal: Patient/Family Education  Outcome: Progressing Towards Goal     Problem: Discharge Planning  Goal: *Discharge to safe environment  Outcome: Progressing Towards Goal  Goal: *Knowledge of medication management  Outcome: Progressing Towards Goal  Goal: *Knowledge of discharge instructions  Outcome: Progressing Towards Goal     Problem: Patient Education: Go to Patient Education Activity  Goal: Patient/Family Education  Outcome: Progressing Towards Goal     Problem: Patient Education: Go to Patient Education Activity  Goal: Patient/Family Education  Outcome: Progressing Towards Goal     Problem: Patient Education: Go to Patient Education Activity  Goal: Patient/Family Education  Outcome: Progressing Towards Goal     Problem: Nutrition Deficit  Goal: *Optimize nutritional status  Outcome: Progressing Towards Goal

## 2021-10-23 NOTE — ROUTINE PROCESS
Patient sitting in the recliner AAOx3, hard of hearing, iv site infiltrated ,patient refused to take off the iv cath and to be reinserted, also disconnected him self the PEG tube stated that he had enough. Charge Nurse spoke with the patient ,but still refusing. 0200 Patient refused lab work.

## 2021-10-23 NOTE — PROGRESS NOTES
Report received from nightshift RN. Per RN, patient refused tube feedings, lab draw and attempts at starting a new working IV as patient currently has none. Will try to educate patient on the importance of having IV access to receive antibiotics and notify physician.

## 2021-10-23 NOTE — PROGRESS NOTES
Bedside and Verbal shift change report given to [address] Tricia Arredondo (oncoming nurse) by Leon Call RN (offgoing nurse). Report included the following information SBAR, Kardex, Intake/Output and MAR.

## 2021-10-24 NOTE — ROUTINE PROCESS
Bedside and verbal shift change report given to KARMA Victoria by Vashti Weathers RN.  Report included the following information:  -procedure summary  -MAR  -Recent Results  -Med Rec Status  -SBAR

## 2021-10-24 NOTE — PROGRESS NOTES
[facility] Hospitalist Group  Progress Note    Patient: Ron Cheung Age: 67 y.o. : 1945 MR#: [id number] SSN: [id number]  Date/Time: 10/24/2021    Subjective:     Patient is sitting in a chair in no apparent distress, awake, follows simple commands. Has dyspnea on exertion. Sister at bedside    Assessment/Plan:     Possible aspiration pneumonia, suspected chronic aspiration  Cavitary lung lesion  Interstitial lung disease  Hypoxia  Chronic systolic congestive heart failurecompensated  Gram-positive cocci bacteremialikely contaminant  Dysphagia    PLAN  Continue bronchial hygiene, pulmonary input noted  Encouraged incentive spirometry  Continue antibiotics per ID  Wean oxygen  PEG tube feeding. Nutrition services are following  PT OT  Discussed with patient and sister at bedside  [address] [place] [address]  Palliative care input noted. Patient is full code      Case discussed with:  [x]Patient  []Family  []Nursing  []Case Management  DVT Prophylaxis:  [x]Lovenox  []Hep SQ  []SCDs  []Coumadin   []On Heparin gtt    Objective:   VS:   Visit Vitals  BP (!) 104/55 (BP 1 Location: Left upper arm, BP Patient Position: Sitting)   Pulse 100   Temp 98.9 °F (37.2 °C)   Resp 20   Ht 5' 7\" (1.702 m)   Wt 54.4 kg (120 lb)   SpO2 95%   BMI 18.79 kg/m²      Tmax/24hrs: Temp (24hrs), Av.5 °F (36.9 °C), Min:97.9 °F (36.6 °C), Max:99.1 °F (37.3 °C)        Input/Output:     Intake/Output Summary (Last 24 hours) at 10/24/2021 1648  Last data filed at 10/24/2021 0427  Gross per 24 hour   Intake 430 ml   Output 800 ml   Net -370 ml       General:  Awake, follows simple commands.   Hard of hearing  Cardiovascular:  S1S2+, RRR  Pulmonary: Coarse breath sounds bilaterally, no wheezing currently  GI:  Soft, BS+, NT, ND  Extremities:  No edema  Frail, bitemporal wasting    Labs:    Recent Results (from the past 24 hour(s))   GLUCOSE, POC    Collection Time: 10/23/21 11:59 PM Result Value Ref Range    Glucose (POC) 148 (H) 70 - 110 mg/dL   MAGNESIUM    Collection Time: 10/24/21  4:12 AM   Result Value Ref Range    Magnesium 2.1 1.6 - 2.6 mg/dL   PHOSPHORUS    Collection Time: 10/24/21  4:12 AM   Result Value Ref Range    Phosphorus 2.7 2.5 - 4.9 MG/DL   CBC WITH AUTOMATED DIFF    Collection Time: 10/24/21  4:12 AM   Result Value Ref Range    WBC 5.9 4.6 - 13.2 K/uL    RBC 3.91 (L) 4.35 - 5.65 M/uL    HGB 10.4 (L) 13.0 - 16.0 g/dL    HCT 36.0 36.0 - 48.0 %    MCV 92.1 78.0 - 100.0 FL    MCH 26.6 24.0 - 34.0 PG    MCHC 28.9 (L) 31.0 - 37.0 g/dL    RDW 13.4 11.6 - 14.5 %    PLATELET 313 968 - 513 K/uL    MPV 9.1 (L) 9.2 - 11.8 FL    NEUTROPHILS 83 (H) 40 - 73 %    LYMPHOCYTES 4 (L) 21 - 52 %    MONOCYTES 10 3 - 10 %    EOSINOPHILS 3 0 - 5 %    BASOPHILS 0 0 - 2 %    ABS. NEUTROPHILS 4.9 1.8 - 8.0 K/UL    ABS. LYMPHOCYTES 0.2 (L) 0.9 - 3.6 K/UL    ABS. MONOCYTES 0.6 0.05 - 1.2 K/UL    ABS. EOSINOPHILS 0.2 0.0 - 0.4 K/UL    ABS.  BASOPHILS 0.0 0.0 - 0.1 K/UL    DF AUTOMATED     METABOLIC PANEL, BASIC    Collection Time: 10/24/21  4:12 AM   Result Value Ref Range    Sodium 141 136 - 145 mmol/L    Potassium 3.8 3.5 - 5.5 mmol/L    Chloride 102 100 - 111 mmol/L    CO2 41 (HH) 21 - 32 mmol/L    Anion gap NEG 2 3.0 - 18 mmol/L    Glucose 153 (H) 74 - 99 mg/dL    BUN 11 7.0 - 18 MG/DL    Creatinine 0.29 (L) 0.6 - 1.3 MG/DL    BUN/Creatinine ratio 38 (H) 12 - 20      GFR est AA >60 >60 ml/min/1.73m2    GFR est non-AA >60 >60 ml/min/1.73m2    Calcium 8.0 (L) 8.5 - 10.1 MG/DL   GLUCOSE, POC    Collection Time: 10/24/21  6:32 AM   Result Value Ref Range    Glucose (POC) 159 (H) 70 - 110 mg/dL   GLUCOSE, POC    Collection Time: 10/24/21  1:24 PM   Result Value Ref Range    Glucose (POC) 134 (H) 70 - 110 mg/dL     Additional Data Reviewed:      Signed By: Carmelita Vogel MD     October 24, 2021

## 2021-10-24 NOTE — PROGRESS NOTES
[facility]  Pulmonary, Critical Care, and Sleep Medicine    Pulmonary Medicine Progress Note    Name: Lauryn Gee MRN: [id number]  : 1945 Hospital: [address] [place] [address] Dr  Date: 10/24/2021       Subjective:  Pt appears comfortable. No complaints. No coughing. Only 1 AFB sent. Patient Active Problem List   Diagnosis Code    Coronary artery disease involving native coronary artery of native heart without angina pectoris,s/p stent  I25.10    Scarlet fever A38.9    Myocardial infarction ([facility] I21.9    Urinary frequency R35.0    Pneumonia J18.9    Goals of care, counseling/discussion Z71.89    Acute respiratory failure with hypoxia ([facility] J96.01    Debility R53.81    Severe protein-calorie malnutrition ([facility] E43       Assessment:  · Acute on Chronic Hypoxic Respiratory Failure  · - 2/2 chronic aspiration, bronchiectasis, ILD, with new consolidations suspicious for mucus plugging vs pneumonia. 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 · ID following for ABX  · S/p PEG tube. · Continue hypertonic saline nebs with duonebs  · Mucinex and acapella valve  · Assess home Oxygen needs at discharge    Okay for discharge from my standpoint, will sign off. Can f/u in our clinic. FiO2 to keep SpO2 >=92%, HOB >=30 degree, aspiration precautions, aggressive pulmonary toileting, incentive spirometry. Other issues management by primary team and respective consultants. Events and notes from last 24 hours reviewed. Discussed with patient and family, answered all questions to their satisfaction. Care plan discussed with nursing.      Labs and images personally seen and available reports reviewed  All current medicines are reviewed       Medications- Current:  Current Facility-Administered Medications   Medication Dose Route Frequency    albuterol-ipratropium (DUO-NEB) 2.5 MG-0.5 MG/3 ML  3 mL Nebulization BID    albuterol-ipratropium (DUO-NEB) 2.5 MG-0.5 MG/3 ML  3 mL Nebulization Q4H PRN    sodium chloride 3% hypertonic nebulizer soln  4 mL Nebulization BID RT    vancomycin (VANCOCIN) 1,000 mg in 0.9% sodium chloride 250 mL (VIAL-MATE)  1,000 mg IntraVENous Q8H    ascorbic acid (vitamin C) (VITAMIN C) tablet 500 mg  500 mg Per G Tube DAILY    aspirin chewable tablet 81 mg  81 mg Per G Tube DAILY    cholecalciferol (VITAMIN D3) (1000 Units /25 mcg) tablet 1,000 Units  1,000 Units PEG Tube DAILY    polyethylene glycol (MIRALAX) packet 17 g  17 g Per G Tube DAILY PRN    acetaminophen (TYLENOL) tablet 650 mg  650 mg Per G Tube Q6H PRN    Or    acetaminophen (TYLENOL) suppository 650 mg  650 mg Rectal Q6H PRN    potassium, sodium phosphates (NEUTRA-PHOS) packet 1 Packet  1 Packet Per G Tube BID    thiamine (B-1) 200 mg in 0.9% sodium chloride 50 mL IVPB  200 mg IntraVENous DAILY    enoxaparin (LOVENOX) injection 40 mg  40 mg SubCUTAneous QHS    guaiFENesin ER (MUCINEX) tablet 600 mg  600 mg Oral Q12H    sodium chloride (NS) flush 5-10 mL  5-10 mL IntraVENous PRN    piperacillin-tazobactam (ZOSYN) 3.375 g in 0.9% sodium chloride (MBP/ADV) 100 mL MBP  3.375 g IntraVENous Q6H    sodium chloride (NS) flush 5-40 mL  5-40 mL IntraVENous Q8H    sodium chloride (NS) flush 5-40 mL  5-40 mL IntraVENous PRN    omega-3 acid ethyl esters (LOVAZA) capsule 1,000 mg  1 g Oral DAILY WITH BREAKFAST    therapeutic multivitamin (THERAGRAN) tablet 1 Tablet  1 Tablet Oral DAILY       Objective:  Vital Signs:    Visit Vitals  BP (!) 103/58   Pulse 87   Temp 98.5 °F (36.9 °C)   Resp 20   Ht 5' 7\" (1.702 m)   Wt 54.4 kg (120 lb)   SpO2 95%   BMI 18.79 kg/m²      O2 Device: Nasal cannula  O2 Flow Rate (L/min): 4 l/min  Temp (24hrs), Av.5 °F (36.9 °C), Min:97.9 °F (36.6 °C), Max:99.1 °F (37.3 °C)      Intake/Output:   Last shift:      No intake/output data recorded. Last 3 shifts: 10/22 190 - 10/24 0700  In: 530 [I.V.:250]  Out: 800 [Urine:800]    Intake/Output Summary (Last 24 hours) at 10/24/2021 1038  Last data filed at 10/24/2021 0427  Gross per 24 hour   Intake 430 ml   Output 800 ml   Net -370 ml       Physical Exam:   General:  Alert, awake, appears older than stated age, chronic malnourished. Head:  Normocephalic, without obvious abnormality, atraumatic. Lungs:   Bilateral auscultation bilateral rhonchi, no rales. No wheezing. Chest wall:  No tenderness or deformity. NO CREPITUS   Heart:  Regular rate and rhythm, S1, S2 normal, no murmur, click, rub or gallop. Abdomen:   Soft, non-tender. Bowel sounds normal. No masses,  No organomegaly. No paradox   Extremities: normal, atraumatic, no cyanosis or edema. Pulses: 1-2+ and symmetric all extremities. Neurologic: Grossly nonfocal, deaf         Data:      Recent Results (from the past 24 hour(s))   GLUCOSE, POC    Collection Time: 10/23/21 11:59 PM   Result Value Ref Range    Glucose (POC) 148 (H) 70 - 110 mg/dL   MAGNESIUM    Collection Time: 10/24/21  4:12 AM   Result Value Ref Range    Magnesium 2.1 1.6 - 2.6 mg/dL   PHOSPHORUS    Collection Time: 10/24/21  4:12 AM   Result Value Ref Range    Phosphorus 2.7 2.5 - 4.9 MG/DL   CBC WITH AUTOMATED DIFF    Collection Time: 10/24/21  4:12 AM   Result Value Ref Range    WBC 5.9 4.6 - 13.2 K/uL    RBC 3.91 (L) 4.35 - 5.65 M/uL    HGB 10.4 (L) 13.0 - 16.0 g/dL    HCT 36.0 36.0 - 48.0 %    MCV 92.1 78.0 - 100.0 FL    MCH 26.6 24.0 - 34.0 PG    MCHC 28.9 (L) 31.0 - 37.0 g/dL    RDW 13.4 11.6 - 14.5 %    PLATELET 567 197 - 627 K/uL    MPV 9.1 (L) 9.2 - 11.8 FL    NEUTROPHILS 83 (H) 40 - 73 %    LYMPHOCYTES 4 (L) 21 - 52 %    MONOCYTES 10 3 - 10 %    EOSINOPHILS 3 0 - 5 %    BASOPHILS 0 0 - 2 %    ABS.  NEUTROPHILS 4.9 1.8 - 8.0 K/UL    ABS. LYMPHOCYTES 0.2 (L) 0.9 - 3.6 K/UL    ABS. MONOCYTES 0.6 0.05 - 1.2 K/UL    ABS. EOSINOPHILS 0.2 0.0 - 0.4 K/UL    ABS. BASOPHILS 0.0 0.0 - 0.1 K/UL    DF AUTOMATED     METABOLIC PANEL, BASIC    Collection Time: 10/24/21  4:12 AM   Result Value Ref Range    Sodium 141 136 - 145 mmol/L    Potassium 3.8 3.5 - 5.5 mmol/L    Chloride 102 100 - 111 mmol/L    CO2 41 (HH) 21 - 32 mmol/L    Anion gap NEG 2 3.0 - 18 mmol/L    Glucose 153 (H) 74 - 99 mg/dL    BUN 11 7.0 - 18 MG/DL    Creatinine 0.29 (L) 0.6 - 1.3 MG/DL    BUN/Creatinine ratio 38 (H) 12 - 20      GFR est AA >60 >60 ml/min/1.73m2    GFR est non-AA >60 >60 ml/min/1.73m2    Calcium 8.0 (L) 8.5 - 10.1 MG/DL   GLUCOSE, POC    Collection Time: 10/24/21  6:32 AM   Result Value Ref Range    Glucose (POC) 159 (H) 70 - 110 mg/dL         Chemistry   Recent Labs     10/24/21  0412 10/22/21  0205   * 88    138   K 3.8 3.6    102   CO2 41* 32   BUN 11 15   CREA 0.29* 0.33*   CA 8.0* 8.1*   MG 2.1 2.1   PHOS 2.7 2.3*   AGAP NEG 2 4   BUCR 38* 45*       CBC w/Diff   Recent Labs     10/24/21  0412 10/22/21  0205   WBC 5.9 7.3   RBC 3.91* 4.00*   HGB 10.4* 10.8*   HCT 36.0 37.0    221   GRANS 83* 89*   LYMPH 4* 2*   EOS 3 0       ABG No results for input(s): PHI, PHI, POC2, PCO2I, PO2, PO2I, HCO3, HCO3I, FIO2, FIO2I in the last 72 hours. Micro    No results for input(s): SDES, CULT in the last 72 hours. No results for input(s): CULT in the last 72 hours. CT (Most Recent) Results from Hospital Encounter encounter on 10/18/21    CTA CHEST W OR W WO CONT    Narrative  CTA CHEST PULMONARY EMBOLISM PROTOCOL      INDICATION: Progressive shortness of breath. Question pulmonary embolism.     TECHNIQUE: Thin collimation axial images obtained through the level of the  pulmonary arteries with additional imaging through the chest following the  uneventful administration of nonionic intravenous contrast.  Images  reconstructed into three dimensional coronal and sagittal projections for  complete evaluation of the tortuous and overlapping pulmonary vascular  structures and to reduce patient radiation dose. All CT scans at this facility are performed using dose optimization technique as  appropriate to a performed exam, to include automated exposure control,  adjustment of the mA and/or kV according to patient size (including appropriate  matching first site-specific examinations), or use of iterative reconstruction  technique. COMPARISON: August 10, 2021. FINDINGS:    There is some mixing artifact in the left pulmonary artery. There is no definite  acute pulmonary embolus. .    Thyroid: Unremarkable in its visualized aspects. Pericardium/ Heart: Heart is enlarged. Coronary artery disease. Aorta/ Vessels: No aneurysm or dissection. Lymph Nodes: Unremarkable. .    Lungs: Emphysema. There is dense consolidation in the lateral right upper lobe  and posteriorly which has increased. Dense consolidation anteriorly in the right  upper lobe is also increased. There is dense consolidation in the periphery of  the right lower lobe which has increased. The known cavitary right upper lobe  lesion is similar in the cavitary component. There are some nodular opacities in  the right lower lobe concerning for infection. The left lung is relatively  stable in appearance. Upper Abdomen: Colonic diverticulosis    Bones/soft tissues: No acute finding    Impression  No evidence for pulmonary emboli. Multifocal pneumonia has developed in the right lung. Pre-existing cavitary right upper lobe lesion is similar in appearance. 3 month follow-up chest CT is recommended for reassessment. Additional incidentals as above. XR (Most Recent). CXR reviewed by me and compared with previous CXR Results from [place] encounter on 10/18/21    XR SWALLOW FUNC VIDEO    Narrative  MODIFIED BARIUM SWALLOW. CLINICAL INDICATION/HISTORY: Dysphagia. Feeding difficulties. Poor PO intake. Worsening cough. Chronic aspiration. Hypoxemic respiratory failure. COMPARISON: None. TECHNIQUE: A live videoradiographic swallowing function study was performed in  conjunction with the speech therapist.  The video is available in the department  for review by speech pathology and ancillary staff. Fluoroscopic images were not made available in PACS for radiologist review and  this report is strictly a procedural documentation by the physician assistant  with input from speech pathology. It is not considered inclusive or exclusive  of anatomic abnormalities and is not diagnostic beyond the specific  considerations regarding swallowing function as it relates to airway protection  while eating and drinking. Fluoroscopy time: 24 seconds    Fluoroscopic images: 0    Electronic capture cine loops: 7    FINDINGS:    Patient swallowed multiple consistencies of barium mixtures including thin  liquids, nectar, pudding. There was silent tracheal aspiration of all tested consistencies. Swallow delay, premature spillage, and pharyngeal residuals were seen with all  tested consistencies. Impression  Silent tracheal aspiration of all tested consistencies. Please see speech pathologist report for additional details and recommendations. See my orders for details     Total care time exclusive of procedures with complex decision making, coordination of care and counseling patient performed and > 50% time spent in face to face evaluation as mentioned above.     Lindsay Alarcon MD  Critical Care Medicine

## 2021-10-24 NOTE — PROGRESS NOTES
Problem: Patient Education: Go to Patient Education Activity  Goal: Patient/Family Education  Outcome: Progressing Towards Goal     Problem: Falls - Risk of  Goal: *Absence of Falls  Description: Document Zechariah Juan Fall Risk and appropriate interventions in the flowsheet.   Outcome: Progressing Towards Goal  Note: Fall Risk Interventions:  Mobility Interventions: Patient to call before getting OOB, PT Consult for mobility concerns, PT Consult for assist device competence, Assess mobility with egress test, OT consult for ADLs         Medication Interventions: Teach patient to arise slowly, Patient to call before getting OOB, Assess postural VS orthostatic hypotension    Elimination Interventions: Call light in reach, Patient to call for help with toileting needs              Problem: Patient Education: Go to Patient Education Activity  Goal: Patient/Family Education  Outcome: Progressing Towards Goal     Problem: Discharge Planning  Goal: *Discharge to safe environment  Outcome: Progressing Towards Goal  Goal: *Knowledge of medication management  Outcome: Progressing Towards Goal  Goal: *Knowledge of discharge instructions  Outcome: Progressing Towards Goal     Problem: Patient Education: Go to Patient Education Activity  Goal: Patient/Family Education  Outcome: Progressing Towards Goal     Problem: Patient Education: Go to Patient Education Activity  Goal: Patient/Family Education  Outcome: Progressing Towards Goal     Problem: Patient Education: Go to Patient Education Activity  Goal: Patient/Family Education  Outcome: Progressing Towards Goal     Problem: Nutrition Deficit  Goal: *Optimize nutritional status  Outcome: Progressing Towards Goal

## 2021-10-24 NOTE — PROGRESS NOTES
Physician Progress Note      Maricarmen Cote  CSN #:                  [id number]  :                       1945  ADMIT DATE:       10/18/2021 6:35 PM  [address] DATE:  [place] Jernigan  PROVIDER #:        Inocencio Venegas MD          QUERY TEXT:    Dear Dr. Jamin Serrano or Rhonda Jara,    Patient admitted with Pneumonia. Noted documentation of sepsis in ER Clinical Impression with max temp 100.2 oral and WBC 9.0. In order to support the diagnosis of sepsis, please include additional clinical indicators in your documentation. Or please document if the diagnosis of sepsis has been ruled out after further study. The medical record reflects the following:  Risk Factors: Pneumonia, Tachycardia, Hypoxia    Clinical Indicators:  Highest Temp: 100.2 Oral  WBCs: 9.0  >  7.3 >  6.9    Treatment: Admission for Pneumonia, IV Abx, O2, Pulm consult, Blood cultures ordered, ID recommends DC Vanc    Thank you,  Isaiah Ruiz BSN, RN, CRCR  Please contact me for any questions or concerns regarding this query at [email or web address]  Options provided:  -- Sepsis was ruled out after study  -- Sepsis present as evidenced by, Please document evidence. -- Other - I will add my own diagnosis  -- Disagree - Not applicable / Not valid  -- Disagree - Clinically unable to determine / Unknown  -- Refer to Clinical Documentation Reviewer    PROVIDER RESPONSE TEXT:    Sepsis is present as evidenced by fever, sob, and RUL increasing opacity.     Query created by: Andry Boone on 10/22/2021 8:34 AM      Electronically signed by:  Inocencio Venegas MD 10/24/2021 6:13 AM

## 2021-10-25 NOTE — PROGRESS NOTES
[email or web address]  [phone]    Gastroenterology follow up-Progress note    Impression:  1. S/p PEG placement 10/21/2021 - now with abdominal pain and small amount of blood at PEG site, now also tachycardic, ABD XR and CT ordered  2. Acute on chronic hypoxic respiratory failure  - ? Due to aspiration PNA  3. Cavitary lung lesion  4. Interstitial lung disease  5. Chronic CHF - compensated  6. Gram pos cocci bacteremia    Plan:  1. Await ABD XR and CT, attending notified - Dr. Brand Fitting on call this week. 2. Will check STAT CBC to r/o infectious process  3. Hold anticoagulation if significant bleeding  4. Medical management per primary team    Chief Complaint: s/p PEG placement, abdominal pain at tube site      Subjective:  Pt very [place], points to PEG tube as source of pain per nursing, in moderate distress, high respiration rate.       Eyes: conjunctiva normal, EOM normal   Neck: ROM normal, supple and trachea normal   Cardiovascular: heart normal, intact distal pulses, tachycardia   Pulmonary/Chest Wall: breath sounds normal and effort normal, elevated respiration rate   Abdominal: appearance normal, small amount of blood size of bumper at PEG site, moderate ttp around PEG     Patient Active Problem List   Diagnosis Code    Coronary artery disease involving native coronary artery of native heart without angina pectoris,s/p stent 2003 I25.10    Scarlet fever A38.9    Myocardial infarction (HCC) I21.9    Urinary frequency R35.0    Pneumonia J18.9    Goals of care, counseling/discussion Z71.89    Acute respiratory failure with hypoxia (HCC) J96.01    Debility R53.81    Severe protein-calorie malnutrition (HCC) E43         Visit Vitals  BP (!) 141/73   Pulse (!) 137   Temp 98.6 °F (37 °C)   Resp (!) 56   Ht 5' 7\" (1.702 m)   Wt 54.4 kg (120 lb)   SpO2 95%   BMI 18.79 kg/m²           Intake/Output Summary (Last 24 hours) at 10/25/2021 1606  Last data filed at 10/25/2021 1539  Gross per 24 hour   Intake 540 ml Output 1403 ml   Net -863 ml       CBC w/Diff    Lab Results   Component Value Date/Time    WBC 5.9 10/24/2021 04:12 AM    RBC 3.91 (L) 10/24/2021 04:12 AM    HGB 10.4 (L) 10/24/2021 04:12 AM    HCT 36.0 10/24/2021 04:12 AM    MCV 92.1 10/24/2021 04:12 AM    MCH 26.6 10/24/2021 04:12 AM    MCHC 28.9 (L) 10/24/2021 04:12 AM    RDW 13.4 10/24/2021 04:12 AM     10/24/2021 04:12 AM    Lab Results   Component Value Date/Time    GRANS 83 (H) 10/24/2021 04:12 AM    LYMPH 4 (L) 10/24/2021 04:12 AM    EOS 3 10/24/2021 04:12 AM    BASOS 0 10/24/2021 04:12 AM      Basic Metabolic Profile   Recent Labs     10/25/21  0450      K 4.1   CL 99*   CO2 40*   BUN 10   CA 8.5   MG 2.1   PHOS 2.1*        Hepatic Function    Lab Results   Component Value Date/Time    ALB 2.4 (L) 10/18/2021 07:28 PM    TP 7.2 10/18/2021 07:28 PM    AP 32 (L) 10/18/2021 07:28 PM    No results found for: TBIL       Coags   No results for input(s): PTP, INR, APTT, INREXT in the last 72 hours. LEXUS Ayala    [facility]. [email or web address]  Phone: [phone]  Pager: [phone]  ;maria elena

## 2021-10-25 NOTE — PROGRESS NOTES
SLP Note:    Pt with nutrition source established as he is now s/p PEG placement. Recommend follow up with SLP upon discharge as appropriate. Will sign off.       Akira Roberts M.S., [address] [facility]  Speech-Language Pathologist

## 2021-10-25 NOTE — PROGRESS NOTES
Infectious Disease progress Note        Reason: Right upper lobe cavitary pneumonia    Current abx Prior abx   Piperacillin/tazobactam, levofloxacin since 10/18/2021 vancomycin 10/18-10/21     Lines:       Assessment :    67 y.o. 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 intake, and worsening cough . Now with gram positive bacteremia, chronic cavitary lesion RUL with RUL/RLL infiltrates    Clinical presentation c/w acute on chronic hypoxic respiratory failure likely secondary to recurrent aspiration pneumonia, chronic cavitary lesion right upper lobe  Rule out superimposed infection/colonization with atypical mycobacteria    Sputum cx- mixed respiratory cristine, MSSA yeast  Yeast likely colonizer. Sputum AFB 10/19 negative    Pulmonary follow-up appreciated    Single positive blood culture for coagulase negative staph. On  10/18 is likely contamination    S/p peg tube placement 10/21/21    Was sitting at the edge of bed trying to urinate when I evaluated him. Plans to discharge patient noted    Recommendations:    1. D/c Zosyn, vancomycin. Recommend levofloxacin, Augmentin till 10/28/2021  2. Follow putum for AFB  3. Follow-up pulmonary recommendations       Above plan was discussed in details with  family at bedside, dr. Barron Carter.. Please call me if any further questions or concerns. Will continue to participate in the care of this patient. HPI:    Did not answer questions asked. Unable to obtain detailed review of system      Current Discharge Medication List      CONTINUE these medications which have NOT CHANGED    Details   acetylcysteine (MUCOMYST) 100 mg/mL (10 %) nebulizer solution Take 4 mL by inhalation every four (4) hours for 120 days.   Qty: 720 mL, Refills: 3      fluorouraciL (EFUDEX) 5 % chemo cream APPLY CREAM TO FOREHEAD 2 TIMES DAILY FOR 2 WEEKS ONCE HEALED USE 2 TIMES DAILY TO THE EARS FOR 2 WEEKS ONCE HEALED USE 2 TIMES DAILY TO THE CHEEKS AND TEMPLES FOR 2 WEEKS      sodium chloride 3 % nebulizer solution 4 mL by Nebulization route four (4) times daily. Mix with albuterol. File under Medicare Part B. Dx: R06.02; J47.9; J84.9; R53.81  Qty: 480 mL, Refills: 5    Associated Diagnoses: Dyspnea on exertion; Bronchiectasis without complication ([address] ILD (interstitial lung disease) ([facility] [address] Chronic pulmonary aspiration, sequela; Physical deconditioning      albuterol (PROVENTIL VENTOLIN) 2.5 mg /3 mL (0.083 %) nebu Mix with hypertonic saline nebulizer -- use 3- times per day. File under Medicare Part B. Dx: R06.02; J47.9; J84.9; R53.81  Qty: 360 Nebule, Refills: 3    Associated Diagnoses: Dyspnea on exertion; Bronchiectasis without complication ([facility] [address] ILD (interstitial lung disease) ([facility] [address] Chronic pulmonary aspiration, sequela; Physical deconditioning      guaiFENesin ER (MUCINEX) 600 mg ER tablet Take 1 Tablet by mouth two (2) times a day for 90 days. Qty: 180 Tablet, Refills: 3    Associated Diagnoses: Bronchiectasis without complication (HCC)      cholecalciferol (VITAMIN D3) (1000 Units /25 mcg) tablet Take 1,000 Units by mouth daily. FISH OIL-DHA-EPA PO Take 1 Tab by mouth daily. amino acids powd Take 1 Dose by mouth daily. OTHER Take 1 Cap by mouth daily. tumeric       fluticasone propionate (FLONASE NA) 1 Spray by Both Nostrils route daily. ascorbic acid, vitamin C, (VITAMIN C) 500 mg tablet Take 500 mg by mouth daily. multivitamin (ONE A DAY) tablet Take 1 Tab by mouth daily. aspirin delayed-release 81 mg tablet Take 81 mg by mouth daily.     Associated Diagnoses: Coronary artery disease involving native coronary artery of native heart without angina pectoris             Current Facility-Administered Medications   Medication Dose Route Frequency    Lactobacillus Acidoph & [facility]) tablet 2 Tablet  2 Tablet Per G Tube BID    guaiFENesin (ROBITUSSIN) 100 mg/5 mL oral liquid 400 mg  400 mg Per G Tube Q4H PRN    albuterol-ipratropium (DUO-NEB) 2.5 MG-0.5 MG/3 ML  3 mL Nebulization BID    albuterol-ipratropium (DUO-NEB) 2.5 MG-0.5 MG/3 ML  3 mL Nebulization Q4H PRN    sodium chloride 3% hypertonic nebulizer soln  4 mL Nebulization BID RT    vancomycin (VANCOCIN) 1,000 mg in 0.9% sodium chloride 250 mL (VIAL-MATE)  1,000 mg IntraVENous Q8H    ascorbic acid (vitamin C) (VITAMIN C) tablet 500 mg  500 mg Per G Tube DAILY    aspirin chewable tablet 81 mg  81 mg Per G Tube DAILY    cholecalciferol (VITAMIN D3) (1000 Units /25 mcg) tablet 1,000 Units  1,000 Units PEG Tube DAILY    polyethylene glycol (MIRALAX) packet 17 g  17 g Per G Tube DAILY PRN    acetaminophen (TYLENOL) tablet 650 mg  650 mg Per G Tube Q6H PRN    Or    acetaminophen (TYLENOL) suppository 650 mg  650 mg Rectal Q6H PRN    thiamine (B-1) 200 mg in 0.9% sodium chloride 50 mL IVPB  200 mg IntraVENous DAILY    enoxaparin (LOVENOX) injection 40 mg  40 mg SubCUTAneous QHS    sodium chloride (NS) flush 5-10 mL  5-10 mL IntraVENous PRN    piperacillin-tazobactam (ZOSYN) 3.375 g in 0.9% sodium chloride (MBP/ADV) 100 mL MBP  3.375 g IntraVENous Q6H    sodium chloride (NS) flush 5-40 mL  5-40 mL IntraVENous Q8H    sodium chloride (NS) flush 5-40 mL  5-40 mL IntraVENous PRN    omega-3 acid ethyl esters (LOVAZA) capsule 1,000 mg  1 g Oral DAILY WITH BREAKFAST    therapeutic multivitamin (THERAGRAN) tablet 1 Tablet  1 Tablet Oral DAILY       Allergies: Pollens extract    Family History   Problem Relation Age of Onset    No Known Problems Mother     Heart Disease Father     Heart Attack Father         1989    Coronary Artery Disease Father     Hypertension Father     High Cholesterol Father      Social History     Socioeconomic History    Marital status: SINGLE     Spouse name: Not on file    Number of children: Not on file    Years of education: Not on file    Highest education level: Not on file   Occupational History    Not on file   Tobacco Use    Smoking status: Never Smoker    Smokeless tobacco: Never Used   Vaping Use    Vaping Use: Never used   Substance and Sexual Activity    Alcohol use: No    Drug use: No    Sexual activity: Not on file   Other Topics Concern    Not on file   Social History Narrative    Not on file     Social Determinants of Health     Financial Resource Strain:     Difficulty of Paying Living Expenses:    Food Insecurity:     Worried About Running Out of Food in the Last Year:     [address] in the Last Year:    Transportation Needs:     Lack of Transportation (Medical):  Lack of Transportation (Non-Medical):    Physical Activity:     Days of Exercise per Week:     Minutes of Exercise per Session:    Stress:     Feeling of Stress :    Social Connections:     Frequency of Communication with Friends and Family:     Frequency of Social Gatherings with Friends and Family:     Attends Muslim Services:     Active Member of Clubs or Organizations:     Attends Club or Organization Meetings:     Marital Status:    Intimate Partner Violence:     Fear of Current or Ex-Partner:     Emotionally Abused:     Physically Abused:     Sexually Abused:      Social History     Tobacco Use   Smoking Status Never Smoker   Smokeless Tobacco Never Used        Temp (24hrs), Av.6 °F (37 °C), Min:98.3 °F (36.8 °C), Max:98.9 °F (37.2 °C)    Visit Vitals  /65   Pulse 100   Temp 98.6 °F (37 °C)   Resp 19   Ht 5' 7\" (1.702 m)   Wt 54.4 kg (120 lb)   SpO2 98%   BMI 18.79 kg/m²       ROS: 12 point ROS obtained in details. Pertinent positives as mentioned in HPI,   otherwise negative    Physical Exam:    General:Thin  male sittingon the bed AAOx3 in no acute distress.     HEENT:  Normocephalic, atraumatic, EOMI, no scleral icterus or pallor; no conjunctival hemmohage; nasal and oral mucous are moist and without evidence of lesions. Neck supple, no bruits. Lymph Nodes:   not examined   Lungs:   shallow breathing, mildly tachypneic, rhonchi right upper lung   Heart:  RRR, s1 and s2; no  rubs or gallops, no edema, + pedal pulses   Abdomen:  soft, non-distended, PEG tube in place, no hepatomegaly, no splenomegaly. Non-tender   Genitourinary:  deferred   Extremities:   no clubbing, cyanosis; no joint effusions or swelling;  muscle mass appropriate for age   Neurologic:  No gross focal motor or sensory abnormalities                        Skin:  No rash or ulcers noted   Back:  no spinal or paraspinal muscle tenderness or rigidity, no CVA tenderness     Psychiatric:  No suicidal or homicidal ideations, appropriate mood and affect         Labs: Results:   Chemistry Recent Labs     10/25/21  0450 10/24/21  0412   * 153*    141   K 4.1 3.8   CL 99* 102   CO2 40* 41*   BUN 10 11   CREA 0.32* 0.29*   CA 8.5 8.0*   AGAP 1* NEG 2   BUCR 31* 38*      CBC w/Diff Recent Labs     10/24/21  0412   WBC 5.9   RBC 3.91*   HGB 10.4*   HCT 36.0      GRANS 83*   LYMPH 4*   EOS 3      Microbiology No results for input(s): CULT in the last 72 hours. RADIOLOGY:    All available imaging studies/reports in [facility] for this admission were reviewed          Disclaimer: Sections of this note are dictated utilizing voice recognition software, which may have resulted in some phonetic based errors in grammar and contents. Even though attempts were made to correct all the mistakes, some may have been missed, and remained in the body of the document. If questions arise, please contact our department.     Dr. Maxine Clark, Infectious Disease Specialist  [phone]  October 25, 2021  3:25 PM

## 2021-10-25 NOTE — ROUTINE PROCESS
Transfer change report given to Matt Hale RN by Ashley Gagnon RN.  Report included the following information:  -procedure summary  -MAR  -Recent Results  -Med Rec Status  -SBAR  -Patient condition  -Ordered tests

## 2021-10-25 NOTE — PROGRESS NOTES
Nutrition Assessment     Type and Reason for Visit: Reassess, Consult    Nutrition Recommendations/Plan:   - Continue tube feeding of Jevity 1.5 at goal rate of 50 mL/hr with 150 mL water flushes q 4 hours. (goal regimen to provide: 1800 kcal, 77 gm protein, 912 mL free water, 100% RDIs). - Continue probiotic and monitor for improvement in stool consistency. Add Banatrol BID. Nutrition Assessment:  NPO per SLP- TF infusing at goal rate of 50 mL/hr via PEG. IVF discontinued 10/23. Watery stools likely related to antibiotic therapy & started on probiotic today. Pts family at bedside deny pt c/o abdominal pain, nausea or vomiting. SNF at discharge. Malnutrition Assessment:  Malnutrition Status: Severe malnutrition     Estimated Daily Nutrient Needs:  Energy (kcal):  9097-6738  Protein (g):  64-80       Fluid (ml/day):  3505-3824    Nutrition Related Findings:  Watery stools. PEG placed 10/21. Meds: vitamin C, D3, floranex, MVI & thiamine. Neutra phos (10/22-10/24). Current Nutrition Therapies:  ADULT TUBE FEEDING PEG; Standard with Fiber; Delivery Method: Continuous; Continuous Initial Rate (mL/hr): 20; Continuous Advance Tube Feeding: Yes; Advancement Volume (mL/hr): 10; Advancement Frequency: Q 6 hours; Continuous Goal Rate (mL/hr): 50. ..   DIET NPO    Anthropometric Measures:  · Height:  5' 7\" (170.2 cm)  · Current Body Wt:  54.4 kg (120 lb)  · BMI: 18.8    Nutrition Diagnosis:   · Inadequate oral intake related to swallowing difficulty as evidenced by NPO or clear liquid status due to medical condition, swallowing study results    · Severe malnutrition, In context of chronic illness related to inadequate protein-energy intake, swallowing difficulty as evidenced by poor intake prior to admission, weight loss, severe muscle loss, severe loss of subcutaneous fat    Nutrition Intervention:  Food and/or Nutrient Delivery: Continue NPO, Continue tube feeding, Vitamin supplement, Mineral supplement  Nutrition Education and Counseling: Education completed (basics of g-tube and EN support)  Coordination of Nutrition Care: Continue to monitor while inpatient, Speech therapy (pt discussed with nursing)    Goals:  Nutritional needs will be met through adequate oral intake or nutrition support within the next 7 days.        Nutrition Monitoring and Evaluation:   Behavioral-Environmental Outcomes: None identified  Food/Nutrient Intake Outcomes: Enteral nutrition intake/tolerance, Vitamin/mineral intake  Physical Signs/Symptoms Outcomes: Biochemical data, Chewing or swallowing, Diarrhea, Nutrition focused physical findings    Discharge Planning:    Enteral nutrition     Electronically signed by Carter Calvin RD on 10/25/2021 at 1:46 PM    Contact Number: [phone]

## 2021-10-25 NOTE — PROGRESS NOTES
[facility]  Progress Note    Patient: Milo Alonso Age: 67 y.o. : 1945 MR#: [id number] SSN: [id number]  Date/Time: 10/25/2021    Subjective:     I saw patient twice today. Earlier today when I saw the patient he was sitting in a chair and appeared comfortable. RN called me back stating that patient has been complaining of some abdominal pain and is tachypneic and tachycardic. I came and saw patient again. Patient is sitting in a chair and complains of some abdominal pain. Patient does not give more information. Sister is at bedside. I have asked RN to hold the tube feeding. Assessment/Plan:     Possible aspiration pneumonia, suspected chronic aspiration  Cavitary lung lesion  Interstitial lung disease  Hypoxia  Chronic systolic congestive heart failurecompensated  Gram-positive cocci bacteremialikely contaminant  Dysphagia  Abdominal pain, tachypnea, tachycardia, SIRS  Sinus tachycardiaEKG reviewed    PLAN  Upgrade to stepdown. On Zosyn. CT abdomen once more stable. Check KUB. Check labs and lactic acid level. Will cautiously give a small dose of morphine given the patient's uncontrolled pain. I have also called and discussed with GI APC to assess the PEG tube site. Hold tube feeding. IV fluids. Continue bronchial hygiene, pulmonary input noted  Encouraged incentive spirometry  Continue antibiotics   Wean oxygen  PEG tube feeding. Nutrition services are following  PT OT  Dispositionawait skilled nursing facility  Palliative care input noted. Patient is full code  Discussed with patient and sister at bedside. Discussed with RN. Discussed with ID. Discussed with pulmonologist Dr. Isi Alexander.   Prognosis is guarded    Case discussed with:  [x]Patient  []Family  []Nursing  []Case Management  DVT Prophylaxis:  [x]Lovenox  []Hep SQ  []SCDs  []Coumadin   []On Heparin gtt    Objective:   VS:   Visit Vitals  BP (!) 141/73   Pulse (!) 143   Temp 98.6 °F (37 °C)   Resp (!) 56   Ht 5' 7\" (1.702 m)   Wt 54.4 kg (120 lb)   SpO2 95%   BMI 18.79 kg/m²      Tmax/24hrs: Temp (24hrs), Av.7 °F (37.1 °C), Min:98.6 °F (37 °C), Max:98.9 °F (37.2 °C)        Input/Output:     Intake/Output Summary (Last 24 hours) at 10/25/2021 1538  Last data filed at 10/25/2021 0900  Gross per 24 hour   Intake 540 ml   Output 1203 ml   Net -663 ml       General:  Awake, sitting in a chair, appears uncomfortable  Cardiovascular:  S1S2+, tachycardic  Pulmonary: Coarse breath sounds bilaterally, tachypnea  GI:  Soft, BS+, NT, ND, PEG tube in place  Extremities:  No edema  Frail, bitemporal wasting    Labs:    Recent Results (from the past 24 hour(s))   GLUCOSE, POC    Collection Time: 10/24/21  7:22 PM   Result Value Ref Range    Glucose (POC) 157 (H) 70 - 110 mg/dL   MAGNESIUM    Collection Time: 10/25/21  4:50 AM   Result Value Ref Range    Magnesium 2.1 1.6 - 2.6 mg/dL   PHOSPHORUS    Collection Time: 10/25/21  4:50 AM   Result Value Ref Range    Phosphorus 2.1 (L) 2.5 - 4.9 MG/DL   VANCOMYCIN, RANDOM    Collection Time: 10/25/21  4:50 AM   Result Value Ref Range    Vancomycin, random 19.3 5.0 - 53.0 UG/ML   METABOLIC PANEL, BASIC    Collection Time: 10/25/21  4:50 AM   Result Value Ref Range    Sodium 140 136 - 145 mmol/L    Potassium 4.1 3.5 - 5.5 mmol/L    Chloride 99 (L) 100 - 111 mmol/L    CO2 40 (H) 21 - 32 mmol/L    Anion gap 1 (L) 3.0 - 18 mmol/L    Glucose 107 (H) 74 - 99 mg/dL    BUN 10 7.0 - 18 MG/DL    Creatinine 0.32 (L) 0.6 - 1.3 MG/DL    BUN/Creatinine ratio 31 (H) 12 - 20      GFR est AA >60 >60 ml/min/1.73m2    GFR est non-AA >60 >60 ml/min/1.73m2    Calcium 8.5 8.5 - 10.1 MG/DL     Additional Data Reviewed:      Signed By: Luzmaria Benson MD     2021

## 2021-10-25 NOTE — PROGRESS NOTES
Attempted pt x 2 for OT tx. Pt unable to be seen 2/2 pt and family politely refusing due to fatigue from mult loose BMs. Will continue to f/u as schedule allows.  Thank you  ANDREW Forte/HEIDE

## 2021-10-25 NOTE — PROGRESS NOTES
Spoke with sister, Eliervaishali Davenportlester. First choice in SNF is [facility], 2nd, is [facility] and third is [address].   Beatriz Ochoa RN - Outcomes Manager  [phone]

## 2021-10-25 NOTE — PROGRESS NOTES
Problem: Patient Education: Go to Patient Education Activity  Goal: Patient/Family Education  Outcome: Progressing Towards Goal     Problem: Falls - Risk of  Goal: *Absence of Falls  Description: Document Clydene Bone Fall Risk and appropriate interventions in the flowsheet. Outcome: Progressing Towards Goal  Note: Fall Risk Interventions:  Mobility Interventions: OT consult for ADLs, Patient to call before getting OOB         Medication Interventions: Patient to call before getting OOB, Teach patient to arise slowly, Assess postural VS orthostatic hypotension    Elimination Interventions: Call light in reach, Patient to call for help with toileting needs, Toilet paper/wipes in reach              Problem: Patient Education: Go to Patient Education Activity  Goal: Patient/Family Education  Outcome: Progressing Towards Goal     Problem: Discharge Planning  Goal: *Discharge to safe environment  Outcome: Progressing Towards Goal  Goal: *Knowledge of medication management  Outcome: Progressing Towards Goal  Goal: *Knowledge of discharge instructions  Outcome: Progressing Towards Goal     Problem: Patient Education: Go to Patient Education Activity  Goal: Patient/Family Education  Outcome: Progressing Towards Goal     Problem: Patient Education: Go to Patient Education Activity  Goal: Patient/Family Education  Outcome: Progressing Towards Goal     Problem: Patient Education: Go to Patient Education Activity  Goal: Patient/Family Education  Outcome: Progressing Towards Goal     Problem: Nutrition Deficit  Goal: *Optimize nutritional status  Outcome: Progressing Towards Goal     Problem: Pressure Injury - Risk of  Goal: *Prevention of pressure injury  Description: Document Kali Scale and appropriate interventions in the flowsheet.   Outcome: Progressing Towards Goal  Note: Pressure Injury Interventions:  Sensory Interventions: Assess changes in LOC    Moisture Interventions: Absorbent underpads, Minimize layers    Activity Interventions: PT/OT evaluation, Pressure redistribution bed/mattress(bed type)    Mobility Interventions: PT/OT evaluation, Pressure redistribution bed/mattress (bed type)    Nutrition Interventions: Document food/fluid/supplement intake    Friction and Shear Interventions: Minimize layers, Foam dressings/transparent film/skin sealants                Problem: Patient Education: Go to Patient Education Activity  Goal: Patient/Family Education  Outcome: Progressing Towards Goal

## 2021-10-25 NOTE — PROGRESS NOTES
Received call from radiologist that the PEG tube was displaced and was not in the stomach and was in the subcutaneous tissue. I called and discussed with the gastroenterologist.  He will plan to pull the tube out tomorrow. I called and discussed with the nurse taking care of the patient and advised her to not use the PEG tube for anything ( not even for a flush ). Also called patient's sister at phone #[phone] and updated her.

## 2021-10-26 NOTE — PROGRESS NOTES
0700-received bedside report from off going nurse Greg Nicole RN.    1440-Dr. Prabhjot Miles removed PEG Tube at pt bedside.

## 2021-10-26 NOTE — PROGRESS NOTES
[facility] Hospitalist Group  Progress Note    Patient: Zander Ramirez Age: 67 y.o. : 1945 MR#: [id number] SSN: [id number]  Date/Time: 10/26/2021    Subjective:     Patient is sitting in bed in no apparent distress, opens eyes, confused, appears a little tachypneic. Sister at bedside  Assessment/Plan:     Possible aspiration pneumonia, suspected chronic aspiration  Cavitary lung lesion  Interstitial lung disease  Hypoxia  Chronic systolic congestive heart failurecompensated  Gram-positive cocci bacteremialikely contaminant  Dysphagia  Abdominal pain, tachypnea, tachycardia, SIRS  Sinus tachycardiaEKG reviewed  Displaced PEG tube  Chest x-ray suspicious for a little volume overload    PLAN  GI is planning to remove PEG tube today. Await input regarding replacing PEG tube  Hold off IV fluids given concern for volume overload  Discussed with pulmonary and will gently diurese  Continue bronchial hygiene, pulmonary input noted  Continue antibiotics   Wean oxygen  PT OT as tolerated  Dispositionwill need skilled nursing facility at discharge  Palliative care input noted. Patient is full code  Discussed with sister at bedside. Specifically discussed patient poor prognosis. Sister states that she has medical power of . Discussed with sister regarding the risks versus benefits and alternatives of NG tube placement. Sister verbalized understanding and agrees for NG tube placement.   Discussed with pulmonologist    Case discussed with:  [x]Patient  []Family  []Nursing  []Case Management  DVT Prophylaxis:  [x]Lovenox  []Hep SQ  []SCDs  []Coumadin   []On Heparin gtt    Objective:   VS:   Visit Vitals  /62 (BP Patient Position: At rest;Semi fowlers)   Pulse (!) 115   Temp 98 °F (36.7 °C)   Resp 26   Ht 5' 7\" (1.702 m)   Wt 54.4 kg (120 lb)   SpO2 95%   BMI 18.79 kg/m²      Tmax/24hrs: Temp (24hrs), Av.9 °F (36.6 °C), Min:97.5 °F (36.4 °C), Max:98.3 °F (36.8 °C)        Input/Output:     Intake/Output Summary (Last 24 hours) at 10/26/2021 1252  Last data filed at 10/26/2021 0504  Gross per 24 hour   Intake 0 ml   Output 850 ml   Net -850 ml       General: Opens eyes, hard of hearing, confused  Cardiovascular:  S1S2+, tachycardic  Pulmonary: Coarse breath sounds bilaterally  GI:  Soft, BS+, NT, ND, PEG tube in place  Extremities:  No edema  Frail, bitemporal wasting    Labs:    Recent Results (from the past 24 hour(s))   EKG, 12 LEAD, INITIAL    Collection Time: 10/25/21  3:28 PM   Result Value Ref Range    Ventricular Rate 143 BPM    Atrial Rate 143 BPM    P-R Interval 124 ms    QRS Duration 68 ms    Q-T Interval 248 ms    QTC Calculation (Bezet) 382 ms    Calculated P Axis 53 degrees    Calculated R Axis -42 degrees    Calculated T Axis 87 degrees    Diagnosis       Sinus tachycardia with occasional premature ventricular complexes  Possible Left atrial enlargement  Left axis deviation  Septal infarct (cited on or before 08-OCT-2020)  Abnormal ECG  When compared with ECG of 18-OCT-2021 18:38,  premature ventricular complexes are now present  Questionable change in initial forces of Septal leads  Confirmed by Jojo Reinoso MD, ----- ([id number]) on 10/25/2021 4:21:56 PM     LACTIC ACID    Collection Time: 10/25/21  3:52 PM   Result Value Ref Range    Lactic acid 1.1 0.4 - 2.0 MMOL/L   CBC WITH AUTOMATED DIFF    Collection Time: 10/25/21  3:52 PM   Result Value Ref Range    WBC 11.9 4.6 - 13.2 K/uL    RBC 4.87 4.35 - 5.65 M/uL    HGB 12.8 (L) 13.0 - 16.0 g/dL    HCT 44.5 36.0 - 48.0 %    MCV 91.4 78.0 - 100.0 FL    MCH 26.3 24.0 - 34.0 PG    MCHC 28.8 (L) 31.0 - 37.0 g/dL    RDW 13.3 11.6 - 14.5 %    PLATELET 491 319 - 984 K/uL    MPV 9.2 9.2 - 11.8 FL    NEUTROPHILS 94 (H) 40 - 73 %    LYMPHOCYTES 0 (L) 21 - 52 %    MONOCYTES 6 3 - 10 %    EOSINOPHILS 0 0 - 5 %    BASOPHILS 0 0 - 2 %    ABS. NEUTROPHILS 11.1 (H) 1.8 - 8.0 K/UL    ABS.  LYMPHOCYTES 0.0 (L) 0.9 - 3.6 K/UL ABS. MONOCYTES 0.7 0.05 - 1.2 K/UL    ABS. EOSINOPHILS 0.0 0.0 - 0.4 K/UL    ABS.  BASOPHILS 0.0 0.0 - 0.1 K/UL    DF AUTOMATED     METABOLIC PANEL, BASIC    Collection Time: 10/25/21  3:52 PM   Result Value Ref Range    Sodium 139 136 - 145 mmol/L    Potassium 4.1 3.5 - 5.5 mmol/L    Chloride 96 (L) 100 - 111 mmol/L    CO2 44 (HH) 21 - 32 mmol/L    Anion gap NEG 1 3.0 - 18 mmol/L    Glucose 138 (H) 74 - 99 mg/dL    BUN 10 7.0 - 18 MG/DL    Creatinine 0.37 (L) 0.6 - 1.3 MG/DL    BUN/Creatinine ratio 27 (H) 12 - 20      GFR est AA >60 >60 ml/min/1.73m2    GFR est non-AA >60 >60 ml/min/1.73m2    Calcium 9.1 8.5 - 10.1 MG/DL   CARDIAC PANEL,(CK, CKMB & TROPONIN)    Collection Time: 10/25/21  3:52 PM   Result Value Ref Range    CK - MB 2.1 <3.6 ng/ml    CK-MB Index 4.0 0.0 - 4.0 %    CK 53 39 - 308 U/L    Troponin-I, QT 0.04 0.0 - 0.045 NG/ML   NT-PRO BNP    Collection Time: 10/25/21  3:52 PM   Result Value Ref Range    NT pro-BNP 1,775 0 - 1,800 PG/ML   BLOOD GAS, ARTERIAL POC    Collection Time: 10/25/21  5:28 PM   Result Value Ref Range    Device: NASAL CANNULA      FIO2 (POC) 4 %    pH (POC) 7.37 7.35 - 7.45      pCO2 (POC) 84.5 (H) 35.0 - 45.0 MMHG    pO2 (POC) 69 (L) 80 - 100 MMHG    HCO3 (POC) 49.3 (H) 22 - 26 MMOL/L    sO2 (POC) 91.5 (L) 92 - 97 %    Base excess (POC) 19.1 mmol/L    Allens test (POC) Positive      Site LEFT RADIAL      Specimen type (POC) ARTERIAL      Performed by [facility]    GLUCOSE, POC    Collection Time: 10/25/21  5:53 PM   Result Value Ref Range    Glucose (POC) 129 (H) 70 - 110 mg/dL   GLUCOSE, POC    Collection Time: 10/26/21 12:13 AM   Result Value Ref Range    Glucose (POC) 107 70 - 110 mg/dL   MAGNESIUM    Collection Time: 10/26/21  6:24 AM   Result Value Ref Range    Magnesium 2.2 1.6 - 2.6 mg/dL   PHOSPHORUS    Collection Time: 10/26/21  6:24 AM   Result Value Ref Range    Phosphorus 2.9 2.5 - 4.9 MG/DL   CBC WITH AUTOMATED DIFF    Collection Time: 10/26/21  6:24 AM   Result Value Ref Range    WBC 11.1 4.6 - 13.2 K/uL    RBC 4.27 (L) 4.35 - 5.65 M/uL    HGB 11.5 (L) 13.0 - 16.0 g/dL    HCT 39.4 36.0 - 48.0 %    MCV 92.3 78.0 - 100.0 FL    MCH 26.9 24.0 - 34.0 PG    MCHC 29.2 (L) 31.0 - 37.0 g/dL    RDW 13.3 11.6 - 14.5 %    PLATELET 117 970 - 808 K/uL    MPV 9.5 9.2 - 11.8 FL    NEUTROPHILS 94 (H) 40 - 73 %    LYMPHOCYTES 1 (L) 21 - 52 %    MONOCYTES 4 3 - 10 %    EOSINOPHILS 0 0 - 5 %    BASOPHILS 0 0 - 2 %    ABS. NEUTROPHILS 10.4 (H) 1.8 - 8.0 K/UL    ABS. LYMPHOCYTES 0.2 (L) 0.9 - 3.6 K/UL    ABS. MONOCYTES 0.5 0.05 - 1.2 K/UL    ABS. EOSINOPHILS 0.0 0.0 - 0.4 K/UL    ABS.  BASOPHILS 0.0 0.0 - 0.1 K/UL    DF AUTOMATED     METABOLIC PANEL, BASIC    Collection Time: 10/26/21  6:24 AM   Result Value Ref Range    Sodium 140 136 - 145 mmol/L    Potassium 4.3 3.5 - 5.5 mmol/L    Chloride 96 (L) 100 - 111 mmol/L    CO2 41 (HH) 21 - 32 mmol/L    Anion gap 3 3.0 - 18 mmol/L    Glucose 86 74 - 99 mg/dL    BUN 13 7.0 - 18 MG/DL    Creatinine 0.34 (L) 0.6 - 1.3 MG/DL    BUN/Creatinine ratio 38 (H) 12 - 20      GFR est AA >60 >60 ml/min/1.73m2    GFR est non-AA >60 >60 ml/min/1.73m2    Calcium 8.5 8.5 - 10.1 MG/DL   GLUCOSE, POC    Collection Time: 10/26/21  6:31 AM   Result Value Ref Range    Glucose (POC) 103 70 - 110 mg/dL     Additional Data Reviewed:      Signed By: Christina Solomon MD     October 26, 2021

## 2021-10-26 NOTE — PROGRESS NOTES
conducted a Follow up consultation and Spiritual Assessment for Mariah Scott, who is a 67 y. o.,male. The  provided the following Interventions:  Continued the relationship of care and support. Listened empathically. Patient expressed the desire to receive holy communion but declined offer for a visit from a [address]  Theodore Stokess prayer and assurance of continued prayer on patient's behalf. Patient received holy communion. Chart reviewed. The following outcomes were achieved:  Patient and patient's brother-in-law expressed gratitude for 's visit. Assessment:  There are no further spiritual or Muslim issues which require Spiritual Care Services interventions at this time. Plan:  Chaplains will continue to follow and will provide pastoral care on an as needed/requested basis.  recommends bedside caregivers page  on duty if patient shows signs of acute spiritual or emotional distress.      Tesfaye Fields 5 [phone]

## 2021-10-26 NOTE — PROGRESS NOTES
Infectious Disease progress Note        Reason: Right upper lobe cavitary pneumonia    Current abx Prior abx   Piperacillin/tazobactam since 10/18/2021 vancomycin 10/18-10/21  Levofloxacin 10/18-10/25     Lines:       Assessment :    67 y.o. 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 intake, and worsening cough . Now with gram positive bacteremia, chronic cavitary lesion RUL with RUL/RLL infiltrates    Clinical presentation c/w acute on chronic hypoxic respiratory failure likely secondary to recurrent aspiration pneumonia, chronic cavitary lesion right upper lobe  Rule out superimposed infection/colonization with atypical mycobacteria    Sputum cx- mixed respiratory cristine, MSSA yeast  Yeast likely colonizer. Sputum AFB 10/19 negative    Pulmonary follow-up appreciated    Single positive blood culture for coagulase negative staph. On  10/18 is likely contamination    S/p peg tube placement 10/21/21    Now with worsening tachypnea, increased abdominal pain on 10/25/2021 likely secondary to displaced PEG tube. GI follow-up appreciated. Status post removal of PEG tube 10/26/2021. Evidence of sherif-PEG wound infection noted  No clinical or radiological evidence of worsening pneumonia      Recommendations:    1. Continue Zosyn  2. Follow sputum for AFB  3. Follow-up pulmonary recommendations  4. Follow-up GI recommendations regarding replacement of PEG tube. 5.  Monitor PEG site for worsening infection       Above plan was discussed in details with  family at bedside, dr. Jose Enrique Johnson.. Please call me if any further questions or concerns. Will continue to participate in the care of this patient. HPI:    Did not answer questions asked.   Unable to obtain detailed review of system      Current Discharge Medication List CONTINUE these medications which have NOT CHANGED    Details   acetylcysteine (MUCOMYST) 100 mg/mL (10 %) nebulizer solution Take 4 mL by inhalation every four (4) hours for 120 days. Qty: 720 mL, Refills: 3      fluorouraciL (EFUDEX) 5 % chemo cream APPLY CREAM TO FOREHEAD 2 TIMES DAILY FOR 2 WEEKS ONCE HEALED USE 2 TIMES DAILY TO THE EARS FOR 2 WEEKS ONCE HEALED USE 2 TIMES DAILY TO THE CHEEKS AND TEMPLES FOR 2 WEEKS      sodium chloride 3 % nebulizer solution 4 mL by Nebulization route four (4) times daily. Mix with albuterol. File under Medicare Part B. Dx: R06.02; J47.9; J84.9; R53.81  Qty: 480 mL, Refills: 5    Associated Diagnoses: Dyspnea on exertion; Bronchiectasis without complication ([facility] [address] [facility] [facility] Chronic pulmonary aspiration, sequela; Physical deconditioning      albuterol (PROVENTIL VENTOLIN) 2.5 mg /3 mL (0.083 %) nebu Mix with hypertonic saline nebulizer -- use 3- times per day. File under Medicare Part B. Dx: R06.02; J47.9; J84.9; R53.81  Qty: 360 Nebule, Refills: 3    Associated Diagnoses: Dyspnea on exertion; Bronchiectasis without complication ([facility] [address] ILD (interstitial lung disease) [facility] Chronic pulmonary aspiration, sequela; Physical deconditioning      guaiFENesin ER (MUCINEX) 600 mg ER tablet Take 1 Tablet by mouth two (2) times a day for 90 days. Qty: 180 Tablet, Refills: 3    Associated Diagnoses: Bronchiectasis without complication (HCC)      cholecalciferol (VITAMIN D3) (1000 Units /25 mcg) tablet Take 1,000 Units by mouth daily. FISH OIL-DHA-EPA PO Take 1 Tab by mouth daily. amino acids powd Take 1 Dose by mouth daily. OTHER Take 1 Cap by mouth daily. tumeric       fluticasone propionate (FLONASE NA) 1 Spray by Both Nostrils route daily. ascorbic acid, vitamin C, (VITAMIN C) 500 mg tablet Take 500 mg by mouth daily. multivitamin (ONE A DAY) tablet Take 1 Tab by mouth daily.       aspirin delayed-release 81 mg tablet Take 81 mg by mouth daily.     Associated Diagnoses: Coronary artery disease involving native coronary artery of native heart without angina pectoris             Current Facility-Administered Medications   Medication Dose Route Frequency    bacitracin zinc-polymyxin b (POLYSPORIN) 500-10,000 unit/gram ointment   Topical BID    morphine injection 1 mg  1 mg IntraVENous Q8H PRN    naloxone (NARCAN) injection 0.4 mg  0.4 mg IntraVENous PRN    acetaZOLAMIDE (DIAMOX) 500 mg in sterile water (preservative free) 5 mL injection  500 mg IntraVENous ONCE    albuterol-ipratropium (DUO-NEB) 2.5 MG-0.5 MG/3 ML  3 mL Nebulization Q6H RT    Lactobacillus Acidoph & Bulgar (FLORANEX) tablet 2 Tablet  2 Tablet Per G Tube BID    banana flakes trans-galactooligosaccharide (BANATROL PLUS) powder 1 Packet  1 Packet Per NG tube BID    [Held by provider] 0.9% sodium chloride infusion  75 mL/hr IntraVENous CONTINUOUS    guaiFENesin (ROBITUSSIN) 100 mg/5 mL oral liquid 400 mg  400 mg Per G Tube Q4H PRN    albuterol-ipratropium (DUO-NEB) 2.5 MG-0.5 MG/3 ML  3 mL Nebulization Q4H PRN    sodium chloride 3% hypertonic nebulizer soln  4 mL Nebulization BID RT    aspirin chewable tablet 81 mg  81 mg Per G Tube DAILY    cholecalciferol (VITAMIN D3) (1000 Units /25 mcg) tablet 1,000 Units  1,000 Units PEG Tube DAILY    polyethylene glycol (MIRALAX) packet 17 g  17 g Per G Tube DAILY PRN    acetaminophen (TYLENOL) tablet 650 mg  650 mg Per G Tube Q6H PRN    Or    acetaminophen (TYLENOL) suppository 650 mg  650 mg Rectal Q6H PRN    thiamine (B-1) 200 mg in 0.9% sodium chloride 50 mL IVPB  200 mg IntraVENous DAILY    enoxaparin (LOVENOX) injection 40 mg  40 mg SubCUTAneous QHS    sodium chloride (NS) flush 5-10 mL  5-10 mL IntraVENous PRN    piperacillin-tazobactam (ZOSYN) 3.375 g in 0.9% sodium chloride (MBP/ADV) 100 mL MBP  3.375 g IntraVENous Q6H    sodium chloride (NS) flush 5-40 mL  5-40 mL IntraVENous Q8H    sodium chloride (NS) flush 5-40 mL  5-40 mL IntraVENous PRN    omega-3 acid ethyl esters (LOVAZA) capsule 1,000 mg  1 g Oral DAILY WITH BREAKFAST    therapeutic multivitamin (THERAGRAN) tablet 1 Tablet  1 Tablet Oral DAILY       Allergies: Pollens extract    Family History   Problem Relation Age of Onset    No Known Problems Mother     Heart Disease Father     Heart Attack Father         1989    Coronary Artery Disease Father     Hypertension Father     High Cholesterol Father      Social History     Socioeconomic History    Marital status: SINGLE     Spouse name: Not on file    Number of children: Not on file    Years of education: Not on file    Highest education level: Not on file   Occupational History    Not on file   Tobacco Use    Smoking status: Never Smoker    Smokeless tobacco: Never Used   Vaping Use    Vaping Use: Never used   Substance and Sexual Activity    Alcohol use: No    Drug use: No    Sexual activity: Not on file   Other Topics Concern    Not on file   Social History Narrative    Not on file     Social Determinants of Health     Financial Resource Strain:     Difficulty of Paying Living Expenses:    Food Insecurity:     Worried About Running Out of Food in the Last Year:     Ran Out of Food in the Last Year:    Transportation Needs:     Lack of Transportation (Medical):      Lack of Transportation (Non-Medical):    Physical Activity:     Days of Exercise per Week:     Minutes of Exercise per Session:    Stress:     Feeling of Stress :    Social Connections:     Frequency of Communication with Friends and Family:     Frequency of Social Gatherings with Friends and Family:     Attends Pentec[employer] Services:     Active Member of Clubs or Organizations:     Attends Club or Organization Meetings:     Marital Status:    Intimate Partner Violence:     Fear of Current or Ex-Partner:     Emotionally Abused:     Physically Abused:     Sexually Abused:      Social History     Tobacco Use Smoking Status Never Smoker   Smokeless Tobacco Never Used        Temp (24hrs), Av.9 °F (36.6 °C), Min:97.5 °F (36.4 °C), Max:98.3 °F (36.8 °C)    Visit Vitals  /62 (BP 1 Location: Left upper arm, BP Patient Position: At rest;Semi fowlers)   Pulse (!) 125   Temp 98.1 °F (36.7 °C)   Resp (!) 36   Ht 5' 7\" (1.702 m)   Wt 54.4 kg (120 lb)   SpO2 95%   BMI 18.79 kg/m²       ROS: 12 point ROS obtained in details. Pertinent positives as mentioned in HPI,   otherwise negative    Physical Exam:    General:Thin  male sittingon the bed AAOx3 in no acute distress. HEENT:  Normocephalic, atraumatic, EOMI, no scleral icterus or pallor; no conjunctival hemmohage;  nasal and oral mucous are moist and without evidence of lesions. Neck supple, no bruits. Lymph Nodes:   not examined   Lungs:   shallow breathing, mildly tachypneic, rhonchi right upper lung   Heart:  RRR, s1 and s2; no  rubs or gallops, no edema, + pedal pulses   Abdomen:  soft, non-distended, PEG tube in place, no hepatomegaly, no splenomegaly. Non-tender   Genitourinary:  deferred   Extremities:   no clubbing, cyanosis; no joint effusions or swelling;  muscle mass appropriate for age   Neurologic:  No gross focal motor or sensory abnormalities                        Skin:  No rash or ulcers noted   Back:  no spinal or paraspinal muscle tenderness or rigidity, no CVA tenderness     Psychiatric:  No suicidal or homicidal ideations, appropriate mood and affect         Labs: Results:   Chemistry Recent Labs     10/26/21  0624 10/25/21  1552 10/25/21  0450   GLU 86 138* 107*    139 140   K 4.3 4.1 4.1   CL 96* 96* 99*   CO2 41* 44* 40*   BUN 13 10 10   CREA 0.34* 0.37* 0.32*   CA 8.5 9.1 8.5   AGAP 3 NEG 1 1*   BUCR 38* 27* 31*      CBC w/Diff Recent Labs     10/26/21  0624 10/25/21  1552 10/24/21  0412   WBC 11.1 11.9 5.9   RBC 4.27* 4.87 3.91*   HGB 11.5* 12.8* 10.4*   HCT 39.4 44.5 36.0    202 172   GRANS 94* 94* 83*   LYMPH 1* 0* 4*   EOS 0 0 3      Microbiology No results for input(s): CULT in the last 72 hours. RADIOLOGY:    All available [facility] studies/[facility] in [facility] for this admission were reviewed      High complexity decision making was performed during the evaluation of this patient at high risk for decompensation with multiple organ involvement         Disclaimer: Sections of this note are dictated utilizing voice recognition software, which may have resulted in some phonetic based errors in grammar and contents. Even though attempts were made to correct all the mistakes, some may have been missed, and remained in the body of the document. If questions arise, please contact our department.     Dr. Dio Huggins, Infectious Disease Specialist  [phone]  October 26, 2021  3:25 PM

## 2021-10-26 NOTE — PROGRESS NOTES
Problem: Mobility Impaired (Adult and Pediatric)  Goal: *Acute Goals and Plan of Care (Insert Text)  Description: Physical Therapy Goals  Initiated 10/20/2021, reevaluated 10/26/2021 and to be accomplished within 7 day(s)  1. Patient will move from supine to sit and sit to supine , scoot up and down, and roll side to side in bed with modified independence. 2.  Patient will transfer from bed to chair and chair to bed with modified independence using the least restrictive device. 3.  Patient will perform sit to stand with modified independence. 4.  Patient will ambulate with modified independence for 100 feet with the least restrictive device. 5.  Patient will ascend/descend 2 stairs with 0 handrail(s) with modified independence. PLOF: Pt reporting he lives alone in 1 story house with 2 [address]SE [address] without handrails. Pt uses RW for mobility, caregiver daily for assist with meal prep and house chores. Outcome: Progressing Towards Goal     PHYSICAL THERAPY RE-EVALUATION    Patient: Sylvester Perea (77 y.o. male)  Date: 10/26/2021  Primary Diagnosis: Pneumonia [J18.9]  Procedure(s) (LRB):  PERCUTANEOUS ENDOSCOPIC GASTROSTOMY TUBE INSERTION (N/A) 5 Days Post-Op   Precautions:   Fall, Aspiration      ASSESSMENT :  Patient due for PT re-evaluation and remains appropriate for PT in order to maximize independence with mobility. Based on the objective data described below, the patient presents with decreased strength, decreased balance, decreased activity tolerance, confusion, and decreased independence with mobility. Supine to sit transfer with mod A. He sat EOB for 6 minutes with good static sitting balance while engaging in exercises and self care He scoots laterally along EOB with CGA. Heart rate increased to 135 during mobility and deferred further tasks. Patient returns to supine and family encouraged to perform exercises with patient in the bed.  Call bell in reach, bed alarm activated, and family member present. Recommend Rehab at this time to restore PLOF. Patient will benefit from skilled intervention to address the above impairments. Patient's rehabilitation potential is considered to be Good  Factors which may influence rehabilitation potential include:   []         None noted  []         Mental ability/status  [x]         Medical condition  [x]         Home/family situation and support systems  [x]         Safety awareness  []         Pain tolerance/management  []         Other:      PLAN :  Recommendations and Planned Interventions:   [x]           Bed Mobility Training             [x]    Neuromuscular Re-Education  [x]           Transfer Training                   []    Orthotic/Prosthetic Training  [x]           Gait Training                          []    Modalities  [x]           Therapeutic Exercises           []    Edema Management/Control  [x]           Therapeutic Activities            [x]    Family Training/Education  [x]           Patient Education  []           Other (comment):    Frequency/Duration: Patient will be followed by physical therapy 1-2 times per day/4-7 days per week to address goals. Discharge Recommendations: Rehab  Further Equipment Recommendations for Discharge: rolling walker     SUBJECTIVE:   Patient states \"  I need my shoes. \"    OBJECTIVE DATA SUMMARY:   Hospital course since last seen and reason for re-evaluation: Patient seems to be benefiting from skilled PT services. Will continue to follow to improve strength, balance, and endurance in order to increase independence with mobility.    Past Medical History:   Diagnosis Date    CAD (coronary artery disease)     MI 1995; stent 2003 (done preop cochlear sgy)    Cancer ([facility] [address]     SQUAMOUS CELL    Coronary artery disease     Deafness     Myocardial infarction [facility]) [id number]    Scarlet fever age 9     Past Surgical History:   Procedure Laterality Date    BRONCH-FIBER/DIAGNOSTIC  6/16/2016         HX COLONOSCOPY  2013    neg HX CORONARY STENT PLACEMENT  2003    [place]    HX HEART CATHETERIZATION  2003    HX HEART CATHETERIZATION  1999    HX HERNIA REPAIR      inguinal ? laterality    HX OTHER SURGICAL      2 cochlear implants (R); 1 left - all failed     Barriers to Learning/Limitations: yes;  confused  Compensate with: Visual Cues, Tactile Cues, and Kinesthetic Cues  Home Situation:   Home Situation  Home Environment: Private residence  # Steps to Enter: 2  Rails to Enter: No  Wheelchair Ramp: No  One/Two Story Residence: One story  Living Alone: Yes  Support Systems: Other Family Member(s)  Patient Expects to be Discharged to[de-identified] Skilled nursing facility  Current DME Used/Available at Home: [place] Behavior:  Neurologic State: Alert  Orientation Level: Oriented X4  Cognition: Follows commands;Poor safety awareness     Psychosocial  Patient Behaviors: Calm  Family  Behaviors: Supportive  Purposeful Interaction: Yes  Pt Identified Daily Priority: Clinical issues (comment)  Caritas Process: Establish trust;Teaching/learning; Attend basic human needs  Caring Interventions: Reassure; Therapeutic modalities  Reassure: Informing;Caring rounds  Therapeutic Modalities: Intentional therapeutic touch     Family  Behaviors: Supportive  Skin Integrity: Tear;Excoriation     Strength:    Strength: Generally decreased, functional              Tone & Sensation:   Tone: Normal              Range Of Motion:  AROM: Generally decreased, functional  PROM: Within functional limits              Functional Mobility:  Bed Mobility:     Supine to Sit: Moderate assistance  Sit to Supine: Moderate assistance  Scooting: Contact guard assistance     Balance:   Sitting: Intact; With support  Sitting - Static: Good (unsupported)  Sitting - Dynamic: Fair (occasional)         Therapeutic Exercises:   10x LAQ     Pain:  Pain level pre-treatment: 0/10   Pain level post-treatment: 0/10   Pain Intervention(s) : Medication (see MAR);  Rest, Ice, Repositioning Response to intervention: Nurse notified, See doc flow    Activity Tolerance:   Fair  Please refer to the flowsheet for vital signs taken during this treatment. After treatment:   []         Patient left in no apparent distress sitting up in chair  [x]         Patient left in no apparent distress in bed  [x]         Call bell left within reach  [x]         Nursing notified  []         Caregiver present  [x]         Bed alarm activated  []         SCDs applied    COMMUNICATION/EDUCATION:   [x]         Role of Physical Therapy in the acute care setting. [x]         Fall prevention education was provided and the patient/caregiver indicated understanding. [x]         Patient/family have participated as able in goal setting and plan of care. []         Patient/family agree to work toward stated goals and plan of care. []         Patient understands intent and goals of therapy, but is neutral about his/her participation. []         Patient is unable to participate in goal setting/plan of care: ongoing with therapy staff.  []         Other:     Thank you for this referral.  Royetta Saint, PT   Time Calculation: 20 mins

## 2021-10-26 NOTE — PROGRESS NOTES
Problem: Falls - Risk of  Goal: *Absence of Falls  Description: Document Zechariah Juan Fall Risk and appropriate interventions in the flowsheet. Outcome: Progressing Towards Goal  Note: Fall Risk Interventions:  Mobility Interventions: Bed/chair exit alarm, Patient to call before getting OOB         Medication Interventions: Bed/chair exit alarm    Elimination Interventions: Bed/chair exit alarm, Call light in reach, Urinal in reach              Problem: Patient Education: Go to Patient Education Activity  Goal: Patient/Family Education  Outcome: Progressing Towards Goal     Problem: Pressure Injury - Risk of  Goal: *Prevention of pressure injury  Description: Document Kali Scale and appropriate interventions in the flowsheet.   Outcome: Progressing Towards Goal  Note: Pressure Injury Interventions:  Sensory Interventions: Assess changes in LOC, Keep linens dry and wrinkle-free, Minimize linen layers    Moisture Interventions: Absorbent underpads, Minimize layers    Activity Interventions: Pressure redistribution bed/mattress(bed type), PT/OT evaluation    Mobility Interventions: Pressure redistribution bed/mattress (bed type), PT/OT evaluation    Nutrition Interventions: Document food/fluid/supplement intake    Friction and Shear Interventions: Minimize layers                Problem: Patient Education: Go to Patient Education Activity  Goal: Patient/Family Education  Outcome: Progressing Towards Goal

## 2021-10-26 NOTE — PROGRESS NOTES
[facility]  Pulmonary, Critical Care, and Sleep Medicine    Name: Nicolle Templeton MRN: [id number]   : 1945 Hospital: [address] [place] [address]    Date: 10/26/2021      F/U pulmonary consult  Requested by:  Dr. Cabral Son  Reason:  dyspnea      IMPRESSION:   · Acute hypoxic respiratory failure: Combination of aspiration pneumonia, bronchiectasis, ILD, hypoventilation, and now superimposed pulmonary edema with pleural effusions. 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 · Dyspnea/shortness of breath: Due to above     Patient Active Problem List   Diagnosis Code    Coronary artery disease involving native coronary artery of native heart without angina pectoris,s/p stent  I25.10    Scarlet fever A38.9    Myocardial infarction ([facility] [address] I21.9    Urinary frequency R35.0    Pneumonia J18.9    Goals of care, counseling/discussion Z71.89    Acute respiratory failure with hypoxia ([address] J96.01    Debility R53.81    Severe protein-calorie malnutrition ([facility] [address] E43      PLAN:   Maintain net negative fluid balance as renal function tolerates. Diuresis per primary service.   Will add dose of Diamox  Advise cardiology consult given worsening ejection fraction  Continue ABx for aspiration pneumonia --will defer to ID  Follow-up on AFB cultures  Hypertonic saline nebs q4hwa + duonebs q4h   Supplemental oxygen to maintain SpO2 >88%. Avoid Bipap due to aspiration  Please assess for home oxygen need prior to discharge  Aggressive pulmonary toileting/bronchial hygiene  Frequent incentive spirometry  Strict aspiration precautions including elevating HOB >30deg  Advise NGT placement followed by PEG per GI when possible since pt can likely never take PO given chronic dysphagia  PT/OT, OOB, ambulate with assistance as tolerated  DVT ppx per primary service  Advise goals of care discussion given poor prognosis. Appreciate palliative care input  Will follow    Poor prognosis. Attempted to discuss goals of care with patient's brother at bedside     Subjective/Interval History:   10/26/21   Patient seen and examined at bedside. Patient more tachypneic. History obtained from patient's brother-in-law and bedside dictation krishna. Patient reports he wants to get out of bed. Brother-in-law reports patient is delirious. Overnight, patient requiring more oxygen, oxygen requirement now up to 6 L by nasal cannula, patient kidney. PEG tube confirmed to not be in place since yesterday, tube feeds held. ROS:Review of systems not obtained due to patient factors. Objective:   Vital Signs:    Visit Vitals  /62 (BP Patient Position: At rest;Semi fowlers)   Pulse (!) 115   Temp 98 °F (36.7 °C)   Resp 26   Ht 5' 7\" (1.702 m)   Wt 54.4 kg (120 lb)   SpO2 95%   BMI 18.79 kg/m²       O2 Device: Nasal cannula   O2 Flow Rate (L/min): 3 l/min   Temp (24hrs), Av.9 °F (36.6 °C), Min:97.5 °F (36.4 °C), Max:98.3 °F (36.8 °C)       Intake/Output:   Last shift:      No intake/output data recorded.   Last 3 shifts: 10/24 1901 - 10/26 0700  In: 540   Out: 1853 [Urine:1850]    Intake/Output Summary (Last 24 hours) at 10/26/2021 1354  Last data filed at 10/26/2021 1336  Gross per 24 hour   Intake 0 ml   Output 850 ml   Net -850 ml        Physical Exam:    General: alert and cachectic, tachypneic, frail   HEENT: Normal, NCAT, PERRLA, no ocular drainage, nasal bridge midline, salter cannula in place, no nasal drainage, poor dentition, no oral lesions   Neck: Neck supple, trachea midline, no adenopathy   Chest: Frail with normal rise and fall   Lungs: Poor air entry bilaterally, coarse rhonchi throughout all lung fields, decreased breath sounds in bilateral bases   Heart: Regular rate and rhythm, S1S2 present or without murmur or extra heart sounds   Abdomen: abdomen is soft without significant tenderness, PEG tube remains in place, with dressing in place, masses, organomegaly   Extremity: 1+ edema, no cyanosis, no clubbing   Neuro: alert, moves extremities, however appears delirious   Skin: Poor turgor, pale, easily friable        DATA:  Labs:  Recent Labs     10/26/21  0624 10/25/21  1552 10/24/21  0412   WBC 11.1 11.9 5.9   HGB 11.5* 12.8* 10.4*   HCT 39.4 44.5 36.0    202 172     Recent Labs     10/26/21  0624 10/25/21  1552 10/25/21  0450 10/24/21  0412 10/24/21  0412    139 140   < > 141   K 4.3 4.1 4.1   < > 3.8   CL 96* 96* 99*   < > 102   CO2 41* 44* 40*   < > 41*   GLU 86 138* 107*   < > 153*   BUN 13 10 10   < > 11   CREA 0.34* 0.37* 0.32*   < > 0.29*   CA 8.5 9.1 8.5   < > 8.0*   MG 2.2  --  2.1  --  2.1   PHOS 2.9  --  2.1*  --  2.7    < > = values in this interval not displayed. No results for input(s): PH, PCO2, PO2, HCO3, FIO2 in the last 72 hours. PFT Results  (Last 3 results in the past 10 years)    None        11/24/20    ECHO ADULT COMPLETE 11/24/2020 11/24/2020    Interpretation Summary  · LV: Estimated LVEF is 45 - 50%. Visually measured ejection fraction. Normal cavity size and wall thickness. Globally reduced systolic function. Abnormal left ventricular septal motion consistent with paradoxic motion. Inconclusive left ventricular diastolic function.   · Saline contrast was given to evaluate for intracardiac shunt. No shunt seen. Bubble study was negative with valsalva and without valsalva. · TV: Moderate tricuspid valve regurgitation is present. · PA: Mild pulmonary hypertension. Pulmonary arterial systolic pressure is 42 mmHg. Signed by: Rocky Serna MD on 11/24/2020  2:49 PM        Imaging:  [x]I have personally reviewed the patients radiographs  []Radiographs reviewed with radiologist   []No change from prior, tubes and lines in adequate position  []Improved   [x]Worsening    High complexity decision making was performed during the evaluation of this patient at high risk for decompensation with multiple organ involvement     Above mentioned total time spent on reviewing the case/medical record/data/notes/EMR/patient examination/documentation/coordinating care with nurse/consultants, exclusive of procedures with complex decision making performed and > 50% time spent in face to face evaluation.         Ashley Buitrago MD/MPH     Pulmonary, Critical Care Medicine  [facility]

## 2021-10-26 NOTE — ROUTINE PROCESS
Bedside shift change report given to [address] RN (oncoming nurse) by Gil Tobin RN (offgoing nurse). Report included the following information SBAR, Procedure Summary, Intake/Output, MAR and Recent Results.

## 2021-10-26 NOTE — PROGRESS NOTES
Rita  [facility], [address]    Progress Note    Patient: Ron Cheung MRN: [id number]  SSN: [id number]    YOB: 1945  Age: 67 y.o. Sex: male      Admit Date: 10/18/2021    LOS: 8 days     Chief complaint:peg dislodged    Impression:     1. dislodged peg into subcutaneous space   2. Kristin peg wound infection  3. oropharyngeal dysphagia     Plan:     1. Continue iv abx  2. Consider ng placement   3. Wound care ordered and discussed with nursing  4. Possible peg replacement later this week or early next week       Subjective: The patient problems have included subcutaneous dislodgement of peg with surrounding hematoma and likely infection . S/P extraction of peg.       Objective:     Vitals:    10/26/21 0700 10/26/21 0748 10/26/21 1130 10/26/21 1407   BP: 127/79  116/62 116/62   Pulse: (!) 146  (!) 115    Resp: (!) 46  26    Temp: 98.3 °F (36.8 °C)  98 °F (36.7 °C)    SpO2: 92% 93% 95%    Weight:    54.4 kg (120 lb)   Height:    5' 7\" (1.702 m)        Physical Exam:   GENERAL: alert, cooperative, no distress, appears stated age, cachectic  ABDOMEN: peg extracted and blood and pus extracted from site tender to touch     Lab/Data Review:  BMP:   Lab Results   Component Value Date/Time     10/26/2021 06:24 AM    K 4.3 10/26/2021 06:24 AM    CL 96 (L) 10/26/2021 06:24 AM    CO2 41 (HH) 10/26/2021 06:24 AM    AGAP 3 10/26/2021 06:24 AM    GLU 86 10/26/2021 06:24 AM    BUN 13 10/26/2021 06:24 AM    CREA 0.34 (L) 10/26/2021 06:24 AM    GFRAA >60 10/26/2021 06:24 AM    GFRNA >60 10/26/2021 06:24 AM     CMP:   Lab Results   Component Value Date/Time     10/26/2021 06:24 AM    K 4.3 10/26/2021 06:24 AM    CL 96 (L) 10/26/2021 06:24 AM    CO2 41 (HH) 10/26/2021 06:24 AM    AGAP 3 10/26/2021 06:24 AM    GLU 86 10/26/2021 06:24 AM    BUN 13 10/26/2021 06:24 AM    CREA 0.34 (L) 10/26/2021 06:24 AM    GFRAA >60 10/26/2021 06:24 AM    GFRNA >60 10/26/2021 06:24 AM    CA 8.5 10/26/2021 06:24 AM    MG 2.2 10/26/2021 06:24 AM    PHOS 2.9 10/26/2021 06:24 AM     CBC:   Lab Results   Component Value Date/Time    WBC 11.1 10/26/2021 06:24 AM    HGB 11.5 (L) 10/26/2021 06:24 AM    HCT 39.4 10/26/2021 06:24 AM     10/26/2021 06:24 AM     COAGS: No results found for: APTT, PTP, INR, INREXT  Liver Panel: No results found for: ALB, CBIL, TBIL, TP, GLOB, AGRAT, ASTPOC, ALTPOC, ALT, AP       Active Problems:    Pneumonia (10/18/2021)      Severe protein-calorie malnutrition ([address] (10/20/2021)          Signed By: Pa Cortez MD     October 26, 2021

## 2021-10-27 PROBLEM — K94.20 GASTROSTOMY COMPLICATION (HCC): Status: ACTIVE | Noted: 2021-01-01

## 2021-10-27 NOTE — PROGRESS NOTES
Spoke with Melodie Garcia of [address] [place] [address] for [facility] and [place]. She stated [facility] accepted pt but the acceptance will be based on if pt will be on IV abx and what type. Sent text message to Dr. Demian Hobbs    Per Dr. Demian Hobbs, pt is getting PEG tube. Informed [facility]    Met with pt's sister Dora Chino and informed her [facility] accepted pt.           Tanvi Garcia, SHAKIRN RN  Care Management  Pager: [phone]

## 2021-10-27 NOTE — PROGRESS NOTES
Problem: Falls - Risk of  Goal: *Absence of Falls  Description: Document Cesar Lewis Fall Risk and appropriate interventions in the flowsheet. Outcome: Progressing Towards Goal  Note: Fall Risk Interventions:  Mobility Interventions: Bed/chair exit alarm, Patient to call before getting OOB    Mentation Interventions: Bed/chair exit alarm, Toileting rounds    Medication Interventions: Bed/chair exit alarm, Patient to call before getting OOB    Elimination Interventions: Bed/chair exit alarm, Call light in reach              Problem: Pressure Injury - Risk of  Goal: *Prevention of pressure injury  Description: Document Kali Scale and appropriate interventions in the flowsheet.   Outcome: Progressing Towards Goal  Note: Pressure Injury Interventions:  Sensory Interventions: Assess changes in LOC, Keep linens dry and wrinkle-free, Minimize linen layers    Moisture Interventions: Absorbent underpads, Minimize layers    Activity Interventions: Pressure redistribution bed/mattress(bed type), PT/OT evaluation    Mobility Interventions: Pressure redistribution bed/mattress (bed type), PT/OT evaluation    Nutrition Interventions: Document food/fluid/supplement intake    Friction and Shear Interventions: Apply protective barrier, creams and emollients, Minimize layers

## 2021-10-27 NOTE — PROCEDURES
[address] [place] [address] Pulmonary Specialist  Central Line Procedure Note    Indication: Inadequate venous access    Consent - not obtained as emergent line. Positioning supine. Central line Bundle:   Full sterile barrier precautions used. 7-Step Sterility Protocol followed. (cap, mask sterile gown, sterile gloves, large sterile sheet, hand hygiene, 2% chlorhexidine for cutaneous antisepsis)  No anesthetic used, emergent line. Using ultrasound guidance,   Right femoral cannulated x 1 attempt(s) utilizing the modified Seldinger technique. No difficulty or immediate complications. Good blood return. Catheter secured & Biopatch applied. Sterile Tegaderm placed. Procedure directly supervised by attending physician Dr. Laurent Foster. Due to technical difficulties and emergent nature, US images were unable to uploaded to [facility] care. Rashaun Alvarez MD  [facility] Emergency Medicine, PGY1      I was present for and supervised the entire procedure. See resident note for details.     Taras Cantor MD/MPH     Pulmonary, Critical Care Medicine  [address] [place] [address] Pulmonary Specialists

## 2021-10-27 NOTE — PROGRESS NOTES
Patient received [facility] by Father Baudilio George on 10/27/21. Gina Burnham MDiv.   Stefany Grant   [phone]

## 2021-10-27 NOTE — PROGRESS NOTES
0700-received bedside report from off going nurse Andreina Marks RN.    8170-ZK removed condom cath, heart monitor and gown. Pt also had an incontinent episode of urine. 1240-Informed Dr. Chela Moseley pt is minimally responsive to sternal rub and stimuli. Dr. Chela Moseley gave a verbal order for ABG Stat. Patient returned our office call.  Patient aware of referral placed to pain management.  Patient verbalized understanding.

## 2021-10-27 NOTE — PROGRESS NOTES
responded to Rapid Response for  Principal Mancuso, who is a 67 y. o.,male,     The  provided the following Interventions:  Provided crisis spiritual care and support. Offered prayers on behalf for the patient. Chart reviewed. The following outcomes were achieved:      Assessment:  There are no further spiritual or Nondenominational issues which require intervention at this time. Plan:  Chaplains will continue to follow and will provide spiritual care as needed.  recommends bedside caregivers page  on duty if patient or family shows signs of acute spiritual or emotional distress.        [address] [facility]   [phone]

## 2021-10-27 NOTE — PROGRESS NOTES
1625- Pt received to ICU, transferred to ICU bed, and attached to bedside cardiac monitor and ventilator. All alarms reviewed, set and audible. Pt is deaf at baseline. Pt tremors in response to stimuli however does not withdraw. Pt does not open eyes to stimulus. Pt with weak cough reflex. Pt with scant amount of sputum. Unable to obtain enough for sputum culture. OGT placement confirmed by auscultation. Placed to low wall intermittent suction. Condom catheter draining and patent, placed to over bedside drainage. Pt bathed in CHG wipes and bath pack. Preventative allevyn placed to sacrum. Gown and linens changed. Pt tolerated activity without incident. See EMR flowsheets and documentation. 65- Dr Patrica Garcia at bedside to assess pt. Fentanyl held per Dr Patrica Garcia. 1730- ABG drawn at bedside by RT. Pt tolerated without incident. L Radial arterial line dressing soiled. Dressing changed under sterile conditions. New CHG dressing placed, CDI. Pt tolerated without incident. 1930- A. Reagan ALBERTS updated on pupillary assessment and lab results of pt and increase in PVCs. Verbal orders received for electrolyte replacement. Bedside shift report endorsed to oncoming RN. Assessment, output, VS trend, lab results, infusions, and orders reviewed. Care relinquished.

## 2021-10-27 NOTE — PROGRESS NOTES
1323: This writer present during RRT   1330: This writer to assume care of patient now that patient is intubated. 1335: Levo started  1347: 1 mg IV Dilaudid and 2 mg IV Ativan given  1410: L radial Charlotte placed by Dr Garcia Pouch  [phone]: R femoral triple lumen central line placed by Dr Steph Andrade  [phone]: Labs drawn and sent  [phone]: LEXUS Galan at bedside to see patient  1500: OGT placed  1527: Repeat ABG done  1530: Attempted to place corcoran x2, unable. Condom catheter placed. 1555: Called radiology to inquire about STAT CXR ordered at 12, radiology states they will be up when done in cath lab.   1615: Patient transported to MICU on bed with this writer, RT x2 and transport staff. All belongings including cell phone, , watch, and personal walker taken to patient's new room. Patient's BP dropped during transport and this writer had to titrate Levo back up to 15 mcg/min during transport. Bedside shift report given to receiving nurse Trish Waterman RN.

## 2021-10-27 NOTE — PROGRESS NOTES
Problem: Self Care Deficits Care Plan (Adult)  Goal: *Acute Goals and Plan of Care (Insert Text)  Description: Occupational Therapy Goals  Initiated 10/20/2021 within 7 day(s). 1.  Patient will perform grooming with supervision/set-up standing at the sink for 2-4 min with Good balance. 2.  Patient will perform bathing with supervision/set-up. 3.  Patient will perform lower body dressing with supervision/set-up for seated and standing aspects, using EC techniques. 4.  Patient will perform toilet transfers with supervision/set-up. 5.  Patient will perform all aspects of toileting with supervision/set-up. 6.  Patient will participate in upper extremity therapeutic exercise/activities with supervision/set-up for 5 minutes to increase endurance for ADLs. 7.  Patient will utilize energy conservation techniques during functional activities with verbal cues. Prior Level of Function: Pt reports he lives alone and has caregivers daily assisting with meals and light housework. Outcome: Progressing Towards Goal   OCCUPATIONAL THERAPY TREATMENT    Patient: Junior Parr (31 y.o. male)  Date: 10/27/2021  Diagnosis: Pneumonia [J18.9] <principal problem not specified>  Procedure(s) (LRB):  PERCUTANEOUS ENDOSCOPIC GASTROSTOMY TUBE INSERTION (N/A) 6 Days Post-Op  Precautions: Fall, Aspiration    Chart, occupational therapy assessment, plan of care, and goals were reviewed. ASSESSMENT:  PT sleeping upon entry. Supportive sister at bedside. Performed and educated sister on importance UE Therex. Pt resists PROM TherEx LUE. Encouraged to perform throughout the day. Pt's sister, former dialysis nurse, verbalized understanding. Appreciative of OT intervention and education.    Progression toward goals:  []          Improving appropriately and progressing toward goals  [x]          Improving slowly and progressing toward goals  []          Not making progress toward goals and plan of care will be adjusted PLAN:  Patient continues to benefit from skilled intervention to address the above impairments. Continue treatment per established plan of care. Discharge Recommendations:  Skilled Nursing Facility  Further Equipment Recommendations for Discharge:  TBD by next level of care     SUBJECTIVE:   Patient sister stated We still want to give him every opportunity.     OBJECTIVE DATA SUMMARY:   Cognitive/Behavioral Status:  Neurologic State: Sleeping  Orientation Level: Oriented to person, Disoriented to place, Disoriented to situation, Disoriented to time  Cognition: No command following    UE Therapeutic Exercises:   PROM BUE all planes, at bed level    Pain:  Pain level pre-treatment: 0/10   Pain level post-treatment: 0/10    Activity Tolerance:    Poor    Please refer to the flowsheet for vital signs taken during this treatment. After treatment:   []  Patient left in no apparent distress sitting up in chair  [x]  Patient left in no apparent distress in bed  []  Call bell left within reach  []  Nursing notified  [x]  sister present  []  Bed alarm activated    COMMUNICATION/EDUCATION:   [] Role of Occupational Therapy in the acute care setting  [] Home safety education was provided and the patient/caregiver indicated understanding. [] Patient/family have participated as able in working towards goals and plan of care. [x] Patient/family agree to work toward stated goals and plan of care. [] Patient understands intent and goals of therapy, but is neutral about his/her participation. [] Patient is unable to participate in goal setting and plan of care.       Thank you for this referral.  ANDREW Acevedo  Time Calculation: 12 mins

## 2021-10-27 NOTE — PROGRESS NOTES
Results for Milagros Kolb (MRN [id number]) as of 10/27/2021 17:49   Ref. Range 10/27/2021 17:04   pH (POC) Latest Ref Range: 7.35 - 7.45   7.46 (H)   pCO2 (POC) Latest Ref Range: 35.0 - 45.0 MMHG 52.8 (H)   pO2 (POC) Latest Ref Range: 80 - 100 MMHG 76 (L)   HCO3 (POC) Latest Ref Range: 22 - 26 MMOL/L 37.8 (H)   sO2 (POC) Latest Ref Range: 92 - 97 % 95.5   Base excess (POC) Latest Units: mmol/L 11.9   FIO2 (POC) Latest Units: % 50   Specimen type (POC) Latest Units:   ARTERIAL   Set Rate Latest Units: bpm 14   Site Latest Units:   DRAWN FROM ARTERIAL LINE   Device: Latest Units:   ADULT VENT   Mode Latest Units:   Pressure regulated volume control   Tidal volume Latest Units: ml 400   PEEP/CPAP (POC) Latest Units: cmH2O 5   Allens test (POC) Latest Units:   NOT APPLICABLE     ABG done and results given to Dr Song Hassan.

## 2021-10-27 NOTE — PROGRESS NOTES
[facility]  [facility]    Patient: Axel Church Age: 67 y.o. : 1945 MR#: [id number] SSN: [id number]  Date/Time: 10/27/2021    Subjective:     RN called stating that patient not as responsive as yesterday. I asked RN to obtain ABG stat and call me. I received a call from the respiratory therapist that patient's pH was 7.24 and a PCO2 of 110. I came and assessed the patient immediately. Patient is obtunded. Sister at bedside. Discussed with sister and she states that patient and family are okay with intubation and mechanical ventilation. Called RRT and anesthesia was paged. Pulmonologist also responded and is at bedside. Patient intubated by anesthesia. Patient has been accepted to ICU. Assessment/Plan:   Acute hypercapnic respiratory failure  Possible aspiration pneumonia, suspected chronic aspiration  Cavitary lung lesion  Interstitial lung disease  Hypoxia  Chronic systolic congestive heart failurecompensated  Gram-positive cocci bacteremialikely contaminant  Dysphagia  Abdominal pain, tachypnea, tachycardia, SIRS  Sinus tachycardiaEKG reviewed  Displaced PEG tube  Chest x-ray suspicious for a little volume overload  New cardiomyopathy noted on echocardiology consult    PLAN  Patient is now intubated. Ventilator support  Status post removal of PEG tube on . GI is planning to reinsert PEG tube on   Continue bronchial hygiene,   Continue antibiotics   Palliative care input noted. Patient is full code  Discussed with pulmonologist at bedside.   Discussed with sister  Discussed with RN  Prognosis is poor    Case discussed with:  [x]Patient  []Family  []Nursing  []Case Management  DVT Prophylaxis:  [x]Lovenox  []Hep SQ  []SCDs  []Coumadin   []On Heparin gtt    Objective:   VS:   Visit Vitals  /68 (BP 1 Location: Left upper arm, BP Patient Position: At rest)   Pulse 81   Temp 98.1 °F (36.7 °C)   Resp 18   Ht 5' 7\" (1.702 m)   Wt 52.6 kg (115 lb 14.4 oz)   SpO2 100%   BMI 18.15 kg/m²      Tmax/24hrs: Temp (24hrs), Av.7 °F (36.5 °C), Min:97 °F (36.1 °C), Max:98.1 °F (36.7 °C)        Input/Output:     Intake/Output Summary (Last 24 hours) at 10/27/2021 1330  Last data filed at 10/27/2021 0659  Gross per 24 hour   Intake 628.67 ml   Output 600 ml   Net 28.67 ml       General: Obtunded  Cardiovascular:  S1S2+, RRR  Pulmonary: Coarse breath sounds bilaterally  GI:  Soft, BS+, NT, ND  Extremities:  No edema  Frail, bitemporal wasting    Labs:    Recent Results (from the past 24 hour(s))   ECHO ADULT COMPLETE    Collection Time: 10/26/21  2:07 PM   Result Value Ref Range    IVSd 0.86 0.60 - 1.00 cm    LVIDd 5.49 4.20 - 5.90 cm    LVIDs 4.76 cm    LVOT d 2.15 cm    LVPWd 0.93 0.60 - 1.00 cm    LVOT Cardiac Output 5.70 liter/minute    LVOT Peak Gradient 2.81 mmHg    Left Ventricular Outflow Tract Mean Gradient 1.24 mmHg    LVOT SV 47.3 mL    LVOT Peak Velocity 83.85 cm/s    LVOT VTI 13.01 cm    LV E' Lateral Velocity 12.46 cm/s    LV E' Septal Velocity 13.71 cm/s    Triscuspid Valve Regurgitation Peak Gradient 70.04 mmHg    TR Max Velocity 418.44 cm/s    Ao Root D 2.96 cm    LV Mass .9 88.0 - 224.0 g    LV Mass AL Index 112.8 49.0 - 115.0 g/m2   GLUCOSE, POC    Collection Time: 10/26/21  3:24 PM   Result Value Ref Range    Glucose (POC) 104 70 - 110 mg/dL   GLUCOSE, POC    Collection Time: 10/26/21  6:11 PM   Result Value Ref Range    Glucose (POC) 116 (H) 70 - 110 mg/dL   GLUCOSE, POC    Collection Time: 10/26/21 11:25 PM   Result Value Ref Range    Glucose (POC) 113 (H) 70 - 110 mg/dL   MAGNESIUM    Collection Time: 10/27/21  5:10 AM   Result Value Ref Range    Magnesium 2.4 1.6 - 2.6 mg/dL   PHOSPHORUS    Collection Time: 10/27/21  5:10 AM   Result Value Ref Range    Phosphorus 3.6 2.5 - 4.9 MG/DL   CBC WITH AUTOMATED DIFF    Collection Time: 10/27/21  5:10 AM   Result Value Ref Range    WBC 8.6 4.6 - 13.2 K/uL    RBC 4.24 (L) 4.35 - 5.65 M/uL    HGB 11.4 (L) 13.0 - 16.0 g/dL    HCT 41.2 36.0 - 48.0 %    MCV 97.2 78.0 - 100.0 FL    MCH 26.9 24.0 - 34.0 PG    MCHC 27.7 (L) 31.0 - 37.0 g/dL    RDW 13.4 11.6 - 14.5 %    PLATELET 469 488 - 740 K/uL    MPV 9.6 9.2 - 11.8 FL    NEUTROPHILS 90 (H) 40 - 73 %    LYMPHOCYTES 2 (L) 21 - 52 %    MONOCYTES 7 3 - 10 %    EOSINOPHILS 0 0 - 5 %    BASOPHILS 0 0 - 2 %    ABS. NEUTROPHILS 7.8 1.8 - 8.0 K/UL    ABS. LYMPHOCYTES 0.2 (L) 0.9 - 3.6 K/UL    ABS. MONOCYTES 0.6 0.05 - 1.2 K/UL    ABS. EOSINOPHILS 0.0 0.0 - 0.4 K/UL    ABS.  BASOPHILS 0.0 0.0 - 0.1 K/UL    DF AUTOMATED     METABOLIC PANEL, BASIC    Collection Time: 10/27/21  5:10 AM   Result Value Ref Range    Sodium 142 136 - 145 mmol/L    Potassium 3.8 3.5 - 5.5 mmol/L    Chloride 97 (L) 100 - 111 mmol/L    CO2 43 (HH) 21 - 32 mmol/L    Anion gap 2 (L) 3.0 - 18 mmol/L    Glucose 124 (H) 74 - 99 mg/dL    BUN 21 (H) 7.0 - 18 MG/DL    Creatinine 0.54 (L) 0.6 - 1.3 MG/DL    BUN/Creatinine ratio 39 (H) 12 - 20      GFR est AA >60 >60 ml/min/1.73m2    GFR est non-AA >60 >60 ml/min/1.73m2    Calcium 8.6 8.5 - 10.1 MG/DL   GLUCOSE, POC    Collection Time: 10/27/21  5:27 AM   Result Value Ref Range    Glucose (POC) 118 (H) 70 - 110 mg/dL   GLUCOSE, POC    Collection Time: 10/27/21  7:59 AM   Result Value Ref Range    Glucose (POC) 126 (H) 70 - 110 mg/dL   GLUCOSE, POC    Collection Time: 10/27/21 11:17 AM   Result Value Ref Range    Glucose (POC) 128 (H) 70 - 110 mg/dL     Additional Data Reviewed:      Signed By: Valery Lomeli MD     October 27, 2021

## 2021-10-27 NOTE — PROGRESS NOTES
1323: RRT called for unresponsiveness. 1325: ABG obtained by RT, anesthesia called. 1327: Dr. Jassi Calrke (Anesthesia) at bedside. 100 of succ and 20 of etomidate administered by Dr. Jassi Clarke. Pt intubated at bedside, placed on ventilator by RT.   1328: Vitals: 94/55, 100% sats, 94 HR,   1329: Vitals: 83/38.  1330: 1mL of epi administered by LISA Garcia. 1335: Levo infusion ordered. Initiated at 2.   1338: Levo increased to 15 by Dr. Henna Parnell. 1400: Ultrasound at bedside, A-line placed in left radial, CVC placed.

## 2021-10-27 NOTE — CONSULTS
Cardiology Initial Patient Referral Note    Cardiology referral request from Dr. Laure Brewster for evaluation and management/treatment of CHF. Date of  Admission: 10/18/2021  6:35 PM   Primary Care Physician:  Alfredo Busch NP    Attending Cardiologist: Dr. Ann Marie Jessica:     -Acute hypercapnic and hypoxic respiratory failure requiring emergent intubation on 10/27/2021  -Multifactorial respiratory failure likely aspiration pneumonia, interstitial lung disease and chronic bronchiectasis  -Aspiration pneumonia  -Acute systolic congestive heart failure. LVEF 25% by echo on this admission. Historically mildly reduced LVEF  -History of cavitary lung lesion  -History of severe pulmonary hypertension  -Failure to thrive with multiple medical problems    Primary cardiologist: Dr. Rupert Rodriguez:     78-year-old male with multiple medical problems who was admitted about a week ago with failure to thrive, poor p.o. intact and some hypoxic respiratory failure which was thought to be because of chronic aspiration pneumonia and displacement of PEG tube  Patient this morning had altered mental status and poor responsiveness and ABG showed hypercapnic hypoxic respiratory failure and for which he was intubated  Yesterday echo was performed which showed severe LV dysfunction with EF 25-30% in the setting of acute illness. Historically EF has been 45%    -Currently patient is intubated  -ICU team is placing central line and A-line for hemodynamic monitoring and pressure support  -Currently patient needs supportive care. -Patient is a poor candidate for ischemic workup at this time. Prognosis is poor. Continue to address goals of care. -Further recommendations pending hospital course/meaningful recovery. History of Present Illness: This is a 67 y.o. male admitted for Pneumonia [J18.9].     Patient complains of: hypoxia      Dora Maravilla is a 67 y.o. male, pmhx as stated above, who we are seeing for CHF.   50-year-old male with multiple medical problems who was admitted about a week ago with failure to thrive, poor p.o. intact and some hypoxic respiratory failure which was thought to be because of chronic aspiration pneumonia and displacement of PEG tube  Patient this morning had altered mental status and poor responsiveness and ABG showed hypercapnic hypoxic respiratory failure and for which he was intubated  Yesterday echo was performed which showed severe LV dysfunction with EF 25-30% in the setting of acute illness. Historically EF has been 45%    Cardiac risk factors: male gender, hypertension      Review of Symptoms:     Review of systems not obtained due to patient factors. Past Medical History:     Past Medical History:   Diagnosis Date    CAD (coronary artery disease)     MI 1995; stent 2003 (done preop cochlear sgy)    Cancer ([facility] [address]     SQUAMOUS CELL    Coronary artery disease     Deafness     Myocardial infarction [facility]) [id number]    Scarlet fever age 9         Social History:     Social History     Socioeconomic History    Marital status: SINGLE     Spouse name: Not on file    Number of children: Not on file    Years of education: Not on file    Highest education level: Not on file   Tobacco Use    Smoking status: Never Smoker    Smokeless tobacco: Never Used   Vaping Use    Vaping Use: Never used   Substance and Sexual Activity    Alcohol use: No    Drug use: No     Social Determinants of Health     Financial Resource Strain:     Difficulty of Paying Living Expenses:    Food Insecurity:     Worried About Running Out of Food in the Last Year:     Ran Out of Food in the Last Year:    Transportation Needs:     Lack of Transportation (Medical):      Lack of Transportation (Non-Medical):    Physical Activity:     Days of Exercise per Week:     Minutes of Exercise per Session:    Stress:     Feeling of Stress :    Social Connections:     Frequency of Communication with Friends and Family:     Frequency of Social Gatherings with Friends and Family:     Attends Confucianism Services:     Active Member of Clubs or Organizations:     Attends Club or Organization Meetings:     Marital Status:         Family History:     Family History   Problem Relation Age of Onset    No Known Problems Mother     Heart Disease Father     Heart Attack Father         1989    Coronary Artery Disease Father     Hypertension Father     High Cholesterol Father         Medications:      Allergies   Allergen Reactions    Pollens Extract Runny Nose and Cough        Current Facility-Administered Medications   Medication Dose Route Frequency    acetaZOLAMIDE (DIAMOX) 500 mg in sterile water (preservative free) 5 mL injection  500 mg IntraVENous ONCE    NOREPINephrine (LEVOPHED) 8 mg/250 mL (32 mcg/mL) in dextrose 5% 250 mL (32 mcg/mL) infusion        HYDROmorphone (DILAUDID) syringe 1 mg  1 mg IntraVENous ONCE    NOREPINephrine (LEVOPHED) 8 mg in 5% dextrose 250mL (32 mcg/mL) infusion  0.5-50 mcg/min IntraVENous TITRATE    LORazepam (ATIVAN) injection 2 mg  2 mg IntraVENous Q15MIN PRN    HYDROmorphone (DILAUDID) syringe 1 mg  1 mg IntraVENous Q15MIN PRN    chlorhexidine (PERIDEX) 0.12 % mouthwash 10 mL  10 mL Oral Q12H    midazolam (VERSED) injection 2 mg  2 mg IntraVENous Q10MIN PRN    fentaNYL citrate (PF) injection 100 mcg  100 mcg IntraVENous Q30MIN PRN    fentaNYL (PF) 1,500 mcg/30 mL (50 mcg/mL) infusion  0-200 mcg/hr IntraVENous TITRATE    bacitracin zinc-polymyxin b (POLYSPORIN) 500-10,000 unit/gram ointment   Topical BID    naloxone (NARCAN) injection 0.4 mg  0.4 mg IntraVENous PRN    dextrose 5% and 0.9% NaCl infusion  40 mL/hr IntraVENous CONTINUOUS    albuterol-ipratropium (DUO-NEB) 2.5 MG-0.5 MG/3 ML  3 mL Nebulization Q4H RT    sodium chloride 3% hypertonic nebulizer soln  4 mL Nebulization Q4HWA RT    Lactobacillus Acidoph & Bulgar (FLORANEX) tablet 2 Tablet  2 Tablet Per G Tube BID    banana flakes trans-galactooligosaccharide (BANATROL PLUS) powder 1 Packet  1 Packet Per NG tube BID    guaiFENesin (ROBITUSSIN) 100 mg/5 mL oral liquid 400 mg  400 mg Per G Tube Q4H PRN    albuterol-ipratropium (DUO-NEB) 2.5 MG-0.5 MG/3 ML  3 mL Nebulization Q4H PRN    aspirin chewable tablet 81 mg  81 mg Per G Tube DAILY    cholecalciferol (VITAMIN D3) (1000 Units /25 mcg) tablet 1,000 Units  1,000 Units PEG Tube DAILY    polyethylene glycol (MIRALAX) packet 17 g  17 g Per G Tube DAILY PRN    acetaminophen (TYLENOL) tablet 650 mg  650 mg Per G Tube Q6H PRN    Or    acetaminophen (TYLENOL) suppository 650 mg  650 mg Rectal Q6H PRN    thiamine (B-1) 200 mg in 0.9% sodium chloride 50 mL IVPB  200 mg IntraVENous DAILY    enoxaparin (LOVENOX) injection 40 mg  40 mg SubCUTAneous QHS    sodium chloride (NS) flush 5-10 mL  5-10 mL IntraVENous PRN    piperacillin-tazobactam (ZOSYN) 3.375 g in 0.9% sodium chloride (MBP/ADV) 100 mL MBP  3.375 g IntraVENous Q6H    sodium chloride (NS) flush 5-40 mL  5-40 mL IntraVENous Q8H    sodium chloride (NS) flush 5-40 mL  5-40 mL IntraVENous PRN    omega-3 acid ethyl esters (LOVAZA) capsule 1,000 mg  1 g Oral DAILY WITH BREAKFAST    therapeutic multivitamin (THERAGRAN) tablet 1 Tablet  1 Tablet Oral DAILY         Physical Exam:     Visit Vitals  BP (P) 128/62   Pulse (P) 62   Temp 98.1 °F (36.7 °C)   Resp 17   Ht 5' 7\" (1.702 m)   Wt 52.6 kg (115 lb 14.4 oz)   SpO2 (P) 100%   BMI 18.15 kg/m²       TELE: normal sinus rhythm    BP Readings from Last 3 Encounters:   10/27/21 (P) 128/62   08/31/21 117/76   06/29/21 116/62     Pulse Readings from Last 3 Encounters:   10/27/21 (P) 62   08/31/21 (!) 121   06/29/21 (!) 107     Wt Readings from Last 3 Encounters:   10/27/21 52.6 kg (115 lb 14.4 oz)   08/31/21 51.7 kg (114 lb)   06/29/21 53.1 kg (117 lb)       General: intubated, sedated, cachetic   Neck:  no JVD  Lungs:   On mechanical ventilation   Heart:  Regular rate and rhythm   Abdomen:  abdomen is soft   Extremities:  extremities normal, atraumatic, no cyanosis or edema  Skin: no rash  Neuro: sedated   Psych: unable to assess      Data Review:     Recent Labs     10/27/21  0510 10/26/21  0624 10/25/21  1552   WBC 8.6 11.1 11.9   HGB 11.4* 11.5* 12.8*   HCT 41.2 39.4 44.5    183 202     Recent Labs     10/27/21  0510 10/26/21  0624 10/25/21  1552 10/25/21  0450 10/25/21  0450    140 139   < > 140   K 3.8 4.3 4.1   < > 4.1   CL 97* 96* 96*   < > 99*   CO2 43* 41* 44*   < > 40*   * 86 138*   < > 107*   BUN 21* 13 10   < > 10   CREA 0.54* 0.34* 0.37*   < > 0.32*   CA 8.6 8.5 9.1   < > 8.5   MG 2.4 2.2  --   --  2.1   PHOS 3.6 2.9  --   --  2.1*    < > = values in this interval not displayed. Results for orders placed or performed during the hospital encounter of 10/18/21   EKG, 12 LEAD, INITIAL   Result Value Ref Range    Ventricular Rate 143 BPM    Atrial Rate 143 BPM    P-R Interval 124 ms    QRS Duration 68 ms    Q-T Interval 248 ms    QTC Calculation (Bezet) 382 ms    Calculated P Axis 53 degrees    Calculated R Axis -42 degrees    Calculated T Axis 87 degrees    Diagnosis       Sinus tachycardia with occasional premature ventricular complexes  Possible Left atrial enlargement  Left axis deviation  Septal infarct (cited on or before 08-OCT-2020)  Abnormal ECG  When compared with ECG of 18-OCT-2021 18:38,  premature ventricular complexes are now present  Questionable change in initial forces of Septal leads  Confirmed by Yasmeen Scott MD, ----- [id number]) on 10/25/2021 4:21:56 PM     Results for orders placed or performed in visit on 06/06/16   AMB POC EKG ROUTINE W/ 12 LEADS, INTER & REP    Narrative    EKG: normal EKG, normal sinus rhythm, there are no previous  tracings available for comparison.        All Cardiac Markers in the last 24 hours:  No results found for: CPK, CK, CKMMB, CKMB, RCK3, CKMBT, CKNDX, CKND1, KATERIN, TROPT, TROIQ, PAULINE, TROPT, TNIPOC, BNP, BNPP    Last Lipid:    Lab Results   Component Value Date/Time    Cholesterol, total 138 09/24/2020 10:04 AM    HDL Cholesterol 66 (H) 09/24/2020 10:04 AM    LDL, calculated 61.4 09/24/2020 10:04 AM    Triglyceride 53 09/24/2020 10:04 AM    CHOL/HDL Ratio 2.1 09/24/2020 10:04 AM       Cardiographics:     EKG Results     Procedure 720 Value Units Date/Time    EKG, 12 LEAD, INITIAL [[id number]] Collected: 10/25/21 1528    Order Status: Completed Updated: 10/25/21 1622     Ventricular Rate 143 BPM      Atrial Rate 143 BPM      P-R Interval 124 ms      QRS Duration 68 ms      Q-T Interval 248 ms      QTC Calculation (Bezet) 382 ms      Calculated P Axis 53 degrees      Calculated R Axis -42 degrees      Calculated T Axis 87 degrees      Diagnosis --     Sinus tachycardia with occasional premature ventricular complexes  Possible Left atrial enlargement  Left axis deviation  Septal infarct (cited on or before 08-OCT-2020)  Abnormal ECG  When compared with ECG of 18-OCT-2021 18:38,  premature ventricular complexes are now present  Questionable change in initial forces of Septal leads  Confirmed by Rajeev Maldonado MD, ----- [id number]) on 10/25/2021 4:21:56 PM      EKG, 12 LEAD, INITIAL [[id number]] Collected: 10/18/21 1837    Order Status: Completed Updated: 10/19/21 1206     Ventricular Rate 120 BPM      Atrial Rate 120 BPM      P-R Interval 134 ms      QRS Duration 76 ms      Q-T Interval 306 ms      QTC Calculation (Bezet) 432 ms      Calculated P Axis 57 degrees      Calculated R Axis -44 degrees      Calculated T Axis 86 degrees      Diagnosis --     Sinus tachycardia  Right atrial enlargement  Left axis deviation  Pulmonary disease pattern  Septal infarct (cited on or before 08-OCT-2020)  Abnormal ECG  When compared with ECG of 28-APR-2021 13:39,  No significant change was found  Confirmed by Latonia Ramirez MD, --- ([id number]) on 10/19/2021 12:05:37 PM          10/18/21    ECHO ADULT COMPLETE 10/26/2021 10/26/2021    Interpretation Summary  · LV: Estimated LVEF is 25 - 30%. Visually measured ejection fraction. Normal cavity size and wall thickness. Severely and segmentally reduced systolic function. Age-appropriate left ventricular diastolic function. · PA: Severe pulmonary hypertension. Pulmonary arterial systolic pressure is 78 mmHg. · IVC: Mildly elevated central venous pressure (8 mmHg); IVC diameter is less than 21 mm and collapses less than 50% with respiration. · Image quality for this study was technically difficult. · Echo study was limited due to patient's condition. · RV: Not well visualized. Signed by: Bonnie Carrillo MD on 10/26/2021  6:26 PM            XR Results (most recent):  Results from [place] encounter on 10/18/21    XR ABD PORT  1 V    Narrative  XR ABD PORT  1 V: 10/25/2021 4:06 PM    CLINICAL INFORMATION  abdominal pain, recent peg. COMPARISON  None. FINDINGS  Normal bowel gas pattern, with no evidence of obstruction. No disproportionally  dilated loops of bowel. Normal volume of stool throughout the colon. No pathologic calcifications present. Soft tissue structures are within normal  limits. No acute osseous findings. 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 Impression  Nonspecific, nonobstructive bowel gas pattern.         Signed By: Fly Gupta MD     October 27, 2021

## 2021-10-27 NOTE — PROGRESS NOTES
[email or web address]  [phone]    Gastroenterology follow up-Progress note    Impression:  1. Peg dislodged - s/p removal  2. Kristin peg wound infection - purulent bloody discharge expressed with light pressure, less today but still present. 3. Oropharyngeal dysphagia - severe  4. Acute hypoxic respiratory failure: Combination of aspiration pneumonia, bronchiectasis, ILD, hypoventilation, and now superimposed pulmonary edema with pleural effusions. Also component of splinting secondary to peritoneal inflammation from dislodged PEG tube  5. Acute pulmonary edema: A combination of worsening cardiomyopathy as well as hypoalbuminemia  6. Aspiration pneumonia with severe sepsis  7 Cavitary lung lesion with infected bullae  8. Gram pos cocci bacteremia    Plan:  1. Needs NGT placement under fluoro  2. Appreciate treatment from Wound Care  3. Consider replacing PEG possibly next week if wound infection resolves  4. Medical management per primary team    Chief Complaint: Peg dislodged      Subjective:  Patient minimally responsive, only opening eyes briefly to voice. No pain response to wound pressure while expressing discharge as this was extremely uncomfortable for him yesterday. Nursing at bedside will notify hospitalist of current changes, unclear of poor response due to pain meds or other cause. ROS: Unable to obtain ROS due to above factors    Eyes: conjunctiva normal, EOM normal   Neck: ROM normal, supple and trachea normal   Cardiovascular: heart normal, intact distal pulses, normal rate and regular rhythm   Pulmonary/Chest Wall: breath sounds normal and effort normal   Abdominal: thin, bowel sounds normal and soft, non-acute, PEG wound still with mild induration, suspect hematoma and infection, decreased purulent bloody discharge when expressed. Bandage replaced.      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         Visit Vitals  BP (!) 101/56 (BP 1 Location: Left upper arm, BP Patient Position: At rest)   Pulse 92   Temp 97.2 °F (36.2 °C)   Resp 18   Ht 5' 7\" (1.702 m)   Wt 52.6 kg (115 lb 14.4 oz)   SpO2 100%   BMI 18.15 kg/m²           Intake/Output Summary (Last 24 hours) at 10/27/2021 1247  Last data filed at 10/27/2021 0659  Gross per 24 hour   Intake 628.67 ml   Output 600 ml   Net 28.67 ml       CBC w/Diff    Lab Results   Component Value Date/Time    WBC 8.6 10/27/2021 05:10 AM    RBC 4.24 (L) 10/27/2021 05:10 AM    HGB 11.4 (L) 10/27/2021 05:10 AM    HCT 41.2 10/27/2021 05:10 AM    MCV 97.2 10/27/2021 05:10 AM    MCH 26.9 10/27/2021 05:10 AM    MCHC 27.7 (L) 10/27/2021 05:10 AM    RDW 13.4 10/27/2021 05:10 AM     10/27/2021 05:10 AM    Lab Results   Component Value Date/Time    GRANS 90 (H) 10/27/2021 05:10 AM    LYMPH 2 (L) 10/27/2021 05:10 AM    EOS 0 10/27/2021 05:10 AM    BASOS 0 10/27/2021 05:10 AM      Basic Metabolic Profile   Recent Labs     10/27/21  0510      K 3.8   CL 97*   CO2 43*   BUN 21*   CA 8.6   MG 2.4   PHOS 3.6        Hepatic Function    Lab Results   Component Value Date/Time    ALB 2.4 (L) 10/18/2021 07:28 PM    TP 7.2 10/18/2021 07:28 PM    AP 32 (L) 10/18/2021 07:28 PM    No results found for: TBIL       Coags   No results for input(s): PTP, INR, APTT, INREXT in the last 72 hours. LEXUS Talley    [facility]. [email or web address]  Phone: [phone]  Pager: [phone]

## 2021-10-27 NOTE — CONSULTS
Cardiology Initial Patient Referral Note    Cardiology referral request from Dr. Lev Draper for evaluation and management/treatment of CHF. Date of  Admission: 10/18/2021  6:35 PM   Primary Care Physician:  Suzanne Avila NP    Attending Cardiologist: Dr. Kalina So:     Hospital Problems  Date Reviewed: 12/22/2020        Codes Class Noted POA    Gastrostomy complication ([facility] [address] LBT-00-KS: K94.20  ICD-9-CM: 536.40  10/27/2021 Unknown        Severe protein-calorie malnutrition ([facility] [address] ICD-10-CM: E43  ICD-9-CM: 262  10/20/2021 Unknown        Pneumonia ICD-10-CM: J18.9  ICD-9-CM: 183  10/18/2021 Unknown            -Acute Hypercapnic Respiratory Failure, requiring emergent intubation and mechanical ventilation 10/27/21. PCO2 110.    -New HFrEF. ECHO this admission EF 25-30%, previously 45-50% in 11/2020.   -Possible Aspiration PNA   -PEG Tube Displacement   -Severe PAH by ECHO, PASP 78 mmHg. -Moderate TR by ECHO. -Cavitary Lung Disease  -ILD   -Failure to Thrive      Primary cardiologist: Dr. Fernández Printers:     -patient pending transfer to [facility] off pressor support as able. Plans to start GDMT for new cardiomyopathy when longer requiring pressor support.   -Patient is a poor candidate for ischemic workup at this time. Prognosis is poor. Continue to address goals of care. -Further recommendations pending hospital course/meaningful recovery. History of Present Illness: This is a 67 y.o. male admitted for Pneumonia [J18.9]. Patient complains of: hypoxia      María Elena Polk is a 67 y.o. male, pmhx as stated above, who we are seeing for CHF. Patient is intubated and sedated, unable to provide any hx. RRT called this afternoon for worsening mental and respiratory status. Patient was subsequently intubated and started on mechanical ventilation. ECHO was performed this admission, with new worsening EF compared to prior ECHO from 2020.          Cardiac risk factors: male gender, hypertension      Review of Symptoms:     Review of systems not obtained due to patient factors. Past Medical History:     Past Medical History:   Diagnosis Date    CAD (coronary artery disease)     MI 1995; stent 2003 (done preop cochlear sgy)    Cancer ([address]     SQUAMOUS CELL    Coronary artery disease     Deafness     Myocardial infarction [facility]) [id number]    Scarlet fever age 9         Social History:     Social History     Socioeconomic History    Marital status: SINGLE     Spouse name: Not on file    Number of children: Not on file    Years of education: Not on file    Highest education level: Not on file   Tobacco Use    Smoking status: Never Smoker    Smokeless tobacco: Never Used   Vaping Use    Vaping Use: Never used   Substance and Sexual Activity    Alcohol use: No    Drug use: No     Social Determinants of Health     Financial Resource Strain:     Difficulty of Paying Living Expenses:    Food Insecurity:     Worried About Running Out of Food in the Last Year:     Ran Out of Food in the Last Year:    Transportation Needs:     Lack of Transportation (Medical):  Lack of Transportation (Non-Medical):    Physical Activity:     Days of Exercise per Week:     Minutes of Exercise per Session:    Stress:     Feeling of Stress :    Social Connections:     Frequency of Communication with Friends and Family:     Frequency of Social Gatherings with Friends and Family:     Attends Catholic Services:     Active Member of Clubs or Organizations:     Attends Club or Organization Meetings:     Marital Status:         Family History:     Family History   Problem Relation Age of Onset    No Known Problems Mother     Heart Disease Father     Heart Attack Father         1989    Coronary Artery Disease Father     Hypertension Father     High Cholesterol Father         Medications:      Allergies   Allergen Reactions    Pollens Extract Runny Nose and Cough        Current Facility-Administered Medications   Medication Dose Route Frequency    acetaZOLAMIDE (DIAMOX) 500 mg in sterile water (preservative free) 5 mL injection  500 mg IntraVENous ONCE    NOREPINephrine (LEVOPHED) 8 mg/250 mL (32 mcg/mL) in dextrose 5% 250 mL (32 mcg/mL) infusion        LORazepam (ATIVAN) 2 mg/mL injection        HYDROmorphone (DILAUDID) 2 mg/mL injection        bacitracin zinc-polymyxin b (POLYSPORIN) 500-10,000 unit/gram ointment   Topical BID    morphine injection 1 mg  1 mg IntraVENous Q8H PRN    naloxone (NARCAN) injection 0.4 mg  0.4 mg IntraVENous PRN    dextrose 5% and 0.9% NaCl infusion  40 mL/hr IntraVENous CONTINUOUS    albuterol-ipratropium (DUO-NEB) 2.5 MG-0.5 MG/3 ML  3 mL Nebulization Q4H RT    sodium chloride 3% hypertonic nebulizer soln  4 mL Nebulization Q4HWA RT    Lactobacillus Acidoph & Bulgar (FLORANEX) tablet 2 Tablet  2 Tablet Per G Tube BID    banana flakes trans-galactooligosaccharide (BANATROL PLUS) powder 1 Packet  1 Packet Per NG tube BID    guaiFENesin (ROBITUSSIN) 100 mg/5 mL oral liquid 400 mg  400 mg Per G Tube Q4H PRN    albuterol-ipratropium (DUO-NEB) 2.5 MG-0.5 MG/3 ML  3 mL Nebulization Q4H PRN    aspirin chewable tablet 81 mg  81 mg Per G Tube DAILY    cholecalciferol (VITAMIN D3) (1000 Units /25 mcg) tablet 1,000 Units  1,000 Units PEG Tube DAILY    polyethylene glycol (MIRALAX) packet 17 g  17 g Per G Tube DAILY PRN    acetaminophen (TYLENOL) tablet 650 mg  650 mg Per G Tube Q6H PRN    Or    acetaminophen (TYLENOL) suppository 650 mg  650 mg Rectal Q6H PRN    thiamine (B-1) 200 mg in 0.9% sodium chloride 50 mL IVPB  200 mg IntraVENous DAILY    enoxaparin (LOVENOX) injection 40 mg  40 mg SubCUTAneous QHS    sodium chloride (NS) flush 5-10 mL  5-10 mL IntraVENous PRN    piperacillin-tazobactam (ZOSYN) 3.375 g in 0.9% sodium chloride (MBP/ADV) 100 mL MBP  3.375 g IntraVENous Q6H    sodium chloride (NS) flush 5-40 mL  5-40 mL IntraVENous Q8H    sodium chloride (NS) flush 5-40 mL  5-40 mL IntraVENous PRN    omega-3 acid ethyl esters (LOVAZA) capsule 1,000 mg  1 g Oral DAILY WITH BREAKFAST    therapeutic multivitamin (THERAGRAN) tablet 1 Tablet  1 Tablet Oral DAILY         Physical Exam:     Visit Vitals  /68 (BP 1 Location: Left upper arm, BP Patient Position: At rest)   Pulse 81   Temp 98.1 °F (36.7 °C)   Resp 18   Ht 5' 7\" (1.702 m)   Wt 52.6 kg (115 lb 14.4 oz)   SpO2 100%   BMI 18.15 kg/m²       TELE: normal sinus rhythm    BP Readings from Last 3 Encounters:   10/27/21 135/68   08/31/21 117/76   06/29/21 116/62     Pulse Readings from Last 3 Encounters:   10/27/21 81   08/31/21 (!) 121   06/29/21 (!) 107     Wt Readings from Last 3 Encounters:   10/27/21 52.6 kg (115 lb 14.4 oz)   08/31/21 51.7 kg (114 lb)   06/29/21 53.1 kg (117 lb)       General: intubated, sedated, cachetic   Neck:  no JVD  Lungs: On mechanical ventilation   Heart:  Regular rate and rhythm   Abdomen:  abdomen is soft without significant tenderness, masses, organomegaly or guarding  Extremities:  extremities normal, atraumatic, no cyanosis or edema  Skin: Warm and dry. no hyperpigmentation, vitiligo, or suspicious lesions  Neuro: sedated   Psych: unable to assess      Data Review:     Recent Labs     10/27/21  0510 10/26/21  0624 10/25/21  1552   WBC 8.6 11.1 11.9   HGB 11.4* 11.5* 12.8*   HCT 41.2 39.4 44.5    183 202     Recent Labs     10/27/21  0510 10/26/21  0624 10/25/21  1552 10/25/21  0450 10/25/21  0450    140 139   < > 140   K 3.8 4.3 4.1   < > 4.1   CL 97* 96* 96*   < > 99*   CO2 43* 41* 44*   < > 40*   * 86 138*   < > 107*   BUN 21* 13 10   < > 10   CREA 0.54* 0.34* 0.37*   < > 0.32*   CA 8.6 8.5 9.1   < > 8.5   MG 2.4 2.2  --   --  2.1   PHOS 3.6 2.9  --   --  2.1*    < > = values in this interval not displayed.        Results for orders placed or performed during the hospital encounter of 10/18/21   EKG, 12 LEAD, INITIAL   Result Value Ref Range    Ventricular Rate 143 BPM    Atrial Rate 143 BPM    P-R Interval 124 ms    QRS Duration 68 ms    Q-T Interval 248 ms    QTC Calculation (Bezet) 382 ms    Calculated P Axis 53 degrees    Calculated R Axis -42 degrees    Calculated T Axis 87 degrees    Diagnosis       Sinus tachycardia with occasional premature ventricular complexes  Possible Left atrial enlargement  Left axis deviation  Septal infarct (cited on or before 08-OCT-2020)  Abnormal ECG  When compared with ECG of 18-OCT-2021 18:38,  premature ventricular complexes are now present  Questionable change in initial forces of Septal leads  Confirmed by Trevor Washington MD, ----- [id number]) on 10/25/2021 4:21:56 PM     Results for orders placed or performed in visit on 06/06/16   AMB POC EKG ROUTINE W/ 12 LEADS, INTER & REP    Narrative    EKG: normal EKG, normal sinus rhythm, there are no previous  tracings available for comparison.        All Cardiac Markers in the last 24 hours:  No results found for: CPK, CK, CKMMB, CKMB, RCK3, CKMBT, CKNDX, CKND1, KATERIN, TROPT, TROIQ, PAULINE, TROPT, TNIPOC, BNP, BNPP    Last Lipid:    Lab Results   Component Value Date/Time    Cholesterol, total 138 09/24/2020 10:04 AM    HDL Cholesterol 66 (H) 09/24/2020 10:04 AM    LDL, calculated 61.4 09/24/2020 10:04 AM    Triglyceride 53 09/24/2020 10:04 AM    CHOL/HDL Ratio 2.1 09/24/2020 10:04 AM       Cardiographics:     EKG Results     Procedure 720 Value Units Date/Time    EKG, 12 LEAD, INITIAL [[id number]] Collected: 10/25/21 1528    Order Status: Completed Updated: 10/25/21 1622     Ventricular Rate 143 BPM      Atrial Rate 143 BPM      P-R Interval 124 ms      QRS Duration 68 ms      Q-T Interval 248 ms      QTC Calculation (Bezet) 382 ms      Calculated P Axis 53 degrees      Calculated R Axis -42 degrees      Calculated T Axis 87 degrees      Diagnosis --     Sinus tachycardia with occasional premature ventricular complexes  Possible Left atrial enlargement  Left axis deviation  Septal infarct (cited on or before 08-OCT-2020)  Abnormal ECG  When compared with ECG of 18-OCT-2021 18:38,  premature ventricular complexes are now present  Questionable change in initial forces of Septal leads  Confirmed by Benjamín Lozada MD, ----- [id number]) on 10/25/2021 4:21:56 PM      EKG, 12 LEAD, INITIAL [[id number]] Collected: 10/18/21 1837    Order Status: Completed Updated: 10/19/21 1206     Ventricular Rate 120 BPM      Atrial Rate 120 BPM      P-R Interval 134 ms      QRS Duration 76 ms      Q-T Interval 306 ms      QTC Calculation (Bezet) 432 ms      Calculated P Axis 57 degrees      Calculated R Axis -44 degrees      Calculated T Axis 86 degrees      Diagnosis --     Sinus tachycardia  Right atrial enlargement  Left axis deviation  Pulmonary disease pattern  Septal infarct (cited on or before 08-OCT-2020)  Abnormal ECG  When compared with ECG of 28-APR-2021 13:39,  No significant change was found  Confirmed by Patricia Johns MD, --- ([id number]) on 10/19/2021 12:05:37 PM          10/18/21    ECHO ADULT COMPLETE 10/26/2021 10/26/2021    Interpretation Summary  · LV: Estimated LVEF is 25 - 30%. Visually measured ejection fraction. Normal cavity size and wall thickness. Severely and segmentally reduced systolic function. Age-appropriate left ventricular diastolic function. · PA: Severe pulmonary hypertension. Pulmonary arterial systolic pressure is 78 mmHg. · IVC: Mildly elevated central venous pressure (8 mmHg); IVC diameter is less than 21 mm and collapses less than 50% with respiration. · Image quality for this study was technically difficult. · Echo study was limited due to patient's condition. · RV: Not well visualized.     Signed by: Vicenta Juarez MD on 10/26/2021  6:26 PM            XR Results (most recent):  Results from [place] encounter on 10/18/21    XR ABD PORT  1 V    Narrative  XR ABD PORT  1 V: 10/25/2021 4:06 PM    CLINICAL INFORMATION  abdominal pain, recent peg.    COMPARISON  None. FINDINGS  Normal bowel gas pattern, with no evidence of obstruction. No disproportionally  dilated loops of bowel. Normal volume of stool throughout the colon. No pathologic calcifications present. Soft tissue structures are within normal  limits. No acute osseous findings. 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 Impression  Nonspecific, nonobstructive bowel gas pattern.         Signed By: Charley Spurling, PA-C     October 27, 2021

## 2021-10-27 NOTE — PROGRESS NOTES
OT orders d/c 2/2 pt now intubated and transferred to ICU. Please reorder when pt in medically appropriate. Thank you for allowing us to participate in the care of this pt.

## 2021-10-27 NOTE — PROGRESS NOTES
Nutrition Note    PEG dislodged into subcutaneous space and TF discontinued 10/25 pm. Started on D5 NS at 40 mL/hr providing 48 gm dextrose, 163 kcal per day. S/p extraction of PEG yesterday & GI with plan for replacement of PEG later this week or early next week. Unsuccessful attempt at NGT placement last night by RN & order noted for NG placement with fluoro. Sleeping soundly during visit today, sister at bedside. Loose BM 10/25. Nutrition Recommendations/Plan:   - If NGT placement successful under fluoroscopy today, recommend restarting tube feeding of Jevity 1.5 at goal rate of 50 mL/hr with 150 mL water flushes q 4 hours and Banatrol BID. (goal regimen to provide: 1800 kcal, 77 gm protein, 912 mL free water, 100% RDIs). - Continue IVF while pt without EN access.     Electronically signed by Trinidda Hernandez RD on 10/27/2021 at 11:21 AM    Contact: [phone]

## 2021-10-27 NOTE — PROGRESS NOTES
[facility]  Pulmonary, Critical Care, and Sleep Medicine    Name: Ginny So MRN: [id number]   : 1945 Hospital: [facility]   Date: 10/27/2021      F/U pulmonary consult  Requested by:  Dr. Justine Jackson  Reason:  dyspnea      IMPRESSION:   · Acute hypoxic and now hypercarbic respiratory failure: Combination of aspiration pneumonia, bronchiectasis, ILD, hypoventilation, and now superimposed pulmonary edema with pleural effusions. Also component of splinting secondary to peritoneal inflammation from dislodged PEG tube  · Shock:  Septic vs distributive vs cardiogenic  · Acute pulmonary edema: A combination of worsening cardiomyopathy as well as hypoalbuminemia  · Aspiration pneumonia with severe sepsis: New extensive consolidations and infiltrate seen in right lung, etiology secondary to recurrent aspiration  · Severe sepsis:  Due to above  · Acute encephalopathy:  Secondary for CO2 narcosis  · Recurrent aspiration with dysphagia: Status post PEG tube, PEG tube became dislodged  · Right upper lobe cavitary lesion with infected bullae  · 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   · Dyspnea/shortness of breath: Due to above     Patient Active Problem List   Diagnosis Code    Coronary artery disease involving native coronary artery of native heart without angina pectoris,s/p stent  I25.10    Scarlet fever A38.9    Myocardial infarction ([facility] [address] I21.9    Urinary frequency R35.0    Pneumonia J18.9    Goals of care, counseling/discussion Z71.89    Acute respiratory failure with hypoxia ([facility] [address] J96.01    Debility R53.81    Severe protein-calorie malnutrition ([facility] [address] E43    [facility] R01.32      PLAN:   Came to bedside for acute hypercarbic respiratory failure, pCO2 completely unreadable, pt obtunded. RRT called and emergently intubated at bedside. Post intubation, pt hypotensive, likely septic vs distrubutive vs cardiogenic shock. I was at bedside and pushed vasopressors and then helped titrate levophed gtt  Vasopressors to maintain MAP >65mmHg  Vent per protocol  PRN sedation for now given pt was obtunded -- RASS goal 0 to -1, PRN dilaudid and ativan given at bedside  ABx per ID, brload spectrum  Repeat labs, CBC, cardiac panel, CMP, lactic acid, type and screen. Will f/u on labs and follow serially pending results  Hold off on further diuresis until pressure improves  F/U cx, consider repeat BAL cx  Advise cardiology consult given worsening ejection fraction -- discussed with Dr. Sydney Chua at bedside  OGT for feeds, hold off on PEG until goals of care further discussed. Discussed with GI  DVT ppx  Advise goals of care discussion given poor prognosis. Appreciate palliative care input  Will follow    Very poor prognosis     Subjective/Interval History:   10/27/21   Patient seen and examined at [facility] with RRT. Bolivar Thompson Pt obtunded, pCO2 >110. Sister at bedside. ROS:Review of systems not obtained due to patient factors. Objective:   Vital Signs:    Visit Vitals  /62   Pulse 78   Temp 98.1 °F (36.7 °C)   Resp 18   Ht 5' 7\" (1.702 m)   Wt 52.6 kg (115 lb 14.4 oz)   SpO2 100%   BMI 18.15 kg/m²       O2 Device: Ventilator, Endotracheal tube   O2 Flow Rate (L/min): 6 l/min   Temp (24hrs), Av.7 °F (36.5 °C), Min:97 °F (36.1 °C), Max:98.1 °F (36.7 °C)       Intake/Output:   Last shift:      No intake/output data recorded.   Last 3 shifts: 10/25 1901 - 10/27 0700  In: 2628.7 [I.V.:2628.7]  Out: 1275 [Urine:1275]    Intake/Output Summary (Last 24 hours) at 10/27/2021 1534  Last data filed at 10/27/2021 0659  Gross per 24 hour   Intake 628.67 ml   Output 600 ml   Net 28.67 ml        Physical Exam:    General: obtunded and cachectic, tachypneic, frail   HEENT: Normal, NCAT, PERRLA, no ocular drainage, nasal bridge midline, salter cannula in place, no nasal drainage, poor dentition, no oral lesions   Neck: Neck supple, trachea midline, no adenopathy   Chest: Frail with normal rise and fall   Lungs: Poor air entry bilaterally, coarse rhonchi throughout all lung fields, decreased breath sounds in bilateral bases   Heart: Regular rate and rhythm, S1S2 present or without murmur or extra heart sounds   Abdomen: abdomen is soft without significant tenderness, PEG tube remains in place, with dressing in place, masses, organomegaly   Extremity: 1+ edema, no cyanosis, no clubbing   Neuro: obtunded, unable to perform further testing   Skin: Poor turgor, pale, easily friable        DATA:  Labs:  Recent Labs     10/27/21  1446 10/27/21  0510 10/26/21  0624   WBC 11.5 8.6 11.1   HGB 10.6* 11.4* 11.5*   HCT 38.0 41.2 39.4    187 183     Recent Labs     10/27/21  1446 10/27/21  0510 10/26/21  0624 10/25/21  1552 10/25/21  0450    142 140   < > 140   K 3.6 3.8 4.3   < > 4.1    97* 96*   < > 99*   CO2 40* 43* 41*   < > 40*   * 124* 86   < > 107*   BUN 23* 21* 13   < > 10   CREA 0.56* 0.54* 0.34*   < > 0.32*   CA 8.4* 8.6 8.5   < > 8.5   MG 2.3 2.4 2.2  --  2.1   PHOS  --  3.6 2.9  --  2.1*   ALB 1.9*  --   --   --   --    ALT 21  --   --   --   --     < > = values in this interval not displayed. No results for input(s): PH, PCO2, PO2, HCO3, FIO2 in the last 72 hours. PFT Results  (Last 3 results in the past 10 years)    None        11/24/20    ECHO ADULT COMPLETE 11/24/2020 11/24/2020    Interpretation Summary  · LV: Estimated LVEF is 45 - 50%. Visually measured ejection fraction. Normal cavity size and wall thickness. Globally reduced systolic function. Abnormal left ventricular septal motion consistent with paradoxic motion. Inconclusive left ventricular diastolic function.   · Saline contrast was given to evaluate for intracardiac shunt. No shunt seen. Bubble study was negative with valsalva and without valsalva. · TV: Moderate tricuspid valve regurgitation is present. · PA: Mild pulmonary hypertension. Pulmonary arterial systolic pressure is 42 mmHg. Signed by: Harris Zheng MD on 11/24/2020  2:49 PM        Imaging:  [x]I have personally reviewed the patients radiographs  []Radiographs reviewed with radiologist   []No change from prior, tubes and lines in adequate position  []Improved   [x]Worsening    High complexity decision making was performed during the evaluation of this patient at high risk for decompensation with multiple organ involvement     Total of 143 min critical care time spent at bedside (personally) during the course of care providing evaluation,management and care decisions and ordering appropriate treatment related to critical care problems exclusive of procedures. The reason for providing this level of medical care for this critically ill patient was due a critical illness that impaired one or more vital organ systems such that there was a high probability of imminent or life threatening deterioration in the patients condition. This care involved high complexity decision making to assess, manipulate, and support vital system functions, to treat this degree vital organ system failure and to prevent further life threatening deterioration of the patients condition. This time was independent of other practitioners. Above mentioned total time spent on reviewing the case/medical record/data/notes/EMR/patient examination/documentation/coordinating care with nurse/consultants, exclusive of procedures with complex decision making performed and > 50% time spent in face to face evaluation.         Lolly Tavares MD/MPH     Pulmonary, Critical Care Medicine  [facility]

## 2021-10-27 NOTE — PROGRESS NOTES
1304 ABG done and results give to Dr Laure Brewster and Dr Say Street. PH 7.23 and Co2 > 110.  - Rapid response called. Patient intubated with 7.0 ETT by Anesthesia and is 23 at the lip. Patient started on ACVC+ 16 / 400 / +5 / Fio2 60%. Dr Say Street Notified.

## 2021-10-27 NOTE — PROGRESS NOTES
Infectious Disease progress Note        Reason: Right upper lobe cavitary pneumonia    Current abx Prior abx   Piperacillin/tazobactam since 10/18/2021 vancomycin 10/18-10/21  Levofloxacin 10/18-10/25     Lines:       Assessment :    67 y.o. 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 intake, and worsening cough . Now with gram positive bacteremia, chronic cavitary lesion RUL with RUL/RLL infiltrates    Clinical presentation c/w acute on chronic hypoxic respiratory failure likely secondary to recurrent aspiration pneumonia, chronic cavitary lesion right upper lobe  Rule out superimposed infection/colonization with atypical mycobacteria    Sputum cx- mixed respiratory cristine, MSSA yeast  Yeast likely colonizer. Sputum AFB 10/19 negative    Pulmonary follow-up appreciated    Single positive blood culture for coagulase negative staph. On  10/18 is likely contamination    S/p peg tube placement 10/21/21    Now with worsening tachypnea, increased abdominal pain on 10/25/2021 likely secondary to displaced PEG tube. GI follow-up appreciated. Status post removal of PEG tube 10/26/2021. Evidence of sherif-PEG wound infection noted  No clinical or radiological evidence of worsening pneumonia    Appears more drowsy on today's exam.  No worsening erythema/drainage around recent PEG site. No worsening tachypnea. Appeared comfortable on high flow oxygen at 7 L    Recommendations:    1. Continue Zosyn  2. Follow sputum for AFB  3. Follow-up pulmonary recommendations  4. Follow-up GI recommendations regarding replacement of PEG tube. 5.  Monitor PEG site for worsening infection       Above plan was discussed in details with primary team.  Please call me if any further questions or concerns.  Will continue to participate in the care of this patient. HPI:    Did not answer questions asked. Unable to obtain detailed review of system      Current Discharge Medication List      CONTINUE these medications which have NOT CHANGED    Details   acetylcysteine (MUCOMYST) 100 mg/mL (10 %) nebulizer solution Take 4 mL by inhalation every four (4) hours for 120 days. Qty: 720 mL, Refills: 3      fluorouraciL (EFUDEX) 5 % chemo cream APPLY CREAM TO FOREHEAD 2 TIMES DAILY FOR 2 WEEKS ONCE HEALED USE 2 TIMES DAILY TO THE EARS FOR 2 WEEKS ONCE HEALED USE 2 TIMES DAILY TO THE CHEEKS AND TEMPLES FOR 2 WEEKS      sodium chloride 3 % nebulizer solution 4 mL by Nebulization route four (4) times daily. Mix with albuterol. File under Medicare Part B. Dx: R06.02; J47.9; J84.9; R53.81  Qty: 480 mL, Refills: 5    Associated Diagnoses: Dyspnea on exertion; Bronchiectasis without complication ([address] ILD (interstitial lung disease) ([facility] [address] Chronic pulmonary aspiration, sequela; Physical deconditioning      albuterol (PROVENTIL VENTOLIN) 2.5 mg /3 mL (0.083 %) nebu Mix with hypertonic saline nebulizer -- use 3- times per day. File under Medicare Part B. Dx: R06.02; J47.9; J84.9; R53.81  Qty: 360 Nebule, Refills: 3    Associated Diagnoses: Dyspnea on exertion; Bronchiectasis without complication ([facility] [address] ILD (interstitial lung disease) ([facility] [address] Chronic pulmonary aspiration, sequela; Physical deconditioning      guaiFENesin ER (MUCINEX) 600 mg ER tablet Take 1 Tablet by mouth two (2) times a day for 90 days. Qty: 180 Tablet, Refills: 3    Associated Diagnoses: Bronchiectasis without complication (HCC)      cholecalciferol (VITAMIN D3) (1000 Units /25 mcg) tablet Take 1,000 Units by mouth daily. FISH OIL-DHA-EPA PO Take 1 Tab by mouth daily. amino acids powd Take 1 Dose by mouth daily. OTHER Take 1 Cap by mouth daily. tumeric       fluticasone propionate (FLONASE NA) 1 Spray by Both Nostrils route daily.       ascorbic acid, vitamin C, (VITAMIN C) 500 mg tablet Take 500 mg by mouth daily. multivitamin (ONE A DAY) tablet Take 1 Tab by mouth daily. aspirin delayed-release 81 mg tablet Take 81 mg by mouth daily.     Associated Diagnoses: Coronary artery disease involving native coronary artery of native heart without angina pectoris             Current Facility-Administered Medications   Medication Dose Route Frequency    bacitracin zinc-polymyxin b (POLYSPORIN) 500-10,000 unit/gram ointment   Topical BID    morphine injection 1 mg  1 mg IntraVENous Q8H PRN    naloxone (NARCAN) injection 0.4 mg  0.4 mg IntraVENous PRN    dextrose 5% and 0.9% NaCl infusion  40 mL/hr IntraVENous CONTINUOUS    albuterol-ipratropium (DUO-NEB) 2.5 MG-0.5 MG/3 ML  3 mL Nebulization Q4H RT    sodium chloride 3% hypertonic nebulizer soln  4 mL Nebulization Q4HWA RT    Lactobacillus Acidoph & Bulgar (FLORANEX) tablet 2 Tablet  2 Tablet Per G Tube BID    banana flakes trans-galactooligosaccharide (BANATROL PLUS) powder 1 Packet  1 Packet Per NG tube BID    guaiFENesin (ROBITUSSIN) 100 mg/5 mL oral liquid 400 mg  400 mg Per G Tube Q4H PRN    albuterol-ipratropium (DUO-NEB) 2.5 MG-0.5 MG/3 ML  3 mL Nebulization Q4H PRN    aspirin chewable tablet 81 mg  81 mg Per G Tube DAILY    cholecalciferol (VITAMIN D3) (1000 Units /25 mcg) tablet 1,000 Units  1,000 Units PEG Tube DAILY    polyethylene glycol (MIRALAX) packet 17 g  17 g Per G Tube DAILY PRN    acetaminophen (TYLENOL) tablet 650 mg  650 mg Per G Tube Q6H PRN    Or    acetaminophen (TYLENOL) suppository 650 mg  650 mg Rectal Q6H PRN    thiamine (B-1) 200 mg in 0.9% sodium chloride 50 mL IVPB  200 mg IntraVENous DAILY    enoxaparin (LOVENOX) injection 40 mg  40 mg SubCUTAneous QHS    sodium chloride (NS) flush 5-10 mL  5-10 mL IntraVENous PRN    piperacillin-tazobactam (ZOSYN) 3.375 g in 0.9% sodium chloride (MBP/ADV) 100 mL MBP  3.375 g IntraVENous Q6H    sodium chloride (NS) flush 5-40 mL  5-40 mL IntraVENous Q8H    sodium chloride (NS) flush 5-40 mL  5-40 mL IntraVENous PRN    omega-3 acid ethyl esters (LOVAZA) capsule 1,000 mg  1 g Oral DAILY WITH BREAKFAST    therapeutic multivitamin (THERAGRAN) tablet 1 Tablet  1 Tablet Oral DAILY       Allergies: Pollens extract    Family History   Problem Relation Age of Onset    No Known Problems Mother     Heart Disease Father     Heart Attack Father         1989    Coronary Artery Disease Father     Hypertension Father     High Cholesterol Father      Social History     Socioeconomic History    Marital status: SINGLE     Spouse name: Not on file    Number of children: Not on file    Years of education: Not on file    Highest education level: Not on file   Occupational History    Not on file   Tobacco Use    Smoking status: Never Smoker    Smokeless tobacco: Never Used   Vaping Use    Vaping Use: Never used   Substance and Sexual Activity    Alcohol use: No    Drug use: No    Sexual activity: Not on file   Other Topics Concern    Not on file   Social History Narrative    Not on file     Social Determinants of Health     Financial Resource Strain:     Difficulty of Paying Living Expenses:    Food Insecurity:     Worried About Running Out of Food in the Last Year:     Ran Out of Food in the Last Year:    Transportation Needs:     Lack of Transportation (Medical):      Lack of Transportation (Non-Medical):    Physical Activity:     Days of Exercise per Week:     Minutes of Exercise per Session:    Stress:     Feeling of Stress :    Social Connections:     Frequency of Communication with Friends and Family:     Frequency of Social Gatherings with Friends and Family:     Attends Anabaptism Services:     Active Member of Clubs or Organizations:     Attends Club or Organization Meetings:     Marital Status:    Intimate Partner Violence:     Fear of Current or Ex-Partner:     Emotionally Abused:     Physically Abused:     Sexually Abused:      Social History Tobacco Use   Smoking Status Never Smoker   Smokeless Tobacco Never Used        Temp (24hrs), Av.9 °F (36.6 °C), Min:97.8 °F (36.6 °C), Max:98.1 °F (36.7 °C)    Visit Vitals  BP (!) 91/52 (BP 1 Location: Left upper arm, BP Patient Position: At rest)   Pulse 90   Temp 97.9 °F (36.6 °C)   Resp 19   Ht 5' 7\" (1.702 m)   Wt 52.6 kg (115 lb 14.4 oz)   SpO2 95%   BMI 18.15 kg/m²       ROS: Unable to obtain due to patient factors    Physical Exam:    General:Thin  male sitting on the bed, sleepy, not very communicative    HEENT:  Normocephalic, atraumatic, no scleral icterus or pallor; no conjunctival hemmohage;  nasal and oral mucous are moist and without evidence of lesions. Neck supple, no bruits. Lymph Nodes:   not examined   Lungs:   shallow breathing, less tachypneic, rhonchi right upper lung   Heart:  RRR, s1 and s2; no  rubs or gallops, no edema, + pedal pulses   Abdomen:  soft, non-distended, no erythema/drainage around recent PEG site, no hepatomegaly, no splenomegaly. Non-tender   Genitourinary:  deferred   Extremities:   no clubbing, cyanosis; no joint effusions or swelling;  muscle mass appropriate for age   Neurologic:  No gross focal motor or sensory abnormalities                        Skin:  No rash or ulcers noted   Back:  not examined     Psychiatric:   Unable to assess         Labs: Results:   Chemistry Recent Labs     10/26/21  0624 10/25/21  1552 10/25/21  0450   GLU 86 138* 107*    139 140   K 4.3 4.1 4.1   CL 96* 96* 99*   CO2 41* 44* 40*   BUN 13 10 10   CREA 0.34* 0.37* 0.32*   CA 8.5 9.1 8.5   AGAP 3 NEG 1 1*   BUCR 38* 27* 31*      CBC w/Diff Recent Labs     10/26/21  0624 10/25/21  1552   WBC 11.1 11.9   RBC 4.27* 4.87   HGB 11.5* 12.8*   HCT 39.4 44.5    202   GRANS 94* 94*   LYMPH 1* 0*   EOS 0 0      Microbiology No results for input(s): CULT in the last 72 hours.        RADIOLOGY:    All available imaging studies/reports in [facility] for this admission were reviewed        Disclaimer: Sections of this note are dictated utilizing voice recognition software, which may have resulted in some phonetic based errors in grammar and contents. Even though attempts were made to correct all the mistakes, some may have been missed, and remained in the body of the document. If questions arise, please contact our department.     Dr. Dillan Power, Infectious Disease Specialist  [phone]  October 27, 2021  3:25 PM

## 2021-10-27 NOTE — ROUTINE PROCESS
1945  Assumed care of patient from off going nurse. Patient resting in bed. Tachpheic RR 40. Patient constantly trying to take off oxygen and condom cath. Call bell within reach, siderails up x 3, bed in lowest position, and patient instructed to use call bell for assistance. Will continue to monitor. 1950 Dr. Nichole Denney notified of MEWs score of 5. MD stated not to insert NGT that was ordered at this time. 2350 Patient removed condom catheter. Cleansed of incontinent care. Reapplied new condom cath. 0400 Patient relaxed. Respiratory rate and HR has improved. Attempted x1 with the assistance of charge nurse, April, to insert NGT in patient's R nare. Patient did not tolerate well. Constantly trying to pull out tube. NGT would not advance so tube removed. Patient Vitals for the past 12 hrs:   Temp Pulse Resp BP SpO2   10/27/21 0339     95 %   10/27/21 0329 97.9 °F (36.6 °C) 90 19 (!) 91/52 95 %   10/26/21 2350 97.9 °F (36.6 °C) 100 25 (!) 91/50 99 %   10/26/21 2044     98 %   10/26/21 2023 97.8 °F (36.6 °C) (!) 111 28 (!) 102/59 98 %   10/26/21 1957 97.9 °F (36.6 °C) (!) 118 (!) 42 130/70 98 %     0716 Bedside and Verbal shift change report given to [address] (oncoming nurse) by Anita Julien RN(offgoing nurse). Report included the following information SBAR, Intake/Output, MAR, Recent Results and Cardiac Rhythm SR/ST.

## 2021-10-27 NOTE — PROGRESS NOTES
Rapid Response Note  [facility]    Patient: Ginny So 67 y.o. male  [id number]  1945      Admit Date: 10/18/2021   Admission Diagnosis: Pneumonia [J18.9]    RAPID RESPONSE     Rapid response called for altered mental status/PCO2 >110. Attending physician Dr. Justine Jackson at bedside, informed us that pt in need of intubation. Anesthesia paged overhead and arrived promptly. Etomidate and succinyle choline given for intubation. BP 94/55. HR 93. Successful intubation. Pushed 1 mg Epinephrine for low pressures. Started Norepinephrine 2 mcg/min in RUE 20g PIV. Increased rate of norepinephrine to 15 mcg/min. Pt transferred to ICU for continued care. Medications Reviewed    OBJECTIVE     Patient Vitals for the past 12 hrs:   Temp Pulse Resp BP SpO2   10/27/21 1258 98.1 °F (36.7 °C) 81 18 135/68 100 %   10/27/21 1220     100 %   10/27/21 1113 97.2 °F (36.2 °C) 92 18 (!) 101/56 100 %   10/27/21 0800     100 %   10/27/21 0755 97 °F (36.1 °C) (!) 101 22 (!) 109/58 100 %   10/27/21 0747     100 %   10/27/21 0339     95 %   10/27/21 0329 97.9 °F (36.6 °C) 90 19 (!) 91/52 95 %         PHYSICAL:  General:  Obtunded  CV:  Tachycardic, no mgr  RESP:  Intubated on rebreather  ABD:  Soft, nontender, nondistended. Normoactive bowel sounds. No hepatosplenomegaly. No suprapubic tenderness. No CVA tenderness. Neuro:  obtunded    EKG: NA      ASSESSMENT, PLAN & DISPOSITION   Ginny So is a 67y.o. year old male admitted for Pneumonia [J18.9]. Rapid response called for hypercarbic respiratory failure. Patient condition currently: Intubated transition to ICU. Medications Administered: etomidate, succinyl choline, epinephrine, norepinephrine    Labs ordered/Pending: NA    Disposition: ICU    Attending Dr. Justine Jackson notified of rapid response. In agreement with plan. Primary team resuming care. Senior resident Dr. Zurdo Serna present during RRT and evaluation    Abran Culp. Kasia Granger MD, PGY1   [address] [facility] Medicine   Intern Pager: [phone]   October 27, 2021, 2:44 PM

## 2021-10-27 NOTE — PROGRESS NOTES
10/27/21 0800   Oxygen Therapy   O2 Sat (%) 100 %   Pulse via Oximetry 92 beats per minute   O2 Device Hi flow nasal cannula  (salter)   O2 Flow Rate (L/min) 6 l/min     Patient found on 7 lpm Salter. patient Spo2 100%.  Titrated to 6 lpm.

## 2021-10-27 NOTE — PROGRESS NOTES
Problem: Patient Education: Go to Patient Education Activity  Goal: Patient/Family Education  Outcome: Progressing Towards Goal     Problem: Falls - Risk of  Goal: *Absence of Falls  Description: Document Vanute Ramos Fall Risk and appropriate interventions in the flowsheet. Outcome: Progressing Towards Goal  Note: Fall Risk Interventions:  Mobility Interventions: Bed/chair exit alarm, Patient to call before getting OOB    Mentation Interventions: Bed/chair exit alarm, Toileting rounds    Medication Interventions: Bed/chair exit alarm, Patient to call before getting OOB    Elimination Interventions: Bed/chair exit alarm, Call light in reach              Problem: Patient Education: Go to Patient Education Activity  Goal: Patient/Family Education  Outcome: Progressing Towards Goal     Problem: Discharge Planning  Goal: *Discharge to safe environment  Outcome: Progressing Towards Goal  Goal: *Knowledge of medication management  Outcome: Progressing Towards Goal  Goal: *Knowledge of discharge instructions  Outcome: Progressing Towards Goal     Problem: Patient Education: Go to Patient Education Activity  Goal: Patient/Family Education  Outcome: Progressing Towards Goal     Problem: Patient Education: Go to Patient Education Activity  Goal: Patient/Family Education  Outcome: Progressing Towards Goal     Problem: Patient Education: Go to Patient Education Activity  Goal: Patient/Family Education  Outcome: Progressing Towards Goal     Problem: Nutrition Deficit  Goal: *Optimize nutritional status  Outcome: Progressing Towards Goal     Problem: Pressure Injury - Risk of  Goal: *Prevention of pressure injury  Description: Document Kali Scale and appropriate interventions in the flowsheet.   Outcome: Progressing Towards Goal  Note: Pressure Injury Interventions:  Sensory Interventions: Assess changes in LOC, Keep linens dry and wrinkle-free, Minimize linen layers    Moisture Interventions: Absorbent underpads, Minimize layers    Activity Interventions: Pressure redistribution bed/mattress(bed type), PT/OT evaluation    Mobility Interventions: Pressure redistribution bed/mattress (bed type), PT/OT evaluation    Nutrition Interventions: Document food/fluid/supplement intake    Friction and Shear Interventions: Apply protective barrier, creams and emollients, Minimize layers

## 2021-10-27 NOTE — PROCEDURES
[facility] Pulmonary Specialist  Arterial  Line Procedure Note with Ultrasound    Indication: acute hypoxic respiratory failure, shock, need for frequent ABGs    Performed emergently due to shock post intubate    Line Bundle:   Full sterile barrier precautions used. 7-Step Sterility Protocol followed. (cap, mask sterile gown, sterile gloves, large sterile sheet, hand hygiene, 2% chlorhexidine for cutaneous antisepsis)  5 mL 1% Lidocaine placed at insertion site. Using Ultrasound, left radial artery cannulated x 2 attempt(s) utilizing the modified Seldinger technique. Good blood return. Catheter secured & Biopatch applied. Sterile Tegaderm placed. Due to technical difficulties and emergent nature, US images were unable to uploaded to connect care.       Roz oS MD/MPH     Pulmonary, Critical Care Medicine  [facility]

## 2021-10-28 NOTE — PROGRESS NOTES
PCCM update:    Called to bedside as patient had self extubated. Patient unresponsive, breathing on his own. Placed on NRB w/ O2 saturations increasing up to 100%, however, patient remained unresponsive to verbal and noxious stimuli and anesthesia was paged for reintubation due to lack of airway protection. Re-intubated without incident. See intubation note for further details.     CC time: 31 minutes    Natacha Goff   10/28/21   Pulmonary, Critical Care Medicine  [facility]

## 2021-10-28 NOTE — PROGRESS NOTES
[facility] Hospitalist Group  Progress Note    Patient: Vernell Krishna Age: 67 y.o. : 1945 MR#: [id number] SSN: [id number]  Date/Time: 10/28/2021    Subjective:     I personally saw and evaluated this patient on 2021  Patient is laying in bed, intubated    Assessment/Plan:   Acute hypercapnic respiratory failure  Possible aspiration pneumonia, suspected chronic aspiration  Cavitary lung lesion  Interstitial lung disease  Hypoxia  Chronic systolic congestive heart failurecompensated  Gram-positive cocci bacteremialikely contaminant  Dysphagia  Abdominal pain, tachypnea, tachycardia, SIRS  Sinus tachycardiaEKG reviewed  Displaced PEG tube  Chest x-ray suspicious for a little volume overload  New cardiomyopathy noted on echocardiology consult    PLAN  Ventilator support, as per pulmonary critical care  Pressors as needed  Continue antibiotics  Cardiology input noted  Continue bronchial hygiene,   Palliative care is following  GI is following  Continue tube feeding  Prognosis is poor  Will defer further care to ICU team    Case discussed with:  [x]Patient  []Family  []Nursing  []Case Management  DVT Prophylaxis:  [x]Lovenox  []Hep SQ  []SCDs  []Coumadin   []On Heparin gtt    Objective:   VS:   Visit Vitals  BP (!) 96/51   Pulse 88   Temp 98.6 °F (37 °C)   Resp 15   Ht 5' 7\" (1.702 m)   Wt 54 kg (119 lb 0.8 oz)   SpO2 100%   BMI 18.65 kg/m²      Tmax/24hrs: Temp (24hrs), Av.1 °F (37.3 °C), Min:98.2 °F (36.8 °C), Max:100 °F (37.8 °C)        Input/Output:     Intake/Output Summary (Last 24 hours) at 10/28/2021 1948  Last data filed at 10/28/2021 1700  Gross per 24 hour   Intake 1752.58 ml   Output 1740 ml   Net 12.58 ml       General: Intubated  Cardiovascular:  S1S2+, RRR  Pulmonary: Coarse breath sounds bilaterally  GI:  Soft, BS+, NT, ND  Extremities:  No edema      Labs:    Recent Results (from the past 24 hour(s))   CARDIAC PANEL,(CK, CKMB & TROPONIN) Collection Time: 10/27/21  9:06 PM   Result Value Ref Range    CK - MB 2.8 <3.6 ng/ml    CK-MB Index 11.2 (H) 0.0 - 4.0 %    CK 25 (L) 39 - 308 U/L    Troponin-I, QT 0.62 (H) 0.0 - 0.045 NG/ML   GLUCOSE, POC    Collection Time: 10/27/21 11:23 PM   Result Value Ref Range    Glucose (POC) 180 (H) 70 - 110 mg/dL   CARDIAC PANEL,(CK, CKMB & TROPONIN)    Collection Time: 10/28/21  2:39 AM   Result Value Ref Range    CK - MB 1.5 <3.6 ng/ml    CK-MB Index 6.0 (H) 0.0 - 4.0 %    CK 25 (L) 39 - 308 U/L    Troponin-I, QT 0.53 (H) 0.0 - 9.899 NG/ML   METABOLIC PANEL, BASIC    Collection Time: 10/28/21  2:39 AM   Result Value Ref Range    Sodium 143 136 - 145 mmol/L    Potassium 3.5 3.5 - 5.5 mmol/L    Chloride 105 100 - 111 mmol/L    CO2 33 (H) 21 - 32 mmol/L    Anion gap 5 3.0 - 18 mmol/L    Glucose 160 (H) 74 - 99 mg/dL    BUN 25 (H) 7.0 - 18 MG/DL    Creatinine 0.62 0.6 - 1.3 MG/DL    BUN/Creatinine ratio 40 (H) 12 - 20      GFR est AA >60 >60 ml/min/1.73m2    GFR est non-AA >60 >60 ml/min/1.73m2    Calcium 8.2 (L) 8.5 - 10.1 MG/DL   CBC WITH AUTOMATED DIFF    Collection Time: 10/28/21  2:39 AM   Result Value Ref Range    WBC 11.0 4.6 - 13.2 K/uL    RBC 3.80 (L) 4.35 - 5.65 M/uL    HGB 10.0 (L) 13.0 - 16.0 g/dL    HCT 34.7 (L) 36.0 - 48.0 %    MCV 91.3 78.0 - 100.0 FL    MCH 26.3 24.0 - 34.0 PG    MCHC 28.8 (L) 31.0 - 37.0 g/dL    RDW 13.4 11.6 - 14.5 %    PLATELET 274 724 - 116 K/uL    MPV 9.3 9.2 - 11.8 FL    NEUTROPHILS 92 (H) 40 - 73 %    LYMPHOCYTES 2 (L) 21 - 52 %    MONOCYTES 6 3 - 10 %    EOSINOPHILS 0 0 - 5 %    BASOPHILS 0 0 - 2 %    ABS. NEUTROPHILS 10.1 (H) 1.8 - 8.0 K/UL    ABS. LYMPHOCYTES 0.2 (L) 0.9 - 3.6 K/UL    ABS. MONOCYTES 0.7 0.05 - 1.2 K/UL    ABS. EOSINOPHILS 0.0 0.0 - 0.4 K/UL    ABS.  BASOPHILS 0.0 0.0 - 0.1 K/UL    DF AUTOMATED      PLATELET COMMENTS ADEQUATE PLATELETS      RBC COMMENTS ANISOCYTOSIS  1+        RBC COMMENTS OVALOCYTES  1+        RBC COMMENTS PAULA CELLS  1+        RBC COMMENTS HYPOCHROMIA  1+       MAGNESIUM    Collection Time: 10/28/21  2:39 AM   Result Value Ref Range    Magnesium 2.1 1.6 - 2.6 mg/dL   PHOSPHORUS    Collection Time: 10/28/21  2:39 AM   Result Value Ref Range    Phosphorus 2.1 (L) 2.5 - 4.9 MG/DL   PROTHROMBIN TIME + INR    Collection Time: 10/28/21  2:39 AM   Result Value Ref Range    Prothrombin time 14.2 11.5 - 15.2 sec    INR 1.1 0.8 - 1.2     HEMOGLOBIN A1C WITH EAG    Collection Time: 10/28/21  2:48 AM   Result Value Ref Range    Hemoglobin A1c 5.8 (H) 4.2 - 5.6 %    Est. average glucose 120 mg/dL   BLOOD GAS, ARTERIAL POC    Collection Time: 10/28/21  3:17 AM   Result Value Ref Range    Device: AEROSOL MASK      FIO2 (POC) 50 %    pH (POC) 7.52 (H) 7.35 - 7.45      pCO2 (POC) 41.7 35.0 - 45.0 MMHG    pO2 (POC) 107 (H) 80 - 100 MMHG    HCO3 (POC) 33.9 (H) 22 - 26 MMOL/L    sO2 (POC) 98.6 (H) 92 - 97 %    Base excess (POC) 10.1 mmol/L    Mode ASSIST CONTROL      Tidal volume 400 ml    Set Rate 14 bpm    PEEP/CPAP (POC) 5 cmH2O    PIP (POC) 23      Allens test (POC) NOT APPLICABLE      Total resp.  rate 14      Site DRAWN FROM ARTERIAL LINE      Specimen type (POC) ARTERIAL      Performed by Geronimo Randolph    CULTURE, RESPIRATORY/SPUTUM/BRONCH W GRAM STAIN    Collection Time: 10/28/21  3:21 AM    Specimen: Endotracheal aspirate; Sputum   Result Value Ref Range    Special Requests: NO SPECIAL REQUESTS      GRAM STAIN 1+ WBCS SEEN      GRAM STAIN NO ORGANISMS SEEN      Culture result: PENDING    GLUCOSE, POC    Collection Time: 10/28/21  5:12 AM   Result Value Ref Range    Glucose (POC) 171 (H) 70 - 110 mg/dL   BLOOD GAS, ARTERIAL POC    Collection Time: 10/28/21 11:04 AM   Result Value Ref Range    Device: ADULT VENT      FIO2 (POC) 35 %    pH (POC) 7.47 (H) 7.35 - 7.45      pCO2 (POC) 42.7 35.0 - 45.0 MMHG    pO2 (POC) 81 80 - 100 MMHG    HCO3 (POC) 31.4 (H) 22 - 26 MMOL/L    sO2 (POC) 96.5 92 - 97 %    Base excess (POC) 6.9 mmol/L    Mode ASSIST CONTROL      Tidal volume 400 ml    Set Rate 14 bpm    PEEP/CPAP (POC) 5 cmH2O    Allens test (POC) NOT APPLICABLE      Inspiratory Time 1 sec    Total resp.  rate 14      Site DRAWN FROM ARTERIAL LINE      Specimen type (POC) ARTERIAL      Performed by REGI Perez    Collection Time: 10/28/21 11:09 AM   Result Value Ref Range    Glucose (POC) 164 (H) 70 - 110 mg/dL   GLUCOSE, POC    Collection Time: 10/28/21  5:37 PM   Result Value Ref Range    Glucose (POC) 96 70 - 110 mg/dL     Additional Data Reviewed:      Signed By: Eve Caballero MD     October 28, 2021

## 2021-10-28 NOTE — PROGRESS NOTES
Per chart review, patient transferred to ICU and now intubated. Discontinuing PT orders. Please re-order when medically appropriate.

## 2021-10-28 NOTE — PROGRESS NOTES
attended the interdisciplinary rounds for Dora Maravilla, who is a 67 y. o.,male. Patients Primary Language is: [place]. According to the patients EMR Church Affiliation is: Denominational. The reason the Patient came to the hospital is:   Patient Active Problem List    Diagnosis Date Noted    Gastrostomy complication ([facility] [address] 73/22/2351    Severe protein-calorie malnutrition ([address] 10/20/2021    Goals of care, counseling/discussion     Acute respiratory failure with hypoxia ([facility] [address]     Debility     Pneumonia 10/18/2021    Urinary frequency 06/30/2016    Scarlet fever     [facility] infarction [facility])     Coronary artery disease involving native coronary artery of native heart without angina pectoris,s/p stent 2003 05/19/2016          Plan:  King Carrillo will continue to follow and will provide pastoral care on as needed/requested basis.  recommends bedside caregivers page  on duty if patient shows signs of acute spiritual or emotional distress.     Matthew Gottron, [address]   [phone]

## 2021-10-28 NOTE — PROGRESS NOTES
Nutrition Assessment     Type and Reason for Visit: Reassess, Consult    Nutrition Recommendations/Plan:   - Initiate tube feeding of Jevity 1.5 at 10 mL/hr and advance as tolerated by 10 mL q 8 hours to goal rate of 50 mL/hr with 100 mL q 4 hour water flushes (goal regimen to provide 1800 kcal, 77 gm protein, 912 mL free water, 100% RDIs). - Discontinue banatrol.  - IVF per MD.        Nutrition Assessment:  Patient obtunded with acute respiratory failure and intubated yesterday, hypotensive with OGT clamped and plan to start tube feeding today. Electrolytes replaced. No longer with loose stool and plan to stop banatrol. Malnutrition Assessment:  Malnutrition Status: Severe malnutrition     Estimated Daily Nutrient Needs:  Energy (kcal):  4923-7850  Protein (g):         Fluid (ml/day):  7966-7840    Nutrition Related Findings:  Last BM 10/25. Pertinent Medications: cholecalciferol, banatrol BID, floranex, fentanyl, versed, levophed, omega 3 fatty acids, 40 mEq KCl, 20 mmol K Phos, thiamine, theragran, D5 NS at 40 mL/hr      Additional Caloric Sources: D5 NS at 40 mL/hr (48 gm dextrose, 163 kcal per day)     Current Nutrition Therapies:  DIET NPO  ADULT TUBE FEEDING Orogastric; Standard with Fiber; Delivery Method: Continuous; Continuous Initial Rate (mL/hr): 10; Continuous Advance Tube Feeding: Yes; Advancement Volume (mL/hr): 10; Advancement Frequency: Q 8 hours; Continuous Goal Rate (mL/...     Anthropometric Measures:  · Height:  5' 7\" (170.2 cm)  · Current Body Wt:  54.4 kg (120 lb)  · BMI: 18.8    Nutrition Diagnosis:   · Inadequate oral intake related to swallowing difficulty as evidenced by NPO or clear liquid status due to medical condition, swallowing study results    · Severe malnutrition, In context of chronic illness related to inadequate protein-energy intake, swallowing difficulty as evidenced by poor intake prior to admission, weight loss, severe muscle loss, severe loss of subcutaneous fat      Nutrition Intervention:  Food and/or Nutrient Delivery: Continue NPO, Mineral supplement, Vitamin supplement, Start tube feeding, IV fluid delivery  Nutrition Education and Counseling: Education completed (basics of g-tube and EN support)  Coordination of Nutrition Care: Continue to monitor while inpatient, Interdisciplinary rounds, Speech therapy, Coordination of community care    Goals:  Nutritional needs will be met through adequate oral intake or nutrition support within the next 7 days.        Nutrition Monitoring and Evaluation:   Behavioral-Environmental Outcomes: None identified  Food/Nutrient Intake Outcomes: Enteral nutrition intake/tolerance, Vitamin/mineral intake, IVF intake  Physical Signs/Symptoms Outcomes: Biochemical data, GI status, Nutrition focused physical findings    Discharge Planning:    Enteral nutrition     Electronically signed by Bassam Keenan RD, [address]  on 10/28/2021 at 10:34 AM    Contact Number: [phone]

## 2021-10-28 NOTE — PROGRESS NOTES
Infectious Disease progress Note        Reason: Right upper lobe cavitary pneumonia    Current abx Prior abx   Piperacillin/tazobactam since 10/18/2021 vancomycin 10/18-10/21  Levofloxacin 10/18-10/25     Lines:       Assessment :    67 y.o. 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 intake, and worsening cough . Now with gram positive bacteremia, chronic cavitary lesion RUL with RUL/RLL infiltrates    Clinical presentation c/w acute on chronic hypoxic respiratory failure likely secondary to recurrent aspiration pneumonia, chronic cavitary lesion right upper lobe  Rule out superimposed infection/colonization with atypical mycobacteria    Sputum cx- mixed respiratory cristine, MSSA yeast  Yeast likely colonizer. Sputum AFB 10/19 negative    Pulmonary follow-up appreciated    Single positive blood culture for coagulase negative staph. On  10/18 is likely contamination    S/p peg tube placement 10/21/21    Now with worsening tachypnea, increased abdominal pain on 10/25/2021 likely secondary to displaced PEG tube. GI follow-up appreciated. Status post removal of PEG tube 10/26/2021. Evidence of sherif-PEG wound infection noted  No clinical or radiological evidence of worsening pneumonia    Unresponsiveness on 10/27/2021 status post intubation 10/27/2021. Pulled out ET tube, reintubated on 10/20/2021  Currently on 35% FiO2. No worsening erythema/drainage around recent PEG site. No worsening tachypnea. Recommendations:    1. Continue Zosyn  2. Follow sputum for AFB  3. Weaning from vent per ICU team  4. Follow-up GI recommendations regarding replacement of PEG tube. 5.  Monitor PEG site for worsening infection       Above plan was discussed in details with icu  team.  Please call me if any further questions or concerns. Will continue to participate in the care of this patient. HPI:    Intubated      Current Discharge Medication List      CONTINUE these medications which have NOT CHANGED    Details   acetylcysteine (MUCOMYST) 100 mg/mL (10 %) nebulizer solution Take 4 mL by inhalation every four (4) hours for 120 days. Qty: 720 mL, Refills: 3      fluorouraciL (EFUDEX) 5 % chemo cream APPLY CREAM TO FOREHEAD 2 TIMES DAILY FOR 2 WEEKS ONCE HEALED USE 2 TIMES DAILY TO THE EARS FOR 2 WEEKS ONCE HEALED USE 2 TIMES DAILY TO THE CHEEKS AND TEMPLES FOR 2 WEEKS      sodium chloride 3 % nebulizer solution 4 mL by Nebulization route four (4) times daily. Mix with albuterol. File under Medicare Part B. Dx: R06.02; J47.9; J84.9; R53.81  Qty: 480 mL, Refills: 5    Associated Diagnoses: Dyspnea on exertion; Bronchiectasis without complication ([address] ILD (interstitial lung disease) ([facility] [address] Chronic pulmonary aspiration, sequela; Physical deconditioning      albuterol (PROVENTIL VENTOLIN) 2.5 mg /3 mL (0.083 %) nebu Mix with hypertonic saline nebulizer -- use 3- times per day. File under Medicare Part B. Dx: R06.02; J47.9; J84.9; R53.81  Qty: 360 Nebule, Refills: 3    Associated Diagnoses: Dyspnea on exertion; Bronchiectasis without complication ([address] ILD (interstitial lung disease) ([facility] [address] Chronic pulmonary aspiration, sequela; Physical deconditioning      guaiFENesin ER (MUCINEX) 600 mg ER tablet Take 1 Tablet by mouth two (2) times a day for 90 days. Qty: 180 Tablet, Refills: 3    Associated Diagnoses: Bronchiectasis without complication (HCC)      cholecalciferol (VITAMIN D3) (1000 Units /25 mcg) tablet Take 1,000 Units by mouth daily. FISH OIL-DHA-EPA PO Take 1 Tab by mouth daily. amino acids powd Take 1 Dose by mouth daily. OTHER Take 1 Cap by mouth daily. tumeric       fluticasone propionate (FLONASE NA) 1 Spray by Both Nostrils route daily.       ascorbic acid, vitamin C, (VITAMIN C) 500 mg tablet Take 500 mg by mouth daily.      multivitamin (ONE A DAY) tablet Take 1 Tab by mouth daily. aspirin delayed-release 81 mg tablet Take 81 mg by mouth daily.     Associated Diagnoses: Coronary artery disease involving native coronary artery of native heart without angina pectoris             Current Facility-Administered Medications   Medication Dose Route Frequency    potassium chloride 10 mEq in 50 ml IVPB  10 mEq IntraVENous Q1H    NOREPINephrine (LEVOPHED) 8 mg in 5% dextrose 250mL (32 mcg/mL) infusion  0.5-50 mcg/min IntraVENous TITRATE    LORazepam (ATIVAN) injection 2 mg  2 mg IntraVENous Q15MIN PRN    HYDROmorphone (DILAUDID) syringe 1 mg  1 mg IntraVENous Q15MIN PRN    chlorhexidine (PERIDEX) 0.12 % mouthwash 10 mL  10 mL Oral Q12H    midazolam (VERSED) injection 2 mg  2 mg IntraVENous Q10MIN PRN    fentaNYL citrate (PF) injection 100 mcg  100 mcg IntraVENous Q30MIN PRN    fentaNYL (PF) 1,500 mcg/30 mL (50 mcg/mL) infusion  0-200 mcg/hr IntraVENous TITRATE    insulin lispro (HUMALOG) injection   SubCUTAneous Q6H    glucose chewable tablet 16 g  16 g Oral PRN    glucagon (GLUCAGEN) injection 1 mg  1 mg IntraMUSCular PRN    bacitracin zinc-polymyxin b (POLYSPORIN) 500-10,000 unit/gram ointment   Topical BID    naloxone (NARCAN) injection 0.4 mg  0.4 mg IntraVENous PRN    dextrose 5% and 0.9% NaCl infusion  40 mL/hr IntraVENous CONTINUOUS    albuterol-ipratropium (DUO-NEB) 2.5 MG-0.5 MG/3 ML  3 mL Nebulization Q4H RT    sodium chloride 3% hypertonic nebulizer soln  4 mL Nebulization Q4HWA RT    Lactobacillus Acidoph & Bulgar (FLORANEX) tablet 2 Tablet  2 Tablet Per G Tube BID    banana flakes trans-galactooligosaccharide (BANATROL PLUS) powder 1 Packet  1 Packet Per NG tube BID    guaiFENesin (ROBITUSSIN) 100 mg/5 mL oral liquid 400 mg  400 mg Per G Tube Q4H PRN    albuterol-ipratropium (DUO-NEB) 2.5 MG-0.5 MG/3 ML  3 mL Nebulization Q4H PRN    aspirin chewable tablet 81 mg  81 mg Per G Tube DAILY    cholecalciferol (VITAMIN D3) (1000 Units /25 mcg) tablet 1,000 Units  1,000 Units PEG Tube DAILY    polyethylene glycol (MIRALAX) packet 17 g  17 g Per G Tube DAILY PRN    acetaminophen (TYLENOL) tablet 650 mg  650 mg Per G Tube Q6H PRN    Or    acetaminophen (TYLENOL) suppository 650 mg  650 mg Rectal Q6H PRN    thiamine (B-1) 200 mg in 0.9% sodium chloride 50 mL IVPB  200 mg IntraVENous DAILY    enoxaparin (LOVENOX) injection 40 mg  40 mg SubCUTAneous QHS    sodium chloride (NS) flush 5-10 mL  5-10 mL IntraVENous PRN    piperacillin-tazobactam (ZOSYN) 3.375 g in 0.9% sodium chloride (MBP/ADV) 100 mL MBP  3.375 g IntraVENous Q6H    sodium chloride (NS) flush 5-40 mL  5-40 mL IntraVENous Q8H    sodium chloride (NS) flush 5-40 mL  5-40 mL IntraVENous PRN    omega-3 acid ethyl esters (LOVAZA) capsule 1,000 mg  1 g Oral DAILY WITH BREAKFAST    therapeutic multivitamin (THERAGRAN) tablet 1 Tablet  1 Tablet Oral DAILY     Facility-Administered Medications Ordered in Other Encounters   Medication Dose Route Frequency    propofoL (DIPRIVAN) 10 mg/mL injection   IntraVENous PRN       Allergies: Pollens extract    Family History   Problem Relation Age of Onset    No Known Problems Mother     Heart Disease Father     Heart Attack Father         1989    Coronary Artery Disease Father     Hypertension Father     High Cholesterol Father      Social History     Socioeconomic History    Marital status: SINGLE     Spouse name: Not on file    Number of children: Not on file    Years of education: Not on file    Highest education level: Not on file   Occupational History    Not on file   Tobacco Use    Smoking status: Never Smoker    Smokeless tobacco: Never Used   Vaping Use    Vaping Use: Never used   Substance and Sexual Activity    Alcohol use: No    Drug use: No    Sexual activity: Not on file   Other Topics Concern    Not on file   Social History Narrative    Not on file     Social Determinants of Health     Financial Resource Strain:     Difficulty of Paying Living Expenses:    Food Insecurity:     Worried About Running Out of Food in the Last Year:     [address] in the Last Year:    Transportation Needs:     Lack of Transportation (Medical):  Lack of Transportation (Non-Medical):    Physical Activity:     Days of Exercise per Week:     Minutes of Exercise per Session:    Stress:     Feeling of Stress :    Social Connections:     Frequency of Communication with Friends and Family:     Frequency of Social Gatherings with Friends and Family:     Attends Mu-[employer] Services:     Active Member of Clubs or Organizations:     Attends Club or Organization Meetings:     Marital Status:    Intimate Partner Violence:     Fear of Current or Ex-Partner:     Emotionally Abused:     Physically Abused:     Sexually Abused:      Social History     Tobacco Use   Smoking Status Never Smoker   Smokeless Tobacco Never Used        Temp (24hrs), Av.9 °F (37.2 °C), Min:97.2 °F (36.2 °C), Max:100 °F (37.8 °C)    Visit Vitals  BP (!) 104/52   Pulse 68   Temp 99.5 °F (37.5 °C)   Resp 16   Ht 5' 7\" (1.702 m)   Wt 54 kg (119 lb 0.8 oz)   SpO2 99%   BMI 18.65 kg/m²       ROS: Unable to obtain due to patient factors    Physical Exam:    General:Thin  male laying on bed, intubated, sedated    HEENT:  Normocephalic, atraumatic, no scleral icterus or pallor; no conjunctival hemmohage; ET tube in place; neck supple, no bruits. Lymph Nodes:   not examined   Lungs:   shallow breathing, less tachypneic, rhonchi right upper lung   Heart:  RRR, s1 and s2; no  rubs or gallops, no edema, + pedal pulses   Abdomen:  soft, non-distended, no erythema/drainage around recent PEG site, no hepatomegaly, no splenomegaly. Non-tender   Genitourinary:  deferred   Extremities:   no clubbing, cyanosis; no joint effusions or swelling;  muscle mass appropriate for age   Neurologic:   Sedated                        Skin:  No rash or ulcers noted   Back:  not examined     Psychiatric:   Unable to assess         Labs: Results:   Chemistry Recent Labs     10/28/21  0239 10/27/21  1446 10/27/21  0510   * 247* 124*    145 142   K 3.5 3.6 3.8    102 97*   CO2 33* 40* 43*   BUN 25* 23* 21*   CREA 0.62 0.56* 0.54*   CA 8.2* 8.4* 8.6   AGAP 5 3 2*   BUCR 40* 41* 39*   AP  --  30*  --    TP  --  5.4*  --    ALB  --  1.9*  --    GLOB  --  3.5  --    AGRAT  --  0.5*  --       CBC w/Diff Recent Labs     10/28/21  0239 10/27/21  1446 10/27/21  0510   WBC 11.0 11.5 8.6   RBC 3.80* 4.00* 4.24*   HGB 10.0* 10.6* 11.4*   HCT 34.7* 38.0 41.2    213 187   GRANS 92* 93* 90*   LYMPH 2* 1* 2*   EOS 0 0 0      Microbiology No results for input(s): CULT in the last 72 hours. RADIOLOGY:    All available imaging studies/reports in [facility] care for this admission were reviewed        Disclaimer: Sections of this note are dictated utilizing voice recognition software, which may have resulted in some phonetic based errors in grammar and contents. Even though attempts were made to correct all the mistakes, some may have been missed, and remained in the body of the document. If questions arise, please contact our department.     Dr. Stevie La, Infectious Disease Specialist  [phone]  October 28, 2021  3:25 PM

## 2021-10-28 NOTE — PROGRESS NOTES
Responded to vent alarm and found pt self extubated. Placed on NRB/mask and anesthesia paged. 0020- Re-intubate by CRNA ETT 7.5 secure at [email or web address]. Placement verified by EtCO2,, BBS clear and bilaterally chest rise movement. Chest xray pending. Placed on vent with current setting.

## 2021-10-28 NOTE — INTERDISCIPLINARY ROUNDS
[facility]  Pulmonary, Critical Care, and Sleep Medicine  Interdisciplinary and Ventilator Weaning Rounds    Patient discussed in morning walking rounds and interdisciplinary rounds. ICU day: 2    Overnight events:    Self extubation overnight    Restraints applied     Assessments and best practice:   Ventilator  o Ventilator day 2  o Vent settings: VC/VC+ with RR 14 and TV of 400 and FiO2 of 50 and PEEP of 5  o VAP bundle, aim to keep peak plateau pressure 23-92JB H2O  o Weaning assessed and documented   - Patient does not meet criteria for SBT. - Patient is not on sedation holiday. - Plan to wean with PS n/a.  - Outcome: n/a   - Final plan: n/a   Sedation  o Fentanyl    Other pertinent drips  o none   Lines noted  o Femoral CVL    Critical labs assessed  o Yes   Antibiotics  o Zosyn   Medications reviewed  o Yes   Pending imaging  o none   Pending send out labs  o No   Pending Procedures  o PEG   Glycemic control   Stress ulcer prophylaxis. o Pepcid   DVT prophylaxis. o Lovenox   Need for Lines, corcoran assessed.  o Yes   Restraint Reevaluation   o Yes  o I have reevaluated the patient one hour after initiation of intervention. The patient is comfortable, uninjured, but continues to pose an imminent risk of injury to self to themselves and/or serious disruption of medical treatment required to keep patient stable. The patient's current medical and behavioral conditions that warrant the use intervention include danger to self and Interference with medical equipment or treatment. Restraint or seclusion will be discontinued at the earliest possible time, regardless of the scheduled expiration of the order.  Based on my evaluation, restraints will be continued: YES 10/28/21 1015  o Attending Overseeing restraints: Wendy Shi MD     Family contact/MPOA: family  Family updated will update today       Daily Plans:   Cont' vent support today    SUP   TF   Bladder scan CONNOR time 15 minutes        Doroteo Berger PA-C  10/28/21  Pulmonary, Critical Care Medicine  [facility]

## 2021-10-28 NOTE — PROGRESS NOTES
0730am Bedside shift report received from [facility], [place]  0930am Wean Levo IV gtt to keep Map above 65  1121am- Bladder scan done at the bedside. Greater than 679  1215pm Attempt Chordae catheter. Merlos catheter coiled inside and clot noted. 1600pm #16 Coule Catheter inserted at the bedside by Ms. Rodriguez Bone NP and Dr. Butch Driver.  1900pm Bedside shift change report given to Tyson Godfrey RN by Cecile Stanley RN. Report included the following information SBAR, Kardex, Intake/Output, MAR, Recent Results, Med Rec Status, Cardiac Rhythm NSR and Alarm Parameters .

## 2021-10-28 NOTE — PROGRESS NOTES
1930 Bedside and Verbal shift change report given to diego rn  (oncoming nurse) by Emelina Elaine (offgoing nurse). Report included the following information SBAR, Kardex and Recent Results   2000 assessment completed. 2350 vent alarming, found with  ett in hand, anesthesia paged, fio 99 %. 0000 reassessment completed. 0020 reintubated  With 7.5 ett. 23 lip.  0400 reassessment completed. 0730 Bedside and Verbal shift change report given to dayday jara (oncoming nurse) by diego rn (offgoing nurse). Report included the following information SBAR, Kardex and Recent Results. Side rails up x 4. Call light within reach. Hob elevated 30 degrees.

## 2021-10-28 NOTE — ANESTHESIA PROCEDURE NOTES
Emergent Intubation  Performed by: Solis Blakely CRNA  Authorized by: Solis Blakely CRNA     Emergent Intubation:   Date/Time:  10/28/2021 12:20 AM  Indications:  Impending respiratory failure  Spontaneous Ventilation: present    Expected sedation level: responds to stimuli. Preoxygenated: Yes      Airway Documentation:   Airway:  ETT - Cuffed  Advanced Technique:  Glide scope  Insertion Site:  Oral  ETT size (mm):  7.5  ETT Line Clint:  Lips  ETT Insertion depth (cm):  24  Placement verified by: auscultation, EtCO2 and BBS    Attempts:  1  Difficult airway: No    Dl times one per glidescope,#7.5 ett placed without incident,teeth and lips intact.

## 2021-10-28 NOTE — PROGRESS NOTES
[facility]  Pulmonary, Critical Care, and Sleep Medicine    Name: Ayush Whipple MRN: [id number]   : 1945 Hospital: [address] [place] [address]   Date: 10/28/2021      F/U pulmonary consult  Requested by:  Dr. Rhett Blue  Reason:  dyspnea      IMPRESSION:   · Acute hypoxic and hypercarbic respiratory failure: 2/2 PNA and pulm edema on top of bronchiectasis, ILD  · Shock:  Septic + cardiogenic  · Acute pulmonary edema: A combination of worsening cardiomyopathy as well as hypoalbuminemia  · RUL PNA w bronchiectasis, RLL nodular consolidation, Cavitary lesions - likely 2/2 recurrent aspiration. resp cultures growing MSSA. At risk for invasive fungal PNA, NTM, given hx severe structural lung disease. 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 ?sepsis, stress, ischemic  · Severe pulmonary hypertension on echo. Definitely group II and III component   · Urinary retention  · normocytic anemia-  stable  · Deconditioning/adult failure to thrive/cachexia: Body mass index is 18.65 kg/m².   · Dyspnea/shortness of breath: Due to above     Patient Active Problem List   Diagnosis Code    Coronary artery disease involving native coronary artery of native heart without angina pectoris,s/p stent  I25.10    Scarlet fever A38.9    Myocardial infarction ([address] I21.9    Urinary frequency R35.0    Pneumonia J18.9    Goals of care, counseling/discussion Z71.89    Acute respiratory failure with hypoxia ([address] J96.01    Debility R53.81    Severe protein-calorie malnutrition ([address] E43    Gastrostomy complication ([facility] [address] M37.69      PLAN:   Low tidal vol ventilation, aggressive pulm toilet/chest PT  Vasopressors to maintain MAP >65mmHg  Daily sedation vacation  ABx per ID, broad spectrum  May need BAL if not improvnig  Will need to diurese but need to address urinary retention first. Nurse unable to place corcoran, urology consulted  Start tube feeds. OGT for feeds, hold off on PEG until goals of care further discussed  DVT proph: lovenox    Palliative saw earlier in admission and was full code and interventions  Would be appropriate to have another palliative meeting  Very poor prognosis     Subjective/Interval History:   10/28/21   Patient seen and examined at [facility] with RRT. Greg Wilkerson Pt obtunded, pCO2 >110. Sister at bedside. ROS:Review of systems not obtained due to patient factors.     Objective:   Vital Signs:    Visit Vitals  /72   Pulse 90   Temp 98.2 °F (36.8 °C)   Resp 14   Ht 5' 7\" (1.702 m)   Wt 54 kg (119 lb 0.8 oz)   SpO2 97%   BMI 18.65 kg/m²       O2 Device: Ventilator, Endotracheal tube   O2 Flow Rate (L/min): 6 l/min   Temp (24hrs), Av.3 °F (37.4 °C), Min:98.2 °F (36.8 °C), Max:100 °F (37.8 °C)       Intake/Output:   Last shift:      10/28 07 - 10/28 1900  In: 400 [I.V.:400]  Out: 250 [Urine:250]  Last 3 shifts: 10/26 1901 - 10/28 07  In: 2829.8 [I.V.:2669.8]  Out: 290 [Urine:940]    Intake/Output Summary (Last 24 hours) at 10/28/2021 1310  Last data filed at 10/28/2021 1215  Gross per 24 hour   Intake 2150.45 ml   Output 1240 ml   Net 910.45 ml        Physical Exam:    General: obtunded and cachectic, tachypneic, frail   HEENT: Normal, NCAT, PERRLA, no ocular drainage, nasal bridge midline, no nasal drainage, poor dentition, no oral lesions   Neck: Neck supple, trachea midline, no adenopathy   Chest: Frail with normal rise and fall   Lungs: Poor air entry bilaterally, coarse rhonchi throughout all lung fields, decreased breath sounds in bilateral bases   Heart: Regular rate and rhythm, S1S2 present or without murmur or extra heart sounds   Abdomen: abdomen is soft without significant tenderness, PEG tube remains in place, with dressing in place, masses, organomegaly   Extremity: 1+ edema, no cyanosis, no clubbing   Neuro: obtunded, unable to perform further testing   Skin: Poor turgor, pale, easily friable        DATA:  Labs:  Recent Labs     10/28/21  0239 10/27/21  1446 10/27/21  0510   WBC 11.0 11.5 8.6   HGB 10.0* 10.6* 11.4*   HCT 34.7* 38.0 41.2    213 187     Recent Labs     10/28/21  0239 10/27/21  1446 10/27/21  0510    145 142   K 3.5 3.6 3.8    102 97*   CO2 33* 40* 43*   * 247* 124*   BUN 25* 23* 21*   CREA 0.62 0.56* 0.54*   CA 8.2* 8.4* 8.6   MG 2.1 2.3 2.4   PHOS 2.1* 1.5* 3.6   ALB  --  1.9*  --    ALT  --  21  --    INR 1.1 1.1  --      No results for input(s): PH, PCO2, PO2, HCO3, FIO2 in the last 72 hours. PFT Results  (Last 3 results in the past 10 years)    None        11/24/20    ECHO ADULT COMPLETE 11/24/2020 11/24/2020    Interpretation Summary  · LV: Estimated LVEF is 45 - 50%. Visually measured ejection fraction. Normal cavity size and wall thickness. Globally reduced systolic function. Abnormal left ventricular septal motion consistent with paradoxic motion. Inconclusive left ventricular diastolic function. · Saline contrast was given to evaluate for intracardiac shunt. No shunt seen. Bubble study was negative with valsalva and without valsalva. · TV: Moderate tricuspid valve regurgitation is present. · PA: Mild pulmonary hypertension. Pulmonary arterial systolic pressure is 42 mmHg. Signed by: Sahil Garcia MD on 11/24/2020  2:49 PM        Imaging:  [x]I have personally reviewed the patients radiographs  []Radiographs reviewed with radiologist   []No change from prior, tubes and lines in adequate position  []Improved   [x]Worsening    . Restraints need.   Attending Non-violent Restraint Reevaluation     I have reevaluated the patient one hour after initiation of intervention. The patient is comfortable, uninjured, but continues to pose an imminent risk of injury to self to themselves and/or serious disruption of medical treatment required to keep patient stable. The patient's current medical and behavioral conditions that warrant the use intervention include danger to self and Interference with medical equipment or treatment. Restraint or seclusion will be discontinued at the earliest possible time, regardless of the scheduled expiration of the order.     Based on my evaluation, restraints will be continued: YES    1:10 PM    MD Bakari Miller MD/MPH     Pulmonary, Critical Care Medicine  [facility]

## 2021-10-28 NOTE — PROGRESS NOTES
Problem: Falls - Risk of  Goal: *Absence of Falls  Description: Document Jackie Minilexie Fall Risk and appropriate interventions in the flowsheet.   Outcome: Progressing Towards Goal  Note: Fall Risk Interventions:  Mobility Interventions: Assess mobility with egress test, Bed/chair exit alarm, Communicate number of staff needed for ambulation/transfer, PT Consult for mobility concerns, Patient to call before getting OOB, Strengthening exercises (ROM-active/passive)    Mentation Interventions: Adequate sleep, hydration, pain control, Bed/chair exit alarm, Door open when patient unattended, Evaluate medications/consider consulting pharmacy, Eyeglasses and hearing aids, Update white board, Toileting rounds, Reorient patient, More frequent rounding    Medication Interventions: Bed/chair exit alarm, Evaluate medications/consider consulting pharmacy, Patient to call before getting OOB, Teach patient to arise slowly    Elimination Interventions: Bed/chair exit alarm, Call light in reach, Patient to call for help with toileting needs, Stay With Me (per policy), Toilet paper/wipes in reach, Toileting schedule/hourly rounds              Problem: Patient Education: Go to Patient Education Activity  Goal: Patient/Family Education  Outcome: Not Progressing Towards Goal     Problem: Discharge Planning  Goal: *Discharge to safe environment  Outcome: Progressing Towards Goal  Goal: *Knowledge of medication management  Outcome: Not Progressing Towards Goal  Goal: *Knowledge of discharge instructions  Outcome: Not Progressing Towards Goal     Problem: Patient Education: Go to Patient Education Activity  Goal: Patient/Family Education  Outcome: Not Progressing Towards Goal     Problem: Nutrition Deficit  Goal: *Optimize nutritional status  Outcome: Not Progressing Towards Goal     Problem: Pressure Injury - Risk of  Goal: *Prevention of pressure injury  Description: Document Kali Scale and appropriate interventions in the flowsheet. Outcome: Progressing Towards Goal  Note: Pressure Injury Interventions:  Sensory Interventions: Assess changes in LOC, Assess need for specialty bed, Avoid rigorous massage over bony prominences, Check visual cues for pain, Float heels, Keep linens dry and wrinkle-free, Minimize linen layers, Monitor skin under medical devices, Pad between skin to skin, Pressure redistribution bed/mattress (bed type), Turn and reposition approx. every two hours (pillows and wedges if needed)    Moisture Interventions: Absorbent underpads, Apply protective barrier, creams and emollients, Assess need for specialty bed, Check for incontinence Q2 hours and as needed, Internal/External urinary devices, Minimize layers, Moisture barrier    Activity Interventions: Pressure redistribution bed/mattress(bed type)    Mobility Interventions: Assess need for specialty bed, Float heels, HOB 30 degrees or less, Pressure redistribution bed/mattress (bed type), Turn and reposition approx.  every two hours(pillow and wedges)    Nutrition Interventions: Discuss nutritional consult with provider    Friction and Shear Interventions: Apply protective barrier, creams and emollients, Foam dressings/transparent film/skin sealants, HOB 30 degrees or less, Lift sheet, Minimize layers, Transferring/repositioning devices                Problem: Patient Education: Go to Patient Education Activity  Goal: Patient/Family Education  Outcome: Progressing Towards Goal     Problem: Ventilator Management  Goal: *Adequate oxygenation and ventilation  Outcome: Progressing Towards Goal  Goal: *Patient maintains clear airway/free of aspiration  Outcome: Progressing Towards Goal  Goal: *Absence of infection signs and symptoms  Outcome: Progressing Towards Goal  Goal: *Normal spontaneous ventilation  Outcome: Not Progressing Towards Goal     Problem: Non-Violent Restraints  Goal: Removal from restraints as soon as assessed to be safe  Outcome: Progressing Towards Goal  Goal: No harm/injury to patient while restraints in use  Outcome: Progressing Towards Goal  Goal: Patient's dignity will be maintained  Outcome: Progressing Towards Goal  Goal: Patient Interventions  Outcome: Progressing Towards Goal     Problem: Patient Education: Go to Patient Education Activity  Goal: Patient/Family Education  Outcome: Not Progressing Towards Goal     Problem: Ventilator Management  Goal: *Adequate oxygenation and ventilation  Outcome: Progressing Towards Goal  Goal: *Patient maintains clear airway/free of aspiration  Outcome: Progressing Towards Goal  Goal: *Absence of infection signs and symptoms  Outcome: Progressing Towards Goal  Goal: *Normal spontaneous ventilation  Outcome: Not Progressing Towards Goal     Problem: Patient Education: Go to Patient Education Activity  Goal: Patient/Family Education  Outcome: Not Progressing Towards Goal

## 2021-10-28 NOTE — PROGRESS NOTES
Cardiology Progress Note    Admit Date: 10/18/2021  Attending Cardiologist: Dr. Liya Mustafa:     Hospital Problems  Date Reviewed: 12/22/2020        Codes Class Noted POA    Gastrostomy complication ([facility] [place] [facility] JUI-22-QM: K94.20  ICD-9-CM: 536.40  10/27/2021 Unknown        Severe protein-calorie malnutrition ([facility] ICD-10-CM: E43  ICD-9-CM: 262  10/20/2021 Unknown        Pneumonia ICD-10-CM: J18.9  ICD-9-CM: 405  10/18/2021 Unknown            -Acute hypercapnic and hypoxic respiratory failure requiring emergent intubation on 10/27/2021  -Multifactorial respiratory failure likely aspiration pneumonia, interstitial lung disease and chronic bronchiectasis  -Aspiration pneumonia  -Acute systolic congestive heart failure. LVEF 25% by echo on this admission. Historically mildly reduced LVEF  -History of cavitary lung lesion  -History of severe pulmonary hypertension  -Failure to thrive with multiple medical problems     Primary cardiologist: Dr. Orlin Nathan:     Independently seen and evaluated. Agree with below. Would continue supportive measures as tolerated. Will defer pulmonary evaluation and management to PCCM team.    Patient remains intubated on pressor support. Would continue work up and treatment of underlying pulmonary process. Patient is not currently appropriate candidate for further cardiac work up or intervention. Unable to tolerate BB/ace given hypotension. Further recommendations pending further recovery and hospital course. Continue to address goals of care. Subjective:     Remains intubated and sedated.     Objective:      Patient Vitals for the past 8 hrs:   Temp Pulse Resp BP SpO2   10/28/21 1000  76 14 (!) 92/51 99 %   10/28/21 0900  67 14 (!) 106/54 97 %   10/28/21 0800 98.2 °F (36.8 °C) 67 14 (!) 106/51 98 %   10/28/21 0730  68 16  99 %   10/28/21 0720  63 14  99 %   10/28/21 0715  66 14  99 %   10/28/21 0700  64 14 (!) 104/52 99 %   10/28/21 0645  68 14  99 % 10/28/21 0630  79 14  99 %   10/28/21 0615  86 22  99 %   10/28/21 0600  69 15 124/68 99 %   10/28/21 0545  74 28  99 %   10/28/21 0530  72 30  99 %   10/28/21 0515  72 26  99 %   10/28/21 0500  75 21 (!) 109/58 99 %   10/28/21 0445  68 30  99 %   10/28/21 0430  78 15  99 %   10/28/21 0415  74 22  99 %   10/28/21 0400 99.5 °F (37.5 °C) 76 28 126/64 99 %   10/28/21 0345  71 25  99 %   10/28/21 0330  70 28  99 %   10/28/21 0328     99 %   10/28/21 0322  71 14  100 %   10/28/21 0315  70 (!) 31  100 %   10/28/21 0300  75 28 (!) 110/56 99 %         Patient Vitals for the past 96 hrs:   Weight   10/28/21 0400 119 lb 0.8 oz (54 kg)   10/27/21 0329 115 lb 14.4 oz (52.6 kg)   10/26/21 1407 120 lb (54.4 kg)       TELE: PVC               Current Facility-Administered Medications   Medication Dose Route Frequency Last Admin    potassium chloride 10 mEq in 50 ml IVPB  10 mEq IntraVENous Q1H 10 mEq at 10/28/21 0923    famotidine (PF) (PEPCID) 20 mg in 0.9% sodium chloride 10 mL injection  20 mg IntraVENous Q12H      [START ON 10/29/2021] thiamine HCL (B-1) tablet 200 mg  200 mg Per G Tube DAILY      NOREPINephrine (LEVOPHED) 8 mg in 5% dextrose 250mL (32 mcg/mL) infusion  0.5-50 mcg/min IntraVENous TITRATE 2 mcg/min at 10/28/21 0932    LORazepam (ATIVAN) injection 2 mg  2 mg IntraVENous Q15MIN PRN      HYDROmorphone (DILAUDID) syringe 1 mg  1 mg IntraVENous Q15MIN PRN      chlorhexidine (PERIDEX) 0.12 % mouthwash 10 mL  10 mL Oral Q12H 10 mL at 10/28/21 0689    midazolam (VERSED) injection 2 mg  2 mg IntraVENous Q10MIN PRN 2 mg at 10/28/21 0152    fentaNYL citrate (PF) injection 100 mcg  100 mcg IntraVENous Q30MIN PRN      fentaNYL (PF) 1,500 mcg/30 mL (50 mcg/mL) infusion  0-200 mcg/hr IntraVENous TITRATE 75 mcg/hr at 10/28/21 0746    insulin lispro (HUMALOG) injection   SubCUTAneous Q6H 2 Units at 10/28/21 0545    glucose chewable tablet 16 g  16 g Oral PRN      glucagon (GLUCAGEN) injection 1 mg  1 mg IntraMUSCular PRN      bacitracin zinc-polymyxin b (POLYSPORIN) 500-10,000 unit/gram ointment   Topical BID Given at 10/28/21 0914    naloxone (NARCAN) injection 0.4 mg  0.4 mg IntraVENous PRN      dextrose 5% and 0.9% NaCl infusion  40 mL/hr IntraVENous CONTINUOUS 40 mL/hr at 10/26/21 2016    albuterol-ipratropium (DUO-NEB) 2.5 MG-0.5 MG/3 ML  3 mL Nebulization Q4H RT 3 mL at 10/28/21 0719    sodium chloride 3% hypertonic nebulizer soln  4 mL Nebulization Q4HWA RT 4 mL at 10/28/21 0719    Lactobacillus Acidoph & Bulgar [facility]) tablet 2 Tablet  2 Tablet Per G Tube BID 2 Tablet at 10/28/21 5813    guaiFENesin (ROBITUSSIN) 100 mg/5 mL oral liquid 400 mg  400 mg Per G Tube Q4H  mg at 10/24/21 2352    albuterol-ipratropium (DUO-NEB) 2.5 MG-0.5 MG/3 ML  3 mL Nebulization Q4H PRN      aspirin chewable tablet 81 mg  81 mg Per G Tube DAILY 81 mg at 10/28/21 0823    cholecalciferol (VITAMIN D3) (1000 Units /25 mcg) tablet 1,000 Units  1,000 Units PEG Tube DAILY 1,000 Units at 10/28/21 4928    polyethylene glycol (MIRALAX) packet 17 g  17 g Per G Tube DAILY PRN      acetaminophen (TYLENOL) tablet 650 mg  650 mg Per G Tube Q6H PRN      Or    acetaminophen (TYLENOL) suppository 650 mg  650 mg Rectal Q6H  mg at 10/25/21 1532    enoxaparin (LOVENOX) injection 40 mg  40 mg SubCUTAneous QHS 40 mg at 10/27/21 2110    sodium chloride (NS) flush 5-10 mL  5-10 mL IntraVENous PRN 10 mL at 10/19/21 2336    piperacillin-tazobactam (ZOSYN) 3.375 g in 0.9% sodium chloride (MBP/ADV) 100 mL MBP  3.375 g IntraVENous Q6H 3.375 g at 10/28/21 0450    sodium chloride (NS) flush 5-40 mL  5-40 mL IntraVENous Q8H 10 mL at 10/28/21 0546    sodium chloride (NS) flush 5-40 mL  5-40 mL IntraVENous PRN      omega-3 acid ethyl esters (LOVAZA) capsule 1,000 mg  1 g Oral DAILY WITH BREAKFAST 1,000 mg at 10/28/21 5426    therapeutic multivitamin (THERAGRAN) tablet 1 Tablet  1 Tablet Oral DAILY 1 Tablet at 10/28/21 0915     Facility-Administered Medications Ordered in Other Encounters   Medication Dose Route Frequency Last Admin    propofoL (DIPRIVAN) 10 mg/mL injection   IntraVENous PRN 50 mg at 10/28/21 0020         Intake/Output Summary (Last 24 hours) at 10/28/2021 1051  Last data filed at 10/28/2021 0586  Gross per 24 hour   Intake 2310.45 ml   Output 1090 ml   Net 1220.45 ml       Physical Exam:  General:  no distress  Lungs:  clear to auscultation anterior  Heart:  regular rate and rhythm  Abdomen:  abdomen is soft   Extremities:  no edema    Visit Vitals  BP (!) 92/51   Pulse 76   Temp 98.2 °F (36.8 °C)   Resp 14   Ht 5' 7\" (1.702 m)   Wt 119 lb 0.8 oz (54 kg)   SpO2 99%   BMI 18.65 kg/m²       Data Review:     Labs: Results:       Chemistry Recent Labs     10/28/21  0239 10/27/21  1446 10/27/21  0510   * 247* 124*    145 142   K 3.5 3.6 3.8    102 97*   CO2 33* 40* 43*   BUN 25* 23* 21*   CREA 0.62 0.56* 0.54*   CA 8.2* 8.4* 8.6   MG 2.1 2.3 2.4   PHOS 2.1* 1.5* 3.6   AGAP 5 3 2*   BUCR 40* 41* 39*   AP  --  30*  --    TP  --  5.4*  --    ALB  --  1.9*  --    GLOB  --  3.5  --    AGRAT  --  0.5*  --       CBC w/Diff Recent Labs     10/28/21  0239 10/27/21  1446 10/27/21  0510   WBC 11.0 11.5 8.6   RBC 3.80* 4.00* 4.24*   HGB 10.0* 10.6* 11.4*   HCT 34.7* 38.0 41.2    213 187   GRANS 92* 93* 90*   LYMPH 2* 1* 2*   EOS 0 0 0      Cardiac Enzymes Lab Results   Component Value Date/Time    CPK 25 (L) 10/28/2021 02:39 AM    CPK 25 (L) 10/27/2021 09:06 PM    CPK 43 10/27/2021 02:46 PM    CKMB 1.5 10/28/2021 02:39 AM    CKMB 2.8 10/27/2021 09:06 PM    CKMB 4.9 (H) 10/27/2021 02:46 PM    CKND1 6.0 (H) 10/28/2021 02:39 AM    CKND1 11.2 (H) 10/27/2021 09:06 PM    CKND1 11.4 (H) 10/27/2021 02:46 PM    TROIQ 0.53 (H) 10/28/2021 02:39 AM    TROIQ 0.62 (H) 10/27/2021 09:06 PM    TROIQ 0.55 (H) 10/27/2021 02:46 PM      Coagulation Recent Labs     10/28/21  0239 10/27/21  1446   PTP 14.2 13.7 INR 1.1 1.1       Lipid Panel Lab Results   Component Value Date/Time    Cholesterol, total 138 09/24/2020 10:04 AM    HDL Cholesterol 66 (H) 09/24/2020 10:04 AM    LDL, calculated 61.4 09/24/2020 10:04 AM    VLDL, calculated 10.6 09/24/2020 10:04 AM    Triglyceride 53 09/24/2020 10:04 AM    CHOL/HDL Ratio 2.1 09/24/2020 10:04 AM      BNP No results found for: BNP, BNPP, XBNPT   Liver Enzymes Recent Labs     10/27/21  1446   TP 5.4*   ALB 1.9*   AP 30*      Thyroid Studies Lab Results   Component Value Date/Time    TSH 1.40 10/27/2021 02:46 PM          Signed By: LEXUS White     October 28, 2021

## 2021-10-28 NOTE — DIABETES MGMT
Diabetes/ Glycemic Control Plan of Care  Recommendations:   1. Continue corrective lispro, normal insulin sensitivity q 6 hours and monitor. 2. Obtain current A1C per protocol. Assessment:   Pt with acute respiratory failure, intubated 10/27/21. Has dislodged PEG tube. No known history of diabetes per chart review. Today's  blood glucose readings in target range. DX:   1. Pneumonia of right lung due to infectious organism, unspecified part of lung     2. Hypoxia     3. Sepsis due to other etiology ([address]     4. Cavitary lesion of lung     5. Acute respiratory failure with hypoxia (HCC)     6. Debility     7. Goals of care, counseling/discussion     8. Tachycardia     9. Aspiration pneumonia of right lower lobe due to gastric secretions (HCC)     10. Physical deconditioning     11. Severe sepsis ([address]     12. Acute on chronic systolic congestive heart failure ([address]     13. Acute pulmonary edema (HCC)     14. Adult failure to thrive     15. Bronchiectasis with acute exacerbation ([address]     16. Acute respiratory failure with hypoxia and hypercapnia (HCC)     17. Acute encephalopathy     18. Obtunded        Fasting/ Morning blood glucose:   Lab Results   Component Value Date/Time    Glucose 160 (H) 10/28/2021 02:39 AM    Glucose (POC) 164 (H) 10/28/2021 11:09 AM     IV Fluids containing dextrose: D5/0.9NaCl at 40 ml/hr (  Steroids:   none    Blood glucose values:        Within target range (70-180mg/dL):  yes  Current insulin orders: corrective lispro, normal insulin sensitivity q 6 hours  Total Daily Dose previous 24 hours = 6 units  (corrective lispro)  Current A1c: none available  Adequate glycemic control PTA: unknown  Nutrition/Diet:   Active Orders   Diet    DIET NPO      Meal Intake:  Patient Vitals for the past 168 hrs:   % Diet Eaten   10/27/21 1630 0%   10/26/21 1336 0%   10/26/21 0748 0%   10/25/21 1620 0%     Supplement Intake:  Patient Vitals for the past 168 hrs:   Supplement intake %   10/27/21 1630 0%   10/26/21 1336 0%   10/26/21 0748 0%   10/25/21 1620 0%       Home diabetes medications:   Key Antihyperglycemic Medications     Patient is on no antihyperglycemic meds.           Plan/Goals:   Blood glucose will be within target of 70 - 180 mg/dl within 72 hours    Education:  [] Refer to Diabetes Education Record                       [x] Education not indicated at this time     Lamont Bejarano,  MS, RD, [facility]  Diabetes and Glycemic Control   [employer]

## 2021-10-28 NOTE — PROGRESS NOTES
Rita  [facility], [address]    Progress Note    Patient: Milo Alonso MRN: [id number]  SSN: [id number]    YOB: 1945  Age: 67 y.o. Sex: male      Admit Date: 10/18/2021    LOS: 10 days     Chief complaint:resp failure peg extraction     Impression:     1. Aspiration pn with respiratory failure on ventilator, poor prognosis  2. Partial peg extraction to sub cutaneous tissue, s/p removal and cavity decompressed     Plan:     1. Continue wound care and abx  2. No specific gi recommendations will be available as needed , call for questions or concerns       Subjective: The patient problems have taken a turn for the worse with aspiration pn and resp failure requiring ventilation and pressure support.       Objective:     Vitals:    10/28/21 1136 10/28/21 1200 10/28/21 1215 10/28/21 1300   BP:  130/72  (!) 97/58   Pulse: 87 (!) 108 90 85   Resp: 20 27 14 14   Temp:  98.2 °F (36.8 °C)     SpO2: 100% 100% 97% 99%   Weight:       Height:            Physical Exam:   GENERAL: no distress, appears stated age, ventilated     Lab/Data Review:  BMP:   Lab Results   Component Value Date/Time     10/28/2021 02:39 AM    K 3.5 10/28/2021 02:39 AM     10/28/2021 02:39 AM    CO2 33 (H) 10/28/2021 02:39 AM    AGAP 5 10/28/2021 02:39 AM     (H) 10/28/2021 02:39 AM    BUN 25 (H) 10/28/2021 02:39 AM    CREA 0.62 10/28/2021 02:39 AM    GFRAA >60 10/28/2021 02:39 AM    GFRNA >60 10/28/2021 02:39 AM     CMP:   Lab Results   Component Value Date/Time     10/28/2021 02:39 AM    K 3.5 10/28/2021 02:39 AM     10/28/2021 02:39 AM    CO2 33 (H) 10/28/2021 02:39 AM    AGAP 5 10/28/2021 02:39 AM     (H) 10/28/2021 02:39 AM    BUN 25 (H) 10/28/2021 02:39 AM    CREA 0.62 10/28/2021 02:39 AM    GFRAA >60 10/28/2021 02:39 AM    GFRNA >60 10/28/2021 02:39 AM    CA 8.2 (L) 10/28/2021 02:39 AM    MG 2.1 10/28/2021 02:39 AM    PHOS 2.1 (L) 10/28/2021 02:39 AM    ALB 1.9 (L) 10/27/2021 02:46 PM    TP 5.4 (L) 10/27/2021 02:46 PM    GLOB 3.5 10/27/2021 02:46 PM    AGRAT 0.5 (L) 10/27/2021 02:46 PM    ALT 21 10/27/2021 02:46 PM     CBC:   Lab Results   Component Value Date/Time    WBC 11.0 10/28/2021 02:39 AM    HGB 10.0 (L) 10/28/2021 02:39 AM    HCT 34.7 (L) 10/28/2021 02:39 AM     10/28/2021 02:39 AM     COAGS:   Lab Results   Component Value Date/Time    PTP 14.2 10/28/2021 02:39 AM    INR 1.1 10/28/2021 02:39 AM     Liver Panel:   Lab Results   Component Value Date/Time    ALB 1.9 (L) 10/27/2021 02:46 PM    TP 5.4 (L) 10/27/2021 02:46 PM    GLOB 3.5 10/27/2021 02:46 PM    AGRAT 0.5 (L) 10/27/2021 02:46 PM    ALT 21 10/27/2021 02:46 PM    AP 30 (L) 10/27/2021 02:46 PM          Active Problems:    Pneumonia (10/18/2021)      Severe protein-calorie malnutrition ([facility] (10/20/2021)      Gastrostomy complication ([facility] (44/00/3045)          Signed By: Arleen Porter MD     October 28, 2021

## 2021-10-28 NOTE — PROGRESS NOTES
10/27/21 2045   Patient Observations   Pulse (Heart Rate) 87   Resp Rate 14   O2 Sat (%) 99 %   O2 wean to 45%. RT will continue to wean O2 as tolerated.

## 2021-10-28 NOTE — PROCEDURES
[facility], [address] [place] [address]  Tel: [phone]         Procedure Note  10/28/2021      [facility] has been consulted for difficult robb catheter placement after unsuccessful attempts by nursing staff. John Balderas has been admitted for acute respiratory failure and is currently in the ICU. He was found to be in urinary retention and requires robb catheter placement. Time Out performed with RN, Dr. Tianna Nolasco and Ariella COREY.  (see RN documentation)     Procedure: The patient was prepped in the usual sterile manner including sterile drape. 10cc of Lidocane gel was instilled in the urethral meatus/urethra. Unable to pass 20 fr coude or glidewire. Resistance met just prior to prostate, considering false passage vs rigidity vs enlarged prostate. Bedside cystoscopy performed by Dr. Tianna Nolasco, scope advanced into bladder without false passage but was rigid, requiring scope manipulation. Glidewire then passed through scope in urethra and into the bladder. Lidocaine gel covered catheter tip and 16 fr coucil catheter then passed over wire, through urethral meatus/urethra and into the bladder with an immediate return of clear yellow urine. The robb catheter balloon was then inflated with 10 cc of sterile water and the robb drainage bag was put to gravity. The patient tolerated the procedure moderately well. Thank you for allowing us to participate in the care of John Balderas. If further assistance with this patient is required, please feel free to contact us as needed. DO NOT REMOVE ROBB CATHETER. MAINTAIN AT DISCHARGE   May re-consult for voiding trial if patient has long hospitalization course       Jordon Stinson NP-BC  [facility] [place], [facility]   Pager [phone]     Staff:  I performed this procedure with the assistance of Ms. Liam Sancehz. I agree with the above documentation of this procedure as presented.   I have discussed this procedure and rationale with the above provider and patient in detail.       Robert Hammond MD

## 2021-10-28 NOTE — PROGRESS NOTES
Restraint type: Soft restraint:  right wrist and left wrist  Reason for restraints: Interference with medical equipment or treatment  Duration: 24 hours    Restraints must be removed when an alternative is available and effective and/or patient no longer meets criteria. Orders must be renewed every calendar day or when discontinued.     The MD must conduct a face to face assessment within 1 calendar day of initiation when initial restraint order is verbal.    Anirudh Collins PA-C   10/28/21   Pulmonary, Critical Care Medicine  [facility]

## 2021-10-28 NOTE — PROGRESS NOTES
Chart reviewed. Patient remains intubated and on vent. CM will continue to monitor and assist with transition of care needs.      SHAKIR BridgesN, RN  Pager # [phone]  Care Manager

## 2021-10-28 NOTE — PROGRESS NOTES
Problem: Patient Education: Go to Patient Education Activity  Goal: Patient/Family Education  Outcome: Progressing Towards Goal     Problem: Falls - Risk of  Goal: *Absence of Falls  Description: Document Emerson Lomas Fall Risk and appropriate interventions in the flowsheet.   Outcome: Progressing Towards Goal  Note: Fall Risk Interventions:  Mobility Interventions: Assess mobility with egress test, Bed/chair exit alarm, Communicate number of staff needed for ambulation/transfer, PT Consult for mobility concerns, Patient to call before getting OOB, Strengthening exercises (ROM-active/passive)    Mentation Interventions: Adequate sleep, hydration, pain control, Bed/chair exit alarm, Door open when patient unattended, Evaluate medications/consider consulting pharmacy, Eyeglasses and hearing aids, Update white board, Toileting rounds, Reorient patient, More frequent rounding    Medication Interventions: Bed/chair exit alarm, Evaluate medications/consider consulting pharmacy, Patient to call before getting OOB, Teach patient to arise slowly    Elimination Interventions: Bed/chair exit alarm, Call light in reach, Patient to call for help with toileting needs, Stay With Me (per policy), Toilet paper/wipes in reach, Toileting schedule/hourly rounds              Problem: Patient Education: Go to Patient Education Activity  Goal: Patient/Family Education  Outcome: Progressing Towards Goal     Problem: Discharge Planning  Goal: *Discharge to safe environment  Outcome: Progressing Towards Goal  Goal: *Knowledge of medication management  Outcome: Progressing Towards Goal  Goal: *Knowledge of discharge instructions  Outcome: Progressing Towards Goal     Problem: Patient Education: Go to Patient Education Activity  Goal: Patient/Family Education  Outcome: Progressing Towards Goal     Problem: Patient Education: Go to Patient Education Activity  Goal: Patient/Family Education  Outcome: Progressing Towards Goal     Problem: Patient Education: Go to Patient Education Activity  Goal: Patient/Family Education  Outcome: Progressing Towards Goal     Problem: Patient Education: Go to Patient Education Activity  Goal: Patient/Family Education  Outcome: Progressing Towards Goal     Problem: Pressure Injury - Risk of  Goal: *Prevention of pressure injury  Description: Document Kali Scale and appropriate interventions in the flowsheet. Outcome: Progressing Towards Goal  Note: Pressure Injury Interventions:  Sensory Interventions: Assess changes in LOC, Assess need for specialty bed, Avoid rigorous massage over bony prominences, Check visual cues for pain, Float heels, Keep linens dry and wrinkle-free, Minimize linen layers, Monitor skin under medical devices, Pad between skin to skin, Pressure redistribution bed/mattress (bed type), Turn and reposition approx. every two hours (pillows and wedges if needed)    Moisture Interventions: Absorbent underpads, Apply protective barrier, creams and emollients, Assess need for specialty bed, Check for incontinence Q2 hours and as needed, Internal/External urinary devices, Minimize layers, Moisture barrier    Activity Interventions: Pressure redistribution bed/mattress(bed type)    Mobility Interventions: Assess need for specialty bed, Float heels, HOB 30 degrees or less, Pressure redistribution bed/mattress (bed type), Turn and reposition approx.  every two hours(pillow and wedges)    Nutrition Interventions: Discuss nutritional consult with provider    Friction and Shear Interventions: Apply protective barrier, creams and emollients, Foam dressings/transparent film/skin sealants, HOB 30 degrees or less, Lift sheet, Minimize layers, Transferring/repositioning devices                Problem: Patient Education: Go to Patient Education Activity  Goal: Patient/Family Education  Outcome: Progressing Towards Goal     Problem: Ventilator Management  Goal: *Adequate oxygenation and ventilation  Outcome: Progressing Towards Goal  Goal: *Patient maintains clear airway/free of aspiration  Outcome: Progressing Towards Goal  Goal: *Absence of infection signs and symptoms  Outcome: Progressing Towards Goal  Goal: *Normal spontaneous ventilation  Outcome: Progressing Towards Goal     Problem: Non-Violent Restraints  Goal: Removal from restraints as soon as assessed to be safe  Outcome: Progressing Towards Goal  Goal: No harm/injury to patient while restraints in use  Outcome: Progressing Towards Goal  Goal: Patient's dignity will be maintained  Outcome: Progressing Towards Goal  Goal: Patient Interventions  Outcome: Progressing Towards Goal

## 2021-10-28 NOTE — CONSULTS
1. Pneumonia of right lung due to infectious organism, unspecified part of lung    2. Hypoxia    3. Sepsis due to other etiology ([address]    4. Cavitary lesion of lung    5. Acute respiratory failure with hypoxia (HCC)    6. Debility    7. Goals of care, counseling/discussion    8. Tachycardia    9. Aspiration pneumonia of right lower lobe due to gastric secretions (HCC)    10. Physical deconditioning    11. Severe sepsis ([address]    12. Acute on chronic systolic congestive heart failure ([address]    13. Acute pulmonary edema (HCC)    14. Adult failure to thrive    15. Bronchiectasis with acute exacerbation ([address]    16. Acute respiratory failure with hypoxia and hypercapnia (HCC)    17. Acute encephalopathy    18. Obtunded        ASSESSMENT:   Acute urinary retention, ~600 cc, difficult catheter placement  Hx BPH with urinary frequency    S/p 10/28 bedside cysto and catheter placement over wire      Admitted for respiratory failure, in the ICU, intubated and on pressors     Consulted 10/28 for catheter placement     PLAN:    Acute urinary retention, difficult catheter placement (see procedure note)  Maintain corcoran catheter at time of discharge- DO NOT REMOVE    May reconsult and attempt voiding trial if patient is still admitted in the next few weeks   Patient will need a follow up in office pending hospital course and prognosis   Available again if/when needed       Follow up arranged? Patient will need outpatient follow up for acute retention pending hospitalization course       Cyrus Moore NP-BC  [facility]   Pager [phone]     Staff:  I have interviewed and examined this patient. I agree with the assessment and plans as outlined above.     Robert Hammond MD     October 28, 2021 1:32 PM        Chief Complaint   Patient presents with    Shortness of Breath       HISTORY OF PRESENT ILLNESS:  Manuel Alexander is a 67 y.o. male who is seen in consultation as referred by Dr. Shannan Douglas for acute urinary retetniton and difficult corcoran catheter placement by nursing. Patient has a hx of BPH with urinary frequency and is currently in the ICU for acute respiratory failure. Patient was found to be retaining urine, approximately 600cc and nursing had difficulty placing catheter after multiple attempts. Last CT 10/25 shows no distention of bladder and patient was voiding with condom catheter. Patient has been seen by [facility] in the past, last office visit 6/2016 with Dr. Urvashi Nichole but was then lost to follow up. Patient is in the ICU, intubated and on pressors. Pt required bedside cystoscopy and catheter placement over wire. Corcoran now draining yellow urine. No flowsheet data found.       Past Medical History:   Diagnosis Date    CAD (coronary artery disease)     MI 1995; stent 2003 (done preop cochlear sgy)    Cancer ([facility]     SQUAMOUS CELL    Coronary artery disease     Deafness     Myocardial infarction [facility]) [id number]    Scarlet fever age 9       Past Surgical History:   Procedure Laterality Date    BRONCH-FIBER/DIAGNOSTIC  6/16/2016         HX COLONOSCOPY  2013    neg    HX CORONARY STENT PLACEMENT  2003    [place]    HX HEART CATHETERIZATION  2003    HX HEART CATHETERIZATION  1999    HX HERNIA REPAIR      inguinal ? laterality    HX OTHER SURGICAL      2 cochlear implants (R); 1 left - all failed       Social History     Tobacco Use    Smoking status: Never Smoker    Smokeless tobacco: Never Used   Vaping Use    Vaping Use: Never used   Substance Use Topics    Alcohol use: No    Drug use: No       Allergies   Allergen Reactions    Pollens Extract Runny Nose and Cough       Family History   Problem Relation Age of Onset    No Known Problems Mother     Heart Disease Father     Heart Attack Father         1989    Coronary Artery Disease Father     Hypertension Father     High Cholesterol Father        Current Facility-Administered Medications   Medication Dose Route Frequency Provider Last Rate Last Admin    famotidine (PF) (PEPCID) 20 mg in 0.9% sodium chloride 10 mL injection  20 mg IntraVENous Q12H Ele Encinas PA-C   20 mg at 10/28/21 1124    [START ON 10/29/2021] thiamine HCL (B-1) tablet 200 mg  200 mg Per G Tube DAILY Crow Palacio MD        NOREPINephrine (LEVOPHED) 8 mg in 5% dextrose 250mL (32 mcg/mL) infusion  0.5-50 mcg/min IntraVENous TITRATE Ely Stokes MD 3.8 mL/hr at 10/28/21 1218 2 mcg/min at 10/28/21 1218    LORazepam (ATIVAN) injection 2 mg  2 mg IntraVENous Q15MIN PRN Ely Stokes MD        HYDROmorphone (DILAUDID) syringe 1 mg  1 mg IntraVENous Q15MIN PRN Ely Stokes MD        chlorhexidine (PERIDEX) 0.12 % mouthwash 10 mL  10 mL Oral Q12H Ferna Drone, PA-C   10 mL at 10/28/21 7327    midazolam (VERSED) injection 2 mg  2 mg IntraVENous Q10MIN PRN Ferna Drone, PA-C   2 mg at 10/28/21 0152    fentaNYL citrate (PF) injection 100 mcg  100 mcg IntraVENous Q30MIN PRN Ferna LEXUS Parra-STAR        fentaNYL (PF) 1,500 mcg/30 mL (50 mcg/mL) infusion  0-200 mcg/hr IntraVENous TITRATE Ferna Drone PA-C 1.5 mL/hr at 10/28/21 0746 75 mcg/hr at 10/28/21 0746    insulin lispro (HUMALOG) injection   SubCUTAneous Q6H Ele Encinas PA-C   2 Units at 10/28/21 1257    glucose chewable tablet 16 g  16 g Oral PRN Ele Johnson PA-C        glucagon (GLUCAGEN) injection 1 mg  1 mg IntraMUSCular PRN Ele Encinas PA-C        bacitracin zinc-polymyxin b (POLYSPORIN) 500-10,000 unit/gram ointment   Topical BID Amber Calle MD   Given at 10/28/21 0914    naloxone [facility]) injection 0.4 mg  0.4 mg IntraVENous PRN Crow Palacio MD        dextrose 5% and 0.9% NaCl infusion  40 mL/hr IntraVENous CONTINUOUS Crow Palacio MD 40 mL/hr at 10/26/21 2016 40 mL/hr at 10/26/21 2016    albuterol-ipratropium (DUO-NEB) 2.5 MG-0.5 MG/3 ML  3 mL Nebulization Q4H RT Ely Stokes MD   3 mL at 10/28/21 1134    sodium chloride 3% hypertonic nebulizer soln 4 mL Nebulization Q4HWA RT Flossie Hamman, MD   4 mL at 10/28/21 1210    Lactobacillus Acidoph & Bulgar [facility]) tablet 2 Tablet  2 Tablet Per G Tube BID Jacquelyn Amaya N, DO   2 Tablet at 10/28/21 5699    guaiFENesin (ROBITUSSIN) 100 mg/5 mL oral liquid 400 mg  400 mg Per G Tube Q4H PRN Jamilah Matthews N, DO   400 mg at 10/24/21 2352    albuterol-ipratropium (DUO-NEB) 2.5 MG-0.5 MG/3 ML  3 mL Nebulization Q4H PRN Yoselin Houston MD        aspirin chewable tablet 81 mg  81 mg Per G Tube DAILY Yoselin Houston MD   81 mg at 10/28/21 8781    cholecalciferol (VITAMIN D3) (1000 Units /25 mcg) tablet 1,000 Units  1,000 Units PEG Tube DAILY Yoselin Houston MD   1,000 Units at 10/28/21 7098    polyethylene glycol (MIRALAX) packet 17 g  17 g Per G Tube DAILY PRN Yoselin Houston MD        acetaminophen (TYLENOL) tablet 650 mg  650 mg Per G Tube Q6H PRN Yoselin Houston MD        Or    acetaminophen (TYLENOL) suppository 650 mg  650 mg Rectal Q6H PRN Yoselin Houston MD   650 mg at 10/25/21 1532    enoxaparin (LOVENOX) injection 40 mg  40 mg SubCUTAneous QHS Kofi Mao MD   40 mg at 10/27/21 2110    sodium chloride (NS) flush 5-10 mL  5-10 mL IntraVENous PRN Kofi Mao MD   10 mL at 10/19/21 2336    piperacillin-tazobactam (ZOSYN) 3.375 g in 0.9% sodium chloride (MBP/ADV) 100 mL MBP  3.375 g IntraVENous Q6H Kofi Mao  mL/hr at 10/28/21 1124 3.375 g at 10/28/21 1124    sodium chloride (NS) flush 5-40 mL  5-40 mL IntraVENous Q8H Kofi Mao MD   10 mL at 10/28/21 0546    sodium chloride (NS) flush 5-40 mL  5-40 mL IntraVENous PRN Kofi Mao MD        omega-3 acid ethyl esters (LOVAZA) capsule 1,000 mg  1 g Oral DAILY WITH BREAKFAST Kofi Mao MD   1,000 mg at 10/28/21 5903    therapeutic multivitamin [facility]) tablet 1 Tablet  1 Tablet Oral DAILY Kofi Mao MD   1 Tablet at 10/28/21 0915     Facility-Administered Medications Ordered in Other Encounters   Medication Dose Route Frequency Provider Last Rate Last Admin    propofoL (DIPRIVAN) 10 mg/mL injection   IntraVENous PRN Marco Grain, CRNA   50 mg at 10/28/21 0020       Review of Systems  ROS is:     Not obtainable due to patient factors- medically sedated and intubated     Positive for:    Urinary retention           PHYSICAL EXAMINATION:   Visit Vitals  BP (!) 97/58   Pulse 85   Temp 98.2 °F (36.8 °C)   Resp 14   Ht 5' 7\" (1.702 m)   Wt 54 kg (119 lb 0.8 oz)   SpO2 99%   BMI 18.65 kg/m²     Constitutional: Elderly male. In ICU  HEENT: Normocephalic, Atraumatic  CV: On pressor support   Respiratory: mechanically ventilated   Abdomen:  soft and no apparent tenderness    Male:  SCROTUM:  No scrotal rash or lesions noticed. Normal bilateral testes and epididymis. PENIS: Urethral meatus normal in location and size. No urethral discharge. Circumsized   Condom cath removed. Merlos placed 10/28 16 fr [place] draining yellow urine   Skin:  Normal color  Neuro/Psych: medically sedated, responsive to painful stimuli         REVIEW OF LABS AND IMAGING:      CT A/P WO 10/25     IMPRESSION  1. The button of the PEG tube is in the anterior abdominal subcutaneous fat (not  in the gastric lumen) with moderately severe subcutaneous emphysema and  fluid/infusing material. No intra-abdominal free air/pneumoperitoneum or  ascites. Discussed with patient's nurse. Discussed with Dr. Minal Hall hrs  2. Mild colitis with diarrheal disease possible. No bowel obstruction. 3. Sigmoid diverticulosis. With surrounding fluid, mild diverticulitis not  excluded. 4. Cholelithiasis.   5. Bilateral pleural effusion with basilar infiltrate/pneumonia    Labs: Results:   Chemistry    Recent Labs     10/28/21  0239 10/27/21  1446 10/27/21  0510   * 247* 124*    145 142   K 3.5 3.6 3.8    102 97*   CO2 33* 40* 43*   BUN 25* 23* 21*   CREA 0.62 0.56* 0.54*   CA 8.2* 8.4* 8.6   AGAP 5 3 2*   BUCR 40* 41* 39*   AP  --  30*  --    TP  --  5.4*  --    ALB  --  1.9* --    GLOB  --  3.5  --    AGRAT  --  0.5*  --       CBC w/Diff Recent Labs     10/28/21  0239 10/27/21  1446 10/27/21  0510   WBC 11.0 11.5 8.6   RBC 3.80* 4.00* 4.24*   HGB 10.0* 10.6* 11.4*   HCT 34.7* 38.0 41.2    213 187   GRANS 92* 93* 90*   LYMPH 2* 1* 2*   EOS 0 0 0      Cultures No results for input(s): CULT in the last 72 hours.   All Micro Results       Procedure Component Value Units Date/Time    CULTURE, RESPIRATORY/SPUTUM/BRONCH Jannaus Umu [[id number]] Collected: 10/28/21 0321    Order Status: Completed Specimen: Sputum from Endotracheal aspirate Updated: 10/28/21 0337    CULTURE, RESPIRATORY/SPUTUM/BRONCH W Trisha Schlossman STAIN [[id number]]  (Abnormal)  (Susceptibility) Collected: 10/20/21 1141    Order Status: Completed Specimen: Sputum Updated: 10/24/21 1328     Special Requests: NO SPECIAL REQUESTS        GRAM STAIN 2+ WBCS SEEN         1+ EPITHELIAL CELLS SEEN         FEW GRAM NEGATIVE RODS        Culture result:       LIGHT YEAST, (APPARENT CANDIDA ALBICANS)            FEW STAPHYLOCOCCUS AUREUS               FEW NORMAL RESPIRATORY LEROY          CULTURE, BLOOD [[id number]] Collected: 10/18/21 2115    Order Status: Completed Specimen: Blood Updated: 10/24/21 0649     Special Requests: NO SPECIAL REQUESTS        Culture result: NO GROWTH 6 DAYS       AFB CULTURE + SMEAR W/RFLX ID FROM CULTURE [[id number]] Collected: 10/19/21 1141    Order Status: Completed Specimen: Miscellaneous sample Updated: 10/21/21 1637     Source SPUTUM        AFB Specimen processing Concentration     Acid Fast Smear Negative        Comment: (NOTE)  Performed At: [address] [id number]  Sheryle Sicks MD MV:[id number]          Acid Fast Culture PENDING    CULTURE, BLOOD [[id number]]  (Abnormal) Collected: 10/18/21 1928    Order Status: Completed Specimen: Blood Updated: 10/20/21 1340     Special Requests: NO SPECIAL REQUESTS        GRAM STAIN       AEROBIC BOTTLE GRAM POSITIVE COCCI IN GROUPS                  SMEAR CALLED TO AND CORRECTLY REPEATED BY: Telly Tsai RN ED AT [phone] TO [id number].            Culture result:       STAPHYLOCOCCUS SPECIES, COAGULASE NEGATIVE GROWING IN THE AEROBIC BOTTLE NO SITE          CULTURE, MRSA [[id number]] Collected: 10/20/21 1141    Order Status: Canceled Specimen: Nasal from Nose     AFB CULTURE + SMEAR W/RFLX ID FROM CULTURE [[id number]] Collected: 10/19/21 1400    Order Status: Canceled     AFB CULTURE + SMEAR W/RFLX ID FROM CULTURE [[id number]] Collected: 10/19/21 1400    Order Status: Canceled     LEGIONELLA PNEUMOPHILA AG, URINE [[id number]] Collected: 10/19/21 0330    Order Status: Completed Specimen: Urine, random Updated: 10/19/21 0541     Legionella Ag, urine Negative       STREP PNEUMO AG, URINE [[id number]] Collected: 10/19/21 0330    Order Status: Completed Specimen: Urine, random Updated: 10/19/21 0541     Strep pneumo Ag, urine Negative       RESPIRATORY VIRUS PANEL W/COVID-19, PCR [[id number]] Collected: 10/18/21 2120    Order Status: Completed Specimen: Nasopharyngeal Updated: 10/18/21 2239     Adenovirus Not detected        Coronavirus 229E Not detected        Coronavirus HKU1 Not detected        Coronavirus CVNL63 Not detected        Coronavirus OC43 Not detected        SARS-CoV-2, PCR Not detected        Metapneumovirus Not detected        Rhinovirus and Enterovirus Not detected        Influenza A Not detected        Influenza A, subtype H1 Not detected        Influenza A, subtype H3 Not detected        INFLUENZA A H1N1 PCR Not detected        Influenza B Not detected        Parainfluenza 1 Not detected        Parainfluenza 2 Not detected        Parainfluenza 3 Not detected        Parainfluenza virus 4 Not detected        RSV by PCR Not detected        B. parapertussis, PCR Not detected        Bordetella pertussis - PCR Not detected        Chlamydophila pneumoniae DNA, QL, PCR Not detected        Mycoplasma pneumoniae DNA, QL, PCR Not detected COVID-19 RAPID TEST [[id number]] Collected: 10/18/21 2115    Order Status: Canceled               Urinalysis Color   Date Value Ref Range Status   10/18/2021 YELLOW   Final     Appearance   Date Value Ref Range Status   10/18/2021 CLEAR   Final     Specific gravity   Date Value Ref Range Status   10/18/2021 >1.030 (H) 1.005 - 1.030 Final     pH (UA)   Date Value Ref Range Status   10/18/2021 5.5 5.0 - 8.0   Final     Protein   Date Value Ref Range Status   10/18/2021 30 (A) NEG mg/dL Final     Ketone   Date Value Ref Range Status   10/18/2021 Negative NEG mg/dL Final     Bilirubin   Date Value Ref Range Status   10/18/2021 Negative NEG   Final     Blood   Date Value Ref Range Status   10/18/2021 Negative NEG   Final     Urobilinogen   Date Value Ref Range Status   10/18/2021 1.0 0.2 - 1.0 EU/dL Final     Nitrites   Date Value Ref Range Status   10/18/2021 Negative NEG   Final     Leukocyte Esterase   Date Value Ref Range Status   10/18/2021 Negative NEG   Final     Potassium   Date Value Ref Range Status   10/28/2021 3.5 3.5 - 5.5 mmol/L Final     Creatinine   Date Value Ref Range Status   10/28/2021 0.62 0.6 - 1.3 MG/DL Final     BUN   Date Value Ref Range Status   10/28/2021 25 (H) 7.0 - 18 MG/DL Final     Prostate Specific Ag   Date Value Ref Range Status   09/24/2020 0.9 0.0 - 4.0 ng/mL Final     Comment:     (NOTE)  Roche ECLIA methodology. According to the American Urological Association, Serum PSA should  decrease and remain at undetectable levels after radical  prostatectomy. The AUA defines biochemical recurrence as an initial  PSA value 0.2 ng/mL or greater followed by a subsequent  confirmatory PSA value 0.2 ng/mL or greater. Values obtained with different assay methods or kits cannot be used  interchangeably. Results cannot be interpreted as absolute evidence  of the presence or absence of malignant disease. PSA No results for input(s): PSA in the last 72 hours.    Coagulation Lab Results Component Value Date/Time    Prothrombin time 14.2 10/28/2021 02:39 AM    Prothrombin time 13.7 10/27/2021 02:46 PM    INR 1.1 10/28/2021 02:39 AM    INR 1.1 10/27/2021 02:46 PM

## 2021-10-28 NOTE — PROGRESS NOTES
Attempted to speak with pt's sister/MPOA, not in waiting room at 5 and 6pm.  I then atttempted to call around 6:45pm, no answer. Will defer update to PA on call or primary service tomorrow.       Demetria Cutler MD/MPH     Pulmonary, Critical Care Medicine  [facility]

## 2021-10-29 NOTE — PROGRESS NOTES
Nutrition Note    Patient tolerating trickle tube feeding and last BM 10/25. Replaced with 15 mmol K Phos. On pressure support. Recommendations/Plan   - Continue to advance tube feeding of Jevity 1.5 as tolerated by 10 mL q 8 hours to goal rate of 50 mL/hr with 100 mL q 4 hour water flushes (goal regimen to provide 1800 kcal, 77 gm protein, 912 mL free water, 100% RDIs). - Add bowel regimen.      Electronically signed by Jovon Hayes RD, [address]  on 10/29/2021 at 11:04 AM    Contact: [phone]

## 2021-10-29 NOTE — INTERDISCIPLINARY ROUNDS
[facility]  Pulmonary, Critical Care, and Sleep Medicine  Interdisciplinary and Ventilator Weaning Rounds    Patient discussed in morning walking rounds and interdisciplinary rounds. ICU day: 3    Overnight events:    none    Assessments and best practice:   Ventilator  o Ventilator day 3  o Vent settings: VC/VC+ with RR 14 and TV of 400 and FiO2 of 50 and PEEP of 5  o VAP bundle, aim to keep peak plateau pressure 73-36PF H2O  o Weaning assessed and documented   - Patient does not meet criteria for SBT. - Patient is not on sedation holiday. - Plan to wean with PS n/a.  - Outcome: n/a   - Final plan: n/a   Sedation  o Fentanyl    Other pertinent drips  o levophed   Lines noted  o Femoral CVL    Critical labs assessed  o Yes   Antibiotics  o Zosyn   Medications reviewed  o Yes   Pending imaging  o none   Pending send out labs  o No   Pending Procedures  o PEG   Glycemic control   Stress ulcer prophylaxis. o Pepcid   DVT prophylaxis. o Lovenox   Need for Lines, corcoran assessed.  o Yes   Restraint Reevaluation   o Yes  o I have reevaluated the patient one hour after initiation of intervention. The patient is comfortable, uninjured, but continues to pose an imminent risk of injury to self to themselves and/or serious disruption of medical treatment required to keep patient stable. The patient's current medical and behavioral conditions that warrant the use intervention include danger to self and Interference with medical equipment or treatment. Restraint or seclusion will be discontinued at the earliest possible time, regardless of the scheduled expiration of the order.  Based on my evaluation, restraints will be continued: YES 10/29/21 0900  o Attending Overseeing restraints: Jose Erazo MD     Family contact/MPOA: family  Family updated will update today       Daily Plans:   CPAP    500cc bolus     CONNOR time 15 minutes        Mireya Almaraz PA-C  10/29/21  Pulmonary, [id number] [facility]

## 2021-10-29 NOTE — PROGRESS NOTES
[id number] [facility] 54: [phone]  [facility]: [address] [place] [address] Way: [phone]    Patient Name: Dora Maravilla  YOB: 1945    Date of follow up 10/29/2021   Reason for Consult: goals of care discussions   Requesting Provider: Dr Mao  Primary Care Physician: Alfredo Busch NP      SUMMARY:   Dora Maravilla is a 67y.o. year old with a past history of CAD. S/p stent in 2003, scarlet fever at age 9, interstitial lung disease, chronic bronchiectasis, and chronic aspiration with dysphagia   , who was admitted on 10/18/2021 from home  with a diagnosis of hypoxemic respiratory failure possibly due to aspiration pneumonia . Current medical issues leading to Palliative Medicine involvement include: 67year old male with several life limiting chronic illnesses. Palliative medicine is consulted for support and goals of care discussions. 10/29/21:  Rapid response called 4 days ago when patient decompensated respiratory status he was intubated at that time. He self extubated 2 days ago and was reintubated. 10/21/2021 alert, no complaints this am. PEG placement today. PALLIATIVE DIAGNOSES:   1. Goals of care   2. Acute hypoxic respiratory failure   3. Pneumonia likely due to aspiration   4. Debility        PLAN:   10/29/21: This NP and Flash Ramos RN in to see patient at the bedside. Assessment completed and spoke with ICU team about goals of care. Patient is sedated, intubated, and mechanically ventilated. Our team is reaching out to his family Indy Wade to set up a meeting to discuss goals of care moving forward. At this time patient remains a full code with full interventions    UPDATE:   Meeting set up with patient's family for Monday at 12:00 11/1/21. Appreciate any assistance from the ICU team.     See below for prior discussions:    10/21/2021 Mr Melida Rod seen along with Ms Boo Cezar.  Alert, deaf, although able to communicate with written word. Denies pain, still some shortness of breath, he is not sure the oxygen is helping. Breathing a bit more labored this am. O2 sats 95% on nasal cannula oxygen. Noted plans for PEG placement later today. Goals of care have not changed patient wishes for full code with full interventions. Goals of care are defined. Palliative medicine will sign off at this time Please re consult if we can be of service. ( please see below for previous notes per palliative team)     1. Goals of care Patient seen along with Ms Angela Mirza RN. He is alert, deaf and communicates verbally however needs questions text or written. We were able to write our questions today. He denied pain or shortness of breath. Noted AMD on file naming his sister Indy Wade as MPOA and her  Aracelis Leon as secondary. He wished no changes today. Able to discuss goals of care through writing the question and explaining the benefits and burdens. He was able to verbally tell us he wished to be resuscitated in the event of cardiac arrest and intubation if needed. This was consistent with his AMD wishes. Goals of care full code with full interventions. Communicated with patient by writing to have further thought and consideration of resuscitation as these efforts will not cure, treat or reverse his underlying chronic medical conditions and likely cause more functional debility. 2. Acute hypoxic respiratory failure likely due to pneumonia but has interstitial lung disease. Maintaining on nasal cannula currently   3. Pneumonia due to aspiration. Appreciate speech consult at time of our visit MBS pending. 4. Debility has care givers during weekdays, sister and brother in law provide care on weekends. Uses walker for ambulation. Very thin and frail appearing albumin 2.4   5. Initial consult note routed to primary continuity provider  6.  Communicated plan of care with: Palliative IDT, patient     Patient/Health Care Proxy Stated Goals: Prolong life      TREATMENT PREFERENCES:   Code Status: Full Code    Advance Care Planning:  [] [facility] Interdisciplinary Team has updated the ACP Navigator with [address] and Patient Capacity    Primary Decision Maker ([address]): AMD on file naming his sister Mason Standing as MPOA   Brother in law Contreras Retort as secondary     Medical Interventions: Full interventions     Artificially Administered Nutrition: Feeding tube long-term, if indicated     Other:  As far as possible, the palliative care team has discussed with patient / health care proxy about goals of care / treatment preferences for patient. HISTORY:     History obtained from: chart and patient     CHIEF COMPLAINT: respiratory failure     HPI/SUBJECTIVE:    The patient is:   [x] Verbal and participatory receives communication via writing and via text. He is verbal  [] Non-participatory due to:   Please see summary     Clinical Pain Assessment (nonverbal scale for nonverbal patients): Clinical Pain Assessment  Severity: 0     Activity (Movement): Lying quietly, normal position    Duration: for how long has pt been experiencing pain (e.g., 2 days, 1 month, years)  Frequency: how often pain is an issue (e.g., several times per day, once every few days, constant)     FUNCTIONAL ASSESSMENT:     Palliative Performance Scale (PPS):  PPS: 20    ECOG  ECOG Status : Completely disabled     PSYCHOSOCIAL/SPIRITUAL SCREENING:      Any spiritual / Advent concerns:  [] Yes /  [x] No    Caregiver Burnout:  [] Yes /  [] No /  [x] No Caregiver Present      Anticipatory grief assessment:   [x] Normal  / [] Maladaptive        REVIEW OF SYSTEMS:     Positive and pertinent negative findings in ROS are noted above in HPI. The following systems were [x] reviewed / [] unable to be reviewed as noted in HPI  Other findings are noted below.   Systems: constitutional, ears/nose/mouth/throat, respiratory, gastrointestinal, genitourinary, musculoskeletal, integumentary, neurologic, psychiatric, endocrine. Positive findings noted below. Modified ESAS Completed by: provider   Fatigue: 5       Pain: 0   Anxiety: 0 Nausea: 0     Dyspnea: 5           Stool Occurrence(s): 0        PHYSICAL EXAM:     Wt Readings from Last 3 Encounters:   10/29/21 56 kg (123 lb 7.3 oz)   08/31/21 51.7 kg (114 lb)   06/29/21 53.1 kg (117 lb)     Blood pressure (!) 95/55, pulse 95, temperature 100 °F (37.8 °C), resp. rate 15, height 5' 7\" (1.702 m), weight 56 kg (123 lb 7.3 oz), SpO2 96 %. Pain:  Pain Scale 1: Adult Nonverbal Pain Scale  Pain Intensity 1: 0  Pain Onset 1: surgical  Pain Location 1: Abdomen  Pain Orientation 1: Anterior  Pain Description 1: Aching  Pain Intervention(s) 1: Medication (see MAR)  Last bowel movement: none recorded     Constitutional: thin, chronically ill. Now on a ventilator and intubated.  Sedated   Eyes: glasses donned   ENMT: intubated   Cardiovascular: mildly hypotensive   Respiratory: mechanically ventilated  PEEP 5 Fi02 is 35%  Skin: warm, dry  Neurologic: sedated        HISTORY:     Active Problems:    Pneumonia (10/18/2021)      Severe protein-calorie malnutrition ([facility] (10/20/2021)      Gastrostomy complication ([facility] (64/65/3055)      Past Medical History:   Diagnosis Date    CAD (coronary artery disease)     MI 1995; stent 2003 (done preop cochlear sgy)    Cancer ([facility]     SQUAMOUS CELL    Coronary artery disease     Deafness     Myocardial infarction [facility]) [id number]    Scarlet fever age 9      Past Surgical History:   Procedure Laterality Date    BRONCH-FIBER/DIAGNOSTIC  6/16/2016         HX COLONOSCOPY  2013    neg    [place]    HX HEART CATHETERIZATION  2003    HX HEART CATHETERIZATION  1999    HX HERNIA REPAIR      inguinal ? laterality    HX OTHER SURGICAL      2 cochlear implants (R); 1 left - all failed      Family History   Problem Relation Age of Onset    No Known Problems Mother     Heart Disease Father     Heart Attack Father         1989    Coronary Artery Disease Father     Hypertension Father     High Cholesterol Father      History reviewed, no pertinent family history.   Social History     Tobacco Use    Smoking status: Never Smoker    Smokeless tobacco: Never Used   Substance Use Topics    Alcohol use: No     Allergies   Allergen Reactions    Pollens Extract Runny Nose and Cough      Current Facility-Administered Medications   Medication Dose Route Frequency    potassium phosphate 15 mmol in 0.9% sodium chloride 250 mL infusion   IntraVENous ONCE    senna-docusate (PERICOLACE) 8.6-50 mg per tablet 1 Tablet  1 Tablet Oral DAILY    polyethylene glycol (MIRALAX) packet 17 g  17 g Per NG tube DAILY    famotidine (PF) (PEPCID) 20 mg in 0.9% sodium chloride 10 mL injection  20 mg IntraVENous Q12H    thiamine HCL (B-1) tablet 200 mg  200 mg Per G Tube DAILY    NOREPINephrine (LEVOPHED) 8 mg in 5% dextrose 250mL (32 mcg/mL) infusion  0.5-50 mcg/min IntraVENous TITRATE    LORazepam (ATIVAN) injection 2 mg  2 mg IntraVENous Q15MIN PRN    HYDROmorphone (DILAUDID) syringe 1 mg  1 mg IntraVENous Q15MIN PRN    chlorhexidine (PERIDEX) 0.12 % mouthwash 10 mL  10 mL Oral Q12H    midazolam (VERSED) injection 2 mg  2 mg IntraVENous Q10MIN PRN    fentaNYL citrate (PF) injection 100 mcg  100 mcg IntraVENous Q30MIN PRN    fentaNYL (PF) 1,500 mcg/30 mL (50 mcg/mL) infusion  0-200 mcg/hr IntraVENous TITRATE    insulin lispro (HUMALOG) injection   SubCUTAneous Q6H    glucose chewable tablet 16 g  16 g Oral PRN    glucagon (GLUCAGEN) injection 1 mg  1 mg IntraMUSCular PRN    bacitracin zinc-polymyxin b (POLYSPORIN) 500-10,000 unit/gram ointment   Topical BID    naloxone (NARCAN) injection 0.4 mg  0.4 mg IntraVENous PRN    albuterol-ipratropium (DUO-NEB) 2.5 MG-0.5 MG/3 ML  3 mL Nebulization Q4H RT    sodium chloride 3% hypertonic nebulizer soln  4 mL Nebulization Q4HWA RT    Lactobacillus Nicole GREGORY [facility]) tablet 2 Tablet  2 Tablet Per G Tube BID    guaiFENesin (ROBITUSSIN) 100 mg/5 mL oral liquid 400 mg  400 mg Per G Tube Q4H PRN    albuterol-ipratropium (DUO-NEB) 2.5 MG-0.5 MG/3 ML  3 mL Nebulization Q4H PRN    aspirin chewable tablet 81 mg  81 mg Per G Tube DAILY    cholecalciferol (VITAMIN D3) (1000 Units /25 mcg) tablet 1,000 Units  1,000 Units PEG Tube DAILY    acetaminophen (TYLENOL) tablet 650 mg  650 mg Per G Tube Q6H PRN    Or    acetaminophen (TYLENOL) suppository 650 mg  650 mg Rectal Q6H PRN    enoxaparin (LOVENOX) injection 40 mg  40 mg SubCUTAneous QHS    sodium chloride (NS) flush 5-10 mL  5-10 mL IntraVENous PRN    piperacillin-tazobactam (ZOSYN) 3.375 g in 0.9% sodium chloride (MBP/ADV) 100 mL MBP  3.375 g IntraVENous Q6H    sodium chloride (NS) flush 5-40 mL  5-40 mL IntraVENous Q8H    sodium chloride (NS) flush 5-40 mL  5-40 mL IntraVENous PRN    omega-3 acid ethyl esters (LOVAZA) capsule 1,000 mg  1 g Oral DAILY WITH BREAKFAST    therapeutic multivitamin (THERAGRAN) tablet 1 Tablet  1 Tablet Oral DAILY     Facility-Administered Medications Ordered in Other Encounters   Medication Dose Route Frequency    propofoL (DIPRIVAN) 10 mg/mL injection   IntraVENous PRN        LAB AND IMAGING FINDINGS:     Lab Results   Component Value Date/Time    WBC 9.0 10/29/2021 04:50 AM    HGB 9.4 (L) 10/29/2021 04:50 AM    PLATELET 198 67/36/1506 04:50 AM     Lab Results   Component Value Date/Time    Sodium 140 10/29/2021 04:50 AM    Potassium 3.6 10/29/2021 04:50 AM    Chloride 107 10/29/2021 04:50 AM    CO2 28 10/29/2021 04:50 AM    BUN 29 (H) 10/29/2021 04:50 AM    Creatinine 0.57 (L) 10/29/2021 04:50 AM    Calcium 7.7 (L) 10/29/2021 04:50 AM    Magnesium 2.0 10/29/2021 04:50 AM    Phosphorus 1.9 (L) 10/29/2021 04:50 AM      Lab Results   Component Value Date/Time    Alk.  phosphatase 30 (L) 10/27/2021 02:46 PM    Protein, total 5.4 (L) 10/27/2021 02:46 PM    Albumin 1.9 (L) 10/27/2021 02:46 PM    Globulin 3.5 10/27/2021 02:46 PM     Lab Results   Component Value Date/Time    INR 1.1 10/28/2021 02:39 AM    Prothrombin time 14.2 10/28/2021 02:39 AM      No results found for: IRON, FE, TIBC, IBCT, PSAT, FERR   No results found for: PH, PCO2, PO2  No components found for: [place]   Lab Results   Component Value Date/Time    CK 25 (L) 10/28/2021 02:39 AM    CK - MB 1.5 10/28/2021 02:39 AM              Total time: 25 minutes   Counseling / coordination time, spent as noted above:   > 50% counseling / coordination: yes with patient     Prolonged service was provided for  []30 min   []75 min in face to face time in the presence of the patient, spent as noted above.   Time Start:   Time End:

## 2021-10-29 NOTE — PROGRESS NOTES
attended the interdisciplinary rounds for Devorah Walter, who is a 67 y. o.,male. Patients Primary Language is: [place]. According to the patients EMR Judaism Affiliation is: Bahai. The reason the Patient came to the hospital is:   Patient Active Problem List    Diagnosis Date Noted    Gastrostomy complication ([facility] 01/81/6918    Severe protein-calorie malnutrition ([facility] [facility] 10/20/2021    Goals of care, counseling/discussion     Acute respiratory failure with hypoxia ([facility]     Debility     Pneumonia 10/18/2021    Urinary frequency 06/30/2016    Scarlet fever     Myocardial infarction [facility])     Coronary artery disease involving native coronary artery of native heart without angina pectoris,s/p stent 2003 05/19/2016          Plan:  Tammy Urias will continue to follow and will provide pastoral care on an as needed/requested basis.  recommends bedside caregivers page  on duty if patient shows signs of acute spiritual or emotional distress.     [address]  Board Certified [address] [facility]   [phone]

## 2021-10-29 NOTE — PROGRESS NOTES
[facility]  Pulmonary, Critical Care, and Sleep Medicine    Name: Junior Parr MRN: [id number]   : 1945 Hospital: [address] [place] [address] Dr   Date: 10/29/2021      F/U pulmonary consult  Requested by:  Dr. Baldev Rodriguez  Reason:  dyspnea      IMPRESSION:   · Acute hypoxic and hypercarbic respiratory failure: 2/2 PNA and pulm edema on top of bronchiectasis, ILD  · Shock:  Septic + cardiogenic  · Acute pulmonary edema: A combination of worsening cardiomyopathy as well as hypoalbuminemia  · RUL PNA w bronchiectasis, RLL nodular consolidation, Cavitary lesions - likely 2/2 recurrent aspiration. resp cultures growing MSSA. At risk for invasive fungal PNA, NTM, given hx severe structural lung disease. 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 ?sepsis, stress, ischemic  · Severe pulmonary hypertension on echo. Definitely group II and III component   · Urinary retention - coude catheter placed by urology   · normocytic anemia-  stable  · Deconditioning/adult failure to thrive/cachexia: Body mass index is 19.34 kg/m².   · Dyspnea/shortness of breath: Due to above     Patient Active Problem List   Diagnosis Code    Coronary artery disease involving native coronary artery of native heart without angina pectoris,s/p stent  I25.10    Scarlet fever A38.9    Myocardial infarction ([facility] I21.9    Urinary frequency R35.0    Pneumonia J18.9    Goals of care, counseling/discussion Z71.89    Acute respiratory failure with hypoxia ([address] J96.01    Debility R53.81    Severe protein-calorie malnutrition ([facility] E43    Gastrostomy complication ([facility] [facility] I53.66      PLAN:   Low tidal vol ventilation, aggressive pulm toilet/chest PT  Vasopressors to maintain MAP >65mmHg. Giving 500cc bolus. Monitor for signs of vol overload  Daily sedation vacation. No SBT today as having worsening hypotension. But can do pressure support trials. ABx per ID, broad spectrum  May need BAL if not improvnig  tube feeds. OGT for feeds, hold off on PEG until goals of care further discussed  Docusate, senna, miralax added for bowel reg  DVT proph: lovenox    Palliative saw earlier in admission and was full code and interventions  Would be appropriate to have another palliative meeting  Very poor prognosis     Subjective/Interval History:   10/29/21   Worsening pressor requirements overnight  Pt opens eyes to stimulation    ROS:Review of systems not obtained due to patient factors. Objective:   Vital Signs:    Visit Vitals  BP (!) 95/55   Pulse 95   Temp 100 °F (37.8 °C)   Resp 15   Ht 5' 7\" (1.702 m)   Wt 56 kg (123 lb 7.3 oz)   SpO2 96%   BMI 19.34 kg/m²       O2 Device: Endotracheal tube, Ventilator   O2 Flow Rate (L/min): 6 l/min   Temp (24hrs), Av.4 °F (37.4 °C), Min:98.2 °F (36.8 °C), Max:100 °F (37.8 °C)       Intake/Output:   Last shift:      No intake/output data recorded.   Last 3 shifts: 10/27 190 - 10/29 0700  In: 3067.7 [I.V.:1887.7]  Out: 2390 [Urine:2390]    Intake/Output Summary (Last 24 hours) at 10/29/2021 1047  Last data filed at 10/29/2021 0700  Gross per 24 hour   Intake 1665.16 ml   Output 1550 ml   Net 115.16 ml        Physical Exam:    General: intubated and sedated, opens eyes to stimulation,  cachectic, tachypneic, frail   HEENT: Normal, NCAT, PERRLA, no ocular drainage, nasal bridge midline, no nasal drainage, poor dentition, no oral lesions   Neck: Neck supple, trachea midline, no adenopathy   Chest: Frail with normal rise and fall   Lungs: Poor air entry bilaterally, coarse rhonchi throughout all lung fields, decreased breath sounds in bilateral bases, vesicular breath sounds on right anteriorly   Heart: Regular rate and rhythm, S1S2 present or without murmur or extra heart sounds   Abdomen: abdomen is soft without significant tenderness, PEG tube remains in place, with dressing in place, masses, organomegaly   Extremity: 1+ edema, no cyanosis, no clubbing   Neuro: opens eyes to stimulation, deaf   Skin: Poor turgor, pale, easily friable        DATA:  Labs:  Recent Labs     10/29/21  0450 10/28/21  0239 10/27/21  1446   WBC 9.0 11.0 11.5   HGB 9.4* 10.0* 10.6*   HCT 32.6* 34.7* 38.0    220 213     Recent Labs     10/29/21  0450 10/28/21  0239 10/27/21  1446    143 145   K 3.6 3.5 3.6    105 102   CO2 28 33* 40*   * 160* 247*   BUN 29* 25* 23*   CREA 0.57* 0.62 0.56*   CA 7.7* 8.2* 8.4*   MG 2.0 2.1 2.3   PHOS 1.9* 2.1* 1.5*   ALB  --   --  1.9*   ALT  --   --  21   INR  --  1.1 1.1     No results for input(s): PH, PCO2, PO2, HCO3, FIO2 in the last 72 hours. PFT Results  (Last 3 results in the past 10 years)    None        11/24/20    ECHO ADULT COMPLETE 11/24/2020 11/24/2020    Interpretation Summary  · LV: Estimated LVEF is 45 - 50%. Visually measured ejection fraction. Normal cavity size and wall thickness. Globally reduced systolic function. Abnormal left ventricular septal motion consistent with paradoxic motion. Inconclusive left ventricular diastolic function. · Saline contrast was given to evaluate for intracardiac shunt. No shunt seen. Bubble study was negative with valsalva and without valsalva. · TV: Moderate tricuspid valve regurgitation is present. · PA: Mild pulmonary hypertension. Pulmonary arterial systolic pressure is 42 mmHg.     Signed by: Michael Gibson MD on 11/24/2020  2:49 PM        Imaging:  [x]I have personally reviewed the patients radiographs  []Radiographs reviewed with radiologist   []No change from prior, tubes and lines in adequate position  []Improved   [x]Worsening    . Restraints need. Attending Non-violent Restraint Reevaluation     I have reevaluated the patient one hour after initiation of intervention. The patient is comfortable, uninjured, but continues to pose an imminent risk of injury to self to themselves and/or serious disruption of medical treatment required to keep patient stable. The patient's current medical and behavioral conditions that warrant the use intervention include danger to self and Interference with medical equipment or treatment. Restraint or seclusion will be discontinued at the earliest possible time, regardless of the scheduled expiration of the order.     Based on my evaluation, restraints will be continued: YES    1:10 PM    DO PRASANNA Miller PCCM Fellow    Pulmonary, Critical Care Medicine  [facility]

## 2021-10-29 NOTE — PROGRESS NOTES
Palliative Medicine    Family meeting with pt's sister/Xin Anderson is scheduled for Monday 11/1/2021 at 1200 to discuss goals of care. Thank you for the Palliative Medicine consult and allowing us to participate in the care of Mr. Brandi Hernandez. Will continue to monitor and provide support.     Lonnie Loyd RN, BSN  Palliative Medicine Inpatient RN  [facility]  Palliative COPE Line: [phone] ([phone])

## 2021-10-29 NOTE — PROGRESS NOTES
Cardiology Progress Note    Admit Date: 10/18/2021  Attending Cardiologist: Dr. Ritu Ny:     -Acute hypercapnic and hypoxic respiratory failure requiring emergent intubation on 10/27/2021  -Multifactorial respiratory failure likely aspiration pneumonia, interstitial lung disease and chronic bronchiectasis  -Aspiration pneumonia  -Acute systolic congestive heart failure. LVEF 25% by echo on this admission. Historically mildly reduced LVEF  -History of cavitary lung lesion  -History of severe pulmonary hypertension  -Failure to thrive with multiple medical problems     Primary cardiologist: Dr. Aelssia Dash:       I saw, evaluated, interviewed and examined the patient personally. I agree with the findings and plan of care as documented below with PA-C note  Patient is intubated and sedated. Borderline low blood pressure. On Levophed. No significant fluid overload on exam.  LVEF 25%. Not a candidate for aggressive invasive cardiac management at this time. Overall prognosis is poor in my opinion. I would recommend supportive care and address goals of care with the family member. Defer to ICU team  Once hemodynamically stable and if creatinine stable, would recommend starting guideline directed medical therapy for LV dysfunction. Once hemodynamics improve, consider starting beta-blocker and ARB as tolerated. No new recommendation from cardiovascular standpoint at this time. We will be available as needed. Bryant Richardson MD       Remains sedated, intubated and on pressor support. Would continue work up and treatment of underlying pulmonary process. Patient is not currently appropriate candidate for further cardiac work up or intervention. Unable to tolerate BB/ace given hypotension. Diuretics only on PRN basis. Continue supportive care. Continue to address goals of care. Further recommendations pending further recovery and hospital course. Subjective:      Intubated and sedated    Objective:      Patient Vitals for the past 8 hrs:   Temp Pulse Resp BP SpO2   10/29/21 0824  95 15  96 %   10/29/21 0800 100 °F (37.8 °C)       10/29/21 0700  85 16 (!) 95/55 97 %   10/29/21 0630  86 14  97 %   10/29/21 0617  87 15  97 %   10/29/21 0616  86 14  97 %   10/29/21 0615  86 14  96 %   10/29/21 0614  87 14  97 %   10/29/21 0613  86 14  97 %   10/29/21 0612  88 14  97 %   10/29/21 0611  89 14  97 %   10/29/21 0610  88 14  97 %   10/29/21 0609  92 15  96 %   10/29/21 0608  98 18  98 %   10/29/21 0607  (!) 102 20  99 %   10/29/21 0606  88 14  97 %   10/29/21 0605  85 14  97 %   10/29/21 0604  89 14  97 %   10/29/21 0600  87 14 (!) 90/51 97 %   10/29/21 0545  87 14  97 %   10/29/21 0530  91 14  97 %   10/29/21 0515  95 14  96 %   10/29/21 0500  97 14 (!) 95/57 97 %   10/29/21 0445  (!) 101 14  97 %   10/29/21 0430  93 14  99 %   10/29/21 0415  93 14  98 %   10/29/21 0400 100 °F (37.8 °C) 89 14 (!) 104/57 98 %   10/29/21 0345  94 14  97 %   10/29/21 0331  91 14  97 %   10/29/21 0330  84 14  98 %   10/29/21 0315  86 14  97 %   10/29/21 0300  89 14 106/64 97 %   10/29/21 0258  89 14  97 %   10/29/21 0257  89 14  97 %   10/29/21 0256  89 14  97 %   10/29/21 0255  93 14  97 %   10/29/21 0254  91 14  97 %   10/29/21 0253  94 14  97 %   10/29/21 0252  91 14  97 %   10/29/21 0251  91 14  97 %   10/29/21 0250  93 14  97 %   10/29/21 0249  91 14  97 %   10/29/21 0248  92 14  97 %   10/29/21 0247  96 14  97 %   10/29/21 0246  95 14  97 %   10/29/21 0245  94 14  97 %   10/29/21 0230  (!) 115 22  95 %   10/29/21 0215  98 14  97 %   10/29/21 0200  96 14 (!) 93/58 98 %   10/29/21 0145  93 14  100 %   10/29/21 0130  89 15  (!) 85 %         Patient Vitals for the past 96 hrs:   Weight   10/29/21 0854 123 lb 7.3 oz (56 kg)   10/28/21 0400 119 lb 0.8 oz (54 kg)   10/27/21 0329 115 lb 14.4 oz (52.6 kg)   10/26/21 1407 120 lb (54.4 kg)       TELE: normal sinus rhythm               Current Facility-Administered Medications   Medication Dose Route Frequency Last Admin    potassium phosphate 15 mmol in 0.9% sodium chloride 250 mL infusion   IntraVENous ONCE      famotidine (PF) (PEPCID) 20 mg in 0.9% sodium chloride 10 mL injection  20 mg IntraVENous Q12H 20 mg at 10/28/21 2104    thiamine HCL (B-1) tablet 200 mg  200 mg Per G Tube DAILY      NOREPINephrine (LEVOPHED) 8 mg in 5% dextrose 250mL (32 mcg/mL) infusion  0.5-50 mcg/min IntraVENous TITRATE 10 mcg/min at 10/29/21 0847    LORazepam (ATIVAN) injection 2 mg  2 mg IntraVENous Q15MIN PRN      HYDROmorphone (DILAUDID) syringe 1 mg  1 mg IntraVENous Q15MIN PRN      chlorhexidine (PERIDEX) 0.12 % mouthwash 10 mL  10 mL Oral Q12H 10 mL at 10/28/21 2105    midazolam (VERSED) injection 2 mg  2 mg IntraVENous Q10MIN PRN 2 mg at 10/28/21 0152    fentaNYL citrate (PF) injection 100 mcg  100 mcg IntraVENous Q30MIN PRN      fentaNYL (PF) 1,500 mcg/30 mL (50 mcg/mL) infusion  0-200 mcg/hr IntraVENous TITRATE 75 mcg/hr at 10/29/21 0804    insulin lispro (HUMALOG) injection   SubCUTAneous Q6H 2 Units at 10/29/21 0600    glucose chewable tablet 16 g  16 g Oral PRN      glucagon (GLUCAGEN) injection 1 mg  1 mg IntraMUSCular PRN      bacitracin zinc-polymyxin b (POLYSPORIN) 500-10,000 unit/gram ointment   Topical BID Given at 10/28/21 1830    naloxone (NARCAN) injection 0.4 mg  0.4 mg IntraVENous PRN      albuterol-ipratropium (DUO-NEB) 2.5 MG-0.5 MG/3 ML  3 mL Nebulization Q4H RT 3 mL at 10/29/21 0819    sodium chloride 3% hypertonic nebulizer soln  4 mL Nebulization Q4HWA RT 4 mL at 10/29/21 0819    Lactobacillus Acidoph & Bulgar [facility]) tablet 2 Tablet  2 Tablet Per G Tube BID 2 Tablet at 10/28/21 1831    guaiFENesin (ROBITUSSIN) 100 mg/5 mL oral liquid 400 mg  400 mg Per G Tube Q4H  mg at 10/24/21 5796    albuterol-ipratropium (DUO-NEB) 2.5 MG-0.5 MG/3 ML  3 mL Nebulization Q4H PRN      aspirin chewable tablet 81 mg  81 mg Per G Tube DAILY 81 mg at 10/28/21 0823    cholecalciferol (VITAMIN D3) (1000 Units /25 mcg) tablet 1,000 Units  1,000 Units PEG Tube DAILY 1,000 Units at 10/28/21 3475    polyethylene glycol (MIRALAX) packet 17 g  17 g Per G Tube DAILY PRN      acetaminophen (TYLENOL) tablet 650 mg  650 mg Per G Tube Q6H PRN      Or    acetaminophen (TYLENOL) suppository 650 mg  650 mg Rectal Q6H  mg at 10/25/21 1532    enoxaparin (LOVENOX) injection 40 mg  40 mg SubCUTAneous QHS 40 mg at 10/28/21 2105    sodium chloride (NS) flush 5-10 mL  5-10 mL IntraVENous PRN 10 mL at 10/19/21 2336    piperacillin-tazobactam (ZOSYN) 3.375 g in 0.9% sodium chloride (MBP/ADV) 100 mL MBP  3.375 g IntraVENous Q6H 3.375 g at 10/29/21 2671    sodium chloride (NS) flush 5-40 mL  5-40 mL IntraVENous Q8H 10 mL at 10/29/21 0614    sodium chloride (NS) flush 5-40 mL  5-40 mL IntraVENous PRN      omega-3 acid ethyl esters (LOVAZA) capsule 1,000 mg  1 g Oral DAILY WITH BREAKFAST 1,000 mg at 10/28/21 1248    therapeutic multivitamin (THERAGRAN) tablet 1 Tablet  1 Tablet Oral DAILY 1 Tablet at 10/28/21 0915     Facility-Administered Medications Ordered in Other Encounters   Medication Dose Route Frequency Last Admin    propofoL (DIPRIVAN) 10 mg/mL injection   IntraVENous PRN 50 mg at 10/28/21 0020         Intake/Output Summary (Last 24 hours) at 10/29/2021 0910  Last data filed at 10/29/2021 0700  Gross per 24 hour   Intake 1765.16 ml   Output 1650 ml   Net 115.16 ml       Physical Exam:  General:  no distress  Lungs:  clear to auscultation anterior  Heart:  regular rate and rhythm  Abdomen:  abdomen is soft   Extremities:  no edema    Visit Vitals  BP (!) 95/55   Pulse 95   Temp 100 °F (37.8 °C)   Resp 15   Ht 5' 7\" (1.702 m)   Wt 123 lb 7.3 oz (56 kg)   SpO2 96%   BMI 19.34 kg/m²       Data Review:     Labs: Results:       Chemistry Recent Labs     10/29/21  8938 10/28/21  0239 10/27/21  1446   * 160* 247*    143 145   K 3.6 3.5 3.6    105 102   CO2 28 33* 40*   BUN 29* 25* 23*   CREA 0.57* 0.62 0.56*   CA 7.7* 8.2* 8.4*   MG 2.0 2.1 2.3   PHOS 1.9* 2.1* 1.5*   AGAP 5 5 3   BUCR 51* 40* 41*   AP  --   --  30*   TP  --   --  5.4*   ALB  --   --  1.9*   GLOB  --   --  3.5   AGRAT  --   --  0.5*      CBC w/Diff Recent Labs     10/29/21  0450 10/28/21  0239 10/27/21  1446   WBC 9.0 11.0 11.5   RBC 3.66* 3.80* 4.00*   HGB 9.4* 10.0* 10.6*   HCT 32.6* 34.7* 38.0    220 213   GRANS 87* 92* 93*   LYMPH 4* 2* 1*   EOS 2 0 0      Cardiac Enzymes No results found for: CPK, CK, CKMMB, CKMB, RCK3, CKMBT, CKNDX, CKND1, KATERIN, TROPT, TROIQ, PAULINE, TROPT, TNIPOC, BNP, BNPP   Coagulation Recent Labs     10/28/21  0239 10/27/21  1446   PTP 14.2 13.7   INR 1.1 1.1       Lipid Panel Lab Results   Component Value Date/Time    Cholesterol, total 138 09/24/2020 10:04 AM    HDL Cholesterol 66 (H) 09/24/2020 10:04 AM    LDL, calculated 61.4 09/24/2020 10:04 AM    VLDL, calculated 10.6 09/24/2020 10:04 AM    Triglyceride 53 09/24/2020 10:04 AM    CHOL/HDL Ratio 2.1 09/24/2020 10:04 AM      BNP No results found for: BNP, BNPP, XBNPT   Liver Enzymes Recent Labs     10/27/21  1446   TP 5.4*   ALB 1.9*   AP 30*      Thyroid Studies Lab Results   Component Value Date/Time    TSH 1.40 10/27/2021 02:46 PM          Signed By: LEXUS Dawson     October 29, 2021

## 2021-10-29 NOTE — PROGRESS NOTES
Infectious Disease progress Note        Reason: Right upper lobe cavitary pneumonia    Current abx Prior abx   Piperacillin/tazobactam since 10/18/2021 vancomycin 10/18-10/21  Levofloxacin 10/18-10/25     Lines:       Assessment :    67 y.o. 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 intake, and worsening cough . Now with gram positive bacteremia, chronic cavitary lesion RUL with RUL/RLL infiltrates    Clinical presentation c/w acute on chronic hypoxic respiratory failure likely secondary to recurrent aspiration pneumonia, chronic cavitary lesion right upper lobe  Rule out superimposed infection/colonization with atypical mycobacteria    Sputum cx- mixed respiratory cristine, MSSA yeast  Yeast likely colonizer. Sputum AFB 10/19 negative    Pulmonary follow-up appreciated    Single positive blood culture for coagulase negative staph. On  10/18 is likely contamination    S/p peg tube placement 10/21/21    Now with worsening tachypnea, increased abdominal pain on 10/25/2021 likely secondary to displaced PEG tube. GI follow-up appreciated. Status post removal of PEG tube 10/26/2021. Evidence of sherif-PEG wound infection noted  No clinical or radiological evidence of worsening pneumonia    Unresponsiveness on 10/27/2021 status post intubation 10/27/2021. Pulled out ET tube, reintubated on 10/28/2021  Currently on 35% FiO2. Now with worsening hypotension requiring pressors  Etiology of hypotension not entirely clear at this time- ? Cardiogenic versus partially treated sepsis  Concerns for PEG site infection when PEG tube removed 10/26. Will broaden antibiotic coverage to cover for ESBL gram-negative still further information obtained  No worsening erythema around recent PEG site.     Thick bloody drainage noted from PEG site    Recommendations:    1. Discontinue Zosyn. Start meropenem  2. Send respiratory cultures, peg site drainage cultures  3. Titrate pressors as tolerated  4. follow sputum for AFB  5. Weaning from vent per ICU team  6. Follow-up GI recommendations regarding replacement of PEG tube. Above plan was discussed in details with icu  Team, dr. Iqra Monterroso. Please call me if any further questions or concerns. Will continue to participate in the care of this patient. HPI:    Intubated      Current Discharge Medication List      CONTINUE these medications which have NOT CHANGED    Details   acetylcysteine (MUCOMYST) 100 mg/mL (10 %) nebulizer solution Take 4 mL by inhalation every four (4) hours for 120 days. Qty: 720 mL, Refills: 3      fluorouraciL (EFUDEX) 5 % chemo cream APPLY CREAM TO FOREHEAD 2 TIMES DAILY FOR 2 WEEKS ONCE HEALED USE 2 TIMES DAILY TO THE EARS FOR 2 WEEKS ONCE HEALED USE 2 TIMES DAILY TO THE CHEEKS AND TEMPLES FOR 2 WEEKS      sodium chloride 3 % nebulizer solution 4 mL by Nebulization route four (4) times daily. Mix with albuterol. File under Medicare Part B. Dx: R06.02; J47.9; J84.9; R53.81  Qty: 480 mL, Refills: 5    Associated Diagnoses: Dyspnea on exertion; Bronchiectasis without complication ([address] ILD (interstitial lung disease) ([address] Chronic pulmonary aspiration, sequela; Physical deconditioning      albuterol (PROVENTIL VENTOLIN) 2.5 mg /3 mL (0.083 %) nebu Mix with hypertonic saline nebulizer -- use 3- times per day. File under Medicare Part B. Dx: R06.02; J47.9; J84.9; R53.81  Qty: 360 Nebule, Refills: 3    Associated Diagnoses: Dyspnea on exertion; Bronchiectasis without complication ([address] ILD (interstitial lung disease) ([address] Chronic pulmonary aspiration, sequela; Physical deconditioning      guaiFENesin ER (MUCINEX) 600 mg ER tablet Take 1 Tablet by mouth two (2) times a day for 90 days.   Qty: 180 Tablet, Refills: 3    Associated Diagnoses: Bronchiectasis without complication (HCC)      cholecalciferol (VITAMIN D3) (1000 Units /25 mcg) tablet Take 1,000 Units by mouth daily. FISH OIL-DHA-EPA PO Take 1 Tab by mouth daily. amino acids powd Take 1 Dose by mouth daily. OTHER Take 1 Cap by mouth daily. tumeric       fluticasone propionate (FLONASE NA) 1 Spray by Both Nostrils route daily. ascorbic acid, vitamin C, (VITAMIN C) 500 mg tablet Take 500 mg by mouth daily. multivitamin (ONE A DAY) tablet Take 1 Tab by mouth daily. aspirin delayed-release 81 mg tablet Take 81 mg by mouth daily.     Associated Diagnoses: Coronary artery disease involving native coronary artery of native heart without angina pectoris             Current Facility-Administered Medications   Medication Dose Route Frequency    potassium phosphate 15 mmol in 0.9% sodium chloride 250 mL infusion   IntraVENous ONCE    famotidine (PF) (PEPCID) 20 mg in 0.9% sodium chloride 10 mL injection  20 mg IntraVENous Q12H    thiamine HCL (B-1) tablet 200 mg  200 mg Per G Tube DAILY    NOREPINephrine (LEVOPHED) 8 mg in 5% dextrose 250mL (32 mcg/mL) infusion  0.5-50 mcg/min IntraVENous TITRATE    LORazepam (ATIVAN) injection 2 mg  2 mg IntraVENous Q15MIN PRN    HYDROmorphone (DILAUDID) syringe 1 mg  1 mg IntraVENous Q15MIN PRN    chlorhexidine (PERIDEX) 0.12 % mouthwash 10 mL  10 mL Oral Q12H    midazolam (VERSED) injection 2 mg  2 mg IntraVENous Q10MIN PRN    fentaNYL citrate (PF) injection 100 mcg  100 mcg IntraVENous Q30MIN PRN    fentaNYL (PF) 1,500 mcg/30 mL (50 mcg/mL) infusion  0-200 mcg/hr IntraVENous TITRATE    insulin lispro (HUMALOG) injection   SubCUTAneous Q6H    glucose chewable tablet 16 g  16 g Oral PRN    glucagon (GLUCAGEN) injection 1 mg  1 mg IntraMUSCular PRN    bacitracin zinc-polymyxin b (POLYSPORIN) 500-10,000 unit/gram ointment   Topical BID    naloxone (NARCAN) injection 0.4 mg  0.4 mg IntraVENous PRN    albuterol-ipratropium (DUO-NEB) 2.5 MG-0.5 MG/3 ML 3 mL Nebulization Q4H RT    sodium chloride 3% hypertonic nebulizer soln  4 mL Nebulization Q4HWA RT    Lactobacillus Acidoph & Bulgar (FLORANEX) tablet 2 Tablet  2 Tablet Per G Tube BID    guaiFENesin (ROBITUSSIN) 100 mg/5 mL oral liquid 400 mg  400 mg Per G Tube Q4H PRN    albuterol-ipratropium (DUO-NEB) 2.5 MG-0.5 MG/3 ML  3 mL Nebulization Q4H PRN    aspirin chewable tablet 81 mg  81 mg Per G Tube DAILY    cholecalciferol (VITAMIN D3) (1000 Units /25 mcg) tablet 1,000 Units  1,000 Units PEG Tube DAILY    polyethylene glycol (MIRALAX) packet 17 g  17 g Per G Tube DAILY PRN    acetaminophen (TYLENOL) tablet 650 mg  650 mg Per G Tube Q6H PRN    Or    acetaminophen (TYLENOL) suppository 650 mg  650 mg Rectal Q6H PRN    enoxaparin (LOVENOX) injection 40 mg  40 mg SubCUTAneous QHS    sodium chloride (NS) flush 5-10 mL  5-10 mL IntraVENous PRN    piperacillin-tazobactam (ZOSYN) 3.375 g in 0.9% sodium chloride (MBP/ADV) 100 mL MBP  3.375 g IntraVENous Q6H    sodium chloride (NS) flush 5-40 mL  5-40 mL IntraVENous Q8H    sodium chloride (NS) flush 5-40 mL  5-40 mL IntraVENous PRN    omega-3 acid ethyl esters (LOVAZA) capsule 1,000 mg  1 g Oral DAILY WITH BREAKFAST    therapeutic multivitamin (THERAGRAN) tablet 1 Tablet  1 Tablet Oral DAILY     Facility-Administered Medications Ordered in Other Encounters   Medication Dose Route Frequency    propofoL (DIPRIVAN) 10 mg/mL injection   IntraVENous PRN       Allergies: Pollens extract    Family History   Problem Relation Age of Onset    No Known Problems Mother     Heart Disease Father     Heart Attack Father         1989    Coronary Artery Disease Father     Hypertension Father     High Cholesterol Father      Social History     Socioeconomic History    Marital status: SINGLE     Spouse name: Not on file    Number of children: Not on file    Years of education: Not on file    Highest education level: Not on file   Occupational History    Not on file   Tobacco Use    Smoking status: Never Smoker    Smokeless tobacco: Never Used   Vaping Use    Vaping Use: Never used   Substance and Sexual Activity    Alcohol use: No    Drug use: No    Sexual activity: Not on file   Other Topics Concern    Not on file   Social History Narrative    Not on file     Social Determinants of Health     Financial Resource Strain:     Difficulty of Paying Living Expenses:    Food Insecurity:     Worried About Running Out of Food in the Last Year:     [address] in the Last Year:    Transportation Needs:     Lack of Transportation (Medical):  Lack of Transportation (Non-Medical):    Physical Activity:     Days of Exercise per Week:     Minutes of Exercise per Session:    Stress:     Feeling of Stress :    Social Connections:     Frequency of Communication with Friends and Family:     Frequency of Social Gatherings with Friends and Family:     Attends [employer] Services:     Active Member of Clubs or Organizations:     Attends Club or Organization Meetings:     Marital Status:    Intimate Partner Violence:     Fear of Current or Ex-Partner:     Emotionally Abused:     Physically Abused:     Sexually Abused:      Social History     Tobacco Use   Smoking Status Never Smoker   Smokeless Tobacco Never Used        Temp (24hrs), Av.1 °F (37.3 °C), Min:98.2 °F (36.8 °C), Max:100 °F (37.8 °C)    Visit Vitals  BP (!) 95/55   Pulse 85   Temp 100 °F (37.8 °C)   Resp 16   Ht 5' 7\" (1.702 m)   Wt 54 kg (119 lb 0.8 oz)   SpO2 97%   BMI 18.65 kg/m²       ROS: Unable to obtain due to patient factors    Physical Exam:    General:Thin  male laying on bed, intubated, sedated    HEENT:  Normocephalic, atraumatic, no scleral icterus or pallor; no conjunctival hemmohage; ET tube in place; neck supple, no bruits.    Lymph Nodes:   not examined   Lungs:   shallow breathing, less tachypneic, rhonchi right upper lung   Heart:  RRR, s1 and s2; no  rubs or gallops, no edema, + pedal pulses   Abdomen:  soft, non-distended, no erythema around recent PEG site,bloody drainage from peg site,  no hepatomegaly, no splenomegaly. Non-tender   Genitourinary:  deferred   Extremities:   no clubbing, cyanosis; no joint effusions or swelling;  muscle mass appropriate for age   Neurologic:   Sedated                        Skin:  No rash or ulcers noted   Back:  not examined     Psychiatric:   Unable to assess         Labs: Results:   Chemistry Recent Labs     10/29/21  0450 10/28/21  0239 10/27/21  1446   * 160* 247*    143 145   K 3.6 3.5 3.6    105 102   CO2 28 33* 40*   BUN 29* 25* 23*   CREA 0.57* 0.62 0.56*   CA 7.7* 8.2* 8.4*   AGAP 5 5 3   BUCR 51* 40* 41*   AP  --   --  30*   TP  --   --  5.4*   ALB  --   --  1.9*   GLOB  --   --  3.5   AGRAT  --   --  0.5*      CBC w/Diff Recent Labs     10/29/21  0450 10/28/21  0239 10/27/21  1446   WBC 9.0 11.0 11.5   RBC 3.66* 3.80* 4.00*   HGB 9.4* 10.0* 10.6*   HCT 32.6* 34.7* 38.0    220 213   GRANS 87* 92* 93*   LYMPH 4* 2* 1*   EOS 2 0 0      Microbiology Recent Labs     10/28/21  0321   CULT PENDING          RADIOLOGY:    All available imaging studies/reports in [facility] for this admission were reviewed    High complexity decision making was performed during the evaluation of this patient at high risk for decompensation with multiple organ involvement         Disclaimer: Sections of this note are dictated utilizing voice recognition software, which may have resulted in some phonetic based errors in grammar and contents. Even though attempts were made to correct all the mistakes, some may have been missed, and remained in the body of the document. If questions arise, please contact our department.     Dr. David Patel, Infectious Disease Specialist  [phone]  October 29, 2021  3:25 PM

## 2021-10-29 NOTE — PROGRESS NOTES
Rec'd pt resting in bed will open eyes to command/ loud verbal stimulus. Tremors to upper ext's. Merlos anchor placed. Pt turned and repositioned, has mepilex to sacrum  -which has nonblanchable reddness present to sacrum. 0200  No changes, will grimace when repositioned. Remains on levophed to keep map >65.

## 2021-10-30 NOTE — PROGRESS NOTES
Problem: Patient Education: Go to Patient Education Activity  Goal: Patient/Family Education  Outcome: Progressing Towards Goal     Problem: Falls - Risk of  Goal: *Absence of Falls  Description: Document Priya Batres Fall Risk and appropriate interventions in the flowsheet. Outcome: Progressing Towards Goal  Note: Fall Risk Interventions:  Mobility Interventions: Bed/chair exit alarm, Strengthening exercises (ROM-active/passive)    Mentation Interventions: Evaluate medications/consider consulting pharmacy    Medication Interventions: Bed/chair exit alarm, Evaluate medications/consider consulting pharmacy    Elimination Interventions: Bed/chair exit alarm, Urinal in reach (Merlos)              Problem: Patient Education: Go to Patient Education Activity  Goal: Patient/Family Education  Outcome: Progressing Towards Goal     Problem: Patient Education: Go to Patient Education Activity  Goal: Patient/Family Education  Outcome: Progressing Towards Goal     Problem: Patient Education: Go to Patient Education Activity  Goal: Patient/Family Education  Outcome: Progressing Towards Goal     Problem: Patient Education: Go to Patient Education Activity  Goal: Patient/Family Education  Outcome: Progressing Towards Goal     Problem: Pressure Injury - Risk of  Goal: *Prevention of pressure injury  Description: Document Kali Scale and appropriate interventions in the flowsheet.   Outcome: Progressing Towards Goal  Note: Pressure Injury Interventions:  Sensory Interventions: Assess changes in LOC, Assess need for specialty bed    Moisture Interventions: Absorbent underpads    Activity Interventions: Assess need for specialty bed    Mobility Interventions: Assess need for specialty bed    Nutrition Interventions: Document food/fluid/supplement intake    Friction and Shear Interventions: Apply protective barrier, creams and emollients                Problem: Pressure Injury - Risk of  Goal: *Prevention of pressure injury  Description: Document Kali Scale and appropriate interventions in the flowsheet.   Outcome: Progressing Towards Goal  Note: Pressure Injury Interventions:  Sensory Interventions: Assess changes in LOC, Assess need for specialty bed    Moisture Interventions: Absorbent underpads    Activity Interventions: Assess need for specialty bed    Mobility Interventions: Assess need for specialty bed    Nutrition Interventions: Document food/fluid/supplement intake    Friction and Shear Interventions: Apply protective barrier, creams and emollients                Problem: Patient Education: Go to Patient Education Activity  Goal: Patient/Family Education  Outcome: Progressing Towards Goal     Problem: Ventilator Management  Goal: *Adequate oxygenation and ventilation  Outcome: Progressing Towards Goal  Goal: *Patient maintains clear airway/free of aspiration  Outcome: Progressing Towards Goal  Goal: *Absence of infection signs and symptoms  Outcome: Progressing Towards Goal  Goal: *Normal spontaneous ventilation  Outcome: Progressing Towards Goal     Problem: Patient Education: Go to Patient Education Activity  Goal: Patient/Family Education  Outcome: Progressing Towards Goal     Problem: Non-Violent Restraints  Goal: Removal from restraints as soon as assessed to be safe  Outcome: Progressing Towards Goal  Goal: No harm/injury to patient while restraints in use  Outcome: Progressing Towards Goal  Goal: Patient's dignity will be maintained  Outcome: Progressing Towards Goal  Goal: Patient Interventions  Outcome: Progressing Towards Goal

## 2021-10-30 NOTE — PROGRESS NOTES
[facility]  ICU Progress Note      Name: Junior Parr   : 1945   MRN: [id number]   Date: 10/30/2021 3:07 PM     [x]I have reviewed the flowsheet and previous days notes. Events overnight reviewed and discussed with nursing staff. Vital signs and records reviewed. Subjective:  10/30/21:    Pt with Respiratory failure and CHF, Failure to thrive. Slowly improving. Pancho SBT's but not quit ready for extubation. Anxiety an issue. Con't to ween off vent and pressors    []The patient is unable to give any meaningful history or review of systems because the patient is:  [x]Intubated [x]Sedated   []Unresponsive      []The patient is critically ill on      [x]Mechanical ventilation []Pressors   []BiPAP []                 ROS:Review of systems not obtained due to patient factors.       Medication Review  · Pressors -Levo  · Sedation -Fentanyl  · Antibiotics -Meropenem   · Pain -Fentanyl  · GI/ DVT -Lovenox/Pepcid  · Others (other gtts)      Vital Signs:    Visit Vitals  /67   Pulse 81   Temp (!) 100.9 °F (38.3 °C)   Resp 15   Ht 5' 7\" (1.702 m)   Wt 56 kg (123 lb 7.3 oz)   SpO2 96%   BMI 19.34 kg/m²       O2 Device: Endotracheal tube, Ventilator   O2 Flow Rate (L/min): 6 l/min   Temp (24hrs), Av.8 °F (38.2 °C), Min:99.9 °F (37.7 °C), Max:101.3 °F (38.5 °C)       Intake/Output:   Last shift:      10/30 0701 - 10/30 1900  In: 770 [I.V.:50]  Out: 450 [Urine:450]  Last 3 shifts: 10/28 1901 - 10/30 0700  In: 2762.5 [I.V.:1062.5]  Out: 1950 [Urine:1950]    Intake/Output Summary (Last 24 hours) at 10/30/2021 1507  Last data filed at 10/30/2021 1500  Gross per 24 hour   Intake 1956 ml   Output 1750 ml   Net 206 ml       Ventilator Settings:  Mode Rate Tidal Volume Pressure FiO2 PEEP   Assist control, VC+   400 ml    40 % 5 cm H20     Peak airway pressure: 25 cm H2O    Minute ventilation: 5.59 l/min      ARDS network Guidelines:   Lung protective strategy and Plateau  Pressure goal < 30 cm H2O goals  Oxygenation Goals PaO2 55-80 mm Hg or SaO2 88-95%  PH goal 7.30-7.45    VAP bundle:  Reviewed. Jessy tube to suction at 20-30 cm Hg. Maintain [place] tube with 5-10ml air every 4 hours  Routine oral care every 4 hours  Elevation of head > 45 degree  Daily sedation holiday and SBT evaluation starting at 6.00am.    Physical Exam:    General: Intubated/sedated;    HEENT:  Anicteric sclerae; pink palpebral conjunctivae; mucosa moist  Resp:  Symmetrical chest rise, no accessory muscle use; good AE Bilat; no rales/ wheezing/ rhonchi noted  CV:  S1, S2 present; RRR; no m/g/r  GI:  Abdomen soft, non-tender; (+) active bowel sounds  Extremities:  +2 pulses on all extremities; no edema/ cyanosis/ clubbing noted  Skin:  Warm; no rashes/ lesions noted  Neurologic:  Non-focal  Devices:  No NGT/OGT, Central line/ PICC, ETT/tracheostomy, chest tube      DATA:     Current Facility-Administered Medications   Medication Dose Route Frequency    senna-docusate (PERICOLACE) 8.6-50 mg per tablet 1 Tablet  1 Tablet Oral DAILY    polyethylene glycol (MIRALAX) packet 17 g  17 g Per NG tube DAILY    meropenem (MERREM) 1 g in sterile water (preservative free) 20 mL IV syringe  1 g IntraVENous Q8H    famotidine (PF) (PEPCID) 20 mg in 0.9% sodium chloride 10 mL injection  20 mg IntraVENous Q12H    thiamine HCL (B-1) tablet 200 mg  200 mg Per G Tube DAILY    NOREPINephrine (LEVOPHED) 8 mg in 5% dextrose 250mL (32 mcg/mL) infusion  0.5-50 mcg/min IntraVENous TITRATE    LORazepam (ATIVAN) injection 2 mg  2 mg IntraVENous Q15MIN PRN    HYDROmorphone (DILAUDID) syringe 1 mg  1 mg IntraVENous Q15MIN PRN    chlorhexidine (PERIDEX) 0.12 % mouthwash 10 mL  10 mL Oral Q12H    midazolam (VERSED) injection 2 mg  2 mg IntraVENous Q10MIN PRN    fentaNYL citrate (PF) injection 100 mcg  100 mcg IntraVENous Q30MIN PRN    fentaNYL (PF) 1,500 mcg/30 mL (50 mcg/mL) infusion  0-200 mcg/hr IntraVENous TITRATE    insulin lispro (HUMALOG) injection   SubCUTAneous Q6H    glucose chewable tablet 16 g  16 g Oral PRN    glucagon (GLUCAGEN) injection 1 mg  1 mg IntraMUSCular PRN    bacitracin zinc-polymyxin b (POLYSPORIN) 500-10,000 unit/gram ointment   Topical BID    naloxone (NARCAN) injection 0.4 mg  0.4 mg IntraVENous PRN    albuterol-ipratropium (DUO-NEB) 2.5 MG-0.5 MG/3 ML  3 mL Nebulization Q4H RT    sodium chloride 3% hypertonic nebulizer soln  4 mL Nebulization Q4HWA RT    Lactobacillus Acidoph & Bulgar (FLORANEX) tablet 2 Tablet  2 Tablet Per G Tube BID    guaiFENesin (ROBITUSSIN) 100 mg/5 mL oral liquid 400 mg  400 mg Per G Tube Q4H PRN    albuterol-ipratropium (DUO-NEB) 2.5 MG-0.5 MG/3 ML  3 mL Nebulization Q4H PRN    aspirin chewable tablet 81 mg  81 mg Per G Tube DAILY    cholecalciferol (VITAMIN D3) (1000 Units /25 mcg) tablet 1,000 Units  1,000 Units PEG Tube DAILY    acetaminophen (TYLENOL) tablet 650 mg  650 mg Per G Tube Q6H PRN    Or    acetaminophen (TYLENOL) suppository 650 mg  650 mg Rectal Q6H PRN    enoxaparin (LOVENOX) injection 40 mg  40 mg SubCUTAneous QHS    sodium chloride (NS) flush 5-10 mL  5-10 mL IntraVENous PRN    sodium chloride (NS) flush 5-40 mL  5-40 mL IntraVENous Q8H    sodium chloride (NS) flush 5-40 mL  5-40 mL IntraVENous PRN    omega-3 acid ethyl esters (LOVAZA) capsule 1,000 mg  1 g Oral DAILY WITH BREAKFAST    therapeutic multivitamin (THERAGRAN) tablet 1 Tablet  1 Tablet Oral DAILY     Facility-Administered Medications Ordered in Other Encounters   Medication Dose Route Frequency    propofoL (DIPRIVAN) 10 mg/mL injection   IntraVENous PRN         Labs: Results:       Chemistry Recent Labs     10/30/21  0200 10/29/21  0450 10/28/21  0239   * 170* 160*   * 140 143   K 4.0 3.6 3.5    107 105   CO2 29 28 33*   BUN 24* 29* 25*   CREA 0.42* 0.57* 0.62   CA 7.4* 7.7* 8.2*   AGAP 2* 5 5   BUCR 57* 51* 40*      CBC w/Diff Recent Labs     10/30/21  0200 10/29/21  6600 10/28/21  0239   WBC 9.4 9.0 11.0   RBC 3.33* 3.66* 3.80*   HGB 8.9* 9.4* 10.0*   HCT 29.0* 32.6* 34.7*    218 220   GRANS 88* 87* 92*   LYMPH 3* 4* 2*   EOS 2 2 0      Coagulation Recent Labs     10/28/21  0239   PTP 14.2   INR 1.1       Liver Enzymes No results for input(s): TP, ALB, TBIL, AP in the last 72 hours. No lab exists for component: SGOT, GPT, DBIL   ABG Lab Results   Component Value Date/Time    PHI 7.43 10/30/2021 04:57 AM    PCO2I 41.4 10/30/2021 04:57 AM    PO2I 59 (L) 10/30/2021 04:57 AM    HCO3I 27.2 (H) 10/30/2021 04:57 AM    FIO2I 30 10/30/2021 04:57 AM      Microbiology Recent Labs     10/29/21  1355 10/28/21  0321   CULT MODERATE YEAST, (APPARENT CANDIDA ALBICANS)* RARE YEAST*          Telemetry: [x]Sinus []A-flutter []Paced    []A-fib []Multiple PVCs                  Imaging:    CXR [date]:    CT HEAD/CHEST/ABD/PELVIS [date]:    []I have personally reviewed the patients radiographs  []Radiographs reviewed with radiologist   []No change from prior, tubes and lines in adequate position  []Improved   []Worsening        IMPRESSION:   · Acute Respiratory Failure improving slowing. On SBT's daily  · Poss aspiration   · Systolic Heart failure. Weening Levo as able  · Failure to thrive   RECOMMENDATIONS:   · Resp: Titrate FiO2/ supp O2 for SpO2 >90%;   · I/D: Afebrile; aleukocytosis;   · Hem/Onc: Daily CBC; H/H, and plts are stable  · CVS: HD stable; Actively titrate vasopressors aim MAP >65mmHg, Check CE's, ECHO results.    · Metabolic: Daily BMP; monitor e-lytes; replace PRN  · Renal: Trend Renal indices; Diuresis, Merlos to BSD,   · Endocrine: POC Glucose q6;   · GI: SUP  · Musc/Skin: No acute issues, wound care  · Neuro: PRN pain medications, +/- sedation  ·      Best Practices/ Safety Bundles:  · Sepsis Bundle per Hospital Protocol  · CAUTI Bundle  · Electrolyte Replacement Bundle  · Glycemic control goal <180; avoid Hypoglycemia  · IMech Vent patients:   · VAP bundle, Aim to keep peak plateau pressure 06-81KV H2O  · Aspiration Precautions - HOB >30'  · Daily sedation holiday as indicated  · SBT as tolerated/appropriate  · Titrate FiO2 for SpO2 >94%  · Aggressive Pulmonary Hygeiene  · Need for Lines, corcoran assessed. · Restraints need. · Palliative care evaluation.  following      The patient is: [x] acutely ill Risk of deterioration: [] moderate    [] critically ill  [] high     []See my orders for details    My assessment/plan was discussed with:  [x]Nursing []PT/OT    []Respiratory therapy []   []Family []     MD Selin Ibrahim

## 2021-10-30 NOTE — PROGRESS NOTES
0800- Report received from offgoing RN. Pt received lying in bed, attached to bedside cardiac monitor and ventilator. All alarms reviewed, set and audible. Pt is deaf, unable to assess ability to follow commands. Pt opens eyes to stimulus, and intermittently tracks RN. Pt is intermittently restless and agitated, breathing over ventilator and pulling at bilateral wrist restraints. Pt moves bilateral upper extremities spontaneously and purposefully to stimuli, pt withdraws bilateral lower extremities non purposefully to stimuli. Pt has tremors in all four extremities. Tube feeds infusing per order set. Merlos catheter placed to over bedside drainage. 0900- M. Capilitan at bedside to assess pt. Updated on pt assessment, VS trend, lab results, output, and infusions. No verbal orders received. 1230- Tylenol administered for temperature control. 0- Sister and brother in law of pt at bedside. Update provided, all questions addressed. 1700- Radial arterial line tubing and dressing changed under sterile conditions. Pt tolerated without incident. 1930- Tylenol administered for temp. Bedside shift report endorsed to receiving RN. Assessment, VS trend, lab results, output, and orders reviewed. Care relinquished.

## 2021-10-30 NOTE — PROGRESS NOTES
Problem: Ventilator Management  Goal: *Adequate oxygenation and ventilation  Outcome: Progressing Towards Goal     Problem: Non-Violent Restraints  Goal: Removal from restraints as soon as assessed to be safe  Outcome: Progressing Towards Goal  Goal: No harm/injury to patient while restraints in use  Outcome: Progressing Towards Goal  Goal: Patient's dignity will be maintained  Outcome: Progressing Towards Goal  Goal: Patient Interventions  Outcome: Progressing Towards Goal

## 2021-10-31 NOTE — PROGRESS NOTES
0700 Bedside and Verbal shift change report given to Tamanna RN and Eulalia Pichardo RN (oncoming nurse) by Rona Reynoso RN (offgoing nurse). Report included the following information SBAR, Kardex, Intake/Output and MAR.     1900: Bedside and Verbal shift change report given to Alyson Perez RN (oncoming nurse) by KARMA Nolen and Eulalia Pichardo RN (offgoing nurse). Report included the following information SBAR, Kardex and MAR.

## 2021-10-31 NOTE — PROGRESS NOTES
[address] [place] [address] [facility]  ICU Progress Note      Name: Ginny So   : 1945   MRN: [id number]   Date: 10/31/2021 10:26 AM     [x]I have reviewed the flowsheet and previous days notes. Events overnight reviewed and discussed with nursing staff. Vital signs and records reviewed. Subjective:  10/31/21:    Not much new over night. Several changes in Levo for soft SBP. Some purulent drainage from G-Tube and some mild cellulitis. Will start Vanc. per Pharmacy. Min weaning from vent but kamron SBT ok. I still have concerns about airway protection. CXR unchanged. Will start more family discussions regarding code status    []The patient is unable to give any meaningful history or review of systems because the patient is:  [x]Intubated [x]Sedated   []Unresponsive      []The patient is critically ill on      [x]Mechanical ventilation [x]Pressors   []BiPAP []                 ROS:Review of systems not obtained due to patient factors.       Medication Review  · Pressors -Levo  · Sedation -Fentanyl  · Antibiotics -Meropenem  · Pain -Fentanyl  · GI/ DVT -Lovenox and Pepcid  · Others (other gtts)      Vital Signs:    Visit Vitals  BP (!) 108/59   Pulse (!) 105   Temp 99.7 °F (37.6 °C)   Resp 16   Ht 5' 7\" (1.702 m)   Wt 58.1 kg (128 lb 1.4 oz)   SpO2 95%   BMI 20.06 kg/m²       O2 Device: Ventilator, Endotracheal tube   O2 Flow Rate (L/min): 6 l/min   Temp (24hrs), Av.1 °F (37.8 °C), Min:99.5 °F (37.5 °C), Max:100.9 °F (38.3 °C)       Intake/Output:   Last shift:      10/31 0701 - 10/31 1900  In: 216.8 [I.V.:16.8]  Out: 7 [Urine:7]  Last 3 shifts: 10/29 1901 - 10/31 07  In: 3469.6 [I.V.:709.6]  Out: 2325 [Urine:2325]    Intake/Output Summary (Last 24 hours) at 10/31/2021 1026  Last data filed at 10/31/2021 0800  Gross per 24 hour   Intake 2383.32 ml   Output 1432 ml   Net 951.32 ml       Ventilator Settings:  Mode Rate Tidal Volume Pressure FiO2 PEEP   Assist control, VC+   400 ml    35 % 5 cm H20 Peak airway pressure: 24 cm H2O    Minute ventilation: 9.6 l/min      ARDS network Guidelines:   Lung protective strategy and Plateau  Pressure goal < 30 cm H2O goals  Oxygenation Goals PaO2 55-80 mm Hg or SaO2 88-95%  PH goal 7.30-7.45    VAP bundle:  Reviewed. [place] tube to suction at 20-30 cm Hg. Maintain Jessy tube with 5-10ml air every 4 hours  Routine oral care every 4 hours  Elevation of head > 45 degree  Daily sedation holiday and SBT evaluation starting at 6.00am.    Physical Exam:    General: Intubated/sedated; HEENT:  Anicteric sclerae; pink palpebral conjunctivae; mucosa moist  Resp:  Symmetrical chest rise, no accessory muscle use; good AE Bilat; no rales/ wheezing/ rhonchi noted  CV:  S1, S2 present; RRR; no m/g/r  GI:  Abdomen soft, non-tender; (+) active bowel sounds mild cellulitis near G-tube.  Pus milked from wound  Extremities: no edema/ cyanosis/ clubbing noted  Skin:  Warm; no rashes/ lesions noted  Neurologic:  Non-focal      DATA:     Current Facility-Administered Medications   Medication Dose Route Frequency    senna-docusate (PERICOLACE) 8.6-50 mg per tablet 1 Tablet  1 Tablet Oral DAILY    polyethylene glycol (MIRALAX) packet 17 g  17 g Per NG tube DAILY    meropenem (MERREM) 1 g in sterile water (preservative free) 20 mL IV syringe  1 g IntraVENous Q8H    famotidine (PF) (PEPCID) 20 mg in 0.9% sodium chloride 10 mL injection  20 mg IntraVENous Q12H    thiamine HCL (B-1) tablet 200 mg  200 mg Per G Tube DAILY    NOREPINephrine (LEVOPHED) 8 mg in 5% dextrose 250mL (32 mcg/mL) infusion  0.5-50 mcg/min IntraVENous TITRATE    LORazepam (ATIVAN) injection 2 mg  2 mg IntraVENous Q15MIN PRN    HYDROmorphone (DILAUDID) syringe 1 mg  1 mg IntraVENous Q15MIN PRN    chlorhexidine (PERIDEX) 0.12 % mouthwash 10 mL  10 mL Oral Q12H    midazolam (VERSED) injection 2 mg  2 mg IntraVENous Q10MIN PRN    fentaNYL citrate (PF) injection 100 mcg  100 mcg IntraVENous Q30MIN PRN    fentaNYL (PF) 1,500 mcg/30 mL (50 mcg/mL) infusion  0-200 mcg/hr IntraVENous TITRATE    insulin lispro (HUMALOG) injection   SubCUTAneous Q6H    glucose chewable tablet 16 g  16 g Oral PRN    glucagon (GLUCAGEN) injection 1 mg  1 mg IntraMUSCular PRN    bacitracin zinc-polymyxin b (POLYSPORIN) 500-10,000 unit/gram ointment   Topical BID    naloxone (NARCAN) injection 0.4 mg  0.4 mg IntraVENous PRN    albuterol-ipratropium (DUO-NEB) 2.5 MG-0.5 MG/3 ML  3 mL Nebulization Q4H RT    sodium chloride 3% hypertonic nebulizer soln  4 mL Nebulization Q4HWA RT    Lactobacillus Acidoph & Bulgar (FLORANEX) tablet 2 Tablet  2 Tablet Per G Tube BID    guaiFENesin (ROBITUSSIN) 100 mg/5 mL oral liquid 400 mg  400 mg Per G Tube Q4H PRN    albuterol-ipratropium (DUO-NEB) 2.5 MG-0.5 MG/3 ML  3 mL Nebulization Q4H PRN    aspirin chewable tablet 81 mg  81 mg Per G Tube DAILY    cholecalciferol (VITAMIN D3) (1000 Units /25 mcg) tablet 1,000 Units  1,000 Units PEG Tube DAILY    acetaminophen (TYLENOL) tablet 650 mg  650 mg Per G Tube Q6H PRN    Or    acetaminophen (TYLENOL) suppository 650 mg  650 mg Rectal Q6H PRN    enoxaparin (LOVENOX) injection 40 mg  40 mg SubCUTAneous QHS    sodium chloride (NS) flush 5-10 mL  5-10 mL IntraVENous PRN    sodium chloride (NS) flush 5-40 mL  5-40 mL IntraVENous Q8H    sodium chloride (NS) flush 5-40 mL  5-40 mL IntraVENous PRN    omega-3 acid ethyl esters (LOVAZA) capsule 1,000 mg  1 g Oral DAILY WITH BREAKFAST    therapeutic multivitamin (THERAGRAN) tablet 1 Tablet  1 Tablet Oral DAILY     Facility-Administered Medications Ordered in Other Encounters   Medication Dose Route Frequency    propofoL (DIPRIVAN) 10 mg/mL injection   IntraVENous PRN         Labs: Results:       Chemistry Recent Labs     10/30/21  0200 10/29/21  0450   * 170*   * 140   K 4.0 3.6    107   CO2 29 28   BUN 24* 29*   CREA 0.42* 0.57*   CA 7.4* 7.7*   AGAP 2* 5   BUCR 57* 51*      CBC w/Diff Recent Labs 10/30/21  0200 10/29/21  0450   WBC 9.4 9.0   RBC 3.33* 3.66*   HGB 8.9* 9.4*   HCT 29.0* 32.6*    218   GRANS 88* 87*   LYMPH 3* 4*   EOS 2 2      Coagulation No results for input(s): PTP, INR, APTT, INREXT in the last 72 hours. Liver Enzymes No results for input(s): TP, ALB, TBIL, AP in the last 72 hours. No lab exists for component: SGOT, GPT, DBIL   ABG Lab Results   Component Value Date/Time    PHI 7.43 10/31/2021 02:56 AM    PCO2I 39.6 10/31/2021 02:56 AM    PO2I 67 (L) 10/31/2021 02:56 AM    HCO3I 26.1 (H) 10/31/2021 02:56 AM    FIO2I 35 10/31/2021 02:56 AM      Microbiology Recent Labs     10/30/21  1230 10/29/21  1355   CULT PENDING MODERATE YEAST, (APPARENT CANDIDA ALBICANS)*          Telemetry: []Sinus []A-flutter []Paced    []A-fib []Multiple PVCs                  Imaging:    CXR [date]:    CT HEAD/CHEST/ABD/PELVIS [date]:    [x]I have personally reviewed the patients radiographs  []Radiographs reviewed with radiologist   []No change from prior, tubes and lines in adequate position  []Improved   []Worsening        IMPRESSION:   · Acute Respiratory Failure  · Pneumonia  · Vent dependence  · Pressor requirement   · Failure to thrive  · Malnutrition    RECOMMENDATIONS:   · Titrate Levo as needed  · Con't SBT as kamron. · Start Vancomycin via pharmacy to dose  · Family discussion reguarding code status     Best Practices/ Safety Bundles:  · Sepsis Bundle per Hospital Protocol  · CAUTI Bundle  · Electrolyte Replacement Bundle  · Glycemic control goal <180; avoid Hypoglycemia  · Mech Vent patients:   · VAP bundle, Aim to keep peak plateau pressure [id number] H2O  · Aspiration Precautions - HOB >30'  · Daily sedation holiday as indicated  · SBT as tolerated/appropriate  · Titrate FiO2 for SpO2 >94%  · Aggressive Pulmonary Hygeiene  · Need for Lines, corcoran assessed. · Restraints need.   · Palliative care evaluation done      The patient is: [x] acutely ill Risk of deterioration: [] moderate    [] critically ill  [x] high     []See my orders for details    My assessment/plan was discussed with:  [x]Nursing []PT/OT    []Respiratory therapy []   []Family []     Ramiro Cardona MD    [email or web address]

## 2021-10-31 NOTE — H&P
[address] [place] [address] [facility]  ICU Progress Note      Name: Devorah Walter   : 1945   MRN: [id number]   Date: 10/31/2021 10:08 AM     []I have reviewed the flowsheet and previous days notes. Events overnight reviewed and discussed with nursing staff. Vital signs and records reviewed. Subjective:  10/31/21:    -No major events over night. Remains on Levo as SBP gets soft with out it. Pancho SBT fair but requires return to vent. Peg tube site with purulent drainage. Has mad slow progress but now about the same. []The patient is unable to give any meaningful history or review of systems because the patient is:      [x]Intubated [x]Sedated     []Unresponsive      []The patient is critically ill on      [x]Mechanical ventilation [x]Pressors   []BiPAP []                       Vital Signs:    Visit Vitals  BP (!) 108/59   Pulse (!) 105   Temp 99.7 °F (37.6 °C)   Resp 16   Ht 5' 7\" (1.702 m)   Wt 58.1 kg (128 lb 1.4 oz)   SpO2 95%   BMI 20.06 kg/m²       O2 Device: Ventilator, Endotracheal tube   O2 Flow Rate (L/min): 6 l/min   Temp (24hrs), Av.1 °F (37.8 °C), Min:99.5 °F (37.5 °C), Max:100.9 °F (38.3 °C)       Intake/Output:   Last shift:      10/31 0701 - 10/31 1900  In: 216.8 [I.V.:16.8]  Out: 7 [Urine:7]  Last 3 shifts: 10/29 1901 - 10/31 07  In: 3469.6 [I.V.:709.6]  Out: 2325 [Urine:2325]    Intake/Output Summary (Last 24 hours) at 10/31/2021 1008  Last data filed at 10/31/2021 0800  Gross per 24 hour   Intake 2383.32 ml   Output 1432 ml   Net 951.32 ml       Ventilator Settings:  Mode Rate Tidal Volume Pressure FiO2 PEEP   Assist control, VC+   400 ml    35 % 5 cm H20     Peak airway pressure: 24 cm H2O    Minute ventilation: 9.6 l/min      ARDS network Guidelines:   Lung protective strategy and Plateau  Pressure goal < 30 cm H2O goals  Oxygenation Goals PaO2 55-80 mm Hg or SaO2 88-95%  PH goal 7.30-7.45    VAP bundle:  Reviewed. [place] tube to suction at 20-30 cm Hg.   Maintain [place] tube with 5-10ml air every 4 hours  Routine oral care every 4 hours  Elevation of head > 45 degree  Daily sedation holiday and SBT evaluation starting at 6.00am.    Physical Exam:    General: Intubated/sedated; HEENT:  Anicteric sclerae; pink palpebral conjunctivae; mucosa moist  Resp:  Symmetrical chest rise, no accessory muscle use; good AE Bilat; no rales/ wheezing/ rhonchi noted  CV:  S1, S2 present; RRR; no m/g/r  GI:  Abdomen soft, non-tender; (+) active bowel sounds.  PEG wound open and draining  Extremities:   no edema/ cyanosis/ clubbing noted  Skin:  Warm; no rashes/ lesions noted  Neurologic:  Non-focal    DATA:     Current Facility-Administered Medications   Medication Dose Route Frequency    senna-docusate (PERICOLACE) 8.6-50 mg per tablet 1 Tablet  1 Tablet Oral DAILY    polyethylene glycol (MIRALAX) packet 17 g  17 g Per NG tube DAILY    meropenem (MERREM) 1 g in sterile water (preservative free) 20 mL IV syringe  1 g IntraVENous Q8H    famotidine (PF) (PEPCID) 20 mg in 0.9% sodium chloride 10 mL injection  20 mg IntraVENous Q12H    thiamine HCL (B-1) tablet 200 mg  200 mg Per G Tube DAILY    NOREPINephrine (LEVOPHED) 8 mg in 5% dextrose 250mL (32 mcg/mL) infusion  0.5-50 mcg/min IntraVENous TITRATE    LORazepam (ATIVAN) injection 2 mg  2 mg IntraVENous Q15MIN PRN    HYDROmorphone (DILAUDID) syringe 1 mg  1 mg IntraVENous Q15MIN PRN    chlorhexidine (PERIDEX) 0.12 % mouthwash 10 mL  10 mL Oral Q12H    midazolam (VERSED) injection 2 mg  2 mg IntraVENous Q10MIN PRN    fentaNYL citrate (PF) injection 100 mcg  100 mcg IntraVENous Q30MIN PRN    fentaNYL (PF) 1,500 mcg/30 mL (50 mcg/mL) infusion  0-200 mcg/hr IntraVENous TITRATE    insulin lispro (HUMALOG) injection   SubCUTAneous Q6H    glucose chewable tablet 16 g  16 g Oral PRN    glucagon (GLUCAGEN) injection 1 mg  1 mg IntraMUSCular PRN    bacitracin zinc-polymyxin b (POLYSPORIN) 500-10,000 unit/gram ointment   Topical BID    naloxone (NARCAN) injection 0.4 mg 0.4 mg IntraVENous PRN    albuterol-ipratropium (DUO-NEB) 2.5 MG-0.5 MG/3 ML  3 mL Nebulization Q4H RT    sodium chloride 3% hypertonic nebulizer soln  4 mL Nebulization Q4HWA RT    Lactobacillus Acidoph & Bulgar (FLORANEX) tablet 2 Tablet  2 Tablet Per G Tube BID    guaiFENesin (ROBITUSSIN) 100 mg/5 mL oral liquid 400 mg  400 mg Per G Tube Q4H PRN    albuterol-ipratropium (DUO-NEB) 2.5 MG-0.5 MG/3 ML  3 mL Nebulization Q4H PRN    aspirin chewable tablet 81 mg  81 mg Per G Tube DAILY    cholecalciferol (VITAMIN D3) (1000 Units /25 mcg) tablet 1,000 Units  1,000 Units PEG Tube DAILY    acetaminophen (TYLENOL) tablet 650 mg  650 mg Per G Tube Q6H PRN    Or    acetaminophen (TYLENOL) suppository 650 mg  650 mg Rectal Q6H PRN    enoxaparin (LOVENOX) injection 40 mg  40 mg SubCUTAneous QHS    sodium chloride (NS) flush 5-10 mL  5-10 mL IntraVENous PRN    sodium chloride (NS) flush 5-40 mL  5-40 mL IntraVENous Q8H    sodium chloride (NS) flush 5-40 mL  5-40 mL IntraVENous PRN    omega-3 acid ethyl esters (LOVAZA) capsule 1,000 mg  1 g Oral DAILY WITH BREAKFAST    therapeutic multivitamin (THERAGRAN) tablet 1 Tablet  1 Tablet Oral DAILY     Facility-Administered Medications Ordered in Other Encounters   Medication Dose Route Frequency    propofoL (DIPRIVAN) 10 mg/mL injection   IntraVENous PRN         Labs: Results:       Chemistry Recent Labs     10/30/21  0200 10/29/21  0450   * 170*   * 140   K 4.0 3.6    107   CO2 29 28   BUN 24* 29*   CREA 0.42* 0.57*   CA 7.4* 7.7*   AGAP 2* 5   BUCR 57* 51*      CBC w/Diff Recent Labs     10/30/21  0200 10/29/21  0450   WBC 9.4 9.0   RBC 3.33* 3.66*   HGB 8.9* 9.4*   HCT 29.0* 32.6*    218   GRANS 88* 87*   LYMPH 3* 4*   EOS 2 2      Coagulation No results for input(s): PTP, INR, APTT, INREXT in the last 72 hours. Liver Enzymes No results for input(s): TP, ALB, TBIL, AP in the last 72 hours.     No lab exists for component: SGOT, GPT, DBIL   ABG Lab Results   Component Value Date/Time    PHI 7.43 10/31/2021 02:56 AM    PCO2I 39.6 10/31/2021 02:56 AM    PO2I 67 (L) 10/31/2021 02:56 AM    HCO3I 26.1 (H) 10/31/2021 02:56 AM    FIO2I 35 10/31/2021 02:56 AM      Microbiology Recent Labs     10/30/21  1230 10/29/21  1355   CULT PENDING MODERATE YEAST, (APPARENT CANDIDA ALBICANS)*          Telemetry: [x]Sinus []A-flutter []Paced    []A-fib []Multiple PVCs                  Imaging:    CXR [date]:    CT HEAD/CHEST/ABD/PELVIS [date]:    []I have personally reviewed the patients radiographs  []Radiographs reviewed with radiologist   []No change from prior, tubes and lines in adequate position  []Improved   []Worsening        IMPRESSION:   Acute Respiratory Failure  Failure to Thrive   PEG wound infection  COPD  pneumonia     RECOMMENDATIONS:   Resp: Titrate FiO2/ supp O2 for SpO2 >90%; I/D: Afebrile; aleukocytosis;   Hem/Onc: Daily CBC; H/H, and plts are stable  CVS: HD stable; Actively titrate vasopressors aim MAP >65mmHg, Check CE's, ECHO results. Metabolic: Daily BMP; monitor e-lytes; replace PRN  Renal: Trend Renal indices; , Corcoran to BSD,   Endocrine: POC Glucose q6; Check TSH level  GI: SUP, TF  Musc/Skin: No acute issues, wound care  Neuro: PRN pain medications, +/- sedation       Best Practices/ Safety Bundles:  Sepsis Bundle per Hospital Protocol  CAUTI Bundle  Electrolyte Replacement Bundle  Glycemic control goal <180; avoid Hypoglycemia  VAP bundle, Aim to keep peak plateau pressure 13-81BH H2O  Aspiration Precautions - HOB >30'  Daily sedation holiday as indicated  SBT as tolerated/appropriate  Titrate FiO2 for SpO2 >94%  Aggressive Pulmonary Hygeiene  Need for Lines, corcoran assessed. Restraints need.     The patient is: [x] acutely ill Risk of deterioration: [] moderate    [] critically ill  [x] high     []See my orders for details    My assessment/plan was discussed with:  [x]Nursing []PT/OT    []Respiratory therapy []   [x]Family []     Gil Michelle Alexandria Smiley MD    @[email or web address]@

## 2021-10-31 NOTE — PROGRESS NOTES
Easily awakened. Is restless, fidgeting , will remove wires lines if able to reach. soft restraints for protection of tubes and lines. Reddness to sacrum, blanchable-requires frequent turning assist..      1930 and 0300 tylenol given via peg for elevated temp, will continue to monitor. 0250 pt awake, soft restraints on, pt will attempt to remove right fem CVP//ETT and not let go.

## 2021-11-01 NOTE — PROGRESS NOTES
visited with the family of Andie Hsu, who is a 67 y. o.,male. The  provided the following Interventions:  Initiated a relationship of care and support to patient and Indy (sister) and Baldwin Dandy (brother-in-law). Offered prayer and assurance of continued prayers on patient's behalf. Patient's sister received holy communion. Patient provided Act of Spiritual Communion instead. Plan:  Chaplains will continue to follow and will provide pastoral care on an as needed/requested basis.  recommends bedside caregivers page  on duty if patient shows signs of acute spiritual or emotional distress.     Tesfaye Kebede 5 [phone]

## 2021-11-01 NOTE — PROGRESS NOTES
[facility]. Pulmonary, Critical Care, and Sleep Medicine    Name: Junior Parr MRN: [id number]   : 1945 Hospital: [facility]   Date: 2021  Admission Date: 10/18/2021     Chart and notes reviewed. Data reviewed. I have evaluated all findings. [x]I have reviewed the flowsheet and previous days notes. [x]The patient is unable to give any meaningful history or review of systems because the patient is:  [x]Intubated [x]Sedated   []Unresponsive      [x]The patient is critically ill on      [x]Mechanical ventilation []Pressors   []BiPAP []         Interval HPI:  Patient is a 67 y.o. male with PMHx of chronic respiratory failure, ILD, bronchiectasis who presents with failure to thrive. He presented with dyspnea and poor PO intake. He was placed on some O2 and CT chest showed some worsening consolidations in the presence of the chronic lung disease. He follows with us in clinic and was instructed to use hypertonic saline, acapella and mucinex, unsure of compliance. He was admitted to the floor, but RRT was called 10/27/21 for encephalopathy and acute hypercapnic respiratory failure. Anesthesia paged and he was intubated and transferred to ICU. Noted to have PNA and pulm edema on top of bronchiectasis and ILD, also a component of cardiogenic ( EF 25%) and septic shock. Multiple Cavitary lesions - likely 2/2 recurrent aspiration. Respiratory cx + MSSA, light candida albicans. PEG tube placed this admission on 10/21 for dysphagia, but inadvertently dislodged, thus will need to be replaced. Patient self-extubated 10/27/21 and was emergently re-intubated due to unresponsiveness to voice and noxious stimuli. Patient has inability to cough, thus would benefit from trach, but patient does not wish to be trached. Made partial code (21). Ideally, comfort care vs. Trach. Subjective 21  Hospital Day:  Admitted     Vent Day: intubated 10/28/21  Overnight events: No acute events overnight   Mentation/Activity: sedated on Fentanyl gtt, intermittent agitation   Respiratory/ Secretions: stable, thick, beige secretions via CELIA tube   Hemodynamics: Hypotensive, Levophed restarted   Urine output, bowel: 2.5L overnight   Diet: TF   Need for procedures: none               ROS:Review of systems not obtained due to patient factors. Events and notes from last 24 hours reviewed. Care plan discussed on multidisciplinary rounds.   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.81       Vital Signs:  Visit Vitals  BP (!) 96/51   Pulse 100   Temp (!) 101.1 °F (38.4 °C)   Resp 14   Ht 5' 7\" (1.702 m)   Wt 56.4 kg (124 lb 5.4 oz)   SpO2 94%   BMI 19.47 kg/m²       O2 Device: Ventimask, Tracheostomy   O2 Flow Rate (L/min): 6 l/min   Temp (24hrs), Av.5 °F (38.1 °C), Min:99.6 °F (37.6 °C), Max:102 °F (38.9 °C)       Intake/Output:   Last shift:      701 - 1900  In: 416.7 [I.V.:63.7]  Out: -   Last 3 shifts: 10/30 1901 - 700  In: 3666.9 [I.V.:1016.9]  Out:  [Urine:1965]    Intake/Output Summary (Last 24 hours) at 2021 1436  Last data filed at 2021 1200  Gross per 24 hour   Intake 2330.93 ml   Output 665 ml   Net 1665.93 ml        Ventilator Settings:  Ventilator Mode: Assist control, VC+  Respiratory Rate  Resp Rate Observed:  (nurse explained waiting to place to peg tube)  Back-Up Rate: 14  Insp Time (sec): 1 sec  I:E Ratio: 1;3.3  Ventilator Volumes  Vt Set (ml): 400 ml  Vt Exhaled (Machine Breath) (ml): 437 ml  Vt Spont (ml): 317 ml  Ve Observed (l/min): 7.18 l/min  Ventilator Pressures  Pressure Support (cm H2O): 15 cm H2O  PIP Observed (cm H2O): 26 cm H2O  Plateau Pressure (cm H2O): 23 cm H2O  MAP (cm H2O): 13  PEEP/VENT (cm H2O): 5 cm H20  Auto PEEP Observed (cm H2O): 5.3 cm H2O    Current Facility-Administered Medications   Medication Dose Route Frequency    polyethylene glycol (MIRALAX) packet 17 g  17 g Per NG tube BID    anidulafungin (ERAXIS) 100 mg in 0.9% sodium chloride 130 mL IVPB  100 mg IntraVENous Q24H    senna-docusate (PERICOLACE) 8.6-50 mg per tablet 1 Tablet  1 Tablet Oral DAILY    meropenem (MERREM) 1 g in sterile water (preservative free) 20 mL IV syringe  1 g IntraVENous Q8H    famotidine (PF) (PEPCID) 20 mg in 0.9% sodium chloride 10 mL injection  20 mg IntraVENous Q12H    thiamine HCL (B-1) tablet 200 mg  200 mg Per G Tube DAILY    chlorhexidine (PERIDEX) 0.12 % mouthwash 10 mL  10 mL Oral Q12H    fentaNYL (PF) 1,500 mcg/30 mL (50 mcg/mL) infusion  0-200 mcg/hr IntraVENous TITRATE    insulin lispro (HUMALOG) injection   SubCUTAneous Q6H    bacitracin zinc-polymyxin b (POLYSPORIN) 500-10,000 unit/gram ointment   Topical BID    albuterol-ipratropium (DUO-NEB) 2.5 MG-0.5 MG/3 ML  3 mL Nebulization Q4H RT    sodium chloride 3% hypertonic nebulizer soln  4 mL Nebulization Q4HWA RT    Lactobacillus Acidoph & Bulgar (FLORANEX) tablet 2 Tablet  2 Tablet Per G Tube BID    aspirin chewable tablet 81 mg  81 mg Per G Tube DAILY    cholecalciferol (VITAMIN D3) (1000 Units /25 mcg) tablet 1,000 Units  1,000 Units PEG Tube DAILY    enoxaparin (LOVENOX) injection 40 mg  40 mg SubCUTAneous QHS    sodium chloride (NS) flush 5-40 mL  5-40 mL IntraVENous Q8H    omega-3 acid ethyl esters (LOVAZA) capsule 1,000 mg  1 g Oral DAILY WITH BREAKFAST    therapeutic multivitamin (THERAGRAN) tablet 1 Tablet  1 Tablet Oral DAILY         Telemetry: [x]Sinus []A-flutter []Paced    []A-fib []Multiple PVCs Physical Exam:      General: Intubated/sedated; grimaces to pain, intermittent periods of agitation; cachectic, thin and frail appearing   HEENT:  Anicteric sclerae; pink palpebral conjunctivae; mucosa moist; poor dentition   Resp:  Symmetrical chest expansion, no accessory muscle use; no rales/ wheezing/ rhonchi noted; diminished breath sounds b/l   CV:  S1, S2 present; tachycardia   GI:  Abdomen soft, non-tender; (+) active bowel sounds; PEG tube absent, but old site dressed with gauze   Extremities:  +2 pulses on all extremities; +1 BUE edema/ No cyanosis/ no clubbing noted. + b/l wrist restraints   Skin:  Poor turgor, pale, easily friable; scattered bruising upper extremities; Multiple abrasions to b/l shins   Neurologic:  Non-focal; sedated, but easily arousable and follows simple commands   Devices:  OGT, Central line right femoral, ETT, arterial line left radial, [place], PIV       DATA:  MAR reviewed and pertinent medications noted or modified as needed    Labs:  Recent Labs     11/01/21  0213 10/31/21  1140 10/30/21  0200   WBC 9.7 12.2 9.4   HGB 9.4* 9.4* 8.9*   HCT 30.5* 31.2* 29.0*    219 183     Recent Labs     11/01/21  0213 10/31/21  1140 10/30/21  0200   * 138 135*   K 4.4 4.1 4.0    102 104   CO2 32 33* 29   * 155* 154*   BUN 23* 20* 24*   CREA 0.46* 0.44* 0.42*   CA 8.2* 7.8* 7.4*   MG 2.1 2.2 1.9   PHOS 1.8* 2.2* 2.0*     No results for input(s): PH, PCO2, PO2, HCO3, FIO2 in the last 72 hours.   Recent Labs     11/01/21  0422 10/31/21  0256 10/30/21  0457   FIO2I 35 35 30   HCO3I 28.6* 26.1* 27.2*   PCO2I 42.9 39.6 41.4   PHI 7.43 7.43 7.43   PO2I 59* 67* 59*       Imaging:  [x]   I have personally reviewed the patients radiographs and reports  XR Results (most recent):  XR Results (most recent):  Results from Hospital Encounter encounter on 10/18/21    XR CHEST PORT    Narrative  EXAM: PORTABLE CHEST 0454 hours    CLINICAL HISTORY/INDICATION: intubated , acute hypercapnic respiratory failure,  interstitial lung disease cavitary right upper lobe lesion    COMPARISON: Chest x-ray 10/31, 10/30, 10/29/2021. TECHNIQUE: Single AP view    FINDINGS:    Endotracheal tube terminates 4 cm proximal to the jero. The esophagogastric  tube enters the stomach and extends beyond off the film. Right upper lobe  cavitary lesion is poorly visualized but unchanged. Right greater than left  apical pleural widening. Increased patchy consolidation at the left perihilar  region and the right lower perihilar region. Mild blunting at the left  costophrenic angle. Pulmonary vascularity is obscured. The heart is normal in  size. Impression  Interval development of bilateral lower perihilar regions of consolidation  Stable cavitary lesion right upper lobe. Small left pleural effusion  Support tubes in expected position. CT Results (most recent):  Results from Hospital Encounter encounter on 10/18/21    CT ABD PELV WO CONT    Narrative  CT abdomen pelvis without IV contrast    HISTORY: Abdominal pain. Recent PEG tube placement. All CT scans at this facility are performed using dose optimization technique as  appropriate to a performed exam, to include automated exposure control,  adjustment of the mA and/or kV according to patient size (including appropriate  matching for site specific examination) or use of iterative reconstruction  technique. Bilateral pleural effusion with posterior lower lobar consolidation, worse on  the right. Infiltrate also seen in the right anterior lung base. Cardiomegaly  with small pericardial effusion. The button of the PEG tube is in the anterior abdominal subcutaneous fat. Significant amount of subcutaneous emphysema with likely fluid and infusing  material in the anterior abdomen. Maximum thickness is up to 3 cm. It extends  across the abdomen. No intra-abdominal free air. No significant ascites.  Diffuse  subcutaneous edema also seen throughout. Unenhanced liver is unremarkable. Small gallstones. Spleen and pancreas  unremarkable. No adrenal mass. Kidneys show mild perinephric stranding with no  hydronephrosis. Non obstructing stone in left kidney. Aortic calcification and ectasia. Small bowel is not distended. Contrast material seen in the colon with fluid  level. Mild distention of the cecum. Sigmoid diverticulosis. Prostate is  enlarged. Bladder is not full for optimal evaluation. Impression  1. The button of the PEG tube is in the anterior abdominal subcutaneous fat (not  in the gastric lumen) with moderately severe subcutaneous emphysema and  fluid/infusing material. No intra-abdominal free air/pneumoperitoneum or  ascites. Discussed with patient's nurse. Discussed with Dr. Gerda De La Rosa hrs  2. Mild colitis with diarrheal disease possible. No bowel obstruction. 3. Sigmoid diverticulosis. With surrounding fluid, mild diverticulitis not  excluded. 4. Cholelithiasis. 5. Bilateral pleural effusion with basilar infiltrate/pneumonia       10/18/21    ECHO ADULT COMPLETE 10/26/2021 10/26/2021    Interpretation Summary  · LV: Estimated LVEF is 25 - 30%. Visually measured ejection fraction. Normal cavity size and wall thickness. Severely and segmentally reduced systolic function. Age-appropriate left ventricular diastolic function. · PA: Severe pulmonary hypertension. Pulmonary arterial systolic pressure is 78 mmHg. · IVC: Mildly elevated central venous pressure (8 mmHg); IVC diameter is less than 21 mm and collapses less than 50% with respiration. · Image quality for this study was technically difficult. · Echo study was limited due to patient's condition. · RV: Not well visualized.     Signed by: Yin Virk MD on 10/26/2021  6:26 PM       IMPRESSION:   · Acute hypoxic and hypercarbic respiratory failure: 2/2 PNA and pulm edema on top of bronchiectasis, ILD  · Shock:  Septic + cardiogenic  · Acute pulmonary edema: A combination of worsening cardiomyopathy as well as hypoalbuminemia  · RUL PNA w bronchiectasis, RLL nodular consolidation, Cavitary lesions - likely 2/2 recurrent aspiration. resp cultures growing MSSA. At risk for invasive fungal PNA, NTM, given hx severe structural lung disease. 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 ?sepsis, stress, ischemic  · Severe pulmonary hypertension on echo. Definitely group II and III component   · Urinary retention - coude catheter placed by urology   · normocytic anemia-  stable  · Deconditioning/adult failure to thrive/cachexia: Body mass index is 19.34 kg/m². · Dyspnea/shortness of breath: Due to above     Patient Active Problem List   Diagnosis Code    Coronary artery disease involving native coronary artery of native heart without angina pectoris,s/p stent 2003 I25.10    Scarlet fever A38.9    Myocardial infarction ([address] I21.9    Urinary frequency R35.0    Pneumonia J18.9    Goals of care, counseling/discussion Z71.89    Acute respiratory failure with hypoxia ([address] J96.01    Debility R53.81    Severe protein-calorie malnutrition ([address] E43    Gastrostomy complication ([address] W63.99        RECOMMENDATIONS:   Neuro:Titrate sedation for RASS goal 0 to -1, daily sedation holiday. Cont Fentanyl gtt. + restraints   Pulm: Aspiration precautions, HOB>30'. VAP Bundle, Daily sedation vacation. Not an ideal candidate for extubation as he has an absent cough and no way to protect his airway. Patient does not wish to have a trach.  Best option is trach vs. Comfort care (of which the patient wants neither). CVS:Monitor HD, MAP goal >65. Restart Levophed for hypotension. Albumin infusing. Monitor for signs of vol overload. Not a candidate for aggressive invasive cardiac management at this time. GI: NPO. Diet: Cont TF via OGT. Needs PEG tube replaced for chronic dysphagia. Docusate, senna, miralax added for bowel reg  Renal: Trend Cr, UOP. Corcoran (coude) placed by Urology   Hem/Onc: Trend H/H, monitor for s/o active bleeding. Daily CBC. I/D:Trend WBCs and temperature curve. ABx per ID, broad spectrum (Merrem and Eraxis). Rcx w/ MSSA, rare yeast. Wound cx with moderate candida albicans. Bcx w/ coag-negative staph likely contaminant. Worsening fevers overnight, so re-cultured. Persistent lymphopenia noted  Metabolic: Daily BMP, mag, phos. Trend lytes and replace per protocol. Endocrine:Q6 glucoses. SSI. Avoid hypoglycemia. Musc/Skin:  wound care   Partial Code   Discussed in interdisciplinary rounds  S/p Palliative Meeting today with myself and Palliative Care Team. Patient code status changed to Partial code today (only replace ETT if dislodged). Patient does not wish to have a trach. Family would like PEG tube replaced. Long-term would need a trach, but patient refuses. He has no way to protect his airway due to his inability to cough or clear secretions. Best practice :    Glycemic control  IHI ICU bundles:   Central Line Bundle Followed , Corcoran Bundle Followed and Vent Bundle Followed, Vent Day 4   [facility] Vent patients- VAP bundle, aim to keep peak plateau pressure [id number] H2O  Sress ulcer prophylaxis. DVT prophylaxis. Need for Lines, corcoran assessed. Palliative care evaluation. Restraints need. Attending Non-violent Restraint Reevaluation     I have reevaluated the patient one hour after initiation of intervention.  The patient is comfortable, uninjured, but continues to pose an imminent risk of injury to self to themselves and/or serious disruption of medical treatment required to keep patient stable. The patient's current medical and behavioral conditions that warrant the use intervention include danger to self and Interference with medical equipment or treatment. Restraint or seclusion will be discontinued at the earliest possible time, regardless of the scheduled expiration of the order. Based on my evaluation, restraints will be continued: YES    1:40 PM    Madelyn Valle MD       High complexity decision making was performed during this consultation and evaluation. [x]       Pt is at high risk for further organ failure and dysfunction.          Ilda Ayala, AGACNP-BC  11/01/21  Pulmonary, Critical Care Medicine  [facility]

## 2021-11-01 NOTE — CONSULTS
[id number] [facility] 54: [phone]  [facility]: [address] [place] [address] Way: [phone]    Patient Name: Ginny So  YOB: 1945    Date of follow up 11/1/2021   Reason for Consult: goals of care discussions   Requesting Provider: Dr Mao  Primary Care Physician: Devin Mcmahon NP      SUMMARY:   Ginny So is a 67y.o. year old with a past history of CAD. S/p stent in 2003, scarlet fever at age 9, interstitial lung disease, chronic bronchiectasis, and chronic aspiration with dysphagia   , who was admitted on 10/18/2021 from home  with a diagnosis of hypoxemic respiratory failure possibly due to aspiration pneumonia . Current medical issues leading to Palliative Medicine involvement include: 67year old male with several life limiting chronic illnesses. Palliative medicine is consulted for support and goals of care discussions. 11/1/2021 family meeting today with his sister Indy who MPOA and her  Kary Headley. Made Partial code no chest compressions, or shock, continue all other treatments    10/29/21:  Rapid response called 4 days ago when patient decompensated respiratory status he was intubated at that time. He self extubated 2 days ago and was re intubated. 10/21/2021 alert, no complaints this am. PEG placement today. PALLIATIVE DIAGNOSES:   1. Goals of care   2. Acute hypoxic respiratory failure   3. Pneumonia likely due to aspiration   4. Debility        PLAN:   11/1/2021 Mr Cherelle Calderon seen along with Ms Natalia Newton. Appreciate ICU NP also attending meeting. Met with Ms Wade ( Indy) sister who is MPOa and her  Kary Headley who is secondary MPOA. Updated on medical condition. Sister and  would like to have PEG replaced to give him nourishment to give him the best opportunity to thrive.  Goals of care discussed, sister was clear she would not wish for him to be subjected to chest compressions or shock at cardiac arrest, however would like to continue all other treatments, including PEG replacement, IVAB, oral intubation, replace tube if accidentally dislodged. Briefly discussed if he were not able to be liberated from the ventilator next step would be trach, sister did not believe he would want this step and are hopeful it will not come to that discussion. Family is aware patient is very frail and despite all treatment he may not do as well as all hope. Goals of care partial code, no chest compressions, no shock at cardiac arrest, continue all other medical interventions including intubation and replace ET if dislodged. Attending team aware as well as nurse of goals of care change. ( please see below for previous notes)     10/29/21: This NP and Arsalan Chávez RN in to see patient at the bedside. Assessment completed and spoke with ICU team about goals of care. Patient is sedated, intubated, and mechanically ventilated. Our team is reaching out to his family Indy Wade to set up a meeting to discuss goals of care moving forward. At this time patient remains a full code with full interventions    UPDATE:   Meeting set up with patient's family for Monday at 12:00 11/1/21. Appreciate any assistance from the ICU team.     See below for prior discussions:    10/21/2021 Mr Aurelio Blanchard seen along with Ms Whitney Almazan. Alert, deaf, although able to communicate with written word. Denies pain, still some shortness of breath, he is not sure the oxygen is helping. Breathing a bit more labored this am. O2 sats 95% on nasal cannula oxygen. Noted plans for PEG placement later today. Goals of care have not changed patient wishes for full code with full interventions. Goals of care are defined. Palliative medicine will sign off at this time Please re consult if we can be of service. ( please see below for previous notes per palliative team)     1. Goals of care Patient seen along with Ms Kajal Negron RN.  He is alert, deaf and communicates verbally however needs questions text or written. We were able to write our questions today. He denied pain or shortness of breath. Noted AMD on file naming his sister Indy Wade as MPOA and her  Carmelita Dunham as secondary. He wished no changes today. Able to discuss goals of care through writing the question and explaining the benefits and burdens. He was able to verbally tell us he wished to be resuscitated in the event of cardiac arrest and intubation if needed. This was consistent with his AMD wishes. Goals of care full code with full interventions. Communicated with patient by writing to have further thought and consideration of resuscitation as these efforts will not cure, treat or reverse his underlying chronic medical conditions and likely cause more functional debility. 2. Acute hypoxic respiratory failure likely due to pneumonia but has interstitial lung disease. Maintaining on nasal cannula currently   3. Pneumonia due to aspiration. Appreciate speech consult at time of our visit MBS pending. 4. Debility has care givers during weekdays, sister and brother in law provide care on weekends. Uses walker for ambulation. Very thin and frail appearing albumin 2.4   5. Initial consult note routed to primary continuity provider  6.  Communicated plan of care with: Palliative IDT, patient     Patient/Health Care Proxy Stated Goals: Prolong life      TREATMENT PREFERENCES:   Code Status: Partial Code    Advance Care Planning:  [] The [facility] Interdisciplinary Team has updated the ACP Navigator with [address] and Patient Capacity    Primary Decision Maker ([address]): AMD on file naming his sister Tahira Martinez as MPOA   Brother in law Adam Nava as secondary     Medical Interventions: Full interventions     Artificially Administered Nutrition: Feeding tube long-term, if indicated     Other:  As far as possible, the palliative care team has discussed with patient / health care proxy about goals of care / treatment preferences for patient. HISTORY:     History obtained from: chart and patient     CHIEF COMPLAINT: respiratory failure     HPI/SUBJECTIVE:    The patient is:   [x] Verbal and participatory receives communication via writing and via text. He is verbal  [x] Non-participatory due to: 11/1/2021 due to intubation   Please see summary     Clinical Pain Assessment (nonverbal scale for nonverbal patients): Clinical Pain Assessment  Severity: 0     Activity (Movement): Lying quietly, normal position    Duration: for how long has pt been experiencing pain (e.g., 2 days, 1 month, years)  Frequency: how often pain is an issue (e.g., several times per day, once every few days, constant)     FUNCTIONAL ASSESSMENT:     Palliative Performance Scale (PPS):  PPS: 40    ECOG  ECOG Status : Completely disabled     PSYCHOSOCIAL/SPIRITUAL SCREENING:      Any spiritual / Worship concerns:  [] Yes /  [x] No    Caregiver Burnout:  [] Yes /  [] No /  [x] No Caregiver Present      Anticipatory grief assessment:   [x] Normal  / [] Maladaptive        REVIEW OF SYSTEMS:     Positive and pertinent negative findings in ROS are noted above in HPI. The following systems were [x] reviewed / [] unable to be reviewed as noted in HPI  Other findings are noted below. Systems: constitutional, ears/nose/mouth/throat, respiratory, gastrointestinal, genitourinary, musculoskeletal, integumentary, neurologic, psychiatric, endocrine. Positive findings noted below. Modified ESAS Completed by: provider   Fatigue: 5       Pain: 0   Anxiety: 0 Nausea: 0     Dyspnea: 0           Stool Occurrence(s): 0        PHYSICAL EXAM:     Wt Readings from Last 3 Encounters:   10/31/21 56.4 kg (124 lb 5.4 oz)   08/31/21 51.7 kg (114 lb)   06/29/21 53.1 kg (117 lb)     Blood pressure 115/67, pulse 94, temperature 98.6 °F (37 °C), resp. rate 24, height 5' 7\" (1.702 m), weight 56.4 kg (124 lb 5.4 oz), SpO2 95 %.   Pain:  Pain Scale 1: Adult Nonverbal Pain Scale  Pain Intensity 1: 0  Pain Onset 1: surgical  Pain Location 1: Abdomen  Pain Orientation 1: Anterior  Pain Description 1: Aching  Pain Intervention(s) 1: Medication (see MAR)  Last bowel movement: none recorded     Constitutional: sedated orally intubated   ENMT: intubated   Cardiovascular: RRR  Respiratory: mechanically ventilated   Skin: warm, dry  Neurologic: sedated        HISTORY:     Active Problems:    Pneumonia (10/18/2021)      Severe protein-calorie malnutrition ([facility] [address] (10/20/2021)      Gastrostomy complication ([address] (23/27/3116)      Past Medical History:   Diagnosis Date    CAD (coronary artery disease)     MI 1995; stent 2003 (done preop cochlear sgy)    Cancer ([facility] [address]     SQUAMOUS CELL    Coronary artery disease     Deafness     Myocardial infarction [facility]) [phone]    Scarlet fever age 9      Past Surgical History:   Procedure Laterality Date    BRONCH-FIBER/DIAGNOSTIC  6/16/2016         HX COLONOSCOPY  2013    neg    [place]    HX HEART CATHETERIZATION  2003    HX HEART CATHETERIZATION  1999    HX HERNIA REPAIR      inguinal ? laterality    HX OTHER SURGICAL      2 cochlear implants (R); 1 left - all failed      Family History   Problem Relation Age of Onset    No Known Problems Mother     Heart Disease Father     Heart Attack Father         1989    Coronary Artery Disease Father     Hypertension Father     High Cholesterol Father      History reviewed, no pertinent family history.   Social History     Tobacco Use    Smoking status: Never Smoker    Smokeless tobacco: Never Used   Substance Use Topics    Alcohol use: No     Allergies   Allergen Reactions    Pollens Extract Runny Nose and Cough      Current Facility-Administered Medications   Medication Dose Route Frequency    fluconazole (DIFLUCAN) 400mg/200 mL IVPB (premix)  400 mg IntraVENous Q24H    polyethylene glycol (MIRALAX) packet 17 g  17 g Per NG tube BID    senna-docusate (PERICOLACE) 8.6-50 mg per tablet 1 Tablet  1 Tablet Oral DAILY    meropenem (MERREM) 1 g in sterile water (preservative free) 20 mL IV syringe  1 g IntraVENous Q8H    famotidine (PF) (PEPCID) 20 mg in 0.9% sodium chloride 10 mL injection  20 mg IntraVENous Q12H    thiamine HCL (B-1) tablet 200 mg  200 mg Per G Tube DAILY    LORazepam (ATIVAN) injection 2 mg  2 mg IntraVENous Q15MIN PRN    HYDROmorphone (DILAUDID) syringe 1 mg  1 mg IntraVENous Q15MIN PRN    chlorhexidine (PERIDEX) 0.12 % mouthwash 10 mL  10 mL Oral Q12H    midazolam (VERSED) injection 2 mg  2 mg IntraVENous Q10MIN PRN    fentaNYL citrate (PF) injection 100 mcg  100 mcg IntraVENous Q30MIN PRN    fentaNYL (PF) 1,500 mcg/30 mL (50 mcg/mL) infusion  0-200 mcg/hr IntraVENous TITRATE    insulin lispro (HUMALOG) injection   SubCUTAneous Q6H    glucose chewable tablet 16 g  16 g Oral PRN    glucagon (GLUCAGEN) injection 1 mg  1 mg IntraMUSCular PRN    bacitracin zinc-polymyxin b (POLYSPORIN) 500-10,000 unit/gram ointment   Topical BID    naloxone (NARCAN) injection 0.4 mg  0.4 mg IntraVENous PRN    albuterol-ipratropium (DUO-NEB) 2.5 MG-0.5 MG/3 ML  3 mL Nebulization Q4H RT    sodium chloride 3% hypertonic nebulizer soln  4 mL Nebulization Q4HWA RT    Lactobacillus Acidoph & Bulgar (FLORANEX) tablet 2 Tablet  2 Tablet Per G Tube BID    guaiFENesin (ROBITUSSIN) 100 mg/5 mL oral liquid 400 mg  400 mg Per G Tube Q4H PRN    albuterol-ipratropium (DUO-NEB) 2.5 MG-0.5 MG/3 ML  3 mL Nebulization Q4H PRN    aspirin chewable tablet 81 mg  81 mg Per G Tube DAILY    cholecalciferol (VITAMIN D3) (1000 Units /25 mcg) tablet 1,000 Units  1,000 Units PEG Tube DAILY    acetaminophen (TYLENOL) tablet 650 mg  650 mg Per G Tube Q6H PRN    Or    acetaminophen (TYLENOL) suppository 650 mg  650 mg Rectal Q6H PRN    enoxaparin (LOVENOX) injection 40 mg  40 mg SubCUTAneous QHS    sodium chloride (NS) flush 5-10 mL  5-10 mL IntraVENous PRN    sodium chloride (NS) flush 5-40 mL  5-40 mL IntraVENous Q8H    sodium chloride (NS) flush 5-40 mL  5-40 mL IntraVENous PRN    omega-3 acid ethyl esters (LOVAZA) capsule 1,000 mg  1 g Oral DAILY WITH BREAKFAST    therapeutic multivitamin (THERAGRAN) tablet 1 Tablet  1 Tablet Oral DAILY     Facility-Administered Medications Ordered in Other Encounters   Medication Dose Route Frequency    propofoL (DIPRIVAN) 10 mg/mL injection   IntraVENous PRN        LAB AND IMAGING FINDINGS:     Lab Results   Component Value Date/Time    WBC 9.7 11/01/2021 02:13 AM    HGB 9.4 (L) 11/01/2021 02:13 AM    PLATELET 800 39/08/2575 02:13 AM     Lab Results   Component Value Date/Time    Sodium 133 (L) 11/01/2021 02:13 AM    Potassium 4.4 11/01/2021 02:13 AM    Chloride 100 11/01/2021 02:13 AM    CO2 32 11/01/2021 02:13 AM    BUN 23 (H) 11/01/2021 02:13 AM    Creatinine 0.46 (L) 11/01/2021 02:13 AM    Calcium 8.2 (L) 11/01/2021 02:13 AM    Magnesium 2.1 11/01/2021 02:13 AM    Phosphorus 1.8 (L) 11/01/2021 02:13 AM      Lab Results   Component Value Date/Time    Alk. phosphatase 30 (L) 10/27/2021 02:46 PM    Protein, total 5.4 (L) 10/27/2021 02:46 PM    Albumin 1.9 (L) 10/27/2021 02:46 PM    Globulin 3.5 10/27/2021 02:46 PM     Lab Results   Component Value Date/Time    INR 1.1 10/28/2021 02:39 AM    Prothrombin time 14.2 10/28/2021 02:39 AM      No results found for: IRON, FE, TIBC, IBCT, PSAT, FERR   No results found for: PH, PCO2, PO2  No components found for: [place]   Lab Results   Component Value Date/Time    CK 25 (L) 10/28/2021 02:39 AM    CK - MB 1.5 10/28/2021 02:39 AM              Total time: 35  minutes   Counseling / coordination time, spent as noted above:   > 50% counseling / coordination: yes with patient     Prolonged service was provided for  []30 min   []75 min in face to face time in the presence of the patient, spent as noted above.   Time Start:   Time End:

## 2021-11-01 NOTE — PROGRESS NOTES
Infectious Disease progress Note        Reason: Right upper lobe cavitary pneumonia    Current abx Prior abx     Meropenem since 10/29  eraxis since 10/31 vancomycin 10/18-10/21  Levofloxacin 10/18-10/25  Piperacillin/tazobactam since 10/18/2021-10/29     Lines:       Assessment :    67 y.o. 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 intake, and worsening cough . Now with gram positive bacteremia, chronic cavitary lesion RUL with RUL/RLL infiltrates    Clinical presentation c/w acute on chronic hypoxic respiratory failure likely secondary to recurrent aspiration pneumonia, chronic cavitary lesion right upper lobe  Rule out superimposed infection/colonization with atypical mycobacteria    Sputum cx- mixed respiratory cristine, MSSA yeast  Yeast likely colonizer. Sputum AFB 10/19 negative    Pulmonary follow-up appreciated    Single positive blood culture for coagulase negative staph. On  10/18 is likely contamination    S/p peg tube placement 10/21/21    Now with worsening tachypnea, increased abdominal pain on 10/25/2021 likely secondary to displaced PEG tube. GI follow-up appreciated. Status post removal of PEG tube 10/26/2021. Evidence of sherif-PEG wound infection noted  No clinical or radiological evidence of worsening pneumonia    Unresponsiveness on 10/27/2021 status post intubation 10/27/2021. Pulled out ET tube, reintubated on 10/28/2021  Currently on 35% FiO2. Now with worsening hypotension requiring pressors on 10/29  Etiology of hypotension not entirely clear at this time- ? Cardiogenic versus partially treated sepsis  Concerns for PEG site infection when PEG tube removed 10/26.   Will broaden antibiotic coverage to cover for ESBL gram-negative still further information obtained  Peg site culture 10/29- candida albicans. Respiratory culture 10/30- yeast  No worsening erythema around recent PEG site. Now with fevers T-max 102 overnight- ? Recurrent aspiration versus undiagnosed fungemia  Recent broad-spectrum antibiotics, colonization with Candida puts patient at risk for fungemia. Off pressors  Discussed with patient/family at bedside. Patient adamantly refuses to have tracheostomy    Recommendations:    1. Continue meropenem. Discontinue Eraxis. Start IV Diflucan since patient colonized with Candida albicans  2. Follow-up blood culture 11/1 . Send fungitell  3.  follow sputum for AFB  4. Weaning from vent per ICU team  5. Follow-up GI recommendations regarding replacement of PEG tube. Above plan was discussed in details with icu  Team, dr. Patricia Archibald, sister at bedside. Please call me if any further questions or concerns. Will continue to participate in the care of this patient. HPI:    Intubated      Current Discharge Medication List      CONTINUE these medications which have NOT CHANGED    Details   acetylcysteine (MUCOMYST) 100 mg/mL (10 %) nebulizer solution Take 4 mL by inhalation every four (4) hours for 120 days. Qty: 720 mL, Refills: 3      fluorouraciL (EFUDEX) 5 % chemo cream APPLY CREAM TO FOREHEAD 2 TIMES DAILY FOR 2 WEEKS ONCE HEALED USE 2 TIMES DAILY TO THE EARS FOR 2 WEEKS ONCE HEALED USE 2 TIMES DAILY TO THE CHEEKS AND TEMPLES FOR 2 WEEKS      sodium chloride 3 % nebulizer solution 4 mL by Nebulization route four (4) times daily. Mix with albuterol. File under Medicare Part B. Dx: R06.02; J47.9; J84.9; R53.81  Qty: 480 mL, Refills: 5    Associated Diagnoses: Dyspnea on exertion; Bronchiectasis without complication ([address] ILD (interstitial lung disease) ([address] Chronic pulmonary aspiration, sequela; Physical deconditioning      albuterol (PROVENTIL VENTOLIN) 2.5 mg /3 mL (0.083 %) nebu Mix with hypertonic saline nebulizer -- use 3- times per day. File under Medicare Part B.   Dx: R06.02; J47.9; J84.9; R53.81  Qty: 360 Nebule, Refills: 3    Associated Diagnoses: Dyspnea on exertion; Bronchiectasis without complication ([address] ILD (interstitial lung disease) ([facility] Chronic pulmonary aspiration, sequela; Physical deconditioning      guaiFENesin ER (MUCINEX) 600 mg ER tablet Take 1 Tablet by mouth two (2) times a day for 90 days. Qty: 180 Tablet, Refills: 3    Associated Diagnoses: Bronchiectasis without complication (HCC)      cholecalciferol (VITAMIN D3) (1000 Units /25 mcg) tablet Take 1,000 Units by mouth daily. FISH OIL-DHA-EPA PO Take 1 Tab by mouth daily. amino acids powd Take 1 Dose by mouth daily. OTHER Take 1 Cap by mouth daily. tumeric       fluticasone propionate (FLONASE NA) 1 Spray by Both Nostrils route daily. ascorbic acid, vitamin C, (VITAMIN C) 500 mg tablet Take 500 mg by mouth daily. multivitamin (ONE A DAY) tablet Take 1 Tab by mouth daily. aspirin delayed-release 81 mg tablet Take 81 mg by mouth daily.     Associated Diagnoses: Coronary artery disease involving native coronary artery of native heart without angina pectoris             Current Facility-Administered Medications   Medication Dose Route Frequency    polyethylene glycol (MIRALAX) packet 17 g  17 g Per NG tube BID    anidulafungin (ERAXIS) 100 mg in 0.9% sodium chloride 130 mL IVPB  100 mg IntraVENous Q24H    senna-docusate (PERICOLACE) 8.6-50 mg per tablet 1 Tablet  1 Tablet Oral DAILY    meropenem (MERREM) 1 g in sterile water (preservative free) 20 mL IV syringe  1 g IntraVENous Q8H    famotidine (PF) (PEPCID) 20 mg in 0.9% sodium chloride 10 mL injection  20 mg IntraVENous Q12H    thiamine HCL (B-1) tablet 200 mg  200 mg Per G Tube DAILY    LORazepam (ATIVAN) injection 2 mg  2 mg IntraVENous Q15MIN PRN    HYDROmorphone (DILAUDID) syringe 1 mg  1 mg IntraVENous Q15MIN PRN    chlorhexidine (PERIDEX) 0.12 % mouthwash 10 mL  10 mL Oral Q12H    midazolam (VERSED) injection 2 mg  2 mg IntraVENous Q10MIN PRN    fentaNYL citrate (PF) injection 100 mcg  100 mcg IntraVENous Q30MIN PRN    fentaNYL (PF) 1,500 mcg/30 mL (50 mcg/mL) infusion  0-200 mcg/hr IntraVENous TITRATE    insulin lispro (HUMALOG) injection   SubCUTAneous Q6H    glucose chewable tablet 16 g  16 g Oral PRN    glucagon (GLUCAGEN) injection 1 mg  1 mg IntraMUSCular PRN    bacitracin zinc-polymyxin b (POLYSPORIN) 500-10,000 unit/gram ointment   Topical BID    naloxone (NARCAN) injection 0.4 mg  0.4 mg IntraVENous PRN    albuterol-ipratropium (DUO-NEB) 2.5 MG-0.5 MG/3 ML  3 mL Nebulization Q4H RT    sodium chloride 3% hypertonic nebulizer soln  4 mL Nebulization Q4HWA RT    Lactobacillus Acidoph & Bulgar (FLORANEX) tablet 2 Tablet  2 Tablet Per G Tube BID    guaiFENesin (ROBITUSSIN) 100 mg/5 mL oral liquid 400 mg  400 mg Per G Tube Q4H PRN    albuterol-ipratropium (DUO-NEB) 2.5 MG-0.5 MG/3 ML  3 mL Nebulization Q4H PRN    aspirin chewable tablet 81 mg  81 mg Per G Tube DAILY    cholecalciferol (VITAMIN D3) (1000 Units /25 mcg) tablet 1,000 Units  1,000 Units PEG Tube DAILY    acetaminophen (TYLENOL) tablet 650 mg  650 mg Per G Tube Q6H PRN    Or    acetaminophen (TYLENOL) suppository 650 mg  650 mg Rectal Q6H PRN    enoxaparin (LOVENOX) injection 40 mg  40 mg SubCUTAneous QHS    sodium chloride (NS) flush 5-10 mL  5-10 mL IntraVENous PRN    sodium chloride (NS) flush 5-40 mL  5-40 mL IntraVENous Q8H    sodium chloride (NS) flush 5-40 mL  5-40 mL IntraVENous PRN    omega-3 acid ethyl esters (LOVAZA) capsule 1,000 mg  1 g Oral DAILY WITH BREAKFAST    therapeutic multivitamin (THERAGRAN) tablet 1 Tablet  1 Tablet Oral DAILY     Facility-Administered Medications Ordered in Other Encounters   Medication Dose Route Frequency    propofoL (DIPRIVAN) 10 mg/mL injection   IntraVENous PRN       Allergies: Pollens extract    Family History   Problem Relation Age of Onset    No Known Problems Mother     Heart Disease Father  Heart Attack Father         1989    Coronary Artery Disease Father     Hypertension Father     High Cholesterol Father      Social History     Socioeconomic History    Marital status: SINGLE     Spouse name: Not on file    Number of children: Not on file    Years of education: Not on file    Highest education level: Not on file   Occupational History    Not on file   Tobacco Use    Smoking status: Never Smoker    Smokeless tobacco: Never Used   Vaping Use    Vaping Use: Never used   Substance and Sexual Activity    Alcohol use: No    Drug use: No    Sexual activity: Not on file   Other Topics Concern    Not on file   Social History Narrative    Not on file     Social Determinants of Health     Financial Resource Strain:     Difficulty of Paying Living Expenses:    Food Insecurity:     Worried About Running Out of Food in the Last Year:     Ran Out of Food in the Last Year:    Transportation Needs:     Lack of Transportation (Medical):      Lack of Transportation (Non-Medical):    Physical Activity:     Days of Exercise per Week:     Minutes of Exercise per Session:    Stress:     Feeling of Stress :    Social Connections:     Frequency of Communication with Friends and Family:     Frequency of Social Gatherings with Friends and Family:     Attends Quaker Services:     Active Member of Clubs or Organizations:     Attends Club or Organization Meetings:     Marital Status:    Intimate Partner Violence:     Fear of Current or Ex-Partner:     Emotionally Abused:     Physically Abused:     Sexually Abused:      Social History     Tobacco Use   Smoking Status Never Smoker   Smokeless Tobacco Never Used        Temp (24hrs), Av.3 °F (37.9 °C), Min:99 °F (37.2 °C), Max:102 °F (38.9 °C)    Visit Vitals  BP (!) 88/51   Pulse 94   Temp (!) 100.8 °F (38.2 °C)   Resp 14   Ht 5' 7\" (1.702 m)   Wt 56.4 kg (124 lb 5.4 oz)   SpO2 96%   BMI 19.47 kg/m²       ROS: Unable to obtain due to patient factors    Physical Exam:    General:Thin  male laying on bed, intubated, alert    HEENT:  Normocephalic, atraumatic, no scleral icterus or pallor; no conjunctival hemmohage; ET tube in place; neck supple, no bruits. Lymph Nodes:   not examined   Lungs:   shallow breathing, less tachypneic, rhonchi right upper lung   Heart:  RRR, s1 and s2; no  rubs or gallops, no edema, + pedal pulses   Abdomen:  soft, non-distended, no erythema around recent PEG site,bloody drainage from peg site,  no hepatomegaly, no splenomegaly. Non-tender   Genitourinary:  deferred   Extremities:   no clubbing, cyanosis; no joint effusions or swelling;  muscle mass appropriate for age   Neurologic:   Sedated                        Skin:  No rash or ulcers noted   Back:  not examined     Psychiatric:   anxious         Labs: Results:   Chemistry Recent Labs     11/01/21  0213 10/31/21  1140 10/30/21  0200   * 155* 154*   * 138 135*   K 4.4 4.1 4.0    102 104   CO2 32 33* 29   BUN 23* 20* 24*   CREA 0.46* 0.44* 0.42*   CA 8.2* 7.8* 7.4*   AGAP 1* 3 2*   BUCR 50* 45* 57*      CBC w/Diff Recent Labs     11/01/21  0213 10/31/21  1140 10/30/21  0200   WBC 9.7 12.2 9.4   RBC 3.56* 3.59* 3.33*   HGB 9.4* 9.4* 8.9*   HCT 30.5* 31.2* 29.0*    219 183   GRANS 89* 89* 88*   LYMPH 3* 2* 3*   EOS 1 2 2      Microbiology Recent Labs     11/01/21  0213 10/30/21  1230 10/29/21  1355   CULT NO GROWTH AFTER 3 HOURS SCANT YEAST, (APPARENT CANDIDA ALBICANS)*  NO NORMAL RESPIRATORY LEROY ISOLATED MODERATE ALESHA ALBICANS*          RADIOLOGY:    All available imaging studies/reports in [facility] for this admission were reviewed    High complexity decision making was performed during the evaluation of this patient at high risk for decompensation with multiple organ involvement         Disclaimer: Sections of this note are dictated utilizing voice recognition software, which may have resulted in some phonetic based errors in grammar and contents. Even though attempts were made to correct all the mistakes, some may have been missed, and remained in the body of the document. If questions arise, please contact our department.     Dr. Brit Lorenzo, Infectious Disease Specialist  [phone]  November 1, 2021  3:25 PM

## 2021-11-01 NOTE — ROUTINE PROCESS
1930 Bedside and Verbal shift change report given to  Lizzeth Grewal (oncoming nurse) by Sachin Scott RN rn (offgoing nurse). Report included the following information SBAR, Kardex and Recent Results   2000 assessment completed. 0000 reassessment completed. Notified song nogueira of [facility] , will assess,  0100 found corcoran in patient hands. Redirected to let go using phone krishna and sign language. 0400 reassessment completed. 0730 Bedside and Verbal shift change report given to  84 Ana Samaniego rn (oncoming nurse) by Brit Hernandez RN (offgoing nurse). Report included the following information SBAR, Kardex, Recent Results and Cardiac Rhythm  ST. .   Milind Vann attempted to  Contact the sepsis [facility] at [phone] , phone rings fast busy, had hospital  (DOT) call l the number  [phone] , also got a fast busy signal. Nurse did not get a message to , or receive a call from this service. Notified Jessica Mathews NP, no new order received. nilson Farfan NP also [id number] one blood culture sent from [facility] to get enough sample to send from cv. Waiting for lab to  [address] [place] [address] [place]. Nazario Urena  attempted to collect unsucessful. Lab called [phone] and state will have someone collect. Bolivar Thompson

## 2021-11-01 NOTE — INTERDISCIPLINARY ROUNDS
[facility]  Pulmonary, Critical Care, and Sleep Medicine  Interdisciplinary and Ventilator Weaning Rounds    Patient discussed in morning walking rounds and interdisciplinary rounds. ICU day: 10/27/21     Overnight events:    Febrile overnight, Tmax 102. Cultures collected    Weaned off vasopressors    Worsening consolidation noted on right side     Assessments and best practice:   Ventilator  o Ventilator day: 10/28/21   o Vent settings: FiO2 of 35 and PEEP of 5  o VAP bundle, aim to keep peak plateau pressure 21-79UE H2O  o Weaning assessed and documented   - Patient does meet criteria for SBT. - Patient will be placed sedation holiday. - Plan to wean with PS n/a.  - Outcome:   - Final plan: extubated if SBT tolerated    Sedation  o Fentanyl    Other pertinent drips  o None    Lines noted  o Femoral CVL , left radial arterial line, OGT, Corcoran cath, PIV    Critical labs assessed  o Yes   Antibiotics  o Merrem, Eraxis    Medications reviewed  o Yes   Pending imaging  o none   Pending send out labs  o No   Pending Procedures  o PEG   Glycemic control   Stress ulcer prophylaxis. o Pepcid   DVT prophylaxis. o Lovenox   Need for Lines, corcoran assessed.  o Yes   Restraint Reevaluation   o Yes  o I have reevaluated the patient one hour after initiation of intervention. The patient is comfortable, uninjured, but continues to pose an imminent risk of injury to self to themselves and/or serious disruption of medical treatment required to keep patient stable. The patient's current medical and behavioral conditions that warrant the use intervention include danger to self and Interference with medical equipment or treatment. Restraint or seclusion will be discontinued at the earliest possible time, regardless of the scheduled expiration of the order.  Based on my evaluation, restraints will be continued: YES 11/1/21 0900  o Attending Overseeing restraints: Chintan Avina MD Family contact/MPOA: family  Family updated will update today       Daily Plans:   [address] family meeting with Palliative Care at 12pm today   Tolerated being off Fentanyl gtt, but put back on until after family meeting    Needs replacement PEG tube (await family meeting discussion)    Replace phosphorous, kphos     CONNOR time 15 minutes        Jose Winchester NP  11/01/21  Pulmonary, [address] [facility],[address] [facility]

## 2021-11-01 NOTE — REMOTE MONITORING
Attempted to contact primary RN in regards to the sepsis bundle.     Thank you,     SHAKIR GermainN RN   [address]  Callback # [phone]

## 2021-11-01 NOTE — PROGRESS NOTES
Problem: Patient Education: Go to Patient Education Activity  Goal: Patient/Family Education  Outcome: Not Progressing Towards Goal     Problem: Falls - Risk of  Goal: *Absence of Falls  Description: Document Drake Sol Fall Risk and appropriate interventions in the flowsheet.   Outcome: Progressing Towards Goal  Note: Fall Risk Interventions:  Mobility Interventions: Bed/chair exit alarm, Assess mobility with egress test, Communicate number of staff needed for ambulation/transfer, PT Consult for mobility concerns, Strengthening exercises (ROM-active/passive), Utilize walker, cane, or other assistive device    Mentation Interventions: Adequate sleep, hydration, pain control, Bed/chair exit alarm, Door open when patient unattended, Evaluate medications/consider consulting pharmacy, Eyeglasses and hearing aids, Familiar objects from home, More frequent rounding, Reorient patient, Room close to nurse's station, Toileting rounds, Update white board    Medication Interventions: Bed/chair exit alarm, Evaluate medications/consider consulting pharmacy, Teach patient to arise slowly, Patient to call before getting OOB    Elimination Interventions: Bed/chair exit alarm, Call light in reach, Elevated toilet seat, Patient to call for help with toileting needs, Stay With Me (per policy), Toilet paper/wipes in reach, Toileting schedule/hourly rounds              Problem: Patient Education: Go to Patient Education Activity  Goal: Patient/Family Education  Outcome: Not Progressing Towards Goal     Problem: Discharge Planning  Goal: *Discharge to safe environment  Outcome: Progressing Towards Goal     Problem: Patient Education: Go to Patient Education Activity  Goal: Patient/Family Education  Outcome: Not Progressing Towards Goal     Problem: Patient Education: Go to Patient Education Activity  Goal: Patient/Family Education  Outcome: Not Progressing Towards Goal     Problem: Pressure Injury - Risk of  Goal: *Prevention of pressure injury  Description: Document Kali Scale and appropriate interventions in the flowsheet. Outcome: Progressing Towards Goal  Note: Pressure Injury Interventions:  Sensory Interventions: Assess changes in LOC, Assess need for specialty bed, Avoid rigorous massage over bony prominences, Check visual cues for pain, Float heels, Keep linens dry and wrinkle-free, Minimize linen layers, Monitor skin under medical devices, Pressure redistribution bed/mattress (bed type), Turn and reposition approx. every two hours (pillows and wedges if needed)    Moisture Interventions: Absorbent underpads, Apply protective barrier, creams and emollients, Assess need for specialty bed, Check for incontinence Q2 hours and as needed, Internal/External urinary devices, Maintain skin hydration (lotion/cream), Minimize layers, Moisture barrier    Activity Interventions: Assess need for specialty bed, Pressure redistribution bed/mattress(bed type)    Mobility Interventions: Assess need for specialty bed, Float heels, HOB 30 degrees or less, Pressure redistribution bed/mattress (bed type), Turn and reposition approx.  every two hours(pillow and wedges)    Nutrition Interventions: Document food/fluid/supplement intake, Discuss nutritional consult with provider    Friction and Shear Interventions: Apply protective barrier, creams and emollients, Feet elevated on foot rest, Foam dressings/transparent film/skin sealants, HOB 30 degrees or less, Minimize layers, Transferring/repositioning devices                Problem: Patient Education: Go to Patient Education Activity  Goal: Patient/Family Education  Outcome: Not Progressing Towards Goal     Problem: Ventilator Management  Goal: *Adequate oxygenation and ventilation  Outcome: Progressing Towards Goal  Goal: *Patient maintains clear airway/free of aspiration  Outcome: Progressing Towards Goal  Goal: *Absence of infection signs and symptoms  Outcome: Progressing Towards Goal  Goal: *Normal spontaneous ventilation  Outcome: Not Progressing Towards Goal     Problem: Patient Education: Go to Patient Education Activity  Goal: Patient/Family Education  Outcome: Not Progressing Towards Goal     Problem: Non-Violent Restraints  Goal: Removal from restraints as soon as assessed to be safe  Outcome: Progressing Towards Goal  Goal: No harm/injury to patient while restraints in use  Outcome: Progressing Towards Goal  Goal: Patient's dignity will be maintained  Outcome: Progressing Towards Goal  Goal: Patient Interventions  Outcome: Progressing Towards Goal   Patient deaf, difficult to see if he understands , no family at bedside.

## 2021-11-01 NOTE — PROGRESS NOTES
attended the interdisciplinary rounds for Phil Hall, who is a 67 y. o.,male. Patients Primary Language is: [place]. According to the patients EMR Anglican Affiliation is: Rastafarian. The reason the Patient came to the hospital is:   Patient Active Problem List    Diagnosis Date Noted    Gastrostomy complication ([facility] 58/61/0638    Severe protein-calorie malnutrition ([facility] 10/20/2021    Goals of care, counseling/discussion     Acute respiratory failure with hypoxia ([facility]     Debility     Pneumonia 10/18/2021    Urinary frequency 06/30/2016    Scarlet fever     [facility] infarction [facility])     Coronary artery disease involving native coronary artery of native heart without angina pectoris,s/p stent 2003 05/19/2016      Plan:  Rebekah Chávez will continue to follow and will provide pastoral care on an as needed/requested basis.  recommends bedside caregivers page  on duty if patient shows signs of acute spiritual or emotional distress.     [address]  Board Certified [address] [facility]   [phone]

## 2021-11-01 NOTE — PROGRESS NOTES
Nutrition Assessment     Type and Reason for Visit: Reassess, Consult    Nutrition Recommendations/Plan:    - Continue tube feeding of Jevity 1.5 at goal rate of 50 mL/hr with 100 mL q 4 hour water flushes via OGT (goal regimen to provide 1800 kcal, 77 gm protein, 912 mL free water, 100% RDIs). - Electrolyte replacement and bowel regimen per MD.    Nutrition Assessment:  TF advanced & tolerating at goal of 50 mL/hr via OGT since 10/31 around 6am. TF held briefly this morning for SBT. Last BM 10/25- miralax BID started today & receiving pericolace since 10/29. Received 15 mmol Na Phos today for hypophosphatemia. Noted plan for palliative care meeting with pts sister today. Malnutrition Assessment:  Malnutrition Status: Severe malnutrition     Estimated Daily Nutrient Needs:  Energy (kcal):  7596-1954  Protein (g):         Fluid (ml/day):  2969-9354    Nutrition Related Findings:  BM 10/25- bowel regimen. PEG placed 10/21 and removed 10/26 following dislodgement of PEG into subcutaneous space. Pertinent Medications: cholecalciferol, fentanyl, SSI, floranex, versed, lovaza, miralax, pericolace, MVI & thiamine      Current Nutrition Therapies:  DIET NPO  ADULT TUBE FEEDING Orogastric; Standard with Fiber; Delivery Method: Continuous; Continuous Initial Rate (mL/hr): 10; Continuous Advance Tube Feeding: Yes; Advancement Volume (mL/hr): 10; Advancement Frequency: Q 8 hours; Continuous Goal Rate (mL/...     Anthropometric Measures:  · Height:  5' 7\" (170.2 cm)  · Current Body Wt:  56.2 kg (124 lb)  · BMI: 19.4    Nutrition Diagnosis:   · Inadequate oral intake related to impaired respiratory function, swallowing difficulty as evidenced by NPO or clear liquid status due to medical condition, intubation, swallowing study results    · Severe malnutrition, In context of chronic illness related to inadequate protein-energy intake, swallowing difficulty as evidenced by poor intake prior to admission, weight loss, severe muscle loss, severe loss of subcutaneous fat    Nutrition Intervention:  Food and/or Nutrient Delivery: Continue NPO, Vitamin supplement, Mineral supplement, Continue tube feeding  Nutrition Education and Counseling: Education completed (basics of g-tube and EN support)  Coordination of Nutrition Care: Continue to monitor while inpatient, Interdisciplinary rounds    Goals:  Nutritional needs will be met through adequate oral intake or nutrition support within the next 7 days.        Nutrition Monitoring and Evaluation:   Behavioral-Environmental Outcomes: None identified  Food/Nutrient Intake Outcomes: Enteral nutrition intake/tolerance, Vitamin/mineral intake, IVF intake  Physical Signs/Symptoms Outcomes: Biochemical data, GI status, Nutrition focused physical findings    Discharge Planning:    Enteral nutrition (pending replacement of G tube)     Electronically signed by Warren Huizar RD on 11/1/2021 at 10:18 AM    Contact Number: [phone]

## 2021-11-01 NOTE — ACP (ADVANCE CARE PLANNING)
[address] [facility] [phone]  [facility] [phone]    [facility] (Physician Orders for Scope of Treatment)       Date of conversation:  11/01/2021 Location:  [facility]   Length (minutes): 65    Participants:   [x] Patient    [x] Healthcare agent (already designated in existing ACP document)    Name: Toñito Wade    Relationship to Patient: Sister    Phone number: [phone]  [x] Other Surrogate Decision Maker / Next of Ciera Rai    Name: Germania Santos  Relationship to Patient: Brother-in-Law    Phone Number: [phone]       Advanced Steps® ACP Facilitator: Toro Sanderson LMSW      Conversation Topics   (If Patient does not have decision making capacity, Agent/Surrogate responds based on understanding of how patient would respond if capable)    Understanding of Medical Condition/s AND Potential Complications:    Patient response:  Pt unable to participate due to intubated and sedated. Healthcare Agent/Other Surrogate: Pt's sister/brother-in-law, verbalized understanding of seriousness of pt's condition and potential complications there of. Lesson [place] from Experiences Related to Serious Illness: Other brother was very ill, five years ago, and doctors didn't expect him to survive; but, he is still alive today. Identifies the following as important for living well: Being able to read, work on his book and read things on the computer. Hopes: Will return to level of health that allows him to return home to prior quality of life. Worries/Fears about Medical Condition:  Condition will not improve, and pt will die, sooner than he would want. Sources of support/comfort described as: family, hobbies. Cultural, Mosque, spiritual, or personal beliefs described as:  Pt is Lutheran, and relies heavily on his Mosque beliefs.      Needs to discuss with spiritual/Mosque advisor: [] Yes  [x] No    Needs more information about illness and complications:  [] Yes  [x] No      Cardiopulmonary Resuscitation      \"What do you understand about CPR? \" Response: Both patient's sister and brother-in-law, verbalized understanding that CPR would be counter productive in pt's situation. Order Elected for CPR:  []  Attempt Resuscitation [x]  Do Not Attempt Resuscitation    When NOT in Cardiopulmonary Arrest, Order Elected:      [] Comfort Measures  [] Limited Additional Interventions  [x] Full Interventions    Artificially Administered Nutrition, Order Elected:    [] No Feeding Tube   [] Feeding Tube for a defined trial period  [x] Feeding Tube long-term if indicated    Meeting Outcomes:   [x] ACP discussion completed   [] [place] form completed  [] [place] prepared for Provider review and signature   [] Original placed on Chart, if in facility (form to be sent with patient at discharge)  [] Copy given to healthcare agent    [] Copy scanned to electronic medical record    Carrie Griffiths NP and this LMSW met with pt's sister, Arsenio Casanova and her spouse Alia Wade at bedside to discuss pt's medical condition and goals of care. Benefits and burdens of CPR were discuss. Pt's sister/MPoA, made the decision to NOT do CPR, should pt experience cardiac arrest, but does want him to remain intubated and once extubated, to be reintubated, at this time. Pt has already had a peg tube, which inadvertently came out as a result of getting caught on bedding, while trying to get out of bed. Pt currently has a OGT, and anticipate will have Peg tube replaced. Pt's sister and brother-in-law, both very concerned about pt's nutritional intake and want him to get the maximum amount of calorie intake possible. NP explained there are limitations to how much a patient can take in via tube feeds, and that nutrition will work to ensure he get's the nutrition required to best support healing.       During this meeting goals of care were discussed. Pt was changed to DNR, Partial code, with continued ventilator support with reintubation if required. Palliative informed them we will continue to follow and will readdress goals of care, as necessary. Thank you for this referral to Palliative Care. The palliative care team remains available to support patient and his family and to readdress goals of care as required.       CODE STATUS:  DNR w/Ventilator Support    Fabricio Donahue LMSW  Palliative Medicine Inpatient   Jasson Reyes [address]: [phone])

## 2021-11-01 NOTE — REMOTE MONITORING
Attempted to contact primary RN in regards to the sepsis bundle.     Thank you,     SHAKIR MccallN RN   [address] [facility]  Callback # [phone]

## 2021-11-02 NOTE — PROGRESS NOTES
[facility]. Pulmonary, Critical Care, and Sleep Medicine    Name: Melo Kapoor MRN: [id number]   : 1945 Hospital: [address] [place] [address] Dr   Date: 2021  Admission Date: 10/18/2021     Chart and notes reviewed. Data reviewed. I have evaluated all findings. [x]I have reviewed the flowsheet and previous days notes. [x]The patient is unable to give any meaningful history or review of systems because the patient is:  [x]Intubated [x]Sedated   []Unresponsive      [x]The patient is critically ill on      [x]Mechanical ventilation [x]Pressors   []BiPAP []         Interval HPI:  Patient is a 67 y.o. male with PMHx of chronic respiratory failure, ILD, bronchiectasis who presents with failure to thrive. He presented with dyspnea and poor PO intake. He was placed on some O2 and CT chest showed some worsening consolidations in the presence of the chronic lung disease. He follows with us in clinic and was instructed to use hypertonic saline, acapella and mucinex, unsure of compliance. He was admitted to the floor, but RRT was called 10/27/21 for encephalopathy and acute hypercapnic respiratory failure. Anesthesia paged and he was intubated and transferred to ICU. Noted to have PNA and pulm edema on top of bronchiectasis and ILD, also a component of cardiogenic ( EF 25%) and septic shock. Multiple Cavitary lesions - likely 2/2 recurrent aspiration. Respiratory cx + MSSA, light candida albicans. PEG tube placed this admission on 10/21 for dysphagia, but inadvertently dislodged, thus will need to be replaced. Patient self-extubated 10/27/21 and was emergently re-intubated due to unresponsiveness to voice and noxious stimuli. Patient has inability to cough, thus would benefit from trach, but patient does not wish to be trached. Made partial code (21). Ideally, comfort care vs. Trach. Subjective 21  Hospital Day:  Admitted     Vent Day: intubated 10/28/21  Overnight events: Febrile overnight, Tmax 102. He was given Tylenol. Notable increase in Fentanyl gtt from 75 mcg to 150 mcg. Mentation/Activity: sedated on Fentanyl gtt   Respiratory/ Secretions: stable   Hemodynamics: Hypotensive, Levophed infusing   Urine output, bowel: 1.7L urine in 24 hours   Diet: TF   Need for procedures: none               ROS:Review of systems not obtained due to patient factors. Events and notes from last 24 hours reviewed. Care plan discussed on multidisciplinary rounds.   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       Vital Signs:  Visit Vitals  BP (!) 102/58   Pulse 94   Temp 99.7 °F (37.6 °C)   Resp 20   Ht 5' 7\" (1.702 m)   Wt 56.4 kg (124 lb 5.4 oz)   SpO2 94%   BMI 19.47 kg/m²       O2 Device: Ventilator, Endotracheal tube   O2 Flow Rate (L/min): 6 l/min   Temp (24hrs), Av.8 °F (37.7 °C), Min:98.4 °F (36.9 °C), Max:102.2 °F (39 °C)       Intake/Output:   Last shift:      701 - 1900  In: 320 [I.V.:20]  Out: 145 [Urine:145]  Last 3 shifts: 10/31 1901 -  07  In: 3318 [I.V.:900]  Out: 2220 [Urine:2220]    Intake/Output Summary (Last 24 hours) at 2021 1238  Last data filed at 2021 0923  Gross per 24 hour   Intake 1660.25 ml   Output 1520 ml   Net 140.25 ml        Ventilator Settings:  Ventilator Mode: Spontaneous  Respiratory Rate  Resp Rate Observed: 120  Back-Up Rate: 14  Insp Time (sec): 1 sec  I:E Ratio: 1;3.3  Ventilator Volumes  Vt Set (ml): 400 ml  Vt Exhaled (Machine Breath) (ml): 313 ml  Vt Spont (ml): 315 ml  Ve Observed (l/min): 7.8 l/min  Ventilator Pressures  Pressure Support (cm H2O): 10 cm H2O  PIP Observed (cm H2O): 16 cm H2O  Plateau Pressure (cm H2O): 21 cm H2O  MAP (cm H2O): 8  PEEP/VENT (cm H2O): 5 cm H20  Auto PEEP Observed (cm H2O): 5.2 cm H2O    Current Facility-Administered Medications   Medication Dose Route Frequency    sodium phosphate 9 mmol in 0.9% sodium chloride 250 mL infusion  9 mmol IntraVENous ONCE    fluconazole (DIFLUCAN) 400mg/200 mL IVPB (premix)  400 mg IntraVENous Q24H    NOREPINephrine (LEVOPHED) 8 mg in 5% dextrose 250mL (32 mcg/mL) infusion  0.5-50 mcg/min IntraVENous TITRATE    polyethylene glycol (MIRALAX) packet 17 g  17 g Per NG tube BID    senna-docusate (PERICOLACE) 8.6-50 mg per tablet 1 Tablet  1 Tablet Oral DAILY    meropenem (MERREM) 1 g in sterile water (preservative free) 20 mL IV syringe  1 g IntraVENous Q8H    famotidine (PF) (PEPCID) 20 mg in 0.9% sodium chloride 10 mL injection  20 mg IntraVENous Q12H    thiamine HCL (B-1) tablet 200 mg  200 mg Per G Tube DAILY    chlorhexidine (PERIDEX) 0.12 % mouthwash 10 mL  10 mL Oral Q12H    fentaNYL (PF) 1,500 mcg/30 mL (50 mcg/mL) infusion  0-200 mcg/hr IntraVENous TITRATE    insulin lispro (HUMALOG) injection   SubCUTAneous Q6H    bacitracin zinc-polymyxin b (POLYSPORIN) 500-10,000 unit/gram ointment   Topical BID    albuterol-ipratropium (DUO-NEB) 2.5 MG-0.5 MG/3 ML  3 mL Nebulization Q4H RT    sodium chloride 3% hypertonic nebulizer soln  4 mL Nebulization Q4HWA RT    Lactobacillus Acidoph & Bulgar (FLORANEX) tablet 2 Tablet  2 Tablet Per G Tube BID    aspirin chewable tablet 81 mg  81 mg Per G Tube DAILY    cholecalciferol (VITAMIN D3) (1000 Units /25 mcg) tablet 1,000 Units  1,000 Units PEG Tube DAILY    enoxaparin (LOVENOX) injection 40 mg  40 mg SubCUTAneous QHS    sodium chloride (NS) flush 5-40 mL  5-40 mL IntraVENous Q8H    omega-3 acid ethyl esters (LOVAZA) capsule 1,000 mg  1 g Oral DAILY WITH BREAKFAST    therapeutic multivitamin (THERAGRAN) tablet 1 Tablet  1 Tablet Oral DAILY         Telemetry: [x]Sinus []A-flutter []Paced    []A-fib []Multiple PVCs                  Physical Exam:      General: Intubated/sedated; grimaces to pain, intermittent periods of agitation; cachectic, thin and frail appearing   HEENT:  Anicteric sclerae; pink palpebral conjunctivae; mucosa moist; poor dentition   Resp:  Symmetrical chest expansion, no accessory muscle use; no rales/ wheezing/ rhonchi noted; course lung sounds b/l   CV:  S1, S2 present; tachycardia   GI:  Abdomen soft, non-tender; hypoactive bowel sounds; PEG tube absent, but old site dressed with gauze   Extremities:  +2 pulses on all extremities; trace edema BUE/ No cyanosis/ no clubbing noted. + b/l wrist restraints   Skin:  Poor turgor, pale, easily friable; scattered bruising upper extremities; Multiple abrasions to b/l shins   Neurologic:  Non-focal; sedated, but easily arousable and follows simple commands   Devices:  OGT, Central line right femoral, ETT, arterial line left radial, [place], PIV       DATA:  MAR reviewed and pertinent medications noted or modified as needed    Labs:  Recent Labs     11/02/21  0330 11/01/21  0213 10/31/21  1140   WBC 11.4 9.7 12.2   HGB 8.6* 9.4* 9.4*   HCT 28.2* 30.5* 31.2*    192 219     Recent Labs     11/02/21  0330 11/01/21 0213 10/31/21  1140   * 133* 138   K 4.1 4.4 4.1   CL 99* 100 102   CO2 33* 32 33*   * 129* 155*   BUN 20* 23* 20*   CREA 0.48* 0.46* 0.44*   CA 8.2* 8.2* 7.8*   MG 2.1 2.1 2.2   PHOS 2.0* 1.8* 2.2*     No results for input(s): PH, PCO2, PO2, HCO3, FIO2 in the last 72 hours.   Recent Labs     11/02/21  0338 11/01/21  0422 10/31/21  0256   FIO2I 40 35 35   HCO3I 31.1* 28.6* 26.1*   PCO2I 52.0* 42.9 39.6   PHI 7.39 7.43 7.43   PO2I 66* 59* 67*       Imaging:  [x]   I have personally reviewed the patients radiographs and reports  XR Results (most recent):  XR Results (most recent):  Results from [place] encounter on 10/18/21    XR CHEST PORT    Narrative  Portable CXR:    HISTORY: Intubation. COMPARISON: November 1, 2001    ET tube tip 5 cm above the jero. NG tube extends into the stomach. Mild  cardiomegaly. Vascular congestion is unchanged. Right upper chest  infiltrate/scarring in the right apical pleural collection and thickening,  stable. Edema or infiltrate in the left lung and right lower chest, stable. Small left pleural effusion. Impression  As described     CT Results (most recent):  Results from Hospital Encounter encounter on 10/18/21    CT ABD PELV WO CONT    Narrative  CT abdomen pelvis without IV contrast    HISTORY: Abdominal pain. Recent PEG tube placement. All CT scans at this facility are performed using dose optimization technique as  appropriate to a performed exam, to include automated exposure control,  adjustment of the mA and/or kV according to patient size (including appropriate  matching for site specific examination) or use of iterative reconstruction  technique. Bilateral pleural effusion with posterior lower lobar consolidation, worse on  the right. Infiltrate also seen in the right anterior lung base. Cardiomegaly  with small pericardial effusion. The button of the PEG tube is in the anterior abdominal subcutaneous fat. Significant amount of subcutaneous emphysema with likely fluid and infusing  material in the anterior abdomen. Maximum thickness is up to 3 cm. It extends  across the abdomen. No intra-abdominal free air. No significant ascites. Diffuse  subcutaneous edema also seen throughout. Unenhanced liver is unremarkable. Small gallstones. Spleen and pancreas  unremarkable. No adrenal mass. Kidneys show mild perinephric stranding with no  hydronephrosis. Non obstructing stone in left kidney. Aortic calcification and ectasia. Small bowel is not distended. Contrast material seen in the colon with fluid  level. Mild distention of the cecum. Sigmoid diverticulosis. Prostate is  enlarged. Bladder is not full for optimal evaluation. Impression  1. The button of the PEG tube is in the anterior abdominal subcutaneous fat (not  in the gastric lumen) with moderately severe subcutaneous emphysema and  fluid/infusing material. No intra-abdominal free air/pneumoperitoneum or  ascites. Discussed with patient's nurse. Discussed with Dr. Bell Bernard hrs  2. Mild colitis with diarrheal disease possible. No bowel obstruction. 3. Sigmoid diverticulosis. With surrounding fluid, mild diverticulitis not  excluded. 4. Cholelithiasis. 5. Bilateral pleural effusion with basilar infiltrate/pneumonia       10/18/21    ECHO ADULT COMPLETE 10/26/2021 10/26/2021    Interpretation Summary  · LV: Estimated LVEF is 25 - 30%. Visually measured ejection fraction. Normal cavity size and wall thickness. Severely and segmentally reduced systolic function. Age-appropriate left ventricular diastolic function. · PA: Severe pulmonary hypertension. Pulmonary arterial systolic pressure is 78 mmHg. · IVC: Mildly elevated central venous pressure (8 mmHg); IVC diameter is less than 21 mm and collapses less than 50% with respiration. · Image quality for this study was technically difficult. · Echo study was limited due to patient's condition. · RV: Not well visualized. Signed by: Fanny Sanchez MD on 10/26/2021  6:26 PM       IMPRESSION:   · Acute hypoxic and hypercarbic respiratory failure: 2/2 PNA and pulm edema on top of bronchiectasis, ILD  · Shock:  Septic + cardiogenic  · Acute pulmonary edema: A combination of worsening cardiomyopathy as well as hypoalbuminemia  · RUL PNA w bronchiectasis, RLL nodular consolidation, Cavitary lesions - likely 2/2 recurrent aspiration. resp cultures growing MSSA. At risk for invasive fungal PNA, NTM, given hx severe structural lung disease.  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 ?sepsis, stress, ischemic  · Severe pulmonary hypertension on echo. Definitely group II and III component   · Urinary retention - coude catheter placed by urology   · normocytic anemia-  stable  · Deconditioning/adult failure to thrive/cachexia: Body mass index is 19.34 kg/m². · Dyspnea/shortness of breath: Due to above     Patient Active Problem List   Diagnosis Code    Coronary artery disease involving native coronary artery of native heart without angina pectoris,s/p stent 2003 I25.10    Scarlet fever A38.9    Myocardial infarction ([address] I21.9    Urinary frequency R35.0    Pneumonia J18.9    Goals of care, counseling/discussion Z71.89    Acute respiratory failure with hypoxia ([address] J96.01    Debility R53.81    Severe protein-calorie malnutrition ([address] E43    Gastrostomy complication ([address] C52.87        RECOMMENDATIONS:   Neuro:Titrate sedation for RASS goal 0 to -1, daily sedation holiday. SBT today, currently tolerating. Cont Fentanyl gtt, but wean, as the dose is higher than what is needed for this patient . + restraints   Pulm: Aspiration precautions, HOB>30'. VAP Bundle, Daily sedation vacation. Not an ideal candidate for extubation as he has an absent cough and no way to protect his airway. Patient does not wish to have a trach. Best option is trach vs. Comfort care (of which the patient wants neither). CVS:Monitor HD, MAP goal >65. Cont Levophed for hypotension. . Monitor for signs of vol overload. Not a candidate for aggressive invasive cardiac management at this time. GI: NPO. Diet: Cont TF via OGT.  Needs PEG tube replaced for chronic dysphagia. GI consulted and will eval patient today. Docusate, senna, miralax added for bowel reg  Renal: Trend Cr, UOP. Corcoran (coude) placed by Urology   Hem/Onc: Trend H/H, monitor for s/o active bleeding. Daily CBC. I/D:Trend WBCs and temperature curve. ABx per ID, broad spectrum (Merrem and Diflucan). Switched from [employer] yesterday, as patient is colonized with Radha Albicans. Rcx w/ MSSA, rare yeast. Wound cx with moderate candida albicans. Still with fevers overnight, F/u cultures and Fungitell. F/u AFB. Persistent lymphopenia noted  Metabolic: Daily BMP, mag, phos. Trend lytes and replace per protocol. Replace phos today   Endocrine:Q6 glucoses. SSI. Avoid hypoglycemia. Musc/Skin:  wound care   Partial Code   Discussed in interdisciplinary rounds  S/p Palliative Meeting 11/1/21 with myself and Palliative Care Team. Patient code status changed to Partial code today (only replace ETT if dislodged). Patient does not wish to have a trach. Family would like PEG tube replaced. Long-term would need a trach, but patient refuses. He has no way to protect his airway due to his inability to cough or clear secretions. Best practice :    Glycemic control  IHI ICU bundles:   Central Line Bundle Followed , Corcoran Bundle Followed and Vent Bundle Followed, Vent Day 5  [facility] Vent patients- VAP bundle, aim to keep peak plateau pressure [id number] H2O  Sress ulcer prophylaxis. DVT prophylaxis. Need for Lines, corcoran assessed. Palliative care evaluation. Restraints need. Attending Non-violent Restraint Reevaluation     I have reevaluated the patient one hour after initiation of intervention. The patient is comfortable, uninjured, but continues to pose an imminent risk of injury to self to themselves and/or serious disruption of medical treatment required to keep patient stable.      The patient's current medical and behavioral conditions that warrant the use intervention include danger to self and Interference with medical equipment or treatment. Restraint or seclusion will be discontinued at the earliest possible time, regardless of the scheduled expiration of the order. Based on my evaluation, restraints will be continued: YES    12:30 PM      Leigh Ann Valle MD       High complexity decision making was performed during this consultation and evaluation. [x]       Pt is at high risk for further organ failure and dysfunction.        Lizzie Urrutia AGACNP-BC  11/02/21  Pulmonary, Critical Care Medicine  [facility]

## 2021-11-02 NOTE — PROGRESS NOTES
Start of SBT, ps 10 tolerating well, sats 95% RR18, HR 94,   For diaphragmatic exercise not intended to extubabe at this time per Dr Fernando Galeana. Pt on pressors and running small fever today 38 Degrees.        11/02/21 1006   Weaning Parameters   Spontaneous Breathing Trial Complete Yes  (start SBT ps 10)   Resp Rate Observed 18   Ve 5.4      RSBI 57

## 2021-11-02 NOTE — PROGRESS NOTES
Infectious Disease progress Note        Reason: Right upper lobe cavitary pneumonia    Current abx Prior abx     Meropenem since 10/29  eraxis since 10/31 vancomycin 10/18-10/21  Levofloxacin 10/18-10/25  Piperacillin/tazobactam since 10/18/2021-10/29     Lines:       Assessment :    67 y.o. 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 intake, and worsening cough . Now with gram positive bacteremia, chronic cavitary lesion RUL with RUL/RLL infiltrates    Clinical presentation c/w acute on chronic hypoxic respiratory failure likely secondary to recurrent aspiration pneumonia, chronic cavitary lesion right upper lobe  Rule out superimposed infection/colonization with atypical mycobacteria    Sputum cx- mixed respiratory cristine, MSSA yeast  Yeast likely colonizer. Sputum AFB 10/19 negative    Pulmonary follow-up appreciated    Single positive blood culture for coagulase negative staph. On  10/18 is likely contamination    S/p peg tube placement 10/21/21    Now with worsening tachypnea, increased abdominal pain on 10/25/2021 likely secondary to displaced PEG tube. GI follow-up appreciated. Status post removal of PEG tube 10/26/2021. Evidence of sherif-PEG wound infection noted  No clinical or radiological evidence of worsening pneumonia    Unresponsiveness on 10/27/2021 status post intubation 10/27/2021. Pulled out ET tube, reintubated on 10/28/2021  Currently on 35% FiO2. Now with worsening hypotension requiring pressors on 10/29  Etiology of hypotension not entirely clear at this time- ? Cardiogenic versus partially treated sepsis  Concerns for PEG site infection when PEG tube removed 10/26.   Will broaden antibiotic coverage to cover for ESBL gram-negative still further information obtained  Peg site culture 10/29- candida albicans. Respiratory culture 10/30- yeast  No worsening erythema around recent PEG site. Now with persistent fevers T-max 102 since 11/1- ? Recurrent aspiration versus undiagnosed fungemia due to fluconazole resistant Candida versus abscess at site of recent peg- peg site hole is now sealed with surrounding induration  R/o progressive OSCAR infection    Recent broad-spectrum antibiotics, colonization with Candida puts patient at risk for fungemia. Restarted on pressors. Currently on 8 mics Levophed    Recommendations:    1. Continue meropenem. restart Eraxis. 2.  Follow-up blood culture 11/1 . F/u fungitell  3. obtain sputum for AFB x 2   4. Weaning from vent per ICU team  5. Obtain CT c/a/p with iv contrast to evaluate for parapneumonic effusion, abdominal abscess         Above plan was discussed in details with icu  Team, dr. Matt Betancourt. Please call me if any further questions or concerns. Will continue to participate in the care of this patient. HPI:    Intubated      Current Discharge Medication List      CONTINUE these medications which have NOT CHANGED    Details   acetylcysteine (MUCOMYST) 100 mg/mL (10 %) nebulizer solution Take 4 mL by inhalation every four (4) hours for 120 days. Qty: 720 mL, Refills: 3      fluorouraciL (EFUDEX) 5 % chemo cream APPLY CREAM TO FOREHEAD 2 TIMES DAILY FOR 2 WEEKS ONCE HEALED USE 2 TIMES DAILY TO THE EARS FOR 2 WEEKS ONCE HEALED USE 2 TIMES DAILY TO THE CHEEKS AND TEMPLES FOR 2 WEEKS      sodium chloride 3 % nebulizer solution 4 mL by Nebulization route four (4) times daily. Mix with albuterol. File under Medicare Part B. Dx: R06.02; J47.9; J84.9; R53.81  Qty: 480 mL, Refills: 5    Associated Diagnoses: Dyspnea on exertion; Bronchiectasis without complication ([address] ILD (interstitial lung disease) ([address] Chronic pulmonary aspiration, sequela;  Physical deconditioning      albuterol (PROVENTIL VENTOLIN) 2.5 mg /3 mL (0.083 %) nebu Mix with hypertonic saline nebulizer -- use 3- times per day. File under Medicare Part B. Dx: R06.02; J47.9; J84.9; R53.81  Qty: 360 Nebule, Refills: 3    Associated Diagnoses: Dyspnea on exertion; Bronchiectasis without complication ([facility] [address] ILD (interstitial lung disease) ([facility] [address] Chronic pulmonary aspiration, sequela; Physical deconditioning      guaiFENesin ER (MUCINEX) 600 mg ER tablet Take 1 Tablet by mouth two (2) times a day for 90 days. Qty: 180 Tablet, Refills: 3    Associated Diagnoses: Bronchiectasis without complication (HCC)      cholecalciferol (VITAMIN D3) (1000 Units /25 mcg) tablet Take 1,000 Units by mouth daily. FISH OIL-DHA-EPA PO Take 1 Tab by mouth daily. amino acids powd Take 1 Dose by mouth daily. OTHER Take 1 Cap by mouth daily. tumeric       fluticasone propionate (FLONASE NA) 1 Spray by Both Nostrils route daily. ascorbic acid, vitamin C, (VITAMIN C) 500 mg tablet Take 500 mg by mouth daily. multivitamin (ONE A DAY) tablet Take 1 Tab by mouth daily. aspirin delayed-release 81 mg tablet Take 81 mg by mouth daily.     Associated Diagnoses: Coronary artery disease involving native coronary artery of native heart without angina pectoris             Current Facility-Administered Medications   Medication Dose Route Frequency    sodium phosphate 9 mmol in 0.9% sodium chloride 250 mL infusion  9 mmol IntraVENous ONCE    fluconazole (DIFLUCAN) 400mg/200 mL IVPB (premix)  400 mg IntraVENous Q24H    NOREPINephrine (LEVOPHED) 8 mg in 5% dextrose 250mL (32 mcg/mL) infusion  0.5-50 mcg/min IntraVENous TITRATE    polyethylene glycol (MIRALAX) packet 17 g  17 g Per NG tube BID    senna-docusate (PERICOLACE) 8.6-50 mg per tablet 1 Tablet  1 Tablet Oral DAILY    meropenem (MERREM) 1 g in sterile water (preservative free) 20 mL IV syringe  1 g IntraVENous Q8H    famotidine (PF) (PEPCID) 20 mg in 0.9% sodium chloride 10 mL injection  20 mg IntraVENous Q12H    thiamine HCL (B-1) tablet 200 mg  200 mg Per G Tube DAILY    LORazepam (ATIVAN) injection 2 mg  2 mg IntraVENous Q15MIN PRN    HYDROmorphone (DILAUDID) syringe 1 mg  1 mg IntraVENous Q15MIN PRN    chlorhexidine (PERIDEX) 0.12 % mouthwash 10 mL  10 mL Oral Q12H    midazolam (VERSED) injection 2 mg  2 mg IntraVENous Q10MIN PRN    fentaNYL citrate (PF) injection 100 mcg  100 mcg IntraVENous Q30MIN PRN    fentaNYL (PF) 1,500 mcg/30 mL (50 mcg/mL) infusion  0-200 mcg/hr IntraVENous TITRATE    insulin lispro (HUMALOG) injection   SubCUTAneous Q6H    glucose chewable tablet 16 g  16 g Oral PRN    glucagon (GLUCAGEN) injection 1 mg  1 mg IntraMUSCular PRN    bacitracin zinc-polymyxin b (POLYSPORIN) 500-10,000 unit/gram ointment   Topical BID    naloxone (NARCAN) injection 0.4 mg  0.4 mg IntraVENous PRN    albuterol-ipratropium (DUO-NEB) 2.5 MG-0.5 MG/3 ML  3 mL Nebulization Q4H RT    sodium chloride 3% hypertonic nebulizer soln  4 mL Nebulization Q4HWA RT    Lactobacillus Acidoph & Bulgar (FLORANEX) tablet 2 Tablet  2 Tablet Per G Tube BID    guaiFENesin (ROBITUSSIN) 100 mg/5 mL oral liquid 400 mg  400 mg Per G Tube Q4H PRN    albuterol-ipratropium (DUO-NEB) 2.5 MG-0.5 MG/3 ML  3 mL Nebulization Q4H PRN    aspirin chewable tablet 81 mg  81 mg Per G Tube DAILY    cholecalciferol (VITAMIN D3) (1000 Units /25 mcg) tablet 1,000 Units  1,000 Units PEG Tube DAILY    acetaminophen (TYLENOL) tablet 650 mg  650 mg Per G Tube Q6H PRN    Or    acetaminophen (TYLENOL) suppository 650 mg  650 mg Rectal Q6H PRN    enoxaparin (LOVENOX) injection 40 mg  40 mg SubCUTAneous QHS    sodium chloride (NS) flush 5-10 mL  5-10 mL IntraVENous PRN    sodium chloride (NS) flush 5-40 mL  5-40 mL IntraVENous Q8H    sodium chloride (NS) flush 5-40 mL  5-40 mL IntraVENous PRN    omega-3 acid ethyl esters (LOVAZA) capsule 1,000 mg  1 g Oral DAILY WITH BREAKFAST    therapeutic multivitamin (THERAGRAN) tablet 1 Tablet  1 Tablet Oral DAILY     Facility-Administered Medications Ordered in Other Encounters   Medication Dose Route Frequency    propofoL (DIPRIVAN) 10 mg/mL injection   IntraVENous PRN       Allergies: Pollens extract    Family History   Problem Relation Age of Onset    No Known Problems Mother     Heart Disease Father     Heart Attack Father         1989    Coronary Artery Disease Father     Hypertension Father     High Cholesterol Father      Social History     Socioeconomic History    Marital status: SINGLE     Spouse name: Not on file    Number of children: Not on file    Years of education: Not on file    Highest education level: Not on file   Occupational History    Not on file   Tobacco Use    Smoking status: Never Smoker    Smokeless tobacco: Never Used   Vaping Use    Vaping Use: Never used   Substance and Sexual Activity    Alcohol use: No    Drug use: No    Sexual activity: Not on file   Other Topics Concern    Not on file   Social History Narrative    Not on file     Social Determinants of Health     Financial Resource Strain:     Difficulty of Paying Living Expenses:    Food Insecurity:     Worried About Running Out of Food in the Last Year:     Ran Out of Food in the Last Year:    Transportation Needs:     Lack of Transportation (Medical):      Lack of Transportation (Non-Medical):    Physical Activity:     Days of Exercise per Week:     Minutes of Exercise per Session:    Stress:     Feeling of Stress :    Social Connections:     Frequency of Communication with Friends and Family:     Frequency of Social Gatherings with Friends and Family:     Attends Islam Services:     Active Member of Clubs or Organizations:     Attends Club or Organization Meetings:     Marital Status:    Intimate Partner Violence:     Fear of Current or Ex-Partner:     Emotionally Abused:     Physically Abused:     Sexually Abused:      Social History     Tobacco Use   Smoking Status Never Smoker   Smokeless Tobacco Never Used        Temp (24hrs), Av °F (37.8 °C), Min:98.4 °F (36.9 °C), Max:102.2 °F (39 °C)    Visit Vitals  BP (!) 106/45   Pulse 77   Temp 98.4 °F (36.9 °C)   Resp 14   Ht 5' 7\" (1.702 m)   Wt 56.4 kg (124 lb 5.4 oz)   SpO2 95%   BMI 19.47 kg/m²       ROS: Unable to obtain due to patient factors    Physical Exam:    General:Thin  male laying on bed, intubated, alert    HEENT:  Normocephalic, atraumatic, no scleral icterus or pallor; no conjunctival hemmohage; ET tube in place; neck supple, no bruits. Lymph Nodes:   not examined   Lungs:   shallow breathing, less tachypneic, rhonchi right upper lung   Heart:  RRR, s1 and s2; no  rubs or gallops, no edema, + pedal pulses   Abdomen:  soft, non-distended, induration/tenderness around recent PEG site,no drainage from peg site,  no hepatomegaly, no splenomegaly.    Genitourinary:  deferred   Extremities:   no clubbing, cyanosis; no joint effusions or swelling;  muscle mass appropriate for age   Neurologic:   Sedated                        Skin:  No rash or ulcers noted   Back:  not examined     Psychiatric:   anxious         Labs: Results:   Chemistry Recent Labs     11/02/21  0330 11/01/21  0213 10/31/21  1140   * 129* 155*   * 133* 138   K 4.1 4.4 4.1   CL 99* 100 102   CO2 33* 32 33*   BUN 20* 23* 20*   CREA 0.48* 0.46* 0.44*   CA 8.2* 8.2* 7.8*   AGAP 2* 1* 3   BUCR 42* 50* 45*      CBC w/Diff Recent Labs     21  0330 11/01/21  0213 10/31/21  1140   WBC 11.4 9.7 12.2   RBC 3.27* 3.56* 3.59*   HGB 8.6* 9.4* 9.4*   HCT 28.2* 30.5* 31.2*    192 219   GRANS 90* 89* 89*   LYMPH 2* 3* 2*   EOS 1 1 2      Microbiology Recent Labs     21  0932 21  0213 10/30/21  1230   CULT NO GROWTH AFTER 20 HOURS NO GROWTH 1 DAY SCANT YEAST, (APPARENT CANDIDA ALBICANS)*  NO NORMAL RESPIRATORY LEROY ISOLATED          RADIOLOGY:    All available imaging studies/reports in [facility] for this admission were reviewed    High complexity decision making was performed during the evaluation of this patient at high risk for decompensation with multiple organ involvement         Disclaimer: Sections of this note are dictated utilizing voice recognition software, which may have resulted in some phonetic based errors in grammar and contents. Even though attempts were made to correct all the mistakes, some may have been missed, and remained in the body of the document. If questions arise, please contact our department.     Dr. Dio Huggins, Infectious Disease Specialist  [phone]  November 2, 2021  3:25 PM

## 2021-11-02 NOTE — INTERDISCIPLINARY ROUNDS
[facility]  Pulmonary, Critical Care, and Sleep Medicine  Interdisciplinary and Ventilator Weaning Rounds    Patient discussed in morning walking rounds and interdisciplinary rounds. ICU day: 10/27/21     Overnight events:    Febrile overnight, Tmax 102. Tylenol given     Fentanyl gtt increased    Remains on Levophed for hypotension     Assessments and best practice:   Ventilator  o Ventilator day: 10/28/21   o Vent settings: FiO2 of 40 and PEEP of 5  o VAP bundle, aim to keep peak plateau pressure 52-31RI H2O  o Weaning assessed and documented   - Patient does meet criteria for SBT. - Patient will be placed sedation holiday. - Plan to wean with PS n/a.  - Outcome: Pending [facility] discussion   - Final plan: extubated if SBT tolerated    Sedation  o Fentanyl    Other pertinent drips  o Levophed    Lines noted  o Femoral CVL , left radial arterial line, OGT, Corcoran cath, PIV    Critical labs assessed  o Yes   Antibiotics  o Merrem, Diflucan    Medications reviewed  o Yes   Pending imaging  o none   Pending send out labs  o No   Pending Procedures  o PEG   Glycemic control   Stress ulcer prophylaxis. o Pepcid   DVT prophylaxis. o Lovenox   Need for Lines, corcoran assessed.  o Yes   Restraint Reevaluation   o Yes  o I have reevaluated the patient one hour after initiation of intervention. The patient is comfortable, uninjured, but continues to pose an imminent risk of injury to self to themselves and/or serious disruption of medical treatment required to keep patient stable. The patient's current medical and behavioral conditions that warrant the use intervention include danger to self and Interference with medical equipment or treatment. Restraint or seclusion will be discontinued at the earliest possible time, regardless of the scheduled expiration of the order.  Based on my evaluation, restraints will be continued: YES 11/2/21 0900  o Attending Overseeing restraints: Ana Maria Espinal Elvera Fleischer, MD     Family contact/MPOA: Indy Wade (sister) [phone]   Family updated will update today       Daily Plans:   Needs replacement PEG tube, GI to eval    Phos replacement    Decrease sedation    SBT today    GOC discussion, compassionate extubation vs. trach    CONNOR time 15 minutes        Maggie Francisco NP  11/02/21  Pulmonary, Critical Care Medicine  [facility]

## 2021-11-02 NOTE — PROGRESS NOTES
11/02/21 1502   Weaning Parameters   Spontaneous Breathing Trial Complete Yes  (COMPLETE, HIGH , RR40 and aggitated)   Resp Rate Observed 40   Ve 9      RSBI 100   NIF   (ps 10)   end of SBT high Hr 118, RR 40 and pt very aggitated, sats 87%

## 2021-11-02 NOTE — ROUTINE PROCESS
Bedside and Verbal shift change report given to Zhen Farley (oncoming nurse) by Jennie Parks RN (offgoing nurse). Report included the following information SBAR, Kardex, MAR and Recent Results.     SITUATION:    Code Status: Partial Code   Reason for Admission: Pneumonia [J18.9]    [facility] day: 15   Problem List:       Hospital Problems  Date Reviewed: 12/22/2020        Codes Class Noted POA    Gastrostomy complication ([facility] [address] [facility]-09-AW: K94.20  ICD-9-CM: 536.40  10/27/2021 Unknown        Severe protein-calorie malnutrition ([facility] [address] ICD-10-CM: E43  ICD-9-CM: 262  10/20/2021 Unknown        Pneumonia ICD-10-CM: J18.9  ICD-9-CM: 890  10/18/2021 Unknown              BACKGROUND:    Past Medical History:   Past Medical History:   Diagnosis Date    CAD (coronary artery disease)     MI 1995; stent 2003 (done preop cochlear sgy)    Cancer ([facility] [address]     SQUAMOUS CELL    Coronary artery disease     Deafness     Myocardial infarction [facility]) [id number]    Scarlet fever age 9         Patient taking anticoagulants yes     ASSESSMENT:    Changes in Assessment Throughout Shift: Yes     Patient has Central Line: yes Reasons if yes: Access required   Patient has Merlos Cath: yes Reasons if yes: Critically ill      Last Vitals:     Vitals:    11/02/21 0300 11/02/21 0330 11/02/21 0338 11/02/21 0400   BP:    (!) 106/45   Pulse: 78 72 73 81   Resp: 28 19 14 14   Temp:    98.4 °F (36.9 °C)   SpO2: 93% 94% 94% 95%   Weight:       Height:            IV and DRAINS (will only show if present)   Peripheral IV 10/23/21 Anterior;Right Forearm-Site Assessment: Clean, dry, & intact  [REMOVED] Airway - Endotracheal Tube 10/27/21 Oral-Site Assessment: Clean, dry, & intact  Peripheral IV 10/27/21 Right Other(comment)-Site Assessment: Clean, dry, & intact  Triple Lumen 10/27/21 Right Femoral-Site Assessment: Intact, Bleeding  [REMOVED] Orogastric Tube 10/27/21-Site Assessment: Clean, dry, & intact  Arterial Line 10/27/21 Left Radial artery-Site Assessment: Clean, dry, & intact  Airway - Endotracheal Tube 10/28/21-Site Assessment: Clean, dry, & intact  Orogastric Tube 10/28/21-Site Assessment: Clean, dry, & intact  [REMOVED] Peripheral IV 10/18/21 Anterior;Left;Proximal Forearm-Site Assessment: Clean, dry, & intact  [REMOVED] PEG/Gastrostomy Tube 10/21/21-Site Assessment: Clean, dry, & intact  [REMOVED] Peripheral IV 10/22/21 Anterior; Left Forearm-Site Assessment: Clean, dry, & intact     WOUND (if present)   Wound Type:  Excoriation    Dressing present Dressing Present : Intact, not due to be changed, Yes   Wound Concerns/Notes:  none     PAIN    Pain Assessment    Pain Intensity 1: 0 (11/02/21 0400)    Pain Location 1: Abdomen    Pain Intervention(s) 1: Medication (see MAR)    Patient Stated Pain Goal: 0  o Interventions for Pain:  none  o Intervention effective: no  o Time of last intervention: N/A   o Reassessment Completed: no      Last 3 Weights:  Last 3 Recorded Weights in this Encounter    10/29/21 0854 10/30/21 2145 10/31/21 2103   Weight: 56 kg (123 lb 7.3 oz) 58.1 kg (128 lb 1.4 oz) 56.4 kg (124 lb 5.4 oz)     Weight change:      INTAKE/OUPUT    Current Shift: No intake/output data recorded. Last three shifts: 10/31 1901 - 11/02 0700  In: 3318 [I.V.:900]  Out: 2220 [Urine:2220]     LAB RESULTS     Recent Labs     11/02/21  0330 11/01/21  0213 10/31/21  1140   WBC 11.4 9.7 12.2   HGB 8.6* 9.4* 9.4*   HCT 28.2* 30.5* 31.2*    192 219        Recent Labs     11/02/21  0330 11/01/21  0213 10/31/21  1140   * 133* 138   K 4.1 4.4 4.1   * 129* 155*   BUN 20* 23* 20*   CREA 0.48* 0.46* 0.44*   CA 8.2* 8.2* 7.8*   MG 2.1 2.1 2.2       RECOMMENDATIONS AND DISCHARGE PLANNING     1. Pending tests/procedures/ Plan of Care or Other Needs: N/A     2. Discharge plan for patient and Needs/Barriers: TBD    3. Estimated Discharge Date: TBD Posted on Whiteboard in Patients Room: no      4.  The patient's care plan was reviewed with the oncoming nurse. \"HEALS\" SAFETY CHECK      Fall Risk    Total Score: 2    Safety Measures: Safety Measures: Bed/Chair alarm on, Bed/Chair-Wheels locked, Bed in low position, Call light within reach, Restraints    A safety check occurred in the patient's room between off going nurse and oncoming nurse listed above. The safety check included the below items  Area Items   H  High Alert Medications - Verify all high alert medication drips (heparin, PCA, etc.)   E  Equipment - Suction is set up for ALL patients (with deisi)  - Red plugs utilized for all equipment (IV pumps, etc.)  - WOWs wiped down at end of shift.  - Room stocked with oxygen, suction, and other unit-specific supplies   A  Alarms - Bed alarm is set for fall risk patients  - Ensure chair alarm is in place and activated if patient is up in a chair   L  Lines - Check IV for any infiltration  - Merlos bag is empty if patient has a Merlos   - Tubing and IV bags are labeled   S  Safety   - Room is clean, patient is clean, and equipment is clean. - Hallways are clear from equipment besides carts. - Fall bracelet on for fall risk patients  - Ensure room is clear and free of clutter  - Suction is set up for ALL patients (with deisi)  - Hallways are clear from equipment besides carts.    - Isolation precautions followed, supplies available outside room, sign posted     Yesy Ty RN

## 2021-11-02 NOTE — PROGRESS NOTES
[email or web address]  [phone]    Gastroenterology follow up-Progress note    Impression:  1. s/p PEG 10/20, dislodged and removed 10/26/  2. Sherif peg wound infection - purulent discharge expressed with light pressure, Improving per RN report  3. Oropharyngeal dysphagia - severe  4. Intubated - Acute hypoxic respiratory failure: Combination of aspiration pneumonia, bronchiectasis, ILD, hypoventilation, and now superimposed pulmonary edema with pleural effusions.  Also component of splinting secondary to peritoneal inflammation from dislodged PEG tube  5. Acute pulmonary edema: A combination of worsening cardiomyopathy as well as hypoalbuminemia  6. Aspiration pneumonia with severe sepsis  7 Cavitary lung lesion with infected bullae  8. Gram pos cocci bacteremia    Plan:  1. Recommend waiting to replace PEG once sherif peg wound infection has improved further. GI to sign off, please monitor infection and call GI back once improved. Chief Complaint: Dysphagia, PEG placement      Subjective:  Seen earlier this admission when PEG placed by Dr. Darwin Block 10/21, PEG later dislodged and was in subcutaneous tissue, removed on 10/26. PEG site continues to leak purulent discharge. Seen w/ RN at bedside. Recommend monitoring for continued wound healing, consider PEG next week once purulence has resolved. Discussed w/ NP Joanna Borer. ROS: Denies any fevers, chills, rash. General: cachetic, intubated, chronically ill appearing  Eyes: conjunctiva normal, EOM normal  Cardiovascular: heart normal, intact distal pulses, normal rate and regular rhythm  Pulmonary: breath sounds normal and effort normal  Abdominal: PEG site in LUQ w/ small amount non-bloody purulent discharge on dressing, additional discharge expressed w/ light pressure lateral to wound.     Patient Active Problem List   Diagnosis Code    Coronary artery disease involving native coronary artery of native heart without angina pectoris,s/p stent 2003 I25.10    Scarlet fever A38.9    [facility] I21.9    Urinary frequency R35.0    Pneumonia J18.9    Goals of care, counseling/discussion Z71.89    Acute respiratory failure with hypoxia (HCC) J96.01    Debility R53.81    Severe protein-calorie malnutrition ([facility] E43    Gastrostomy complication (HCC) N23.18         Visit Vitals  BP (!) 102/58   Pulse 94   Temp 99.7 °F (37.6 °C)   Resp 20   Ht 5' 7\" (1.702 m)   Wt 56.4 kg (124 lb 5.4 oz)   SpO2 94%   BMI 19.47 kg/m²           Intake/Output Summary (Last 24 hours) at 11/2/2021 1302  Last data filed at 11/2/2021 1210  Gross per 24 hour   Intake 1660.25 ml   Output 1490 ml   Net 170.25 ml       CBC w/Diff    Lab Results   Component Value Date/Time    WBC 11.4 11/02/2021 03:30 AM    RBC 3.27 (L) 11/02/2021 03:30 AM    HGB 8.6 (L) 11/02/2021 03:30 AM    HCT 28.2 (L) 11/02/2021 03:30 AM    MCV 86.2 11/02/2021 03:30 AM    MCH 26.3 11/02/2021 03:30 AM    MCHC 30.5 (L) 11/02/2021 03:30 AM    RDW 13.8 11/02/2021 03:30 AM     11/02/2021 03:30 AM    Lab Results   Component Value Date/Time    GRANS 90 (H) 11/02/2021 03:30 AM    LYMPH 2 (L) 11/02/2021 03:30 AM    EOS 1 11/02/2021 03:30 AM    BASOS 0 11/02/2021 03:30 AM      Basic Metabolic Profile   Recent Labs     11/02/21  0330   *   K 4.1   CL 99*   CO2 33*   BUN 20*   CA 8.2*   MG 2.1   PHOS 2.0*        Hepatic Function    Lab Results   Component Value Date/Time    ALB 1.9 (L) 10/27/2021 02:46 PM    TP 5.4 (L) 10/27/2021 02:46 PM    AP 30 (L) 10/27/2021 02:46 PM    No results found for: TBIL       Coags   No results for input(s): PTP, INR, APTT, INREXT in the last 72 hours. LEXUS Luna  11/02/21, 1:30 PM   [facility]. [employer]  Phone: [phone]  Pager: [phone]

## 2021-11-03 NOTE — PROGRESS NOTES
Problem: Falls - Risk of  Goal: *Absence of Falls  Description: Document Annabelle Lowry Fall Risk and appropriate interventions in the flowsheet.   Outcome: Progressing Towards Goal  Note: Fall Risk Interventions:  Mobility Interventions: Bed/chair exit alarm    Mentation Interventions: Bed/chair exit alarm    Medication Interventions: Bed/chair exit alarm    Elimination Interventions: Bed/chair exit alarm              Problem: Non-Violent Restraints  Goal: Removal from restraints as soon as assessed to be safe  Outcome: Progressing Towards Goal  Goal: No harm/injury to patient while restraints in use  Outcome: Progressing Towards Goal  Goal: Patient's dignity will be maintained  Outcome: Progressing Towards Goal  Goal: Patient Interventions  Outcome: Progressing Towards Goal

## 2021-11-03 NOTE — PROGRESS NOTES
Reviewed chart. Patient remains intubated and on vent. CM will continue to monitor and assist with transition of care needs.       SHEILA Guadalupe, RN  Pager # [phone]  Care Manager

## 2021-11-03 NOTE — ROUTINE PROCESS
Bedside and Verbal shift change report given to [facility] (oncoming nurse) by Farooq Coleman RN (offgoing nurse). Report included the following information SBAR, Kardex, MAR and Recent Results.     SITUATION:    Code Status: Partial Code   Reason for Admission: Pneumonia [J18.9]    [facility] day: 12   Problem List:       Hospital Problems  Date Reviewed: 12/22/2020          Codes Class Noted POA    Gastrostomy complication ([facility]-06-WK: K94.20  ICD-9-CM: 536.40  10/27/2021 Unknown        Severe protein-calorie malnutrition ([facility] ICD-10-CM: E43  ICD-9-CM: 262  10/20/2021 Unknown        Pneumonia ICD-10-CM: J18.9  ICD-9-CM: 319  10/18/2021 Unknown              BACKGROUND:    Past Medical History:   Past Medical History:   Diagnosis Date    CAD (coronary artery disease)     MI 1995; stent 2003 (done preop cochlear sgy)    Cancer ([facility]     SQUAMOUS CELL    Coronary artery disease     Deafness     Myocardial infarction [facility]) [phone]    Scarlet fever age 9         Patient taking anticoagulants yes     ASSESSMENT:    Changes in Assessment Throughout Shift: No     Patient has Central Line: yes Reasons if yes: Access   Patient has Merlos Cath: yes Reasons if yes: Critically ill, Retention      Last Vitals:     Vitals:    11/03/21 0452 11/03/21 0522 11/03/21 0552 11/03/21 0622   BP: (!) 112/56 (!) 91/47 (!) 89/51    Pulse: 97 96 92 86   Resp: 16 15 15 14   Temp:       SpO2: 95% 92% 92% 92%   Weight:       Height:            IV and DRAINS (will only show if present)   Peripheral IV 10/23/21 Anterior;Right Forearm-Site Assessment: Clean, dry, & intact  [REMOVED] Airway - Endotracheal Tube 10/27/21 Oral-Site Assessment: Clean, dry, & intact  Peripheral IV 10/27/21 Right Other(comment)-Site Assessment: Clean, dry, & intact  Triple Lumen 10/27/21 Right Femoral-Site Assessment: Clean, dry, & intact  [REMOVED] Orogastric Tube 10/27/21-Site Assessment: Clean, dry, & intact  [REMOVED] Arterial Line 10/27/21 Left Radial artery-Site Assessment: Clean, dry, & intact  Airway - Endotracheal Tube 10/28/21-Site Assessment: Clean, dry, & intact  Orogastric Tube 10/28/21-Site Assessment: Clean, dry, & intact  [REMOVED] Peripheral IV 10/18/21 Anterior;Left;Proximal Forearm-Site Assessment: Clean, dry, & intact  [REMOVED] PEG/Gastrostomy Tube 10/21/21-Site Assessment: Clean, dry, & intact  [REMOVED] Peripheral IV 10/22/21 Anterior; Left Forearm-Site Assessment: Clean, dry, & intact     WOUND (if present)   Wound Type:  Yes   Dressing present Dressing Present : Intact, not due to be changed, Yes   Wound Concerns/Notes:  none     PAIN    Pain Assessment    Pain Intensity 1: 0 (11/03/21 0445)    Pain Location 1: Abdomen    Pain Intervention(s) 1: Medication (see MAR)    Patient Stated Pain Goal: 0  o Interventions for Pain:  none  o Intervention effective: no  o Time of last intervention: N/A   o Reassessment Completed: no      Last 3 Weights:  Last 3 Recorded Weights in this Encounter    10/29/21 0854 10/30/21 2145 10/31/21 2103   Weight: 56 kg (123 lb 7.3 oz) 58.1 kg (128 lb 1.4 oz) 56.4 kg (124 lb 5.4 oz)     Weight change:      INTAKE/OUPUT    Current Shift: No intake/output data recorded. Last three shifts: 11/01 1901 - 11/03 0700  In: 4932.7 [I.V.:1077.7]  Out: 1990 [Urine:1990]     LAB RESULTS     Recent Labs     11/03/21  0300 11/02/21 0330 11/01/21 0213   WBC 7.7 11.4 9.7   HGB 9.1* 8.6* 9.4*   HCT 30.3* 28.2* 30.5*    249 192        Recent Labs     11/03/21  0300 11/02/21 0330 11/01/21 0213   * 134* 133*   K 4.5 4.1 4.4   * 186* 129*   BUN 18 20* 23*   CREA 0.32* 0.48* 0.46*   CA 8.2* 8.2* 8.2*   MG 2.1 2.1 2.1       RECOMMENDATIONS AND DISCHARGE PLANNING     1. Pending tests/procedures/ Plan of Care: TBD     2. Discharge plan for patient and Needs/Barriers: TBD    3. Estimated Discharge Date: TBD Posted on Whiteboard in Patients Room: no      4.  The patient's care plan was reviewed with the oncoming nurse. \"HEALS\" SAFETY CHECK      Fall Risk    Total Score: 3    Safety Measures: Safety Measures: Bed/Chair alarm on, Bed/Chair-Wheels locked, Bed in low position, Call light within reach, Fall prevention (comment), Side rails X 3, Restraints    A safety check occurred in the patient's room between off going nurse and oncoming nurse listed above. The safety check included the below items  Area Items   H  High Alert Medications - Verify all high alert medication drips (heparin, PCA, etc.)   E  Equipment - Suction is set up for ALL patients (with deisi)  - Red plugs utilized for all equipment (IV pumps, etc.)  - WOWs wiped down at end of shift.  - Room stocked with oxygen, suction, and other unit-specific supplies   A  Alarms - Bed alarm is set for fall risk patients  - Ensure chair alarm is in place and activated if patient is up in a chair   L  Lines - Check IV for any infiltration  - Merlos bag is empty if patient has a Merlos   - Tubing and IV bags are labeled   S  Safety   - Room is clean, patient is clean, and equipment is clean. - Hallways are clear from equipment besides carts. - Fall bracelet on for fall risk patients  - Ensure room is clear and free of clutter  - Suction is set up for ALL patients (with deisi)  - Hallways are clear from equipment besides carts.    - Isolation precautions followed, supplies available outside room, sign posted     Alexis Hernandez RN

## 2021-11-03 NOTE — PROGRESS NOTES
attended the interdisciplinary rounds for Aura Sandra, who is a 67 y. o.,male. Patients Primary Language is: [place]. According to the patients EMR Holiness Affiliation is: Syn[employer]. The reason the Patient came to the hospital is:   Patient Active Problem List    Diagnosis Date Noted    Gastrostomy complication ([facility] 05/49/5039    Severe protein-calorie malnutrition ([facility] 10/20/2021    Goals of care, counseling/discussion     Acute respiratory failure with hypoxia ([facility]     Debility     Pneumonia 10/18/2021    Urinary frequency 06/30/2016    Scarlet fever     [facility] infarction [facility])     Coronary artery disease involving native coronary artery of native heart without angina pectoris,s/p stent 2003 05/19/2016        Plan:  Jens Ordonez will continue to follow and will provide pastoral care on an as needed/requested basis.  recommends bedside caregivers page  on duty if patient shows signs of acute spiritual or emotional distress.     [address] [facility]  Board Certified [address] [facility]   [phone]

## 2021-11-03 NOTE — PROGRESS NOTES
Problem: Patient Education: Go to Patient Education Activity  Goal: Patient/Family Education  Outcome: Progressing Towards Goal     Problem: Ventilator Management  Goal: *Adequate oxygenation and ventilation  Outcome: Progressing Towards Goal

## 2021-11-03 NOTE — PROGRESS NOTES
Nutrition Note    Remains intubated, tolerating tube feeds at goal of 50 mL/hr via OGT. Phos of 1.9 replaced with 20 mmol Na Phos today. GI signed off, recommending waiting to replace PEG once sherif PEG wound infection has improved further. Loose BM 11/2 (previously 10/30 requiring bowel regimen, miralax and pericolace). Hyponatremia noted. Nutrition Recommendations/Plan:    - Continue tube feeding of Jevity 1.5 at goal rate of 50 mL/hr, decrease water flushes to 50 mL q 4 hour water flushes via OGT (goal regimen to provide 1800 kcal, 77 gm protein, 912 mL free water, 100% RDIs).   - Electrolytes actively being replaced per MD.    Electronically signed by Nenita Eduardo RD on 11/3/2021 at 11:05 AM    Contact: [phone]

## 2021-11-03 NOTE — INTERDISCIPLINARY ROUNDS
[employer] [facility]  Pulmonary, Critical Care, and Sleep Medicine  Interdisciplinary and Ventilator Weaning Rounds    Patient discussed in morning walking rounds and interdisciplinary rounds. ICU day: 10/27/21     Overnight events:    Agitated overnight. Required PRN pushes of IV sedaton    Did not go for CT A/P     Assessments and best practice:   Ventilator  o Ventilator day: 10/28/21   o Vent settings: FiO2 of 40 and PEEP of 5  o VAP bundle, aim to keep peak plateau pressure 81-48AN H2O  o Weaning assessed and documented   - Patient does meet criteria for SBT. - Patient will be placed sedation holiday. - Plan to wean with PS n/a.  - Outcome: Pending [facility] discussion   - Final plan: extubated if SBT tolerated    Sedation  o Fentanyl    Other pertinent drips  o Levophed    Lines noted  o Femoral CVL ,  OGT, Corcoran cath, PIV    Critical labs assessed  o Yes   Antibiotics  o Merrem, Eraxis     Medications reviewed  o Yes   Pending imaging  o none   Pending send out labs  o No   Pending Procedures  o PEG, pending clearing of current infection surrounding old PEG site    Glycemic control  o SSI    Stress ulcer prophylaxis. o Pepcid   DVT prophylaxis. o Lovenox   Need for Lines, corcoran assessed.  o Yes   Restraint Reevaluation   o Yes  o I have reevaluated the patient one hour after initiation of intervention. The patient is comfortable, uninjured, but continues to pose an imminent risk of injury to self to themselves and/or serious disruption of medical treatment required to keep patient stable. The patient's current medical and behavioral conditions that warrant the use intervention include danger to self and Interference with medical equipment or treatment. Restraint or seclusion will be discontinued at the earliest possible time, regardless of the scheduled expiration of the order.  Based on my evaluation, restraints will be continued: YES 11/3/21 7:52 AM  o Joshua Echevarria restraints: Marleni Farr MD     Family contact/MPOA: Indy Wade (sister) [phone]   Family updated will update today       Daily Plans:   Needs replacement PEG tube, pending clearing of former PEG site infection    Phos replacement    Decrease sedation    SBT today    Still needs CT A/P   F/u Fungitell    GOC discussion, compassionate extubation vs. trach    CONNOR time 20 minutes        Dawn Mccrary NP  11/03/21  Pulmonary, [address] [facility]

## 2021-11-03 NOTE — PROGRESS NOTES
Infectious Disease progress Note        Reason: Right upper lobe cavitary pneumonia    Current abx Prior abx     Meropenem since 10/29  eraxis since 10/31 vancomycin 10/18-10/21  Levofloxacin 10/18-10/25  Piperacillin/tazobactam since 10/18/2021-10/29     Lines:       Assessment :    67 y.o. 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 intake, and worsening cough . Now with gram positive bacteremia, chronic cavitary lesion RUL with RUL/RLL infiltrates    Clinical presentation c/w acute on chronic hypoxic respiratory failure likely secondary to recurrent aspiration pneumonia, chronic cavitary lesion right upper lobe  Rule out superimposed infection/colonization with atypical mycobacteria    Sputum cx- mixed respiratory cristine, MSSA yeast  Yeast likely colonizer. Sputum AFB 10/19 negative    Pulmonary follow-up appreciated    Single positive blood culture for coagulase negative staph. On  10/18 is likely contamination    S/p peg tube placement 10/21/21    Now with worsening tachypnea, increased abdominal pain on 10/25/2021 likely secondary to displaced PEG tube. GI follow-up appreciated. Status post removal of PEG tube 10/26/2021. Evidence of sherif-PEG wound infection noted  No clinical or radiological evidence of worsening pneumonia    Unresponsiveness on 10/27/2021 status post intubation 10/27/2021. Pulled out ET tube, reintubated on 10/28/2021  Currently on 35% FiO2. Now with worsening hypotension requiring pressors on 10/29  Etiology of hypotension not entirely clear at this time- ? Cardiogenic versus partially treated sepsis  Concerns for PEG site infection when PEG tube removed 10/26.   Will broaden antibiotic coverage to cover for ESBL gram-negative still further information obtained  Peg site culture 10/29- candida albicans. Respiratory culture 10/30- yeast  No worsening erythema around recent PEG site. Now with persistent fevers T-max 102 since 11/1- ? Recurrent aspiration versus undiagnosed fungemia due to fluconazole resistant Candida versus abscess at site of recent peg- peg site hole is now sealed with surrounding induration versus drug fever  R/o progressive OSCAR infection    Recent broad-spectrum antibiotics, colonization with Candida puts patient at risk for fungemia. Restarted on pressors. Currently on 5 mics Levophed    Persistent fevers despite broad-spectrum antibiotics, antifungals. CT scan ordered 11/2 not done. Recommendations:    1. Continue meropenem,  Eraxis. 2.  Follow-up blood culture 11/1 . F/u fungitell  3. obtain sputum for AFB x 2   4. Weaning from vent per ICU team  5. Obtain CT c/a/p with iv contrast to evaluate for parapneumonic effusion, abdominal abscess-discussed with RN, ICU nursing supervisor         Above plan was discussed in details with icu  Team, dr. Kary Mcintosh. Please call me if any further questions or concerns. Will continue to participate in the care of this patient. HPI:    Intubated      Current Discharge Medication List      CONTINUE these medications which have NOT CHANGED    Details   acetylcysteine (MUCOMYST) 100 mg/mL (10 %) nebulizer solution Take 4 mL by inhalation every four (4) hours for 120 days. Qty: 720 mL, Refills: 3      fluorouraciL (EFUDEX) 5 % chemo cream APPLY CREAM TO FOREHEAD 2 TIMES DAILY FOR 2 WEEKS ONCE HEALED USE 2 TIMES DAILY TO THE EARS FOR 2 WEEKS ONCE HEALED USE 2 TIMES DAILY TO THE CHEEKS AND TEMPLES FOR 2 WEEKS      sodium chloride 3 % nebulizer solution 4 mL by Nebulization route four (4) times daily. Mix with albuterol. File under Medicare Part B.   Dx: R06.02; J47.9; J84.9; R53.81  Qty: 480 mL, Refills: 5    Associated Diagnoses: Dyspnea on exertion; Bronchiectasis without complication ([address] ILD (interstitial lung disease) ([address] Chronic pulmonary aspiration, sequela; Physical deconditioning      albuterol (PROVENTIL VENTOLIN) 2.5 mg /3 mL (0.083 %) nebu Mix with hypertonic saline nebulizer -- use 3- times per day. File under Medicare Part B. Dx: R06.02; J47.9; J84.9; R53.81  Qty: 360 Nebule, Refills: 3    Associated Diagnoses: Dyspnea on exertion; Bronchiectasis without complication ([facility] [address] ILD (interstitial lung disease) ([facility] [address] Chronic pulmonary aspiration, sequela; Physical deconditioning      guaiFENesin ER (MUCINEX) 600 mg ER tablet Take 1 Tablet by mouth two (2) times a day for 90 days. Qty: 180 Tablet, Refills: 3    Associated Diagnoses: Bronchiectasis without complication (HCC)      cholecalciferol (VITAMIN D3) (1000 Units /25 mcg) tablet Take 1,000 Units by mouth daily. FISH OIL-DHA-EPA PO Take 1 Tab by mouth daily. amino acids powd Take 1 Dose by mouth daily. OTHER Take 1 Cap by mouth daily. tumeric       fluticasone propionate (FLONASE NA) 1 Spray by Both Nostrils route daily. ascorbic acid, vitamin C, (VITAMIN C) 500 mg tablet Take 500 mg by mouth daily. multivitamin (ONE A DAY) tablet Take 1 Tab by mouth daily. aspirin delayed-release 81 mg tablet Take 81 mg by mouth daily.     Associated Diagnoses: Coronary artery disease involving native coronary artery of native heart without angina pectoris             Current Facility-Administered Medications   Medication Dose Route Frequency    sodium phosphate 20 mmol in 0.9% sodium chloride 250 mL infusion   IntraVENous ONCE    anidulafungin (ERAXIS) 100 mg in 0.9% sodium chloride 130 mL IVPB  100 mg IntraVENous Q24H    NOREPINephrine (LEVOPHED) 8 mg in 5% dextrose 250mL (32 mcg/mL) infusion  0.5-50 mcg/min IntraVENous TITRATE    polyethylene glycol (MIRALAX) packet 17 g  17 g Per NG tube BID    senna-docusate (PERICOLACE) 8.6-50 mg per tablet 1 Tablet  1 Tablet Oral DAILY    meropenem (MERREM) 1 g in sterile water (preservative free) 20 mL IV syringe 1 g IntraVENous Q8H    famotidine (PF) (PEPCID) 20 mg in 0.9% sodium chloride 10 mL injection  20 mg IntraVENous Q12H    thiamine HCL (B-1) tablet 200 mg  200 mg Per G Tube DAILY    LORazepam (ATIVAN) injection 2 mg  2 mg IntraVENous Q15MIN PRN    HYDROmorphone (DILAUDID) syringe 1 mg  1 mg IntraVENous Q15MIN PRN    chlorhexidine (PERIDEX) 0.12 % mouthwash 10 mL  10 mL Oral Q12H    midazolam (VERSED) injection 2 mg  2 mg IntraVENous Q10MIN PRN    fentaNYL citrate (PF) injection 100 mcg  100 mcg IntraVENous Q30MIN PRN    fentaNYL (PF) 1,500 mcg/30 mL (50 mcg/mL) infusion  0-200 mcg/hr IntraVENous TITRATE    insulin lispro (HUMALOG) injection   SubCUTAneous Q6H    glucose chewable tablet 16 g  16 g Oral PRN    glucagon (GLUCAGEN) injection 1 mg  1 mg IntraMUSCular PRN    bacitracin zinc-polymyxin b (POLYSPORIN) 500-10,000 unit/gram ointment   Topical BID    naloxone (NARCAN) injection 0.4 mg  0.4 mg IntraVENous PRN    albuterol-ipratropium (DUO-NEB) 2.5 MG-0.5 MG/3 ML  3 mL Nebulization Q4H RT    sodium chloride 3% hypertonic nebulizer soln  4 mL Nebulization Q4HWA RT    Lactobacillus Acidoph & Bulgar (FLORANEX) tablet 2 Tablet  2 Tablet Per G Tube BID    guaiFENesin (ROBITUSSIN) 100 mg/5 mL oral liquid 400 mg  400 mg Per G Tube Q4H PRN    albuterol-ipratropium (DUO-NEB) 2.5 MG-0.5 MG/3 ML  3 mL Nebulization Q4H PRN    aspirin chewable tablet 81 mg  81 mg Per G Tube DAILY    cholecalciferol (VITAMIN D3) (1000 Units /25 mcg) tablet 1,000 Units  1,000 Units PEG Tube DAILY    acetaminophen (TYLENOL) tablet 650 mg  650 mg Per G Tube Q6H PRN    Or    acetaminophen (TYLENOL) suppository 650 mg  650 mg Rectal Q6H PRN    enoxaparin (LOVENOX) injection 40 mg  40 mg SubCUTAneous QHS    sodium chloride (NS) flush 5-10 mL  5-10 mL IntraVENous PRN    sodium chloride (NS) flush 5-40 mL  5-40 mL IntraVENous Q8H    sodium chloride (NS) flush 5-40 mL  5-40 mL IntraVENous PRN    omega-3 acid ethyl esters (LOVAZA) capsule 1,000 mg  1 g Oral DAILY WITH BREAKFAST    therapeutic multivitamin (THERAGRAN) tablet 1 Tablet  1 Tablet Oral DAILY       Allergies: Pollens extract    Family History   Problem Relation Age of Onset    No Known Problems Mother     Heart Disease Father     Heart Attack Father         1989    Coronary Art Dis Father     Hypertension Father     High Cholesterol Father      Social History     Socioeconomic History    Marital status: SINGLE     Spouse name: Not on file    Number of children: Not on file    Years of education: Not on file    Highest education level: Not on file   Occupational History    Not on file   Tobacco Use    Smoking status: Never Smoker    Smokeless tobacco: Never Used   Vaping Use    Vaping Use: Never used   Substance and Sexual Activity    Alcohol use: No    Drug use: No    Sexual activity: Not on file   Other Topics Concern    Not on file   Social History Narrative    Not on file     Social Determinants of Health     Financial Resource Strain:     Difficulty of Paying Living Expenses: Not on file   Food Insecurity:     Worried About Running Out of Food in the Last Year: Not on file    Cherelle of Food in the Last Year: Not on file   Transportation Needs:     Lack of Transportation (Medical): Not on file    Lack of Transportation (Non-Medical):  Not on file   Physical Activity:     Days of Exercise per Week: Not on file    Minutes of Exercise per Session: Not on file   Stress:     Feeling of Stress : Not on file   Social Connections:     Frequency of Communication with Friends and Family: Not on file    Frequency of Social Gatherings with Friends and Family: Not on file    Attends Restorationist Services: Not on file    Active Member of Clubs or Organizations: Not on file    Attends Club or Organization Meetings: Not on file    Marital Status: Not on file   Intimate Partner Violence:     Fear of Current or Ex-Partner: Not on file   [employer] Abused: Not on file    Physically Abused: Not on file    Sexually Abused: Not on file   Housing Stability:     Unable to Pay for Housing in the Last Year: Not on file    Number of Places Lived in the Last Year: Not on file    Unstable Housing in the Last Year: Not on file     Social History     Tobacco Use   Smoking Status Never Smoker   Smokeless Tobacco Never Used        Temp (24hrs), Av.6 °F (37.6 °C), Min:98.2 °F (36.8 °C), Max:101.8 °F (38.8 °C)    Visit Vitals  BP (!) 89/51   Pulse 86   Temp (!) 101.8 °F (38.8 °C)   Resp 14   Ht 5' 7\" (1.702 m)   Wt 56.4 kg (124 lb 5.4 oz)   SpO2 92%   BMI 19.47 kg/m²       ROS: Unable to obtain due to patient factors    Physical Exam:    General:Thin  male laying on bed, intubated, alert    HEENT:  Normocephalic, atraumatic, no scleral icterus or pallor; no conjunctival hemmohage; ET tube in place; neck supple, no bruits. Lymph Nodes:   not examined   Lungs:   shallow breathing, less tachypneic, rhonchi right upper lung   Heart:  RRR, s1 and s2; no  rubs or gallops, no edema, + pedal pulses   Abdomen:  soft, non-distended, induration/tenderness around recent PEG site,no drainage from peg site,  no hepatomegaly, no splenomegaly.    Genitourinary:  deferred   Extremities:   no clubbing, cyanosis; no joint effusions or swelling;  muscle mass appropriate for age   Neurologic:   Sedated                        Skin:  No rash or ulcers noted   Back:  not examined     Psychiatric:   calm         Labs: Results:   Chemistry Recent Labs     21  0330 21  0213   * 186* 129*   * 134* 133*   K 4.5 4.1 4.4   CL 99* 99* 100   CO2 36* 33* 32   BUN 18 20* 23*   CREA 0.32* 0.48* 0.46*   CA 8.2* 8.2* 8.2*   AGAP 0* 2* 1*   BUCR 56* 42* 50*      CBC w/Diff Recent Labs     21  03021  0330 21  0213   WBC 7.7 11.4 9.7   RBC 3.46* 3.27* 3.56*   HGB 9.1* 8.6* 9.4*   HCT 30.3* 28.2* 30.5*    249 192   GRANS 82* 90* 89*   LYMPH 4* 2* 3*   EOS 5 1 1      Microbiology Recent Labs     11/01/21  0932 11/01/21  0213   CULT NO GROWTH 2 DAYS NO GROWTH 2 DAYS          RADIOLOGY:    All available imaging studies/reports in [facility] for this admission were reviewed    High complexity decision making was performed during the evaluation of this patient at high risk for decompensation with multiple organ involvement         Disclaimer: Sections of this note are dictated utilizing voice recognition software, which may have resulted in some phonetic based errors in grammar and contents. Even though attempts were made to correct all the mistakes, some may have been missed, and remained in the body of the document. If questions arise, please contact our department.     Dr. Luis M Al, Infectious Disease Specialist  [phone]  November 3, 2021  3:25 PM

## 2021-11-03 NOTE — PROGRESS NOTES
[address] [place] [address] [facility]. Pulmonary, Critical Care, and Sleep Medicine    Name: Ellen Hunter MRN: [id number]   : 1945 Hospital: [facility]   Date: 11/3/2021  Admission Date: 10/18/2021     Chart and notes reviewed. Data reviewed. I have evaluated all findings. [x]I have reviewed the flowsheet and previous days notes. [x]The patient is unable to give any meaningful history or review of systems because the patient is:  [x]Intubated [x]Sedated   []Unresponsive      [x]The patient is critically ill on      [x]Mechanical ventilation [x]Pressors   []BiPAP []         Interval HPI:  Patient is a 67 y.o. male with PMHx of chronic respiratory failure, ILD, bronchiectasis who presents with failure to thrive. He presented with dyspnea and poor PO intake. He was placed on some O2 and CT chest showed some worsening consolidations in the presence of the chronic lung disease. He follows with us in clinic and was instructed to use hypertonic saline, acapella and mucinex, unsure of compliance. He was admitted to the floor, but RRT was called 10/27/21 for encephalopathy and acute hypercapnic respiratory failure. Anesthesia paged and he was intubated and transferred to ICU. Noted to have PNA and pulm edema on top of bronchiectasis and ILD, also a component of cardiogenic ( EF 25%) and septic shock. Multiple Cavitary lesions - likely 2/2 recurrent aspiration. Respiratory cx + MSSA, light candida albicans. PEG tube placed this admission on 10/21 for dysphagia, but inadvertently dislodged, thus will need to be replaced. Patient self-extubated 10/27/21 and was emergently re-intubated due to unresponsiveness to voice and noxious stimuli. Patient has inability to cough, thus would benefit from trach, but patient does not wish to be trached. Made partial code (21). Ideally, comfort care vs. Trach. Subjective 21  Hospital Day:  Admitted     Vent Day: intubated 10/28/21  Overnight events: Febrile overnight, Tmax 101.8. Intermittent agitation overnight requiring increase in Fentanyl gtt rate. CT A/P ordered to r/o origin of fever,however, not completed   Mentation/Activity: sedated on Fentanyl gtt   Respiratory/ Secretions: stable   Hemodynamics: Hypotensive, low-dose Levophed infusing   Urine output, bowel: 3.6L in 24 hours   Diet: TF   Need for procedures: none               ROS:Review of systems not obtained due to patient factors. Events and notes from last 24 hours reviewed. Care plan discussed on multidisciplinary rounds. Patient Active Problem List   Diagnosis Code    Coronary artery disease involving native coronary artery of native heart without angina pectoris,s/p stent  I25.10    Scarlet fever A38.9    Myocardial infarction ([facility] [address] I21.9    Urinary frequency R35.0    Pneumonia J18.9    Goals of care, counseling/discussion Z71.89    Acute respiratory failure with hypoxia ([facility] [address] J96.01    Debility R53.81    Severe protein-calorie malnutrition ([facility] [address] E43    Gastrostomy complication (HCC) 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   Last 3 shifts:  1901 -  0700  In: 4932.7 [I.V.:1077.7]  Out:  [Urine:]    Intake/Output Summary (Last 24 hours) at 11/3/2021 1152  Last data filed at 11/3/2021 0516  Gross per 24 hour   Intake 2972.4 ml   Output 1120 ml   Net 1852.4 ml        Ventilator Settings:  Ventilator Mode: Assist control, VC+  Respiratory Rate  Resp Rate Observed: 40  Back-Up Rate: 14  Insp Time (sec): 1 sec  I:E Ratio: 1:2.3  Ventilator Volumes  Vt Set (ml): 400 ml  Vt Exhaled (Machine Breath) (ml): 401 ml  Vt Spont (ml): 315 ml  Ve Observed (l/min): 5.16 l/min  Ventilator Pressures  Pressure Support (cm H2O): 10 cm H2O  PIP Observed (cm H2O): 29 cm H2O  Plateau Pressure (cm H2O): 25 cm H2O  MAP (cm H2O): 11  PEEP/VENT (cm H2O): 5 cm H20  Auto PEEP Observed (cm H2O): 6 cm H2O    Current Facility-Administered Medications   Medication Dose Route Frequency    anidulafungin (ERAXIS) 100 mg in 0.9% sodium chloride 130 mL IVPB  100 mg IntraVENous Q24H    NOREPINephrine (LEVOPHED) 8 mg in 5% dextrose 250mL (32 mcg/mL) infusion  0.5-50 mcg/min IntraVENous TITRATE    polyethylene glycol (MIRALAX) packet 17 g  17 g Per NG tube BID    senna-docusate (PERICOLACE) 8.6-50 mg per tablet 1 Tablet  1 Tablet Oral DAILY    meropenem (MERREM) 1 g in sterile water (preservative free) 20 mL IV syringe  1 g IntraVENous Q8H    famotidine (PF) (PEPCID) 20 mg in 0.9% sodium chloride 10 mL injection  20 mg IntraVENous Q12H    thiamine HCL (B-1) tablet 200 mg  200 mg Per G Tube DAILY    chlorhexidine (PERIDEX) 0.12 % mouthwash 10 mL  10 mL Oral Q12H    fentaNYL (PF) 1,500 mcg/30 mL (50 mcg/mL) infusion  0-200 mcg/hr IntraVENous TITRATE    insulin lispro (HUMALOG) injection   SubCUTAneous Q6H    bacitracin zinc-polymyxin b (POLYSPORIN) 500-10,000 unit/gram ointment   Topical BID    albuterol-ipratropium (DUO-NEB) 2.5 MG-0.5 MG/3 ML  3 mL Nebulization Q4H RT    sodium chloride 3% hypertonic nebulizer soln  4 mL Nebulization Q4HWA RT    Lactobacillus Acidoph & Bulgar (FLORANEX) tablet 2 Tablet  2 Tablet Per G Tube BID    aspirin chewable tablet 81 mg  81 mg Per G Tube DAILY    cholecalciferol (VITAMIN D3) (1000 Units /25 mcg) tablet 1,000 Units  1,000 Units PEG Tube DAILY    enoxaparin (LOVENOX) injection 40 mg  40 mg SubCUTAneous QHS    sodium chloride (NS) flush 5-40 mL  5-40 mL IntraVENous Q8H    omega-3 acid ethyl esters (LOVAZA) capsule 1,000 mg  1 g Oral DAILY WITH BREAKFAST    therapeutic multivitamin (THERAGRAN) tablet 1 Tablet  1 Tablet Oral DAILY         Telemetry: [x]Sinus []A-flutter []Paced    []A-fib []Multiple PVCs                  Physical Exam:      General: Intubated/sedated; grimaces to pain, intermittent periods of agitation; cachectic, thin and frail appearing   HEENT:  Anicteric sclerae; pink palpebral conjunctivae; mucosa moist; poor dentition   Resp:  Symmetrical chest expansion, no accessory muscle use; no rales/ wheezing/ rhonchi noted; course lung sounds b/l   CV:  S1, S2 present; tachycardia/NSR   GI:  Abdomen soft, non-tender; active bowel sounds; PEG tube absent, but old site dressed with Mepilex with small amount of breakthrough drainage   Extremities:  +2 pulses on all extremities; trace edema BUE/ No cyanosis/ no clubbing noted. + b/l wrist restraints   Skin:  Poor turgor, pale, easily friable; scattered bruising upper extremities; Multiple abrasions to b/l shins   Neurologic:  Non-focal; sedated, but easily arousable and follows simple commands; periods of agitation   Devices:  OGT, Central line right femoral, ETT, [place], PIV       DATA:  MAR reviewed and pertinent medications noted or modified as needed    Labs:  Recent Labs     11/03/21 0300 11/02/21 0330 11/01/21 0213   WBC 7.7 11.4 9.7   HGB 9.1* 8.6* 9.4*   HCT 30.3* 28.2* 30.5*    249 192     Recent Labs     11/03/21  0300 11/02/21  0330 11/01/21 0213   * 134* 133*   K 4.5 4.1 4.4   CL 99* 99* 100   CO2 36* 33* 32   * 186* 129*   BUN 18 20* 23*   CREA 0.32* 0.48* 0.46*   CA 8.2* 8.2* 8.2*   MG 2.1 2.1 2.1   PHOS 1.9* 2.0* 1.8*     No results for input(s): PH, PCO2, PO2, HCO3, FIO2 in the last 72 hours.   Recent Labs     11/03/21 0447 11/02/21 0338 11/01/21  0422   FIO2I 40 40 35   HCO3I 34.7* 31.1* 28.6*   PCO2I 56.6* 52.0* 42.9   PHI 7.40 7.39 7.43   PO2I 55* 66* 59*       Imaging:  [x]   I have personally reviewed the patients radiographs and reports  XR Results (most recent):  XR Results (most recent):  Results from Hospital Encounter encounter on 10/18/21    XR CHEST PORT    Narrative  EXAM:  XR CHEST PORT    INDICATION:   intubated    COMPARISON: 11/2/2021. FINDINGS:  Stable appropriate position of the endotracheal tube, enteric tube and  temperature probe. Stable cardiomediastinal silhouette. Similar bilateral extensive  pleural-parenchymal opacities throughout the lungs. No pneumothorax. Stable  osseous structures. Impression  No significant interval change. CT Results (most recent):  Results from Hospital Encounter encounter on 10/18/21    CT ABD PELV WO CONT    Narrative  CT abdomen pelvis without IV contrast    HISTORY: Abdominal pain. Recent PEG tube placement. All CT scans at this facility are performed using dose optimization technique as  appropriate to a performed exam, to include automated exposure control,  adjustment of the mA and/or kV according to patient size (including appropriate  matching for site specific examination) or use of iterative reconstruction  technique. Bilateral pleural effusion with posterior lower lobar consolidation, worse on  the right. Infiltrate also seen in the right anterior lung base. Cardiomegaly  with small pericardial effusion. The button of the PEG tube is in the anterior abdominal subcutaneous fat. Significant amount of subcutaneous emphysema with likely fluid and infusing  material in the anterior abdomen. Maximum thickness is up to 3 cm. It extends  across the abdomen. No intra-abdominal free air. No significant ascites. Diffuse  subcutaneous edema also seen throughout. Unenhanced liver is unremarkable. Small gallstones. Spleen and pancreas  unremarkable. No adrenal mass. Kidneys show mild perinephric stranding with no  hydronephrosis. Non obstructing stone in left kidney. Aortic calcification and ectasia. Small bowel is not distended. Contrast material seen in the colon with fluid  level. Mild distention of the cecum. Sigmoid diverticulosis. Prostate is  enlarged. Bladder is not full for optimal evaluation. Impression  1. The button of the PEG tube is in the anterior abdominal subcutaneous fat (not  in the gastric lumen) with moderately severe subcutaneous emphysema and  fluid/infusing material. No intra-abdominal free air/pneumoperitoneum or  ascites. Discussed with patient's nurse. Discussed with Dr. Jem Morales hrs  2. Mild colitis with diarrheal disease possible. No bowel obstruction. 3. Sigmoid diverticulosis. With surrounding fluid, mild diverticulitis not  excluded. 4. Cholelithiasis. 5. Bilateral pleural effusion with basilar infiltrate/pneumonia       10/18/21    ECHO ADULT COMPLETE 10/26/2021 10/26/2021    Interpretation Summary  · LV: Estimated LVEF is 25 - 30%. Visually measured ejection fraction. Normal cavity size and wall thickness. Severely and segmentally reduced systolic function. Age-appropriate left ventricular diastolic function. · PA: Severe pulmonary hypertension. Pulmonary arterial systolic pressure is 78 mmHg. · IVC: Mildly elevated central venous pressure (8 mmHg); IVC diameter is less than 21 mm and collapses less than 50% with respiration. · Image quality for this study was technically difficult. · Echo study was limited due to patient's condition. · RV: Not well visualized. Signed by: Olaf Samuel MD on 10/26/2021  6:26 PM       IMPRESSION:   · Acute hypoxic and hypercarbic respiratory failure: 2/2 PNA and pulm edema on top of bronchiectasis, ILD  · Shock:  Septic + cardiogenic  · Acute pulmonary edema: A combination of worsening cardiomyopathy as well as hypoalbuminemia  · RUL PNA w bronchiectasis, RLL nodular consolidation, Cavitary lesions - likely 2/2 recurrent aspiration. resp cultures growing MSSA. At risk for invasive fungal PNA, NTM, given hx severe structural lung disease.  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 ?sepsis, stress, ischemic  · Severe pulmonary hypertension on echo. Definitely group II and III component   · Urinary retention - coude catheter placed by urology   · normocytic anemia-  stable  · Deconditioning/adult failure to thrive/cachexia: Body mass index is 19.34 kg/m². · Dyspnea/shortness of breath: Due to above     Patient Active Problem List   Diagnosis Code    Coronary artery disease involving native coronary artery of native heart without angina pectoris,s/p stent 2003 I25.10    Scarlet fever A38.9    Myocardial infarction ([address] I21.9    Urinary frequency R35.0    Pneumonia J18.9    Goals of care, counseling/discussion Z71.89    Acute respiratory failure with hypoxia ([address] J96.01    Debility R53.81    Severe protein-calorie malnutrition ([address] E43    Gastrostomy complication ([address] D26.69        RECOMMENDATIONS:   Neuro:Titrate sedation for RASS goal 0 to -1, daily sedation holiday. Cont Fentanyl gtt. + restraints   Pulm: Aspiration precautions, HOB>30'. VAP Bundle, Daily sedation vacation. SBT today, however, will likely not extubate. Not an ideal candidate for extubation as he has an absent cough and no way to protect his airway. Patient does not wish to have a trach. Best option is trach vs. Comfort care (of which the patient wants neither). CVS:Monitor HD, MAP goal >65. Cont Levophed for hypotension. Monitor for signs of vol overload. Not a candidate for aggressive invasive cardiac management at this time. GI: NPO. Diet: Cont TF via OGT. Needs PEG tube replaced for chronic dysphagia.  GI consulted and will hold off on PEG insertion until current PEG site infection cleared (currently still with pus and drainage). Re-consult when PEG site appears clear of infection. Docusate, senna, miralax added for bowel reg  Renal: Trend Cr, UOP. Corcoran (coude) placed by Urology   Hem/Onc: Trend H/H, monitor for s/o active bleeding. Daily CBC. I/D:Trend WBCs and temperature curve. ABx per ID, broad spectrum (Merrem and Eraxis). Rcx w/ MSSA, rare yeast. Wound cx with moderate candida albicans. Still with fevers overnight, F/u cultures and Fungitell. F/u AFB. Persistent lymphopenia noted. CT A/P to r/o source of fevers. Metabolic: Daily BMP, mag, phos. Trend lytes and replace per protocol. Replace phos today   Endocrine:Q6 glucoses. SSI. Avoid hypoglycemia. Musc/Skin:  wound care   Partial Code   Discussed in interdisciplinary rounds  S/p Palliative Meeting 11/1/21 with myself and Palliative Care Team. Patient code status changed to Partial code  (only replace ETT if dislodged). Patient does not wish to have a trach. Family would like PEG tube replaced. Long-term would need a trach, but patient refuses. He has no way to protect his airway due to his inability to cough or clear secretions. Best practice :    Glycemic control  IHI ICU bundles:   Central Line Bundle Followed , Corcoran Bundle Followed and Vent Bundle Followed, Vent Day 6  [facility] Vent patients- VAP bundle, aim to keep peak plateau pressure [id number] H2O  Stress ulcer prophylaxis. DVT prophylaxis. Need for Lines, corcoran assessed. Palliative care evaluation. Restraints need. Attending Non-violent Restraint Reevaluation     I have reevaluated the patient one hour after initiation of intervention. The patient is comfortable, uninjured, but continues to pose an imminent risk of injury to self to themselves and/or serious disruption of medical treatment required to keep patient stable.      The patient's current medical and behavioral conditions that warrant the use intervention include danger to self and Interference with medical equipment or treatment. Restraint or seclusion will be discontinued at the earliest possible time, regardless of the scheduled expiration of the order. Based on my evaluation, restraints will be continued: YES    11:48 AM    Marion Valle MD       High complexity decision making was performed during this consultation and evaluation. [x]       Pt is at high risk for further organ failure and dysfunction.        Arriola School, AGACNP-BC  11/03/21  Pulmonary, Critical Care Medicine  [facility]

## 2021-11-03 NOTE — PROGRESS NOTES
Problem: Patient Education: Go to Patient Education Activity  Goal: Patient/Family Education  Outcome: Not Progressing Towards Goal     Problem: Falls - Risk of  Goal: *Absence of Falls  Description: Document [place] Fall Risk and appropriate interventions in the flowsheet. Outcome: Not Progressing Towards Goal  Note: Fall Risk Interventions:  Mobility Interventions: Bed/chair exit alarm    Mentation Interventions: Bed/chair exit alarm    Medication Interventions: Bed/chair exit alarm    Elimination Interventions: Bed/chair exit alarm              Problem: Patient Education: Go to Patient Education Activity  Goal: Patient/Family Education  Outcome: Not Progressing Towards Goal     Problem: Discharge Planning  Goal: *Discharge to safe environment  Outcome: Not Progressing Towards Goal  Goal: *Knowledge of medication management  Outcome: Not Progressing Towards Goal  Goal: *Knowledge of discharge instructions  Outcome: Not Progressing Towards Goal     Problem: Patient Education: Go to Patient Education Activity  Goal: Patient/Family Education  Outcome: Not Progressing Towards Goal     Problem: Patient Education: Go to Patient Education Activity  Goal: Patient/Family Education  Outcome: Not Progressing Towards Goal     Problem: Patient Education: Go to Patient Education Activity  Goal: Patient/Family Education  Outcome: Not Progressing Towards Goal     Problem: Pressure Injury - Risk of  Goal: *Prevention of pressure injury  Description: Document Kali Scale and appropriate interventions in the flowsheet.   Outcome: Not Progressing Towards Goal  Note: Pressure Injury Interventions:  Sensory Interventions: Avoid rigorous massage over bony prominences, Assess need for specialty bed, Keep linens dry and wrinkle-free, Minimize linen layers, Monitor skin under medical devices    Moisture Interventions: Apply protective barrier, creams and emollients, Check for incontinence Q2 hours and as needed, Internal/External urinary devices, Minimize layers, Moisture barrier    Activity Interventions: Pressure redistribution bed/mattress(bed type)    Mobility Interventions: Pressure redistribution bed/mattress (bed type)    Nutrition Interventions: Document food/fluid/supplement intake    Friction and Shear Interventions: Foam dressings/transparent film/skin sealants, Minimize layers                Problem: Patient Education: Go to Patient Education Activity  Goal: Patient/Family Education  Outcome: Not Progressing Towards Goal     Problem: Ventilator Management  Goal: *Adequate oxygenation and ventilation  Outcome: Not Progressing Towards Goal  Goal: *Patient maintains clear airway/free of aspiration  Outcome: Not Progressing Towards Goal  Goal: *Absence of infection signs and symptoms  Outcome: Not Progressing Towards Goal  Goal: *Normal spontaneous ventilation  Outcome: Not Progressing Towards Goal     Problem: Patient Education: Go to Patient Education Activity  Goal: Patient/Family Education  Outcome: Not Progressing Towards Goal     Problem: Non-Violent Restraints  Goal: Removal from restraints as soon as assessed to be safe  Outcome: Not Progressing Towards Goal  Goal: No harm/injury to patient while restraints in use  Outcome: Not Progressing Towards Goal  Goal: Patient's dignity will be maintained  Outcome: Not Progressing Towards Goal  Goal: Patient Interventions  Outcome: Not Progressing Towards Goal

## 2021-11-04 NOTE — PROGRESS NOTES
[address] [place] [address] [facility]. Pulmonary, Critical Care, and Sleep Medicine    Name: Vernell Krishna MRN: [id number]   : 1945 Hospital: [address] [place] [address] Dr   Date: 2021  Admission Date: 10/18/2021     Chart and notes reviewed. Data reviewed. I have evaluated all findings. [x]I have reviewed the flowsheet and previous days notes. []The patient is unable to give any meaningful history or review of systems because the patient is:  []Intubated []Sedated   []Unresponsive      []The patient is critically ill on      []Mechanical ventilation []Pressors   []BiPAP []         Interval HPI:  Patient is a 74 y. o. male with PMHx of chronic respiratory failure, ILD, bronchiectasis who presents with failure to thrive. He presented with dyspnea and poor PO intake. He was placed on some O2 and CT chest showed some worsening consolidations in the presence of the chronic lung disease. He follows with us in clinic and was instructed to use hypertonic saline, acapella and mucinex, unsure of compliance. He was admitted to the floor, but RRT was called 10/27/21 for encephalopathy and acute hypercapnic respiratory failure. Anesthesia paged and he was intubated and transferred to ICU. Noted to have PNA and pulm edema on top of bronchiectasis and ILD, also a component of cardiogenic ( EF 25%) and septic shock. Multiple Cavitary lesions - likely 2/2 recurrent aspiration. Respiratory cx + MSSA, light candida albicans. PEG tube placed this admission on 10/21 for dysphagia, but inadvertently dislodged, thus will need to be replaced. Patient self-extubated 10/27/21 and was emergently re-intubated due to unresponsiveness to voice and noxious stimuli. Patient has inability to cough, thus would benefit from trach, but patient does not wish to be trached. Made partial code (21). Ideally, comfort care vs. Trach    Subjective 21  Hospital Day:7  Vent Day:7  Overnight events:No acute events.  Fevers overnight Mentation/Activity: Fentanyl ggt infusing, pt arouses to tactile stimulus, moves upper extremities. Respiratory/ Secretions:small amount of secretions, intubated with minimal settings  Hemodynamics:Low dose Levoped  Urine output, bowel: Good urine output  Diet:Tolerating tube feeds  Need for procedures:n/a              ROS:Pertinent items are noted in HPI. Events and notes from last 24 hours reviewed. Care plan discussed on multidisciplinary rounds. Patient Active Problem List   Diagnosis Code    Coronary artery disease involving native coronary artery of native heart without angina pectoris,s/p stent  I25.10    Scarlet fever A38.9    Myocardial infarction ([facility] I21.9    Urinary frequency R35.0    Pneumonia J18.9    Goals of care, counseling/discussion Z71.89    Acute respiratory failure with hypoxia ([facility] J96.01    Debility R53.81    Severe protein-calorie malnutrition ([facility] E43    Gastrostomy complication (HCC) P86.07       Vital Signs:  Visit Vitals  /64   Pulse 100   Temp 99.8 °F (37.7 °C)   Resp 26   Ht 5' 7\" (1.702 m)   Wt 59.9 kg (132 lb 0.9 oz)   SpO2 93%   BMI 20.68 kg/m²       O2 Device: Endotracheal tube, Ventilator   O2 Flow Rate (L/min): 6 l/min   Temp (24hrs), Av.5 °F (38.1 °C), Min:99.8 °F (37.7 °C), Max:102 °F (38.9 °C)       Intake/Output:   Last shift:      No intake/output data recorded.   Last 3 shifts:  1901 -  0700  In: 2939.9 [I.V.:349.9]  Out: 2660 [Urine:2660]    Intake/Output Summary (Last 24 hours) at 2021 0927  Last data filed at 2021 0400  Gross per 24 hour   Intake 1535.93 ml   Output 1540 ml   Net -4.07 ml        Ventilator Settings:  Ventilator Mode: Assist control, VC+  Respiratory Rate  Resp Rate Observed: 40  Back-Up Rate: 14  Insp Time (sec): 1 sec  I:E Ratio: 1:2.1  Ventilator Volumes  Vt Set (ml): 400 ml  Vt Exhaled (Machine Breath) (ml): 389 ml  Vt Spont (ml): 315 ml  Ve Observed (l/min): 6.07 l/min  Ventilator Pressures  Pressure Support (cm H2O): 10 cm H2O  PIP Observed (cm H2O): 30 cm H2O  Plateau Pressure (cm H2O): 28 cm H2O  MAP (cm H2O): 13  PEEP/VENT (cm H2O): 5 cm H20  Auto PEEP Observed (cm H2O): 6 cm H2O    Current Facility-Administered Medications   Medication Dose Route Frequency    sodium phosphate 30 mmol in 0.9% sodium chloride 250 mL infusion   IntraVENous ONCE    anidulafungin (ERAXIS) 100 mg in 0.9% sodium chloride 130 mL IVPB  100 mg IntraVENous Q24H    NOREPINephrine (LEVOPHED) 8 mg in 5% dextrose 250mL (32 mcg/mL) infusion  0.5-50 mcg/min IntraVENous TITRATE    polyethylene glycol (MIRALAX) packet 17 g  17 g Per NG tube BID    senna-docusate (PERICOLACE) 8.6-50 mg per tablet 1 Tablet  1 Tablet Oral DAILY    meropenem (MERREM) 1 g in sterile water (preservative free) 20 mL IV syringe  1 g IntraVENous Q8H    famotidine (PF) (PEPCID) 20 mg in 0.9% sodium chloride 10 mL injection  20 mg IntraVENous Q12H    thiamine HCL (B-1) tablet 200 mg  200 mg Per G Tube DAILY    chlorhexidine (PERIDEX) 0.12 % mouthwash 10 mL  10 mL Oral Q12H    fentaNYL (PF) 1,500 mcg/30 mL (50 mcg/mL) infusion  0-200 mcg/hr IntraVENous TITRATE    insulin lispro (HUMALOG) injection   SubCUTAneous Q6H    bacitracin zinc-polymyxin b (POLYSPORIN) 500-10,000 unit/gram ointment   Topical BID    albuterol-ipratropium (DUO-NEB) 2.5 MG-0.5 MG/3 ML  3 mL Nebulization Q4H RT    sodium chloride 3% hypertonic nebulizer soln  4 mL Nebulization Q4HWA RT    Lactobacillus Acidoph & Bulgar (FLORANEX) tablet 2 Tablet  2 Tablet Per G Tube BID    aspirin chewable tablet 81 mg  81 mg Per G Tube DAILY    cholecalciferol (VITAMIN D3) (1000 Units /25 mcg) tablet 1,000 Units  1,000 Units PEG Tube DAILY    enoxaparin (LOVENOX) injection 40 mg  40 mg SubCUTAneous QHS    sodium chloride (NS) flush 5-40 mL  5-40 mL IntraVENous Q8H    omega-3 acid ethyl esters (LOVAZA) capsule 1,000 mg  1 g Oral DAILY WITH BREAKFAST    therapeutic multivitamin [facility]) tablet 1 Tablet  1 Tablet Oral DAILY         Telemetry: []Sinus []A-flutter []Paced    []A-fib []Multiple PVCs                  Physical Exam:      General: Intubated/sedated; cachectic, frail looking  HEENT:  Anicteric sclerae; pink palpebral conjunctivae; mucosa moist  Resp:  Symmetrical chest expansion, no accessory muscle use; good airway entry; no rales/ wheezing/ rhonchi noted  CV:  S1, S2 present; regular rate and rhythm  GI:  Abdomen soft, non-tender; (+) active bowel sounds. Dressing for old PEG tube site  Extremities:  +2 pulses on all extremities; no edema/ cyanosis/ clubbing noted, b/l wrist restraints   Skin:  Warm; no rashes/ lesions noted, poor  turgor/cap refill   Neurologic:  Non-focal, arousable with tactile  Devices:  OGT, Central line right femoral, ETT, Merlos, PIV   DATA:  MAR reviewed and pertinent medications noted or modified as needed    Labs:  Recent Labs     11/04/21 0340 11/03/21 0300 11/02/21  0330   WBC 8.3 7.7 11.4   HGB 9.1* 9.1* 8.6*   HCT 31.0* 30.3* 28.2*    232 249     Recent Labs     11/04/21 0340 11/03/21 0300 11/02/21  0330   * 135* 134*   K 4.6 4.5 4.1   CL 97* 99* 99*   CO2 32 36* 33*   * 122* 186*   BUN 15 18 20*   CREA 0.37* 0.32* 0.48*   CA 7.8* 8.2* 8.2*   MG 2.1 2.1 2.1   PHOS 1.9* 1.9* 2.0*     No results for input(s): PH, PCO2, PO2, HCO3, FIO2 in the last 72 hours. Recent Labs     11/04/21  0439 11/03/21 0447 11/02/21  0338   FIO2I 50 40 40   HCO3I 32.9* 34.7* 31.1*   PCO2I 49.1* 56.6* 52.0*   PHI 7.44 7.40 7.39   PO2I 67* 55* 66*       Imaging:  [x]   I have personally reviewed the patients radiographs and reports  XR Results (most recent):  XR Results (most recent):  Results from Hospital Encounter encounter on 10/18/21    XR CHEST PORT    Narrative  EXAMINATION: Chest single view    INDICATION: Intubation    COMPARISON: 11/3/2021    FINDINGS: Single frontal view of the chest obtained. ETT tip 3.7 cm above  jero. Esophagogastric tube tip at level of gastric body. Esophageal probe tip  at level of lower esophagus. Mediastinal silhouette normal in size. Minimal  aortic arch calcifications. Extensive patchy streaky consolidative opacities,  most confluent in the right upper lobe. No definite pneumothorax identified. Right greater and left biapical, as well as left costophrenic angle pleural  shadow thickening. Impression  Lines/tubes as above. Extensive pulmonary and pleural opacities as described  above, similar to most recent prior, but suspected gradual worsening when  compared to multiple recent radiographs. CT Results (most recent):  Results from Hospital Encounter encounter on 10/18/21    CT ABD PELV WO CONT    Narrative  CT abdomen pelvis without IV contrast    HISTORY: Abdominal pain. Recent PEG tube placement. All CT scans at this facility are performed using dose optimization technique as  appropriate to a performed exam, to include automated exposure control,  adjustment of the mA and/or kV according to patient size (including appropriate  matching for site specific examination) or use of iterative reconstruction  technique. Bilateral pleural effusion with posterior lower lobar consolidation, worse on  the right. Infiltrate also seen in the right anterior lung base. Cardiomegaly  with small pericardial effusion. The button of the PEG tube is in the anterior abdominal subcutaneous fat. Significant amount of subcutaneous emphysema with likely fluid and infusing  material in the anterior abdomen. Maximum thickness is up to 3 cm. It extends  across the abdomen. No intra-abdominal free air. No significant ascites. Diffuse  subcutaneous edema also seen throughout. Unenhanced liver is unremarkable. Small gallstones. Spleen and pancreas  unremarkable. No adrenal mass. Kidneys show mild perinephric stranding with no  hydronephrosis. Non obstructing stone in left kidney.     Aortic calcification and ectasia. Small bowel is not distended. Contrast material seen in the colon with fluid  level. Mild distention of the cecum. Sigmoid diverticulosis. Prostate is  enlarged. Bladder is not full for optimal evaluation. Impression  1. The button of the PEG tube is in the anterior abdominal subcutaneous fat (not  in the gastric lumen) with moderately severe subcutaneous emphysema and  fluid/infusing material. No intra-abdominal free air/pneumoperitoneum or  ascites. Discussed with patient's nurse. Discussed with Dr. Higinio Jimenez hrs  2. Mild colitis with diarrheal disease possible. No bowel obstruction. 3. Sigmoid diverticulosis. With surrounding fluid, mild diverticulitis not  excluded. 4. Cholelithiasis. 5. Bilateral pleural effusion with basilar infiltrate/pneumonia       10/18/21    ECHO ADULT COMPLETE 10/26/2021 10/26/2021    Interpretation Summary  · LV: Estimated LVEF is 25 - 30%. Visually measured ejection fraction. Normal cavity size and wall thickness. Severely and segmentally reduced systolic function. Age-appropriate left ventricular diastolic function. · PA: Severe pulmonary hypertension. Pulmonary arterial systolic pressure is 78 mmHg. · IVC: Mildly elevated central venous pressure (8 mmHg); IVC diameter is less than 21 mm and collapses less than 50% with respiration. · Image quality for this study was technically difficult. · Echo study was limited due to patient's condition. · RV: Not well visualized. Signed by: Rosie Hameed MD on 10/26/2021  6:26 PM       IMPRESSION:   · Acute hypoxic and hypercarbic respiratory failure: 2/2 PNA and pulm edema on top of bronchiectasis, ILD  · Shock:  Septic + cardiogenic  · Acute pulmonary edema: A combination of worsening cardiomyopathy as well as hypoalbuminemia  · RUL PNA w bronchiectasis, RLL nodular consolidation, Cavitary lesions - likely 2/2 recurrent aspiration. resp cultures growing MSSA.  At risk for invasive fungal PNA, NTM, given hx severe structural lung disease. 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 ?sepsis, stress, ischemic  · Severe pulmonary hypertension on echo. Definitely group II and III component   · Urinary retention - coude catheter placed by urology   · normocytic anemia-  stable  · Deconditioning/adult failure to thrive/cachexia: Body mass index is 19.34 kg/m². · Dyspnea/shortness of breath: Due to above     Patient Active Problem List   Diagnosis Code    Coronary artery disease involving native coronary artery of native heart without angina pectoris,s/p stent 2003 I25.10    Scarlet fever A38.9    Myocardial infarction ([address] I21.9    Urinary frequency R35.0    Pneumonia J18.9    Goals of care, counseling/discussion Z71.89    Acute respiratory failure with hypoxia ([address] J96.01    Debility R53.81    Severe protein-calorie malnutrition ([address] E43    Gastrostomy complication ([address] S13.70        RECOMMENDATIONS:   Neuro:Titrate sedation for RASS goal 0 to -1, daily sedation holiday. Cont Fentanyl gtt. + restraints   Pulm: Aspiration precautions, HOB>30'. VAP Bundle, Daily sedation vacation. SBT today, minimal vent settings. Pt is a poor candidate for extubation, already failed once, severely deconditioned  CVS:Monitor HD, MAP goal >65. Cont Levophed, low dose. GI: NPO. Diet: Cont TF via OGT. Needs PEG tube replaced for chronic dysphagia. GI consulted and will hold off on PEG insertion until current PEG site infection cleared (currently still with pus and drainage). Re-consult when PEG site appears clear of infection. Docusate, senna, miralax added for bowel reg. CT abdomen/ pelvis with official read pending. Renal: Trend Cr, UOP. Corcoran (coude) placed by Urology   Hem/Onc: Trend H/H, monitor for s/o active bleeding. Daily CBC. I/D:Trend WBCs and temperature curve. ABx per ID, broad spectrum (Merrem and Eraxis). Rcx w/ MSSA, rare yeast. Wound cx with moderate candida albicans. Still with fevers overnight, F/u cultures and Fungitell. F/u AFB. Persistent lymphopenia note  Metabolic: Daily BMP, mag, phos. Trend lytes and replace per protocol. Replace phos today   Endocrine:Q6 glucoses. SSI. Avoid hypoglycemia. Musc/Skin:  wound care   Partial Code   Discussed in interdisciplinary rounds  S/p Palliative Meeting 11/1/2. Patient code status changed to Partial code  (only replace ETT if dislodged). Patient does not wish to have a trach. Family would like PEG tube replaced. Long-term would need a trach, but patient refuses. Best practice :    Glycemic control  IHI ICU bundles:   Central Line Bundle Followed , Corcoran Bundle Followed and Vent Bundle Followed, Vent Day 7    [facility] Vent patients- VAP bundle, aim to keep peak plateau pressure 03-46NY H2O  Sress ulcer prophylaxis. DVT prophylaxis. Need for Lines, corcoran assessed. Palliative care evaluation. Restraints need. Attending Non-violent Restraint Reevaluation     I have reevaluated the patient one hour after initiation of intervention. The patient is comfortable, uninjured, but continues to pose an imminent risk of injury to self to themselves and/or serious disruption of medical treatment required to keep patient stable. The patient's current medical and behavioral conditions that warrant the use intervention include danger to self and Interference with medical equipment or treatment. Restraint or seclusion will be discontinued at the earliest possible time, regardless of the scheduled expiration of the order.     Based on my evaluation, restraints will be continued: YES    9:27 AM    Tanesha Valle MD         High complexity decision making was performed during this consultation and evaluation. [x]       Pt is at high risk for further organ failure and dysfunction. Critical care time spent:35    minutes with patient exclusive of procedures.     Alfie Ewing NP   11/04/21  Pulmonary, Critical Care Medicine  [address] [place] [address] [facility]

## 2021-11-04 NOTE — PROGRESS NOTES
1930 bedside turnover given to me by RN Nunu Crowder. Pt is in bed on the cardiac telemetry monitor, opens eyes spontaneously. SBAR< MAR, ED summary given to me with a chance to ask questions. Drips and lines assessed and verified. 1945 called CT scan to set up a plan to take patient downstairs for a ct scan. He has a few orders for ct and will call when the table is avail  2015 CT called to set up a time to come down, need to coordinate with respiratory therapist  2035 respiratory therapist will inform shortly and patient will go down for ct. 2100 medications given and pt repositioned and oral care given  2330 blood glucose level assessed, wnl held insulin  0000 corcoran and lines reassessed, pt repositioned to his right side      0315 pt bathed by this RN and KARMA Almaraz. He was turned and repositioned, cleaned with bath wipes. Mepilex still clean, cream placed on his buttocks, it is red around the rectum area with some break down. Mouth care provided. Face and hands washed. Pt was given ativan for relaxation and rass of 0 to -1 while being moved and bathed.

## 2021-11-04 NOTE — PALLIATIVE CARE
[address] [facility] [phone]  [phone]    Marcus Spencer NP and this LMSW attended to pt at bedside to assess. Pt resting comfortably. This LMSW made telephone contact with pt's sister, Reid Scott ([phone]) to arrange family meeting for tomorrow, 11/05/2021 at 1:00p.m. She reports she is agreeable to this. LMSW informed her, we will meet her in pt's room. At that time plans for ongoing medical care will be discussed. Thank you for this referral to palliative care. The palliative care team remains available to provide support for patient and his family and to readdress goals of care as required.      Fortunato Farooq, [address] [facility]  Palliative Medicine Inpatient   [facility]  Palliative COPE Line: [phone]

## 2021-11-04 NOTE — PROCEDURES
[facility]  Pulmonary, Critical Care, and Sleep Medicine    Name: Kelvin Dowling MRN: [id number]   : 1945 Hospital: [address] [place] [address] Dr   Date: 2021        Thoracentesis Procedure Note    PROCEDURE:  DIAGNOSTIC/THERAPEUTIC THORACENTESIS  CPT code: without Ultrasound- 11758  CPT code: With Ultrasound imaging- 94172    INDICATION:  PLEURAL EFFUSION    ANESTHESIA:  LOCAL ANESTHESIA WITH 1% LIDOCAINE      CHEST ULTRASOUND FINDINGS:  Ultrasound was used to image the chest and localize the medium left pleural effusion. DESCRIPTION OF PROCEDURE:  After obtaining informed consent and localizing the safest location for thoracentesis, the  left intercostal space was marked with a blunt, plastic needle cap in the mid scapular line. A thoracentesis kit was used to perform the procedure. The skin was prepped and draped in the usual sterile fashion. Using the previously marked location as a giude, 1% lidocaine was injected into the skin and subcutaneous tissue, as well as onto the underlying rib and inter-costal muscles, pleural fluid was aspirated to assure proper location, prior to removing the anesthesia needle. The needle and thoracentesis catheter were then introduced into the chest at the localized site over the lower rib the rib localized with the needle, and the catheter then marched over the rib into the pleural space. After aspirating fluid, the thoracentesis catheter was then placed into the chest using the needle itself as a trocar. The needle was then removed and the catheter was attached to the supplied tubing without complication. A total of 500 cc of yellow fluid, was aspirated and sent for analysis. The patient tolerated the procedure well without complications. Post procedure CXR is pending.   Estimated blood loss Herb Pro MD No

## 2021-11-04 NOTE — INTERDISCIPLINARY ROUNDS
[address] [place] [address] [facility]  Pulmonary, Critical Care, and Sleep Medicine  Interdisciplinary and Ventilator Weaning Rounds    Patient discussed in morning walking rounds and interdisciplinary rounds. ICU day: 10/27/21     Overnight events:    No acute events    Assessments and best practice:   Ventilator  o Ventilator day: 10/28/21   o Vent settings: FiO2 of 50 and PEEP of 5  o VAP bundle, aim to keep peak plateau pressure [id number] H2O  o Weaning assessed and documented   - Patient does meet criteria for SBT. - Patient will be placed sedation holiday. - Plan to wean with PS n/a.  - Outcome: Pending [facility] discussion   - Final plan: extubated if SBT tolerated    Sedation  o Fentanyl    Other pertinent drips  o Levophed    Lines noted  o Femoral CVL ,  OGT, Corcoran cath, PIV    Critical labs assessed  o Yes   Antibiotics  o Merrem, Eraxis     Medications reviewed  o Yes   Pending imaging  o none   Pending send out labs  o No   Pending Procedures  o PEG, pending clearing of current infection surrounding old PEG site    Glycemic control  o SSI    Stress ulcer prophylaxis. o Pepcid   DVT prophylaxis. o Lovenox   Need for Lines, corcoran assessed.  o Yes   Restraint Reevaluation   o Yes  o I have reevaluated the patient one hour after initiation of intervention. The patient is comfortable, uninjured, but continues to pose an imminent risk of injury to self to themselves and/or serious disruption of medical treatment required to keep patient stable. The patient's current medical and behavioral conditions that warrant the use intervention include danger to self and Interference with medical equipment or treatment. Restraint or seclusion will be discontinued at the earliest possible time, regardless of the scheduled expiration of the order.  Based on my evaluation, restraints will be continued: YES 11/4/21 7:52 AM  o Attending Overseeing restraints: Mike Lam MD     Family contact/MPOA: Indy Mauro (sister) [phone]   Family updated will update today       Daily Plans:   Needs replacement PEG tube, pending clearing of former PEG site infection    FFup CT results   GOC discussion, compassionate extubation vs. trach    CONNOR time 20 minutes        Guido Liz NP  11/04/21  Pulmonary, [address] [facility]

## 2021-11-04 NOTE — PROGRESS NOTES
Infectious Disease progress Note        Reason: Right upper lobe cavitary pneumonia    Current abx Prior abx     Meropenem since 10/29  eraxis since 10/31 vancomycin 10/18-10/21  Levofloxacin 10/18-10/25  Piperacillin/tazobactam since 10/18/2021-10/29     Lines:       Assessment :    67 y.o. 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 intake, and worsening cough . Now with gram positive bacteremia, chronic cavitary lesion RUL with RUL/RLL infiltrates    Clinical presentation c/w acute on chronic hypoxic respiratory failure likely secondary to recurrent aspiration pneumonia, chronic cavitary lesion right upper lobe  Rule out superimposed infection/colonization with atypical mycobacteria    Sputum cx- mixed respiratory cristine, MSSA yeast  Yeast likely colonizer. Sputum AFB 10/19 negative    Pulmonary follow-up appreciated    Single positive blood culture for coagulase negative staph. On  10/18 is likely contamination    S/p peg tube placement 10/21/21    Now with worsening tachypnea, increased abdominal pain on 10/25/2021 likely secondary to displaced PEG tube. GI follow-up appreciated. Status post removal of PEG tube 10/26/2021. Evidence of sherif-PEG wound infection noted  No clinical or radiological evidence of worsening pneumonia    Unresponsiveness on 10/27/2021 status post intubation 10/27/2021. Pulled out ET tube, reintubated on 10/28/2021  Currently on 35% FiO2. Now with worsening hypotension requiring pressors on 10/29  Etiology of hypotension not entirely clear at this time- ? Cardiogenic versus partially treated sepsis  Concerns for PEG site infection when PEG tube removed 10/26.   Will broaden antibiotic coverage to cover for ESBL gram-negative still further information obtained  Peg site culture 10/29- candida albicans. Respiratory culture 10/30- yeast  No worsening erythema around recent PEG site. Now with persistent fevers T-max 102 since 11/1, progressive infiltrates noted on CT chest- ?progressive aspiration pneumonia, ? Left sided parapneumonic effusion/evolving empyema? progressive OSCAR pneumonia    Monitor for cholecystitis. Enlarged gallbladder with some wall thickening noted on CT scan 11/1/2021    No definitive evidence of PEG site abscess noted on CT scan 11/4/2021    Recent broad-spectrum antibiotics, colonization with Candida puts patient at risk for fungemia. Remains on currently on 5 mics Levophed    Recommendations:    1. D/c meropenem, start zosyn, levofloxacin. continue Eraxis. 2.  F/u fungitell. D/c eraxis if fungitell negative  3. Follow-up susceptibility of Mycobacterium in sputum culture  4. Weaning from vent per ICU team  5.  ? Thoracentesis to determine if left pleural effusion is transudative versus exudative  6. Add on LFTs to today's labs  7. Titrate pressors as tolerated         Above plan was discussed in details with RN, dr. Munir Avina. D/w nursing supervisor/administration to help facilitate CT scan. All questions answered to their full satisfaction. Spent additional 35 minutes in management and evaluation of this patient. >50% time spent in counselling and coordination of care. Please call me if any further questions or concerns. Will continue to participate in the care of this patient. HPI:    Intubated      Current Discharge Medication List      CONTINUE these medications which have NOT CHANGED    Details   acetylcysteine (MUCOMYST) 100 mg/mL (10 %) nebulizer solution Take 4 mL by inhalation every four (4) hours for 120 days.   Qty: 720 mL, Refills: 3      fluorouraciL (EFUDEX) 5 % chemo cream APPLY CREAM TO FOREHEAD 2 TIMES DAILY FOR 2 WEEKS ONCE HEALED USE 2 TIMES DAILY TO THE EARS FOR 2 WEEKS ONCE HEALED USE 2 TIMES DAILY TO THE CHEEKS AND TEMPLES FOR 2 WEEKS      sodium chloride 3 % nebulizer solution 4 mL by Nebulization route four (4) times daily. Mix with albuterol. File under Medicare Part B. Dx: R06.02; J47.9; J84.9; R53.81  Qty: 480 mL, Refills: 5    Associated Diagnoses: Dyspnea on exertion; Bronchiectasis without complication ([facility] Chronic pulmonary aspiration, sequela; Physical deconditioning      albuterol (PROVENTIL VENTOLIN) 2.5 mg /3 mL (0.083 %) nebu Mix with hypertonic saline nebulizer -- use 3- times per day. File under Medicare Part B. Dx: R06.02; J47.9; J84.9; R53.81  Qty: 360 Nebule, Refills: 3    Associated Diagnoses: Dyspnea on exertion; Bronchiectasis without complication ([facility] ILD (interstitial lung disease) ([facility] Chronic pulmonary aspiration, sequela; Physical deconditioning      guaiFENesin ER (MUCINEX) 600 mg ER tablet Take 1 Tablet by mouth two (2) times a day for 90 days. Qty: 180 Tablet, Refills: 3    Associated Diagnoses: Bronchiectasis without complication (HCC)      cholecalciferol (VITAMIN D3) (1000 Units /25 mcg) tablet Take 1,000 Units by mouth daily. FISH OIL-DHA-EPA PO Take 1 Tab by mouth daily. amino acids powd Take 1 Dose by mouth daily. OTHER Take 1 Cap by mouth daily. tumeric       fluticasone propionate (FLONASE NA) 1 Spray by Both Nostrils route daily. ascorbic acid, vitamin C, (VITAMIN C) 500 mg tablet Take 500 mg by mouth daily. multivitamin (ONE A DAY) tablet Take 1 Tab by mouth daily. aspirin delayed-release 81 mg tablet Take 81 mg by mouth daily.     Associated Diagnoses: Coronary artery disease involving native coronary artery of native heart without angina pectoris             Current Facility-Administered Medications   Medication Dose Route Frequency    sodium phosphate 30 mmol in 0.9% sodium chloride 250 mL infusion   IntraVENous ONCE    iopamidoL (ISOVUE 300) 61 % contrast injection  mL   mL IntraVENous RAD ONCE    anidulafungin (ERAXIS) 100 mg in 0.9% sodium chloride 130 mL IVPB  100 mg IntraVENous Q24H    NOREPINephrine (LEVOPHED) 8 mg in 5% dextrose 250mL (32 mcg/mL) infusion  0.5-50 mcg/min IntraVENous TITRATE    polyethylene glycol (MIRALAX) packet 17 g  17 g Per NG tube BID    senna-docusate (PERICOLACE) 8.6-50 mg per tablet 1 Tablet  1 Tablet Oral DAILY    meropenem (MERREM) 1 g in sterile water (preservative free) 20 mL IV syringe  1 g IntraVENous Q8H    famotidine (PF) (PEPCID) 20 mg in 0.9% sodium chloride 10 mL injection  20 mg IntraVENous Q12H    thiamine HCL (B-1) tablet 200 mg  200 mg Per G Tube DAILY    LORazepam (ATIVAN) injection 2 mg  2 mg IntraVENous Q15MIN PRN    HYDROmorphone (DILAUDID) syringe 1 mg  1 mg IntraVENous Q15MIN PRN    chlorhexidine (PERIDEX) 0.12 % mouthwash 10 mL  10 mL Oral Q12H    midazolam (VERSED) injection 2 mg  2 mg IntraVENous Q10MIN PRN    fentaNYL citrate (PF) injection 100 mcg  100 mcg IntraVENous Q30MIN PRN    fentaNYL (PF) 1,500 mcg/30 mL (50 mcg/mL) infusion  0-200 mcg/hr IntraVENous TITRATE    insulin lispro (HUMALOG) injection   SubCUTAneous Q6H    glucose chewable tablet 16 g  16 g Oral PRN    glucagon (GLUCAGEN) injection 1 mg  1 mg IntraMUSCular PRN    bacitracin zinc-polymyxin b (POLYSPORIN) 500-10,000 unit/gram ointment   Topical BID    naloxone (NARCAN) injection 0.4 mg  0.4 mg IntraVENous PRN    albuterol-ipratropium (DUO-NEB) 2.5 MG-0.5 MG/3 ML  3 mL Nebulization Q4H RT    sodium chloride 3% hypertonic nebulizer soln  4 mL Nebulization Q4HWA RT    Lactobacillus Acidoph & Bulgar (FLORANEX) tablet 2 Tablet  2 Tablet Per G Tube BID    guaiFENesin (ROBITUSSIN) 100 mg/5 mL oral liquid 400 mg  400 mg Per G Tube Q4H PRN    albuterol-ipratropium (DUO-NEB) 2.5 MG-0.5 MG/3 ML  3 mL Nebulization Q4H PRN    aspirin chewable tablet 81 mg  81 mg Per G Tube DAILY    cholecalciferol (VITAMIN D3) (1000 Units /25 mcg) tablet 1,000 Units  1,000 Units PEG Tube DAILY    acetaminophen (TYLENOL) tablet 650 mg  650 mg Per G Tube Q6H PRN    Or    acetaminophen (TYLENOL) suppository 650 mg  650 mg Rectal Q6H PRN    enoxaparin (LOVENOX) injection 40 mg  40 mg SubCUTAneous QHS    sodium chloride (NS) flush 5-10 mL  5-10 mL IntraVENous PRN    sodium chloride (NS) flush 5-40 mL  5-40 mL IntraVENous Q8H    sodium chloride (NS) flush 5-40 mL  5-40 mL IntraVENous PRN    omega-3 acid ethyl esters (LOVAZA) capsule 1,000 mg  1 g Oral DAILY WITH BREAKFAST    therapeutic multivitamin (THERAGRAN) tablet 1 Tablet  1 Tablet Oral DAILY       Allergies: Pollens extract    Family History   Problem Relation Age of Onset    No Known Problems Mother     Heart Disease Father     Heart Attack Father         1989    Coronary Art Dis Father     Hypertension Father     High Cholesterol Father      Social History     Socioeconomic History    Marital status: SINGLE     Spouse name: Not on file    Number of children: Not on file    Years of education: Not on file    Highest education level: Not on file   Occupational History    Not on file   Tobacco Use    Smoking status: Never Smoker    Smokeless tobacco: Never Used   Vaping Use    Vaping Use: Never used   Substance and Sexual Activity    Alcohol use: No    Drug use: No    Sexual activity: Not on file   Other Topics Concern    Not on file   Social History Narrative    Not on file     Social Determinants of Health     Financial Resource Strain:     Difficulty of Paying Living Expenses: Not on file   Food Insecurity:     Worried About Running Out of Food in the Last Year: Not on file    Cherelle of Food in the Last Year: Not on file   Transportation Needs:     Lack of Transportation (Medical): Not on file    Lack of Transportation (Non-Medical):  Not on file   Physical Activity:     Days of Exercise per Week: Not on file    Minutes of Exercise per Session: Not on file   Stress:     Feeling of Stress : Not on file   Social Connections:     Frequency of Communication with Friends and Family: Not on file    Frequency of Social Gatherings with Friends and Family: Not on file    Attends Jew Services: Not on file    Active Member of Clubs or Organizations: Not on file    Attends Club or Organization Meetings: Not on file    Marital Status: Not on file   Intimate Partner Violence:     Fear of Current or Ex-Partner: Not on file    Emotionally Abused: Not on file    Physically Abused: Not on file    Sexually Abused: Not on file   Housing Stability:     Unable to Pay for Housing in the Last Year: Not on file    Number of [place] in the Last Year: Not on file    Unstable Housing in the Last Year: Not on file     Social History     Tobacco Use   Smoking Status Never Smoker   Smokeless Tobacco Never Used        Temp (24hrs), Av.5 °F (38.1 °C), Min:99.8 °F (37.7 °C), Max:102 °F (38.9 °C)    Visit Vitals  BP 95/60 (BP 1 Location: Right lower arm, BP Patient Position: At rest)   Pulse 90   Temp 99.8 °F (37.7 °C)   Resp 18   Ht 5' 7\" (1.702 m)   Wt 59.9 kg (132 lb 0.9 oz)   SpO2 97%   BMI 20.68 kg/m²       ROS: Unable to obtain due to patient factors    Physical Exam:    General:Thin  male laying on bed, intubated    HEENT:  Normocephalic, atraumatic, no scleral icterus or pallor; no conjunctival hemmohage; ET tube in place; neck supple, no bruits. Lymph Nodes:   not examined   Lungs:   shallow breathing, less tachypneic, rhonchi right upper lung   Heart:  RRR, s1 and s2; no  rubs or gallops, no edema, + pedal pulses   Abdomen:  soft, non-distended, no erythema around peg site, +nt abdominal tenderness,  no hepatomegaly, no splenomegaly.    Genitourinary:  deferred   Extremities:   no clubbing, cyanosis; no joint effusions or swelling;  muscle mass appropriate for age   Neurologic:   Sedated                        Skin:  No rash or ulcers noted   Back:  not examined     Psychiatric:   calm         Labs: Results:   Chemistry Recent Labs 11/04/21  0340 11/03/21  0300 11/02/21  0330   * 122* 186*   * 135* 134*   K 4.6 4.5 4.1   CL 97* 99* 99*   CO2 32 36* 33*   BUN 15 18 20*   CREA 0.37* 0.32* 0.48*   CA 7.8* 8.2* 8.2*   AGAP 2* 0* 2*   BUCR 41* 56* 42*      CBC w/Diff Recent Labs     11/04/21  0340 11/03/21  0300 11/02/21  0330   WBC 8.3 7.7 11.4   RBC 3.53* 3.46* 3.27*   HGB 9.1* 9.1* 8.6*   HCT 31.0* 30.3* 28.2*    232 249   GRANS 85* 82* 90*   LYMPH 4* 4* 2*   EOS 3 5 1      Microbiology Recent Labs     11/01/21  0932   CULT NO GROWTH 3 DAYS          RADIOLOGY:    All available imaging studies/reports in [facility] care for this admission were reviewed    High complexity decision making was performed during the evaluation of this patient at high risk for decompensation with multiple organ involvement         Disclaimer: Sections of this note are dictated utilizing voice recognition software, which may have resulted in some phonetic based errors in grammar and contents. Even though attempts were made to correct all the mistakes, some may have been missed, and remained in the body of the document. If questions arise, please contact our department.     Dr. Enmanuel Gonzalez, Infectious Disease Specialist  [phone]  November 4, 2021  3:25 PM

## 2021-11-04 NOTE — PROGRESS NOTES
attended the interdisciplinary rounds for Miguel A Milton, who is a 67 y. o.,male. Patients Primary Language is: [place]. According to the patients EMR Christian Affiliation is: Episcopalian. The reason the Patient came to the hospital is:   Patient Active Problem List    Diagnosis Date Noted    Gastrostomy complication ([facility] 82/64/8200    Severe protein-calorie malnutrition ([facility] 10/20/2021    Goals of care, counseling/discussion     Acute respiratory failure with hypoxia ([facility]     Debility     Pneumonia 10/18/2021    Urinary frequency 06/30/2016    Scarlet fever     Myocardial infarction [facility])     Coronary artery disease involving native coronary artery of native heart without angina pectoris,s/p stent 2003 05/19/2016          Plan:  Ludwig Cristina will continue to follow and will provide pastoral care on an as needed/requested basis.  recommends bedside caregivers page  on duty if patient shows signs of acute spiritual or emotional distress.     [address] [facility]  Board Certified [address] [facility]   [phone]

## 2021-11-05 NOTE — ROUTINE PROCESS
2020 Discussed with provider order to D/C central line as Pt is still actively receiving levophed, verbal orders given to keep and maintain CVL. 0000 Temperature appears to be trending up again, blankets removed and temperature of room reduced to prevent fever. 0 Updated LEXUS Marlow of consistently elevated temperatures despite being treated with tylenol, blankets removed, and temperature of room reduced. LEXUS Marlow to place orders. Also discussed removal of central line d/t levo being stopped and pressures appropriate. MAP has been above 65 although pressures soft, verbal order to keep CVL in place at this time. 5262 Bedside shift change report given to Adele Yu RN (oncoming nurse) by Yfn West (offgoing nurse). Report included the following information SBAR, Intake/Output, MAR, Recent Results and Cardiac Rhythm Sinus tach.

## 2021-11-05 NOTE — PROGRESS NOTES
Infectious Disease progress Note        Reason: Right upper lobe cavitary pneumonia    Current abx Prior abx       Levofloxacin, zosyn since 11/4/21  eraxis since 10/31 vancomycin 10/18-10/21  Levofloxacin 10/18-10/25  Piperacillin/tazobactam since 10/18/2021-10/29  Meropenem  10/29-11/4     Lines:       Assessment :    67 y.o. 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 intake, and worsening cough . Now with gram positive bacteremia, chronic cavitary lesion RUL with RUL/RLL infiltrates    Clinical presentation c/w acute on chronic hypoxic respiratory failure likely secondary to recurrent aspiration pneumonia, chronic cavitary lesion right upper lobe  Rule out superimposed infection/colonization with atypical mycobacteria    Sputum cx- mixed respiratory cristine, MSSA yeast  Yeast likely colonizer. Sputum AFB 10/19 negative    Pulmonary follow-up appreciated    Single positive blood culture for coagulase negative staph. On  10/18 is likely contamination    S/p peg tube placement 10/21/21    Now with worsening tachypnea, increased abdominal pain on 10/25/2021 likely secondary to displaced PEG tube. GI follow-up appreciated. Status post removal of PEG tube 10/26/2021. Evidence of sherif-PEG wound infection noted  No clinical or radiological evidence of worsening pneumonia    Unresponsiveness on 10/27/2021 status post intubation 10/27/2021. Pulled out ET tube, reintubated on 10/28/2021  Currently on 35% FiO2. Now with worsening hypotension requiring pressors on 10/29  Etiology of hypotension not entirely clear at this time- ? Cardiogenic versus partially treated sepsis  Concerns for PEG site infection when PEG tube removed 10/26.   Will broaden antibiotic coverage to cover for ESBL gram-negative still further information obtained  Peg site culture 10/29- candida albicans. Respiratory culture 10/30- yeast  No worsening erythema around recent PEG site. Now with persistent fevers T-max 102 since 11/1, progressive infiltrates noted on CT chest- ?recurrent aspiration pneumonia,?progressive OSCAR pneumonia ?superimposed candida pneumonia R/o secondary bloodstream infection, UTI    Status post paracentesis 11/4/2021. Pulmonary help appreciated. Fluid analysis not consistent with parapneumonic effusion/empyema    Monitor for cholecystitis. Enlarged gallbladder with some wall thickening noted on CT scan 11/1/2021    No definitive evidence of PEG site abscess noted on CT scan 11/4/2021. Recent broad-spectrum antibiotics, colonization with Candida puts patient at risk for fungemia. Negative Fungitell 11/1/2021 argues against fungemia    Off pressors. Worsening oxygen requirement despite improvement in procalcitonin, no leukocytosis-rule out noninfectious etiology of fever such as DVT    Recommendations:    1. continue zosyn, levofloxacin. Add vancomycin. D/c eraxis. Start iv fluconazole  2. Obtain blood cx, UA, sputum cx  3. Follow-up susceptibility of Mycobacterium in sputum culture  4. Weaning from vent per ICU team  5. Obtain venous duplex UE, LE       Above plan was discussed in details with RN, dr. Saad Atkins. Please call me if any further questions or concerns. Will continue to participate in the care of this patient. HPI:    Intubated      home Medication List    Details   acetylcysteine (MUCOMYST) 100 mg/mL (10 %) nebulizer solution Take 4 mL by inhalation every four (4) hours for 120 days. Qty: 720 mL, Refills: 3      fluorouraciL (EFUDEX) 5 % chemo cream APPLY CREAM TO FOREHEAD 2 TIMES DAILY FOR 2 WEEKS ONCE HEALED USE 2 TIMES DAILY TO THE EARS FOR 2 WEEKS ONCE HEALED USE 2 TIMES DAILY TO THE CHEEKS AND TEMPLES FOR 2 WEEKS      sodium chloride 3 % nebulizer solution 4 mL by Nebulization route four (4) times daily.  Mix with albuterol. File under Medicare Part B. Dx: R06.02; J47.9; J84.9; R53.81  Qty: 480 mL, Refills: 5    Associated Diagnoses: Dyspnea on exertion; Bronchiectasis without complication ([facility] Chronic pulmonary aspiration, sequela; Physical deconditioning      albuterol (PROVENTIL VENTOLIN) 2.5 mg /3 mL (0.083 %) nebu Mix with hypertonic saline nebulizer -- use 3- times per day. File under Medicare Part B. Dx: R06.02; J47.9; J84.9; R53.81  Qty: 360 Nebule, Refills: 3    Associated Diagnoses: Dyspnea on exertion; Bronchiectasis without complication ([facility] ILD (interstitial lung disease) [facility] Chronic pulmonary aspiration, sequela; Physical deconditioning      guaiFENesin ER (MUCINEX) 600 mg ER tablet Take 1 Tablet by mouth two (2) times a day for 90 days. Qty: 180 Tablet, Refills: 3    Associated Diagnoses: Bronchiectasis without complication (HCC)      cholecalciferol (VITAMIN D3) (1000 Units /25 mcg) tablet Take 1,000 Units by mouth daily. FISH OIL-DHA-EPA PO Take 1 Tab by mouth daily. amino acids powd Take 1 Dose by mouth daily. OTHER Take 1 Cap by mouth daily. tumeric       fluticasone propionate (FLONASE NA) 1 Spray by Both Nostrils route daily. ascorbic acid, vitamin C, (VITAMIN C) 500 mg tablet Take 500 mg by mouth daily. multivitamin (ONE A DAY) tablet Take 1 Tab by mouth daily. aspirin delayed-release 81 mg tablet Take 81 mg by mouth daily.     Associated Diagnoses: Coronary artery disease involving native coronary artery of native heart without angina pectoris             Current Facility-Administered Medications   Medication Dose Route Frequency    potassium, sodium phosphates (NEUTRA-PHOS) packet 1 Packet  1 Packet Per G Tube QID    piperacillin-tazobactam (ZOSYN) 4.5 g in 0.9% sodium chloride (MBP/ADV) 100 mL MBP  4.5 g IntraVENous Q6H    levoFLOXacin (LEVAQUIN) 750 mg in D5W IVPB  750 mg IntraVENous Q24H    anidulafungin (ERAXIS) 100 mg in 0.9% sodium chloride 130 mL IVPB  100 mg IntraVENous Q24H    NOREPINephrine (LEVOPHED) 8 mg in 5% dextrose 250mL (32 mcg/mL) infusion  0.5-50 mcg/min IntraVENous TITRATE    polyethylene glycol (MIRALAX) packet 17 g  17 g Per NG tube BID    senna-docusate (PERICOLACE) 8.6-50 mg per tablet 1 Tablet  1 Tablet Oral DAILY    famotidine (PF) (PEPCID) 20 mg in 0.9% sodium chloride 10 mL injection  20 mg IntraVENous Q12H    thiamine HCL (B-1) tablet 200 mg  200 mg Per G Tube DAILY    LORazepam (ATIVAN) injection 2 mg  2 mg IntraVENous Q15MIN PRN    HYDROmorphone (DILAUDID) syringe 1 mg  1 mg IntraVENous Q15MIN PRN    chlorhexidine (PERIDEX) 0.12 % mouthwash 10 mL  10 mL Oral Q12H    midazolam (VERSED) injection 2 mg  2 mg IntraVENous Q10MIN PRN    fentaNYL citrate (PF) injection 100 mcg  100 mcg IntraVENous Q30MIN PRN    fentaNYL (PF) 1,500 mcg/30 mL (50 mcg/mL) infusion  0-200 mcg/hr IntraVENous TITRATE    insulin lispro (HUMALOG) injection   SubCUTAneous Q6H    glucose chewable tablet 16 g  16 g Oral PRN    glucagon (GLUCAGEN) injection 1 mg  1 mg IntraMUSCular PRN    bacitracin zinc-polymyxin b (POLYSPORIN) 500-10,000 unit/gram ointment   Topical BID    naloxone (NARCAN) injection 0.4 mg  0.4 mg IntraVENous PRN    albuterol-ipratropium (DUO-NEB) 2.5 MG-0.5 MG/3 ML  3 mL Nebulization Q4H RT    sodium chloride 3% hypertonic nebulizer soln  4 mL Nebulization Q4HWA RT    Lactobacillus Acidoph & Bulgar (FLORANEX) tablet 2 Tablet  2 Tablet Per G Tube BID    guaiFENesin (ROBITUSSIN) 100 mg/5 mL oral liquid 400 mg  400 mg Per G Tube Q4H PRN    albuterol-ipratropium (DUO-NEB) 2.5 MG-0.5 MG/3 ML  3 mL Nebulization Q4H PRN    aspirin chewable tablet 81 mg  81 mg Per G Tube DAILY    cholecalciferol (VITAMIN D3) (1000 Units /25 mcg) tablet 1,000 Units  1,000 Units PEG Tube DAILY    acetaminophen (TYLENOL) tablet 650 mg  650 mg Per G Tube Q6H PRN    Or    acetaminophen (TYLENOL) suppository 650 mg  650 mg Rectal Q6H PRN    enoxaparin (LOVENOX) injection 40 mg  40 mg SubCUTAneous QHS    sodium chloride (NS) flush 5-10 mL  5-10 mL IntraVENous PRN    sodium chloride (NS) flush 5-40 mL  5-40 mL IntraVENous Q8H    sodium chloride (NS) flush 5-40 mL  5-40 mL IntraVENous PRN    omega-3 acid ethyl esters (LOVAZA) capsule 1,000 mg  1 g Oral DAILY WITH BREAKFAST    therapeutic multivitamin (THERAGRAN) tablet 1 Tablet  1 Tablet Oral DAILY       Allergies: Pollens extract    Family History   Problem Relation Age of Onset    No Known Problems Mother     Heart Disease Father     Heart Attack Father         1989    Coronary Art Dis Father     Hypertension Father     High Cholesterol Father      Social History     Socioeconomic History    Marital status: SINGLE     Spouse name: Not on file    Number of children: Not on file    Years of education: Not on file    Highest education level: Not on file   Occupational History    Not on file   Tobacco Use    Smoking status: Never Smoker    Smokeless tobacco: Never Used   Vaping Use    Vaping Use: Never used   Substance and Sexual Activity    Alcohol use: No    Drug use: No    Sexual activity: Not on file   Other Topics Concern    Not on file   Social History Narrative    Not on file     Social Determinants of Health     Financial Resource Strain:     Difficulty of Paying Living Expenses: Not on file   Food Insecurity:     Worried About Running Out of Food in the Last Year: Not on file    Cherelle of Food in the Last Year: Not on file   Transportation Needs:     Lack of Transportation (Medical): Not on file    Lack of Transportation (Non-Medical):  Not on file   Physical Activity:     Days of Exercise per Week: Not on file    Minutes of Exercise per Session: Not on file   Stress:     Feeling of Stress : Not on file   Social Connections:     Frequency of Communication with Friends and Family: Not on file    Frequency of Social Gatherings with Friends and Family: Not on file    Attends Sikh Services: Not on file    Active Member of Clubs or Organizations: Not on file    Attends Club or Organization Meetings: Not on file    Marital Status: Not on file   Intimate Partner Violence:     Fear of Current or Ex-Partner: Not on file    Emotionally Abused: Not on file    Physically Abused: Not on file    Sexually Abused: Not on file   Housing Stability:     Unable to Pay for Housing in the Last Year: Not on file    Number of [place] in the Last Year: Not on file    Unstable Housing in the Last Year: Not on file     Social History     Tobacco Use   Smoking Status Never Smoker   Smokeless Tobacco Never Used        Temp (24hrs), Av.4 °F (38 °C), Min:99.3 °F (37.4 °C), Max:101.7 °F (38.7 °C)    Visit Vitals  BP (!) 85/56   Pulse (!) 101   Temp 99.9 °F (37.7 °C)   Resp 14   Ht 5' 7\" (1.702 m)   Wt 55.7 kg (122 lb 12.7 oz)   SpO2 96%   BMI 19.23 kg/m²       ROS: Unable to obtain due to patient factors    Physical Exam:    General:Thin  male laying on bed, intubated    HEENT:  Normocephalic, atraumatic, no scleral icterus or pallor; no conjunctival hemmohage; ET tube in place; neck supple, no bruits. Lymph Nodes:   not examined   Lungs:   Bilateral chest movements equal, +nt rhonchi   Heart:  RRR, s1 and s2; no  rubs or gallops, no edema, + pedal pulses   Abdomen:  soft, non-distended, no erythema around peg site, +nt abdominal tenderness,  no hepatomegaly, no splenomegaly.    Genitourinary:  deferred   Extremities:   no clubbing, cyanosis; no joint effusions or swelling;  muscle mass appropriate for age   Neurologic:   Sedated                        Skin:  No rash or ulcers noted   Back:  not examined     Psychiatric:   calm         Labs: Results:   Chemistry Recent Labs     21  0300 21  0340 21  0300   * 116* 122*   * 131* 135*   K 4.8 4.6 4.5   CL 95* 97* 99*   CO2 36* 32 36*   BUN 19* 15 18   CREA 0.41* 0.37* 0.32*   CA 7.8* 7.8* 8.2*   AGAP 1* 2* 0*   BUCR 46* 41* 56*   AP  --  55  --    TP  --  5.2*  --    ALB  --  1.5*  --    GLOB  --  3.7  --    AGRAT  --  0.4*  --       CBC w/Diff Recent Labs     11/05/21  0300 11/04/21  0340 11/03/21  0300   WBC 7.8 8.3 7.7   RBC 3.07* 3.53* 3.46*   HGB 8.1* 9.1* 9.1*   HCT 26.9* 31.0* 30.3*    267 232   GRANS 90* 85* 82*   LYMPH 2* 4* 4*   EOS 2 3 5      Microbiology Recent Labs     11/04/21  1630   CULT PENDING          RADIOLOGY:    All available imaging studies/reports in [facility] for this admission were reviewed    High complexity decision making was performed during the evaluation of this patient at high risk for decompensation with multiple organ involvement         Disclaimer: Sections of this note are dictated utilizing voice recognition software, which may have resulted in some phonetic based errors in grammar and contents. Even though attempts were made to correct all the mistakes, some may have been missed, and remained in the body of the document. If questions arise, please contact our department.     Dr. Dio Huggins, Infectious Disease Specialist  [phone]  November 5, 2021  3:25 PM

## 2021-11-05 NOTE — PROGRESS NOTES
Reviewed chart. Patient remains intubated and on vent. Palliative care following. CM will continue to monitor and assist with transition of care needs.      SHEILA Mao, RN  Pager # [phone]  Care Manager

## 2021-11-05 NOTE — INTERDISCIPLINARY ROUNDS
[facility]  Pulmonary, Critical Care, and Sleep Medicine  Interdisciplinary and Ventilator Weaning Rounds    Patient discussed in morning walking rounds and interdisciplinary rounds. ICU day: 10/27/21     Overnight events:    No acute events. Thoracentesis yesterday 500 ml out, Febrile    Assessments and best practice:   Ventilator  o Ventilator day: 10/28/21   o Vent settings: FiO2 of 60 and PEEP of 5  o VAP bundle, aim to keep peak plateau pressure 62-34CS H2O  o Weaning assessed and documented   - Patient does meet criteria for SBT. - Patient will be placed sedation holiday. - Plan to wean with PS n/a.  - Outcome: Pending [facility] discussion   - Final plan: extubated if SBT tolerated    Sedation  o Fentanyl    Other pertinent drips  o none    Lines noted  o Femoral CVL ,  OGT, Corcoran cath, PIV    Critical labs assessed  o Yes   Antibiotics  o Merrem, Eraxis     Medications reviewed  o Yes   Pending imaging  o none   Pending send out labs  o No   Pending Procedures  o PEG, pending clearing of current infection surrounding old PEG site    Glycemic control  o SSI    Stress ulcer prophylaxis. o Pepcid   DVT prophylaxis. o Lovenox   Need for Lines, corcoran assessed.  o Yes   Restraint Reevaluation   o Yes  o I have reevaluated the patient one hour after initiation of intervention. The patient is comfortable, uninjured, but continues to pose an imminent risk of injury to self to themselves and/or serious disruption of medical treatment required to keep patient stable. The patient's current medical and behavioral conditions that warrant the use intervention include danger to self and Interference with medical equipment or treatment. Restraint or seclusion will be discontinued at the earliest possible time, regardless of the scheduled expiration of the order.  Based on my evaluation, restraints will be continued: YES 11/5/21 7:52 AM  o Attending Overseeing restraints: Davina Pineda MD VENU     Family contact/MPOA: Indy Wade (sister) [phone]   Family updated will update today       Daily Plans:   Needs replacement PEG tube, pending clearing of former PEG site infection    SBT   Palliative care meeting at 56    CONNOR time 21 minutes        Costa Coffey NP  11/05/21  Pulmonary, [address] [place] [address],[address] [facility]

## 2021-11-05 NOTE — PROGRESS NOTES
Problem: Patient Education: Go to Patient Education Activity  Goal: Patient/Family Education  Outcome: Progressing Towards Goal     Problem: Falls - Risk of  Goal: *Absence of Falls  Description: Document Leafy Medina Fall Risk and appropriate interventions in the flowsheet.   Outcome: Progressing Towards Goal  Note: Fall Risk Interventions:  Mobility Interventions: Assess mobility with egress test, Bed/chair exit alarm, Communicate number of staff needed for ambulation/transfer, PT Consult for mobility concerns    Mentation Interventions: Adequate sleep, hydration, pain control, Bed/chair exit alarm, Door open when patient unattended, Evaluate medications/consider consulting pharmacy, Increase mobility, More frequent rounding, Reorient patient, Room close to nurse's station, Toileting rounds, Update white board    Medication Interventions: Bed/chair exit alarm, Evaluate medications/consider consulting pharmacy    Elimination Interventions: Bed/chair exit alarm, Call light in reach, Patient to call for help with toileting needs, Stay With Me (per policy), Toileting schedule/hourly rounds              Problem: Patient Education: Go to Patient Education Activity  Goal: Patient/Family Education  Outcome: Progressing Towards Goal     Problem: Discharge Planning  Goal: *Discharge to safe environment  Outcome: Progressing Towards Goal  Goal: *Knowledge of medication management  Outcome: Progressing Towards Goal  Goal: *Knowledge of discharge instructions  Outcome: Progressing Towards Goal     Problem: Patient Education: Go to Patient Education Activity  Goal: Patient/Family Education  Outcome: Progressing Towards Goal     Problem: Patient Education: Go to Patient Education Activity  Goal: Patient/Family Education  Outcome: Progressing Towards Goal     Problem: Patient Education: Go to Patient Education Activity  Goal: Patient/Family Education  Outcome: Progressing Towards Goal     Problem: Pressure Injury - Risk of  Goal: *Prevention of pressure injury  Description: Document Kali Scale and appropriate interventions in the flowsheet. Outcome: Progressing Towards Goal  Note: Pressure Injury Interventions:  Sensory Interventions: Assess changes in LOC, Assess need for specialty bed, Avoid rigorous massage over bony prominences, Check visual cues for pain, Discuss PT/OT consult with provider, Float heels, Keep linens dry and wrinkle-free, Maintain/enhance activity level, Minimize linen layers, Monitor skin under medical devices, Pad between skin to skin, Pressure redistribution bed/mattress (bed type), Turn and reposition approx. every two hours (pillows and wedges if needed)    Moisture Interventions: Absorbent underpads, Apply protective barrier, creams and emollients, Assess need for specialty bed, Check for incontinence Q2 hours and as needed, Contain wound drainage, Internal/External urinary devices, Limit adult briefs, Maintain skin hydration (lotion/cream), Minimize layers, Moisture barrier, Offer toileting Q_hr    Activity Interventions: Assess need for specialty bed, Pressure redistribution bed/mattress(bed type)    Mobility Interventions: Assess need for specialty bed, Float heels, HOB 30 degrees or less, Pressure redistribution bed/mattress (bed type), PT/OT evaluation, Turn and reposition approx.  every two hours(pillow and wedges)    Nutrition Interventions: Document food/fluid/supplement intake, Discuss nutritional consult with provider, Offer support with meals,snacks and hydration    Friction and Shear Interventions: Apply protective barrier, creams and emollients, Foam dressings/transparent film/skin sealants, HOB 30 degrees or less, Lift sheet, Lift team/patient mobility team, Minimize layers                Problem: Patient Education: Go to Patient Education Activity  Goal: Patient/Family Education  Outcome: Progressing Towards Goal     Problem: Ventilator Management  Goal: *Adequate oxygenation and ventilation  Outcome: Progressing Towards Goal  Goal: *Patient maintains clear airway/free of aspiration  Outcome: Progressing Towards Goal  Goal: *Absence of infection signs and symptoms  Outcome: Progressing Towards Goal  Goal: *Normal spontaneous ventilation  Outcome: Progressing Towards Goal     Problem: Patient Education: Go to Patient Education Activity  Goal: Patient/Family Education  Outcome: Progressing Towards Goal     Problem: Non-Violent Restraints  Goal: Removal from restraints as soon as assessed to be safe  Outcome: Progressing Towards Goal  Goal: No harm/injury to patient while restraints in use  Outcome: Progressing Towards Goal  Goal: Patient's dignity will be maintained  Outcome: Progressing Towards Goal  Goal: Patient Interventions  Outcome: Progressing Towards Goal     Problem: Pain  Goal: *Control of Pain  Outcome: Progressing Towards Goal  Goal: *PALLIATIVE CARE:  Alleviation of Pain  Outcome: Progressing Towards Goal     Problem: Patient Education: Go to Patient Education Activity  Goal: Patient/Family Education  Outcome: Progressing Towards Goal

## 2021-11-05 NOTE — PROGRESS NOTES
attended the interdisciplinary rounds for Radha Al, who is a 67 y. o.,male. Patients Primary Language is: [place]. According to the patients EMR Hindu Affiliation is: Yazidi. The reason the Patient came to the hospital is:   Patient Active Problem List    Diagnosis Date Noted    Gastrostomy complication ([facility] 24/80/0522    Severe protein-calorie malnutrition ([facility] 10/20/2021    Goals of care, counseling/discussion     Acute respiratory failure with hypoxia ([facility]     Debility     Pneumonia 10/18/2021    Urinary frequency 06/30/2016    Scarlet fever     [facility]ocardial infarction [facility])     Coronary artery disease involving native coronary artery of native heart without angina pectoris,s/p stent 2003 05/19/2016      Plan:  Rayne Casanova will continue to follow and will provide pastoral care on an as needed/requested basis.  recommends bedside caregivers page  on duty if patient shows signs of acute spiritual or emotional distress.     [address]  Board Certified [facility]   [phone]

## 2021-11-05 NOTE — PALLIATIVE CARE
[address] [facility] [phone]  DR. JANSEN'[facility] [phone]    Jessy Serrano NP and this [facility] met with pt, his sister, Kofi Whiting and her spouse, Ale Pitt at bedside to address goals of care. Estefania Mtz NP from ICU also participated in meeting and provided medical update on condition. Pt is currently sedated and is awake, but not able to make complex medical decisions, due to cognitive impairment from sedative medication. Pt's brother-in-law held cell phone which translated verbal communication to text format for pt, due to pt is deaf. Update on medical condition was provided by Estefania Mtz NP. Jessy Serrano NP explained that pt does not have a cough/gag reflex therefore, is not able to protect airway, and will require a tracheostomy if they wish to pursue aggressive medical treatment for pt. Benefits and burdens of tracheostomy were explained in great detail. Pt's sister was focused on pt's nutritional status and stated desire to have Peg tube placed prior to making decision about trach. It was explained to her that the timeframe for reinserting peg tube surgically is on hold until further healing from the accidental removal of the prior peg tube is allowed to occur, and therefore trach would need to be accomplished prior to this. It was further explained that with trach and peg, pt will likely need to be placed in an LTAC to see if can wean off trach, and even then, pt will likely require long-term care placement in a SNF. After further discussion and explanation it was determined pt's sister will discuss this with pt's family prior to deciding whether or not to trach pt. She reports pt would not want trach long-term, but they want to give him an opportunity to recover. Betty Marie tried to engage pt in conversation throughout and state they would like him to participate in decision if possible.   NP explained due to pt's currently being on sedative medication, it's unlikely he can understand discussion adequately, to make decision related to complex medical procedures. Palliative Care informed them will allow them the weekend to discuss decision with family and will follow up with them early next week. They were agreeable to this. Thank you for this referral to Palliative Care. The palliative care team remains available to provide support for patient and his family and to readdress goals of care as required.       Say Bianchi, [address] [facility]  Palliative Medicine Inpatient   [facility]  Palliative COPE Line: [phone])

## 2021-11-05 NOTE — PROGRESS NOTES
[facility]. Pulmonary, Critical Care, and Sleep Medicine    Name: Ron Cheung MRN: [id number]   : 1945 Hospital: [facility]   Date: 2021  Admission Date: 10/18/2021     Chart and notes reviewed. Data reviewed. I have evaluated all findings. [x]I have reviewed the flowsheet and previous days notes. []The patient is unable to give any meaningful history or review of systems because the patient is:  []Intubated []Sedated   []Unresponsive      []The patient is critically ill on      []Mechanical ventilation []Pressors   []BiPAP []         Interval HPI:  Patient is a 74 y. o. male with PMHx of chronic respiratory failure, ILD, bronchiectasis who presents with failure to thrive. He presented with dyspnea and poor PO intake. He was placed on some O2 and CT chest showed some worsening consolidations in the presence of the chronic lung disease. He follows with us in clinic and was instructed to use hypertonic saline, acapella and mucinex, unsure of compliance. He was admitted to the floor, but RRT was called 10/27/21 for encephalopathy and acute hypercapnic respiratory failure. Anesthesia paged and he was intubated and transferred to ICU. Noted to have PNA and pulm edema on top of bronchiectasis and ILD, also a component of cardiogenic ( EF 25%) and septic shock. Multiple Cavitary lesions - likely 2/2 recurrent aspiration. Respiratory cx + MSSA, light candida albicans. PEG tube placed this admission on 10/21 for dysphagia, but inadvertently dislodged, thus will need to be replaced. Patient self-extubated 10/27/21 and was emergently re-intubated due to unresponsiveness to voice and noxious stimuli.  Patient has inability to cough, thus would benefit from trach, but patient does not wish to be trached. Made partial code (21).  Ideally, comfort care vs. Trach  Subjective 21  Hospital Day:8  Vent Day:8  Overnight events:Thoracentesis yesterday for eft pleural effusion, 500 ml drained out and sent for analysis. Febrile with Tmax 101.7  Mentation/Activity: Pt sedated with Fentanyl ggt, arouses with verbal stimulus, followed simple commands  Respiratory/ Secretions:Small amount of secretions, compliant with vent   Hemodynamics:Soft pressures, off Levophed  Urine output, bowel: Good urine output  Diet:tolerating tube feeds  Need for procedures:none              ROS:Pertinent items are noted in HPI. Events and notes from last 24 hours reviewed. Care plan discussed on multidisciplinary rounds. Patient Active Problem List   Diagnosis Code    Coronary artery disease involving native coronary artery of native heart without angina pectoris,s/p stent  I25.10    Scarlet fever A38.9    Myocardial infarction ([facility] I21.9    Urinary frequency R35.0    Pneumonia J18.9    Goals of care, counseling/discussion Z71.89    Acute respiratory failure with hypoxia ([facility] J96.01    Debility R53.81    Severe protein-calorie malnutrition ([facility] E43    Gastrostomy complication (HCC) 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 User may not have seen previous data. Resp 14   Ht 5' 7\" (1.702 m)   Wt 55.7 kg (122 lb 12.7 oz)   SpO2 97%   BMI 19.23 kg/m²       O2 Device: Endotracheal tube, Ventilator   O2 Flow Rate (L/min): 6 l/min   Temp (24hrs), Av.3 °F (37.9 °C), Min:98.8 °F (37.1 °C), Max:101.7 °F (38.7 °C)       Intake/Output:   Last shift:      No intake/output data recorded.   Last 3 shifts:  1901 -  0700  In: 3526.5 [I.V.:1061.5]  Out: 2860 [Urine:2860]    Intake/Output Summary (Last 24 hours) at 2021 0836  Last data filed at 2021 0700  Gross per 24 hour   Intake 2493.54 ml   Output 2135 ml   Net 358.54 ml        Ventilator Settings:  Ventilator Mode: Assist control, VC+  Respiratory Rate  Resp Rate Observed: 40  Back-Up Rate: 14  Insp Time (sec): 1 sec  I:E Ratio: 1: 3.3  Ventilator Volumes  Vt Set (ml): 400 ml  Vt Exhaled (Machine Breath) (ml): 382 ml  Vt Spont (ml): 315 ml  Ve Observed (l/min): 6.46 l/min  Ventilator Pressures  Pressure Support (cm H2O): 10 cm H2O  PIP Observed (cm H2O): 30 cm H2O  Plateau Pressure (cm H2O): 25 cm H2O  MAP (cm H2O): 15  PEEP/VENT (cm H2O): 5 cm H20  Auto PEEP Observed (cm H2O): 6 cm H2O    Current Facility-Administered Medications   Medication Dose Route Frequency    potassium, sodium phosphates (NEUTRA-PHOS) packet 1 Packet  1 Packet Per G Tube QID    piperacillin-tazobactam (ZOSYN) 4.5 g in 0.9% sodium chloride (MBP/ADV) 100 mL MBP  4.5 g IntraVENous Q6H    levoFLOXacin (LEVAQUIN) 750 mg in D5W IVPB  750 mg IntraVENous Q24H    anidulafungin (ERAXIS) 100 mg in 0.9% sodium chloride 130 mL IVPB  100 mg IntraVENous Q24H    NOREPINephrine (LEVOPHED) 8 mg in 5% dextrose 250mL (32 mcg/mL) infusion  0.5-50 mcg/min IntraVENous TITRATE    polyethylene glycol (MIRALAX) packet 17 g  17 g Per NG tube BID    senna-docusate (PERICOLACE) 8.6-50 mg per tablet 1 Tablet  1 Tablet Oral DAILY    famotidine (PF) (PEPCID) 20 mg in 0.9% sodium chloride 10 mL injection  20 mg IntraVENous Q12H    thiamine HCL (B-1) tablet 200 mg  200 mg Per G Tube DAILY    chlorhexidine (PERIDEX) 0.12 % mouthwash 10 mL  10 mL Oral Q12H    fentaNYL (PF) 1,500 mcg/30 mL (50 mcg/mL) infusion  0-200 mcg/hr IntraVENous TITRATE    insulin lispro (HUMALOG) injection   SubCUTAneous Q6H    bacitracin zinc-polymyxin b (POLYSPORIN) 500-10,000 unit/gram ointment   Topical BID    albuterol-ipratropium (DUO-NEB) 2.5 MG-0.5 MG/3 ML  3 mL Nebulization Q4H RT    sodium chloride 3% hypertonic nebulizer soln  4 mL Nebulization Q4HWA RT    Lactobacillus Acidoph & Bulgar (FLORANEX) tablet 2 Tablet  2 Tablet Per G Tube BID    aspirin chewable tablet 81 mg  81 mg Per G Tube DAILY    cholecalciferol (VITAMIN D3) (1000 Units /25 mcg) tablet 1,000 Units  1,000 Units PEG Tube DAILY    enoxaparin (LOVENOX) injection 40 mg  40 mg SubCUTAneous QHS    sodium chloride (NS) flush 5-40 mL  5-40 mL IntraVENous Q8H    omega-3 acid ethyl esters (LOVAZA) capsule 1,000 mg  1 g Oral DAILY WITH BREAKFAST    therapeutic multivitamin (THERAGRAN) tablet 1 Tablet  1 Tablet Oral DAILY         Telemetry: []Sinus []A-flutter []Paced    []A-fib []Multiple PVCs                  Physical Exam:   General: Intubated/sedated; cachectic, frail looking  HEENT:  Anicteric sclerae; pink palpebral conjunctivae; mucosa moist  Resp:  Symmetrical chest expansion, no accessory muscle use; good airway entry; no rales/ wheezing/ rhonchi noted  CV:  S1, S2 present; regular rate and rhythm  GI:  Abdomen soft, non-tender; (+) active bowel sounds. Dressing for old PEG tube site dry and intact  Extremities:  +2 pulses on all extremities; no edema/ cyanosis/ clubbing noted, b/l wrist restraints   Skin:  Warm; no rashes/ lesions noted, poor  turgor/cap refill   Neurologic:  Non-focal, easy to arouse with verbal stimulus, follows simple commands  Devices:  OGT, Central line right femoral, ETT, Merlos, PIV       DATA:  MAR reviewed and pertinent medications noted or modified as needed    Labs:  Recent Labs     11/05/21  0300 11/04/21  0340 11/03/21  0300   WBC 7.8 8.3 7.7   HGB 8.1* 9.1* 9.1*   HCT 26.9* 31.0* 30.3*    267 232     Recent Labs     11/05/21  0300 11/04/21  0340 11/03/21  0300   * 131* 135*   K 4.8 4.6 4.5   CL 95* 97* 99*   CO2 36* 32 36*   * 116* 122*   BUN 19* 15 18   CREA 0.41* 0.37* 0.32*   CA 7.8* 7.8* 8.2*   MG 2.1 2.1 2.1   PHOS 2.8 1.9* 1.9*   ALB  --  1.5*  --    ALT  --  30  --      No results for input(s): PH, PCO2, PO2, HCO3, FIO2 in the last 72 hours.   Recent Labs     11/05/21  0411 11/04/21  0439 11/03/21  0447   FIO2I 55 50 40   HCO3I 34.8* 32.9* 34.7*   PCO2I 56.0* 49.1* 56.6*   PHI 7.40 7.44 7.40   PO2I 62* 67* 55*       Imaging:  [x]   I have personally reviewed the patients radiographs and reports  XR Results (most recent):  XR Results (most recent):  Results from Hospital Encounter encounter on 10/18/21    XR CHEST PORT    Narrative  EXAMINATION: Chest single view    INDICATION: Intubation    COMPARISON: 11/3/2021    FINDINGS: Single frontal view of the chest obtained. ETT tip 3.7 cm above  jero. Esophagogastric tube tip at level of gastric body. Esophageal probe tip  at level of lower esophagus. Mediastinal silhouette normal in size. Minimal  aortic arch calcifications. Extensive patchy streaky consolidative opacities,  most confluent in the right upper lobe. No definite pneumothorax identified. Right greater and left biapical, as well as left costophrenic angle pleural  shadow thickening. Impression  Lines/tubes as above. Extensive pulmonary and pleural opacities as described  above, similar to most recent prior, but suspected gradual worsening when  compared to multiple recent radiographs. CT Results (most recent):  Results from Hospital Encounter encounter on 10/18/21    CT CHEST ABD PELV W CONT    Narrative  CT chest, abdomen and pelvis with contrast    CLINICAL HISTORY: evaluate for abdominal abscess at site of recent peg tube,  parapneumonic effusion/empyema. persistent fevers/hypotension despite broad  spectrum abx    COMPARISON: Abdomen pelvis CT 10/25/21 and chest CT 10/18/21. TECHNIQUE: Helical scan to the chest, abdomen and pelvis are obtained from the  thoracic inlet to the symphysis pubis after IV contrast administration. All CT scans at this facility performed using dose optimization techniques as  appreciated to a performed exam, to include automated exposure control,  adjustment of the mA and or KU according to patient size (including appropriate  matching for site specific examination), or use of iterative reconstruction  technique.     FINDINGS:    CT CHEST:    Lung Parenchyma: Multiple large areas of consolidations with air bronchogram and  surrounding infiltrations identified bilaterally, moderately interval progressed  since the prior chest CT. There is associated significant volume loss in  bilateral upper lobes. Superimposed compressive atelectasis at posterior  bilateral lower lobes also identified due to pleural effusions. Thyroid: Atrophic. Lymph nodes: No adenopathy. Mediastinum: Borderline size of the heart. No significant pericardial effusion. Moderate coronary artery calcifications. Vasculature: The aorta and the great vessels are unremarkable. Collapsed  esophagus with NG tube present. Pleural Space And Chest Wall: There is a moderate to large nonloculated left  pleural effusion developed. Small right pleural effusion is interval progressed  and loculated. The loculated air-fluid collection at the lateral right upper  lung is similar in size but more fluid level seen since the prior study and  measures 2.6 x 6.5 x 6.2 cm. The air component is communicate with peripheral  bronchial branches, best seen on coronal #30/601. CT ABDOMEN AND PELVIS:    Liver: Normal.  Gallbladder: Elongated and distended with mild wall thickening and surrounding  pericholecystic fluid. Small layering hyperdense debris/calculi. Biliary System: No ductal dilatation. Spleen: Normal.  Pancreas: Normal.  Bowel: The gastric G-tube is interval removed with NG tube present. The  extensive emphysema in anterior upper/mid abdomen seen on prior study is no  longer evident today. The small and large bowel are nondilated. Moderate stool  burden in the colon. Scattered diverticula in the colon. No definite colonic  inflammation. Adrenal Glands: Normal.  Kidneys: Normal.  Lower genitourinary system: The bladder is decompressed with Merlos catheter  present. Peritoneum/Retroperitoneum: No adenopathy. Vasculature: Advanced atherosclerotic aortic disease. Other: Small pelvic free fluid present. No focal fluid collection or abscess  identified.  Moderate edema in pelvic wall and bilateral thigh, unchanged. CT OSSEOUS STRUCTURES:    Compression deformity at T11 appears unchanged. Absence of the left femoral neck  and lateral portion of left femoral head with superior subluxation of proximal  femur, unchanged. Impression  1. Significant regression of multiple large consolidations and surrounding  infiltrations in bilateral lungs since the prior CT, indicative of progression  of infectious process. 2. New large left pleural effusion and increased loculated right pleural  effusion. The loculated hydropneumothorax shows more layering fluid component  since the prior CT. The air component communicating with lateral bronchial  branch in right upper lobe. 3. Gallbladder hydrops with small layering hyperdense debris. Mild  pericholecystic fluid. Recommend ultrasound correlation for potential  cholecystitis. 4. Interval removal of G-tube from left anterior abdominal wall with interval  resolved extensive emphysema. Persistent anasarca extending to the bilateral  thigh. Thank you for this referral.       10/18/21    ECHO ADULT COMPLETE 10/26/2021 10/26/2021    Interpretation Summary  · LV: Estimated LVEF is 25 - 30%. Visually measured ejection fraction. Normal cavity size and wall thickness. Severely and segmentally reduced systolic function. Age-appropriate left ventricular diastolic function. · PA: Severe pulmonary hypertension. Pulmonary arterial systolic pressure is 78 mmHg. · IVC: Mildly elevated central venous pressure (8 mmHg); IVC diameter is less than 21 mm and collapses less than 50% with respiration. · Image quality for this study was technically difficult. · Echo study was limited due to patient's condition. · RV: Not well visualized.     Signed by: Vicenta Juarez MD on 10/26/2021  6:26 PM       IMPRESSION:   · Acute hypoxic and hypercarbic respiratory failure: 2/2 PNA and pulm edema on top of bronchiectasis, ILD  · Shock:  Septic + cardiogenic  · Acute pulmonary edema: combination of worsening cardiomyopathy as well as hypoalbuminemia  · RUL PNA w bronchiectasis, RLL nodular consolidation, Cavitary lesions - likely 2/2 recurrent aspiration. resp cultures growing MSSA. At risk for invasive fungal PNA, NTM, given hx severe structural lung disease. 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 ?sepsis, stress, ischemic  · Severe pulmonary hypertension on echo. Definitely group II and III component   · Urinary retention - coude catheter placed by urology   · normocytic anemia-  stable  · Deconditioning/adult failure to thrive/cachexia: Body mass index is 19.34 kg/m². Patient Active Problem List   Diagnosis Code    Coronary artery disease involving native coronary artery of native heart without angina pectoris,s/p stent 2003 I25.10    Scarlet fever A38.9    Myocardial infarction ([address] I21.9    Urinary frequency R35.0    Pneumonia J18.9    Goals of care, counseling/discussion Z71.89    Acute respiratory failure with hypoxia ([address] J96.01    Debility R53.81    Severe protein-calorie malnutrition ([address] E43    Gastrostomy complication ([address] I87.69        RECOMMENDATIONS:   Neuro:Titrate sedation for RASS goal 0 to -1, daily sedation holiday.  Cont Fentanyl gtt. Bilateral wrist restraints   Pulm: Aspiration precautions, HOB>30'. VAP Bundle, Daily sedation vacation. SBT today, minimal vent settings.  Pt is a poor candidate for extubation, already failed once, severely deconditioned. CT chest showed large left pleural effusion which was drained yesterday, likely exudative and does not appear infectious, Another differential is pseudo transudate effusion from his CHF. CVS:Monitor HD, MAP goal >65. Currently off vasopressors but still with soft blood pressures, will keep femoral central line for now; pt was febrile overnight with risk of going back to vasopressor requirements  GI: NPO. Diet: Cont TF via OGT. Needs PEG tube replaced for chronic dysphagia. GI consulted and will hold off on PEG insertion until current PEG site infection cleared (currently still with pus and drainage). Re-consult when PEG site appears clear of infection. CT abdomen reveals resolution of abdominal abscess on PEG tube site. Renal: Trend Cr, UOP. Corcoran (coude) placed by Urology   Hem/Onc: Trend H/H, monitor for s/o active bleeding. Daily CBC. I/D:Trend WBCs and temperature curve. ABx per ID, broad spectrum (Zosyn, Levaquin).  Rcx w/ MSSA, rare yeast. Wound cx with moderate candida albicans, on Eraxis. Still with fevers overnight, F/u susceptibility of of Mycobacterium in sputum culture  Metabolic: Daily BMP, mag, phos. Trend lytes and replace per protocol. Replace phos today   Endocrine:Q6 glucoses. SSI. Avoid hypoglycemia. Musc/Skin:  wound care   Partial Code   Discussed in interdisciplinary rounds  Palliative care meeting today at 1500 to discuss goals of care: trach vs comfort care     Best practice :    Glycemic control  IHI ICU bundles:   Central Line Bundle Followed , Corcoran Bundle Followed and Vent Bundle Followed, Vent Day 8    [facility] Vent patients- VAP bundle, aim to keep peak plateau pressure 82-97IP H2O  Sress ulcer prophylaxis. DVT prophylaxis. Need for Lines, corcoran assessed. Palliative care evaluation. Restraints need. Attending Non-violent Restraint Reevaluation     I have reevaluated the patient one hour after initiation of intervention.  The patient is comfortable, uninjured, but continues to pose an imminent risk of injury to self to themselves and/or serious disruption of medical treatment required to keep patient stable. The patient's current medical and behavioral conditions that warrant the use intervention include danger to self and Interference with medical equipment or treatment. Restraint or seclusion will be discontinued at the earliest possible time, regardless of the scheduled expiration of the order. Based on my evaluation, restraints will be continued: YES    8:36 AM    Abelardo Valle MD         High complexity decision making was performed during this consultation and evaluation. [x]       Pt is at high risk for further organ failure and dysfunction. Critical care time spent: 35   minutes with patient exclusive of procedures.     Lei Seay NP   11/05/21  Pulmonary, Critical Care Medicine  [facility]

## 2021-11-05 NOTE — CONSULTS
[address] [facility] 54: [id number])  [facility]: [address] [place] [address] Way: [phone]    Patient Name: Jacinto Alicia  YOB: 1945    Date of follow up 11/5/2021   Reason for Consult: goals of care discussions   Requesting Provider: Dr Mao  Primary Care Physician: Chanel Santoro, NP      SUMMARY:   Jacinto Alicia is a 67y.o. year old with a past history of CAD. S/p stent in 2003, scarlet fever at age 9, interstitial lung disease, chronic bronchiectasis, and chronic aspiration with dysphagia   , who was admitted on 10/18/2021 from home  with a diagnosis of hypoxemic respiratory failure possibly due to aspiration pneumonia . Current medical issues leading to Palliative Medicine involvement include: 67year old male with several life limiting chronic illnesses. Palliative medicine is consulted for support and goals of care discussions. 11/5/2021 Met with patient's sister Dipika Thomas and brother in law Carmita Tomas again today. Re discussed his current condition, unfortunately no cough, he is not eligible for medical extubation, only option is compassionate extubation or trach. 11/1/2021 family meeting today with his sister Indy who MPOA and her  Carmita Tomas. Made Partial code no chest compressions, or shock, continue all other treatments    10/29/21:  Rapid response called 4 days ago when patient decompensated respiratory status he was intubated at that time. He self extubated 2 days ago and was re intubated. 10/21/2021 alert, no complaints this am. PEG placement today. PALLIATIVE DIAGNOSES:   1. Goals of care   2. Acute hypoxic respiratory failure   3. Pneumonia likely due to aspiration   4. Debility        PLAN:   11/5/2021 patient seen at bedside remains orally intubated, sedated but will arouse to his name. Met with his sister Indy and brother in law Carmita Tomas at bedside. Appreciate ICU assistance with discussions.  Shared with sister and brother in law he has no cough and because of this he is not eligible for medical extubation. Only 2 options, going forward with trach or compassionate extubation. Discussed unfortunately despite all treatment Jagruti Kang is not likely to return to enough his base line level of functioning ( which was a sedentary life style ). If trach option is elected he will need NGT for feeds until PEG can be placed and will likely need soft restraints for his safety. He will also likely need facility care for the rest of his life. Sister wished to speak with her other siblings prior to making a decision. Patient is currently sedated with 150 mcg of Fentanyl. Cautioned family do not believe he completely understood conversation. We will re discuss with family on Monday. Goals of care Partial code no chest compressions, no shock at cardiac arrest, Re intubate if ET becomes accidentally dislodged. Pressor support acceptable. ( please see below for previous notes per palliative team)     11/1/2021 Mr Manny Santos seen along with Ms Yolis Cope. Appreciate ICU NP also attending meeting. Met with Ms Wade ( Indy) sister who is MPOa and her  [facility] who is secondary MPOA. Updated on medical condition. Sister and  would like to have PEG replaced to give him nourishment to give him the best opportunity to thrive. Goals of care discussed, sister was clear she would not wish for him to be subjected to chest compressions or shock at cardiac arrest, however would like to continue all other treatments, including PEG replacement, IVAB, oral intubation, replace tube if accidentally dislodged. Briefly discussed if he were not able to be liberated from the ventilator next step would be trach, sister did not believe he would want this step and are hopeful it will not come to that discussion. Family is aware patient is very frail and despite all treatment he may not do as well as all hope.  Goals of care partial code, no chest compressions, no shock at cardiac arrest, continue all other medical interventions including intubation and replace ET if dislodged. Attending team aware as well as nurse of goals of care change. ( please see below for previous notes)     10/29/21: This NP and Miguel Wills RN in to see patient at the bedside. Assessment completed and spoke with ICU team about goals of care. Patient is sedated, intubated, and mechanically ventilated. Our team is reaching out to his family Indy Wade to set up a meeting to discuss goals of care moving forward. At this time patient remains a full code with full interventions    UPDATE:   Meeting set up with patient's family for Monday at 12:00 11/1/21. Appreciate any assistance from the ICU team.     See below for prior discussions:    10/21/2021 Mr Christiano Pierce seen along with Ms Chasidy Royal. Alert, deaf, although able to communicate with written word. Denies pain, still some shortness of breath, he is not sure the oxygen is helping. Breathing a bit more labored this am. O2 sats 95% on nasal cannula oxygen. Noted plans for PEG placement later today. Goals of care have not changed patient wishes for full code with full interventions. Goals of care are defined. Palliative medicine will sign off at this time Please re consult if we can be of service. ( please see below for previous notes per palliative team)     1. Goals of care Patient seen along with Ms Georgia Turcios RN. He is alert, deaf and communicates verbally however needs questions text or written. We were able to write our questions today. He denied pain or shortness of breath. Noted AMD on file naming his sister Indy Wade as MPOA and her  Anton Sparks as secondary. He wished no changes today. Able to discuss goals of care through writing the question and explaining the benefits and burdens. He was able to verbally tell us he wished to be resuscitated in the event of cardiac arrest and intubation if needed.  This was consistent with his AMD wishes. Goals of care full code with full interventions. Communicated with patient by writing to have further thought and consideration of resuscitation as these efforts will not cure, treat or reverse his underlying chronic medical conditions and likely cause more functional debility. 2. Acute hypoxic respiratory failure likely due to pneumonia but has interstitial lung disease. Maintaining on nasal cannula currently   3. Pneumonia due to aspiration. Appreciate speech consult at time of our visit MBS pending. 4. Debility has care givers during weekdays, sister and brother in law provide care on weekends. Uses walker for ambulation. Very thin and frail appearing albumin 2.4   5. Initial consult note routed to primary continuity provider  6. Communicated plan of care with: Palliative IDT, patient     Patient/Health Care Proxy Stated Goals: Prolong life      TREATMENT PREFERENCES:   Code Status: Partial Code    Advance Care Planning:  [] The [facility] Interdisciplinary Team has updated the ACP Navigator with [address] and Patient Capacity    Primary Decision Maker ([address]): AMD on file naming his sister Ivan Daughters as MPOA   Brother in law Gillermina Fus as secondary     Medical Interventions: Full interventions     Artificially Administered Nutrition: Feeding tube long-term, if indicated     Other:  As far as possible, the palliative care team has discussed with patient / health care proxy about goals of care / treatment preferences for patient. HISTORY:     History obtained from: chart and patient     CHIEF COMPLAINT: respiratory failure     HPI/SUBJECTIVE:    The patient is:   [x] Verbal and participatory receives communication via writing and via text.  He is verbal  [x] Non-participatory due to: 11/1/2021 due to intubation   Please see summary     Clinical Pain Assessment (nonverbal scale for nonverbal patients): Clinical Pain Assessment  Severity: 0     Activity (Movement): Lying quietly, normal position    Duration: for how long has pt been experiencing pain (e.g., 2 days, 1 month, years)  Frequency: how often pain is an issue (e.g., several times per day, once every few days, constant)     FUNCTIONAL ASSESSMENT:     Palliative Performance Scale (PPS):  PPS: 30    ECOG  ECOG Status : Completely disabled     PSYCHOSOCIAL/SPIRITUAL SCREENING:      Any spiritual / Mosque concerns:  [] Yes /  [x] No    Caregiver Burnout:  [] Yes /  [] No /  [x] No Caregiver Present      Anticipatory grief assessment:   [x] Normal  / [] Maladaptive        REVIEW OF SYSTEMS:     Positive and pertinent negative findings in ROS are noted above in HPI. The following systems were [x] reviewed / [] unable to be reviewed as noted in HPI  Other findings are noted below. Systems: constitutional, ears/nose/mouth/throat, respiratory, gastrointestinal, genitourinary, musculoskeletal, integumentary, neurologic, psychiatric, endocrine. Positive findings noted below. Modified ESAS Completed by: provider   Fatigue: 5       Pain: 0   Anxiety: 0 Nausea: 0     Dyspnea: 0           Stool Occurrence(s): 0        PHYSICAL EXAM:     Wt Readings from Last 3 Encounters:   11/05/21 55.7 kg (122 lb 12.7 oz)   08/31/21 51.7 kg (114 lb)   06/29/21 53.1 kg (117 lb)     Blood pressure 120/72, pulse 94, temperature (!) 102 °F (38.9 °C), resp. rate 24, height 5' 7\" (1.702 m), weight 55.7 kg (122 lb 12.7 oz), SpO2 97 %.   Pain:  Pain Scale 1: (P) Adult Nonverbal Pain Scale  Pain Intensity 1: 0  Pain Onset 1: surgical  Pain Location 1: Abdomen  Pain Orientation 1: Anterior  Pain Description 1: Aching  Pain Intervention(s) 1: Medication (see MAR)  Last bowel movement: none recorded     Constitutional: remains orally intubated and sedated in NAD   ENMT: intubated   Cardiovascular: RRR  Respiratory: mechanically ventilated   Skin: warm, dry  Neurologic: sedated but arouses to his name        HISTORY:     Active Problems:    Pneumonia (10/18/2021) Severe protein-calorie malnutrition ([facility] (10/20/2021)      Gastrostomy complication ([facility] (37/85/0840)      Past Medical History:   Diagnosis Date    CAD (coronary artery disease)     MI 1995; stent 2003 (done preop cochlear sgy)    Cancer ([facility]     SQUAMOUS CELL    Coronary artery disease     Deafness     Myocardial infarction [facility]) [id number]    Scarlet fever age 9      Past Surgical History:   Procedure Laterality Date    BRONCH-FIBER/DIAGNOSTIC  6/16/2016         HX COLONOSCOPY  2013    neg    [place]    HX HEART CATHETERIZATION  2003    HX HEART CATHETERIZATION  1999    HX HERNIA REPAIR      inguinal ? laterality    HX OTHER SURGICAL      2 cochlear implants (R); 1 left - all failed      Family History   Problem Relation Age of Onset    No Known Problems Mother     Heart Disease Father     Heart Attack Father         1989    Coronary Art Dis Father     Hypertension Father     High Cholesterol Father      History reviewed, no pertinent family history.   Social History     Tobacco Use    Smoking status: Never Smoker    Smokeless tobacco: Never Used   Substance Use Topics    Alcohol use: No     Allergies   Allergen Reactions    Pollens Extract Runny Nose and Cough      Current Facility-Administered Medications   Medication Dose Route Frequency    potassium, sodium phosphates (NEUTRA-PHOS) packet 1 Packet  1 Packet Per G Tube QID    piperacillin-tazobactam (ZOSYN) 4.5 g in 0.9% sodium chloride (MBP/ADV) 100 mL MBP  4.5 g IntraVENous Q6H    levoFLOXacin (LEVAQUIN) 750 mg in D5W IVPB  750 mg IntraVENous Q24H    anidulafungin (ERAXIS) 100 mg in 0.9% sodium chloride 130 mL IVPB  100 mg IntraVENous Q24H    NOREPINephrine (LEVOPHED) 8 mg in 5% dextrose 250mL (32 mcg/mL) infusion  0.5-50 mcg/min IntraVENous TITRATE    polyethylene glycol (MIRALAX) packet 17 g  17 g Per NG tube BID    senna-docusate (PERICOLACE) 8.6-50 mg per tablet 1 Tablet  1 Tablet Oral DAILY    famotidine (PF) (PEPCID) 20 mg in 0.9% sodium chloride 10 mL injection  20 mg IntraVENous Q12H    thiamine HCL (B-1) tablet 200 mg  200 mg Per G Tube DAILY    LORazepam (ATIVAN) injection 2 mg  2 mg IntraVENous Q15MIN PRN    HYDROmorphone (DILAUDID) syringe 1 mg  1 mg IntraVENous Q15MIN PRN    chlorhexidine (PERIDEX) 0.12 % mouthwash 10 mL  10 mL Oral Q12H    midazolam (VERSED) injection 2 mg  2 mg IntraVENous Q10MIN PRN    fentaNYL citrate (PF) injection 100 mcg  100 mcg IntraVENous Q30MIN PRN    fentaNYL (PF) 1,500 mcg/30 mL (50 mcg/mL) infusion  0-200 mcg/hr IntraVENous TITRATE    insulin lispro (HUMALOG) injection   SubCUTAneous Q6H    glucose chewable tablet 16 g  16 g Oral PRN    glucagon (GLUCAGEN) injection 1 mg  1 mg IntraMUSCular PRN    naloxone (NARCAN) injection 0.4 mg  0.4 mg IntraVENous PRN    albuterol-ipratropium (DUO-NEB) 2.5 MG-0.5 MG/3 ML  3 mL Nebulization Q4H RT    sodium chloride 3% hypertonic nebulizer soln  4 mL Nebulization Q4HWA RT    Lactobacillus Acidoph & Bulgar (FLORANEX) tablet 2 Tablet  2 Tablet Per G Tube BID    guaiFENesin (ROBITUSSIN) 100 mg/5 mL oral liquid 400 mg  400 mg Per G Tube Q4H PRN    albuterol-ipratropium (DUO-NEB) 2.5 MG-0.5 MG/3 ML  3 mL Nebulization Q4H PRN    aspirin chewable tablet 81 mg  81 mg Per G Tube DAILY    cholecalciferol (VITAMIN D3) (1000 Units /25 mcg) tablet 1,000 Units  1,000 Units PEG Tube DAILY    acetaminophen (TYLENOL) tablet 650 mg  650 mg Per G Tube Q6H PRN    Or    acetaminophen (TYLENOL) suppository 650 mg  650 mg Rectal Q6H PRN    enoxaparin (LOVENOX) injection 40 mg  40 mg SubCUTAneous QHS    sodium chloride (NS) flush 5-10 mL  5-10 mL IntraVENous PRN    sodium chloride (NS) flush 5-40 mL  5-40 mL IntraVENous Q8H    sodium chloride (NS) flush 5-40 mL  5-40 mL IntraVENous PRN    omega-3 acid ethyl esters (LOVAZA) capsule 1,000 mg  1 g Oral DAILY WITH BREAKFAST    therapeutic multivitamin (THERAGRAN) tablet 1 Tablet  1 Tablet Oral DAILY        LAB AND IMAGING FINDINGS:     Lab Results   Component Value Date/Time    WBC 7.8 11/05/2021 03:00 AM    HGB 8.1 (L) 11/05/2021 03:00 AM    PLATELET 242 65/52/4611 03:00 AM     Lab Results   Component Value Date/Time    Sodium 132 (L) 11/05/2021 03:00 AM    Potassium 4.8 11/05/2021 03:00 AM    Chloride 95 (L) 11/05/2021 03:00 AM    CO2 36 (H) 11/05/2021 03:00 AM    BUN 19 (H) 11/05/2021 03:00 AM    Creatinine 0.41 (L) 11/05/2021 03:00 AM    Calcium 7.8 (L) 11/05/2021 03:00 AM    Magnesium 2.1 11/05/2021 03:00 AM    Phosphorus 2.8 11/05/2021 03:00 AM      Lab Results   Component Value Date/Time    Alk. phosphatase 55 11/04/2021 03:40 AM    Protein, total 5.2 (L) 11/04/2021 03:40 AM    Albumin 1.5 (L) 11/04/2021 03:40 AM    Globulin 3.7 11/04/2021 03:40 AM     Lab Results   Component Value Date/Time    INR 1.1 10/28/2021 02:39 AM    Prothrombin time 14.2 10/28/2021 02:39 AM      No results found for: IRON, FE, TIBC, IBCT, PSAT, FERR   No results found for: PH, PCO2, PO2  No components found for: [place]   Lab Results   Component Value Date/Time    CK 25 (L) 10/28/2021 02:39 AM    CK - MB 1.5 10/28/2021 02:39 AM              Total time: 35   minutes   Counseling / coordination time, spent as noted above:   > 50% counseling / coordination: yes with sister and brother in law     Prolonged service was provided for  []30 min   []75 min in face to face time in the presence of the patient, spent as noted above.   Time Start:   Time End:

## 2021-11-06 NOTE — PROGRESS NOTES
Problem: Patient Education: Go to Patient Education Activity  Goal: Patient/Family Education  Outcome: Progressing Towards Goal     Problem: Falls - Risk of  Goal: *Absence of Falls  Description: Document Georgia Rollins Fall Risk and appropriate interventions in the flowsheet.   Outcome: Progressing Towards Goal  Note: Fall Risk Interventions:  Mobility Interventions: Assess mobility with egress test, Bed/chair exit alarm, Communicate number of staff needed for ambulation/transfer, PT Consult for mobility concerns    Mentation Interventions: Adequate sleep, hydration, pain control, Bed/chair exit alarm, Door open when patient unattended, Evaluate medications/consider consulting pharmacy, Increase mobility, More frequent rounding, Reorient patient, Room close to nurse's station, Toileting rounds, Update white board    Medication Interventions: Bed/chair exit alarm, Evaluate medications/consider consulting pharmacy    Elimination Interventions: Bed/chair exit alarm, Call light in reach, Patient to call for help with toileting needs, Stay With Me (per policy), Toileting schedule/hourly rounds              Problem: Patient Education: Go to Patient Education Activity  Goal: Patient/Family Education  Outcome: Progressing Towards Goal     Problem: Discharge Planning  Goal: *Discharge to safe environment  Outcome: Progressing Towards Goal  Goal: *Knowledge of medication management  Outcome: Progressing Towards Goal  Goal: *Knowledge of discharge instructions  Outcome: Progressing Towards Goal     Problem: Patient Education: Go to Patient Education Activity  Goal: Patient/Family Education  Outcome: Progressing Towards Goal     Problem: Patient Education: Go to Patient Education Activity  Goal: Patient/Family Education  Outcome: Progressing Towards Goal     Problem: Patient Education: Go to Patient Education Activity  Goal: Patient/Family Education  Outcome: Progressing Towards Goal     Problem: Pressure Injury - Risk of  Goal: *Prevention of pressure injury  Description: Document Kali Scale and appropriate interventions in the flowsheet. Outcome: Progressing Towards Goal  Note: Pressure Injury Interventions:  Sensory Interventions: Assess changes in LOC, Assess need for specialty bed, Avoid rigorous massage over bony prominences, Check visual cues for pain, Discuss PT/OT consult with provider, Float heels, Keep linens dry and wrinkle-free, Maintain/enhance activity level, Minimize linen layers, Monitor skin under medical devices, Pad between skin to skin, Pressure redistribution bed/mattress (bed type), Turn and reposition approx. every two hours (pillows and wedges if needed)    Moisture Interventions: Absorbent underpads, Apply protective barrier, creams and emollients, Assess need for specialty bed, Check for incontinence Q2 hours and as needed, Contain wound drainage, Internal/External urinary devices, Limit adult briefs, Maintain skin hydration (lotion/cream), Minimize layers, Moisture barrier, Offer toileting Q_hr    Activity Interventions: Assess need for specialty bed, Pressure redistribution bed/mattress(bed type)    Mobility Interventions: Assess need for specialty bed, Float heels, HOB 30 degrees or less, Pressure redistribution bed/mattress (bed type), PT/OT evaluation, Turn and reposition approx.  every two hours(pillow and wedges)    Nutrition Interventions: Document food/fluid/supplement intake, Discuss nutritional consult with provider, Offer support with meals,snacks and hydration    Friction and Shear Interventions: Apply protective barrier, creams and emollients, Foam dressings/transparent film/skin sealants, HOB 30 degrees or less, Lift sheet, Lift team/patient mobility team, Minimize layers                Problem: Patient Education: Go to Patient Education Activity  Goal: Patient/Family Education  Outcome: Progressing Towards Goal     Problem: Ventilator Management  Goal: *Adequate oxygenation and ventilation  Outcome: Progressing Towards Goal  Goal: *Patient maintains clear airway/free of aspiration  Outcome: Progressing Towards Goal  Goal: *Absence of infection signs and symptoms  Outcome: Progressing Towards Goal  Goal: *Normal spontaneous ventilation  Outcome: Progressing Towards Goal     Problem: Patient Education: Go to Patient Education Activity  Goal: Patient/Family Education  Outcome: Progressing Towards Goal     Problem: Non-Violent Restraints  Goal: Removal from restraints as soon as assessed to be safe  Outcome: Progressing Towards Goal  Goal: No harm/injury to patient while restraints in use  Outcome: Progressing Towards Goal  Goal: Patient's dignity will be maintained  Outcome: Progressing Towards Goal  Goal: Patient Interventions  Outcome: Progressing Towards Goal     Problem: Pain  Goal: *Control of Pain  Outcome: Progressing Towards Goal  Goal: *PALLIATIVE CARE:  Alleviation of Pain  Outcome: Progressing Towards Goal     Problem: Patient Education: Go to Patient Education Activity  Goal: Patient/Family Education  Outcome: Progressing Towards Goal

## 2021-11-06 NOTE — ROUTINE PROCESS
2320 Fever noted of 38.3 as well as asynchrony with vent and tachycardia. Abdomen hot to the touch, extremities cool, shivering noted. Increased rate of fentanyl and provided tylenol, see mar. 2330 At approximately this time, Pt began desatting to 88-89, suction provided with thick copious amount of white sputum removed. Sats improved but still fluctuating into the high 80s low 90s with ventilator asynchrony at a rate of 40s and new hypertension. 2337 2mg of ativan and 2mg versed administered, see mar. Asynchrony remains in the 30s.    2355 Ventilator asynchrony, tachycardia and signs of pain remain including attempting to grab at equipment, tears, wrinkling foreheard and attempting to breath over the vent. Administered another 2mg of ativan and versed. 0000 Pt appears more comfortable, compliant with vent, sats improved to 93% and heart rate decreasing with sinus tach rhythm at rate of 118. Temperature on monitor reads 39.1, will update provider. 382 [facility] [employer] [employer] made aware of above events, advised to closely monitor temperature and update provider if no improvement. 0030 Temperature and heart rate coming down appropriately, remains low sinus tach. BP soft and titrating up on levo for map of 65+, see mar and flow sheet for meds/vitals. 3784 Tylenol given for persistently elevated temperatures. 4840 Bedside shift change report given to Perez Perez RN (oncoming nurse) by Gavin Perez (offgoing nurse). Report included the following information SBAR, Intake/Output, MAR, Recent Results and Cardiac Rhythm NSR/Sinus tach.

## 2021-11-06 NOTE — PROGRESS NOTES
Patient is on ventilation support and comfortably resting. Offered Act of Spiritual Communion prayer by the bedside. Patient is not available to be assessed at this time.       Berry Tesfaye Lauren 5   [phone]

## 2021-11-06 NOTE — PROGRESS NOTES
[facility]. Pulmonary, Critical Care, and Sleep Medicine    Name: Marina Flores MRN: [id number]   : 1945 Hospital: [facility]   Date: 2021  Admission Date: 10/18/2021     Chart and notes reviewed. Data reviewed. I have evaluated all findings. [x]I have reviewed the flowsheet and previous days notes. [x]The patient is unable to give any meaningful history or review of systems because the patient is:  [x]Intubated [x]Sedated   []Unresponsive      [x]The patient is critically ill on      [x]Mechanical ventilation []Pressors   []BiPAP []         Interval HPI:  Patient is a 74 y. o. male with PMHx of chronic respiratory failure, ILD, bronchiectasis who presents with failure to thrive. He presented with dyspnea and poor PO intake. He was placed on some O2 and CT chest showed some worsening consolidations in the presence of the chronic lung disease. He follows with us in clinic and was instructed to use hypertonic saline, acapella and mucinex, unsure of compliance. He was admitted to the floor, but RRT was called 10/27/21 for encephalopathy and acute hypercapnic respiratory failure. Anesthesia paged and he was intubated and transferred to ICU. Noted to have PNA and pulm edema on top of bronchiectasis and ILD, also a component of cardiogenic ( EF 25%) and septic shock. Multiple Cavitary lesions - likely 2/2 recurrent aspiration. Respiratory cx + MSSA, light candida albicans. PEG tube placed this admission on 10/21 for dysphagia, but inadvertently dislodged, thus will need to be replaced. Patient self-extubated 10/27/21 and was emergently re-intubated due to unresponsiveness to voice and noxious stimuli.  Patient has inability to cough, thus would benefit from trach, but patient does not wish to be trached. Made partial code (21).  Ideally, comfort care vs. Trach  Subjective 21  Hospital Day:9  Vent Day:9  Overnight events:Thoracentesis  for left pleural effusion, 500 ml drained out and sent for analysis. Febrile with Tmax 102.4 new cultures sent  Mentation/Activity: Pt sedated with Fentanyl ggt, arouses with verbal stimulus, followed simple commands  Respiratory/ Secretions:Small amount of secretions, increased sedation 2/2 asynchrony on vent over night  Hemodynamics:Soft pressures, off Levophed  Urine output, bowel: Good urine output  Diet:tolerating tube feeds  Need for procedures:none              ROS:Pertinent items are noted in HPI. Events and notes from last 24 hours reviewed. Care plan discussed on multidisciplinary rounds. 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   Last 3 shifts:  190 -  0700  In: 4224.4 [I.V.:1969.4]  Out: 2760 [Urine:2760]    Intake/Output Summary (Last 24 hours) at 2021 0925  Last data filed at 2021 0700  Gross per 24 hour   Intake 2701.82 ml   Output 1675 ml   Net 1026.82 ml        Ventilator Settings:  Ventilator Mode: VC+, Assist control  Respiratory Rate  Resp Rate Observed: 40  Back-Up Rate: 14  Insp Time (sec): 1 sec  I:E Ratio: 1:3.3  Ventilator Volumes  Vt Set (ml): 400 ml  Vt Exhaled (Machine Breath) (ml): 393 ml  Vt Spont (ml): 390 ml  Ve Observed (l/min): 5.48 l/min  Ventilator Pressures  Pressure Support (cm H2O): 10 cm H2O  PIP Observed (cm H2O): 30 cm H2O  Plateau Pressure (cm H2O): 25 cm H2O  MAP (cm H2O): 12  PEEP/VENT (cm H2O): 6 cm H20  Auto PEEP Observed (cm H2O): 6 cm H2O    Current Facility-Administered Medications   Medication Dose Route Frequency    [START ON 11/7/2021] vancomycin trough due 11/07 at 0500 (draw before dose)  1 Each Other ONCE    furosemide (LASIX) injection 20 mg  20 mg IntraVENous ONCE    fluconazole (DIFLUCAN) 400mg/200 mL IVPB (premix)  400 mg IntraVENous Q24H    vancomycin (VANCOCIN) 1,000 mg in 0.9% sodium chloride 250 mL (VIAL-MATE)  1,000 mg IntraVENous Q12H    potassium, sodium phosphates (NEUTRA-PHOS) packet 1 Packet  1 Packet Per G Tube QID    piperacillin-tazobactam (ZOSYN) 4.5 g in 0.9% sodium chloride (MBP/ADV) 100 mL MBP  4.5 g IntraVENous Q6H    levoFLOXacin (LEVAQUIN) 750 mg in D5W IVPB  750 mg IntraVENous Q24H    NOREPINephrine (LEVOPHED) 8 mg in 5% dextrose 250mL (32 mcg/mL) infusion  0.5-50 mcg/min IntraVENous TITRATE    polyethylene glycol (MIRALAX) packet 17 g  17 g Per NG tube BID    senna-docusate (PERICOLACE) 8.6-50 mg per tablet 1 Tablet  1 Tablet Oral DAILY    famotidine (PF) (PEPCID) 20 mg in 0.9% sodium chloride 10 mL injection  20 mg IntraVENous Q12H    thiamine HCL (B-1) tablet 200 mg  200 mg Per G Tube DAILY    chlorhexidine (PERIDEX) 0.12 % mouthwash 10 mL  10 mL Oral Q12H    fentaNYL (PF) 1,500 mcg/30 mL (50 mcg/mL) infusion  0-200 mcg/hr IntraVENous TITRATE    insulin lispro (HUMALOG) injection   SubCUTAneous Q6H    albuterol-ipratropium (DUO-NEB) 2.5 MG-0.5 MG/3 ML  3 mL Nebulization Q4H RT    sodium chloride 3% hypertonic nebulizer soln  4 mL Nebulization Q4HWA RT    Lactobacillus Acidoph & Bulgar (FLORANEX) tablet 2 Tablet  2 Tablet Per G Tube BID    aspirin chewable tablet 81 mg  81 mg Per G Tube DAILY    cholecalciferol (VITAMIN D3) (1000 Units /25 mcg) tablet 1,000 Units  1,000 Units PEG Tube DAILY    enoxaparin (LOVENOX) injection 40 mg  40 mg SubCUTAneous QHS    sodium chloride (NS) flush 5-40 mL  5-40 mL IntraVENous Q8H    omega-3 acid ethyl esters (LOVAZA) capsule 1,000 mg  1 g Oral DAILY WITH BREAKFAST    therapeutic multivitamin (THERAGRAN) tablet 1 Tablet  1 Tablet Oral DAILY         Telemetry: []Sinus []A-flutter []Paced    []A-fib []Multiple PVCs                  Physical Exam:   General: Intubated/sedated; cachectic, frail looking  HEENT:  Anicteric sclerae; pink palpebral conjunctivae; mucosa moist  Resp:  Symmetrical chest expansion, no accessory muscle use; good airway entry; no rales/ wheezing/ rhonchi noted  CV:  S1, S2 present; regular rate and rhythm  GI:  Abdomen soft, non-tender; (+) active bowel sounds. Dressing for old PEG tube site dry and intact  Extremities:  +2 pulses on all extremities; no edema/ cyanosis/ clubbing noted, b/l wrist restraints   Skin:  Warm; no rashes/ lesions noted, poor  turgor/cap refill   Neurologic:  Non-focal, easy to arouse with verbal stimulus, follows simple commands  Devices:  OGT, Central line right femoral, ETT, Merlos, PIV       DATA:  MAR reviewed and pertinent medications noted or modified as needed    Labs:  Recent Labs     11/06/21  0330 11/05/21 0300 11/04/21  0340   WBC 8.4 7.8 8.3   HGB 7.7* 8.1* 9.1*   HCT 26.2* 26.9* 31.0*    283 267     Recent Labs     11/06/21  0330 11/05/21  0300 11/04/21  0340   * 132* 131*   K 4.6 4.8 4.6   CL 96* 95* 97*   CO2 36* 36* 32   * 147* 116*   BUN 19* 19* 15   CREA 0.52* 0.41* 0.37*   CA 8.3* 7.8* 7.8*   MG 2.3 2.1 2.1   PHOS 2.6 2.8 1.9*   ALB  --   --  1.5*   ALT  --   --  30     No results for input(s): PH, PCO2, PO2, HCO3, FIO2 in the last 72 hours.   Recent Labs     11/06/21  0318 11/05/21  0411 11/04/21  0439   FIO2I 55 55 50   HCO3I 35.2* 34.8* 32.9*   PCO2I 53.5* 56.0* 49.1* PHI 7.43 7.40 7.44   PO2I 68* 62* 67*       Imaging:  [x]   I have personally reviewed the patients radiographs and reports  XR Results (most recent):  XR Results (most recent):  Results from Hospital Encounter encounter on 10/18/21    XR CHEST PORT    Narrative  EXAM: PORTABLE CHEST 0204 hours    CLINICAL HISTORY/INDICATION: intubated infiltrates, effusions, chronic right  upper lobe cavitary pneumonia, interstitial lung disease, chronic  bronchiectasis, chronic aspiration with dysphagia, gram-positive bacteremia    COMPARISON: Chest x-ray 11/5, 11/3/2021. CT chest 11/4/2021    TECHNIQUE: Single AP view    FINDINGS:    Endotracheal tube terminates 3.8 cm proximal to the jero. The esophagogastric  tube enters the stomach and extends beyond off the film. Bilateral apical pleural thickening. Multifocal bilateral parenchymal opacities  with greater consolidation at the right upper lobe centrally and right lower  lobe laterally and left lower lobe medially. Known peripheral cavitary mass  associated with bronchiectasis in the right upper lobe laterally is not readily  appreciated by chest x-ray. Skinfold projects over the left chest. Minimal  blunting of both costophrenic angles. The heart is normal in size. Pulmonary  vascularity is obscured. Impression  Overall no interval change. Multifocal bilateral infiltrates. Small pleural effusions. Support tubes in expected position     CT Results (most recent):  Results from [place] encounter on 10/18/21    CT CHEST ABD PELV W CONT    Narrative  CT chest, abdomen and pelvis with contrast    CLINICAL HISTORY: evaluate for abdominal abscess at site of recent peg tube,  parapneumonic effusion/empyema. persistent fevers/hypotension despite broad  spectrum abx    COMPARISON: Abdomen pelvis CT 10/25/21 and chest CT 10/18/21.     TECHNIQUE: Helical scan to the chest, abdomen and pelvis are obtained from the  thoracic inlet to the symphysis pubis after IV contrast administration. All CT scans at this facility performed using dose optimization techniques as  appreciated to a performed exam, to include automated exposure control,  adjustment of the mA and or KU according to patient size (including appropriate  matching for site specific examination), or use of iterative reconstruction  technique. FINDINGS:    CT CHEST:    Lung Parenchyma: Multiple large areas of consolidations with air bronchogram and  surrounding infiltrations identified bilaterally, moderately interval progressed  since the prior chest CT. There is associated significant volume loss in  bilateral upper lobes. Superimposed compressive atelectasis at posterior  bilateral lower lobes also identified due to pleural effusions. Thyroid: Atrophic. Lymph nodes: No adenopathy. Mediastinum: Borderline size of the heart. No significant pericardial effusion. Moderate coronary artery calcifications. Vasculature: The aorta and the great vessels are unremarkable. Collapsed  esophagus with NG tube present. Pleural Space And Chest Wall: There is a moderate to large nonloculated left  pleural effusion developed. Small right pleural effusion is interval progressed  and loculated. The loculated air-fluid collection at the lateral right upper  lung is similar in size but more fluid level seen since the prior study and  measures 2.6 x 6.5 x 6.2 cm. The air component is communicate with peripheral  bronchial branches, best seen on coronal #30/601. CT ABDOMEN AND PELVIS:    Liver: Normal.  Gallbladder: Elongated and distended with mild wall thickening and surrounding  pericholecystic fluid. Small layering hyperdense debris/calculi. Biliary System: No ductal dilatation. Spleen: Normal.  Pancreas: Normal.  Bowel: The gastric G-tube is interval removed with NG tube present. The  extensive emphysema in anterior upper/mid abdomen seen on prior study is no  longer evident today.  The small and large bowel are nondilated. Moderate stool  burden in the colon. Scattered diverticula in the colon. No definite colonic  inflammation. Adrenal Glands: Normal.  Kidneys: Normal.  Lower genitourinary system: The bladder is decompressed with Merlos catheter  present. Peritoneum/Retroperitoneum: No adenopathy. Vasculature: Advanced atherosclerotic aortic disease. Other: Small pelvic free fluid present. No focal fluid collection or abscess  identified. Moderate edema in pelvic wall and bilateral thigh, unchanged. CT OSSEOUS STRUCTURES:    Compression deformity at T11 appears unchanged. Absence of the left femoral neck  and lateral portion of left femoral head with superior subluxation of proximal  femur, unchanged. Impression  1. Significant regression of multiple large consolidations and surrounding  infiltrations in bilateral lungs since the prior CT, indicative of progression  of infectious process. 2. New large left pleural effusion and increased loculated right pleural  effusion. The loculated hydropneumothorax shows more layering fluid component  since the prior CT. The air component communicating with lateral bronchial  branch in right upper lobe. 3. Gallbladder hydrops with small layering hyperdense debris. Mild  pericholecystic fluid. Recommend ultrasound correlation for potential  cholecystitis. 4. Interval removal of G-tube from left anterior abdominal wall with interval  resolved extensive emphysema. Persistent anasarca extending to the bilateral  thigh. Thank you for this referral.       10/18/21    ECHO ADULT COMPLETE 10/26/2021 10/26/2021    Interpretation Summary  · LV: Estimated LVEF is 25 - 30%. Visually measured ejection fraction. Normal cavity size and wall thickness. Severely and segmentally reduced systolic function. Age-appropriate left ventricular diastolic function. · PA: Severe pulmonary hypertension. Pulmonary arterial systolic pressure is 78 mmHg.   · IVC: Mildly elevated central venous pressure (8 mmHg); IVC diameter is less than 21 mm and collapses less than 50% with respiration. · Image quality for this study was technically difficult. · Echo study was limited due to patient's condition. · RV: Not well visualized. Signed by: Jose R Zimmerman MD on 10/26/2021  6:26 PM       IMPRESSION:   · Acute hypoxic and hypercarbic respiratory failure: 2/2 PNA and pulm edema on top of bronchiectasis, ILD  · Shock:  Septic + cardiogenic  · Acute pulmonary edema: combination of worsening cardiomyopathy as well as hypoalbuminemia  · RUL PNA w bronchiectasis, RLL nodular consolidation, Cavitary lesions - likely 2/2 recurrent aspiration. resp cultures growing MSSA. At risk for invasive fungal PNA, NTM, given hx severe structural lung disease. 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 ?sepsis, stress, ischemic  · Severe pulmonary hypertension on echo. Definitely group II and III component   · Urinary retention - coude catheter placed by urology   · normocytic anemia-  stable  · Deconditioning/adult failure to thrive/cachexia: Body mass index is 19.34 kg/m².        Patient Active Problem List   Diagnosis Code    Coronary artery disease involving native coronary artery of native heart without angina pectoris,s/p stent 2003 I25.10    Scarlet fever A38.9    [facility] I21.9    Urinary frequency R35.0    Pneumonia J18.9    Goals of care, counseling/discussion Z71.89    Acute respiratory failure with hypoxia (HCC) J96.01    Debility R53.81    Severe protein-calorie malnutrition (HCC) E43    Gastrostomy complication (HCC) P08.16        RECOMMENDATIONS:   Neuro:Titrate sedation for RASS goal 0 to -1, daily sedation holiday.  Cont Fentanyl gtt. Bilateral wrist restraints   Pulm: Aspiration precautions, HOB>30'. VAP Bundle, Daily sedation vacation. SBT today, minimal vent settings. Pt is a poor candidate for extubation, already failed once, severely deconditioned. CT chest showed large left pleural effusion which was drained 11/4, likely exudative and does not appear infectious, Another differential is pseudo transudate effusion from his CHF. CVS:Monitor HD, MAP goal >65. Currently off vasopressors but still with soft blood pressures, will keep femoral central line for now; pt was febrile overnight with risk of going back to vasopressor requirements  GI: NPO. Diet: Cont TF via OGT. Needs PEG tube replaced for chronic dysphagia. GI consulted and will hold off on PEG insertion until current PEG site infection cleared (currently still with pus and drainage). Re-consult when PEG site appears clear of infection. CT abdomen reveals resolution of abdominal abscess on PEG tube site. Renal: Trend Cr, UOP. Merlos (coude) placed by Urology   Hem/Onc: Trend H/H, monitor for s/o active bleeding. Daily CBC. I/D:Trend WBCs and temperature curve. ABx per ID, broad spectrum (Zosyn, Levaquin).  Rcx w/ MSSA, rare yeast. Wound cx with moderate candida albicans, on Eraxis. Still with fevers overnight, F/u susceptibility of of Mycobacterium in sputum culture  Metabolic: Daily BMP, mag, phos. Trend lytes and replace per protocol. Replace phos today   Endocrine:Q6 glucoses. SSI. Avoid hypoglycemia.   Musc/Skin:  wound care   Partial Code   Discussed in interdisciplinary rounds  Palliative care meeting today at 1500 to discuss goals of care: trach vs comfort care     Best practice :    Glycemic control  IHI ICU bundles:   Central Line Bundle Followed , Corcoran Bundle Followed and Vent Bundle Followed, Vent Day 9    [facility] Vent patients- VAP bundle, aim to keep peak plateau pressure 94-12IR H2O  Sress ulcer prophylaxis. DVT prophylaxis. Need for Lines, corcoran assessed. Palliative care evaluation. Restraints need. Attending Non-violent Restraint Reevaluation     I have reevaluated the patient one hour after initiation of intervention. The patient is comfortable, uninjured, but continues to pose an imminent risk of injury to self to themselves and/or serious disruption of medical treatment required to keep patient stable. The patient's current medical and behavioral conditions that warrant the use intervention include danger to self and Interference with medical equipment or treatment. Restraint or seclusion will be discontinued at the earliest possible time, regardless of the scheduled expiration of the order. Based on my evaluation, restraints will be continued: YES    0933 AM    Freda Valle MD         High complexity decision making was performed during this consultation and evaluation. [x]       Pt is at high risk for further organ failure and dysfunction. Critical care time spent: 35   minutes with patient exclusive of procedures.     Patrick Downing PA-C   11/06/21  Pulmonary, Critical Care Medicine  [facility]

## 2021-11-06 NOTE — PROGRESS NOTES
Bedside and Verbal shift change report given to Lizzette Newell RN   (oncoming nurse) by Aura Escalante (offgoing nurse). Report included the following information SBAR, Kardex, Intake/Output, MAR and Recent Results. 1800 hands on assessment completed. Infant awake and alert. Po fed well. VSS on room air. Chart check completed. Was called about patients fever. Vitals reviewed. On 4 ug levophed. Labs reviewed. No leukocytosis. Procalcitonin remains low. No elevation of lactate. Blood cx no growth  Imp   Acute hypoxic respiratory failure due to aspiration pneumonia superimposed on chronic OSCAR infection   Persistent fevers despite multiple broad spectrum antibiotics/ antifungals concerning for non infectious etiology of fever such as drug fever, DV T  Hypotension requiring pressors intermittently- ? Due to impaired cardiac function, fentanyl versus partially treated infection   Recommendations   - continue zosyn, levaquin, vancomycin, fluconazole   - f/u blood cx, sputum cx  - f/u venous duplex    Overall prognosis- poor   D/w dr Luis Petersen. Time spent greater than 20 min.

## 2021-11-07 NOTE — PROGRESS NOTES
Problem: Patient Education: Go to Patient Education Activity  Goal: Patient/Family Education  Outcome: Progressing Towards Goal     Problem: Falls - Risk of  Goal: *Absence of Falls  Description: Document Annabelle Lowry Fall Risk and appropriate interventions in the flowsheet.   Outcome: Progressing Towards Goal  Note: Fall Risk Interventions:  Mobility Interventions: Bed/chair exit alarm, Communicate number of staff needed for ambulation/transfer, Patient to call before getting OOB, PT Consult for mobility concerns    Mentation Interventions: Adequate sleep, hydration, pain control, Bed/chair exit alarm, Door open when patient unattended, Evaluate medications/consider consulting pharmacy, Increase mobility, More frequent rounding, Reorient patient, Room close to nurse's station, Toileting rounds, Update white board    Medication Interventions: Bed/chair exit alarm, Evaluate medications/consider consulting pharmacy    Elimination Interventions: Bed/chair exit alarm, Call light in reach, Patient to call for help with toileting needs, Stay With Me (per policy), Toileting schedule/hourly rounds              Problem: Patient Education: Go to Patient Education Activity  Goal: Patient/Family Education  Outcome: Progressing Towards Goal     Problem: Discharge Planning  Goal: *Discharge to safe environment  Outcome: Progressing Towards Goal  Goal: *Knowledge of medication management  Outcome: Progressing Towards Goal  Goal: *Knowledge of discharge instructions  Outcome: Progressing Towards Goal     Problem: Patient Education: Go to Patient Education Activity  Goal: Patient/Family Education  Outcome: Progressing Towards Goal     Problem: Patient Education: Go to Patient Education Activity  Goal: Patient/Family Education  Outcome: Progressing Towards Goal     Problem: Patient Education: Go to Patient Education Activity  Goal: Patient/Family Education  Outcome: Progressing Towards Goal     Problem: Pressure Injury - Risk of  Goal: *Prevention of pressure injury  Description: Document Kali Scale and appropriate interventions in the flowsheet. Outcome: Progressing Towards Goal  Note: Pressure Injury Interventions:  Sensory Interventions: Assess changes in LOC, Assess need for specialty bed, Avoid rigorous massage over bony prominences, Check visual cues for pain, Discuss PT/OT consult with provider, Float heels, Keep linens dry and wrinkle-free, Maintain/enhance activity level, Minimize linen layers, Monitor skin under medical devices, Pad between skin to skin, Pressure redistribution bed/mattress (bed type), Turn and reposition approx. every two hours (pillows and wedges if needed)    Moisture Interventions: Absorbent underpads, Apply protective barrier, creams and emollients, Assess need for specialty bed, Check for incontinence Q2 hours and as needed, Internal/External urinary devices, Limit adult briefs, Maintain skin hydration (lotion/cream), Minimize layers, Moisture barrier, Offer toileting Q_hr    Activity Interventions: Assess need for specialty bed, Pressure redistribution bed/mattress(bed type)    Mobility Interventions: Assess need for specialty bed, Float heels, HOB 30 degrees or less, Pressure redistribution bed/mattress (bed type), PT/OT evaluation, Turn and reposition approx.  every two hours(pillow and wedges)    Nutrition Interventions: Document food/fluid/supplement intake, Discuss nutritional consult with provider, Offer support with meals,snacks and hydration    Friction and Shear Interventions: Apply protective barrier, creams and emollients, Foam dressings/transparent film/skin sealants, HOB 30 degrees or less, Lift sheet, Lift team/patient mobility team, Minimize layers                Problem: Patient Education: Go to Patient Education Activity  Goal: Patient/Family Education  Outcome: Progressing Towards Goal     Problem: Ventilator Management  Goal: *Adequate oxygenation and ventilation  Outcome: Progressing Towards Goal  Goal: *Patient maintains clear airway/free of aspiration  Outcome: Progressing Towards Goal  Goal: *Absence of infection signs and symptoms  Outcome: Progressing Towards Goal  Goal: *Normal spontaneous ventilation  Outcome: Progressing Towards Goal     Problem: Patient Education: Go to Patient Education Activity  Goal: Patient/Family Education  Outcome: Progressing Towards Goal     Problem: Non-Violent Restraints  Goal: Removal from restraints as soon as assessed to be safe  Outcome: Progressing Towards Goal  Goal: No harm/injury to patient while restraints in use  Outcome: Progressing Towards Goal  Goal: Patient's dignity will be maintained  Outcome: Progressing Towards Goal  Goal: Patient Interventions  Outcome: Progressing Towards Goal     Problem: Pain  Goal: *Control of Pain  Outcome: Progressing Towards Goal  Goal: *PALLIATIVE CARE:  Alleviation of Pain  Outcome: Progressing Towards Goal     Problem: Patient Education: Go to Patient Education Activity  Goal: Patient/Family Education  Outcome: Progressing Towards Goal

## 2021-11-07 NOTE — ROUTINE PROCESS
2215 Ventilator alarming at this time, Pt mildly restless and reaching for medical equipment. Mild asynchrony with vent and tachycardia, administered versed see mar. 2225 Pt desatting to 85%, administered 2 minute 100% preoxygenation from ventilator, sats improved to 94%. 2230 Increasing fentanyl gtt to 200 mcg. BP 93/50 (map 65) increasing levo back to 5 mcg to prevent worsening hypotension. BP running q15 minutes. 0200 Time falls back for daylight saving. Everything on the second hour documented one minute past for the time change. 0521 Bedside shift change report given to Jonathon Richardson RN (oncoming nurse) by Beny Sandoval (offgoing nurse). Report included the following information SBAR, Intake/Output, MAR, Recent Results and Cardiac Rhythm NSR/Sinus tach.

## 2021-11-07 NOTE — PROGRESS NOTES
[facility]. Pulmonary, Critical Care, and Sleep Medicine    Name: Altaf Roy MRN: [id number]   : 1945 Hospital: [address] [place] [address] Dr   Date: 2021  Admission Date: 10/18/2021     Chart and notes reviewed. Data reviewed. I have evaluated all findings. [x]I have reviewed the flowsheet and previous days notes. [x]The patient is unable to give any meaningful history or review of systems because the patient is:  [x]Intubated [x]Sedated   []Unresponsive      [x]The patient is critically ill on      [x]Mechanical ventilation []Pressors   []BiPAP []         Interval HPI:  Patient is a 74 y. o. male with PMHx of chronic respiratory failure, ILD, bronchiectasis who presents with failure to thrive. He presented with dyspnea and poor PO intake. He was placed on some O2 and CT chest showed some worsening consolidations in the presence of the chronic lung disease. He follows with us in clinic and was instructed to use hypertonic saline, acapella and mucinex, unsure of compliance. He was admitted to the floor, but RRT was called 10/27/21 for encephalopathy and acute hypercapnic respiratory failure. Anesthesia paged and he was intubated and transferred to ICU. Noted to have PNA and pulm edema on top of bronchiectasis and ILD, also a component of cardiogenic ( EF 25%) and septic shock. Multiple Cavitary lesions - likely 2/2 recurrent aspiration. Respiratory cx + MSSA, light candida albicans. PEG tube placed this admission on 10/21 for dysphagia, but inadvertently dislodged, thus will need to be replaced. Patient self-extubated 10/27/21 and was emergently re-intubated due to unresponsiveness to voice and noxious stimuli.  Patient has inability to cough, thus would benefit from trach, but patient does not wish to be trached. Made partial code (21). Ideally, comfort care vs. Trach    Subjective 21  Hospital Day:  Admitted 10/18/21   Vent Day: 10   Overnight events: low-grade fevers overnight   Mentation/Activity: Pt sedated with Fentanyl gtt, arouses with verbal stimulus, followed simple commands  Respiratory/ Secretions: stable   Hemodynamics:Soft pressures, on low-dose Levophed gtt   Urine output, bowel: 2.7L in 24 hours   Diet:tolerating tube feeds  Need for procedures:none              ROS:Pertinent items are noted in HPI. Events and notes from last 24 hours reviewed. Care plan discussed on multidisciplinary rounds. Patient Active Problem List   Diagnosis Code    Coronary artery disease involving native coronary artery of native heart without angina pectoris,s/p stent  I25.10    Scarlet fever A38.9    Myocardial infarction ([facility] I21.9    Urinary frequency R35.0    Pneumonia J18.9    Goals of care, counseling/discussion Z71.89    Acute respiratory failure with hypoxia ([facility] [address] J96.01    Debility R53.81    Severe protein-calorie malnutrition ([facility] [address] E43    Gastrostomy complication (HCC) Y83.63       Vital Signs:  Visit Vitals  /69   Pulse (!) 113   Temp 99.1 °F (37.3 °C)   Resp 22   Ht 5' 7\" (1.702 m)   Wt 61 kg (134 lb 7.7 oz)   SpO2 92%   BMI 21.06 kg/m²       O2 Device: Ventilator, Endotracheal tube, Heated, Humidifier   O2 Flow Rate (L/min): 6 l/min   Temp (24hrs), Av °F (37.8 °C), Min:99 °F (37.2 °C), Max:101.5 °F (38.6 °C)       Intake/Output:   Last shift:      No intake/output data recorded.   Last 3 shifts:  1901 -  0700  In: 4712.9 [I.V.:2332.9]  Out: 9122 [Urine:3860]    Intake/Output Summary (Last 24 hours) at 2021 1434  Last data filed at 2021 0700  Gross per 24 hour   Intake 2129.09 ml   Output 2520 ml   Net -390.91 ml        Ventilator Settings:  Ventilator Mode: Assist control, VC+  Respiratory Rate  Resp Rate Observed: 40  Back-Up Rate: 14  Insp Time (sec): 1 sec  I:E Ratio: 1:3.3  Ventilator Volumes  Vt Set (ml): 400 ml  Vt Exhaled (Machine Breath) (ml): 398 ml  Vt Spont (ml): 390 ml  Ve Observed (l/min): 5.4 l/min  Ventilator Pressures  Pressure Support (cm H2O): 10 cm H2O  PIP Observed (cm H2O): 33 cm H2O  Plateau Pressure (cm H2O): 29 cm H2O  MAP (cm H2O): 14  PEEP/VENT (cm H2O): 6 cm H20  Auto PEEP Observed (cm H2O): 0 cm H2O    Current Facility-Administered Medications   Medication Dose Route Frequency    vancomycin (VANCOCIN) 1250 mg in  ml infusion  1,250 mg IntraVENous Q12H    fluconazole (DIFLUCAN) 400mg/200 mL IVPB (premix)  400 mg IntraVENous Q24H    potassium, sodium phosphates (NEUTRA-PHOS) packet 1 Packet  1 Packet Per G Tube QID    piperacillin-tazobactam (ZOSYN) 4.5 g in 0.9% sodium chloride (MBP/ADV) 100 mL MBP  4.5 g IntraVENous Q6H    levoFLOXacin (LEVAQUIN) 750 mg in D5W IVPB  750 mg IntraVENous Q24H    NOREPINephrine (LEVOPHED) 8 mg in 5% dextrose 250mL (32 mcg/mL) infusion  0.5-50 mcg/min IntraVENous TITRATE    polyethylene glycol (MIRALAX) packet 17 g  17 g Per NG tube BID    senna-docusate (PERICOLACE) 8.6-50 mg per tablet 1 Tablet  1 Tablet Oral DAILY    famotidine (PF) (PEPCID) 20 mg in 0.9% sodium chloride 10 mL injection  20 mg IntraVENous Q12H    thiamine HCL (B-1) tablet 200 mg  200 mg Per G Tube DAILY    chlorhexidine (PERIDEX) 0.12 % mouthwash 10 mL  10 mL Oral Q12H    fentaNYL (PF) 1,500 mcg/30 mL (50 mcg/mL) infusion  0-200 mcg/hr IntraVENous TITRATE    insulin lispro (HUMALOG) injection   SubCUTAneous Q6H    albuterol-ipratropium (DUO-NEB) 2.5 MG-0.5 MG/3 ML  3 mL Nebulization Q4H RT    sodium chloride 3% hypertonic nebulizer soln  4 mL Nebulization Q4HWA RT    Lactobacillus Acidoph & Bulgar (FLORANEX) tablet 2 Tablet  2 Tablet Per G Tube BID    aspirin chewable tablet 81 mg  81 mg Per G Tube DAILY    cholecalciferol (VITAMIN D3) (1000 Units /25 mcg) tablet 1,000 Units  1,000 Units PEG Tube DAILY    enoxaparin (LOVENOX) injection 40 mg  40 mg SubCUTAneous QHS    sodium chloride (NS) flush 5-40 mL  5-40 mL IntraVENous Q8H    omega-3 acid ethyl esters (LOVAZA) capsule 1,000 mg  1 g Oral DAILY WITH BREAKFAST    therapeutic multivitamin (THERAGRAN) tablet 1 Tablet  1 Tablet Oral DAILY         Telemetry: [x]Sinus []A-flutter []Paced    []A-fib []Multiple PVCs                  Physical Exam:   General: Intubated/sedated; cachectic, frail looking  HEENT:  Anicteric sclerae; pink palpebral conjunctivae; mucosa moist  Resp: + faint inspiratory wheezing. Symmetrical chest expansion, no accessory muscle use; good airway entry; no rales. No  rhonchi noted  CV:  S1, S2 present; tachycardic (low 's)   GI:  Abdomen soft, non-tender; (+) active bowel sounds. PEG tube absent, but old site dressed with gauze   Extremities:  +2 pulses on all extremities; no edema/ cyanosis/ clubbing noted, b/l wrist restraints   Skin:  Poor turgor, pale, easily friable; scattered bruising upper extremities; Multiple abrasions to b/l shins    Neurologic:  Non-focal, easy to arouse with verbal stimulus, follows simple commands  Devices:  OGT, Central line right femoral, ETT, Merlos, PIV       DATA:  MAR reviewed and pertinent medications noted or modified as needed    Labs:  Recent Labs     11/07/21  0400 11/06/21  0330 11/05/21  0300   WBC 7.5 8.4 7.8   HGB 7.2* 7.7* 8.1*   HCT 24.8* 26.2* 26.9*    337 283     Recent Labs     11/07/21  0400 11/06/21  0330 11/05/21  0300   * 134* 132*   K 4.4 4.6 4.8   CL 95* 96* 95*   CO2 38* 36* 36*   * 150* 147*   BUN 18 19* 19*   CREA 0.43* 0.52* 0.41*   CA 8.2* 8.3* 7.8*   MG 2.3 2.3 2.1   PHOS 3.1 2.6 2.8     No results for input(s): PH, PCO2, PO2, HCO3, FIO2 in the last 72 hours.   Recent Labs     11/07/21  0520 11/06/21  0318 11/05/21  0411   FIO2I 50 55 55   HCO3I 36.9* 35.2* 34.8*   PCO2I 59.5* 53.5* 56.0*   PHI 7.40 7.43 7.40   PO2I 61* 68* 62*       Imaging:  [x]   I have personally reviewed the patients radiographs and reports  XR Results (most recent):  XR Results (most recent):  Results from [facility] Encounter encounter on 10/18/21    XR CHEST PORT    Narrative  EXAM: PORTABLE CHEST 0100 hours    CLINICAL HISTORY/INDICATION: intubated , gram-positive bacteremia, chronic  aspiration with dysphagia, chronic bronchiectasis, chronic right upper lobe  cavitary pneumonia, interstitial lung disease    COMPARISON: Chest x-ray 11/6, 11/5, and 11/3/2021, CT chest 11/4/2021. TECHNIQUE: Single AP view    FINDINGS:    Endotracheal tube terminates 4.7 cm proximal to the jero. The esophagogastric  tube terminates at the proximal stomach. Continued bilateral multifocal infiltrates. Minimal blunting of the costophrenic  angles. No pneumothorax. Right greater than left apical pleural thickening. Slight elevation of the right minor fissure unchanged. Pulmonary vessels are  obscured. The heart is normal in size. Impression  No significant interval change. Multifocal bilateral confluent and groundglass infiltrates. Chronic increased interstitial markings. Minimal blunting of the costophrenic angles. Cavitary lesion in the right upper lobe with mild volume loss not significantly  changed but much better demonstrated on CT. Support tubes in expected position. CT Results (most recent):  Results from Hospital Encounter encounter on 10/18/21    CT CHEST ABD PELV W CONT    Narrative  CT chest, abdomen and pelvis with contrast    CLINICAL HISTORY: evaluate for abdominal abscess at site of recent peg tube,  parapneumonic effusion/empyema. persistent fevers/hypotension despite broad  spectrum abx    COMPARISON: Abdomen pelvis CT 10/25/21 and chest CT 10/18/21. TECHNIQUE: Helical scan to the chest, abdomen and pelvis are obtained from the  thoracic inlet to the symphysis pubis after IV contrast administration.     All CT scans at this facility performed using dose optimization techniques as  appreciated to a performed exam, to include automated exposure control,  adjustment of the mA and or KU according to patient size (including appropriate  matching for site specific examination), or use of iterative reconstruction  technique. FINDINGS:    CT CHEST:    Lung Parenchyma: Multiple large areas of consolidations with air bronchogram and  surrounding infiltrations identified bilaterally, moderately interval progressed  since the prior chest CT. There is associated significant volume loss in  bilateral upper lobes. Superimposed compressive atelectasis at posterior  bilateral lower lobes also identified due to pleural effusions. Thyroid: Atrophic. Lymph nodes: No adenopathy. Mediastinum: Borderline size of the heart. No significant pericardial effusion. Moderate coronary artery calcifications. Vasculature: The aorta and the great vessels are unremarkable. Collapsed  esophagus with NG tube present. Pleural Space And Chest Wall: There is a moderate to large nonloculated left  pleural effusion developed. Small right pleural effusion is interval progressed  and loculated. The loculated air-fluid collection at the lateral right upper  lung is similar in size but more fluid level seen since the prior study and  measures 2.6 x 6.5 x 6.2 cm. The air component is communicate with peripheral  bronchial branches, best seen on coronal #30/601. CT ABDOMEN AND PELVIS:    Liver: Normal.  Gallbladder: Elongated and distended with mild wall thickening and surrounding  pericholecystic fluid. Small layering hyperdense debris/calculi. Biliary System: No ductal dilatation. Spleen: Normal.  Pancreas: Normal.  Bowel: The gastric G-tube is interval removed with NG tube present. The  extensive emphysema in anterior upper/mid abdomen seen on prior study is no  longer evident today. The small and large bowel are nondilated. Moderate stool  burden in the colon. Scattered diverticula in the colon. No definite colonic  inflammation. Adrenal Glands: Normal.  Kidneys: Normal.  Lower genitourinary system:  The bladder is decompressed with Merlos catheter  present. Peritoneum/Retroperitoneum: No adenopathy. Vasculature: Advanced atherosclerotic aortic disease. Other: Small pelvic free fluid present. No focal fluid collection or abscess  identified. Moderate edema in pelvic wall and bilateral thigh, unchanged. CT OSSEOUS STRUCTURES:    Compression deformity at T11 appears unchanged. Absence of the left femoral neck  and lateral portion of left femoral head with superior subluxation of proximal  femur, unchanged. Impression  1. Significant regression of multiple large consolidations and surrounding  infiltrations in bilateral lungs since the prior CT, indicative of progression  of infectious process. 2. New large left pleural effusion and increased loculated right pleural  effusion. The loculated hydropneumothorax shows more layering fluid component  since the prior CT. The air component communicating with lateral bronchial  branch in right upper lobe. 3. Gallbladder hydrops with small layering hyperdense debris. Mild  pericholecystic fluid. Recommend ultrasound correlation for potential  cholecystitis. 4. Interval removal of G-tube from left anterior abdominal wall with interval  resolved extensive emphysema. Persistent anasarca extending to the bilateral  thigh. Thank you for this referral.       10/18/21    ECHO ADULT COMPLETE 10/26/2021 10/26/2021    Interpretation Summary  · LV: Estimated LVEF is 25 - 30%. Visually measured ejection fraction. Normal cavity size and wall thickness. Severely and segmentally reduced systolic function. Age-appropriate left ventricular diastolic function. · PA: Severe pulmonary hypertension. Pulmonary arterial systolic pressure is 78 mmHg. · IVC: Mildly elevated central venous pressure (8 mmHg); IVC diameter is less than 21 mm and collapses less than 50% with respiration. · Image quality for this study was technically difficult. · Echo study was limited due to patient's condition.   · RV: Not well visualized. Signed by: Inge Saldana MD on 10/26/2021  6:26 PM       IMPRESSION:   · Acute hypoxic and hypercarbic respiratory failure: 2/2 PNA (aspiration) superimposed on chronic OSCAR infection and pulm edema on top of bronchiectasis, ILD  · Shock:  Septic + cardiogenic  · Acute pulmonary edema: combination of worsening cardiomyopathy as well as hypoalbuminemia  · RUL PNA w bronchiectasis, RLL nodular consolidation, Cavitary lesions - likely 2/2 recurrent aspiration. resp cultures growing MSSA. At risk for invasive fungal PNA, NTM, given hx severe structural lung disease. 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 ?sepsis, stress, ischemic  · Severe pulmonary hypertension on echo. Definitely group II and III component   · Urinary retention - coude catheter placed by urology   · normocytic anemia-  stable  · Deconditioning/adult failure to thrive/cachexia: Body mass index is 19.34 kg/m².        Patient Active Problem List   Diagnosis Code    Coronary artery disease involving native coronary artery of native heart without angina pectoris,s/p stent 2003 I25.10    Scarlet fever A38.9    Myocardial infarction ([address] I21.9    Urinary frequency R35.0    Pneumonia J18.9    Goals of care, counseling/discussion Z71.89    Acute respiratory failure with hypoxia ([address] J96.01    Debility R53.81    Severe protein-calorie malnutrition ([address] E43    Gastrostomy complication ([address] I15.67        RECOMMENDATIONS:   Neuro:Titrate sedation for RASS goal 0 to -1, daily sedation holiday.  Cont Fentanyl gtt. Bilateral wrist restraints   Pulm: Aspiration precautions, HOB>30'. VAP Bundle, Daily sedation vacation. SBT today, minimal vent settings. Pt is a poor candidate for extubation, already failed once, severely deconditioned. CT chest showed large left pleural effusion which was drained 11/4, likely exudative and does not appear infectious, Another differential is pseudo transudate effusion from his CHF. Will likely need a trach  CVS:Monitor HD, MAP goal >65. BP's remain soft, Levophed low-dose restarted, will keep femoral central line for now   GI: NPO. Diet: Cont TF via OGT. Needs PEG tube replaced for chronic dysphagia. GI consulted and will hold off on PEG insertion until current PEG site infection cleared (currently still with pus and drainage). Re-consult when PEG site appears clear of infection. CT abdomen reveals resolution of abdominal abscess on PEG tube site. Renal: Trend Cr, UOP. Corcoran (coude) placed by Urology   Hem/Onc: Trend H/H, monitor for s/o active bleeding. Daily CBC. I/D:Trend WBCs and temperature curve. Persistent fevers despite multiple broad spectrum antibiotics/ antifungals concerning for non infectious etiology of fever such as drug fever, DVT. ABx per ID, broad spectrum (zosyn, levaquin, vancomycin, fluconazole ).  Rcx w/ MSSA, rare yeast. Wound cx with moderate candida albicans. F/u PVL's negative for source of fever   Metabolic: Daily BMP, mag, phos. Trend lytes and replace per protocol. Endocrine:Q6 glucoses. SSI. Avoid hypoglycemia. Musc/Skin:  wound care   Partial Code   Discussed in interdisciplinary rounds       Best practice :    Glycemic control  IHI ICU bundles:   Central Line Bundle Followed , Corcoran Bundle Followed and Vent Bundle Followed, Vent Day 9    [facility] Vent patients- VAP bundle, aim to keep peak plateau pressure 25-31TC H2O  Sress ulcer prophylaxis. DVT prophylaxis.   Need for Lines, corcoran assessed. Palliative care evaluation. Restraints need. Attending Non-violent Restraint Reevaluation     I have reevaluated the patient one hour after initiation of intervention. The patient is comfortable, uninjured, but continues to pose an imminent risk of injury to self to themselves and/or serious disruption of medical treatment required to keep patient stable. The patient's current medical and behavioral conditions that warrant the use intervention include danger to self and Interference with medical equipment or treatment. Restraint or seclusion will be discontinued at the earliest possible time, regardless of the scheduled expiration of the order. Based on my evaluation, restraints will be continued: YES    1:22 PM    Freda Valle MD         High complexity decision making was performed during this consultation and evaluation. [x]       Pt is at high risk for further organ failure and dysfunction.        SVETLANA GreenCNP-BC  11/07/21  Pulmonary, Critical Care Medicine  [facility]

## 2021-11-08 NOTE — PROGRESS NOTES
visited with the family of Miguel A Milton, who is a 67 y. o.,male. The  provided the following Interventions: This writer contacted family and spoke both to Mr. and Mrs Gil Velasquez (patient's brother-in-law and sister ) to verify if they have come up with a decision whether they are moving forward to putting patient on comfort care or if there are current changes to their decision according to patient's  Initiated a relationship of care and support. Both will be heading out to the hospital ASAP to talk to the doctors involved in the patient's care. Offered assurance of continued prayers on patient's behalf. Plan:  Chaplains will continue to follow and will provide pastoral care on an as needed/requested basis.  recommends bedside caregivers page  on duty if patient shows signs of acute spiritual or emotional distress.     Steffani Peabody, [address]   [phone]

## 2021-11-08 NOTE — PROGRESS NOTES
1930: Assumed care of patient after report with Alexandria Kilgore RN on orientation. Drips verified. Orders reviewed. See Mary Jane's documentation for overnight events.

## 2021-11-08 NOTE — CONSULTS
[id number] [facility] 54: [phone]  [facility]: [address] [place] [address] Way: [phone]    Patient Name: Sylvester Perea  YOB: 1945    Date of follow up 11/8/2021   Reason for Consult: goals of care discussions   Requesting Provider: Dr Mao  Primary Care Physician: Luis Antonio Nolasco, NP      SUMMARY:   Sylvester Perea is a 67y.o. year old with a past history of CAD. S/p stent in 2003, scarlet fever at age 9, interstitial lung disease, chronic bronchiectasis, and chronic aspiration with dysphagia   , who was admitted on 10/18/2021 from home  with a diagnosis of hypoxemic respiratory failure possibly due to aspiration pneumonia . Current medical issues leading to Palliative Medicine involvement include: 67year old male with several life limiting chronic illnesses. Palliative medicine is consulted for support and goals of care discussions. 11/8/2021 Met with sister and brother in law at bedside they do not wish for trach but wish of venue of which to feed to patient. 11/5/2021 Met with patient's sister Annie Allison and brother in law Alia Gutierres again today. Re discussed his current condition, unfortunately no cough, he is not eligible for medical extubation, only option is compassionate extubation or trach. 11/1/2021 family meeting today with his sister Indy CH and her  Alia Gutierres. Made Partial code no chest compressions, or shock, continue all other treatments    10/29/21:  Rapid response called 4 days ago when patient decompensated respiratory status he was intubated at that time. He self extubated 2 days ago and was re intubated. 10/21/2021 alert, no complaints this am. PEG placement today. PALLIATIVE DIAGNOSES:   1. Goals of care   2. Acute hypoxic respiratory failure   3. Pneumonia likely due to aspiration   4. Debility        PLAN:   11/8/2021 Mr Rosangela Tony seen along with Mr SERGIO Tejada. He remains intubated and sedated. Met with his sister Sherri Diaz and her . Indy is MPOA. They have decided on no Trach, however do not wish to move to total comfort feeling this against their Sabianism believes. They wish for him to have a venue to have feeds. Wondering if PEG can be replaced have reached out to GI appreciate help, they will discuss and possibly call sister. Family if Peg can not be placed wishes for NGT, but understand he will likely need to be softly restrained for safety. Will await GI decision, on PEG. Continue partial code for now no chest compressions or shock at cardiac arrest. At present time re intubate if ET is accidentally dislodged. Discussed with GI and PCCM    ( please see below for previous notes per palliative team)     11/5/2021 patient seen at bedside remains orally intubated, sedated but will arouse to his name. Met with his sister Indy and brother in law Adina Mcardle at bedside. Appreciate ICU assistance with discussions. Shared with sister and brother in law he has no cough and because of this he is not eligible for medical extubation. Only 2 options, going forward with trach or compassionate extubation. Discussed unfortunately despite all treatment Ashlyn Walls is not likely to return to enough his base line level of functioning ( which was a sedentary life style ). If trach option is elected he will need NGT for feeds until PEG can be placed and will likely need soft restraints for his safety. He will also likely need facility care for the rest of his life. Sister wished to speak with her other siblings prior to making a decision. Patient is currently sedated with 150 mcg of Fentanyl. Cautioned family do not believe he completely understood conversation. We will re discuss with family on Monday. Goals of care Partial code no chest compressions, no shock at cardiac arrest, Re intubate if ET becomes accidentally dislodged. Pressor support acceptable.      ( please see below for previous notes per palliative team) 11/1/2021 Mr Hector Bustillos seen along with Ms Cory Huamy. Appreciate ICU NP also attending meeting. Met with Ms Wade ( Indy) sister who is MPOa and her  Ben Almeida who is secondary MPOA. Updated on medical condition. Sister and  would like to have PEG replaced to give him nourishment to give him the best opportunity to thrive. Goals of care discussed, sister was clear she would not wish for him to be subjected to chest compressions or shock at cardiac arrest, however would like to continue all other treatments, including PEG replacement, IVAB, oral intubation, replace tube if accidentally dislodged. Briefly discussed if he were not able to be liberated from the ventilator next step would be trach, sister did not believe he would want this step and are hopeful it will not come to that discussion. Family is aware patient is very frail and despite all treatment he may not do as well as all hope. Goals of care partial code, no chest compressions, no shock at cardiac arrest, continue all other medical interventions including intubation and replace ET if dislodged. Attending team aware as well as nurse of goals of care change. ( please see below for previous notes)     10/29/21: This NP and Ihsan Coleman RN in to see patient at the bedside. Assessment completed and spoke with ICU team about goals of care. Patient is sedated, intubated, and mechanically ventilated. Our team is reaching out to his family Indy Wade to set up a meeting to discuss goals of care moving forward. At this time patient remains a full code with full interventions    UPDATE:   Meeting set up with patient's family for Monday at 12:00 11/1/21. Appreciate any assistance from the ICU team.     See below for prior discussions:    10/21/2021 Mr Luciana Pinto seen along with Ms Cory Mason. Alert, deaf, although able to communicate with written word. Denies pain, still some shortness of breath, he is not sure the oxygen is helping.  Breathing a bit more labored this am. O2 sats 95% on nasal cannula oxygen. Noted plans for PEG placement later today. Goals of care have not changed patient wishes for full code with full interventions. Goals of care are defined. Palliative medicine will sign off at this time Please re consult if we can be of service. ( please see below for previous notes per palliative team)     1. Goals of care Patient seen along with Ms Georgia Turcios RN. He is alert, deaf and communicates verbally however needs questions text or written. We were able to write our questions today. He denied pain or shortness of breath. Noted AMD on file naming his sister Indy Wade as MPOA and her  Anton Sparks as secondary. He wished no changes today. Able to discuss goals of care through writing the question and explaining the benefits and burdens. He was able to verbally tell us he wished to be resuscitated in the event of cardiac arrest and intubation if needed. This was consistent with his AMD wishes. Goals of care full code with full interventions. Communicated with patient by writing to have further thought and consideration of resuscitation as these efforts will not cure, treat or reverse his underlying chronic medical conditions and likely cause more functional debility. 2. Acute hypoxic respiratory failure likely due to pneumonia but has interstitial lung disease. Maintaining on nasal cannula currently   3. Pneumonia due to aspiration. Appreciate speech consult at time of our visit MBS pending. 4. Debility has care givers during weekdays, sister and brother in law provide care on weekends. Uses walker for ambulation. Very thin and frail appearing albumin 2.4   5. Initial consult note routed to primary continuity provider  6.  Communicated plan of care with: Palliative IDT, patient     Patient/Health Care Proxy Stated Goals: Prolong life      TREATMENT PREFERENCES:   Code Status: Partial Code    Advance Care Planning:  [] [facility] Med Interdisciplinary Team has updated the ACP Navigator with [address] and Patient Capacity    Primary Decision [address] ([address]): AMD on file naming his sister Rekha Wade as MPOA   Brother in law Sunday Molt as secondary     Medical Interventions: Full interventions     Artificially Administered Nutrition: Feeding tube long-term, if indicated     Other:  As far as possible, the palliative care team has discussed with patient / health care proxy about goals of care / treatment preferences for patient. HISTORY:     History obtained from: chart and patient     CHIEF COMPLAINT: respiratory failure     HPI/SUBJECTIVE:    The patient is:   [x] Verbal and participatory receives communication via writing and via text. He is verbal  [x] Non-participatory due to: 11/1/2021 due to intubation   Please see summary     Clinical Pain Assessment (nonverbal scale for nonverbal patients): Clinical Pain Assessment  Severity: 0     Activity (Movement): Lying quietly, normal position    Duration: for how long has pt been experiencing pain (e.g., 2 days, 1 month, years)  Frequency: how often pain is an issue (e.g., several times per day, once every few days, constant)     FUNCTIONAL ASSESSMENT:     Palliative Performance Scale (PPS):  PPS: 40    ECOG  ECOG Status : Completely disabled     PSYCHOSOCIAL/SPIRITUAL SCREENING:      Any spiritual / Mandaen concerns:  [] Yes /  [x] No    Caregiver Burnout:  [] Yes /  [] No /  [x] No Caregiver Present      Anticipatory grief assessment:   [x] Normal  / [] Maladaptive        REVIEW OF SYSTEMS:     Positive and pertinent negative findings in ROS are noted above in HPI. The following systems were [x] reviewed / [] unable to be reviewed as noted in HPI  Other findings are noted below. Systems: constitutional, ears/nose/mouth/throat, respiratory, gastrointestinal, genitourinary, musculoskeletal, integumentary, neurologic, psychiatric, endocrine.  Positive findings noted below.  Modified ESAS Completed by: provider   Fatigue: 5       Pain: 0   Anxiety: 0 Nausea: 0     Dyspnea: 0           Stool Occurrence(s): 1        PHYSICAL EXAM:     Wt Readings from Last 3 Encounters:   11/08/21 62.1 kg (136 lb 14.5 oz)   08/31/21 51.7 kg (114 lb)   06/29/21 53.1 kg (117 lb)     Blood pressure 97/60, pulse (!) 105, temperature 99.7 °F (37.6 °C), resp. rate 27, height 5' 7\" (1.702 m), weight 62.1 kg (136 lb 14.5 oz), SpO2 90 %.   Pain:  Pain Scale 1: Adult Nonverbal Pain Scale  Pain Intensity 1: 0  Pain Onset 1: surgical  Pain Location 1: Abdomen  Pain Orientation 1: Anterior  Pain Description 1: Aching  Pain Intervention(s) 1: Medication (see MAR)  Last bowel movement: none recorded     Constitutional: orally intubated and sedated   ENMT: intubated   Cardiovascular: RRR  Respiratory: mechanically ventilated   Skin: warm, dry  Neurologic: sedated but arouses to his name        HISTORY:     Active Problems:    Pneumonia (10/18/2021)      Severe protein-calorie malnutrition ([facility] [address] (10/20/2021)      Gastrostomy complication ([facility] [address] (83/58/9931)      Past Medical History:   Diagnosis Date    CAD (coronary artery disease)     MI 1995; stent 2003 (done preop cochlear sgy)    Cancer ([facility] [address]     SQUAMOUS CELL    Coronary artery disease     Deafness     Myocardial infarction [facility]) [phone]    Scarlet fever age 9      Past Surgical History:   Procedure Laterality Date    BRONCH-FIBER/DIAGNOSTIC  6/16/2016         HX COLONOSCOPY  2013    neg    [place]    HX HEART CATHETERIZATION  2003    HX HEART CATHETERIZATION  1999    HX HERNIA REPAIR      inguinal ? laterality    HX OTHER SURGICAL      2 cochlear implants (R); 1 left - all failed      Family History   Problem Relation Age of Onset    No Known Problems Mother     Heart Disease Father     Heart Attack Father         1989    Coronary Art Dis Father     Hypertension Father     High Cholesterol Father History reviewed, no pertinent family history.   Social History     Tobacco Use    Smoking status: Never Smoker    Smokeless tobacco: Never Used   Substance Use Topics    Alcohol use: No     Allergies   Allergen Reactions    Pollens Extract Runny Nose and Cough      Current Facility-Administered Medications   Medication Dose Route Frequency    NOREPINephrine (LEVOPHED) 4,000 mcg in dextrose 5% 250 mL infusion  0.5-50 mcg/min IntraVENous TITRATE    [START ON 09/7/1192] multivit-folic acid-herbal 865 (WELLESSE PLUS) oral liquid 30 mL  30 mL Per G Tube DAILY    [START ON 11/9/2021] Vancomycin trough 11/9 @1030  1 Each Other ONCE    vancomycin (VANCOCIN) 1250 mg in  ml infusion  1,250 mg IntraVENous Q12H    fluconazole (DIFLUCAN) 400mg/200 mL IVPB (premix)  400 mg IntraVENous Q24H    piperacillin-tazobactam (ZOSYN) 4.5 g in 0.9% sodium chloride (MBP/ADV) 100 mL MBP  4.5 g IntraVENous Q6H    levoFLOXacin (LEVAQUIN) 750 mg in D5W IVPB  750 mg IntraVENous Q24H    polyethylene glycol (MIRALAX) packet 17 g  17 g Per NG tube BID    senna-docusate (PERICOLACE) 8.6-50 mg per tablet 1 Tablet  1 Tablet Oral DAILY    famotidine (PF) (PEPCID) 20 mg in 0.9% sodium chloride 10 mL injection  20 mg IntraVENous Q12H    thiamine HCL (B-1) tablet 200 mg  200 mg Per G Tube DAILY    LORazepam (ATIVAN) injection 2 mg  2 mg IntraVENous Q15MIN PRN    HYDROmorphone (DILAUDID) syringe 1 mg  1 mg IntraVENous Q15MIN PRN    chlorhexidine (PERIDEX) 0.12 % mouthwash 10 mL  10 mL Oral Q12H    fentaNYL (PF) 1,500 mcg/30 mL (50 mcg/mL) infusion  0-200 mcg/hr IntraVENous TITRATE    insulin lispro (HUMALOG) injection   SubCUTAneous Q6H    glucose chewable tablet 16 g  16 g Oral PRN    glucagon (GLUCAGEN) injection 1 mg  1 mg IntraMUSCular PRN    naloxone (NARCAN) injection 0.4 mg  0.4 mg IntraVENous PRN    albuterol-ipratropium (DUO-NEB) 2.5 MG-0.5 MG/3 ML  3 mL Nebulization Q4H RT    sodium chloride 3% hypertonic nebulizer soln  4 mL Nebulization Q4HWA RT    Lactobacillus Acidoph & [facility]) tablet 2 Tablet  2 Tablet Per G Tube BID    guaiFENesin (ROBITUSSIN) 100 mg/5 mL oral liquid 400 mg  400 mg Per G Tube Q4H PRN    albuterol-ipratropium (DUO-NEB) 2.5 MG-0.5 MG/3 ML  3 mL Nebulization Q4H PRN    aspirin chewable tablet 81 mg  81 mg Per G Tube DAILY    acetaminophen (TYLENOL) tablet 650 mg  650 mg Per G Tube Q6H PRN    Or    acetaminophen (TYLENOL) suppository 650 mg  650 mg Rectal Q6H PRN    enoxaparin (LOVENOX) injection 40 mg  40 mg SubCUTAneous QHS    sodium chloride (NS) flush 5-10 mL  5-10 mL IntraVENous PRN    sodium chloride (NS) flush 5-40 mL  5-40 mL IntraVENous Q8H    sodium chloride (NS) flush 5-40 mL  5-40 mL IntraVENous PRN    omega-3 acid ethyl esters (LOVAZA) capsule 1,000 mg  1 g Oral DAILY WITH BREAKFAST        LAB AND IMAGING FINDINGS:     Lab Results   Component Value Date/Time    WBC 7.9 11/08/2021 02:18 AM    HGB 7.2 (L) 11/08/2021 02:18 AM    PLATELET 233 90/71/4314 02:18 AM     Lab Results   Component Value Date/Time    Sodium 135 (L) 11/08/2021 02:18 AM    Potassium 5.1 11/08/2021 02:18 AM    Chloride 95 (L) 11/08/2021 02:18 AM    CO2 39 (H) 11/08/2021 02:18 AM    BUN 19 (H) 11/08/2021 02:18 AM    Creatinine 0.45 (L) 11/08/2021 02:18 AM    Calcium 7.9 (L) 11/08/2021 02:18 AM    Magnesium 2.2 11/08/2021 02:18 AM    Phosphorus 3.7 11/08/2021 02:18 AM      Lab Results   Component Value Date/Time    Alk.  phosphatase 55 11/04/2021 03:40 AM    Protein, total 5.2 (L) 11/04/2021 03:40 AM    Albumin 1.5 (L) 11/04/2021 03:40 AM    Globulin 3.7 11/04/2021 03:40 AM     Lab Results   Component Value Date/Time    INR 1.1 10/28/2021 02:39 AM    Prothrombin time 14.2 10/28/2021 02:39 AM      No results found for: IRON, FE, TIBC, IBCT, PSAT, FERR   No results found for: PH, PCO2, PO2  No components found for: [place]   Lab Results   Component Value Date/Time    CK 25 (L) 10/28/2021 02:39 AM    CK - MB 1.5 10/28/2021 02:39 AM              Total time: 35   minutes   Counseling / coordination time, spent as noted above:   > 50% counseling / coordination: yes with sister and brother in law     Prolonged service was provided for  []30 min   []75 min in face to face time in the presence of the patient, spent as noted above.   Time Start:   Time End:

## 2021-11-08 NOTE — PROGRESS NOTES
Bedside turnover given to [employer]. SBAR,MAR,ED summary given with updates R/T the night, a chance to ask questions was given. PT is on the cardiac tele monitor. Vent settings checked with oncoming RN. Bed is in the lowest position with the wheels locked. Call bell and personal effects  are on the bedside table within reach. Double checked drips with oncoming RN.

## 2021-11-08 NOTE — PROGRESS NOTES
Infectious Disease progress Note        Reason: Right upper lobe cavitary pneumonia    Current abx Prior abx       Levofloxacin, zosyn since 11/4/21    Fluconazole since 11/5 vancomycin 10/18-10/21  Levofloxacin 10/18-10/25  Piperacillin/tazobactam since 10/18/2021-10/29  Meropenem  10/29-11/4  eraxis since 10/31-11/5     Lines:   Right femoral CVC since 10/27/21    Assessment :    67 y.o. 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 intake, and worsening cough . Now with gram positive bacteremia, chronic cavitary lesion RUL with RUL/RLL infiltrates    Clinical presentation c/w acute on chronic hypoxic respiratory failure likely secondary to recurrent aspiration pneumonia, chronic cavitary lesion right upper lobe  Rule out superimposed infection/colonization with atypical mycobacteria    Sputum cx- mixed respiratory cristine, MSSA yeast  Yeast likely colonizer. Sputum AFB 10/19 negative    Pulmonary follow-up appreciated    Single positive blood culture for coagulase negative staph. On  10/18 is likely contamination    S/p peg tube placement 10/21/21    Now with worsening tachypnea, increased abdominal pain on 10/25/2021 likely secondary to displaced PEG tube. GI follow-up appreciated. Status post removal of PEG tube 10/26/2021. Evidence of sherif-PEG wound infection noted  No clinical or radiological evidence of worsening pneumonia    Unresponsiveness on 10/27/2021 status post intubation 10/27/2021. Pulled out ET tube, reintubated on 10/28/2021  Currently on 35% FiO2.      worsening hypotension requiring pressors on 10/29  Etiology of hypotension not entirely clear at this time- ? Cardiogenic versus partially treated sepsis  Peg site culture 10/29- candida albicans.    Respiratory culture 10/30- yeast  No worsening erythema around recent PEG site. persistent fevers T-max 102 since 11/1, progressive infiltrates noted on CT chest- ?recurrent aspiration pneumonia,?progressive OSCAR pneumonia ?superimposed candida pneumonia  ? Colonized femoral cvc  Negative blood culture 11/5, negative sputum cx 11/5, negative urine culture 11/5    Status post paracentesis 11/4/2021. Pulmonary help appreciated. Fluid analysis not consistent with parapneumonic effusion/empyema    Monitor for cholecystitis. Enlarged gallbladder with some wall thickening noted on CT scan 11/1/2021    No definitive evidence of PEG site abscess noted on CT scan 11/4/2021. Recent broad-spectrum antibiotics, colonization with Candida puts patient at risk for fungemia. Negative Fungitell 11/1/2021 argues against fungemia    Remains on Levophed. On 50% FiO2. Plans for bronchoscopy noted    Recommendations:    1. continue zosyn, levofloxacin, vancomycin, iv fluconazole  2. F/u results of bronchoscopy- will make decision to de-escalate/discontinue abx based on results of bronchoscopy  3. Follow-up susceptibility of Mycobacterium in sputum culture  4. Weaning from vent per ICU team  5. Remove femoral cvc and obtain alternative iv access if feasible. Above plan was discussed in details with RN, dr. Gibbons Dears. Please call me if any further questions or concerns. Will continue to participate in the care of this patient. HPI:    Intubated      home Medication List    Details   acetylcysteine (MUCOMYST) 100 mg/mL (10 %) nebulizer solution Take 4 mL by inhalation every four (4) hours for 120 days. Qty: 720 mL, Refills: 3      fluorouraciL (EFUDEX) 5 % chemo cream APPLY CREAM TO FOREHEAD 2 TIMES DAILY FOR 2 WEEKS ONCE HEALED USE 2 TIMES DAILY TO THE EARS FOR 2 WEEKS ONCE HEALED USE 2 TIMES DAILY TO THE CHEEKS AND TEMPLES FOR 2 WEEKS      sodium chloride 3 % nebulizer solution 4 mL by Nebulization route four (4) times daily. Mix with albuterol.   File under Medicare Part B.  Dx: R06.02; J47.9; J84.9; R53.81  Qty: 480 mL, Refills: 5    Associated Diagnoses: Dyspnea on exertion; Bronchiectasis without complication ([facility] ILD (interstitial lung disease) ([facility] Chronic pulmonary aspiration, sequela; Physical deconditioning      albuterol (PROVENTIL VENTOLIN) 2.5 mg /3 mL (0.083 %) nebu Mix with hypertonic saline nebulizer -- use 3- times per day. File under Medicare Part B. Dx: R06.02; J47.9; J84.9; R53.81  Qty: 360 Nebule, Refills: 3    Associated Diagnoses: Dyspnea on exertion; [facility] ILD (interstitial lung disease) ([facility] Chronic pulmonary aspiration, sequela; Physical deconditioning      guaiFENesin ER (MUCINEX) 600 mg ER tablet Take 1 Tablet by mouth two (2) times a day for 90 days. Qty: 180 Tablet, Refills: 3    Associated Diagnoses: Bronchiectasis without complication (HCC)      cholecalciferol (VITAMIN D3) (1000 Units /25 mcg) tablet Take 1,000 Units by mouth daily. FISH OIL-DHA-EPA PO Take 1 Tab by mouth daily. amino acids powd Take 1 Dose by mouth daily. OTHER Take 1 Cap by mouth daily. tumeric       fluticasone propionate (FLONASE NA) 1 Spray by Both Nostrils route daily. ascorbic acid, vitamin C, (VITAMIN C) 500 mg tablet Take 500 mg by mouth daily. multivitamin (ONE A DAY) tablet Take 1 Tab by mouth daily. aspirin delayed-release 81 mg tablet Take 81 mg by mouth daily.     Associated Diagnoses: Coronary artery disease involving native coronary artery of native heart without angina pectoris             Current Facility-Administered Medications   Medication Dose Route Frequency    vancomycin (VANCOCIN) 1250 mg in  ml infusion  1,250 mg IntraVENous Q12H    fluconazole (DIFLUCAN) 400mg/200 mL IVPB (premix)  400 mg IntraVENous Q24H    potassium, sodium phosphates (NEUTRA-PHOS) packet 1 Packet  1 Packet Per G Tube QID    piperacillin-tazobactam (ZOSYN) 4.5 g in 0.9% sodium chloride (MBP/ADV) 100 mL MBP 4.5 g IntraVENous Q6H    levoFLOXacin (LEVAQUIN) 750 mg in D5W IVPB  750 mg IntraVENous Q24H    NOREPINephrine (LEVOPHED) 8 mg in 5% dextrose 250mL (32 mcg/mL) infusion  0.5-50 mcg/min IntraVENous TITRATE    polyethylene glycol (MIRALAX) packet 17 g  17 g Per NG tube BID    senna-docusate (PERICOLACE) 8.6-50 mg per tablet 1 Tablet  1 Tablet Oral DAILY    famotidine (PF) (PEPCID) 20 mg in 0.9% sodium chloride 10 mL injection  20 mg IntraVENous Q12H    thiamine HCL (B-1) tablet 200 mg  200 mg Per G Tube DAILY    LORazepam (ATIVAN) injection 2 mg  2 mg IntraVENous Q15MIN PRN    HYDROmorphone (DILAUDID) syringe 1 mg  1 mg IntraVENous Q15MIN PRN    chlorhexidine (PERIDEX) 0.12 % mouthwash 10 mL  10 mL Oral Q12H    midazolam (VERSED) injection 2 mg  2 mg IntraVENous Q10MIN PRN    fentaNYL citrate (PF) injection 100 mcg  100 mcg IntraVENous Q30MIN PRN    fentaNYL (PF) 1,500 mcg/30 mL (50 mcg/mL) infusion  0-200 mcg/hr IntraVENous TITRATE    insulin lispro (HUMALOG) injection   SubCUTAneous Q6H    glucose chewable tablet 16 g  16 g Oral PRN    glucagon (GLUCAGEN) injection 1 mg  1 mg IntraMUSCular PRN    naloxone (NARCAN) injection 0.4 mg  0.4 mg IntraVENous PRN    albuterol-ipratropium (DUO-NEB) 2.5 MG-0.5 MG/3 ML  3 mL Nebulization Q4H RT    sodium chloride 3% hypertonic nebulizer soln  4 mL Nebulization Q4HWA RT    Lactobacillus Acidoph & Bulgar (FLORANEX) tablet 2 Tablet  2 Tablet Per G Tube BID    guaiFENesin (ROBITUSSIN) 100 mg/5 mL oral liquid 400 mg  400 mg Per G Tube Q4H PRN    albuterol-ipratropium (DUO-NEB) 2.5 MG-0.5 MG/3 ML  3 mL Nebulization Q4H PRN    aspirin chewable tablet 81 mg  81 mg Per G Tube DAILY    cholecalciferol (VITAMIN D3) (1000 Units /25 mcg) tablet 1,000 Units  1,000 Units PEG Tube DAILY    acetaminophen (TYLENOL) tablet 650 mg  650 mg Per G Tube Q6H PRN    Or    acetaminophen (TYLENOL) suppository 650 mg  650 mg Rectal Q6H PRN    enoxaparin (LOVENOX) injection 40 mg 40 mg SubCUTAneous QHS    sodium chloride (NS) flush 5-10 mL  5-10 mL IntraVENous PRN    sodium chloride (NS) flush 5-40 mL  5-40 mL IntraVENous Q8H    sodium chloride (NS) flush 5-40 mL  5-40 mL IntraVENous PRN    omega-3 acid ethyl esters (LOVAZA) capsule 1,000 mg  1 g Oral DAILY WITH BREAKFAST    therapeutic multivitamin (THERAGRAN) tablet 1 Tablet  1 Tablet Oral DAILY       Allergies: Pollens extract    Family History   Problem Relation Age of Onset    No Known Problems Mother     Heart Disease Father     Heart Attack Father         1989    Coronary Art Dis Father     Hypertension Father     High Cholesterol Father      Social History     Socioeconomic History    Marital status: SINGLE     Spouse name: Not on file    Number of children: Not on file    Years of education: Not on file    Highest education level: Not on file   Occupational History    Not on file   Tobacco Use    Smoking status: Never Smoker    Smokeless tobacco: Never Used   Vaping Use    Vaping Use: Never used   Substance and Sexual Activity    Alcohol use: No    Drug use: No    Sexual activity: Not on file   Other Topics Concern    Not on file   Social History Narrative    Not on file     Social Determinants of Health     Financial Resource Strain:     Difficulty of Paying Living Expenses: Not on file   Food Insecurity:     Worried About Running Out of Food in the Last Year: Not on file    Cherelle of Food in the Last Year: Not on file   Transportation Needs:     Lack of Transportation (Medical): Not on file    Lack of Transportation (Non-Medical):  Not on file   Physical Activity:     Days of Exercise per Week: Not on file    Minutes of Exercise per Session: Not on file   Stress:     Feeling of Stress : Not on file   Social Connections:     Frequency of Communication with Friends and Family: Not on file    Frequency of Social Gatherings with Friends and Family: Not on file    Attends Zoroastrian Services: Not on file   [facility] or Organizations: Not on file    Attends Club or Organization Meetings: Not on file    Marital Status: Not on file   Intimate Partner Violence:     Fear of Current or Ex-Partner: Not on file    Emotionally Abused: Not on file    Physically Abused: Not on file    Sexually Abused: Not on file   Housing Stability:     Unable to Pay for Housing in the Last Year: Not on file    Number of [place] in the Last Year: Not on file    Unstable Housing in the Last Year: Not on file     Social History     Tobacco Use   Smoking Status Never Smoker   Smokeless Tobacco Never Used        Temp (24hrs), Av.8 °F (37.7 °C), Min:99 °F (37.2 °C), Max:101.8 °F (38.8 °C)    Visit Vitals  BP (!) 92/56   Pulse 100   Temp 99.7 °F (37.6 °C)   Resp 16   Ht 5' 7\" (1.702 m)   Wt 61 kg (134 lb 7.7 oz)   SpO2 (!) 89%   BMI 21.06 kg/m²       ROS: Unable to obtain due to patient factors    Physical Exam:    General:Thin  male laying on bed, intubated    HEENT:  Normocephalic, atraumatic, no scleral icterus or pallor; no conjunctival hemmohage; ET tube in place; neck supple, no bruits. Lymph Nodes:   not examined   Lungs:   Bilateral chest movements equal, +nt rhonchi   Heart:  RRR, s1 and s2; no  rubs or gallops, no edema, + pedal pulses   Abdomen:  soft, non-distended, no erythema around peg site, +nt abdominal tenderness,  no hepatomegaly, no splenomegaly.    Genitourinary:  deferred   Extremities:   no clubbing, cyanosis; no joint effusions or swelling;  muscle mass appropriate for age   Neurologic:   Sedated                        Skin:  No rash or ulcers noted   Back:  not examined     Psychiatric:   calm         Labs: Results:   Chemistry Recent Labs     21  0218 21  0400 21  0330   * 145* 150*   * 134* 134*   K 5.1 4.4 4.6   CL 95* 95* 96*   CO2 39* 38* 36*   BUN 19* 18 19*   CREA 0.45* 0.43* 0.52*   CA 7.9* 8.2* 8.3*   AGAP 1* 1* 2*   BUCR 42* 42* 37*      CBC w/Diff Recent Labs     11/08/21  0218 11/07/21  0400 11/06/21  0330   WBC 7.9 7.5 8.4   RBC 2.75* 2.79* 2.96*   HGB 7.2* 7.2* 7.7*   HCT 24.6* 24.8* 26.2*    301 337   GRANS 89* 88* 89*   LYMPH 2* 3* 3*   EOS 2 3 2      Microbiology Recent Labs     11/05/21  1850 11/05/21  1630 11/05/21  1600 11/05/21  1530   CULT No growth (<1,000 CFU/ML) NO GROWTH 3 DAYS NO GROWTH 1 DAY NO GROWTH 3 DAYS          RADIOLOGY:    All available imaging studies/reports in [facility] care for this admission were reviewed    High complexity decision making was performed during the evaluation of this patient at high risk for decompensation with multiple organ involvement         Disclaimer: Sections of this note are dictated utilizing voice recognition software, which may have resulted in some phonetic based errors in grammar and contents. Even though attempts were made to correct all the mistakes, some may have been missed, and remained in the body of the document. If questions arise, please contact our department.     Dr. Jovan Shaikh, Infectious Disease Specialist  [phone]  November 8, 2021  3:25 PM

## 2021-11-08 NOTE — PROGRESS NOTES
Bedside turnover given from [facility]. SBAR,MAR,ED summary given with updates R/T the day, a chance to ask questions was given. PT is on the cardiac tele monitor. Vent settings confirmed. Bed is in the lowest position with the wheels locked. Call bell and personal effects  are on the bedside table within reach. Double checked IV drips with offgoing RN. Patient resting comfortably. Whiteboard updated.

## 2021-11-08 NOTE — PROGRESS NOTES
[facility]  ICU Progress Note      Name: Andie Hsu   : 1945   MRN: [id number]   Date: 2021 11:13 AM     Interval HPI:  Patient is [id number] y. o. male with PMHx of chronic respiratory failure, ILD, bronchiectasis who presents with failure to thrive. He presented with dyspnea and poor PO intake. He was placed on some O2 and CT chest showed some worsening consolidations in the presence of the chronic lung disease. He follows with us in clinic and was instructed to use hypertonic saline, acapella and mucinex, unsure of compliance. He was admitted to the floor, but RRT was called 10/27/21 for encephalopathy and acute hypercapnic respiratory failure. Anesthesia paged and he was intubated and transferred to ICU. Noted to have PNA and pulm edema on top of bronchiectasis and ILD, also a component of cardiogenic ( EF 25%) and septic shock. Multiple Cavitary lesions - likely 2/2 recurrent aspiration. Respiratory cx + MSSA, light candida albicans. PEG tube placed this admission on 10/21 for dysphagia, but inadvertently dislodged, thus will need to be replaced. Patient self-extubated 10/27/21 and was emergently re-intubated due to unresponsiveness to voice and noxious stimuli.  Patient has inability to cough, thus would benefit from trach, but patient does not wish to be trached. Made partial code (21). Ideally, comfort care vs. Trach. Palliative and  teams will meet with the family to discuss goals of care. Subjective:  Length of stay : 21 days   Vent day : 11 days  21:   Patient was seen at bedside this morning and was sleeping on encounter. []The patient is unable to give any meaningful history or review of systems because the patient is:  []Intubated [x]Sedated   []Unresponsive      []The patient is critically ill on      []Mechanical ventilation []Pressors   []BiPAP []                 ROS:Review of systems not obtained due to patient factors.     Vital Signs: Visit Vitals  BP (!) 94/54   Pulse 95   Temp 99.7 °F (37.6 °C)   Resp 15   Ht 5' 7\" (1.702 m)   Wt 62.1 kg (136 lb 14.5 oz)   SpO2 100%   BMI 21.44 kg/m²       O2 Device: Ventilator, Endotracheal tube   O2 Flow Rate (L/min): 6 l/min   Temp (24hrs), Av °F (37.8 °C), Min:99.1 °F (37.3 °C), Max:101.8 °F (38.8 °C)       Intake/Output:   Last shift:      No intake/output data recorded. Last 3 shifts:  1901 -  0700  In: 4171.1 [I.V.:2371.1]  Out: 3000 [Urine:3000]    Intake/Output Summary (Last 24 hours) at 2021 1113  Last data filed at 2021 0700  Gross per 24 hour   Intake 2232.28 ml   Output 2030 ml   Net 202.28 ml       Ventilator Settings:  Mode Rate Tidal Volume Pressure FiO2 PEEP   Assist control, VC+   400 ml  10 cm H2O 50 % 6 cm H20     Peak airway pressure: 36 cm H2O    Minute ventilation: 6.25 l/min      Physical Exam:    General: sedated   HEENT:  Anicteric sclerae; pink palpebral conjunctivae; mucosa moist  Resp:  Symmetrical chest rise, no accessory muscle use; good AE Bilat; no rales/ wheezing/ rhonchi noted  CV:  S1, S2 present; RRR; no m/g/r  GI:  Abdomen soft, non-tender; (+) active bowel sounds  Extremities:  +2 pulses on upper extremities; no edema/ cyanosis/ clubbing noted  Skin:  Warm except on the fingers and toes.  Toes bilaterally are black in color - gangrene   Neurologic:  Non-focal  DATA:     Current Facility-Administered Medications   Medication Dose Route Frequency    NOREPINephrine (LEVOPHED) 4,000 mcg in dextrose 5% 250 mL infusion  0.5-50 mcg/min IntraVENous TITRATE    [START ON ] multivit-folic acid-herbal 941 (WELLESSE PLUS) oral liquid 30 mL  30 mL Per G Tube DAILY    [START ON 2021] Vancomycin trough  @1030  1 Each Other ONCE    vancomycin (VANCOCIN) 1250 mg in  ml infusion  1,250 mg IntraVENous Q12H    fluconazole (DIFLUCAN) 400mg/200 mL IVPB (premix)  400 mg IntraVENous Q24H    piperacillin-tazobactam (ZOSYN) 4.5 g in 0.9% sodium chloride (MBP/ADV) 100 mL MBP  4.5 g IntraVENous Q6H    levoFLOXacin (LEVAQUIN) 750 mg in D5W IVPB  750 mg IntraVENous Q24H    NOREPINephrine (LEVOPHED) 8 mg in 5% dextrose 250mL (32 mcg/mL) infusion  0.5-50 mcg/min IntraVENous TITRATE    polyethylene glycol (MIRALAX) packet 17 g  17 g Per NG tube BID    senna-docusate (PERICOLACE) 8.6-50 mg per tablet 1 Tablet  1 Tablet Oral DAILY    famotidine (PF) (PEPCID) 20 mg in 0.9% sodium chloride 10 mL injection  20 mg IntraVENous Q12H    thiamine HCL (B-1) tablet 200 mg  200 mg Per G Tube DAILY    LORazepam (ATIVAN) injection 2 mg  2 mg IntraVENous Q15MIN PRN    HYDROmorphone (DILAUDID) syringe 1 mg  1 mg IntraVENous Q15MIN PRN    chlorhexidine (PERIDEX) 0.12 % mouthwash 10 mL  10 mL Oral Q12H    fentaNYL (PF) 1,500 mcg/30 mL (50 mcg/mL) infusion  0-200 mcg/hr IntraVENous TITRATE    insulin lispro (HUMALOG) injection   SubCUTAneous Q6H    glucose chewable tablet 16 g  16 g Oral PRN    glucagon (GLUCAGEN) injection 1 mg  1 mg IntraMUSCular PRN    naloxone (NARCAN) injection 0.4 mg  0.4 mg IntraVENous PRN    albuterol-ipratropium (DUO-NEB) 2.5 MG-0.5 MG/3 ML  3 mL Nebulization Q4H RT    sodium chloride 3% hypertonic nebulizer soln  4 mL Nebulization Q4HWA RT    Lactobacillus Acidoph & Bulgar (FLORANEX) tablet 2 Tablet  2 Tablet Per G Tube BID    guaiFENesin (ROBITUSSIN) 100 mg/5 mL oral liquid 400 mg  400 mg Per G Tube Q4H PRN    albuterol-ipratropium (DUO-NEB) 2.5 MG-0.5 MG/3 ML  3 mL Nebulization Q4H PRN    aspirin chewable tablet 81 mg  81 mg Per G Tube DAILY    acetaminophen (TYLENOL) tablet 650 mg  650 mg Per G Tube Q6H PRN    Or    acetaminophen (TYLENOL) suppository 650 mg  650 mg Rectal Q6H PRN    enoxaparin (LOVENOX) injection 40 mg  40 mg SubCUTAneous QHS    sodium chloride (NS) flush 5-10 mL  5-10 mL IntraVENous PRN    sodium chloride (NS) flush 5-40 mL  5-40 mL IntraVENous Q8H    sodium chloride (NS) flush 5-40 mL  5-40 mL IntraVENous PRN    omega-3 acid ethyl esters (LOVAZA) capsule 1,000 mg  1 g Oral DAILY WITH BREAKFAST         Labs: Results:       Chemistry Recent Labs     11/08/21 0218 11/07/21  0400 11/06/21  0330   * 145* 150*   * 134* 134*   K 5.1 4.4 4.6   CL 95* 95* 96*   CO2 39* 38* 36*   BUN 19* 18 19*   CREA 0.45* 0.43* 0.52*   CA 7.9* 8.2* 8.3*   AGAP 1* 1* 2*   BUCR 42* 42* 37*      CBC w/Diff Recent Labs     11/08/21 0218 11/07/21  0400 11/06/21  0330   WBC 7.9 7.5 8.4   RBC 2.75* 2.79* 2.96*   HGB 7.2* 7.2* 7.7*   HCT 24.6* 24.8* 26.2*    301 337   GRANS 89* 88* 89*   LYMPH 2* 3* 3*   EOS 2 3 2      Coagulation No results for input(s): PTP, INR, APTT, INREXT in the last 72 hours. Liver Enzymes No results for input(s): TP, ALB, TBIL, AP in the last 72 hours. No lab exists for component: SGOT, GPT, DBIL   ABG Lab Results   Component Value Date/Time    PHI 7.43 11/08/2021 03:42 AM    PCO2I 54.2 (H) 11/08/2021 03:42 AM    PO2I 60 (L) 11/08/2021 03:42 AM    HCO3I 36.2 (H) 11/08/2021 03:42 AM    FIO2I 50 11/08/2021 03:42 AM      Microbiology Recent Labs     11/05/21  1850 11/05/21  1630 11/05/21  1600   CULT No growth (<1,000 CFU/ML) NO GROWTH 3 DAYS NO GROWTH 2 DAYS          Telemetry: []Sinus []A-flutter []Paced    []A-fib []Multiple PVCs                  Imaging:  XR CHEST PORT     Narrative  EXAM: PORTABLE CHEST 0100 hours     CLINICAL HISTORY/INDICATION: intubated , gram-positive bacteremia, chronic  aspiration with dysphagia, chronic bronchiectasis, chronic right upper lobe  cavitary pneumonia, interstitial lung disease     COMPARISON: Chest x-ray 11/6, 11/5, and 11/3/2021, CT chest 11/4/2021.     TECHNIQUE: Single AP view     FINDINGS:     Endotracheal tube terminates 4.7 cm proximal to the jero. The esophagogastric  tube terminates at the proximal stomach. Continued bilateral multifocal infiltrates. Minimal blunting of the costophrenic  angles. No pneumothorax.  Right greater than left apical pleural thickening. Slight elevation of the right minor fissure unchanged. Pulmonary vessels are  obscured. The heart is normal in size.     Impression  No significant interval change. Multifocal bilateral confluent and groundglass infiltrates. Chronic increased interstitial markings. Minimal blunting of the costophrenic angles. Cavitary lesion in the right upper lobe with mild volume loss not significantly  changed but much better demonstrated on CT. Support tubes in expected position. CT Results (most recent):  Results from Hospital Encounter encounter on 10/18/21     CT CHEST ABD PELV W CONT     Narrative  CT chest, abdomen and pelvis with contrast     CLINICAL HISTORY: evaluate for abdominal abscess at site of recent peg tube,  parapneumonic effusion/empyema. persistent fevers/hypotension despite broad  spectrum abx     COMPARISON: Abdomen pelvis CT 10/25/21 and chest CT 10/18/21.     TECHNIQUE: Helical scan to the chest, abdomen and pelvis are obtained from the  thoracic inlet to the symphysis pubis after IV contrast administration.     All CT scans at this facility performed using dose optimization techniques as  appreciated to a performed exam, to include automated exposure control,  adjustment of the mA and or KU according to patient size (including appropriate  matching for site specific examination), or use of iterative reconstruction  technique.     FINDINGS:     CT CHEST:     Lung Parenchyma: Multiple large areas of consolidations with air bronchogram and  surrounding infiltrations identified bilaterally, moderately interval progressed  since the prior chest CT. There is associated significant volume loss in  bilateral upper lobes. Superimposed compressive atelectasis at posterior  bilateral lower lobes also identified due to pleural effusions.     Thyroid: Atrophic.     Lymph nodes: No adenopathy.     Mediastinum: Borderline size of the heart.  No significant pericardial effusion. Moderate coronary artery calcifications.     Vasculature: The aorta and the great vessels are unremarkable. Collapsed  esophagus with NG tube present.     Pleural Space And Chest Wall: There is a moderate to large nonloculated left  pleural effusion developed. Small right pleural effusion is interval progressed  and loculated. The loculated air-fluid collection at the lateral right upper  lung is similar in size but more fluid level seen since the prior study and  measures 2.6 x 6.5 x 6.2 cm. The air component is communicate with peripheral  bronchial branches, best seen on coronal #30/601.     CT ABDOMEN AND PELVIS:     Liver: Normal.  Gallbladder: Elongated and distended with mild wall thickening and surrounding  pericholecystic fluid. Small layering hyperdense debris/calculi. Biliary System: No ductal dilatation. Spleen: Normal.  Pancreas: Normal.  Bowel: The gastric G-tube is interval removed with NG tube present. The  extensive emphysema in anterior upper/mid abdomen seen on prior study is no  longer evident today. The small and large bowel are nondilated. Moderate stool  burden in the colon. Scattered diverticula in the colon. No definite colonic  inflammation.     Adrenal Glands: Normal.  Kidneys: Normal.  Lower genitourinary system: The bladder is decompressed with Merlos catheter  present.     Peritoneum/Retroperitoneum: No adenopathy. Vasculature: Advanced atherosclerotic aortic disease. Other: Small pelvic free fluid present. No focal fluid collection or abscess  identified. Moderate edema in pelvic wall and bilateral thigh, unchanged.     CT OSSEOUS STRUCTURES:     Compression deformity at T11 appears unchanged. Absence of the left femoral neck  and lateral portion of left femoral head with superior subluxation of proximal  femur, unchanged.     Impression  1.  Significant regression of multiple large consolidations and surrounding  infiltrations in bilateral lungs since the prior CT, indicative of progression  of infectious process.     2. New large left pleural effusion and increased loculated right pleural  effusion. The loculated hydropneumothorax shows more layering fluid component  since the prior CT. The air component communicating with lateral bronchial  branch in right upper lobe.     3. Gallbladder hydrops with small layering hyperdense debris. Mild  pericholecystic fluid. Recommend ultrasound correlation for potential  cholecystitis.     4. Interval removal of G-tube from left anterior abdominal wall with interval  resolved extensive emphysema. Persistent anasarca extending to the bilateral  thigh.     Thank you for this referral.        10/18/21     ECHO ADULT COMPLETE 10/26/2021 10/26/2021     Interpretation Summary  · LV: Estimated LVEF is 25 - 30%. Visually measured ejection fraction. Normal cavity size and wall thickness. Severely and segmentally reduced systolic function. Age-appropriate left ventricular diastolic function. · PA: Severe pulmonary hypertension. Pulmonary arterial systolic pressure is 78 mmHg. · IVC: Mildly elevated central venous pressure (8 mmHg); IVC diameter is less than 21 mm and collapses less than 50% with respiration. · Image quality for this study was technically difficult. · Echo study was limited due to patient's condition. · RV: Not well visualized. IMPRESSION:   · Acute hypoxic and hypercarbic respiratory failure: 2/2 PNA (aspiration) superimposed on chronic OSCAR infection and pulm edema on top of bronchiectasis, ILD  · Shock:  Septic + cardiogenic  · Acute pulmonary edema: combination of worsening cardiomyopathy as well as hypoalbuminemia  · RUL PNA w bronchiectasis, RLL nodular consolidation, Cavitary lesions - likely 2/2 recurrent aspiration. resp cultures growing MSSA. At risk for invasive fungal PNA, NTM, given hx severe structural lung disease.  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 ?sepsis, stress, ischemic  · Severe pulmonary hypertension on echo. Definitely group II and III component   · Urinary retention - coude catheter placed by urology   · normocytic anemia-  stable  · Deconditioning/adult failure to thrive/cachexia: Body mass index is 19.34 kg/m². RECOMMENDATIONS:   Neuro:Titrate sedation for RASS goal 0 to -1, daily sedation holiday.  Cont Fentanyl gtt. Bilateral wrist restraints   Pulm: Aspiration precautions, HOB>30'. VAP Bundle, Daily sedation vacation. SBT today, minimal vent settings. Pt is a poor candidate for extubation, already failed once, severely deconditioned. CT chest showed large left pleural effusion which was drained 11/4, likely exudative and does not appear infectious, Another differential is pseudo transudate effusion from his CHF. Patient will be bronchoscopied today. CVS:Monitor HD, MAP goal >65. BP's remain soft, Levophed low-dose restarted, will keep femoral central line for now   GI: NPO. Diet: Cont TF via OGT. Needs PEG tube replaced for chronic dysphagia. GI consulted and will hold off on PEG insertion until current PEG site infection cleared (currently still with pus and drainage). Re-consult when PEG site appears clear of infection. CT abdomen reveals resolution of abdominal abscess on PEG tube site. Renal: Trend Cr, UOP. Merlos (coude) placed by Urology   Hem/Onc: Trend H/H, monitor for s/o active bleeding. Daily CBC. I/D:Trend WBCs and temperature curve.  Persistent fevers despite multiple broad spectrum antibiotics/ antifungals concerning for non infectious etiology of fever such as drug fever, DVT. ABx per ID, broad spectrum (zosyn, levaquin, vancomycin, fluconazole ).  Rcx w/ MSSA, rare yeast. Wound cx with moderate candida albicans. F/u PVL's negative for source of fever   Metabolic: Daily BMP, mag, phos. Trend lytes and replace per protocol. Endocrine:Q6 glucoses. SSI. Avoid hypoglycemia. Musc/Skin:  wound care   Global Care: palliative and the  will discuss goals of care with the family. Partial Code   · Discussed in interdisciplinary rounds      Best practice :     Glycemic control  IHI ICU bundles:              Central Line Bundle Followed , Corcoran Bundle Followed and Vent Bundle Followed, Vent Day 9    Mec Vent patients- VAP bundle, aim to keep peak plateau pressure 17-84QY H2O  Sress ulcer prophylaxis. DVT prophylaxis. Need for Lines, corcoran assessed. Palliative care evaluation. Vesna Sanchez MD PGY1  [facility] [address]        Attending Attestation:  I saw and evaluated the patient, performing the key elements of the service. I discussed the findings, assessment and plan with the resident and agree with the resident's findings and plan as documented in the resident's note. Total of 35 min critical care time spent at bedside (personally) during the course of care providing evaluation,management and care decisions and ordering appropriate treatment related to critical care problems exclusive of procedures. The reason for providing this level of medical care for this critically ill patient was due a critical illness that impaired one or more vital organ systems such that there was a high probability of imminent or life threatening deterioration in the patients condition.  This care involved high complexity decision making to assess, manipulate, and support vital system functions, to treat this degree vital organ system failure and to prevent further life threatening deterioration of the patients condition. This time was independent of other practitioners. 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 with shortness of breath and poor p.o. intake, patient was found to have worsening consolidation of the chest consistent with aspiration pneumonia. Patient was hospitalized to the floor for multiple days and developed respiratory failure, and I transferred the patient to the ICU after being emergently intubated for acute worsening hypoxic respiratory failure. Etiology was multifactorial, combination of worsening infection as well as pulmonary edema from hypoalbuminemia/adult failure to thrive. Patient has been intubated and unfortunately has worsening hypoxia, worsening bilateral infiltrates despite receiving broad-spectrum antibiotics. Patient now positive for OSCAR, discussed with ID, patient would benefit from bronchoscopy, however family deciding on goals of care. They have noted that the patient will not get a tracheostomy, however they really want the patient to receive a PEG tube again. Patient received PEG tube while on the floor however he was encephalopathic and pulled PEG tube which became dislodged, and resulted in intra-abdominal infection, PEG tube was then removed by GI service and noted that patient cannot have this replaced for a number of days, until the tract heals. Unfortunately since patient cannot receive tracheostomy and his ventilation status remains poor due to being frail with adult failure to thrive, chronic hypoxia, chronic aspiration, extubating patient medically as not feasible, as patient would likely become emergently reintubated relatively quickly.   Patient already self extubated last week, and was emergently reintubated due to the above. We will continue supportive care.   Discussed with palliative care service    Very poor prognosis    Michael Mchugh MD/MPH     Pulmonary, Critical Care Medicine  [facility]

## 2021-11-08 NOTE — PROGRESS NOTES
Nutrition Note    Patient tolerating tube feeding at goal with hyponatremia, sodium up to 135 and bowel regimen held due to loose stool yesterday and this morning per nursing. MVI tablet being administered via OGT, plan to change to liquid form per MD.     Recommendations/Plan   - Continue tube feeding of Jevity 1.5 at goal rate of 50 mL/hr with 50 mL q 4 hour water flushes via OGT (goal regimen to provide 1800 kcal, 77 gm protein, 912 mL free water, 100% RDIs). - Change to liquid multivitamin.     Electronically signed by Home Williamson RD, [address]  on 11/8/2021 at 2:16 PM    Contact: [phone]

## 2021-11-08 NOTE — PROGRESS NOTES
[address] [facility] [phone]  [phone]    Palliative Care Family Meeting: This writer, along with Bette Cristobal, NP, with the Palliative Care team; met with patient's sister (Indy Wade, IF#[phone]) and her  Emily Stephenson, GK#[phone]) to offer support and to also discuss goals of care moving forward. Patient remains intubated and currently unable to have the peg tube replaced. Sister and brother-in-law were both adamant that someone needs to check the wound on patient's stomach. They feel that the wound has healed enough for him to have the feeding tube replaced. They are interested in getting the breathing tube out; however, they are not interested in having the breathing tube out until the peg tube has been replaced. The family would like GI to re-assess before they make a final decision. At this time, patient will remain a partial code. Recommendations: The Palliative Care team will continue to offer support to patient and his family and will follow up regarding goals of care conversations and decisions. Irma Umaña., [facility]  Palliative Care   PN#[phone]

## 2021-11-08 NOTE — INTERDISCIPLINARY ROUNDS
[facility]  Pulmonary, Critical Care, and Sleep Medicine  Interdisciplinary and Ventilator Weaning Rounds     Patient discussed in morning walking rounds and interdisciplinary rounds.     ICU day: 10/27/21      Overnight events:   · Copious ETT secretions     Assessments and best practice:  · Ventilator  ? Ventilator day: 10/28/21   ? Vent settings: FiO2 of 50 and PEEP of 6  ? VAP bundle, aim to keep peak plateau pressure 21-02IH H2O  ? Weaning assessed and documented   § Patient does meet criteria for SBT. § Patient will be placed sedation holiday. § Plan to wean with PS n/a.  § Outcome: Pending [facility] discussion   § Final plan: extubated if SBT tolerated   · Sedation  ? Fentanyl   · Other pertinent drips  ? none   · Lines noted  ? Femoral CVL ,  OGT, Corcoran cath, PIV   · Critical labs assessed  ? Yes  · Antibiotics  ? Merrem, Eraxis    · Medications reviewed  ? Yes  · Pending imaging  ? none  · Pending send out labs  ? No  · Pending Procedures  ? PEG, pending clearing of current infection surrounding old PEG site   · Glycemic control  ? SSI   · Stress ulcer prophylaxis. ? Pepcid  · DVT prophylaxis. ? Lovenox  · Need for Lines, corcoran assessed. ? Yes  · Restraint Reevaluation   ? Yes  ? I have reevaluated the patient one hour after initiation of intervention. The patient is comfortable, uninjured, but continues to pose an imminent risk of injury to self to themselves and/or serious disruption of medical treatment required to keep patient stable. The patient's current medical and behavioral conditions that warrant the use intervention include danger to self and Interference with medical equipment or treatment. Restraint or seclusion will be discontinued at the earliest possible time, regardless of the scheduled expiration of the order.  Based on my evaluation, restraints will be continued: YES 11/8/21 7:52 AM  ? Attending Overseeing restraints: Dr. Hinson Favorite contact/MPOA: Miranda Dumont Mauro (sister) [phone]   Family updated will update today         Daily Plans:  · Pending [facility] conversation with family  · Bronch tomorrow     CONNOR time 20 minutes           Erasto Pisano PA-C  11/08/21  Pulmonary, Critical Care Medicine  [facility]

## 2021-11-09 NOTE — PROGRESS NOTES
[email or web address]  [phone]    Gastroenterology follow up-Progress note    Impression:  1. s/p PEG 10/20, dislodged and removed 10/26/21  2. Kristin peg wound infection - purulent discharge expressed with light pressure, Improving per RN report  3. Oropharyngeal dysphagia - severe   - Tolerating tube feeds well. 4. Intubated - Acute hypoxic respiratory failure:   - Combination of aspiration pneumonia, bronchiectasis, ILD, hypoventilation, and now superimposed pulmonary edema with pleural effusions.   - Copious ETT secretions overnight on 11/8.  - PT agitated overnight 11/8 and pulling at medical equipment including ventilator.   - Component of splinting secondary to peritoneal inflammation from dislodged PEG tube  5. Acute pulmonary edema: A combination of worsening cardiomyopathy as well as hypoalbuminemia  6. Aspiration pneumonia with severe sepsis  7 Cavitary lung lesion with infected bullae  8. Gram pos cocci bacteremia    Plan:  1. Recommend consideration of full palliative/comfort care measures. Family desires to pursue PEG again, but limited other advanced interventions. Tentatively plan for Dr. Harvinder Marmolejo to speak with family about risk/benefits of PEG tomorrow 11/10 as patient's sister Erika Barnett is not available today due to existing obligations. Discussed w/ Dr. Harvinder Marmolejo and [facility].  2. Continue medical management per primary team.    Chief Complaint: Dysphagia, PEG placement      Subjective:    -Patient was seen earlier this admission when PEG placed by Dr. Brittany Redding 10/21, PEG later dislodged and was in subcutaneous tissue, removed on 10/26. PEG site has been leaking small amounts of purulent discharge. -LEXUS Phillips spoke to patients sister Denins Zamora ([phone]) on phone 11/8. She desires for new PEG to be placed.  Says her goal is for the patient to go to a rehab facility and have PT and gain enough strength to go home.    -Overnight 11/8 PT was noted to be increasingly agitated and was pulling at medical equipment including ETT.  - Nurse notes 11/9 that PT is tolerating tube feeds well and his last BM was yesterday. ROS: unable to be obtained as pt is intubated and sedated. General: warm, sedated and intubated  Eyes: conjunctiva normal, EOM normal  Cardiovascular: tachycardic, intact distal pulses, regular rhythm  Pulmonary: breath sounds normal and effort normal, symmetrical chest rise.   Abdominal: appearance normal, active bowel sounds, soft, non-tender    Patient Active Problem List   Diagnosis Code    Coronary artery disease involving native coronary artery of native heart without angina pectoris,s/p stent 2003 I25.10    Scarlet fever A38.9    Myocardial infarction ([address] I21.9    Urinary frequency R35.0    Pneumonia J18.9    Goals of care, counseling/discussion Z71.89    Acute respiratory failure with hypoxia ([address] J96.01    Debility R53.81    Severe protein-calorie malnutrition ([address] E43    Gastrostomy complication ([address] 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.78         Visit Vitals  BP 97/60   Pulse (!) 101   Temp 99.7 °F (37.6 °C)   Resp 14   Ht 5' 7\" (1.702 m)   Wt 62.1 kg (136 lb 14.5 oz)   SpO2 93%   BMI 21.44 kg/m²           Intake/Output Summary (Last 24 hours) at 11/9/2021 0951  Last data filed at 11/9/2021 0622  Gross per 24 hour   Intake 1202.47 ml   Output 1600 ml   Net -397.53 ml       CBC w/Diff    Lab Results   Component Value Date/Time    WBC 7.8 11/09/2021 06:30 AM    RBC 2.65 (L) 11/09/2021 06:30 AM    HGB 6.9 (L) 11/09/2021 06:30 AM    HCT 23.8 (L) 11/09/2021 06:30 AM    MCV 89.8 11/09/2021 06:30 AM    MCH 26.0 11/09/2021 06:30 AM    MCHC 29.0 (L) 11/09/2021 06:30 AM    RDW 14.6 (H) 11/09/2021 06:30 AM     11/09/2021 06:30 AM    Lab Results   Component Value Date/Time    GRANS 86 (H) 11/09/2021 06:30 AM    LYMPH 3 (L) 11/09/2021 06:30 AM    EOS 3 11/09/2021 06:30 AM    BASOS 0 11/09/2021 06:30 AM      Basic Metabolic Profile   Recent Labs     11/09/21  0630   *   K 4.7   CL 97* CO2 40*   BUN 22*   CA 8.5   MG 2.4   PHOS 3.1        Hepatic Function    Lab Results   Component Value Date/Time    ALB 1.5 (L) 11/04/2021 03:40 AM    TP 5.2 (L) 11/04/2021 03:40 AM    AP 55 11/04/2021 03:40 AM    No results found for: TBIL       Coags   No results for input(s): PTP, INR, APTT, INREXT in the last 72 hours. Bhumi Campuzano PA-C  11/09/21, 10:10 AM   [facility]. [email or web address]  Phone: [phone]  Pager: [phone]

## 2021-11-09 NOTE — PROGRESS NOTES
Assisted with Dr Kati Cifuentes and bronchoscopy for  patient today. Ambu scope was used and NSS saline flush was used, no resp distress, pt was 100% during procedure with no decrease in sats. Tolerated well, 2 samples sent to lab for bronchial washings, pt does have a hx of bronchiectasis.

## 2021-11-09 NOTE — PROGRESS NOTES
[facility]  ICU Progress Note      Name: Altaf Roy   : 1945   MRN: [id number]   Date: 2021 1:49 PM     [x]I have reviewed the flowsheet and previous days notes. Events overnight reviewed and discussed with nursing staff. Vital signs and records reviewed. Interval HPI:  Patient is a 74 y. o. male with PMHx of chronic respiratory failure, ILD, bronchiectasis who presents with failure to thrive. He presented with dyspnea and poor PO intake. He was placed on some O2 and CT chest showed some worsening consolidations in the presence of the chronic lung disease. He follows with us in clinic and was instructed to use hypertonic saline, acapella and mucinex, unsure of compliance. He was admitted to the floor, but RRT was called 10/27/21 for encephalopathy and acute hypercapnic respiratory failure. Anesthesia paged and he was intubated and transferred to ICU. Noted to have PNA and pulm edema on top of bronchiectasis and ILD, also a component of cardiogenic ( EF 25%) and septic shock. Multiple Cavitary lesions - likely 2/2 recurrent aspiration. Respiratory cx + MSSA, light candida albicans. PEG tube placed this admission on 10/21 for dysphagia, but inadvertently dislodged, thus will need to be replaced. Patient self-extubated 10/27/21 and was emergently re-intubated due to unresponsiveness to voice and noxious stimuli.  Patient has inability to cough, thus would benefit from trach, but patient does not wish to be trached. Made partial code (21). Ideally, comfort care vs. Trach. Palliative and  teams will meet with the family to discuss goals of care. Subjective:  21:    Patient was seen at bedside this morning and was sleeping upon encounter.      [x]The patient is unable to give any meaningful history or review of systems because the patient is:  [x]Intubated [x]Sedated   []Unresponsive      [x]The patient is critically ill on      [x]Mechanical ventilation []Pressors   []BiPAP []                 ROS:Review of systems not obtained due to patient factors. Vital Signs:    Visit Vitals  BP (!) 114/57   Pulse 89   Temp 97.3 °F (36.3 °C)   Resp 14   Ht 5' 7\" (1.702 m)   Wt 62.1 kg (136 lb 14.5 oz)   SpO2 95%   BMI 21.44 kg/m²       O2 Device: Ventilator, Endotracheal tube   O2 Flow Rate (L/min): 6 l/min   Temp (24hrs), Av.9 °F (36.6 °C), Min:97.3 °F (36.3 °C), Max:100.9 °F (38.3 °C)       Intake/Output:   Last shift:      701 - 1900  In: 422.5 [I.V.:122.5]  Out: 250 [Urine:250]  Last 3 shifts: 1901 - 700  In: 3434.8 [I.V.:1834.8]  Out: 9840 [Urine:2930]    Intake/Output Summary (Last 24 hours) at 2021 1349  Last data filed at 2021 1200  Gross per 24 hour   Intake 1625 ml   Output 1900 ml   Net -275 ml       Ventilator Settings:  Mode Rate Tidal Volume Pressure FiO2 PEEP   Assist control, VC+   400 ml  10 cm H2O 70 % (S) 10 cm H20 (after bronchoscopy per Dr Norman Richardson)     Peak airway pressure: 45 cm H2O    Minute ventilation: 5.89 l/min      Physical Exam:    General: Intubated/sedated;    HEENT:  Anicteric sclerae; pink palpebral conjunctivae; mucosa moist  Resp:  Symmetrical chest rise, no accessory muscle use; good AE Bilat; no rales/ wheezing/ rhonchi noted  CV:  S1, S2 present; RRR; no m/g/r  GI:  Abdomen soft, non-tender; (+) active bowel sounds  Extremities:  +2 pulses on all extremities; no edema/ cyanosis/ clubbing noted  Skin:  Warm; no rashes/ lesions noted  Neurologic:  Non-focal        DATA:     Current Facility-Administered Medications   Medication Dose Route Frequency    0.9% sodium chloride infusion 250 mL  250 mL IntraVENous PRN    morphine injection 4 mg  4 mg IntraVENous Q15MIN PRN    propofol (DIPRIVAN) 10 mg/mL infusion  0-50 mcg/kg/min IntraVENous TITRATE    [START ON 11/10/2021] therapeutic multivitamin (THERAGRAN) tablet 1 Tablet  1 Tablet Oral DAILY    NOREPINephrine (LEVOPHED) 4,000 mcg in dextrose 5% 250 mL infusion  0.5-50 mcg/min IntraVENous TITRATE    vancomycin (VANCOCIN) 1250 mg in  ml infusion  1,250 mg IntraVENous Q12H    fluconazole (DIFLUCAN) 400mg/200 mL IVPB (premix)  400 mg IntraVENous Q24H    piperacillin-tazobactam (ZOSYN) 4.5 g in 0.9% sodium chloride (MBP/ADV) 100 mL MBP  4.5 g IntraVENous Q6H    levoFLOXacin (LEVAQUIN) 750 mg in D5W IVPB  750 mg IntraVENous Q24H    polyethylene glycol (MIRALAX) packet 17 g  17 g Per NG tube BID    senna-docusate (PERICOLACE) 8.6-50 mg per tablet 1 Tablet  1 Tablet Oral DAILY    famotidine (PF) (PEPCID) 20 mg in 0.9% sodium chloride 10 mL injection  20 mg IntraVENous Q12H    thiamine HCL (B-1) tablet 200 mg  200 mg Per G Tube DAILY    LORazepam (ATIVAN) injection 2 mg  2 mg IntraVENous Q15MIN PRN    chlorhexidine (PERIDEX) 0.12 % mouthwash 10 mL  10 mL Oral Q12H    fentaNYL (PF) 1,500 mcg/30 mL (50 mcg/mL) infusion  0-200 mcg/hr IntraVENous TITRATE    insulin lispro (HUMALOG) injection   SubCUTAneous Q6H    glucose chewable tablet 16 g  16 g Oral PRN    glucagon (GLUCAGEN) injection 1 mg  1 mg IntraMUSCular PRN    naloxone (NARCAN) injection 0.4 mg  0.4 mg IntraVENous PRN    albuterol-ipratropium (DUO-NEB) 2.5 MG-0.5 MG/3 ML  3 mL Nebulization Q4H RT    Lactobacillus Acidoph & Bulgar (FLORANEX) tablet 2 Tablet  2 Tablet Per G Tube BID    guaiFENesin (ROBITUSSIN) 100 mg/5 mL oral liquid 400 mg  400 mg Per G Tube Q4H PRN    albuterol-ipratropium (DUO-NEB) 2.5 MG-0.5 MG/3 ML  3 mL Nebulization Q4H PRN    aspirin chewable tablet 81 mg  81 mg Per G Tube DAILY    acetaminophen (TYLENOL) tablet 650 mg  650 mg Per G Tube Q6H PRN    Or    acetaminophen (TYLENOL) suppository 650 mg  650 mg Rectal Q6H PRN    enoxaparin (LOVENOX) injection 40 mg  40 mg SubCUTAneous QHS    sodium chloride (NS) flush 5-10 mL  5-10 mL IntraVENous PRN    sodium chloride (NS) flush 5-40 mL  5-40 mL IntraVENous Q8H    sodium chloride (NS) flush 5-40 mL  5-40 mL IntraVENous PRN    omega-3 acid ethyl esters (LOVAZA) capsule 1,000 mg  1 g Oral DAILY WITH BREAKFAST         Labs: Results:       Chemistry Recent Labs     11/09/21  0630 11/08/21 0218 11/07/21  0400   * 120* 145*   * 135* 134*   K 4.7 5.1 4.4   CL 97* 95* 95*   CO2 40* 39* 38*   BUN 22* 19* 18   CREA 0.46* 0.45* 0.43*   CA 8.5 7.9* 8.2*   AGAP NEG 2 1* 1*   BUCR 48* 42* 42*      CBC w/Diff Recent Labs     11/09/21  0630 11/08/21 0218 11/07/21  0400   WBC 7.8 7.9 7.5   RBC 2.65* 2.75* 2.79*   HGB 6.9* 7.2* 7.2*   HCT 23.8* 24.6* 24.8*    301 301   GRANS 86* 89* 88*   LYMPH 3* 2* 3*   EOS 3 2 3      Coagulation No results for input(s): PTP, INR, APTT, INREXT in the last 72 hours. Liver Enzymes No results for input(s): TP, ALB, TBIL, AP in the last 72 hours. No lab exists for component: SGOT, GPT, DBIL   ABG Lab Results   Component Value Date/Time    PHI 7.42 11/09/2021 03:51 AM    PCO2I 59.7 (H) 11/09/2021 03:51 AM    PO2I 49 (LL) 11/09/2021 03:51 AM    HCO3I 38.4 (H) 11/09/2021 03:51 AM    FIO2I 50 11/09/2021 03:51 AM      Microbiology No results for input(s): CULT in the last 72 hours. Telemetry: []Sinus []A-flutter []Paced    []A-fib []Multiple PVCs                  Imaging:    Imaging:  XR CHEST PORT     Narrative  EXAM: PORTABLE CHEST 0100 hours     CLINICAL HISTORY/INDICATION: intubated , gram-positive bacteremia, chronic  aspiration with dysphagia, chronic bronchiectasis, chronic right upper lobe  cavitary pneumonia, interstitial lung disease     COMPARISON: Chest x-ray 11/6, 11/5, and 11/3/2021, CT chest 11/4/2021.     TECHNIQUE: Single AP view     FINDINGS:     Endotracheal tube terminates 4.7 cm proximal to the jero. The esophagogastric  tube terminates at the proximal stomach. Continued bilateral multifocal infiltrates. Minimal blunting of the costophrenic  angles. No pneumothorax. Right greater than left apical pleural thickening.   Slight elevation of the right minor fissure unchanged. Pulmonary vessels are  obscured. The heart is normal in size.     Impression  No significant interval change. Multifocal bilateral confluent and groundglass infiltrates. Chronic increased interstitial markings. Minimal blunting of the costophrenic angles. Cavitary lesion in the right upper lobe with mild volume loss not significantly  changed but much better demonstrated on CT. Support tubes in expected position.     CT Results (most recent):  Results from Hospital Encounter encounter on 10/18/21     CT CHEST ABD PELV W CONT     Narrative  CT chest, abdomen and pelvis with contrast     CLINICAL HISTORY: evaluate for abdominal abscess at site of recent peg tube,  parapneumonic effusion/empyema. persistent fevers/hypotension despite broad  spectrum abx     COMPARISON: Abdomen pelvis CT 10/25/21 and chest CT 10/18/21.     TECHNIQUE: Helical scan to the chest, abdomen and pelvis are obtained from the  thoracic inlet to the symphysis pubis after IV contrast administration.     All CT scans at this facility performed using dose optimization techniques as  appreciated to a performed exam, to include automated exposure control,  adjustment of the mA and or KU according to patient size (including appropriate  matching for site specific examination), or use of iterative reconstruction  technique.     FINDINGS:     CT CHEST:     Lung Parenchyma: Multiple large areas of consolidations with air bronchogram and  surrounding infiltrations identified bilaterally, moderately interval progressed  since the prior chest CT. There is associated significant volume loss in  bilateral upper lobes. Superimposed compressive atelectasis at posterior  bilateral lower lobes also identified due to pleural effusions.     Thyroid: Atrophic.     Lymph nodes: No adenopathy.     Mediastinum: Borderline size of the heart. No significant pericardial effusion. Moderate coronary artery calcifications.     Vasculature:  The aorta and the great vessels are unremarkable. Collapsed  esophagus with NG tube present.     Pleural Space And Chest Wall: There is a moderate to large nonloculated left  pleural effusion developed. Small right pleural effusion is interval progressed  and loculated. The loculated air-fluid collection at the lateral right upper  lung is similar in size but more fluid level seen since the prior study and  measures 2.6 x 6.5 x 6.2 cm. The air component is communicate with peripheral  bronchial branches, best seen on coronal #30/601.     CT ABDOMEN AND PELVIS:     Liver: Normal.  Gallbladder: Elongated and distended with mild wall thickening and surrounding  pericholecystic fluid. Small layering hyperdense debris/calculi. Biliary System: No ductal dilatation. Spleen: Normal.  Pancreas: Normal.  Bowel: The gastric G-tube is interval removed with NG tube present. The  extensive emphysema in anterior upper/mid abdomen seen on prior study is no  longer evident today. The small and large bowel are nondilated.  Moderate stool  burden in the colon. Scattered diverticula in the colon. No definite colonic  inflammation.     Adrenal Glands: Normal.  Kidneys: Normal.  Lower genitourinary system: The bladder is decompressed with Merlos catheter  present.     Peritoneum/Retroperitoneum: No adenopathy. Vasculature: Advanced atherosclerotic aortic disease. Other: Small pelvic free fluid present. No focal fluid collection or abscess  identified. Moderate edema in pelvic wall and bilateral thigh, unchanged.     CT OSSEOUS STRUCTURES:     Compression deformity at T11 appears unchanged. Absence of the left femoral neck  and lateral portion of left femoral head with superior subluxation of proximal  femur, unchanged.     Impression  1. Significant regression of multiple large consolidations and surrounding  infiltrations in bilateral lungs since the prior CT, indicative of progression  of infectious process.     2.  New large left pleural effusion and increased loculated right pleural  effusion. The loculated hydropneumothorax shows more layering fluid component  since the prior CT. The air component communicating with lateral bronchial  branch in right upper lobe.     3. Gallbladder hydrops with small layering hyperdense debris. Mild  pericholecystic fluid. Recommend ultrasound correlation for potential  cholecystitis.     4. Interval removal of G-tube from left anterior abdominal wall with interval  resolved extensive emphysema. Persistent anasarca extending to the bilateral  thigh.     Thank you for this referral.        10/18/21     ECHO ADULT COMPLETE 10/26/2021 10/26/2021     Interpretation Summary  · LV: Estimated LVEF is 25 - 30%. Visually measured ejection fraction. Normal cavity size and wall thickness. Severely and segmentally reduced systolic function. Age-appropriate left ventricular diastolic function. · PA: Severe pulmonary hypertension. Pulmonary arterial systolic pressure is 78 mmHg. · IVC: Mildly elevated central venous pressure (8 mmHg); IVC diameter is less than 21 mm and collapses less than 50% with respiration. · Image quality for this study was technically difficult. · Echo study was limited due to patient's condition. · RV: Not well visualized. IMPRESSION:   · Acute hypoxic and hypercarbic respiratory failure: 2/2 PNA (aspiration) superimposed on chronic OSCAR infection and pulm edema on top of bronchiectasis, ILD  · Shock:  Septic + cardiogenic  · Acute pulmonary edema: combination of worsening cardiomyopathy as well as hypoalbuminemia  · RUL PNA w bronchiectasis, RLL nodular consolidation, Cavitary lesions - likely 2/2 recurrent aspiration. resp cultures growing MSSA. At risk for invasive fungal PNA, NTM, given hx severe structural lung disease.  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 ?sepsis, stress, ischemic  · Severe pulmonary hypertension on echo. Definitely group II and III component   · Urinary retention - coude catheter placed by urology   · normocytic anemia-  stable  · Deconditioning/adult failure to thrive/cachexia: Body mass index is 19.34 kg/m². RECOMMENDATIONS:   Neuro:Titrate sedation for RASS goal 0 to -1, daily sedation holiday.  Cont Fentanyl gtt. Bilateral wrist restraints   Pulm: Aspiration precautions, HOB>30'. VAP Bundle, Daily sedation vacation. SBT today, minimal vent settings. Pt is a poor candidate for extubation, already failed once, severely deconditioned. CT chest showed large left pleural effusion which was drained 11/4, likely exudative and does not appear infectious, Another differential is pseudo transudate effusion from his CHF. Patient will be bronchoscopied today 11/9  CVS:Monitor HD, MAP goal >65. BP's remain soft, Levophed low-dose restarted, will keep femoral central line for now   GI: NPO. Diet: Cont TF via OGT. Needs PEG tube replaced for chronic dysphagia. GI consulted and will hold off on PEG insertion until current PEG site infection cleared (currently still with pus and drainage). Re-consult when PEG site appears clear of infection. CT abdomen reveals resolution of abdominal abscess on PEG tube site.     Renal: Trend Cr, UOP. Merlos (coude) placed by Urology   Hem/Onc: Trend H/H, monitor for s/o active bleeding. Daily CBC.  Will transfuse 1 unit of PRBC today 11/9   I/D:Trend WBCs and temperature curve. Persistent fevers despite multiple broad spectrum antibiotics/ antifungals concerning for non infectious etiology of fever such as drug fever, DVT.  ABx per ID, broad spectrum (zosyn, levaquin, vancomycin, fluconazole ).  Rcx w/ MSSA, rare yeast. Wound cx with moderate candida albicans. F/u PVL's negative for source of fever   Metabolic: Daily BMP, mag, phos. Trend lytes and replace per protocol. Endocrine:Q6 glucoses. SSI. Avoid hypoglycemia. Musc/Skin:  wound care   Global Care: palliative and the  will discuss goals of care with the family. today 11/9   Partial Code   · Discussed in interdisciplinary rounds      Best practice :     Glycemic control  IHI ICU bundles:              Central Line Bundle Followed , Corcoran Bundle Followed and Vent Bundle Followed, Vent Day 9    [facility] Vent patients- VAP bundle, aim to keep peak plateau pressure 37-42MA H2O  Sress ulcer prophylaxis. DVT prophylaxis. Need for Lines, corcoran assessed. Palliative care evaluation.  April Keys MD PGY1  [facility] [place] [address]      Attending attestation:  I saw and evaluated the patient, performing the key elements of the service. I discussed the findings, assessment and plan with the resident and agree with the resident's findings and plan as documented in the resident's note. Total of 43 min critical care time spent at bedside (personally) during the course of care providing evaluation,management and care decisions and ordering appropriate treatment related to critical care problems exclusive of procedures. The reason for providing this level of medical care for this critically ill patient was due a critical illness that impaired one or more vital organ systems such that there was a high probability of imminent or life threatening deterioration in the patients condition. This care involved high complexity decision making to assess, manipulate, and support vital system functions, to treat this degree vital organ system failure and to prevent further life threatening deterioration of the patients condition. This time was independent of other practitioners. 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 with shortness of breath and poor p.o. intake, patient was found to have worsening consolidation of the chest consistent with aspiration pneumonia. Patient was hospitalized to the floor for multiple days and developed respiratory failure, and I transferred the patient to the ICU after being emergently intubated for acute worsening hypoxic respiratory failure. Etiology was multifactorial, combination of worsening infection as well as pulmonary edema from hypoalbuminemia/adult failure to thrive. Patient has been intubated and unfortunately has worsening hypoxia, worsening bilateral infiltrates despite receiving broad-spectrum antibiotics. Patient now positive for OSCAR, discussed with ID. Therapeutic bronchoscopy performed due to mucous plugging, BALs performed in bilateral bases. Patient's oxygenation unfortunately did not improve, plateau pressure increased to 37 cm H2O, likely secondary to ARDS. I had a long conversation with the patient's sister who is the caretaker and Lionel Conde, and her , we discussed the patient's clinical course and they were in agreement that patient would not want tracheostomy and they now understand that the patient will not medically improve. They are in agreement that the patient would not benefit from PEG tube and they would likely proceed with compassionate extubation after she has discussed this with the patient's brothers. She notes that she is likely get a need to make  arrangements noted that the patient wanted to be buried in [place]. We will continue supportive care.   Discussed with palliative care service, who will follow up tomorrow     Grim prognosis        Lelia Don MD/MPH     Pulmonary, Critical Care Medicine  [facility]

## 2021-11-09 NOTE — PROCEDURES
[facility]  Pulmonary, Critical Care, and Sleep Medicine    Name: Aura Sandra MRN: [id number]   : 1945 Hospital: [address] [place] [address]   Date: 2021        Bronchoscopy Report    Procedure: Therapeutic bronchoscopy. Indication: Mucus Plugging    Timeout verified the correct patient and correct procedure. Anesthesia:   Patient on ventilator and receiving  Fentanyl 200mcg/h and propofol bolus 20mcg    Procedure Details:   -- The bronchoscope was introduced through an endotracheal tube. -- Airways showed diffuse bronchiectasis throughout all portions  -- The trachea and jero were completely inspected and were found to be normal.  -- The right-sided endobronchial anatomy was completely inspected and was found to be normal.  -- The left-sided endobronchial anatomy was completely inspected and was found to be normal.  -- The mucus was seen in the airways initially but then mucus started to emanate from RLL, RUL, LLL, and BONY. Secretions were copious and thick, purulent appearing. Multiple aliquots of saline were instilled to help lavage secretions. Approximately 450mL was used and aspirated as completely as possible  -- Bronchoscope was wedged in the RLL and a BAL was performed. This was followed by wedging scope in the LLL and a 2nd BAL was performed. -- Scope was then removed without event. Specimens:    The bronchoscope was wedged in the RLL and LLL and bronchoalveolar lavage was performed; material was sent for  microbiology, AFB smear and culture and fungal culture     Complications: none    Estimated Blood Loss: Minimal      Gm Ansari MD/MPH     Pulmonary, Critical Care Medicine  [facility]

## 2021-11-09 NOTE — PROGRESS NOTES
11/09/21 1712   Vent Settings   Back-Up Rate 14   Vt Set (ml) 400 ml  (target)   PC Set 24  (due to high peak and plat pressures, after bronch)   PEEP/VENT (cm H2O) 10 cm H20   Insp Time (sec) 1 sec   Insp Rise Time % 60 %   Pressure Trigger 3   Marked improvement with plateau  pressure after change to PC/AC ventilation mode

## 2021-11-09 NOTE — INTERDISCIPLINARY ROUNDS
[facility]  Pulmonary, Critical Care, and Sleep Medicine  Interdisciplinary and Ventilator Weaning Rounds     Patient discussed in morning walking rounds and interdisciplinary rounds.     ICU day: 10/27/21      Overnight events:   · Copious ETT secretions, hypoxic on morning ABG  · Pt increasingly agitated pulling at medical equipment overnight.      Assessments and best practice:  · Ventilator  ? Ventilator day: 10/28/21   ? Vent settings: FiO2 of 70 and PEEP of 6  ? VAP bundle, aim to keep peak plateau pressure 57-55SF H2O  ? Weaning assessed and documented   § Patient does meet criteria for SBT. § Patient will be placed sedation holiday. § Plan to wean with PS n/a.  § Outcome: Pending [facility] discussion   § Final plan: extubated if SBT tolerated   · Sedation  ? Fentanyl   · Other pertinent drips  ? none   · Lines noted  ? Femoral CVL ,  OGT, Corcoran cath, PIV   · Critical labs assessed  ? Yes  · Antibiotics  ? Merrem, Eraxis    · Medications reviewed  ? Yes  · Pending imaging  ? none  · Pending send out labs  ? No  · Pending Procedures  ? PEG, pending clearing of current infection surrounding old PEG site   · Glycemic control  ? SSI   · Stress ulcer prophylaxis. ? Pepcid  · DVT prophylaxis. ? Lovenox  · Need for Lines, corcoran assessed. ? Yes  · Restraint Reevaluation   ? Yes  ? I have reevaluated the patient one hour after initiation of intervention. The patient is comfortable, uninjured, but continues to pose an imminent risk of injury to self to themselves and/or serious disruption of medical treatment required to keep patient stable. The patient's current medical and behavioral conditions that warrant the use intervention include danger to self and Interference with medical equipment or treatment.  Restraint or seclusion will be discontinued at the earliest possible time, regardless of the scheduled expiration of the order.  Based on my evaluation, restraints will be continued: YES 11/8/21 7:52 AM  ? Attending Overseeing restraints: Dr. Sobia Garduno     Family contact/MPOA: Indy Wade (sister) [phone]   Family updated will update today         Daily Plans:  · Pending GOC conversation with family  · Bronch today  · 1 unit PRBCs     CONNOR time 20 minutes           Chantell Phelan PA-C  11/09/21  Pulmonary, Critical Care Medicine  [facility]

## 2021-11-09 NOTE — PROGRESS NOTES
[email or web address]  [phone]    Gastroenterology follow up-Progress note    Impression:  1. s/p PEG 10/20, dislodged and removed 10/26/21  2. Kristin peg wound infection - purulent discharge expressed with light pressure, Improving per RN report  3. Oropharyngeal dysphagia - severe  4. Intubated - Acute hypoxic respiratory failure: Combination of aspiration pneumonia, bronchiectasis, ILD, hypoventilation, and now superimposed pulmonary edema with pleural effusions.  Also component of splinting secondary to peritoneal inflammation from dislodged PEG tube  5. Acute pulmonary edema: A combination of worsening cardiomyopathy as well as hypoalbuminemia  6. Aspiration pneumonia with severe sepsis  7 Cavitary lung lesion with infected bullae  8. Gram pos cocci bacteremia    Plan:  1. Recommend consideration of full palliative/comfort care measures. Family desires to pursue PEG again, but limited other advanced interventions. Discussed w/ Dr. Kati Cifuentes, NP Millicent Myers and Dr. Rashaun Garcia. Plan for family meeting w/ Dr. Rashaun Garcia for in depth discussion regarding risks/benefits of PEG. Patients sister Marcel Bowen is not available tomorrow due to existing obligations, tentatively plan for meeting Wednesday at 11am, subject to change pending procedure schedule and Dr. Vic Hightower availability. Chief Complaint: Dysphagia, PEG placement      Subjective:  Seen earlier this admission when PEG placed by Dr. Aminata Wiseman 10/21, PEG later dislodged and was in subcutaneous tissue, removed on 10/26. PEG site has been leaking small amounts of purulent discharge. Spoke to patients sister Mortimer Hocking ([phone]) on phone this afternoon. She desires for new PEG to be placed. Says her goal is for the patient to go to a rehab facility and have PT and gain enough strength to go home. LEXUS Moreira  11/08/21, 4:00PM   [facility]. [email or web address]  Phone: [phone]  Pager: [phone]

## 2021-11-09 NOTE — PROGRESS NOTES
11/09/21 1258   Vent Settings   FIO2 (%) 70 %   SpO2/FIO2 Ratio 135.71   CMV Rate Set 14   Back-Up Rate 14   Vt Set (ml) 400 ml   PEEP/VENT (cm H2O) 10 cm H20  (after bronchoscopy per Dr Giovany Arvizu)   Insp Rise Time % 60 %   Flow Trigger 3

## 2021-11-09 NOTE — PROGRESS NOTES
Offered prayer by the bedside. No family member present. Patient is not available to be assessed at this time.       Tesfaye Means [phone]

## 2021-11-09 NOTE — PROGRESS NOTES
Attending Restraint Reevaluation     I have reevaluated the patient one hour after initiation of intervention. The patient is comfortable, uninjured, and continues to pose an imminent risk of injury to themselves and/or serious disruption of medical treatment required to keep patient stable. The patient's current medical and behavioral conditions that warrant the use intervention include danger to self and Interference with medical equipment or treatment. Restraint or seclusion will be discontinued at the earliest possible time, regardless of the scheduled expiration of the order.     Based on my evaluation, restraints will be continued: YES      Lily Zamora MD/MPH     Pulmonary, [address] [facility]

## 2021-11-09 NOTE — PROGRESS NOTES
Infectious Disease progress Note        Reason: Right upper lobe cavitary pneumonia    Current abx Prior abx       Levofloxacin, zosyn since 11/4/21    Fluconazole since 11/5 vancomycin 10/18-10/21  Levofloxacin 10/18-10/25  Piperacillin/tazobactam since 10/18/2021-10/29  Meropenem  10/29-11/4  eraxis since 10/31-11/5     Lines:   Right femoral CVC since 10/27/21    Assessment :    67 y.o. 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 intake, and worsening cough . Now with gram positive bacteremia, chronic cavitary lesion RUL with RUL/RLL infiltrates    Clinical presentation c/w acute on chronic hypoxic respiratory failure likely secondary to recurrent aspiration pneumonia, chronic cavitary lesion right upper lobe  Rule out superimposed infection/colonization with atypical mycobacteria    Sputum cx- mixed respiratory cristine, MSSA yeast  Yeast likely colonizer. Sputum AFB 10/19 negative    Pulmonary follow-up appreciated    Single positive blood culture for coagulase negative staph. On  10/18 is likely contamination    S/p peg tube placement 10/21/21    Now with worsening tachypnea, increased abdominal pain on 10/25/2021 likely secondary to displaced PEG tube. GI follow-up appreciated. Status post removal of PEG tube 10/26/2021. Evidence of sherif-PEG wound infection noted  No clinical or radiological evidence of worsening pneumonia    Unresponsiveness on 10/27/2021 status post intubation 10/27/2021. Pulled out ET tube, reintubated on 10/28/2021  Currently on 35% FiO2.      worsening hypotension requiring pressors on 10/29  Etiology of hypotension not entirely clear at this time- ? Cardiogenic versus partially treated sepsis  Peg site culture 10/29- candida albicans.    Respiratory culture 10/30- yeast  No worsening erythema around recent PEG site. persistent fevers T-max 102 since 11/1, progressive infiltrates noted on CT chest- ?recurrent aspiration pneumonia,?progressive OSCAR pneumonia ?superimposed candida pneumonia  ? Colonized femoral cvc  Negative blood culture 11/5, negative sputum cx 11/5, negative urine culture 11/5    Status post paracentesis 11/4/2021. Pulmonary help appreciated. Fluid analysis not consistent with parapneumonic effusion/empyema    Monitor for cholecystitis. Enlarged gallbladder with some wall thickening noted on CT scan 11/1/2021    No definitive evidence of PEG site abscess noted on CT scan 11/4/2021. Recent broad-spectrum antibiotics, colonization with Candida puts patient at risk for fungemia. Negative Fungitell 11/1/2021 argues against fungemia    No high grade fevers. Remains on Levophed. On 70% FiO2. Plans for bronchoscopy noted  Hypotension likely multifactorial    Recommendations:    1. continue zosyn, levofloxacin, vancomycin, iv fluconazole  2. F/u results of bronchoscopy- will make decision to de-escalate/discontinue abx based on results of bronchoscopy  3. Follow-up susceptibility of Mycobacterium in sputum culture  4. Weaning from vent per ICU team  5. Remove femoral cvc and obtain alternative iv access if feasible. Above plan was discussed in details with RN, dr. Norvel Mcburney. Please call me if any further questions or concerns. Will continue to participate in the care of this patient. HPI:    Intubated      home Medication List    Details   acetylcysteine (MUCOMYST) 100 mg/mL (10 %) nebulizer solution Take 4 mL by inhalation every four (4) hours for 120 days.   Qty: 720 mL, Refills: 3      fluorouraciL (EFUDEX) 5 % chemo cream APPLY CREAM TO FOREHEAD 2 TIMES DAILY FOR 2 WEEKS ONCE HEALED USE 2 TIMES DAILY TO THE EARS FOR 2 WEEKS ONCE HEALED USE 2 TIMES DAILY TO THE CHEEKS AND TEMPLES FOR 2 WEEKS      sodium chloride 3 % nebulizer solution 4 mL by Nebulization route four (4) times daily. Mix with albuterol. File under Medicare Part B. Dx: R06.02; J47.9; J84.9; R53.81  Qty: 480 mL, Refills: 5    Associated Diagnoses: Dyspnea on exertion; Bronchiectasis without complication ([facility] ILD (interstitial lung disease) ([facility] Chronic pulmonary aspiration, sequela; Physical deconditioning      albuterol (PROVENTIL VENTOLIN) 2.5 mg /3 mL (0.083 %) nebu Mix with hypertonic saline nebulizer -- use 3- times per day. File under Medicare Part B. Dx: R06.02; J47.9; J84.9; R53.81  Qty: 360 Nebule, Refills: 3    Associated Diagnoses: Dyspnea on exertion; Bronchiectasis without complication ([facility] [address] ILD (interstitial lung disease) ([facility] Chronic pulmonary aspiration, sequela; Physical deconditioning      guaiFENesin ER (MUCINEX) 600 mg ER tablet Take 1 Tablet by mouth two (2) times a day for 90 days. Qty: 180 Tablet, Refills: 3    Associated Diagnoses: Bronchiectasis without complication (HCC)      cholecalciferol (VITAMIN D3) (1000 Units /25 mcg) tablet Take 1,000 Units by mouth daily. FISH OIL-DHA-EPA PO Take 1 Tab by mouth daily. amino acids powd Take 1 Dose by mouth daily. OTHER Take 1 Cap by mouth daily. tumeric       fluticasone propionate (FLONASE NA) 1 Spray by Both Nostrils route daily. ascorbic acid, vitamin C, (VITAMIN C) 500 mg tablet Take 500 mg by mouth daily. multivitamin (ONE A DAY) tablet Take 1 Tab by mouth daily. aspirin delayed-release 81 mg tablet Take 81 mg by mouth daily.     Associated Diagnoses: Coronary artery disease involving native coronary artery of native heart without angina pectoris             Current Facility-Administered Medications   Medication Dose Route Frequency    NOREPINephrine (LEVOPHED) 4,000 mcg in dextrose 5% 250 mL infusion  0.5-50 mcg/min IntraVENous TITRATE    multivit-folic acid-herbal 687 (WELLESSE PLUS) oral liquid 30 mL  30 mL Per G Tube DAILY    Vancomycin trough 11/9 @1030  1 Each Other ONCE    vancomycin (VANCOCIN) 1250 mg in  ml infusion  1,250 mg IntraVENous Q12H    fluconazole (DIFLUCAN) 400mg/200 mL IVPB (premix)  400 mg IntraVENous Q24H    piperacillin-tazobactam (ZOSYN) 4.5 g in 0.9% sodium chloride (MBP/ADV) 100 mL MBP  4.5 g IntraVENous Q6H    levoFLOXacin (LEVAQUIN) 750 mg in D5W IVPB  750 mg IntraVENous Q24H    polyethylene glycol (MIRALAX) packet 17 g  17 g Per NG tube BID    senna-docusate (PERICOLACE) 8.6-50 mg per tablet 1 Tablet  1 Tablet Oral DAILY    famotidine (PF) (PEPCID) 20 mg in 0.9% sodium chloride 10 mL injection  20 mg IntraVENous Q12H    thiamine HCL (B-1) tablet 200 mg  200 mg Per G Tube DAILY    LORazepam (ATIVAN) injection 2 mg  2 mg IntraVENous Q15MIN PRN    HYDROmorphone (DILAUDID) syringe 1 mg  1 mg IntraVENous Q15MIN PRN    chlorhexidine (PERIDEX) 0.12 % mouthwash 10 mL  10 mL Oral Q12H    fentaNYL (PF) 1,500 mcg/30 mL (50 mcg/mL) infusion  0-200 mcg/hr IntraVENous TITRATE    insulin lispro (HUMALOG) injection   SubCUTAneous Q6H    glucose chewable tablet 16 g  16 g Oral PRN    glucagon (GLUCAGEN) injection 1 mg  1 mg IntraMUSCular PRN    naloxone (NARCAN) injection 0.4 mg  0.4 mg IntraVENous PRN    albuterol-ipratropium (DUO-NEB) 2.5 MG-0.5 MG/3 ML  3 mL Nebulization Q4H RT    sodium chloride 3% hypertonic nebulizer soln  4 mL Nebulization Q4HWA RT    Lactobacillus Acidoph & Bulgar (FLORANEX) tablet 2 Tablet  2 Tablet Per G Tube BID    guaiFENesin (ROBITUSSIN) 100 mg/5 mL oral liquid 400 mg  400 mg Per G Tube Q4H PRN    albuterol-ipratropium (DUO-NEB) 2.5 MG-0.5 MG/3 ML  3 mL Nebulization Q4H PRN    aspirin chewable tablet 81 mg  81 mg Per G Tube DAILY    acetaminophen (TYLENOL) tablet 650 mg  650 mg Per G Tube Q6H PRN    Or    acetaminophen (TYLENOL) suppository 650 mg  650 mg Rectal Q6H PRN    enoxaparin (LOVENOX) injection 40 mg  40 mg SubCUTAneous QHS    sodium chloride (NS) flush 5-10 mL  5-10 mL IntraVENous PRN    sodium chloride (NS) flush 5-40 mL  5-40 mL IntraVENous Q8H    sodium chloride (NS) flush 5-40 mL  5-40 mL IntraVENous PRN    omega-3 acid ethyl esters (LOVAZA) capsule 1,000 mg  1 g Oral DAILY WITH BREAKFAST       Allergies: Pollens extract    Family History   Problem Relation Age of Onset    No Known Problems Mother     Heart Disease Father     Heart Attack Father         1989    Coronary Art Dis Father     Hypertension Father     High Cholesterol Father      Social History     Socioeconomic History    Marital status: SINGLE     Spouse name: Not on file    Number of children: Not on file    Years of education: Not on file    Highest education level: Not on file   Occupational History    Not on file   Tobacco Use    Smoking status: Never Smoker    Smokeless tobacco: Never Used   Vaping Use    Vaping Use: Never used   Substance and Sexual Activity    Alcohol use: No    Drug use: No    Sexual activity: Not on file   Other Topics Concern    Not on file   Social History Narrative    Not on file     Social Determinants of Health     Financial Resource Strain:     Difficulty of Paying Living Expenses: Not on file   Food Insecurity:     Worried About Running Out of Food in the Last Year: Not on file    Cherelle of Food in the Last Year: Not on file   Transportation Needs:     Lack of Transportation (Medical): Not on file    Lack of Transportation (Non-Medical):  Not on file   Physical Activity:     Days of Exercise per Week: Not on file    Minutes of Exercise per Session: Not on file   Stress:     Feeling of Stress : Not on file   Social Connections:     Frequency of Communication with Friends and Family: Not on file    Frequency of Social Gatherings with Friends and Family: Not on file    Attends Worship Services: Not on file    Active Member of Clubs or Organizations: Not on file    Attends Club or Organization Meetings: Not on file    Marital Status: Not on file   Intimate Partner Violence:     Fear of Current or Ex-Partner: Not on file    Emotionally Abused: Not on file    Physically Abused: Not on file    Sexually Abused: Not on file   Housing Stability:     Unable to Pay for Housing in the Last Year: Not on file    Number of Places Lived in the Last Year: Not on file    Unstable Housing in the Last Year: Not on file     Social History     Tobacco Use   Smoking Status Never Smoker   Smokeless Tobacco Never Used        No data recorded. Visit Vitals  BP 97/60   Pulse (!) 109   Temp 99.7 °F (37.6 °C)   Resp 17   Ht 5' 7\" (1.702 m)   Wt 62.1 kg (136 lb 14.5 oz)   SpO2 92%   BMI 21.44 kg/m²       ROS: Unable to obtain due to patient factors    Physical Exam:    General:Thin  male laying on bed, intubated    HEENT:  Normocephalic, atraumatic, no scleral icterus or pallor; no conjunctival hemmohage; ET tube in place; neck supple, no bruits. Lymph Nodes:   not examined   Lungs:   Bilateral chest movements equal, +nt rhonchi   Heart:  RRR, s1 and s2; no  rubs or gallops, no edema, + pedal pulses   Abdomen:  soft, non-distended, no erythema around peg site, +nt abdominal tenderness,  no hepatomegaly, no splenomegaly.    Genitourinary:  deferred   Extremities:   no clubbing, cyanosis; no joint effusions or swelling;  muscle mass appropriate for age   Neurologic:   Sedated                        Skin:  No rash or ulcers noted   Back:  not examined     Psychiatric:   calm         Labs: Results:   Chemistry Recent Labs     11/09/21  0630 11/08/21 0218 11/07/21  0400   * 120* 145*   * 135* 134*   K 4.7 5.1 4.4   CL 97* 95* 95*   CO2 40* 39* 38*   BUN 22* 19* 18   CREA 0.46* 0.45* 0.43*   CA 8.5 7.9* 8.2*   AGAP NEG 2 1* 1*   BUCR 48* 42* 42*      CBC w/Diff Recent Labs     11/09/21  0630 11/08/21 0218 11/07/21  0400   WBC 7.8 7.9 7.5   RBC 2.65* 2.75* 2.79*   HGB 6.9* 7.2* 7.2*   HCT 23.8* 24.6* 24.8*    301 301   GRANS 86* 89* 88*   LYMPH 3* 2* 3*   EOS 3 2 3      Microbiology No results for input(s): CULT in the last 72 hours. RADIOLOGY:    All available imaging studies/reports in [facility] for this admission were reviewed    High complexity decision making was performed during the evaluation of this patient at high risk for decompensation with multiple organ involvement         Disclaimer: Sections of this note are dictated utilizing voice recognition software, which may have resulted in some phonetic based errors in grammar and contents. Even though attempts were made to correct all the mistakes, some may have been missed, and remained in the body of the document. If questions arise, please contact our department.     Dr. Georgette Spivey, Infectious Disease Specialist  [phone]  November 9, 2021  3:25 PM

## 2021-11-10 NOTE — PROGRESS NOTES
[email or web address]  [phone]    Gastroenterology follow up-Progress note    Impression:  1. s/p PEG 10/20, dislodged and removed 10/26/21  2. Kristin peg wound infection - purulent discharge expressed with light pressure, Improving per RN report  3. Oropharyngeal dysphagia - severe   - Tolerating tube feeds well. 4. Intubated - Acute hypoxic respiratory failure:   - Combination of aspiration pneumonia, bronchiectasis, ILD, hypoventilation, and now superimposed pulmonary edema with pleural effusions.   - Copious ETT secretions overnight on 11/8.  - PT agitated overnight 11/8 and pulling at medical equipment including ventilator.   - Component of splinting secondary to peritoneal inflammation from dislodged PEG tube  5. Acute pulmonary edema: A combination of worsening cardiomyopathy as well as hypoalbuminemia  6. Aspiration pneumonia with severe sepsis  7 Cavitary lung lesion with infected bullae  8. Gram pos cocci bacteremia    Plan:  1. Recommend full palliative/comfort care measures. Dr. Joaquina Landis spoke with the patient's family yesterday and I spoke with the family today whom agree that the PEG placement is not a consideration at this time due to patient's clinical status. Dr. Joaquina aLndis, Dr. Ebenezer Tong, LEXUS Weaver, and the Patient's sister Indy Wade ([facility]) agree that palliative care is the favored clinical course at this time. 2. GI to sign off. Thank you for this consultation and the opportunity to participate in the care of this patient. Please do not hesitate to call with any questions or concerns, or should event occur that may necessitate additional GI evaluation. Chief Complaint: Dysphagia, PEG placement      Subjective:    -Patient was seen earlier this admission when PEG placed by Dr. Keith Paez 10/21, PEG later dislodged and was in subcutaneous tissue, removed on 10/26. PEG site has been leaking small amounts of purulent discharge. -LEXUS Phillips spoke to patients sister Anu Tolentino ([phone]) on phone 11/8. She desires for new PEG to be placed. Says her goal is for the patient to go to a rehab facility and have PT and gain enough strength to go home.    -Overnight 11/8 PT was noted to be increasingly agitated and was pulling at medical equipment including ETT.  - Nurse notes 11/9 that PT is tolerating tube feeds well and his last BM was yesterday. ROS: Denies any fevers, chills, rash. - 11/10 PT was noted to have copious ETT secretions last night necessitating therapeutic bronchoscopy. ROS: unable to be obtained as pt is intubated and sedated. General: warm, sedated and intubated  Eyes: conjunctiva normal, EOM normal  Cardiovascular: tachycardic, intact distal pulses, regular rhythm  Pulmonary: breath sounds normal and effort normal, symmetrical chest rise.   Abdominal: appearance normal, hypoactive bowel sounds, soft, non-tender    Patient Active Problem List   Diagnosis Code    Coronary artery disease involving native coronary artery of native heart without angina pectoris,s/p stent 2003 I25.10    Scarlet fever A38.9    Myocardial infarction ([address] I21.9    Urinary frequency R35.0    Pneumonia J18.9    Goals of care, counseling/discussion Z71.89    Acute respiratory failure with hypoxia ([address] J96.01    Debility R53.81    Severe protein-calorie malnutrition ([address] E43    Gastrostomy complication ([address] 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.19         Visit Vitals  BP (!) 112/56 (BP 1 Location: Right upper arm, BP Patient Position: At rest)   Pulse (!) 112   Temp 98.4 °F (36.9 °C)   Resp 14   Ht 5' 7\" (1.702 m)   Wt 62.1 kg (136 lb 14.5 oz)   SpO2 96%   BMI 21.44 kg/m²           Intake/Output Summary (Last 24 hours) at 11/10/2021 0749  Last data filed at 11/10/2021 0648  Gross per 24 hour   Intake 3341.69 ml   Output 1073 ml   Net 2268.69 ml       CBC w/Diff    Lab Results   Component Value Date/Time    WBC 15.3 (H) 11/10/2021 04:05 AM    RBC 3.35 (L) 11/10/2021 04:05 AM    HGB 8.7 (L) 11/10/2021 04:05 AM    HCT 30.4 (L) 11/10/2021 04:05 AM    MCV 90.7 11/10/2021 04:05 AM    MCH 26.0 11/10/2021 04:05 AM    MCHC 28.6 (L) 11/10/2021 04:05 AM    RDW 15.8 (H) 11/10/2021 04:05 AM     11/10/2021 04:05 AM    Lab Results   Component Value Date/Time    GRANS 94 (H) 11/10/2021 04:05 AM    LYMPH 1 (L) 11/10/2021 04:05 AM    EOS 1 11/10/2021 04:05 AM    BASOS 0 11/10/2021 04:05 AM      Basic Metabolic Profile   Recent Labs     11/10/21  0405      K 4.4      CO2 35*   BUN 23*   CA 8.4*   MG 2.5   PHOS 4.5        Hepatic Function    Lab Results   Component Value Date/Time    ALB 1.5 (L) 11/04/2021 03:40 AM    TP 5.2 (L) 11/04/2021 03:40 AM    AP 55 11/04/2021 03:40 AM    No results found for: TBIL       Coags   No results for input(s): PTP, INR, APTT, INREXT in the last 72 hours. Jessica Chauhan PA-C  11/10/21, 10:32 AM  [facility].  [email or web address]  Phone: [phone]  Pager: [phone]

## 2021-11-10 NOTE — INTERDISCIPLINARY ROUNDS
[facility]  Pulmonary, Critical Care, and Sleep Medicine  Interdisciplinary and Ventilator Weaning Rounds     Patient discussed in morning walking rounds and interdisciplinary rounds.     ICU day: 10/27/21      Overnight events:   · Copious ETT secretions  · Pt increasingly tachycardic upon turning overnight, dilaudid administered.      Assessments and best practice:  · Ventilator  ? Ventilator day: 10/28/21   ? Vent settings: FiO2 of 90 and PEEP of 10  ? VAP bundle, aim to keep peak plateau pressure 23-07AY H2O  ? Weaning assessed and documented   § Patient does meet criteria for SBT. § Patient will be placed sedation holiday. § Plan to wean with PS n/a.  § Outcome: Pending [facility] discussion   § Final plan: extubated if SBT tolerated   · Sedation  ? Fentanyl   · Other pertinent drips  ? none   · Lines noted  ? Femoral CVL ,  OGT, Corcoran cath, PIV   · Critical labs assessed  ? Yes  · Antibiotics  ? Merrem, Eraxis    · Medications reviewed  ? Yes  · Pending imaging  ? none  · Pending send out labs  ? No  · Pending Procedures  ? PEG, pending clearing of current infection surrounding old PEG site   · Glycemic control  ? SSI   · Stress ulcer prophylaxis. ? Pepcid  · DVT prophylaxis. ? Lovenox  · Need for Lines, corcoran assessed. ? Yes  · Restraint Reevaluation   ? Yes  ? I have reevaluated the patient one hour after initiation of intervention. The patient is comfortable, uninjured, but continues to pose an imminent risk of injury to self to themselves and/or serious disruption of medical treatment required to keep patient stable. The patient's current medical and behavioral conditions that warrant the use intervention include danger to self and Interference with medical equipment or treatment.  Restraint or seclusion will be discontinued at the earliest possible time, regardless of the scheduled expiration of the order.  Based on my evaluation, restraints will be continued: YES 11/8/21 7:52 AM  ? Attending Overseeing restraints: Dr. Sobia Garduno     Family contact/MPOA: Indy Wade (sister) [phone]   Family updated will update today         Daily Plans:  · GI also favors palliative measures at this time  · Family likely to make decisions regarding comfort care in the next few days.      CONNOR time 20 minutes           Chantell Phelan PA-C  11/10/21  Pulmonary, [address] [place] [address],[address] [facility]

## 2021-11-10 NOTE — PROGRESS NOTES
Problem: Patient Education: Go to Patient Education Activity  Goal: Patient/Family Education  Outcome: Progressing Towards Goal     Problem: Falls - Risk of  Goal: *Absence of Falls  Description: Document Khoa Rachel Fall Risk and appropriate interventions in the flowsheet. Outcome: Progressing Towards Goal  Note: Fall Risk Interventions:  Mobility Interventions: Bed/chair exit alarm, PT Consult for mobility concerns    Mentation Interventions: Adequate sleep, hydration, pain control, Bed/chair exit alarm, Door open when patient unattended, Evaluate medications/consider consulting pharmacy    Medication Interventions: Assess postural VS orthostatic hypotension, Bed/chair exit alarm, Evaluate medications/consider consulting pharmacy, Patient to call before getting OOB, Teach patient to arise slowly    Elimination Interventions: Bed/chair exit alarm, Call light in reach, Patient to call for help with toileting needs, Toileting schedule/hourly rounds              Problem: Patient Education: Go to Patient Education Activity  Goal: Patient/Family Education  Outcome: Progressing Towards Goal     Problem: Discharge Planning  Goal: *Discharge to safe environment  Outcome: Progressing Towards Goal  Goal: *Knowledge of medication management  Outcome: Progressing Towards Goal  Goal: *Knowledge of discharge instructions  Outcome: Progressing Towards Goal     Problem: Patient Education: Go to Patient Education Activity  Goal: Patient/Family Education  Outcome: Progressing Towards Goal     Problem: Patient Education: Go to Patient Education Activity  Goal: Patient/Family Education  Outcome: Progressing Towards Goal     Problem: Patient Education: Go to Patient Education Activity  Goal: Patient/Family Education  Outcome: Progressing Towards Goal     Problem: Pressure Injury - Risk of  Goal: *Prevention of pressure injury  Description: Document Kali Scale and appropriate interventions in the flowsheet.   Outcome: Progressing Towards Goal  Variance Patient Condition  Note: Pressure Injury Interventions:  Sensory Interventions: Assess changes in LOC, Avoid rigorous massage over bony prominences, Check visual cues for pain, Discuss PT/OT consult with provider, Float heels, Keep linens dry and wrinkle-free, Maintain/enhance activity level, Minimize linen layers, Monitor skin under medical devices, Pad between skin to skin, Pressure redistribution bed/mattress (bed type), Turn and reposition approx.  every two hours (pillows and wedges if needed), Use 30-degree side-lying position    Moisture Interventions: Absorbent underpads, Apply protective barrier, creams and emollients, Check for incontinence Q2 hours and as needed, Internal/External urinary devices, Minimize layers, Moisture barrier, Offer toileting Q_hr    Activity Interventions: Assess need for specialty bed, Pressure redistribution bed/mattress(bed type), PT/OT evaluation    Mobility Interventions: Assess need for specialty bed, Float heels, Pressure redistribution bed/mattress (bed type), PT/OT evaluation    Nutrition Interventions: Discuss nutritional consult with provider    Friction and Shear Interventions: Apply protective barrier, creams and emollients, Foam dressings/transparent film/skin sealants, Lift sheet, Lift team/patient mobility team, Minimize layers, Transferring/repositioning devices                Problem: Patient Education: Go to Patient Education Activity  Goal: Patient/Family Education  Outcome: Progressing Towards Goal     Problem: Ventilator Management  Goal: *Adequate oxygenation and ventilation  Outcome: Progressing Towards Goal  Goal: *Patient maintains clear airway/free of aspiration  Outcome: Progressing Towards Goal  Goal: *Absence of infection signs and symptoms  Outcome: Progressing Towards Goal  Goal: *Normal spontaneous ventilation  Outcome: Progressing Towards Goal     Problem: Patient Education: Go to Patient Education Activity  Goal: Patient/Family Education  Outcome: Progressing Towards Goal     Problem: Non-Violent Restraints  Goal: Removal from restraints as soon as assessed to be safe  Outcome: Progressing Towards Goal  Goal: No harm/injury to patient while restraints in use  Outcome: Progressing Towards Goal  Goal: Patient's dignity will be maintained  Outcome: Progressing Towards Goal  Goal: Patient Interventions  Outcome: Progressing Towards Goal     Problem: Pain  Goal: *Control of Pain  Outcome: Progressing Towards Goal  Goal: *PALLIATIVE CARE:  Alleviation of Pain  Outcome: Progressing Towards Goal     Problem: Patient Education: Go to Patient Education Activity  Goal: Patient/Family Education  Outcome: Progressing Towards Goal     Problem: Injury - Risk of, Adverse Drug Event  Goal: *Absence of adverse drug events  Outcome: Progressing Towards Goal  Goal: *Absence of medication errors  Outcome: Progressing Towards Goal  Goal: *Knowledge of prescribed medications  Outcome: Progressing Towards Goal     Problem: Patient Education: Go to Patient Education Activity  Goal: Patient/Family Education  Outcome: Progressing Towards Goal

## 2021-11-10 NOTE — PROGRESS NOTES
Nutrition Assessment     Type and Reason for Visit: Reassess, Consult    Nutrition Recommendations/Plan:   - Continue tube feeding of Jevity 1.5 at goal rate of 50 mL/hr with daily multivitamin and  50 mL q 4 hour water flushes via OGT (goal regimen to provide 1800 kcal, 77 gm protein, 912 mL free water, 100% RDIs). Nutrition Assessment:  Patient tolerating tube feeding at goal with loose stool yesterday on bowel regimen (pericolace, miralax BID) sedated on pressure support. Hyponatremia resolved, electrolytes WNL. Malnutrition Assessment:  Malnutrition Status: Severe malnutrition     Estimated Daily Nutrient Needs:  Energy (kcal):  5545-5255  Protein (g):         Fluid (ml/day):  3041-0026    Nutrition Related Findings:  Last BM 11/9      Current Nutrition Therapies:  ADULT TUBE FEEDING Orogastric; Standard with Fiber; Delivery Method: Continuous; Continuous Initial Rate (mL/hr): 10; Continuous Advance Tube Feeding: Yes; Advancement Volume (mL/hr): 10; Advancement Frequency: Q 8 hours; Continuous Goal Rate (mL/...     Anthropometric Measures:  · Height:  5' 7\" (170.2 cm)  · Current Body Wt:  62.1 kg (136 lb 14.5 oz)  · BMI: 21.4    Nutrition Diagnosis:   · Inadequate oral intake related to impaired respiratory function, swallowing difficulty as evidenced by NPO or clear liquid status due to medical condition, intubation, swallowing study results    · Severe malnutrition, In context of chronic illness related to inadequate protein-energy intake, swallowing difficulty as evidenced by poor intake prior to admission, weight loss, severe muscle loss, severe loss of subcutaneous fat      Nutrition Intervention:  Food and/or Nutrient Delivery: Continue NPO, Continue tube feeding  Nutrition Education and Counseling: Education completed (basics of g-tube and EN support)  Coordination of Nutrition Care: Continue to monitor while inpatient, Interdisciplinary rounds    Goals:  Nutritional needs will be met through adequate oral intake or nutrition support within the next 7 days.        Nutrition Monitoring and Evaluation:   Behavioral-Environmental Outcomes: None identified  Food/Nutrient Intake Outcomes: Enteral nutrition intake/tolerance, Vitamin/mineral intake  Physical Signs/Symptoms Outcomes: Biochemical data, GI status, Nutrition focused physical findings    Discharge Planning:    Enteral nutrition (pending replacement of G tube)     Electronically signed by Home Williamson RD, [facility] on 11/10/2021 at 12:12 PM    Contact Number: [phone]

## 2021-11-10 NOTE — PROGRESS NOTES
Received pt on vent support head elevated sxn for small amount of thick yellow secretions ETT moved to left of mouth patent and secured inline resp neb given     11/09/21 2043   Patient Observations   Pulse (Heart Rate) (!) 129   Resp Rate 25   O2 Sat (%) 99 %   Airway - Endotracheal Tube 10/28/21   Placement Date/Time: 10/28/21 0020   Number of Attempts: 1  Inserted By: García Hope CRNA  Placement Verified: Auscultation;BBS;EtCO2  Airway Tube Size: 7.5 mm  Anesthesia ETT Insertion Depth: 23 cm  Anesthesia ETT Line Clint: Lips   Insertion Depth (cm) 24 cm   Line Clint Lips   Side Secured Left   Site Assessment Clean, dry, & intact   Respiratory   Respiratory (WDL) X   Patient on Vent Yes - If patient is on vent, add Doc Flowsheet Ventilator ().    Respiratory Pattern Regular   Chest/Tracheal Assessment Chest expansion, symmetrical   Cough Productive; Cough with suction   Airway Clearance   Suction ET Tube   Suction Device Inline suction catheter   Sputum Method Obtained Endotracheal   Sputum Amount Small   Sputum Color/Odor Yellow   Sputum Consistency Thick   Ventilator Initiate/Discontinue   Bio-Med ID # [id number]   Vent Settings   FIO2 (%) 90 %   SpO2/FIO2 Ratio 110   CMV Rate Set 14   Back-Up Rate 14   PC Set 26   PEEP/VENT (cm H2O) 10 cm H20   Insp Time (sec) 0.9 sec   Insp Rise Time % 60 %   Flow Trigger 3   Ventilator Measurements   Resp Rate Observed 25   Vt Exhaled (Machine Breath) (ml) 310 ml   Ve Observed (l/min) 7.57 l/min   PIP Observed (cm H2O) 38 cm H2O   MAP (cm H2O) 22   I:E Ratio Actual 1:1.2   Safety & Alarms   Circuit Temperature 96.4 °F (35.8 °C)   Backup Mode Checked/Apnea Yes   Pressure Max 50 cm H2O   Pressure Min 10 cm H2O   Ve Min 2   Ve Max 20   Vt Min 200 ml   Vt Max 1000 ml   RR Min 14   RR Max 40   Ambu Bag Yes   Ambu Mask Yes   Age Specific Ventilator Associated Pneumonia Bundle   Patient Age Group Adult   Adult Ventilator Associated Pneumonia Bundle   Elevation of Head to 30-45 Degrees (Unless Contraindicated) Yes   Oxygen Therapy   Skin Assessment Clean, dry, & intact   Skin Protection for O2 Device Yes   Vent Method/Mode   Ventilation Method Conventional   Ventilator Mode Pressure control   Procedures   $$ Subsequent Procedure Aerosol   Delivery Source In-line nebulizer   Pulmonary Toilet   Pulmonary Toilet H. O.B elevated; Suction

## 2021-11-10 NOTE — PROGRESS NOTES
1930 bedside turnover given to me by KARMA Lau. Pt is in bed on the cardiac telemetry monitor. Intubated. No signs of distress. SBAR< MAR< ED summary given with a chance to ask questions.

## 2021-11-10 NOTE — PROGRESS NOTES
Problem: Patient Education: Go to Patient Education Activity  Goal: Patient/Family Education  Outcome: Progressing Towards Goal     Problem: Falls - Risk of  Goal: *Absence of Falls  Description: Document Twiggs Flow Fall Risk and appropriate interventions in the flowsheet.   Outcome: Progressing Towards Goal  Note: Fall Risk Interventions:  Mobility Interventions: Bed/chair exit alarm    Mentation Interventions: Bed/chair exit alarm    Medication Interventions: Bed/chair exit alarm    Elimination Interventions: Bed/chair exit alarm              Problem: Patient Education: Go to Patient Education Activity  Goal: Patient/Family Education  Outcome: Progressing Towards Goal     Problem: Discharge Planning  Goal: *Discharge to safe environment  Outcome: Progressing Towards Goal  Goal: *Knowledge of medication management  Outcome: Progressing Towards Goal  Goal: *Knowledge of discharge instructions  Outcome: Progressing Towards Goal     Problem: Patient Education: Go to Patient Education Activity  Goal: Patient/Family Education  Outcome: Progressing Towards Goal     Problem: Patient Education: Go to Patient Education Activity  Goal: Patient/Family Education  Outcome: Progressing Towards Goal     Problem: Ventilator Management  Goal: *Adequate oxygenation and ventilation  Outcome: Progressing Towards Goal  Goal: *Patient maintains clear airway/free of aspiration  Outcome: Progressing Towards Goal  Goal: *Absence of infection signs and symptoms  Outcome: Progressing Towards Goal  Goal: *Normal spontaneous ventilation  Outcome: Progressing Towards Goal     Problem: Patient Education: Go to Patient Education Activity  Goal: Patient/Family Education  Outcome: Progressing Towards Goal     Problem: Non-Violent Restraints  Goal: Removal from restraints as soon as assessed to be safe  Outcome: Progressing Towards Goal  Goal: No harm/injury to patient while restraints in use  Outcome: Progressing Towards Goal  Goal: Patient's dignity will be maintained  Outcome: Progressing Towards Goal  Goal: Patient Interventions  Outcome: Progressing Towards Goal     Problem: Pain  Goal: *Control of Pain  Outcome: Progressing Towards Goal  Goal: *PALLIATIVE CARE:  Alleviation of Pain  Outcome: Progressing Towards Goal

## 2021-11-10 NOTE — PROGRESS NOTES
attended the interdisciplinary rounds for Ron Cheung, who is a 67 y. o.,male. Patients Primary Language is: [place]. According to the patients EMR Yarsanism Affiliation is: Hoahaoism. The reason the Patient came to the hospital is:   Patient Active Problem List    Diagnosis Date Noted    Gastrostomy complication ([facility] 76/97/8386    Severe protein-calorie malnutrition ([facility] 10/20/2021    Goals of care, counseling/discussion     Acute respiratory failure with hypoxia ([facility]     Debility     Pneumonia 10/18/2021    Urinary frequency 06/30/2016    Scarlet fever     [facility]ocardial infarction [facility])     Coronary artery disease involving native coronary artery of native heart without angina pectoris,s/p stent 2003 05/19/2016        Plan:  Flo Ness will continue to follow and will provide pastoral care on an as needed/requested basis.  recommends bedside caregivers page  on duty if patient shows signs of acute spiritual or emotional distress.     [address]  Board Certified [address] [place] [address]   [phone] Chief complaint:   Chief Complaint   Patient presents with   • Rash       Vitals:  Visit Vitals  Pulse 98   Temp 97.4 °F (36.3 °C) (Temporal)   Resp 30   Wt 10.3 kg   SpO2 97%       HISTORY OF PRESENT ILLNESS     HPI    Patient presents today with father with c/o rash to entire body  The rash started yesterday and has worsened today  He was put on Amoxicillin for bilateral otitis media 7 days ago   Called PCP yesterday and was told to stop the Amoxicillin, last dose yesterday morning   Denies any fever, vomiting, diarrhea  Benadryl given last night, nothing today   The rash does not seem to be bothering patient     Other significant problems:  There are no active problems to display for this patient.      PAST MEDICAL, FAMILY AND SOCIAL HISTORY     Medications:  Current Outpatient Medications   Medication   • prednisoLONE (PRELONE) 15 MG/5ML oral solution     No current facility-administered medications for this visit.        Allergies:  ALLERGIES:   Allergen Reactions   • Amoxicillin RASH       Past Medical  History/Surgeries:  History reviewed. No pertinent past medical history.    History reviewed. No pertinent surgical history.    Family History:  History reviewed. No pertinent family history.    Social History:  Social History     Tobacco Use   • Smoking status: Not on file   Substance Use Topics   • Alcohol use: Not on file       REVIEW OF SYSTEMS     Review of Systems   Constitutional: Negative for fever and irritability.   Gastrointestinal: Negative for diarrhea and vomiting.   Skin: Positive for rash.       PHYSICAL EXAM     Physical Exam  Vitals signs and nursing note reviewed.   Constitutional:       General: He is active. He is not in acute distress.  HENT:      Ears:      Comments: Bilateral TMs slightly pink, non bulging      Mouth/Throat:      Mouth: Mucous membranes are moist.      Pharynx: Oropharynx is clear.   Eyes:      Conjunctiva/sclera: Conjunctivae normal.   Cardiovascular:      Rate and  Rhythm: Normal rate and regular rhythm.   Pulmonary:      Effort: Pulmonary effort is normal.      Breath sounds: Normal breath sounds.   Skin:     Findings: Rash present. Rash is urticarial (to face, arms, legs, chest, back).   Neurological:      Mental Status: He is alert.         ASSESSMENT/PLAN     1. Allergic urticaria      Urticaria secondary to Amoxicillin   Continue Benadryl and will start Prelone daily for 3 days   Amoxicillin added to allergy list  At this point otitis media seems to be improving, no further antibiotics needed at this time       Orders Placed This Encounter   • prednisoLONE (PRELONE) 15 MG/5ML oral solution       Return if symptoms worsen or fail to improve.    Patient Instructions were given      Supervising physician, Dr. Deloris Moore

## 2021-11-10 NOTE — PROGRESS NOTES
Infectious Disease progress Note        Reason: Right upper lobe cavitary pneumonia    Current abx Prior abx       Levofloxacin, zosyn since 11/4/21    Fluconazole since 11/5 vancomycin 10/18-10/21  Levofloxacin 10/18-10/25  Piperacillin/tazobactam since 10/18/2021-10/29  Meropenem  10/29-11/4  eraxis since 10/31-11/5     Lines:   Right femoral CVC since 10/27/21    Assessment :    67 y.o. 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 intake, and worsening cough . Now with gram positive bacteremia, chronic cavitary lesion RUL with RUL/RLL infiltrates    Clinical presentation c/w acute on chronic hypoxic respiratory failure likely secondary to recurrent aspiration pneumonia, chronic cavitary lesion right upper lobe  Rule out superimposed infection/colonization with atypical mycobacteria    Sputum cx- mixed respiratory cristine, MSSA yeast  Yeast likely colonizer. Sputum AFB 10/19 negative    Pulmonary follow-up appreciated    Single positive blood culture for coagulase negative staph. On  10/18 is likely contamination    S/p peg tube placement 10/21/21    Now with worsening tachypnea, increased abdominal pain on 10/25/2021 likely secondary to displaced PEG tube. GI follow-up appreciated. Status post removal of PEG tube 10/26/2021. Evidence of sherif-PEG wound infection noted  No clinical or radiological evidence of worsening pneumonia    Unresponsiveness on 10/27/2021 status post intubation 10/27/2021. Pulled out ET tube, reintubated on 10/28/2021  Currently on 35% FiO2.      worsening hypotension requiring pressors on 10/29  Etiology of hypotension not entirely clear at this time- ? Cardiogenic versus partially treated sepsis  Peg site culture 10/29- candida albicans.    Respiratory culture 10/30- yeast  No worsening erythema around recent PEG site. persistent fevers T-max 102 since , progressive infiltrates noted on CT chest- ?recurrent aspiration pneumonia,?progressive OSCAR pneumonia ?superimposed candida pneumonia  ? Colonized femoral cvc  Negative blood culture , negative sputum cx , negative urine culture     Status post paracentesis 2021. Pulmonary help appreciated. Fluid analysis not consistent with parapneumonic effusion/empyema    Monitor for cholecystitis. Enlarged gallbladder with some wall thickening noted on CT scan 2021    No definitive evidence of PEG site abscess noted on CT scan 2021. Recent broad-spectrum antibiotics, colonization with Candida puts patient at risk for fungemia. Negative Fungitell 2021 argues against fungemia    No high grade fevers. Remains on Levophed. On 70% FiO2. Status post bronchoscopy 2021. Significant mucous plugs noted on right lung. Discussion with family regarding transition to comfort measures. Discussed with sister at bedside. She has made decision regarding transition to comfort measures once  arrangements, graveyard, etc finalized. Recommendations:    1. continue zosyn, d/c levofloxacin, vancomycin, iv fluconazole  2. Follow-up family's decision regarding timing of transitioning patient to comfort measures. Discontinue Zosyn once transition to comfort measures. We will sign off. Please call me if any new questions or concerns. Thanks       Above plan was discussed in details with RN, dr. Pretty Hong. HPI:    Intubated      home Medication List    Details   acetylcysteine (MUCOMYST) 100 mg/mL (10 %) nebulizer solution Take 4 mL by inhalation every four (4) hours for 120 days.   Qty: 720 mL, Refills: 3      fluorouraciL (EFUDEX) 5 % chemo cream APPLY CREAM TO FOREHEAD 2 TIMES DAILY FOR 2 WEEKS ONCE HEALED USE 2 TIMES DAILY TO THE EARS FOR 2 WEEKS ONCE HEALED USE 2 TIMES DAILY TO THE CHEEKS AND TEMPLES FOR 2 WEEKS      sodium chloride 3 % nebulizer solution 4 mL by Nebulization route four (4) times daily. Mix with albuterol. File under Medicare Part B. Dx: R06.02; J47.9; J84.9; R53.81  Qty: 480 mL, Refills: 5    Associated Diagnoses: Dyspnea on exertion; Bronchiectasis without complication ([facility] [address] ILD (interstitial lung disease) ([facility] [address] Chronic pulmonary aspiration, sequela; Physical deconditioning      albuterol (PROVENTIL VENTOLIN) 2.5 mg /3 mL (0.083 %) nebu Mix with hypertonic saline nebulizer -- use 3- times per day. File under Medicare Part B. Dx: R06.02; J47.9; J84.9; R53.81  Qty: 360 Nebule, Refills: 3    Associated Diagnoses: Dyspnea on exertion; Bronchiectasis without complication ([facility] [address] ILD (interstitial lung disease) ([facility] [address] Chronic pulmonary aspiration, sequela; Physical deconditioning      guaiFENesin ER (MUCINEX) 600 mg ER tablet Take 1 Tablet by mouth two (2) times a day for 90 days. Qty: 180 Tablet, Refills: 3    Associated Diagnoses: Bronchiectasis without complication (HCC)      cholecalciferol (VITAMIN D3) (1000 Units /25 mcg) tablet Take 1,000 Units by mouth daily. FISH OIL-DHA-EPA PO Take 1 Tab by mouth daily. amino acids powd Take 1 Dose by mouth daily. OTHER Take 1 Cap by mouth daily. tumeric       fluticasone propionate (FLONASE NA) 1 Spray by Both Nostrils route daily. ascorbic acid, vitamin C, (VITAMIN C) 500 mg tablet Take 500 mg by mouth daily. multivitamin (ONE A DAY) tablet Take 1 Tab by mouth daily. aspirin delayed-release 81 mg tablet Take 81 mg by mouth daily.     Associated Diagnoses: Coronary artery disease involving native coronary artery of native heart without angina pectoris             Current Facility-Administered Medications   Medication Dose Route Frequency    0.9% sodium chloride infusion 250 mL  250 mL IntraVENous PRN    morphine injection 4 mg  4 mg IntraVENous Q15MIN PRN    propofol (DIPRIVAN) 10 mg/mL infusion  0-50 mcg/kg/min IntraVENous TITRATE    therapeutic multivitamin (THERAGRAN) tablet 1 Tablet  1 Tablet Oral DAILY    NOREPINephrine (LEVOPHED) 4,000 mcg in dextrose 5% 250 mL infusion  0.5-50 mcg/min IntraVENous TITRATE    vancomycin (VANCOCIN) 1250 mg in  ml infusion  1,250 mg IntraVENous Q12H    fluconazole (DIFLUCAN) 400mg/200 mL IVPB (premix)  400 mg IntraVENous Q24H    piperacillin-tazobactam (ZOSYN) 4.5 g in 0.9% sodium chloride (MBP/ADV) 100 mL MBP  4.5 g IntraVENous Q6H    levoFLOXacin (LEVAQUIN) 750 mg in D5W IVPB  750 mg IntraVENous Q24H    polyethylene glycol (MIRALAX) packet 17 g  17 g Per NG tube BID    senna-docusate (PERICOLACE) 8.6-50 mg per tablet 1 Tablet  1 Tablet Oral DAILY    famotidine (PF) (PEPCID) 20 mg in 0.9% sodium chloride 10 mL injection  20 mg IntraVENous Q12H    thiamine HCL (B-1) tablet 200 mg  200 mg Per G Tube DAILY    LORazepam (ATIVAN) injection 2 mg  2 mg IntraVENous Q15MIN PRN    chlorhexidine (PERIDEX) 0.12 % mouthwash 10 mL  10 mL Oral Q12H    fentaNYL (PF) 1,500 mcg/30 mL (50 mcg/mL) infusion  0-200 mcg/hr IntraVENous TITRATE    insulin lispro (HUMALOG) injection   SubCUTAneous Q6H    glucose chewable tablet 16 g  16 g Oral PRN    glucagon (GLUCAGEN) injection 1 mg  1 mg IntraMUSCular PRN    naloxone (NARCAN) injection 0.4 mg  0.4 mg IntraVENous PRN    albuterol-ipratropium (DUO-NEB) 2.5 MG-0.5 MG/3 ML  3 mL Nebulization Q4H RT    Lactobacillus Acidoph & Bulgar (FLORANEX) tablet 2 Tablet  2 Tablet Per G Tube BID    guaiFENesin (ROBITUSSIN) 100 mg/5 mL oral liquid 400 mg  400 mg Per G Tube Q4H PRN    albuterol-ipratropium (DUO-NEB) 2.5 MG-0.5 MG/3 ML  3 mL Nebulization Q4H PRN    aspirin chewable tablet 81 mg  81 mg Per G Tube DAILY    acetaminophen (TYLENOL) tablet 650 mg  650 mg Per G Tube Q6H PRN    Or    acetaminophen (TYLENOL) suppository 650 mg  650 mg Rectal Q6H PRN    enoxaparin (LOVENOX) injection 40 mg  40 mg SubCUTAneous QHS    sodium chloride (NS) flush 5-10 mL 5-10 mL IntraVENous PRN    sodium chloride (NS) flush 5-40 mL  5-40 mL IntraVENous Q8H    sodium chloride (NS) flush 5-40 mL  5-40 mL IntraVENous PRN    omega-3 acid ethyl esters (LOVAZA) capsule 1,000 mg  1 g Oral DAILY WITH BREAKFAST       Allergies: Pollens extract    Family History   Problem Relation Age of Onset    No Known Problems Mother     Heart Disease Father     Heart Attack Father         1989    Coronary Art Dis Father     Hypertension Father     High Cholesterol Father      Social History     Socioeconomic History    Marital status: SINGLE     Spouse name: Not on file    Number of children: Not on file    Years of education: Not on file    Highest education level: Not on file   Occupational History    Not on file   Tobacco Use    Smoking status: Never Smoker    Smokeless tobacco: Never Used   Vaping Use    Vaping Use: Never used   Substance and Sexual Activity    Alcohol use: No    Drug use: No    Sexual activity: Not on file   Other Topics Concern    Not on file   Social History Narrative    Not on file     Social Determinants of Health     Financial Resource Strain:     Difficulty of Paying Living Expenses: Not on file   Food Insecurity:     Worried About Running Out of Food in the Last Year: Not on file    Cherelle of Food in the Last Year: Not on file   Transportation Needs:     Lack of Transportation (Medical): Not on file    Lack of Transportation (Non-Medical):  Not on file   Physical Activity:     Days of Exercise per Week: Not on file    Minutes of Exercise per Session: Not on file   Stress:     Feeling of Stress : Not on file   Social Connections:     Frequency of Communication with Friends and Family: Not on file    Frequency of Social Gatherings with Friends and Family: Not on file    Attends Mandaen Services: Not on file    Active Member of Clubs or Organizations: Not on file    Attends Club or Organization Meetings: Not on file    Marital Status: Not on file   Intimate Partner Violence:     Fear of Current or Ex-Partner: Not on file    Emotionally Abused: Not on file    Physically Abused: Not on file    Sexually Abused: Not on file   Housing Stability:     Unable to Pay for Housing in the Last Year: Not on file    Number of [place] in the Last Year: Not on file    Unstable Housing in the Last Year: Not on file     Social History     Tobacco Use   Smoking Status Never Smoker   Smokeless Tobacco Never Used        Temp (24hrs), Av.9 °F (37.2 °C), Min:97.3 °F (36.3 °C), Max:101.8 °F (38.8 °C)    Visit Vitals  BP (!) 112/56 (BP 1 Location: Right upper arm, BP Patient Position: At rest)   Pulse (!) 112   Temp 98.4 °F (36.9 °C)   Resp 14   Ht 5' 7\" (1.702 m)   Wt 62.1 kg (136 lb 14.5 oz)   SpO2 96%   BMI 21.44 kg/m²       ROS: Unable to obtain due to patient factors    Physical Exam:    General:Thin  male laying on bed, intubated    HEENT:  Normocephalic, atraumatic, no scleral icterus or pallor; no conjunctival hemmohage; ET tube in place; neck supple, no bruits. Lymph Nodes:   not examined   Lungs:   Bilateral chest movements equal, +nt rhonchi   Heart:  RRR, s1 and s2; no  rubs or gallops, no edema, + pedal pulses   Abdomen:  soft, non-distended, no erythema around peg site, +nt abdominal tenderness,  no hepatomegaly, no splenomegaly.    Genitourinary:  deferred   Extremities:   no clubbing, cyanosis; no joint effusions or swelling;  muscle mass appropriate for age   Neurologic:   Sedated                        Skin:  No rash or ulcers noted   Back:  not examined     Psychiatric:   calm         Labs: Results:   Chemistry Recent Labs     11/10/21  0405 21  0630 21  0218   * 141* 120*    135* 135*   K 4.4 4.7 5.1    97* 95*   CO2 35* 40* 39*   BUN 23* 22* 19*   CREA 0.52* 0.46* 0.45*   CA 8.4* 8.5 7.9*   AGAP 1* NEG 2 1*   BUCR 44* 48* 42*      CBC w/Diff Recent Labs     11/10/21  0405 21  0630 21  0218   WBC 15.3* 7.8 7.9   RBC 3.35* 2.65* 2.75*   HGB 8.7* 6.9* 7.2*   HCT 30.4* 23.8* 24.6*    309 301   GRANS 94* 86* 89*   LYMPH 1* 3* 2*   EOS 1 3 2      Microbiology Recent Labs     11/09/21  1212   CULT PENDING  PENDING          RADIOLOGY:    All available imaging studies/reports in [facility] for this admission were reviewed    High complexity decision making was performed during the evaluation of this patient at high risk for decompensation with multiple organ involvement         Disclaimer: Sections of this note are dictated utilizing voice recognition software, which may have resulted in some phonetic based errors in grammar and contents. Even though attempts were made to correct all the mistakes, some may have been missed, and remained in the body of the document. If questions arise, please contact our department.     Dr. Ivan Ferreira, Infectious Disease Specialist  [phone]  November 10, 2021  3:25 PM

## 2021-11-11 NOTE — WOUND CARE
Physical Exam  HENT:      Head:     Musculoskeletal:        Back:         Legs:         Focused assessment   Patient received lying in bed, intubated and unresponsive. Heel boots and preventive silicone dressings in place, TAPS friction reducing sheet in place. RT in and adjusted vent settings due to patient history of desating with turning and positioning. Merlos in place draining clear yellow urine. Patient body habitus indicative of long term sedentary positioning, sherif-anal area malformed and lower extremities are significantly atrophied. Patients overall appearance is cachetic. Bilateral lower extremities with healed/scabbed traumatic injuries. Right ischium suspected deep tissue injury. 1.5x3cm. Deep purple discoloration surrounded by non-blanchable erythema. Sacrum with suspected deep tissue injury with open area directly over the yousif prominence of the coccyx. [id number] with open area 1x1cm. Deep purple discoloration. Friction injury to left upper back. 4.5x7cm. deep red lesion with scattered lesions sherif-wound. Some skin peeling noted. No drainage noted. Left ischium suspected deep tissue injury. 5x6cm deep purple discoloration. No drainage noted. Left iliac wing medical device related pressure injury. Stage 2, 0.5x8cm. bright red with clean, dry intact sherif-wound. No drainage noted. Medical device related pressure injury to right ear, unstageable. 0.5x0.5cm. deep purple discoloration. Due to patient body habitus, ET tube anna rests on right ear. Topical treatment protocol in place as follows:   Silicone dressing to right/left ischium, sacrum, heels, left upper back, and right ear. Change every 3 days and prn soilage. Consult turn team.  Continue using prevention boots, TAPS system friction reducing sheet and wedges. Turn [id number]. Care discussed with primary nurse, Garcia Larson. Care turned over to nursing staff at this time. Robert Torres RN, BSN, [facility]

## 2021-11-11 NOTE — PROGRESS NOTES
Problem: Non-Violent Restraints  Goal: Removal from restraints as soon as assessed to be safe  Outcome: Not Progressing Towards Goal  Goal: No harm/injury to patient while restraints in use  Outcome: Not Progressing Towards Goal  Goal: Patient's dignity will be maintained  Outcome: Not Progressing Towards Goal  Goal: Patient Interventions  Outcome: Not Progressing Towards Goal

## 2021-11-11 NOTE — PROGRESS NOTES
conducted a Follow up consultation and Spiritual Assessment for Miguel A Milton, who is a 67 y. o.,male. The  provided the following Interventions:  Continued the relationship of care and support. Listened empathically. Offered prayer and assurance of continued prayer on patients behalf. Chart reviewed. The following outcomes were achieved:  Family expressed gratitude for 's visit. They are leaning towards compassionate extubation tomorrow when more family are available. Assessment:  There are no further spiritual or Samaritan issues which require Spiritual Care Services interventions at this time. Plan:  Chaplains will continue to follow and will provide pastoral care on an as needed/requested basis.  recommends bedside caregivers page  on duty if patient shows signs of acute spiritual or emotional distress.      [facility] Certified [address] [place] [address]   [phone]

## 2021-11-11 NOTE — PROGRESS NOTES
attended the interdisciplinary rounds for Marina Flores, who is a 67 y. o.,male. Patients Primary Language is: [place]. According to the patients EMR [facility]itan Affiliation is: Orthodoxy. The reason the Patient came to the hospital is:   Patient Active Problem List    Diagnosis Date Noted    Gastrostomy complication ([facility] [address] 14/20/3694    Severe protein-calorie malnutrition ([facility] [address] 10/20/2021    Goals of care, counseling/discussion     Acute respiratory failure with hypoxia ([facility] [address]     Debility     Pneumonia 10/18/2021    Urinary frequency 06/30/2016    Scarlet fever     [facility] infarction [facility])     Coronary artery disease involving native coronary artery of native heart without angina pectoris,s/p stent 2003 05/19/2016      Plan:  Reyna Silver will continue to follow and will provide pastoral care on an as needed/requested basis.  recommends bedside caregivers page  on duty if patient shows signs of acute spiritual or emotional distress.     [address] [facility]  Board Certified [address] [facility]   [phone]

## 2021-11-11 NOTE — INTERDISCIPLINARY ROUNDS
[facility]  Pulmonary, Critical Care, and Sleep Medicine  Interdisciplinary and Ventilator Weaning Rounds     Patient discussed in morning walking rounds and interdisciplinary rounds.     ICU day: 10/27/21      Overnight events:   · No acute events  ·      Assessments and best practice:  · Ventilator  ? Ventilator day: 10/28/21   ? Vent settings: FiO2 of 85 and PEEP of 10  ? VAP bundle, aim to keep peak plateau pressure 26-57IY H2O  ? Weaning assessed and documented   § Patient does meet criteria for SBT. § Patient will be placed sedation holiday. § Plan to wean with PS n/a.  § Outcome: Pending GO discussion   § Final plan: Pending, not a candidate for extubation  · Sedation  ? Fentanyl   · Other pertinent drips  ? Levophed  · Lines noted  ? Femoral CVL ,  OGT, Corcoran cath, PIV   · Critical labs assessed  ? Yes  · Antibiotics  ? Pip/Tazo  · Medications reviewed  ? Yes  · Pending imaging  ? none  · Pending send out labs  ? No  · Pending Procedures  ? PEG, pending clearing of current infection surrounding old PEG site   · Glycemic control  ? SSI   · Stress ulcer prophylaxis. ? Pepcid  · DVT prophylaxis. ? Lovenox  · Need for Lines, corcoran assessed. ? Yes  · Restraint Reevaluation   ? Yes  ? I have reevaluated the patient one hour after initiation of intervention. The patient is comfortable, uninjured, but continues to pose an imminent risk of injury to self to themselves and/or serious disruption of medical treatment required to keep patient stable. The patient's current medical and behavioral conditions that warrant the use intervention include danger to self and Interference with medical equipment or treatment.  Restraint or seclusion will be discontinued at the earliest possible time, regardless of the scheduled expiration of the order.  Based on my evaluation, restraints will be continued: YES 11/11/21 7:52 AM  ? Attending Overseeing restraints: Dr. Yosi Fuller     Family contact/MPOA: Indy Mauro (sister) [phone]   Family updated will update today         Daily Plans:  · Family likely to make decisions regarding comfort care in the next few days.   · Palliative care to touch base with sister        CONNOR time 20 minutes             Pulmonary, Critical Care Medicine  [facility]

## 2021-11-11 NOTE — ROUTINE PROCESS
Bedside and Verbal shift change report given to *** (oncoming nurse) by *** (offgoing nurse). Report included the following information {SBAR REPORTS YNON:[id number]}.

## 2021-11-11 NOTE — PROGRESS NOTES
[facility]  ICU Progress Note      Name: Whitney Hernandez   : 1945   MRN: [id number]   Date: 11/10/2021 1:49 PM     [x]I have reviewed the flowsheet and previous days notes. Events overnight reviewed and discussed with nursing staff. Vital signs and records reviewed. Interval HPI:  Patient is a 74 y. o. male with PMHx of chronic respiratory failure, ILD, bronchiectasis who presents with failure to thrive. He presented with dyspnea and poor PO intake. He was placed on some O2 and CT chest showed some worsening consolidations in the presence of the chronic lung disease. He follows with us in clinic and was instructed to use hypertonic saline, acapella and mucinex, unsure of compliance. He was admitted to the floor, but RRT was called 10/27/21 for encephalopathy and acute hypercapnic respiratory failure. Anesthesia paged and he was intubated and transferred to ICU. Noted to have PNA and pulm edema on top of bronchiectasis and ILD, also a component of cardiogenic ( EF 25%) and septic shock. Multiple Cavitary lesions - likely 2/2 recurrent aspiration. Respiratory cx + MSSA, light candida albicans. PEG tube placed this admission on 10/21 for dysphagia, but inadvertently dislodged, thus will need to be replaced. Patient self-extubated 10/27/21 and was emergently re-intubated due to unresponsiveness to voice and noxious stimuli.  Patient has inability to cough, thus would benefit from trach, but patient does not wish to be trached. Made partial code (21). Ideally, comfort care vs. Trach. Palliative and  teams will meet with the family to discuss goals of care.        [x]The patient is unable to give any meaningful history or review of systems because the patient is:  [x]Intubated [x]Sedated   []Unresponsive      [x]The patient is critically ill on      [x]Mechanical ventilation []Pressors   []BiPAP []                 Subjective:  11/10/21:  Patient was seen at bedside this morning on mechanical ventilation. Overnight, oxygenation worsening. Overnight patient also attempted self extubation, had to be sedated. Patient today remains sedated, only responsive to noxious stimuli      ROS:Review of systems not obtained due to patient factors. Vital Signs:    Visit Vitals  BP (!) 99/52 (BP 1 Location: Right upper arm)   Pulse (!) 127   Temp (!) 101.8 °F (38.8 °C)   Resp 21   Ht 5' 7\" (1.702 m)   Wt 62.1 kg (136 lb 14.5 oz)   SpO2 97%   BMI 21.44 kg/m²       O2 Device: Ventilator, Endotracheal tube   O2 Flow Rate (L/min): 6 l/min   Temp (24hrs), Av.3 °F (37.4 °C), Min:98 °F (36.7 °C), Max:101.8 °F (38.8 °C)       Intake/Output:   Last shift:      No intake/output data recorded.   Last 3 shifts: 701 - 11/10 190  In: 5244.6 [I.V.:2679.6]  Out:  [Urine:8]    Intake/Output Summary (Last 24 hours) at 11/10/2021 2135  Last data filed at 11/10/2021 1900  Gross per 24 hour   Intake 3430.4 ml   Output 1150 ml   Net 2280.4 ml       Ventilator Settings:  Mode Rate Tidal Volume Pressure FiO2 PEEP   Assist control, Pressure control   400 ml (target)  10 cm H2O 85 % 10 cm H20     Peak airway pressure: 39 cm H2O    Minute ventilation: 7.51 l/min      Physical Exam:    General: Intubated/sedated; on mechanical ventilation  HEENT:  Anicteric sclerae; pink palpebral conjunctivae; mucosa moist  Resp:  Symmetrical chest rise, no accessory muscle use; good AE Bilat; no rales/ wheezing/ rhonchi noted  CV:  S1, S2 present; RRR; no m/g/r  GI:  Abdomen soft, non-tender; (+) active bowel sounds  Extremities:  +2 pulses on all extremities; no edema/ cyanosis/ clubbing noted  Skin:  Warm; no rashes/ lesions noted  Neurologic:  Non-focal        DATA:     Current Facility-Administered Medications   Medication Dose Route Frequency    NOREPINephrine (LEVOPHED) 8 mg in 5% dextrose 250mL (32 mcg/mL) infusion  0.5-50 mcg/min IntraVENous TITRATE    0.9% sodium chloride infusion 250 mL  250 mL IntraVENous PRN    morphine injection 4 mg  4 mg IntraVENous Q15MIN PRN    propofol (DIPRIVAN) 10 mg/mL infusion  0-50 mcg/kg/min IntraVENous TITRATE    therapeutic multivitamin (THERAGRAN) tablet 1 Tablet  1 Tablet Oral DAILY    piperacillin-tazobactam (ZOSYN) 4.5 g in 0.9% sodium chloride (MBP/ADV) 100 mL MBP  4.5 g IntraVENous Q6H    polyethylene glycol (MIRALAX) packet 17 g  17 g Per NG tube BID    senna-docusate (PERICOLACE) 8.6-50 mg per tablet 1 Tablet  1 Tablet Oral DAILY    famotidine (PF) (PEPCID) 20 mg in 0.9% sodium chloride 10 mL injection  20 mg IntraVENous Q12H    thiamine HCL (B-1) tablet 200 mg  200 mg Per G Tube DAILY    LORazepam (ATIVAN) injection 2 mg  2 mg IntraVENous Q15MIN PRN    chlorhexidine (PERIDEX) 0.12 % mouthwash 10 mL  10 mL Oral Q12H    fentaNYL (PF) 1,500 mcg/30 mL (50 mcg/mL) infusion  0-200 mcg/hr IntraVENous TITRATE    insulin lispro (HUMALOG) injection   SubCUTAneous Q6H    glucose chewable tablet 16 g  16 g Oral PRN    glucagon (GLUCAGEN) injection 1 mg  1 mg IntraMUSCular PRN    naloxone (NARCAN) injection 0.4 mg  0.4 mg IntraVENous PRN    albuterol-ipratropium (DUO-NEB) 2.5 MG-0.5 MG/3 ML  3 mL Nebulization Q4H RT    Lactobacillus Acidoph & Bulgar (FLORANEX) tablet 2 Tablet  2 Tablet Per G Tube BID    guaiFENesin (ROBITUSSIN) 100 mg/5 mL oral liquid 400 mg  400 mg Per G Tube Q4H PRN    albuterol-ipratropium (DUO-NEB) 2.5 MG-0.5 MG/3 ML  3 mL Nebulization Q4H PRN    aspirin chewable tablet 81 mg  81 mg Per G Tube DAILY    acetaminophen (TYLENOL) tablet 650 mg  650 mg Per G Tube Q6H PRN    Or    acetaminophen (TYLENOL) suppository 650 mg  650 mg Rectal Q6H PRN    enoxaparin (LOVENOX) injection 40 mg  40 mg SubCUTAneous QHS    sodium chloride (NS) flush 5-10 mL  5-10 mL IntraVENous PRN    sodium chloride (NS) flush 5-40 mL  5-40 mL IntraVENous Q8H    sodium chloride (NS) flush 5-40 mL  5-40 mL IntraVENous PRN         Labs: Results:       Chemistry Recent Labs     11/10/21  0405 11/09/21  0630 11/08/21  0218   * 141* 120*    135* 135*   K 4.4 4.7 5.1    97* 95*   CO2 35* 40* 39*   BUN 23* 22* 19*   CREA 0.52* 0.46* 0.45*   CA 8.4* 8.5 7.9*   AGAP 1* NEG 2 1*   BUCR 44* 48* 42*   ALB 1.4*  --   --       CBC w/Diff Recent Labs     11/10/21  0405 11/09/21  0630 11/08/21  0218   WBC 15.3* 7.8 7.9   RBC 3.35* 2.65* 2.75*   HGB 8.7* 6.9* 7.2*   HCT 30.4* 23.8* 24.6*    309 301   GRANS 94* 86* 89*   LYMPH 1* 3* 2*   EOS 1 3 2      Coagulation No results for input(s): PTP, INR, APTT, INREXT, INREXT in the last 72 hours. Liver Enzymes Recent Labs     11/10/21  0405   ALB 1.4*      ABG Lab Results   Component Value Date/Time    PHI 7.26 (L) 11/10/2021 04:23 AM    PCO2I 78.3 (H) 11/10/2021 04:23 AM    PO2I 83 11/10/2021 04:23 AM    HCO3I 35.3 (H) 11/10/2021 04:23 AM    FIO2I 90 11/10/2021 04:23 AM      Microbiology Recent Labs     11/09/21  1212   CULT NO GROWTH THUS FAR  NO GROWTH THUS FAR          Telemetry: []Sinus []A-flutter []Paced    []A-fib []Multiple PVCs                  Imaging:    Imaging:  XR CHEST PORT     Narrative  EXAM: PORTABLE CHEST 0100 hours     CLINICAL HISTORY/INDICATION: intubated , gram-positive bacteremia, chronic  aspiration with dysphagia, chronic bronchiectasis, chronic right upper lobe  cavitary pneumonia, interstitial lung disease     COMPARISON: Chest x-ray 11/6, 11/5, and 11/3/2021, CT chest 11/4/2021.     TECHNIQUE: Single AP view     FINDINGS:     Endotracheal tube terminates 4.7 cm proximal to the jero. The esophagogastric  tube terminates at the proximal stomach. Continued bilateral multifocal infiltrates. Minimal blunting of the costophrenic  angles. No pneumothorax. Right greater than left apical pleural thickening. Slight elevation of the right minor fissure unchanged. Pulmonary vessels are  obscured. The heart is normal in size.     Impression  No significant interval change.   Multifocal bilateral confluent and groundglass infiltrates. Chronic increased interstitial markings. Minimal blunting of the costophrenic angles. Cavitary lesion in the right upper lobe with mild volume loss not significantly  changed but much better demonstrated on CT. Support tubes in expected position.     CT Results (most recent):  Results from Hospital Encounter encounter on 10/18/21     CT CHEST ABD PELV W CONT     Narrative  CT chest, abdomen and pelvis with contrast     CLINICAL HISTORY: evaluate for abdominal abscess at site of recent peg tube,  parapneumonic effusion/empyema. persistent fevers/hypotension despite broad  spectrum abx     COMPARISON: Abdomen pelvis CT 10/25/21 and chest CT 10/18/21.     TECHNIQUE: Helical scan to the chest, abdomen and pelvis are obtained from the  thoracic inlet to the symphysis pubis after IV contrast administration.     All CT scans at this facility performed using dose optimization techniques as  appreciated to a performed exam, to include automated exposure control,  adjustment of the mA and or KU according to patient size (including appropriate  matching for site specific examination), or use of iterative reconstruction  technique.     FINDINGS:     CT CHEST:     Lung Parenchyma: Multiple large areas of consolidations with air bronchogram and  surrounding infiltrations identified bilaterally, moderately interval progressed  since the prior chest CT. There is associated significant volume loss in  bilateral upper lobes. Superimposed compressive atelectasis at posterior  bilateral lower lobes also identified due to pleural effusions.     Thyroid: Atrophic.     Lymph nodes: No adenopathy.     Mediastinum: Borderline size of the heart. No significant pericardial effusion. Moderate coronary artery calcifications.     Vasculature: The aorta and the great vessels are unremarkable.  Collapsed  esophagus with NG tube present.     Pleural Space And Chest Wall: There is a moderate to large nonloculated left  pleural effusion developed. Small right pleural effusion is interval progressed  and loculated. The loculated air-fluid collection at the lateral right upper  lung is similar in size but more fluid level seen since the prior study and  measures 2.6 x 6.5 x 6.2 cm. The air component is communicate with peripheral  bronchial branches, best seen on coronal #30/601.     CT ABDOMEN AND PELVIS:     Liver: Normal.  Gallbladder: Elongated and distended with mild wall thickening and surrounding  pericholecystic fluid. Small layering hyperdense debris/calculi. Biliary System: No ductal dilatation. Spleen: Normal.  Pancreas: Normal.  Bowel: The gastric G-tube is interval removed with NG tube present. The  extensive emphysema in anterior upper/mid abdomen seen on prior study is no  longer evident today. The small and large bowel are nondilated.  Moderate stool  burden in the colon. Scattered diverticula in the colon. No definite colonic  inflammation.     Adrenal Glands: Normal.  Kidneys: Normal.  Lower genitourinary system: The bladder is decompressed with Merlos catheter  present.     Peritoneum/Retroperitoneum: No adenopathy. Vasculature: Advanced atherosclerotic aortic disease. Other: Small pelvic free fluid present. No focal fluid collection or abscess  identified. Moderate edema in pelvic wall and bilateral thigh, unchanged.     CT OSSEOUS STRUCTURES:     Compression deformity at T11 appears unchanged. Absence of the left femoral neck  and lateral portion of left femoral head with superior subluxation of proximal  femur, unchanged.     Impression  1. Significant regression of multiple large consolidations and surrounding  infiltrations in bilateral lungs since the prior CT, indicative of progression  of infectious process.     2. New large left pleural effusion and increased loculated right pleural  effusion.  The loculated hydropneumothorax shows more layering fluid component  since the prior CT. The air component communicating with lateral bronchial  branch in right upper lobe.     3. Gallbladder hydrops with small layering hyperdense debris. Mild  pericholecystic fluid. Recommend ultrasound correlation for potential  cholecystitis.     4. Interval removal of G-tube from left anterior abdominal wall with interval  resolved extensive emphysema. Persistent anasarca extending to the bilateral  thigh.     Thank you for this referral.        10/18/21     ECHO ADULT COMPLETE 10/26/2021 10/26/2021     Interpretation Summary  · LV: Estimated LVEF is 25 - 30%. Visually measured ejection fraction. Normal cavity size and wall thickness. Severely and segmentally reduced systolic function. Age-appropriate left ventricular diastolic function. · PA: Severe pulmonary hypertension. Pulmonary arterial systolic pressure is 78 mmHg. · IVC: Mildly elevated central venous pressure (8 mmHg); IVC diameter is less than 21 mm and collapses less than 50% with respiration. · Image quality for this study was technically difficult. · Echo study was limited due to patient's condition. · RV: Not well visualized. IMPRESSION:   · Acute hypoxic and hypercarbic respiratory failure: 2/2 PNA (aspiration) superimposed on chronic OSCAR infection and pulm edema on top of bronchiectasis, ILD  · Shock:  Septic + cardiogenic  · Acute pulmonary edema: combination of worsening cardiomyopathy as well as hypoalbuminemia  · RUL PNA w bronchiectasis, RLL nodular consolidation, Cavitary lesions - likely 2/2 recurrent aspiration. resp cultures growing MSSA. At risk for invasive fungal PNA, NTM, given hx severe structural lung disease.   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 ?sepsis, stress, ischemic  · Severe pulmonary hypertension on echo. Definitely group II and III component   · Urinary retention - coude catheter placed by urology   · normocytic anemia-  stable  · Deconditioning/adult failure to thrive/cachexia: Body mass index is 19.34 kg/m². RECOMMENDATIONS:   Neuro:Titrate sedation for RASS goal 0 to -1, daily sedation holiday.  Cont Fentanyl gtt. Bilateral wrist restraints due to patient attempting self extubation  Pulm: Aspiration precautions, HOB>30'. VAP Bundle, Daily sedation vacation. SBT today, minimal vent settings. Pt is a poor candidate for extubation, already failed once, severely deconditioned. CT chest showed large left pleural effusion which was drained 11/4, likely exudative and does not appear infectious, Another differential is pseudo transudate effusion from his CHF. Patient will be bronchoscopied today 11/9  CVS:Monitor HD, MAP goal >65. BP's remain soft, Levophed low-dose restarted, will keep femoral central line for now   GI: NPO. Diet: Cont TF via OGT. Needs PEG tube replaced for chronic dysphagia. GI consulted and will hold off on PEG insertion until current PEG site infection cleared (currently still with pus and drainage). Re-consult when PEG site appears clear of infection. CT abdomen reveals resolution of abdominal abscess on PEG tube site.     Renal: Trend Cr, UOP. Merlos (coude) placed by Urology   Hem/Onc: Trend H/H, monitor for s/o active bleeding. Daily CBC.   Status post PRBC transfusion on 11/9  I/D:Trend WBCs and temperature curve. Persistent fevers despite multiple broad spectrum antibiotics/ antifungals concerning for non infectious etiology of fever such as drug fever, DVT.  ABx per ID, broad spectrum (zosyn, levaquin, vancomycin, fluconazole ).  Rcx w/ MSSA, rare yeast. Wound cx with moderate candida albicans. F/u PVL's negative for source of fever   Metabolic: Daily BMP, mag, phos. Trend lytes and replace per protocol. Endocrine:Q6 glucoses. SSI. Avoid hypoglycemia. Musc/Skin:  wound care   Global Care: palliative and the  will discuss goals of care with the family. today    Partial Code   · Discussed in interdisciplinary rounds     I had a long conversation regarding patient's very poor prognosis with the patient's sister and her  again today. I reviewed the multiple medical conditions noted above, they are in agreement, and they know that the patient is dying, she states that she has to speak with multiple family members and make  arrangements prior to compassionately extubating. She reports that she would like to wait a few days. I have indicated that the patient may pass away given worsening ARDS. She indicated that she would like to keep the patient comfortable giving pain medication and as needed to prevent extra suffering. Best practice :     Glycemic control  IHI ICU bundles:              Central Line Bundle Followed , Corcoran Bundle Followed and Vent Bundle Followed, Vent Day 9    [facility] Vent patients- VAP bundle, aim to keep peak plateau pressure 77-45PT H2O  Sress ulcer prophylaxis. DVT prophylaxis. Need for Lines, corcoran assessed. Palliative care evaluation.     Total of 32 min critical care time spent at bedside (personally) during the course of care providing evaluation,management and care decisions and ordering appropriate treatment related to critical care problems exclusive of procedures.   The reason for providing this level of medical care for this critically ill patient was due a critical illness that impaired one or more vital organ systems such that there was a high probability of imminent or life threatening deterioration in the patients condition. This care involved high complexity decision making to assess, manipulate, and support vital system functions, to treat this degree vital organ system failure and to prevent further life threatening deterioration of the patients condition. This time was independent of other practitioners.       Grim prognosis        Desiree Del Valle MD/MPH     Pulmonary, Critical Care Medicine  [facility]

## 2021-11-11 NOTE — PROGRESS NOTES
[facility]  ICU Progress Note      Name: Richard Olson   : 1945   MRN: [id number]   Date: 2021 1:37 PM     []I have reviewed the flowsheet and previous days notes. Events overnight reviewed and discussed with nursing staff. Vital signs and records reviewed. Interval HPI:  Patient is a 74 y. o. male with PMHx of chronic respiratory failure, ILD, bronchiectasis who presents with failure to thrive. He presented with dyspnea and poor PO intake. He was placed on some O2 and CT chest showed some worsening consolidations in the presence of the chronic lung disease. He follows with us in clinic and was instructed to use hypertonic saline, acapella and mucinex, unsure of compliance. He was admitted to the floor, but RRT was called 10/27/21 for encephalopathy and acute hypercapnic respiratory failure. Anesthesia paged and he was intubated and transferred to ICU. Noted to have PNA and pulm edema on top of bronchiectasis and ILD, also a component of cardiogenic ( EF 25%) and septic shock. Multiple Cavitary lesions - likely 2/2 recurrent aspiration. Respiratory cx + MSSA, light candida albicans. PEG tube placed this admission on 10/21 for dysphagia, but inadvertently dislodged, thus will need to be replaced. Patient self-extubated 10/27/21 and was emergently re-intubated due to unresponsiveness to voice and noxious stimuli.  Patient has inability to cough, thus would benefit from trach, but patient does not wish to be trached. Made partial code (21). Ideally, comfort care vs. Trach. Patient has tried to self extubate himself a couple days ago and was placed in bilateral wrist restraints. Palliative and  teams will meet with the family to discuss goals of care.     Subjective:  21:  Patient was seen at bedside this morning and was was sedated. Tried to self-extubate himself a couple nights ago. ROS:Review of systems not obtained due to patient factors.     Vital Signs: Visit Vitals  BP (!) 105/56   Pulse (!) 115   Temp 100.2 °F (37.9 °C)   Resp 23   Ht 5' 7\" (1.702 m)   Wt 62.1 kg (136 lb 14.5 oz)   SpO2 91%   BMI 21.44 kg/m²       O2 Device: Ventilator, Tracheostomy   O2 Flow Rate (L/min): 6 l/min   Temp (24hrs), Av.8 °F (37.7 °C), Min:98.9 °F (37.2 °C), Max:101.8 °F (38.8 °C)       Intake/Output:   Last shift:      No intake/output data recorded.   Last 3 shifts:  1901 -  0700  In: 4530.4 [I.V.:1950.4]  Out: 1250 [Urine:1250]    Intake/Output Summary (Last 24 hours) at 2021 1337  Last data filed at 2021 0700  Gross per 24 hour   Intake 2352.93 ml   Output 875 ml   Net 1477.93 ml       Ventilator Settings:  Mode Rate Tidal Volume Pressure FiO2 PEEP   Assist control, Pressure control   300 ml (target)  10 cm H2O 85 % 10 cm H20     Peak airway pressure: 37 cm H2O    Minute ventilation: 6.59 l/min      Physical Exam:     General: Intubated/sedated; on mechanical ventilation  HEENT:  Anicteric sclerae; pink palpebral conjunctivae; mucosa moist  Resp:  Symmetrical chest rise, no accessory muscle use; good AE Bilat; no rales/ wheezing/ rhonchi noted  CV:  S1, S2 present; RRR; no m/g/r  GI:  Abdomen soft, non-tender; (+) active bowel sounds  Extremities:  +2 pulses on all extremities; no edema/ cyanosis/ clubbing noted  Skin:  Warm; no rashes/ lesions noted  Neurologic:  Non-focal    DATA:     Current Facility-Administered Medications   Medication Dose Route Frequency    NOREPINephrine (LEVOPHED) 8 mg in 5% dextrose 250mL (32 mcg/mL) infusion  0.5-50 mcg/min IntraVENous TITRATE    0.9% sodium chloride infusion 250 mL  250 mL IntraVENous PRN    morphine injection 4 mg  4 mg IntraVENous Q15MIN PRN    therapeutic multivitamin (THERAGRAN) tablet 1 Tablet  1 Tablet Oral DAILY    piperacillin-tazobactam (ZOSYN) 4.5 g in 0.9% sodium chloride (MBP/ADV) 100 mL MBP  4.5 g IntraVENous Q6H    polyethylene glycol (MIRALAX) packet 17 g  17 g Per NG tube BID  senna-docusate (PERICOLACE) 8.6-50 mg per tablet 1 Tablet  1 Tablet Oral DAILY    famotidine (PF) (PEPCID) 20 mg in 0.9% sodium chloride 10 mL injection  20 mg IntraVENous Q12H    thiamine HCL (B-1) tablet 200 mg  200 mg Per G Tube DAILY    LORazepam (ATIVAN) injection 2 mg  2 mg IntraVENous Q15MIN PRN    chlorhexidine (PERIDEX) 0.12 % mouthwash 10 mL  10 mL Oral Q12H    fentaNYL (PF) 1,500 mcg/30 mL (50 mcg/mL) infusion  0-200 mcg/hr IntraVENous TITRATE    insulin lispro (HUMALOG) injection   SubCUTAneous Q6H    glucose chewable tablet 16 g  16 g Oral PRN    glucagon (GLUCAGEN) injection 1 mg  1 mg IntraMUSCular PRN    naloxone (NARCAN) injection 0.4 mg  0.4 mg IntraVENous PRN    albuterol-ipratropium (DUO-NEB) 2.5 MG-0.5 MG/3 ML  3 mL Nebulization Q4H RT    Lactobacillus Acidoph & Bulgar (FLORANEX) tablet 2 Tablet  2 Tablet Per G Tube BID    guaiFENesin (ROBITUSSIN) 100 mg/5 mL oral liquid 400 mg  400 mg Per G Tube Q4H PRN    albuterol-ipratropium (DUO-NEB) 2.5 MG-0.5 MG/3 ML  3 mL Nebulization Q4H PRN    aspirin chewable tablet 81 mg  81 mg Per G Tube DAILY    acetaminophen (TYLENOL) tablet 650 mg  650 mg Per G Tube Q6H PRN    Or    acetaminophen (TYLENOL) suppository 650 mg  650 mg Rectal Q6H PRN    enoxaparin (LOVENOX) injection 40 mg  40 mg SubCUTAneous QHS    sodium chloride (NS) flush 5-10 mL  5-10 mL IntraVENous PRN    sodium chloride (NS) flush 5-40 mL  5-40 mL IntraVENous Q8H    sodium chloride (NS) flush 5-40 mL  5-40 mL IntraVENous PRN       Labs: Results:       Chemistry Recent Labs     11/11/21  0255 11/10/21  0405 11/09/21  0630   * 166* 141*    137 135*   K 3.8 4.4 4.7    101 97*   CO2 33* 35* 40*   BUN 28* 23* 22*   CREA 0.63 0.52* 0.46*   CA 8.9 8.4* 8.5   AGAP 3 1* NEG 2   BUCR 44* 44* 48*   ALB  --  1.4*  --       CBC w/Diff Recent Labs     11/11/21  0255 11/10/21  0405 11/09/21  0630   WBC 15.6* 15.3* 7.8   RBC 3.34* 3.35* 2.65*   HGB 8.6* 8.7* 6.9* HCT 30.9* 30.4* 23.8*    366 309   GRANS 91* 94* 86*   LYMPH 2* 1* 3*   EOS 1 1 3      Coagulation No results for input(s): PTP, INR, APTT, INREXT in the last 72 hours. Liver Enzymes Recent Labs     11/10/21  0405   ALB 1.4*      ABG Lab Results   Component Value Date/Time    PHI 7.23 (LL) 11/11/2021 03:16 AM    PCO2I 83.0 (H) 11/11/2021 03:16 AM    PO2I 76 (L) 11/11/2021 03:16 AM    HCO3I 34.6 (H) 11/11/2021 03:16 AM    FIO2I 85 11/11/2021 03:16 AM      Microbiology Recent Labs     11/09/21  1212   CULT NO GROWTH 2 DAYS  NO GROWTH 2 DAYS          Telemetry: []Sinus []A-flutter []Paced    []A-fib []Multiple PVCs                  Imaging:  XR CHEST PORT     Narrative  EXAM: PORTABLE CHEST 0100 hours     CLINICAL HISTORY/INDICATION: intubated , gram-positive bacteremia, chronic  aspiration with dysphagia, chronic bronchiectasis, chronic right upper lobe  cavitary pneumonia, interstitial lung disease     COMPARISON: Chest x-ray 11/6, 11/5, and 11/3/2021, CT chest 11/4/2021.     TECHNIQUE: Single AP view     FINDINGS:     Endotracheal tube terminates 4.7 cm proximal to the jero. The esophagogastric  tube terminates at the proximal stomach. Continued bilateral multifocal infiltrates. Minimal blunting of the costophrenic  angles. No pneumothorax. Right greater than left apical pleural thickening. Slight elevation of the right minor fissure unchanged. Pulmonary vessels are  obscured. The heart is normal in size.     Impression  No significant interval change. Multifocal bilateral confluent and groundglass infiltrates. Chronic increased interstitial markings. Minimal blunting of the costophrenic angles. Cavitary lesion in the right upper lobe with mild volume loss not significantly  changed but much better demonstrated on CT.   Support tubes in expected position.     CT Results (most recent):  Results from Hospital Encounter encounter on 10/18/21     CT CHEST ABD PELV W CONT     Narrative  CT chest, abdomen and pelvis with contrast     CLINICAL HISTORY: evaluate for abdominal abscess at site of recent peg tube,  parapneumonic effusion/empyema. persistent fevers/hypotension despite broad  spectrum abx     COMPARISON: Abdomen pelvis CT 10/25/21 and chest CT 10/18/21.     TECHNIQUE: Helical scan to the chest, abdomen and pelvis are obtained from the  thoracic inlet to the symphysis pubis after IV contrast administration.     All CT scans at this facility performed using dose optimization techniques as  appreciated to a performed exam, to include automated exposure control,  adjustment of the mA and or KU according to patient size (including appropriate  matching for site specific examination), or use of iterative reconstruction  technique.     FINDINGS:     CT CHEST:     Lung Parenchyma: Multiple large areas of consolidations with air bronchogram and  surrounding infiltrations identified bilaterally, moderately interval progressed  since the prior chest CT. There is associated significant volume loss in  bilateral upper lobes. Superimposed compressive atelectasis at posterior  bilateral lower lobes also identified due to pleural effusions.     Thyroid: Atrophic.     Lymph nodes: No adenopathy.     Mediastinum: Borderline size of the heart. No significant pericardial effusion. Moderate coronary artery calcifications.     Vasculature: The aorta and the great vessels are unremarkable. Collapsed  esophagus with NG tube present.     Pleural Space And Chest Wall: There is a moderate to large nonloculated left  pleural effusion developed. Small right pleural effusion is interval progressed  and loculated. The loculated air-fluid collection at the lateral right upper  lung is similar in size but more fluid level seen since the prior study and  measures 2.6 x 6.5 x 6.2 cm.  The air component is communicate with peripheral  bronchial branches, best seen on coronal #30/601.     CT ABDOMEN AND PELVIS:     Liver: Normal.  Gallbladder: Elongated and distended with mild wall thickening and surrounding  pericholecystic fluid. Small layering hyperdense debris/calculi. Biliary System: No ductal dilatation. Spleen: Normal.  Pancreas: Normal.  Bowel: The gastric G-tube is interval removed with NG tube present. The  extensive emphysema in anterior upper/mid abdomen seen on prior study is no  longer evident today. The small and large bowel are nondilated.  Moderate stool  burden in the colon. Scattered diverticula in the colon. No definite colonic  inflammation.     Adrenal Glands: Normal.  Kidneys: Normal.  Lower genitourinary system: The bladder is decompressed with Merlos catheter  present.     Peritoneum/Retroperitoneum: No adenopathy. Vasculature: Advanced atherosclerotic aortic disease. Other: Small pelvic free fluid present. No focal fluid collection or abscess  identified. Moderate edema in pelvic wall and bilateral thigh, unchanged.     CT OSSEOUS STRUCTURES:     Compression deformity at T11 appears unchanged. Absence of the left femoral neck  and lateral portion of left femoral head with superior subluxation of proximal  femur, unchanged.     Impression  1. Significant regression of multiple large consolidations and surrounding  infiltrations in bilateral lungs since the prior CT, indicative of progression  of infectious process.     2. New large left pleural effusion and increased loculated right pleural  effusion. The loculated hydropneumothorax shows more layering fluid component  since the prior CT. The air component communicating with lateral bronchial  branch in right upper lobe.     3. Gallbladder hydrops with small layering hyperdense debris. Mild  pericholecystic fluid. Recommend ultrasound correlation for potential  cholecystitis.     4. Interval removal of G-tube from left anterior abdominal wall with interval  resolved extensive emphysema.  Persistent anasarca extending to the bilateral  thigh     10/18/21     ECHO ADULT COMPLETE 10/26/2021 10/26/2021     Interpretation Summary  · LV: Estimated LVEF is 25 - 30%. Visually measured ejection fraction. Normal cavity size and wall thickness. Severely and segmentally reduced systolic function. Age-appropriate left ventricular diastolic function. · PA: Severe pulmonary hypertension. Pulmonary arterial systolic pressure is 78 mmHg. · IVC: Mildly elevated central venous pressure (8 mmHg); IVC diameter is less than 21 mm and collapses less than 50% with respiration. · Image quality for this study was technically difficult. · Echo study was limited due to patient's condition. · RV: Not well visualized. IMPRESSION:   · Acute hypoxic and hypercarbic respiratory failure: 2/2 PNA (aspiration) superimposed on chronic OSCAR infection and pulm edema on top of bronchiectasis, ILD  · Shock:  Septic + cardiogenic  · Acute pulmonary edema: combination of worsening cardiomyopathy as well as hypoalbuminemia  · RUL PNA w bronchiectasis, RLL nodular consolidation, Cavitary lesions - likely 2/2 recurrent aspiration. resp cultures growing MSSA. At risk for invasive fungal PNA, NTM, given hx severe structural lung disease. 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 ?sepsis, stress, ischemic  · Severe pulmonary hypertension on echo.  Definitely group II and III component   · Urinary retention - coude catheter placed by urology   · normocytic anemia-  stable  · Deconditioning/adult failure to thrive/cachexia: Body mass index is 19.34 kg/m². RECOMMENDATIONS:   Neuro:Titrate sedation for RASS goal 0 to -1, daily sedation holiday.  Cont Fentanyl gtt. Bilateral wrist restraints due to patient attempting self extubation  Pulm: Aspiration precautions, HOB>30'. VAP Bundle, Daily sedation vacation. SBT today, minimal vent settings. Pt is a poor candidate for extubation, already failed once, severely deconditioned. CVS:Monitor HD, MAP goal >65. BP's remain soft, on Levophed,  will keep femoral central line for now   GI: NPO. Diet: Cont TF via OGT. Needs PEG tube replaced for chronic dysphagia. GI consulted and will hold off on PEG insertion until current PEG site infection cleared (currently still with pus and drainage). Re-consult when PEG site appears clear of infection. CT abdomen reveals resolution of abdominal abscess on PEG tube site. Renal: Trend Cr, UOP. Merlos (coude) placed by Urology   Hem/Onc: Trend H/H, monitor for s/o active bleeding. Daily CBC. Status post PRBC transfusion on 11/9  I/D:Trend WBCs and temperature curve. Persistent fevers despite multiple broad spectrum antibiotics/ antifungals concerning for non infectious etiology of fever such as drug fever, DVT.  ABx per ID, broad spectrum (zosyn, levaquin, vancomycin, fluconazole ).  Rcx w/ MSSA, rare yeast. Wound cx with moderate candida albicans. F/u PVL's negative for source of fever   Metabolic: Daily BMP, mag, phos. Trend lytes and replace per protocol. Endocrine:Q6 glucoses. SSI. Avoid hypoglycemia. Musc/Skin: wound care   [facility]: palliative care to touch base with the patients sister. Family will likely make decisions regarding comfort care in the next few days.    Partial Code   · Discussed in interdisciplinary rounds      Best practice :     Glycemic control  IHI ICU bundles:              Central Line Bundle Followed , Merlos Bundle Followed and Vent Bundle Followed, Vent Day 9    [facility] Vent patients- VAP bundle, aim to keep peak plateau pressure 41-28GP H2O  Sress ulcer prophylaxis. DVT prophylaxis. Need for Lines, corcoran assessed. Palliative care evaluation. Raghu Garcia MD PGY1  [address]      Attending attestation:  I saw and evaluated the patient, performing the key elements of the service. I discussed the findings, assessment and plan with the resident and agree with the resident's findings and plan as documented in the resident's note. Total of 30 min critical care time spent at bedside (personally) during the course of care providing evaluation,management and care decisions and ordering appropriate treatment related to critical care problems exclusive of procedures. The reason for providing this level of medical care for this critically ill patient was due a critical illness that impaired one or more vital organ systems such that there was a high probability of imminent or life threatening deterioration in the patients condition. This care involved high complexity decision making to assess, manipulate, and support vital system functions, to treat this degree vital organ system failure and to prevent further life threatening deterioration of the patients condition. This time was independent of other practitioners.     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 in [place] with shortness of breath and poor p.o. intake, patient was found to have worsening consolidation of the chest consistent with aspiration pneumonia.  Patient was hospitalized to the floor for multiple days and developed respiratory failure, and I transferred the patient to the ICU after being emergently intubated for acute worsening hypoxic respiratory failure.  Etiology was multifactorial, combination of worsening infection as well as pulmonary edema from hypoalbuminemia/adult failure to thrive.  Patient has been intubated and unfortunately has worsening hypoxia, worsening bilateral infiltrates despite receiving broad-spectrum antibiotics.  Patient now positive for OSCAR, discussed with ID. Therapeutic bronchoscopy performed due to mucous plugging, BALs performed in bilateral bases -- also AFB+. Patient's oxygenation unfortunately did not improve, plateau pressure increased to 37 cm H2O, likely secondary to ARDS. I had a long conversation with the patient's sister who is the caretaker and Pat Lennox, and her , on  and 11/10 we discussed the patient's clinical course and they were in agreement that patient would not want tracheostomy and they now understand that the patient will not medically improve. They are in agreement that the patient would not benefit from PEG tube and they would proceed with compassionate extubation after she has discussed this with the patient's brothers. She notes that she is making  arrangements noted that the patient wanted to be buried in [place].   We will continue supportive care.  Discussed with  services     Mikael Ansari MD/MPH     Pulmonary, [id number] [facility]

## 2021-11-12 PROBLEM — R62.7 FAILURE TO THRIVE IN ADULT: Status: ACTIVE | Noted: 2021-01-01

## 2021-11-12 PROBLEM — J80 ARDS (ADULT RESPIRATORY DISTRESS SYNDROME) (HCC): Status: ACTIVE | Noted: 2021-01-01

## 2021-11-12 PROBLEM — J47.9 BRONCHIECTASIS (HCC): Status: ACTIVE | Noted: 2021-01-01

## 2021-11-12 NOTE — PROGRESS NOTES
Problem: Patient Education: Go to Patient Education Activity  Goal: Patient/Family Education  Outcome: Progressing Towards Goal     Problem: Falls - Risk of  Goal: *Absence of Falls  Description: Document Nidia Led Fall Risk and appropriate interventions in the flowsheet.   Outcome: Progressing Towards Goal  Note: Fall Risk Interventions:  Mobility Interventions: Assess mobility with egress test, Bed/chair exit alarm, Communicate number of staff needed for ambulation/transfer, Patient to call before getting OOB, PT Consult for mobility concerns    Mentation Interventions: Adequate sleep, hydration, pain control, Bed/chair exit alarm, Door open when patient unattended, Evaluate medications/consider consulting pharmacy, Increase mobility, More frequent rounding, Reorient patient, Room close to nurse's station, Toileting rounds, Update white board    Medication Interventions: Bed/chair exit alarm, Evaluate medications/consider consulting pharmacy    Elimination Interventions: Bed/chair exit alarm, Call light in reach, Patient to call for help with toileting needs, Stay With Me (per policy), Toileting schedule/hourly rounds              Problem: Patient Education: Go to Patient Education Activity  Goal: Patient/Family Education  Outcome: Progressing Towards Goal     Problem: Discharge Planning  Goal: *Discharge to safe environment  Outcome: Progressing Towards Goal  Goal: *Knowledge of medication management  Outcome: Progressing Towards Goal  Goal: *Knowledge of discharge instructions  Outcome: Progressing Towards Goal     Problem: Patient Education: Go to Patient Education Activity  Goal: Patient/Family Education  Outcome: Progressing Towards Goal     Problem: Patient Education: Go to Patient Education Activity  Goal: Patient/Family Education  Outcome: Progressing Towards Goal     Problem: Patient Education: Go to Patient Education Activity  Goal: Patient/Family Education  Outcome: Progressing Towards Goal     Problem: Patient Education: Go to Patient Education Activity  Goal: Patient/Family Education  Outcome: Progressing Towards Goal     Problem: Ventilator Management  Goal: *Adequate oxygenation and ventilation  Outcome: Progressing Towards Goal  Goal: *Patient maintains clear airway/free of aspiration  Outcome: Progressing Towards Goal  Goal: *Absence of infection signs and symptoms  Outcome: Progressing Towards Goal  Goal: *Normal spontaneous ventilation  Outcome: Progressing Towards Goal     Problem: Patient Education: Go to Patient Education Activity  Goal: Patient/Family Education  Outcome: Progressing Towards Goal     Problem: Non-Violent Restraints  Goal: Removal from restraints as soon as assessed to be safe  Outcome: Progressing Towards Goal  Goal: No harm/injury to patient while restraints in use  Outcome: Progressing Towards Goal  Goal: Patient's dignity will be maintained  Outcome: Progressing Towards Goal  Goal: Patient Interventions  Outcome: Progressing Towards Goal     Problem: Pain  Goal: *Control of Pain  Outcome: Progressing Towards Goal  Goal: *PALLIATIVE CARE:  Alleviation of Pain  Outcome: Progressing Towards Goal     Problem: Patient Education: Go to Patient Education Activity  Goal: Patient/Family Education  Outcome: Progressing Towards Goal     Problem: Injury - Risk of, Adverse Drug Event  Goal: *Absence of adverse drug events  Outcome: Progressing Towards Goal  Goal: *Absence of medication errors  Outcome: Progressing Towards Goal  Goal: *Knowledge of prescribed medications  Outcome: Progressing Towards Goal     Problem: Patient Education: Go to Patient Education Activity  Goal: Patient/Family Education  Outcome: Progressing Towards Goal

## 2021-11-12 NOTE — PROGRESS NOTES
Nutrition Note    Nursing referral received for pressure injury- wound care following & current EN regimen adequate to meet protein needs. Continues to tolerate tube feeding at goal with loose stool today on bowel regimen (pericolace, miralax BID). Receiving MVI & thiamine. Noted family likely to make decisions regarding comfort care in the next few days per NP note. Nutrition Recommendations/Plan:   - Continue tube feeding of Jevity 1.5 at goal rate of 50 mL/hr with daily multivitamin and  50 mL q 4 hour water flushes via OGT. (goal regimen to provide 1800 kcal, 77 gm protein, 912 mL free water, 100% RDIs).     Electronically signed by Hanny Leal RD on 11/12/2021 at 8:52 AM    Contact: [phone]

## 2021-11-12 NOTE — PROGRESS NOTES
Death Pronouncement Note    Patient lying in bed, mouth open, eyes closed. Pupils fixed and equal bilaterally. No response to verbal or painful stimuli. No heart or lung sounds auscultated. No carotid or peripheral pulses. Death officially pronounced at 17:16, 11/12/2021    Medical Examiner notified: No, does not meet criteria    Family Notified: Pricila Scott, NP - C  11/12/2021  Pulmonary, Critical Care Medicine  [facility]

## 2021-11-12 NOTE — PALLIATIVE CARE
[address] [facility] [phone]  [facility] [phone]    Patient's case discussed in Palliative Care rounds. Per documentation pt's family is considering compassionate extubation today, 11/12/2021. LMSW consulted with pt's RN, who asked what time family is coming in. LMSW attempted to contact pt's sister, Lalitha Mendez at [phone], no answer. Thank you for this referral to Palliative Care. The palliative care team remains available to provide support for patient and his family. ICU has been notified to reach out to Palliative when family arrives.       Rebecca Barry, [facility] [facility]  Palliative Medicine Inpatient   DR. [facility]  Palliative COPE Line: [phone])

## 2021-11-12 NOTE — PROGRESS NOTES
Patient remains on ventilation support. Offered prayer by the bedside. No family members present. Patient is not available to be assessed at this time.       Tesfaye Wright 5 [phone]

## 2021-11-12 NOTE — PROGRESS NOTES
Problem: Patient Education: Go to Patient Education Activity  Goal: Patient/Family Education  Outcome: Progressing Towards Goal     Problem: Falls - Risk of  Goal: *Absence of Falls  Description: Document Gene Hodgson Fall Risk and appropriate interventions in the flowsheet.   Outcome: Progressing Towards Goal  Note: Fall Risk Interventions:  Mobility Interventions: Assess mobility with egress test, Bed/chair exit alarm, Communicate number of staff needed for ambulation/transfer, Patient to call before getting OOB, PT Consult for mobility concerns    Mentation Interventions: Adequate sleep, hydration, pain control, Bed/chair exit alarm, Door open when patient unattended, Evaluate medications/consider consulting pharmacy, Increase mobility, More frequent rounding, Reorient patient, Room close to nurse's station, Toileting rounds, Update white board    Medication Interventions: Bed/chair exit alarm, Evaluate medications/consider consulting pharmacy    Elimination Interventions: Bed/chair exit alarm, Call light in reach, Patient to call for help with toileting needs, Stay With Me (per policy), Toileting schedule/hourly rounds              Problem: Patient Education: Go to Patient Education Activity  Goal: Patient/Family Education  Outcome: Progressing Towards Goal     Problem: Discharge Planning  Goal: *Discharge to safe environment  Outcome: Progressing Towards Goal  Goal: *Knowledge of medication management  Outcome: Progressing Towards Goal  Goal: *Knowledge of discharge instructions  Outcome: Progressing Towards Goal     Problem: Patient Education: Go to Patient Education Activity  Goal: Patient/Family Education  Outcome: Progressing Towards Goal     Problem: Patient Education: Go to Patient Education Activity  Goal: Patient/Family Education  Outcome: Progressing Towards Goal     Problem: Patient Education: Go to Patient Education Activity  Goal: Patient/Family Education  Outcome: Progressing Towards Goal     Problem: Patient Education: Go to Patient Education Activity  Goal: Patient/Family Education  Outcome: Progressing Towards Goal     Problem: Ventilator Management  Goal: *Adequate oxygenation and ventilation  Outcome: Progressing Towards Goal  Goal: *Patient maintains clear airway/free of aspiration  Outcome: Progressing Towards Goal  Goal: *Absence of infection signs and symptoms  Outcome: Progressing Towards Goal  Goal: *Normal spontaneous ventilation  Outcome: Progressing Towards Goal     Problem: Patient Education: Go to Patient Education Activity  Goal: Patient/Family Education  Outcome: Progressing Towards Goal     Problem: Pain  Goal: *Control of Pain  Outcome: Progressing Towards Goal  Goal: *PALLIATIVE CARE:  Alleviation of Pain  Outcome: Progressing Towards Goal     Problem: Patient Education: Go to Patient Education Activity  Goal: Patient/Family Education  Outcome: Progressing Towards Goal     Problem: Injury - Risk of, Adverse Drug Event  Goal: *Absence of adverse drug events  Outcome: Progressing Towards Goal  Goal: *Absence of medication errors  Outcome: Progressing Towards Goal  Goal: *Knowledge of prescribed medications  Outcome: Progressing Towards Goal     Problem: Patient Education: Go to Patient Education Activity  Goal: Patient/Family Education  Outcome: Progressing Towards Goal

## 2021-11-12 NOTE — ROUTINE PROCESS
Pt transitioned to comfort care per orders. Pt extubated at approx. 1650. Sister and brother in law at bedside. Pt given prn meds @1700 (see emar). TOD 1716 per Arash Jesus. NPNATALIE. [employer] notified, pt declined. Pt's sister took Pt's walker, cell phone and cell phone  w/. No other belongings noted w/ pt.

## 2021-11-12 NOTE — ROUTINE PROCESS
9875 Attempting to remove restraints at this time. Left arm dced at this time, closely monitoring because Pt is high risk for extubation. 2930 Ativan given to provide for safety as Pt transitions off restraints, see mar and flow sheet. 0720 Bedside shift change report given to Britton Peng RN (oncoming nurse) by Flavia Mcneil (offgoing nurse). Report included the following information SBAR, Intake/Output, MAR, Recent Results and Cardiac Rhythm Sinus tach.

## 2021-11-12 NOTE — ACP (ADVANCE CARE PLANNING)
201 [facility] [phone]  [facility] [phone]    [facility] (Physician Orders for Scope of Treatment)       Date of conversation:  11/12/2021 Location:  [facility]   Length (minutes): 20    Participants:   [] Patient    [x] Healthcare agent (already designated in existing ACP document)    Name: Remigio Wade    Relationship to Patient: Sister/MPoA    Phone number: [phone]    [employer]® ACP Facilitator: Angelina Medina LMSW      Conversation Topics   (If Patient does not have decision making capacity, Agent/Surrogate responds based on understanding of how patient would respond if capable)    Understanding of Medical Condition/s AND Potential Complications:    Patient response: Unable to participate due to medical condition. Healthcare Agent/Other Surrogate: Rekha verbalized understanding of the seriousness of pt's condition and the complications there of, that can and are causing him to suffer. Lesson [place] from Experiences Related to Serious Illness: It is not in pt's best interest to continue care that will not bring him back to health. Identifies the following as important for living well: Lack of suffering. Hopes: Will pass peacefully without further suffering. Worries/Fears about Medical Condition: None stated     Sources of support/comfort described as: Family    Cultural, Adventist, spiritual, or personal beliefs described as: Yarsanism     Needs to discuss with spiritual/Adventist advisor: [] Yes  [x] No    Needs more information about illness and complications:  [] Yes  [x] No      Cardiopulmonary Resuscitation      \"What do you understand about CPR? \" Response: CPR is very painful procedure and would not bring pt back to health.      Order Elected for CPR:  []  Attempt Resuscitation [x]  Do Not Attempt Resuscitation      When NOT in Cardiopulmonary Arrest, Order Elected:      [x] Comfort Measures  [] Limited Additional Interventions  [] Full Interventions    Artificially Administered Nutrition, Order Elected:    [x] No Feeding Tube   [] Feeding Tube for a defined trial period  [] Feeding Tube long-term if indicated     Meeting Outcomes:   [x] ACP discussion completed   [x] [place] form completed  [x] [place] prepared for Provider review and signature   [x] Original placed on Chart, if in facility (form to be sent with patient at discharge)  [x] Copy given to healthcare agent    [x] Copy scanned to electronic medical record    Palliative Care notified that pt's sister is at bedside. Elana Howard NP and this LMSW met with pt's sister at bedside. Pt remains intubated and sedated, and was unable to participate in discussion regarding goals of care. Elana Howard NP and LMSW offered supportive counseling and listened to Rekha's decision regarding extubating pt. She reports she does not want to see pt suffer any longer, and that Dr. Patrica Garcia explained in great detail that pt's condition is not going to improve sufficiently to sustain life. She reports she has spoken with pt's other siblings and they have agreed it is appropriate to discontinue aggressive treatment at this time. NP explained process of transitioning to comfort measures and extubation. Pt's sister verbalized understanding. She reports she is comfortable with process, and is comfortable with moving forward. Palliative care team verbalized sympathy and offered further support if needed. Thank you for this referral to Palliative Care. The palliative care team remains available to provide support for pt and his family. Goals of care have been addressed.       CODE STATUS:  DNR/COMFORT MEASURES ONLY      Lizz Clark, 6[facility]  Palliative Medicine Inpatient   [facility]   Palliative COPE Line: [phone])

## 2021-11-12 NOTE — PROGRESS NOTES
visited with the family of Darius Hardy, who is a 67 y. o.,male. The  provided the following Interventions:  Initiated a relationship of care and support to patient's sister, Indy who shared her innermost acceptance that her brother Román Levine had reached his end and that all his other three brothers finally came to terms with it as well. It is sad but some won't be able to make it to the  because of the pandemic situation. Offered Commendation prayer for the dying. Compassionate extubation will follow this afternoon   Offered assurance of continued prayers on patient's behalf. Foster  home arrangement were done by the said family. Plan:  Chaplains will continue to follow and will provide pastoral care on an as needed/requested basis.  recommends bedside caregivers page  on duty if patient shows signs of acute spiritual or emotional distress.     Tesfaye Choi 5   [phone]

## 2021-11-12 NOTE — INTERDISCIPLINARY ROUNDS
3 [facility]  Pulmonary, Critical Care, and Sleep Medicine  Interdisciplinary and Ventilator Weaning Rounds     Patient discussed in morning walking rounds and interdisciplinary rounds.     ICU day: 10/27/21      Overnight events:   · No acute events  ·      Assessments and best practice:  · Ventilator  ? Ventilator day: 10/28/21   ? Vent settings: FiO2 of 85 and PEEP of 10  ? VAP bundle, aim to keep peak plateau pressure [id number] H2O  ? Weaning assessed and documented   § Patient does meet criteria for SBT. § Patient will be placed sedation holiday. § Plan to wean with PS n/a.  § Outcome: Pending GOC discussion   § Final plan: Pending, not a candidate for extubation  · Sedation  ? Fentanyl   · Other pertinent drips  ? Levophed  · Lines noted  ? Femoral CVL ,  OGT, Corcoran cath, PIV   · Critical labs assessed  ? Yes  · Antibiotics  ? Pip/Tazo  · Medications reviewed  ? Yes  · Pending imaging  ? none  · Pending send out labs  ? No  · Pending Procedures  ? PEG, pending clearing of current infection surrounding old PEG site   · Glycemic control  ? SSI   · Stress ulcer prophylaxis. ? Pepcid  · DVT prophylaxis. ? Lovenox  · Need for Lines, corcoran assessed. ? Yes  · Restraint Reevaluation   ? Yes  ? I have reevaluated the patient one hour after initiation of intervention. The patient is comfortable, uninjured, but continues to pose an imminent risk of injury to self to themselves and/or serious disruption of medical treatment required to keep patient stable. The patient's current medical and behavioral conditions that warrant the use intervention include danger to self and Interference with medical equipment or treatment.  Restraint or seclusion will be discontinued at the earliest possible time, regardless of the scheduled expiration of the order.  Based on my evaluation, restraints will be continued: YES 11/11/21 7:52 AM  ? Attending Overseeing restraints: Dr. Cara Saleem     Family contact/MPOA: Indy Wade (sister) [phone]   Family updated will update today         Daily Plans:  · Family likely to make decisions today, palliative care to touch base with sister.       CONNOR time 20 minutes           Asmita Duong NP    Pulmonary, [address] [facility]

## 2021-11-12 NOTE — DISCHARGE SUMMARY
Discharge / Death Summary    Patient: Ayush Whipple               Sex: male          DOA: 10/18/2021         YOB: 1945      Age:  67 y.o.        LOS:  LOS: 25 days                Admit Date: 10/18/2021    Discharge Date: 11/12/2021    Admission Diagnoses: Pneumonia [J18.9]    Discharge Diagnoses:  Respiratory failure, pneumonia, ILD, bronchiectasis, CHF, failure to thrive, pleural effucsion, aspiration, septic shock, pulmonary hypertension  Problem List as of 11/12/2021 Date Reviewed: 12/22/2020          Codes Class Noted - Resolved    Gastrostomy complication ([facility] CYC-09-KU: K94.20  ICD-9-CM: 536.40  10/27/2021 - Present        Severe protein-calorie malnutrition ([facility] ICD-10-CM: E43  ICD-9-CM: 262  10/20/2021 - Present        Goals of care, counseling/discussion ICD-10-CM: Z71.89  ICD-9-CM: V65.49  Unknown - Present        Acute respiratory failure with hypoxia ([facility] ICD-10-CM: J96.01  ICD-9-CM: 518.81  Unknown - Present        Debility ICD-10-CM: R53.81  ICD-9-CM: 799.3  Unknown - Present        Pneumonia ICD-10-CM: J18.9  ICD-9-CM: 874  10/18/2021 - Present        Urinary frequency ICD-10-CM: R35.0  ICD-9-CM: 788.41  6/30/2016 - Present        Scarlet fever ICD-10-CM: A38.9  ICD-9-CM: 034.1  Unknown - Present        Myocardial infarction ([facility] ICD-10-CM: I21.9  ICD-9-CM: 410.90  Unknown - Present        Coronary artery disease involving native coronary artery of native heart without angina pectoris,s/p stent 2003 ICD-10-CM: I25.10  ICD-9-CM: 414.01  5/19/2016 - Present                  Hospital Course:    58-year-old male with past medical history of chronic hypoxic respiratory failure, interstitial lung disease with bronchiectasis, 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 on 10/18/2021 with shortness of breath and poor p.o. intake.    CR and CT chest showed  worsening consolidation of the chest consistent with aspiration pneumonia.  Patient was admitted  to the floor for multiple days and developed respiratory failure requiring emergent intubation. He was transferred to the ICU.   Etiology was multifactorial including  worsening infection and pulmonary edema from hypoalbuminemia/adult failure to thrive.   Patient's hospital course was complicated by worsening hypoxia and bilateral infiltrates despite receiving broad-spectrum antibiotics.  Patient's sputum cultures were  positive for MAC/ MSSA and ID was consulted. Therapeutic bronchoscopy was also  performed due to mucous plugging, BALs performed in bilateral bases -- also AFB+.  Patient's oxygenation unfortunately did not improve, likely secondary to ARDS which prevented successful extubation. Palliative care was consulted and after multiple discussions with family it was decided that the patient would not want a tracheostomy. The patient's condition continued to decline despite all interventions. The family decided to proceed with comfort measures only and he was compassionately extubated on 11/12/2021. Patient's time of death was 17:16 on 11/12/2021. Family and  was at bedside.        Consults: Hospitalist, Infectious Disease and Pulmonary/Critical Care    Significant Diagnostic Studies: labs, radiology    Emilie Light, NP - C  11/12/2021  Pulmonary, Critical Care Medicine  [address] [place] [address] [facility]           I have discussed the expiration/discharge management with the [facility] and we have worked collaboratively to manage the patient's expiration/discharge.   I have examined the patient independently and I agree with the Discharge Summary by the [facility]     Total time spent including time spent on final examination and discharge discussion, discharge documentation and records reviewed and medication reconciliation: > 30 minutes    Cause of death: Aspiration pneumonia  Secondary causes: Mycobacterium avium intracellulare infection, cardiogenic and septic shock, pulmonary edema  Contributors: Cavitary lung disease, severe protein calorie malnutrition, bronchiectasis with interstitial lung disease, chronic dysphagia with recurrent aspiration, congestive heart failure, severe pulmonary hypertension, deconditioning/debility/cachexia    No tobacco contribution    Michael Mchugh MD/MPH     Pulmonary, [address] [facility]

## 2021-11-12 NOTE — PROGRESS NOTES
[facility]  ICU Progress Note      Name: Guido Magana   : 1945   MRN: [id number]   Date: 2021 12:31 PM     []I have reviewed the flowsheet and previous days notes. Events overnight reviewed and discussed with nursing staff. Vital signs and records reviewed. Interval HPI:  Patient is a 74 y. o. male with PMHx of chronic respiratory failure, ILD, bronchiectasis who presents with failure to thrive. He presented with dyspnea and poor PO intake. He was placed on some O2 and CT chest showed some worsening consolidations in the presence of the chronic lung disease. He follows with us in clinic and was instructed to use hypertonic saline, acapella and mucinex, unsure of compliance. He was admitted to the floor, but RRT was called 10/27/21 for encephalopathy and acute hypercapnic respiratory failure. Anesthesia paged and he was intubated and transferred to ICU. Noted to have PNA and pulm edema on top of bronchiectasis and ILD, also a component of cardiogenic ( EF 25%) and septic shock. Multiple Cavitary lesions - likely 2/2 recurrent aspiration. Respiratory cx + MSSA, light candida albicans. PEG tube placed this admission on 10/21 for dysphagia, but inadvertently dislodged, thus will need to be replaced. Patient self-extubated 10/27/21 and was emergently re-intubated due to unresponsiveness to voice and noxious stimuli.  Patient has inability to cough, thus would benefit from trach, but patient does not wish to be trached. Made partial code (21). Ideally, comfort care vs. Trach. Patient has tried to self extubate himself a couple days ago and was placed in bilateral wrist restraints. Palliative and  teams will meet with the family to discuss goals of care.     Subjective:  21:   Patient was seen at bedside this morning and was sedated upon encounter. ROS:Review of systems not obtained due to patient factors.     Vital Signs:    Visit Vitals  BP (!) 142/74   Pulse (!) 122   Temp 99.5 °F (37.5 °C)   Resp 23   Ht 5' 7\" (1.702 m)   Wt 62.8 kg (138 lb 7.2 oz)   SpO2 92%   BMI 21.68 kg/m²       O2 Device: Endotracheal tube, Ventilator   O2 Flow Rate (L/min): 6 l/min   Temp (24hrs), Av.8 °F (37.7 °C), Min:98.2 °F (36.8 °C), Max:100.6 °F (38.1 °C)       Intake/Output:   Last shift:       07 - 1900  In: 126.5 [I.V.:26.5]  Out: -   Last 3 shifts: 11/10 1901 -  07  In: 4812.4 [I.V.:1782.4]  Out: 7145 [Urine:1645]    Intake/Output Summary (Last 24 hours) at 2021 1231  Last data filed at 2021 0800  Gross per 24 hour   Intake 4088.91 ml   Output 1595 ml   Net 2493.91 ml       Ventilator Settings:  Mode Rate Tidal Volume Pressure FiO2 PEEP   Pressure control   300 ml  10 cm H2O 90 % 10 cm H20     Peak airway pressure: 38 cm H2O    Minute ventilation: 6.99 l/min      Physical Exam:     General: Intubated/sedated; on mechanical ventilation  HEENT:  Anicteric sclerae; pink palpebral conjunctivae; mucosa moist  Resp:  Symmetrical chest rise, no accessory muscle use; good AE Bilat; no rales/ wheezing/ rhonchi noted  CV:  S1, S2 present; RRR; no m/g/r  GI:  Abdomen soft, non-tender; (+) active bowel sounds  Extremities:  +2 pulses on all extremities; no edema/ cyanosis/ clubbing noted  Skin:  Warm; no rashes/ lesions noted  Neurologic:  Non-focal    DATA:     Current Facility-Administered Medications   Medication Dose Route Frequency    NOREPINephrine (LEVOPHED) 8 mg in 5% dextrose 250mL (32 mcg/mL) infusion  0.5-50 mcg/min IntraVENous TITRATE    0.9% sodium chloride infusion 250 mL  250 mL IntraVENous PRN    morphine injection 4 mg  4 mg IntraVENous Q15MIN PRN    therapeutic multivitamin (THERAGRAN) tablet 1 Tablet  1 Tablet Oral DAILY    piperacillin-tazobactam (ZOSYN) 4.5 g in 0.9% sodium chloride (MBP/ADV) 100 mL MBP  4.5 g IntraVENous Q6H    polyethylene glycol (MIRALAX) packet 17 g  17 g Per NG tube BID    senna-docusate (PERICOLACE) 8.6-50 mg per tablet 1 Tablet  1 Tablet Oral DAILY    famotidine (PF) (PEPCID) 20 mg in 0.9% sodium chloride 10 mL injection  20 mg IntraVENous Q12H    thiamine HCL (B-1) tablet 200 mg  200 mg Per G Tube DAILY    LORazepam (ATIVAN) injection 2 mg  2 mg IntraVENous Q15MIN PRN    chlorhexidine (PERIDEX) 0.12 % mouthwash 10 mL  10 mL Oral Q12H    fentaNYL (PF) 1,500 mcg/30 mL (50 mcg/mL) infusion  0-200 mcg/hr IntraVENous TITRATE    insulin lispro (HUMALOG) injection   SubCUTAneous Q6H    glucose chewable tablet 16 g  16 g Oral PRN    glucagon (GLUCAGEN) injection 1 mg  1 mg IntraMUSCular PRN    naloxone (NARCAN) injection 0.4 mg  0.4 mg IntraVENous PRN    albuterol-ipratropium (DUO-NEB) 2.5 MG-0.5 MG/3 ML  3 mL Nebulization Q4H RT    Lactobacillus Acidoph & Bulgar (FLORANEX) tablet 2 Tablet  2 Tablet Per G Tube BID    guaiFENesin (ROBITUSSIN) 100 mg/5 mL oral liquid 400 mg  400 mg Per G Tube Q4H PRN    albuterol-ipratropium (DUO-NEB) 2.5 MG-0.5 MG/3 ML  3 mL Nebulization Q4H PRN    aspirin chewable tablet 81 mg  81 mg Per G Tube DAILY    acetaminophen (TYLENOL) tablet 650 mg  650 mg Per G Tube Q6H PRN    Or    acetaminophen (TYLENOL) suppository 650 mg  650 mg Rectal Q6H PRN    enoxaparin (LOVENOX) injection 40 mg  40 mg SubCUTAneous QHS    sodium chloride (NS) flush 5-10 mL  5-10 mL IntraVENous PRN    sodium chloride (NS) flush 5-40 mL  5-40 mL IntraVENous Q8H    sodium chloride (NS) flush 5-40 mL  5-40 mL IntraVENous PRN         Labs: Results:       Chemistry Recent Labs     11/12/21  0330 11/11/21  0255 11/10/21  0405   * 167* 166*    137 137   K 4.0 3.8 4.4    101 101   CO2 37* 33* 35*   BUN 33* 28* 23*   CREA 0.54* 0.63 0.52*   CA 9.1 8.9 8.4*   AGAP 0* 3 1*   BUCR 61* 44* 44*   ALB  --   --  1.4*      CBC w/Diff Recent Labs     11/12/21  0330 11/11/21  0255 11/10/21  0405   WBC 18.5* 15.6* 15.3*   RBC 3.42* 3.34* 3.35*   HGB 9.0* 8.6* 8.7*   HCT 31.4* 30.9* 30.4*    404 366   GRANS 95* 91* 94*   LYMPH 3* 2* 1*   EOS 0 1 1      Coagulation No results for input(s): PTP, INR, APTT, INREXT in the last 72 hours. Liver Enzymes Recent Labs     11/10/21  0405   ALB 1.4*      ABG Lab Results   Component Value Date/Time    PHI 7.26 (L) 11/12/2021 04:57 AM    PCO2I 80.0 (H) 11/12/2021 04:57 AM    PO2I 54 (L) 11/12/2021 04:57 AM    HCO3I 36.0 (H) 11/12/2021 04:57 AM    FIO2I 90 11/12/2021 04:57 AM      Microbiology No results for input(s): CULT in the last 72 hours. Telemetry: []Sinus []A-flutter []Paced    []A-fib []Multiple PVCs                  Imaging:    Imaging:    EXAMINATION: Chest single view     INDICATION: Intubation     COMPARISON: 1 day prior     FINDINGS: Single frontal view the chest obtained. ETT tip 4 cm above jero. Esophagogastric tube tip below the GE junction outside field-of-view. Esophageal  probe tip at mid esophagus level.     Borderline prominent cardiac silhouette. Extensive patchy consolidations  throughout the lungs including cavitation in the right upper lobe laterally. Possible bilateral layering pleural effusions.     IMPRESSION     Lines/tubes as above. Extensive bilateral pulmonary opacities largely similar to  prior, perhaps slightly improved in the left upper lung. Right upper lobe  cavitary lesion similar to prior. Possible small pleural effusions. Continued  imaging follow-up recommended. XR CHEST PORT     Narrative  EXAM: PORTABLE CHEST 0100 hours     CLINICAL HISTORY/INDICATION: intubated , gram-positive bacteremia, chronic  aspiration with dysphagia, chronic bronchiectasis, chronic right upper lobe  cavitary pneumonia, interstitial lung disease     COMPARISON: Chest x-ray 11/6, 11/5, and 11/3/2021, CT chest 11/4/2021.     TECHNIQUE: Single AP view     FINDINGS:     Endotracheal tube terminates 4.7 cm proximal to the jero. The esophagogastric  tube terminates at the proximal stomach.   Continued bilateral multifocal infiltrates. Minimal blunting of the costophrenic  angles. No pneumothorax. Right greater than left apical pleural thickening. Slight elevation of the right minor fissure unchanged. Pulmonary vessels are  obscured. The heart is normal in size.     Impression  No significant interval change. Multifocal bilateral confluent and groundglass infiltrates. Chronic increased interstitial markings. Minimal blunting of the costophrenic angles. Cavitary lesion in the right upper lobe with mild volume loss not significantly  changed but much better demonstrated on CT. Support tubes in expected position.     CT Results (most recent):  Results from Hospital Encounter encounter on 10/18/21     CT CHEST ABD PELV W CONT     Narrative  CT chest, abdomen and pelvis with contrast     CLINICAL HISTORY: evaluate for abdominal abscess at site of recent peg tube,  parapneumonic effusion/empyema. persistent fevers/hypotension despite broad  spectrum abx     COMPARISON: Abdomen pelvis CT 10/25/21 and chest CT 10/18/21.     TECHNIQUE: Helical scan to the chest, abdomen and pelvis are obtained from the  thoracic inlet to the symphysis pubis after IV contrast administration.     All CT scans at this facility performed using dose optimization techniques as  appreciated to a performed exam, to include automated exposure control,  adjustment of the mA and or KU according to patient size (including appropriate  matching for site specific examination), or use of iterative reconstruction  technique.     FINDINGS:     CT CHEST:     Lung Parenchyma: Multiple large areas of consolidations with air bronchogram and  surrounding infiltrations identified bilaterally, moderately interval progressed  since the prior chest CT. There is associated significant volume loss in  bilateral upper lobes.  Superimposed compressive atelectasis at posterior  bilateral lower lobes also identified due to pleural effusions.     Thyroid: Atrophic.     Lymph nodes: No adenopathy.     Mediastinum: Borderline size of the heart. No significant pericardial effusion. Moderate coronary artery calcifications.     Vasculature: The aorta and the great vessels are unremarkable. Collapsed  esophagus with NG tube present.     Pleural Space And Chest Wall: There is a moderate to large nonloculated left  pleural effusion developed. Small right pleural effusion is interval progressed  and loculated. The loculated air-fluid collection at the lateral right upper  lung is similar in size but more fluid level seen since the prior study and  measures 2.6 x 6.5 x 6.2 cm. The air component is communicate with peripheral  bronchial branches, best seen on coronal #30/601.     CT ABDOMEN AND PELVIS:     Liver: Normal.  Gallbladder: Elongated and distended with mild wall thickening and surrounding  pericholecystic fluid. Small layering hyperdense debris/calculi. Biliary System: No ductal dilatation. Spleen: Normal.  Pancreas: Normal.  Bowel: The gastric G-tube is interval removed with NG tube present. The  extensive emphysema in anterior upper/mid abdomen seen on prior study is no  longer evident today. The small and large bowel are nondilated.  Moderate stool  burden in the colon. Scattered diverticula in the colon. No definite colonic  inflammation.     Adrenal Glands: Normal.  Kidneys: Normal.  Lower genitourinary system: The bladder is decompressed with Merlos catheter  present.     Peritoneum/Retroperitoneum: No adenopathy. Vasculature: Advanced atherosclerotic aortic disease. Other: Small pelvic free fluid present. No focal fluid collection or abscess  identified. Moderate edema in pelvic wall and bilateral thigh, unchanged.     CT OSSEOUS STRUCTURES:     Compression deformity at T11 appears unchanged. Absence of the left femoral neck  and lateral portion of left femoral head with superior subluxation of proximal  femur, unchanged.     Impression  1.  Significant regression of multiple large consolidations and surrounding  infiltrations in bilateral lungs since the prior CT, indicative of progression  of infectious process.     2. New large left pleural effusion and increased loculated right pleural  effusion. The loculated hydropneumothorax shows more layering fluid component  since the prior CT. The air component communicating with lateral bronchial  branch in right upper lobe.     3. Gallbladder hydrops with small layering hyperdense debris. Mild  pericholecystic fluid. Recommend ultrasound correlation for potential  cholecystitis.     4. Interval removal of G-tube from left anterior abdominal wall with interval  resolved extensive emphysema. Persistent anasarca extending to the bilateral  thigh     10/18/21     ECHO ADULT COMPLETE 10/26/2021 10/26/2021     Interpretation Summary  · LV: Estimated LVEF is 25 - 30%. Visually measured ejection fraction. Normal cavity size and wall thickness. Severely and segmentally reduced systolic function. Age-appropriate left ventricular diastolic function. · PA: Severe pulmonary hypertension. Pulmonary arterial systolic pressure is 78 mmHg. · IVC: Mildly elevated central venous pressure (8 mmHg); IVC diameter is less than 21 mm and collapses less than 50% with respiration. · Image quality for this study was technically difficult. · Echo study was limited due to patient's condition. · RV: Not well visualized. IMPRESSION:   · Acute hypoxic and hypercarbic respiratory failure: 2/2 PNA (aspiration) superimposed on chronic OSCAR infection and pulm edema on top of bronchiectasis, ILD  · Shock:  Septic + cardiogenic  · Acute pulmonary edema: combination of worsening cardiomyopathy as well as hypoalbuminemia  · RUL PNA w bronchiectasis, RLL nodular consolidation, Cavitary lesions - likely 2/2 recurrent aspiration. resp cultures growing MSSA.  At risk for invasive fungal PNA, NTM, given hx severe structural lung disease.  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 ?sepsis, stress, ischemic  · Severe pulmonary hypertension on echo. Definitely group II and III component   · Urinary retention - coude catheter placed by urology   · normocytic anemia-  stable  · Deconditioning/adult failure to thrive/cachexia: Body mass index is 19.34 kg/m². RECOMMENDATIONS:   Neuro:Titrate sedation for RASS goal 0 to -1, daily sedation holiday.  Cont Fentanyl gtt. Bilateral wrist restraints due to patient attempting self extubation  Pulm: Aspiration precautions, HOB>30'. VAP Bundle, Daily sedation vacation. SBT, minimal vent settings. Pt is a poor candidate for extubation, already failed once, severely deconditioned. CVS:Monitor HD, MAP goal >65. BP's remain soft, on Levophed,  will keep femoral central line for now   GI: NPO. Diet: Cont TF via OGT. Needs PEG tube replaced for chronic dysphagia. G   Renal: Trend Cr, UOP. Merlos (coude) placed by Urology   Hem/Onc: Trend H/H, monitor for s/o active bleeding. Daily CBC.  Status post PRBC transfusion on 11/9  I/D:Trend WBCs and temperature curve. Persistent fevers despite multiple broad spectrum antibiotics/ antifungals concerning for non infectious etiology of fever such as drug fever, DVT.  ABx per ID, broad spectrum (zosyn, levaquin, vancomycin, fluconazole ).  Rcx w/ MSSA, rare yeast. Wound cx with moderate candida albicans. F/u PVL's negative for source of fever   Metabolic: Daily BMP, mag, phos. Trend lytes and replace per protocol. Endocrine:Q6 glucoses. SSI. Avoid hypoglycemia. Musc/Skin: wound care   [facility]: palliative care to touch base with the patients sister. Family will likely make decisions regarding comfort care today. Partial Code   · Discussed in interdisciplinary rounds      Best practice :     Glycemic control  IHI ICU bundles:              Central Line Bundle Followed , Corcoran Bundle Followed and Vent Bundle Followed, Vent Day 9    [facility] Vent patients- VAP bundle, aim to keep peak plateau pressure 68-58BE H2O  Sress ulcer prophylaxis. DVT prophylaxis. Need for Lines, corcoran assessed. Palliative care evaluation.  Mariposa Ulrich MD PGY1  [facility] [id number]      Attending attestation  I saw and evaluated the patient, performing the key elements of the service. I discussed the findings, assessment and plan with the resident and agree with the resident's findings and plan as documented in the resident's note. Total of 30 min critical care time spent at bedside (personally) during the course of care providing evaluation,management and care decisions and ordering appropriate treatment related to critical care problems exclusive of procedures. The reason for providing this level of medical care for this critically ill patient was due a critical illness that impaired one or more vital organ systems such that there was a high probability of imminent or life threatening deterioration in the patients condition. This care involved high complexity decision making to assess, manipulate, and support vital system functions, to treat this degree vital organ system failure and to prevent further life threatening deterioration of the patients condition. This time was independent of other practitioners.       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 with shortness of breath and poor p.o. intake, patient was found to have worsening consolidation of the chest consistent with aspiration pneumonia.  Patient was hospitalized to the floor for multiple days and developed respiratory failure, and I transferred the patient to the ICU after being emergently intubated for acute worsening hypoxic respiratory failure.  Etiology was multifactorial, combination of worsening infection as well as pulmonary edema from hypoalbuminemia/adult failure to thrive.  Patient has been intubated and unfortunately has worsening hypoxia, worsening bilateral infiltrates despite receiving broad-spectrum antibiotics.  Patient now positive for OSCAR, discussed with ID.  Therapeutic bronchoscopy performed due to mucous plugging, BALs performed in bilateral bases -- also AFB+.  Patient's oxygenation unfortunately did not improve, plateau pressure increased to 37 cm H2O, likely secondary to ARDS.  I had a long conversation with the patient's sister who is the caretaker and MPOA, Lucio Quinn, and her , on 11/9 and 11/10 we discussed the patient's clinical course and they were in agreement that patient would not want tracheostomy and they now understand that the patient will not medically improve. They decided on compassionate extubation later today.   Palliative care input appreciated.     Prognosis moribund      Cara Saleem MD/MPH     Pulmonary, Critical Care Medicine  [facility]

## 2021-11-12 NOTE — CONSULTS
[id number] [facility] 54: [phone])  [facility]: [address] [place] [address] Way: [phone]    Patient Name: Mariah Scott  YOB: 1945    Date of follow up 11/12/2021   Reason for Consult: goals of care discussions   Requesting Provider: Dr Mao  Primary Care Physician: Frantz Crystal NP      SUMMARY:   Mariah Scott is a 67y.o. year old with a past history of CAD. S/p stent in 2003, scarlet fever at age 9, interstitial lung disease, chronic bronchiectasis, and chronic aspiration with dysphagia   , who was admitted on 10/18/2021 from home  with a diagnosis of hypoxemic respiratory failure possibly due to aspiration pneumonia . Current medical issues leading to Palliative Medicine involvement include: 67year old male with several life limiting chronic illnesses. Palliative medicine is consulted for support and goals of care discussions. 11/12/2021 remains orally intubated. Sister at bedside wishes to compassionately extubate. Process explained. Comfort orders placed. 11/8/2021 Met with sister and brother in law at bedside they do not wish for trach but wish of venue of which to feed to patient. 11/5/2021 Met with patient's sister Sharee Winchester and brother in law Gela Malcolm again today. Re discussed his current condition, unfortunately no cough, he is not eligible for medical extubation, only option is compassionate extubation or trach. 11/1/2021 family meeting today with his sister Indy nava MPOA and her  Gela Malcolm. Made Partial code no chest compressions, or shock, continue all other treatments    10/29/21:  Rapid response called 4 days ago when patient decompensated respiratory status he was intubated at that time. He self extubated 2 days ago and was re intubated. 10/21/2021 alert, no complaints this am. PEG placement today. PALLIATIVE DIAGNOSES:   1. Goals of care   2. Acute hypoxic respiratory failure   3.  Pneumonia likely due to aspiration   4. Debility        PLAN:   11/12/2021 Mr Ced Curtis remains orally intubated. We have left a message for his sister Wali Howard. Sister Wali Howard arrived at bedside, they are ready to compassionately extubate. Compassionate extubation discussed including use of Morphine and ativan for comfort. He is on high vent setting to help with comfort will place on Morphine PCA start at 4 mg / hr with ability to titrate up by 1 mg / hr. Added Morphine 2 mg IV q 15 minute PRN if needed to help with comfort as well as Ativan IV PRN. Comfort order set in place. Offered support to sister. Discussed with BSRN and PCCM. POST completed and signed for DNR/DNI comfort measures no feeding tubes. ( please see below for previous notes per palliative team)     11/8/2021 Mr Elmer Burgess seen along with Mr Mariel Adame, MSW. He remains intubated and sedated. Met with his sister Kathi Gomez and her . Indy is MPOA. They have decided on no Trach, however do not wish to move to total comfort feeling this against their Yarsanism believes. They wish for him to have a venue to have feeds. Wondering if PEG can be replaced have reached out to GI appreciate help, they will discuss and possibly call sister. Family if Peg can not be placed wishes for NGT, but understand he will likely need to be softly restrained for safety. Will await GI decision, on PEG. Continue partial code for now no chest compressions or shock at cardiac arrest. At present time re intubate if ET is accidentally dislodged. Discussed with GI and PCCM    ( please see below for previous notes per palliative team)     11/5/2021 patient seen at bedside remains orally intubated, sedated but will arouse to his name. Met with his sister Indy and brother in law Tash Patel at bedside. Appreciate ICU assistance with discussions. Shared with sister and brother in law he has no cough and because of this he is not eligible for medical extubation.  Only 2 options, going forward with trach or compassionate extubation. Discussed unfortunately despite all treatment Muna Dupree is not likely to return to enough his base line level of functioning ( which was a sedentary life style ). If trach option is elected he will need NGT for feeds until PEG can be placed and will likely need soft restraints for his safety. He will also likely need facility care for the rest of his life. Sister wished to speak with her other siblings prior to making a decision. Patient is currently sedated with 150 mcg of Fentanyl. Cautioned family do not believe he completely understood conversation. We will re discuss with family on Monday. Goals of care Partial code no chest compressions, no shock at cardiac arrest, Re intubate if ET becomes accidentally dislodged. Pressor support acceptable. ( please see below for previous notes per palliative team)     11/1/2021 Mr Ramila Hamm seen along with Ms Alexia Michael. Appreciate ICU NP also attending meeting. Met with Ms Kellerkel ( [place]) sister who is MPOa and her  Oralia Bond who is secondary MPOA. Updated on medical condition. Sister and  would like to have PEG replaced to give him nourishment to give him the best opportunity to thrive. Goals of care discussed, sister was clear she would not wish for him to be subjected to chest compressions or shock at cardiac arrest, however would like to continue all other treatments, including PEG replacement, IVAB, oral intubation, replace tube if accidentally dislodged. Briefly discussed if he were not able to be liberated from the ventilator next step would be trach, sister did not believe he would want this step and are hopeful it will not come to that discussion. Family is aware patient is very frail and despite all treatment he may not do as well as all hope. Goals of care partial code, no chest compressions, no shock at cardiac arrest, continue all other medical interventions including intubation and replace ET if dislodged.  Attending team aware as well as nurse of goals of care change. ( please see below for previous notes)     10/29/21: This NP and Lynnette Naik RN in to see patient at the bedside. Assessment completed and spoke with ICU team about goals of care. Patient is sedated, intubated, and mechanically ventilated. Our team is reaching out to his family Indy Wade to set up a meeting to discuss goals of care moving forward. At this time patient remains a full code with full interventions    UPDATE:   Meeting set up with patient's family for Monday at 12:00 11/1/21. Appreciate any assistance from the ICU team.     See below for prior discussions:    10/21/2021 Mr Valery Mccrary seen along with Ms Mary Kramer. Alert, deaf, although able to communicate with written word. Denies pain, still some shortness of breath, he is not sure the oxygen is helping. Breathing a bit more labored this am. O2 sats 95% on nasal cannula oxygen. Noted plans for PEG placement later today. Goals of care have not changed patient wishes for full code with full interventions. Goals of care are defined. Palliative medicine will sign off at this time Please re consult if we can be of service. ( please see below for previous notes per palliative team)     1. Goals of care Patient seen along with Ms Laurent Lara RN. He is alert, deaf and communicates verbally however needs questions text or written. We were able to write our questions today. He denied pain or shortness of breath. Noted AMD on file naming his sister Indy Wade as MPOA and her  Francisco Ross as secondary. He wished no changes today. Able to discuss goals of care through writing the question and explaining the benefits and burdens. He was able to verbally tell us he wished to be resuscitated in the event of cardiac arrest and intubation if needed. This was consistent with his AMD wishes. Goals of care full code with full interventions.  Communicated with patient by writing to have further thought and consideration of resuscitation as these efforts will not cure, treat or reverse his underlying chronic medical conditions and likely cause more functional debility. 2. Acute hypoxic respiratory failure likely due to pneumonia but has interstitial lung disease. Maintaining on nasal cannula currently   3. Pneumonia due to aspiration. Appreciate speech consult at time of our visit MBS pending. 4. Debility has care givers during weekdays, sister and brother in law provide care on weekends. Uses walker for ambulation. Very thin and frail appearing albumin 2.4   5. Initial consult note routed to primary continuity provider  6. Communicated plan of care with: Palliative IDT, patient     Patient/Health Care Proxy Stated Goals: Comfort      TREATMENT PREFERENCES:   Code Status: DNR/DNI     Advance Care Planning:  [] [facility] Interdisciplinary Team has updated the ACP Navigator with [address] and Patient Capacity    Primary Decision Maker ([address]): AMD on file naming his sister Prabha Garduno as MPOA   Brother in law Ghent Later as secondary     Medical Interventions: Comfort measures     Artificially Administered Nutrition: No feeding tube     Other:  As far as possible, the palliative care team has discussed with patient / health care proxy about goals of care / treatment preferences for patient. HISTORY:     History obtained from: chart and patient     CHIEF COMPLAINT: respiratory failure     HPI/SUBJECTIVE:    The patient is:   [x] Verbal and participatory receives communication via writing and via text.  He is verbal  [x] Non-participatory due to: 11/1/2021 due to intubation   11/12/2021 compassionate extubation today   Please see summary     Clinical Pain Assessment (nonverbal scale for nonverbal patients): Clinical Pain Assessment  Severity: 0     Activity (Movement): Lying quietly, normal position    Duration: for how long has pt been experiencing pain (e.g., 2 days, 1 month, years)  Frequency: how often pain is an issue (e.g., several times per day, once every few days, constant)     FUNCTIONAL ASSESSMENT:     Palliative Performance Scale (PPS):  PPS: 30    ECOG  ECOG Status : Completely disabled     PSYCHOSOCIAL/SPIRITUAL SCREENING:      Any spiritual / Muslim concerns:  [] Yes /  [x] No    Caregiver Burnout:  [] Yes /  [] No /  [x] No Caregiver Present      Anticipatory grief assessment:   [x] Normal  / [] Maladaptive        REVIEW OF SYSTEMS:     Positive and pertinent negative findings in ROS are noted above in HPI. The following systems were [x] reviewed / [] unable to be reviewed as noted in HPI  Other findings are noted below. Systems: constitutional, ears/nose/mouth/throat, respiratory, gastrointestinal, genitourinary, musculoskeletal, integumentary, neurologic, psychiatric, endocrine. Positive findings noted below. Modified ESAS Completed by: provider   Fatigue: 5       Pain: 0   Anxiety: 0 Nausea: 0     Dyspnea: 0           Stool Occurrence(s): 1        PHYSICAL EXAM:     Wt Readings from Last 3 Encounters:   11/12/21 62.8 kg (138 lb 7.2 oz)   08/31/21 51.7 kg (114 lb)   06/29/21 53.1 kg (117 lb)     Blood pressure 107/64, pulse (!) 117, temperature 98.6 °F (37 °C), resp. rate 24, height 5' 7\" (1.702 m), weight 62.8 kg (138 lb 7.2 oz), SpO2 92 %.   Pain:  Pain Scale 1: Adult Nonverbal Pain Scale  Pain Intensity 1: 0  Pain Onset 1: 0  Pain Location 1: Abdomen  Pain Orientation 1: Anterior  Pain Description 1: Aching  Pain Intervention(s) 1: Medication (see MAR)  Last bowel movement: x 1 11/11/2021     Constitutional: orally intubated and sedated   ENMT: intubated   Cardiovascular: RRR  Respiratory: mechanically ventilated   Skin: warm, dry  Neurologic: eyes closed sedated did not respond to his name or light touch         HISTORY:     Active Problems:    Pneumonia (10/18/2021)      Severe protein-calorie malnutrition ([facility] [address] (10/20/2021)      Gastrostomy complication ([facility] [address] (10/27/2021)      Past Medical History:   Diagnosis Date    CAD (coronary artery disease)     MI 1995; stent 2003 (done preop cochlear sgy)    Cancer ([address]     SQUAMOUS CELL    Coronary artery disease     Deafness     Myocardial infarction [facility]) [id number]    Scarlet fever age 9      Past Surgical History:   Procedure Laterality Date    BRONCH-FIBER/DIAGNOSTIC  6/16/2016         HX COLONOSCOPY  2013    neg    [place]    HX HEART CATHETERIZATION  2003    HX HEART CATHETERIZATION  1999    HX HERNIA REPAIR      inguinal ? laterality    HX OTHER SURGICAL      2 cochlear implants (R); 1 left - all failed      Family History   Problem Relation Age of Onset    No Known Problems Mother     Heart Disease Father     Heart Attack Father         1989    Coronary Art Dis Father     Hypertension Father     High Cholesterol Father      History reviewed, no pertinent family history.   Social History     Tobacco Use    Smoking status: Never Smoker    Smokeless tobacco: Never Used   Substance Use Topics    Alcohol use: No     Allergies   Allergen Reactions    Pollens Extract Runny Nose and Cough      Current Facility-Administered Medications   Medication Dose Route Frequency    LORazepam (ATIVAN) injection 1 mg  1 mg IntraVENous Q15MIN PRN    scopolamine (TRANSDERM-SCOP) 1 mg over 3 days 1 Patch  1 Patch TransDERmal Q72H PRN    glycopyrrolate (ROBINUL) injection 0.2 mg  0.2 mg IntraVENous Q4H PRN    morphine injection 2 mg  2 mg IntraVENous Q15MIN PRN    morphine (PF)  mg/30 ml   IntraVENous CONTINUOUS    0.9% sodium chloride infusion 250 mL  250 mL IntraVENous PRN    chlorhexidine (PERIDEX) 0.12 % mouthwash 10 mL  10 mL Oral Q12H    albuterol-ipratropium (DUO-NEB) 2.5 MG-0.5 MG/3 ML  3 mL Nebulization Q4H PRN    acetaminophen (TYLENOL) suppository 650 mg  650 mg Rectal Q6H PRN    sodium chloride (NS) flush 5-10 mL  5-10 mL IntraVENous PRN    sodium chloride (NS) flush 5-40 mL  5-40 mL IntraVENous PRN        LAB AND IMAGING FINDINGS:     Lab Results   Component Value Date/Time    WBC 18.5 (H) 11/12/2021 03:30 AM    HGB 9.0 (L) 11/12/2021 03:30 AM    PLATELET 196 87/80/5791 03:30 AM     Lab Results   Component Value Date/Time    Sodium 138 11/12/2021 03:30 AM    Potassium 4.0 11/12/2021 03:30 AM    Chloride 101 11/12/2021 03:30 AM    CO2 37 (H) 11/12/2021 03:30 AM    BUN 33 (H) 11/12/2021 03:30 AM    Creatinine 0.54 (L) 11/12/2021 03:30 AM    Calcium 9.1 11/12/2021 03:30 AM    Magnesium 2.4 11/12/2021 03:30 AM    Phosphorus 3.0 11/12/2021 03:30 AM      Lab Results   Component Value Date/Time    Alk. phosphatase 55 11/04/2021 03:40 AM    Protein, total 5.2 (L) 11/04/2021 03:40 AM    Albumin 1.4 (L) 11/10/2021 04:05 AM    Globulin 3.7 11/04/2021 03:40 AM     Lab Results   Component Value Date/Time    INR 1.1 10/28/2021 02:39 AM    Prothrombin time 14.2 10/28/2021 02:39 AM      No results found for: IRON, FE, TIBC, IBCT, PSAT, FERR   No results found for: PH, PCO2, PO2  No components found for: [place]   Lab Results   Component Value Date/Time    CK 25 (L) 10/28/2021 02:39 AM    CK - MB 1.5 10/28/2021 02:39 AM              Total time: 35   minutes   Counseling / coordination time, spent as noted above:   > 50% counseling / coordination: yes with sister and brother in law     Prolonged service was provided for  []30 min   []75 min in face to face time in the presence of the patient, spent as noted above.   Time Start:   Time End:

## 2021-11-13 NOTE — PROGRESS NOTES
responded to Death of  Axel Church, who was a 67 y. o.,male,     The  provided the following Interventions:  Engaged supportively with Mr. Pamela Grigsby family as they shared about his life, their con, and the grief which his sister knew would hit her later. Offered assurance prayers on their behalf. Chart reviewed. Plan:  Chaplains will continue to follow and will provide pastoral care on an as needed/requested basis and grief support for the family.       [address] [place]   [phone]

## 2021-11-17 NOTE — PROGRESS NOTES
Physician Progress Note      PATIENT:               Irma Wolff  CSN #:                  [id number]  :                       1945  ADMIT DATE:       10/18/2021 6:35 PM  [address] [place] DATE:        2021 7:22 PM  RESPONDING  PROVIDER #:        Thomas Trujillo NP          QUERY TEXT:    Dear Malena Siddiqui,    Pt admitted with Acute Respiratory Failure. Pt noted to have Sepsis and Septic Shock with first temp > 100.4 on  and  first WBC >12 on 10/31.  If possible, please document the present on admission status of Sepsis :    The medical record reflects the following:  Risk Factors: Chronic Aspiration, Severe Malnutrition, Sepsis, G-tube complication,    Clinical Indicators:  ->Admission:  *  Temp:100.2 Oral  *  WBCs 9.0  -  7.3    ->10/31- WBC 12.2  -> - Temp 101.1    Treatment: Given IV Abx, sputum and blood cultures, CXRs, ID and Pulmonary consults, Intubated    Thank you,  Belinda SCHILLINGN, RN, CRCR  Please contact me for any questions or concerns regarding this query at [email or web address]  Options provided:  -- Yes, sepsis was present at the time of the order to admit to the hospital  -- No, sepsis was not present on admission and developed during the inpatient stay  -- Other - I will add my own diagnosis  -- Disagree - Not applicable / Not valid  -- Disagree - Clinically unable to determine / Unknown  -- Refer to Clinical Documentation Reviewer    PROVIDER RESPONSE TEXT:    Yes, sepsis was present at the time of the order to admit to the hospital.    Query created by: Ca Lowry on 11/15/2021 2:02 PM      Electronically signed by:  Thomas Trujillo NP 2021 6:56 PM

## 2021-11-25 LAB
ACID FAST STN SPEC: POSITIVE
M AVIUM CMPLX RRNA SPEC QL PROBE: NEGATIVE
M GORDONAE RRNA SPEC QL PROBE: NORMAL
M KANSASII RRNA SPEC QL PROBE: NORMAL
M TB CMPLX RRNA SPEC QL PROBE: NEGATIVE
MYCOBACTERIUM SPEC QL CULT: POSITIVE
OTHER, RAFBI6: NORMAL
SPECIMEN PREPARATION: ABNORMAL
SPECIMEN SOURCE: ABNORMAL
SPECIMEN SOURCE: NORMAL

## 2021-11-30 LAB
OTHER MICROORG DNA SPEC NAA+PROBE: ABNORMAL
SPECIMEN SOURCE: ABNORMAL
SUSCEPTIBILITY TESTING, RSUS1T: ABNORMAL

## 2021-12-03 LAB
ACID FAST STN SPEC: NEGATIVE
ACID FAST STN SPEC: POSITIVE
AMIKACIN ISLT MIC: ABNORMAL
CIPROFLOXACIN ISLT MIC: ABNORMAL
CLARITHRO ISLT MIC: ABNORMAL
CLOFAZIMINE: ABNORMAL
DOXYCYCLINE: ABNORMAL
LINEZOLID ISLT MIC: ABNORMAL
M AVIUM CMPLX RRNA SPEC QL PROBE: NEGATIVE
M AVIUM CMPLX RRNA SPEC QL PROBE: NEGATIVE
M GORDONAE RRNA SPEC QL PROBE: NEGATIVE
M GORDONAE RRNA SPEC QL PROBE: NORMAL
M KANSASII RRNA SPEC QL PROBE: NORMAL
M KANSASII RRNA SPEC QL PROBE: POSITIVE
M TB CMPLX RRNA SPEC QL PROBE: NEGATIVE
M TB CMPLX RRNA SPEC QL PROBE: NEGATIVE
MICROORGANISM SPEC CULT: ABNORMAL
MINOCYCLINE, AFR85: ABNORMAL
MOXIFLOXACIN ISLT MIC: ABNORMAL
MOXIFLOXACIN: ABNORMAL
MYCOBACTERIUM SPEC QL CULT: POSITIVE
MYCOBACTERIUM SPEC QL CULT: POSITIVE
OTHER, RAFBI6: ABNORMAL
OTHER, RAFBI6: NORMAL
RIFAMPIN ISLT MIC: ABNORMAL
SPECIMEN PREPARATION: ABNORMAL
SPECIMEN PREPARATION: ABNORMAL
SPECIMEN SOURCE: ABNORMAL
SPECIMEN SOURCE: NORMAL
STREPTOMYCIN ISLT MIC: ABNORMAL
SUSCEPT TESTING, RAFBI7: ABNORMAL
TMP SMX ISLT MIC: ABNORMAL

## 2021-12-22 LAB
OTHER MICROORG DNA SPEC NAA+PROBE: ABNORMAL
SPECIMEN SOURCE: ABNORMAL
SUSCEPTIBILITY TESTING, RSUS1T: ABNORMAL

## (undated) DEVICE — BITE BLOCK ENDOSCP UNIV AD 6 TO 9.4 MM

## (undated) DEVICE — SYR 50ML LR LCK 1ML GRAD NSAF --

## (undated) DEVICE — DRAPE TWL SURG 16X26IN BLU ORB04] ALLCARE INC]

## (undated) DEVICE — SYR 10ML LUER LOK 1/5ML GRAD --

## (undated) DEVICE — MEDI-VAC NON-CONDUCTIVE SUCTION TUBING: Brand: CARDINAL HEALTH

## (undated) DEVICE — KIT GASTMY PERC PEG PULL 20FR -- ENDOVIVE BX/2

## (undated) DEVICE — SYRINGE MED 25GA 3ML L5/8IN SUBQ PLAS W/ DETACH NDL SFTY

## (undated) DEVICE — GAUZE,SPONGE,4"X4",16PLY,STRL,LF,10/TRAY: Brand: MEDLINE

## (undated) DEVICE — Z DISCONTINUED USE (MFG CAT 7984-37) SOLUTION IV SODIUM CHL 0.9% 100 ML INJ

## (undated) DEVICE — TAPE ADH CLTH SILK H2O REPELLENT CURAD